# Patient Record
Sex: FEMALE | Race: WHITE | Employment: FULL TIME | URBAN - METROPOLITAN AREA
[De-identification: names, ages, dates, MRNs, and addresses within clinical notes are randomized per-mention and may not be internally consistent; named-entity substitution may affect disease eponyms.]

---

## 2017-11-03 ENCOUNTER — ED HISTORICAL/CONVERTED ENCOUNTER (OUTPATIENT)
Dept: OTHER | Age: 42
End: 2017-11-03

## 2022-09-16 ENCOUNTER — HOSPITAL ENCOUNTER (INPATIENT)
Age: 47
LOS: 4 days | Discharge: HOME | DRG: 193 | End: 2022-09-20
Attending: STUDENT IN AN ORGANIZED HEALTH CARE EDUCATION/TRAINING PROGRAM | Admitting: INTERNAL MEDICINE

## 2022-09-16 ENCOUNTER — APPOINTMENT (OUTPATIENT)
Dept: GENERAL RADIOLOGY | Age: 47
DRG: 193 | End: 2022-09-16
Attending: STUDENT IN AN ORGANIZED HEALTH CARE EDUCATION/TRAINING PROGRAM

## 2022-09-16 ENCOUNTER — APPOINTMENT (OUTPATIENT)
Dept: CT IMAGING | Age: 47
DRG: 193 | End: 2022-09-16
Attending: INTERNAL MEDICINE

## 2022-09-16 DIAGNOSIS — J44.1 ACUTE EXACERBATION OF CHRONIC OBSTRUCTIVE PULMONARY DISEASE (COPD) (HCC): Primary | ICD-10-CM

## 2022-09-16 LAB
ALBUMIN SERPL-MCNC: 3.8 G/DL (ref 3.5–5)
ALBUMIN/GLOB SERPL: 1.1 {RATIO} (ref 1.1–2.2)
ALP SERPL-CCNC: 78 U/L (ref 45–117)
ALT SERPL-CCNC: 24 U/L (ref 12–78)
ANION GAP SERPL CALC-SCNC: 5 MMOL/L (ref 5–15)
AST SERPL W P-5'-P-CCNC: 16 U/L (ref 15–37)
BASOPHILS # BLD: 0.1 K/UL (ref 0–0.1)
BASOPHILS NFR BLD: 1 % (ref 0–1)
BILIRUB SERPL-MCNC: 0.4 MG/DL (ref 0.2–1)
BNP SERPL-MCNC: 74 PG/ML
BUN SERPL-MCNC: 7 MG/DL (ref 6–20)
BUN/CREAT SERPL: 10 (ref 12–20)
CA-I BLD-MCNC: 9.4 MG/DL (ref 8.5–10.1)
CHLORIDE SERPL-SCNC: 105 MMOL/L (ref 97–108)
CO2 SERPL-SCNC: 26 MMOL/L (ref 21–32)
CREAT SERPL-MCNC: 0.71 MG/DL (ref 0.55–1.02)
DIFFERENTIAL METHOD BLD: ABNORMAL
EOSINOPHIL # BLD: 0 K/UL (ref 0–0.4)
EOSINOPHIL NFR BLD: 0 % (ref 0–7)
ERYTHROCYTE [DISTWIDTH] IN BLOOD BY AUTOMATED COUNT: 14.3 % (ref 11.5–14.5)
FLUAV RNA SPEC QL NAA+PROBE: NOT DETECTED
FLUBV RNA SPEC QL NAA+PROBE: NOT DETECTED
GLOBULIN SER CALC-MCNC: 3.6 G/DL (ref 2–4)
GLUCOSE SERPL-MCNC: 109 MG/DL (ref 65–100)
HCT VFR BLD AUTO: 41.7 % (ref 35–47)
HGB BLD-MCNC: 14 G/DL (ref 11.5–16)
IMM GRANULOCYTES # BLD AUTO: 0 K/UL (ref 0–0.04)
IMM GRANULOCYTES NFR BLD AUTO: 0 % (ref 0–0.5)
LYMPHOCYTES # BLD: 0.6 K/UL (ref 0.8–3.5)
LYMPHOCYTES NFR BLD: 5 % (ref 12–49)
MCH RBC QN AUTO: 29.5 PG (ref 26–34)
MCHC RBC AUTO-ENTMCNC: 33.6 G/DL (ref 30–36.5)
MCV RBC AUTO: 87.8 FL (ref 80–99)
MONOCYTES # BLD: 0.9 K/UL (ref 0–1)
MONOCYTES NFR BLD: 8 % (ref 5–13)
NEUTS SEG # BLD: 9.5 K/UL (ref 1.8–8)
NEUTS SEG NFR BLD: 86 % (ref 32–75)
NRBC # BLD: 0 K/UL (ref 0–0.01)
NRBC BLD-RTO: 0 PER 100 WBC
PLATELET # BLD AUTO: 260 K/UL (ref 150–400)
PMV BLD AUTO: 11.2 FL (ref 8.9–12.9)
POTASSIUM SERPL-SCNC: 4.3 MMOL/L (ref 3.5–5.1)
PROT SERPL-MCNC: 7.4 G/DL (ref 6.4–8.2)
RBC # BLD AUTO: 4.75 M/UL (ref 3.8–5.2)
SARS-COV-2, COV2: NOT DETECTED
SODIUM SERPL-SCNC: 136 MMOL/L (ref 136–145)
TROPONIN-HIGH SENSITIVITY: 7 NG/L (ref 0–51)
WBC # BLD AUTO: 11.1 K/UL (ref 3.6–11)

## 2022-09-16 PROCEDURE — 83880 ASSAY OF NATRIURETIC PEPTIDE: CPT

## 2022-09-16 PROCEDURE — 80053 COMPREHEN METABOLIC PANEL: CPT

## 2022-09-16 PROCEDURE — 94640 AIRWAY INHALATION TREATMENT: CPT

## 2022-09-16 PROCEDURE — 99285 EMERGENCY DEPT VISIT HI MDM: CPT

## 2022-09-16 PROCEDURE — 85025 COMPLETE CBC W/AUTO DIFF WBC: CPT

## 2022-09-16 PROCEDURE — 74011000250 HC RX REV CODE- 250: Performed by: INTERNAL MEDICINE

## 2022-09-16 PROCEDURE — 74011250636 HC RX REV CODE- 250/636: Performed by: STUDENT IN AN ORGANIZED HEALTH CARE EDUCATION/TRAINING PROGRAM

## 2022-09-16 PROCEDURE — 96374 THER/PROPH/DIAG INJ IV PUSH: CPT

## 2022-09-16 PROCEDURE — 84484 ASSAY OF TROPONIN QUANT: CPT

## 2022-09-16 PROCEDURE — 71045 X-RAY EXAM CHEST 1 VIEW: CPT

## 2022-09-16 PROCEDURE — 36415 COLL VENOUS BLD VENIPUNCTURE: CPT

## 2022-09-16 PROCEDURE — 87636 SARSCOV2 & INF A&B AMP PRB: CPT

## 2022-09-16 PROCEDURE — 74011250636 HC RX REV CODE- 250/636: Performed by: INTERNAL MEDICINE

## 2022-09-16 PROCEDURE — 71260 CT THORAX DX C+: CPT

## 2022-09-16 PROCEDURE — 65270000029 HC RM PRIVATE

## 2022-09-16 PROCEDURE — 74011250637 HC RX REV CODE- 250/637: Performed by: INTERNAL MEDICINE

## 2022-09-16 PROCEDURE — 74011000250 HC RX REV CODE- 250: Performed by: STUDENT IN AN ORGANIZED HEALTH CARE EDUCATION/TRAINING PROGRAM

## 2022-09-16 PROCEDURE — 74011250637 HC RX REV CODE- 250/637: Performed by: STUDENT IN AN ORGANIZED HEALTH CARE EDUCATION/TRAINING PROGRAM

## 2022-09-16 PROCEDURE — 74011000636 HC RX REV CODE- 636: Performed by: INTERNAL MEDICINE

## 2022-09-16 RX ORDER — ACETAMINOPHEN 325 MG/1
650 TABLET ORAL
Status: DISCONTINUED | OUTPATIENT
Start: 2022-09-16 | End: 2022-09-20 | Stop reason: HOSPADM

## 2022-09-16 RX ORDER — BUDESONIDE AND FORMOTEROL FUMARATE DIHYDRATE 160; 4.5 UG/1; UG/1
2 AEROSOL RESPIRATORY (INHALATION)
Status: DISCONTINUED | OUTPATIENT
Start: 2022-09-16 | End: 2022-09-20 | Stop reason: HOSPADM

## 2022-09-16 RX ORDER — IPRATROPIUM BROMIDE AND ALBUTEROL SULFATE 2.5; .5 MG/3ML; MG/3ML
3 SOLUTION RESPIRATORY (INHALATION)
Status: COMPLETED | OUTPATIENT
Start: 2022-09-16 | End: 2022-09-16

## 2022-09-16 RX ORDER — IPRATROPIUM BROMIDE AND ALBUTEROL SULFATE 2.5; .5 MG/3ML; MG/3ML
3 SOLUTION RESPIRATORY (INHALATION)
Status: DISCONTINUED | OUTPATIENT
Start: 2022-09-16 | End: 2022-09-18

## 2022-09-16 RX ORDER — AZITHROMYCIN 500 MG/1
500 TABLET, FILM COATED ORAL
Status: COMPLETED | OUTPATIENT
Start: 2022-09-16 | End: 2022-09-16

## 2022-09-16 RX ORDER — ONDANSETRON 2 MG/ML
4 INJECTION INTRAMUSCULAR; INTRAVENOUS
Status: DISCONTINUED | OUTPATIENT
Start: 2022-09-16 | End: 2022-09-20 | Stop reason: HOSPADM

## 2022-09-16 RX ORDER — DIPHENHYDRAMINE HCL 25 MG
25 TABLET ORAL AS NEEDED
COMMUNITY

## 2022-09-16 RX ORDER — ENOXAPARIN SODIUM 100 MG/ML
30 INJECTION SUBCUTANEOUS 2 TIMES DAILY
Status: DISCONTINUED | OUTPATIENT
Start: 2022-09-17 | End: 2022-09-20 | Stop reason: HOSPADM

## 2022-09-16 RX ORDER — POLYETHYLENE GLYCOL 3350 17 G/17G
17 POWDER, FOR SOLUTION ORAL DAILY PRN
Status: DISCONTINUED | OUTPATIENT
Start: 2022-09-16 | End: 2022-09-20 | Stop reason: HOSPADM

## 2022-09-16 RX ORDER — ALBUTEROL SULFATE 0.83 MG/ML
2.5 SOLUTION RESPIRATORY (INHALATION)
COMMUNITY

## 2022-09-16 RX ORDER — ONDANSETRON 4 MG/1
4 TABLET, ORALLY DISINTEGRATING ORAL
Status: DISCONTINUED | OUTPATIENT
Start: 2022-09-16 | End: 2022-09-20 | Stop reason: HOSPADM

## 2022-09-16 RX ORDER — SODIUM CHLORIDE 0.9 % (FLUSH) 0.9 %
5-40 SYRINGE (ML) INJECTION EVERY 8 HOURS
Status: DISCONTINUED | OUTPATIENT
Start: 2022-09-16 | End: 2022-09-16

## 2022-09-16 RX ORDER — SODIUM CHLORIDE 0.9 % (FLUSH) 0.9 %
5-40 SYRINGE (ML) INJECTION AS NEEDED
Status: DISCONTINUED | OUTPATIENT
Start: 2022-09-16 | End: 2022-09-20 | Stop reason: HOSPADM

## 2022-09-16 RX ORDER — ALBUTEROL SULFATE 90 UG/1
2 AEROSOL, METERED RESPIRATORY (INHALATION)
COMMUNITY

## 2022-09-16 RX ORDER — ACETAMINOPHEN 650 MG/1
650 SUPPOSITORY RECTAL
Status: DISCONTINUED | OUTPATIENT
Start: 2022-09-16 | End: 2022-09-20 | Stop reason: HOSPADM

## 2022-09-16 RX ORDER — PANTOPRAZOLE SODIUM 40 MG/1
40 TABLET, DELAYED RELEASE ORAL DAILY
Status: DISCONTINUED | OUTPATIENT
Start: 2022-09-17 | End: 2022-09-20 | Stop reason: HOSPADM

## 2022-09-16 RX ADMIN — ACETAMINOPHEN 650 MG: 325 TABLET, FILM COATED ORAL at 14:14

## 2022-09-16 RX ADMIN — IPRATROPIUM BROMIDE AND ALBUTEROL SULFATE 3 ML: .5; 2.5 SOLUTION RESPIRATORY (INHALATION) at 08:57

## 2022-09-16 RX ADMIN — METHYLPREDNISOLONE SODIUM SUCCINATE 60 MG: 40 INJECTION, POWDER, FOR SOLUTION INTRAMUSCULAR; INTRAVENOUS at 14:09

## 2022-09-16 RX ADMIN — METHYLPREDNISOLONE SODIUM SUCCINATE 60 MG: 40 INJECTION, POWDER, FOR SOLUTION INTRAMUSCULAR; INTRAVENOUS at 21:45

## 2022-09-16 RX ADMIN — BUDESONIDE AND FORMOTEROL FUMARATE DIHYDRATE 2 PUFF: 160; 4.5 AEROSOL RESPIRATORY (INHALATION) at 20:49

## 2022-09-16 RX ADMIN — LEVOFLOXACIN 750 MG: 500 TABLET, FILM COATED ORAL at 12:44

## 2022-09-16 RX ADMIN — IPRATROPIUM BROMIDE AND ALBUTEROL SULFATE 3 ML: .5; 2.5 SOLUTION RESPIRATORY (INHALATION) at 08:58

## 2022-09-16 RX ADMIN — IOPAMIDOL 100 ML: 755 INJECTION, SOLUTION INTRAVENOUS at 12:39

## 2022-09-16 RX ADMIN — AZITHROMYCIN MONOHYDRATE 500 MG: 500 TABLET ORAL at 08:58

## 2022-09-16 RX ADMIN — IPRATROPIUM BROMIDE AND ALBUTEROL SULFATE 3 ML: .5; 3 SOLUTION RESPIRATORY (INHALATION) at 20:49

## 2022-09-16 RX ADMIN — SODIUM CHLORIDE, PRESERVATIVE FREE 10 ML: 5 INJECTION INTRAVENOUS at 14:09

## 2022-09-16 RX ADMIN — IPRATROPIUM BROMIDE AND ALBUTEROL SULFATE 3 ML: .5; 3 SOLUTION RESPIRATORY (INHALATION) at 14:09

## 2022-09-16 RX ADMIN — METHYLPREDNISOLONE SODIUM SUCCINATE 125 MG: 125 INJECTION, POWDER, FOR SOLUTION INTRAMUSCULAR; INTRAVENOUS at 08:58

## 2022-09-16 NOTE — PROGRESS NOTES
Patient arrived to room via wheelchair and able to ambulate to the bed. Standard safety measures are in place. Patient A&O x4, patient has no needs at this time.

## 2022-09-16 NOTE — H&P
History and Physical    Patient: Kelsey Torres MRN: [id number]  SSN: [id number]    YOB: 1975  Age: 49 y.o. Sex: female      Subjective:      Kelsey Torres is a 49 y.o. female who presents to ER with 2 days of increasing wheezing, difficulty breathing, productive cough. +Tobacco. Has NOT received Covid vaccination. On inhalers at home. No other significant hx or ROS. PMH: Asthma/COPD  PSH: Negative  Med: Inhalers  SH: + Tobacco  FH: Negative       No Known Allergies    Review of Systems:  Constitutional: No fevers, No chills, No night sweats, No fatigue, No weakness, No significant weight change  Eyes: No visual disturbance, No loss of vision  HENT: No nasal congestion, No sore throat, No head lesion, No hearing deficit  Respiratory: +SOB, wheeze, productive cough  Cardiovascular: No chest pain, No lower extremity edema, No palpitations, No PND, No orthopnea  Gastrointestinal: No nausea, No vomiting, No diarrhea, No constipation, No abdominal pain, No Melena, No hematemesis, No BRBPR  Genitourinary: No frequency, No dysuria, No hematuria  Integument/Breast: No rash, No new skin lesion(s), No dryness, No new palpable nodule  Musculoskeletal: No arthralgias, No neck pain, No back pain, No joint pain, No fall or injury  Neurological: No headaches, No dizziness, No confusion,  No seizures, No focal weakness, No LOC, No paresthesia  Heme/Onc: No overt bleeding noted, No obvious swollen glands  Behavioral/Psychiatric: No change in mood; no SI; no hallucination      Objective:     Vitals:    09/16/22 1032 09/16/22 1039 09/16/22 1040 09/16/22 1122   BP:    (!) 141/91   Pulse:    (!) 102   Resp:    16   Temp:       SpO2: (!) 87% 91% 92% 92%   Weight:       Height:            Physical Exam:    General: +distress; accessory muscles  Head: atraumatic  Eye: No overt orbital findings, PERRLA   ENT: No overt hearing loss noted; No nasal lesion;  Throat julia  Neck: Supple, No overt palpable mass, No significant palpable lymph nodes  Vascular: No carotid bruit, palpable pulses bilat  Lung:  bilat wheezing; 3L NC  Heart: S1S2, No significant murmur appreciated  Abdomen: Soft, NT, No rigidity, No rebound, Bowel sounds +  Extremities: No edema  M/S: No traumatic change, active mobility noted  Skin: No cyanosis, No rash of significance (other than chronic lesions)  Neurologic: No overt focal motor deficit. Oriented. Psychiatric: Coherent and age appropriate    Recent Results (from the past 24 hour(s))   METABOLIC PANEL, COMPREHENSIVE    Collection Time: 09/16/22  8:57 AM   Result Value Ref Range    Sodium 136 136 - 145 mmol/L    Potassium 4.3 3.5 - 5.1 mmol/L    Chloride 105 97 - 108 mmol/L    CO2 26 21 - 32 mmol/L    Anion gap 5 5 - 15 mmol/L    Glucose 109 (H) 65 - 100 mg/dL    BUN 7 6 - 20 mg/dL    Creatinine 0.71 0.55 - 1.02 mg/dL    BUN/Creatinine ratio 10 (L) 12 - 20      GFR est AA >60 >60 ml/min/1.73m2    GFR est non-AA >60 >60 ml/min/1.73m2    Calcium 9.4 8.5 - 10.1 mg/dL    Bilirubin, total 0.4 0.2 - 1.0 mg/dL    AST (SGOT) 16 15 - 37 U/L    ALT (SGPT) 24 12 - 78 U/L    Alk. phosphatase 78 45 - 117 U/L    Protein, total 7.4 6.4 - 8.2 g/dL    Albumin 3.8 3.5 - 5.0 g/dL    Globulin 3.6 2.0 - 4.0 g/dL    A-G Ratio 1.1 1.1 - 2.2     CBC WITH AUTOMATED DIFF    Collection Time: 09/16/22  8:57 AM   Result Value Ref Range    WBC 11.1 (H) 3.6 - 11.0 K/uL    RBC 4.75 3.80 - 5.20 M/uL    HGB 14.0 11.5 - 16.0 g/dL    HCT 41.7 35.0 - 47.0 %    MCV 87.8 80.0 - 99.0 FL    MCH 29.5 26.0 - 34.0 PG    MCHC 33.6 30.0 - 36.5 g/dL    RDW 14.3 11.5 - 14.5 %    PLATELET 210 996 - 059 K/uL    MPV 11.2 8.9 - 12.9 FL    NRBC 0.0 0.0  WBC    ABSOLUTE NRBC 0.00 0.00 - 0.01 K/uL    NEUTROPHILS 86 (H) 32 - 75 %    LYMPHOCYTES 5 (L) 12 - 49 %    MONOCYTES 8 5 - 13 %    EOSINOPHILS 0 0 - 7 %    BASOPHILS 1 0 - 1 %    IMMATURE GRANULOCYTES 0 0 - 0.5 %    ABS. NEUTROPHILS 9.5 (H) 1.8 - 8.0 K/UL    ABS.  LYMPHOCYTES 0.6 (L) 0.8 - 3.5 K/UL    ABS. MONOCYTES 0.9 0.0 - 1.0 K/UL    ABS. EOSINOPHILS 0.0 0.0 - 0.4 K/UL    ABS. BASOPHILS 0.1 0.0 - 0.1 K/UL    ABS. IMM. GRANS. 0.0 0.00 - 0.04 K/UL    DF AUTOMATED     TROPONIN-HIGH SENSITIVITY    Collection Time: 09/16/22  8:57 AM   Result Value Ref Range    Troponin-High Sensitivity 7 0 - 51 ng/L   NT-PRO BNP    Collection Time: 09/16/22  8:57 AM   Result Value Ref Range    NT pro-BNP 74 <125 pg/mL       XR Results (maximum last 3): Results from Hospital Encounter encounter on 09/16/22    XR CHEST PORT    Narrative  Indication: Shortness of breath    Comparison: None    Portable exam of the chest obtained at 927 demonstrates normal heart size. There  is no acute process in the lung fields. Old right rib fracture is noted. Impression  No acute process.         Assessment/Plan:     Acute Hypoxic Respiratory Failure d/t AECOPD with Chronic Tobacco    - Admit  - Solu-medrol, O2, Levaquin, Duoneb  - C/s pulm  - CT Chest  - Covid test  - Nicotine    DVT Prophylaxis: Lovenox  Code Status: FULL    Signed By: Sadie Robles MD     September 16, 2022

## 2022-09-16 NOTE — ED PROVIDER NOTES
EMERGENCY DEPARTMENT HISTORY AND PHYSICAL EXAM      Date: 9/16/2022  Patient Name: Farhad Barnes    History of Presenting Illness   No chief complaint on file. History Provided By: Patient    HPI: Farhad Barnes, 49 y.o. female with past medical history of asthma versus COPD presents with complaint of difficulty breathing and symptoms started yesterday while she was working her shift. She has used her home inhalers without significant resolution. She does not recall the last time she used steroid. She endorses increased sputum production, no fevers or chills no associated chest pain. No  or GI symptoms. There are no other complaints, changes, or physical findings at this time. PCP: None    No current facility-administered medications on file prior to encounter. Current Outpatient Medications on File Prior to Encounter   Medication Sig Dispense Refill    albuterol (PROVENTIL HFA, VENTOLIN HFA, PROAIR HFA) 90 mcg/actuation inhaler Take 2 Puffs by inhalation every six (6) hours as needed for Wheezing. albuterol (PROVENTIL VENTOLIN) 2.5 mg /3 mL (0.083 %) nebu 2.5 mg by Nebulization route every six (6) hours as needed for Wheezing. diphenhydrAMINE (BENADRYL) 25 mg tablet Take 25 mg by mouth as needed. Past History     Past Medical History:  History reviewed. No pertinent past medical history. Past Surgical History:  History reviewed. No pertinent surgical history. Family History:  No family history on file. Social History: Allergies:  No Known Allergies    Review of Systems   Review of Systems  Constitutional: Negative except as in HPI. Eyes: Negative except as in HPI.  ENT: Negative except as in HPI. Cardiovascular: Negative except as in HPI. Respiratory: Negative except as in HPI. Gastrointestinal: Negative except as in HPI. Genitourinary: Negative except as in HPI. Musculoskeletal: Negative except as in HPI.   Integumentary: Negative except as in HPI.  Neurological: Negative except as in HPI. Psychiatric: Negative except as in HPI. Endocrine: Negative except as in HPI. Hematologic/Lymphatic: Negative except as in HPI. Allergic/Immunologic: Negative except as in HPI. Physical Exam   Physical Exam  Constitutional:       Appearance: Normal appearance. HENT:      Head: Normocephalic and atraumatic. Nose: Nose normal.      Mouth/Throat:      Mouth: Mucous membranes are moist.   Eyes:      Conjunctiva/sclera: Conjunctivae normal.      Pupils: Pupils are equal, round, and reactive to light. Cardiovascular:      Rate and Rhythm: Regular rhythm. Tachycardia present. Heart sounds: Normal heart sounds. Pulmonary:      Effort: Respiratory distress present. Breath sounds: Wheezing present. Abdominal:      General: There is no distension. Palpations: Abdomen is soft. Tenderness: There is no abdominal tenderness. Musculoskeletal:         General: No tenderness or deformity. Normal range of motion. Cervical back: Normal range of motion and neck supple. Skin:     General: Skin is warm and dry. Neurological:      General: No focal deficit present. Mental Status: She is alert and oriented to person, place, and time.    Psychiatric:         Mood and Affect: Mood normal.         Behavior: Behavior normal.       Lab and Diagnostic Study Results   Labs -     Recent Results (from the past 12 hour(s))   METABOLIC PANEL, COMPREHENSIVE    Collection Time: 09/16/22  8:57 AM   Result Value Ref Range    Sodium 136 136 - 145 mmol/L    Potassium 4.3 3.5 - 5.1 mmol/L    Chloride 105 97 - 108 mmol/L    CO2 26 21 - 32 mmol/L    Anion gap 5 5 - 15 mmol/L    Glucose 109 (H) 65 - 100 mg/dL    BUN 7 6 - 20 mg/dL    Creatinine 0.71 0.55 - 1.02 mg/dL    BUN/Creatinine ratio 10 (L) 12 - 20      GFR est AA >60 >60 ml/min/1.73m2    GFR est non-AA >60 >60 ml/min/1.73m2    Calcium 9.4 8.5 - 10.1 mg/dL    Bilirubin, total 0.4 0.2 - 1.0 mg/dL    AST (SGOT) 16 15 - 37 U/L    ALT (SGPT) 24 12 - 78 U/L    Alk. phosphatase 78 45 - 117 U/L    Protein, total 7.4 6.4 - 8.2 g/dL    Albumin 3.8 3.5 - 5.0 g/dL    Globulin 3.6 2.0 - 4.0 g/dL    A-G Ratio 1.1 1.1 - 2.2     CBC WITH AUTOMATED DIFF    Collection Time: 09/16/22  8:57 AM   Result Value Ref Range    WBC 11.1 (H) 3.6 - 11.0 K/uL    RBC 4.75 3.80 - 5.20 M/uL    HGB 14.0 11.5 - 16.0 g/dL    HCT 41.7 35.0 - 47.0 %    MCV 87.8 80.0 - 99.0 FL    MCH 29.5 26.0 - 34.0 PG    MCHC 33.6 30.0 - 36.5 g/dL    RDW 14.3 11.5 - 14.5 %    PLATELET 425 201 - 397 K/uL    MPV 11.2 8.9 - 12.9 FL    NRBC 0.0 0.0  WBC    ABSOLUTE NRBC 0.00 0.00 - 0.01 K/uL    NEUTROPHILS 86 (H) 32 - 75 %    LYMPHOCYTES 5 (L) 12 - 49 %    MONOCYTES 8 5 - 13 %    EOSINOPHILS 0 0 - 7 %    BASOPHILS 1 0 - 1 %    IMMATURE GRANULOCYTES 0 0 - 0.5 %    ABS. NEUTROPHILS 9.5 (H) 1.8 - 8.0 K/UL    ABS. LYMPHOCYTES 0.6 (L) 0.8 - 3.5 K/UL    ABS. MONOCYTES 0.9 0.0 - 1.0 K/UL    ABS. EOSINOPHILS 0.0 0.0 - 0.4 K/UL    ABS. BASOPHILS 0.1 0.0 - 0.1 K/UL    ABS. IMM. GRANS. 0.0 0.00 - 0.04 K/UL    DF AUTOMATED     TROPONIN-HIGH SENSITIVITY    Collection Time: 09/16/22  8:57 AM   Result Value Ref Range    Troponin-High Sensitivity 7 0 - 51 ng/L   NT-PRO BNP    Collection Time: 09/16/22  8:57 AM   Result Value Ref Range    NT pro-BNP 74 <125 pg/mL   COVID-19 WITH INFLUENZA A/B    Collection Time: 09/16/22 12:19 PM   Result Value Ref Range    SARS-CoV-2 by PCR Not Detected Not Detected      Influenza A by PCR Not Detected Not Detected      Influenza B by PCR Not Detected Not Detected         Radiologic Studies -   @lastxrresult@  CT Results  (Last 48 hours)      None          CXR Results  (Last 48 hours)                 09/16/22 0927  XR CHEST PORT Final result    Impression:  No acute process. Narrative: Indication: Shortness of breath       Comparison: None       Portable exam of the chest obtained at 927 demonstrates normal heart size.  There   is no acute process in the lung fields. Old right rib fracture is noted. Medical Decision Making and ED Course   Differential Diagnosis & Medical Decision Making Provider Note:   18-year-old female presenting for evaluation of difficulty breathing. Patient in extremis on initial exam with expiratory wheezing throughout and intermittently descending into the high 80s. Placed on oxygen and treat for presumed COPD exacerbation with steroid and azithromycin given report of possible prior COPD diagnoses and increase in sputum production in the setting of this exacerbation. Will reevaluate following treatment. Chest x-ray to rule out pneumonia. - I am the first provider for this patient. I reviewed the vital signs, available nursing notes, past medical history, past surgical history, family history and social history. The patients presenting problems have been discussed, and they are in agreement with the care plan formulated and outlined with them. I have encouraged them to ask questions as they arise throughout their visit. Vital Signs-Reviewed the patient's vital signs. Patient Vitals for the past 12 hrs:   Temp Pulse Resp BP SpO2   09/16/22 1426 -- (!) 113 17 (!) 140/67 91 %   09/16/22 1122 -- (!) 102 16 (!) 141/91 92 %   09/16/22 1040 -- -- -- -- 92 %   09/16/22 1039 -- -- -- -- 91 %   09/16/22 1032 -- -- -- -- (!) 87 %   09/16/22 0955 -- -- -- -- 95 %   09/16/22 0940 -- -- -- (!) 159/84 93 %   09/16/22 0925 -- -- -- (!) 152/90 96 %   09/16/22 0910 -- -- -- (!) 130/94 98 %   09/16/22 0855 99.8 °F (37.7 °C) -- -- -- 93 %   09/16/22 0852 -- (!) 114 26 (!) 186/100 (!) 88 %       ED Course:   ED Course as of 09/16/22 1457   Fri Sep 16, 2022   1121 Respiratory effort improved but desaturating to mid 80s on room air. Placed back on 3 L nasal cannula, admitted for hypoxia in the setting of COPD exacerbation.  [BQ]      ED Course User Index  [BQ] Sandra Mosqueda MD         Procedures   Performed by: Gricel Brush MD  Procedures      Disposition   Disposition: Admitted to Floor Medical Floor the case was discussed with the admitting physician Tereza Rivera    Diagnosis/Clinical Impression     Clinical Impression:   1. Acute exacerbation of chronic obstructive pulmonary disease (COPD) (HCC)        Attestations: Eloy Goodwin MD, am the primary clinician of record. Please note that this dictation was completed with [employer], the computer voice recognition software. Quite often unanticipated grammatical, syntax, homophones, and other interpretive errors are inadvertently transcribed by the computer software. Please disregard these errors. Please excuse any errors that have escaped final proofreading. Thank you.

## 2022-09-16 NOTE — PROGRESS NOTES
Reason for Admission:  SOB                     RUR Score:    4%                 Plan for utilizing home health:  Not at this time        PCP: First and Last name:  None     Name of Practice:    Are you a current patient: Yes/No:    Approximate date of last visit:    Can you participate in a virtual visit with your PCP:                     Current Advanced Directive/Advance Care Plan: Full Code      Healthcare Decision Maker:   Click here to complete [address] including selection of the Healthcare Decision Maker Relationship (ie \"Primary\")      Irma Shahid, boyfriend, [phone], Pt stated that Denise Swenson can make decision for her should she not be able to. Transition of Care Plan:      Met f/f with Pt, she confirmed that the information on the face sheet is correct. Pt stated that she lived at [facility] for about a year and not she lives at [facility]. Pt stated that she works at [employer]. Pt stated no HH, no DME and independent with ADL. Pt stated that she uses [address] on [facility] [address] Pt stated that her friend will give her a ride home when she is DC. Pt may need home oxygen, Pt will need to be tested. CM  dispo: home with no needs at this time.

## 2022-09-16 NOTE — ED NOTES
Pt trailed off 02 at 3L, pt o2 saturations  decreases to 87-88 percent, placed back on 3L via NC. Provider notified.

## 2022-09-16 NOTE — PROGRESS NOTES
Admission Medication Reconciliation:    Information obtained from:  Patient    Comments/Recommendations: Reviewed PTA medications and patient's allergies. Allergies:  Patient has no known allergies. Significant PMH/Disease States: History reviewed. No pertinent past medical history. Chief Complaint for this Admission:  No chief complaint on file. Prior to Admission Medications:   Prior to Admission Medications   Prescriptions Last Dose Informant Patient Reported? Taking? albuterol (PROVENTIL HFA, VENTOLIN HFA, PROAIR HFA) 90 mcg/actuation inhaler  Self Yes Yes   Sig: Take 2 Puffs by inhalation every six (6) hours as needed for Wheezing. albuterol (PROVENTIL VENTOLIN) 2.5 mg /3 mL (0.083 %) nebu  Self Yes Yes   Si.5 mg by Nebulization route every six (6) hours as needed for Wheezing. diphenhydrAMINE (BENADRYL) 25 mg tablet  Self Yes Yes   Sig: Take 25 mg by mouth as needed.       Facility-Administered Medications: None       Chasity Coppola

## 2022-09-16 NOTE — ED NOTES
Pt report called to nurse Vincenzo Dumont. Pt sitting on side of bed and eating dinner. TRANSFER - OUT REPORT:    Verbal report given to Megan patrick John  being transferred to (unit) for routine progression of care       Report consisted of patients Situation, Background, Assessment and   Recommendations(SBAR). Information from the following report(s) SBAR, Kardex, ED Summary, MAR, Recent Results, and Cardiac Rhythm sinus tach  was reviewed with the receiving nurse. Lines:   Peripheral IV 09/16/22 Anterior;Proximal;Right Forearm (Active)        Opportunity for questions and clarification was provided. Patient transported with:   Monitor  O2 @ 3 liters  Tech          History reviewed. No pertinent past medical history.

## 2022-09-17 PROCEDURE — 74011000250 HC RX REV CODE- 250: Performed by: INTERNAL MEDICINE

## 2022-09-17 PROCEDURE — 74011250637 HC RX REV CODE- 250/637: Performed by: HOSPITALIST

## 2022-09-17 PROCEDURE — 94761 N-INVAS EAR/PLS OXIMETRY MLT: CPT

## 2022-09-17 PROCEDURE — 65270000029 HC RM PRIVATE

## 2022-09-17 PROCEDURE — 94640 AIRWAY INHALATION TREATMENT: CPT

## 2022-09-17 PROCEDURE — 74011250637 HC RX REV CODE- 250/637: Performed by: INTERNAL MEDICINE

## 2022-09-17 PROCEDURE — 77010033678 HC OXYGEN DAILY

## 2022-09-17 PROCEDURE — 74011250636 HC RX REV CODE- 250/636: Performed by: INTERNAL MEDICINE

## 2022-09-17 RX ORDER — CHOLECALCIFEROL (VITAMIN D3) 125 MCG
5 CAPSULE ORAL
Status: DISCONTINUED | OUTPATIENT
Start: 2022-09-17 | End: 2022-09-20 | Stop reason: HOSPADM

## 2022-09-17 RX ORDER — GUAIFENESIN 600 MG/1
600 TABLET, EXTENDED RELEASE ORAL EVERY 12 HOURS
Status: DISCONTINUED | OUTPATIENT
Start: 2022-09-17 | End: 2022-09-18

## 2022-09-17 RX ORDER — BENZONATATE 100 MG/1
100 CAPSULE ORAL
Status: DISCONTINUED | OUTPATIENT
Start: 2022-09-17 | End: 2022-09-20 | Stop reason: HOSPADM

## 2022-09-17 RX ADMIN — MELATONIN TAB 5 MG 5 MG: 5 TAB at 23:06

## 2022-09-17 RX ADMIN — LEVOFLOXACIN 750 MG: 500 TABLET, FILM COATED ORAL at 12:33

## 2022-09-17 RX ADMIN — ENOXAPARIN SODIUM 30 MG: 100 INJECTION SUBCUTANEOUS at 08:40

## 2022-09-17 RX ADMIN — GUAIFENESIN 600 MG: 600 TABLET, EXTENDED RELEASE ORAL at 20:38

## 2022-09-17 RX ADMIN — IPRATROPIUM BROMIDE AND ALBUTEROL SULFATE 3 ML: .5; 3 SOLUTION RESPIRATORY (INHALATION) at 13:26

## 2022-09-17 RX ADMIN — METHYLPREDNISOLONE SODIUM SUCCINATE 40 MG: 40 INJECTION, POWDER, FOR SOLUTION INTRAMUSCULAR; INTRAVENOUS at 20:38

## 2022-09-17 RX ADMIN — IPRATROPIUM BROMIDE AND ALBUTEROL SULFATE 3 ML: .5; 3 SOLUTION RESPIRATORY (INHALATION) at 02:00

## 2022-09-17 RX ADMIN — PANTOPRAZOLE SODIUM 40 MG: 40 TABLET, DELAYED RELEASE ORAL at 08:41

## 2022-09-17 RX ADMIN — IPRATROPIUM BROMIDE AND ALBUTEROL SULFATE 3 ML: .5; 3 SOLUTION RESPIRATORY (INHALATION) at 20:14

## 2022-09-17 RX ADMIN — ACETAMINOPHEN 650 MG: 325 TABLET, FILM COATED ORAL at 12:37

## 2022-09-17 RX ADMIN — BUDESONIDE AND FORMOTEROL FUMARATE DIHYDRATE 2 PUFF: 160; 4.5 AEROSOL RESPIRATORY (INHALATION) at 20:14

## 2022-09-17 RX ADMIN — BUDESONIDE AND FORMOTEROL FUMARATE DIHYDRATE 2 PUFF: 160; 4.5 AEROSOL RESPIRATORY (INHALATION) at 08:03

## 2022-09-17 RX ADMIN — ENOXAPARIN SODIUM 30 MG: 100 INJECTION SUBCUTANEOUS at 20:38

## 2022-09-17 RX ADMIN — IPRATROPIUM BROMIDE AND ALBUTEROL SULFATE 3 ML: .5; 3 SOLUTION RESPIRATORY (INHALATION) at 08:03

## 2022-09-17 RX ADMIN — BENZONATATE 100 MG: 100 CAPSULE ORAL at 17:29

## 2022-09-17 RX ADMIN — METHYLPREDNISOLONE SODIUM SUCCINATE 60 MG: 40 INJECTION, POWDER, FOR SOLUTION INTRAMUSCULAR; INTRAVENOUS at 15:06

## 2022-09-17 RX ADMIN — METHYLPREDNISOLONE SODIUM SUCCINATE 60 MG: 40 INJECTION, POWDER, FOR SOLUTION INTRAMUSCULAR; INTRAVENOUS at 05:45

## 2022-09-17 NOTE — PROGRESS NOTES
PULMONARY CONSULT  [facility]    Name: Ad Rodriguez MRN: [id number]   : 1975 Hospital: [facility] [place]   Date: 2022  Admission date: 2022 Hospital Day: 2       HPI:     Hospital Problems  Never Reviewed            Codes Class Noted POA    COPD with acute exacerbation [facility]) ICD-10-CM: J44.1  ICD-9-CM: 491.21  2022 Unknown                [x] High complexity decision making was performed  [x] See my orders for details      Subjective/Initial History:     I was asked by Tone Auguste MD to see Ad Rodriguez  a 49 y.o.  female in consultation     Excerpts from admission 2022 or consult notes as follows:     Ad Rodriguez, 49 y.o. female with past medical history of asthma versus COPD presents with complaint of difficulty breathing and symptoms started yesterday while she was working her shift. She has used her home inhalers without significant resolution. She does not recall the last time she used steroid. She endorses increased sputum production, no fevers or chills no associated chest pain. No  or GI symptoms. There are no other complaints, changes, or physical findings at this time.     No Known Allergies     MAR reviewed and pertinent medications noted or modified as needed     Current Facility-Administered Medications   Medication    sodium chloride (NS) flush 5-40 mL    acetaminophen (TYLENOL) tablet 650 mg    Or    acetaminophen (TYLENOL) suppository 650 mg    polyethylene glycol (MIRALAX) packet 17 g    ondansetron (ZOFRAN ODT) tablet 4 mg    Or    ondansetron (ZOFRAN) injection 4 mg    enoxaparin (LOVENOX) injection 30 mg    albuterol-ipratropium (DUO-NEB) 2.5 MG-0.5 MG/3 ML    budesonide-formoteroL (SYMBICORT) 160-4.5 mcg/actuation HFA inhaler 2 Puff    methylPREDNISolone (PF) (SOLU-MEDROL) injection 60 mg    pantoprazole (PROTONIX) tablet 40 mg    levoFLOXacin (LEVAQUIN) tablet 750 mg      Patient PCP: None  PMH:  has no past medical history on file.  PSH:   has no past surgical history on file. FHX: family history is not on file. SHX:       ROS:    Negative except as in HPI. Objective:     Vital Signs: Telemetry:    normal sinus rhythm Intake/Output:   Visit Vitals  BP (!) 148/91 (BP 1 Location: Left upper arm, BP Patient Position: Sitting)   Pulse (!) 110   Temp 98.5 °F (36.9 °C)   Resp 18   Ht 5' 8\" (1.727 m)   Wt 113.4 kg (250 lb)   SpO2 97%   BMI 38.01 kg/m²       Temp (24hrs), Av.5 °F (36.9 °C), Min:98.5 °F (36.9 °C), Max:98.6 °F (37 °C)        O2 Device: Nasal cannula O2 Flow Rate (L/min): 4 l/min       Wt Readings from Last 4 Encounters:   22 113.4 kg (250 lb)        No intake or output data in the 24 hours ending 22    Last shift:      No intake/output data recorded. Last 3 shifts: No intake/output data recorded. Physical Exam  Constitutional:       Appearance: Normal appearance. HENT:      Head: Normocephalic and atraumatic. Nose: Nose normal.      Mouth/Throat:      Mouth: Mucous membranes are moist.   Eyes:      Conjunctiva/sclera: Conjunctivae normal.      Pupils: Pupils are equal, round, and reactive to light. Cardiovascular:      Rate and Rhythm: Regular rhythm. Tachycardia present. Heart sounds: Normal heart sounds. Pulmonary:      Effort: Respiratory distress present. Breath sounds: Wheezing present. Abdominal:      General: There is no distension. Palpations: Abdomen is soft. Tenderness: There is no abdominal tenderness. Musculoskeletal:         General: No tenderness or deformity. Normal range of motion. Cervical back: Normal range of motion and neck supple. Skin:     General: Skin is warm and dry. Neurological:      General: No focal deficit present. Mental Status: She is alert and oriented to person, place, and time.    Psychiatric:         Mood and Affect: Mood normal.         Behavior: Behavior normal.     Labs:    Recent Labs     22  0857   WBC 11.1*   HGB 14.0        Recent Labs     09/16/22  0857      K 4.3      CO2 26   *   BUN 7   CREA 0.71   CA 9.4   ALB 3.8   ALT 24     No results for input(s): PH, PCO2, PO2, HCO3, FIO2 in the last 72 hours. No results for input(s): CPK, CKNDX, TROIQ in the last 72 hours. No lab exists for component: CPKMB  No results found for: BNPP, BNP   No results found for: CULTNo results found for: TSH, TSHEXT    Imaging:    CXR Results  (Last 48 hours)                 09/16/22 0927  XR CHEST PORT Final result    Impression:  No acute process. Narrative: Indication: Shortness of breath       Comparison: None       Portable exam of the chest obtained at 927 demonstrates normal heart size. There   is no acute process in the lung fields. Old right rib fracture is noted. Results from Hospital Encounter encounter on 09/16/22    XR CHEST PORT    Narrative  Indication: Shortness of breath    Comparison: None    Portable exam of the chest obtained at 927 demonstrates normal heart size. There  is no acute process in the lung fields. Old right rib fracture is noted. Impression  No acute process. Results from [place] encounter on 09/16/22    CT CHEST W CONT    Narrative  INDICATION: resp distress    COMPARISON: None    TECHNIQUE:  Following the uneventful intravenous administration of 100 cc  Isovue-300, 5 mm axial images were obtained through the thorax. Coronal and  sagittal reformats were generated. CT dose reduction was achieved through use  of a standardized protocol tailored for this examination and automatic exposure  control for dose modulation. FINDINGS:    CHEST WALL: No mass or axillary lymphadenopathy. THYROID: No nodule. MEDIASTINUM: No mass or lymphadenopathy. LINSEY: No mass or lymphadenopathy. THORACIC AORTA: No dissection or aneurysm. MAIN PULMONARY ARTERY: Normal in caliber. TRACHEA/BRONCHI: Patent.   ESOPHAGUS: No wall thickening or dilatation. HEART: Normal in size. PLEURA: No effusion or pneumothorax. LUNGS: Patchy mild groundglass opacities in the upper lobes, right predominant. Background of emphysema. Multiple scattered pulmonary nodules. The largest  measures 8 mm in left upper lobe, 204-28; other examples include 5 mm  groundglass nodule left lower lobe, 204-34; and 5 mm solid nodule left lower  lobe, 204-41. INCIDENTALLY IMAGED UPPER ABDOMEN: Indeterminate 2.2 cm right adrenal nodule. Subcentimeter hepatic densities too small characterize, likely benign. BONES: No destructive bone lesion. Impression  1. Patchy right dominant upper lobe airspace disease, possibly representing  multifocal pneumonia amongst other considerations. Consider posttreatment chest  CT to document resolution. 2.  Scattered pulmonary nodules measuring up to 7 mm, which can also be  reassessed on follow-up. 3.  2.2 cm indeterminate right adrenal nodule. Further evaluation with adrenal  mass protocol CT recommended. IMPRESSION:   Acute respiratory failure with hypoxia and possible hypercapnea  COPD exacerbation  Body mass index is 38.01 kg/m². Pt is requiring Drug therapy requiring intensive monitoring for toxicity  Pt is unstable, unpredictable needing inpatient monitoring; is acutely ill and at high risk of sudden decline and decompensation with severe consequenses and continued end organ dysfunction and failure  Prognosis guarded       RECOMMENDATIONS/PLAN:     Patient is a 50 yo female presenting with acute exacerbation of her COPD. She demonstrates dyspnea upon exertion and shortness of breath. She used home inhalers without significant resolution. She does not recall the last time she used steroids. There is increased sputum production. High flow nasal Cannula oxygen as salvage oxygen delivery device to provide high concentration of oxygen to overcome refractory hypoxia;   Intubate and place on vent if NIV fails  Order and follow sputum cultures  Currently on PPIs and H3 blockers for GERD  Supplemental O2 to keep sats > 93%  Glucose monitoring and SSI  Bronchial hygiene with respiratory therapy techniques, bronchodilators  DVT, SUP prophylaxis     9/17 Patient is 97% on 4L. Need to order an ABG and sputum culture. Patient is awake, alert, and oriented. She has no concerns at this time. Currently on PPIs and H3 blockers for GERD.         Renetta Hair

## 2022-09-17 NOTE — PROGRESS NOTES
Hospitalist Progress Note    Subjective:   Daily Progress Note: 9/17/2022 4:03 PM    Admission HPI/Hx:  Shyann Steel is a 49 y.o. female who presents to ER with 2 days of increasing wheezing, difficulty breathing, productive cough. +Tobacco. Has NOT received Covid vaccination. On inhalers at home. No other significant hx or ROS.  ---------------------    Subjective: Breathing better; + cough.  No other acute symptoms    Current Facility-Administered Medications   Medication Dose Route Frequency    methylPREDNISolone (PF) (SOLU-MEDROL) injection 40 mg  40 mg IntraVENous Q8H    guaiFENesin ER (MUCINEX) tablet 600 mg  600 mg Oral Q12H    benzonatate (TESSALON) capsule 100 mg  100 mg Oral TID PRN    sodium chloride (NS) flush 5-40 mL  5-40 mL IntraVENous PRN    acetaminophen (TYLENOL) tablet 650 mg  650 mg Oral Q6H PRN    Or    acetaminophen (TYLENOL) suppository 650 mg  650 mg Rectal Q6H PRN    polyethylene glycol (MIRALAX) packet 17 g  17 g Oral DAILY PRN    ondansetron (ZOFRAN ODT) tablet 4 mg  4 mg Oral Q8H PRN    Or    ondansetron (ZOFRAN) injection 4 mg  4 mg IntraVENous Q6H PRN    enoxaparin (LOVENOX) injection 30 mg  30 mg SubCUTAneous BID    albuterol-ipratropium (DUO-NEB) 2.5 MG-0.5 MG/3 ML  3 mL Nebulization Q6H RT    budesonide-formoteroL (SYMBICORT) 160-4.5 mcg/actuation HFA inhaler 2 Puff  2 Puff Inhalation BID RT    pantoprazole (PROTONIX) tablet 40 mg  40 mg Oral DAILY    levoFLOXacin (LEVAQUIN) tablet 750 mg  750 mg Oral Q24H        Review of Systems    Constitutional: No fevers, No chills, No night sweats, No fatigue, No weakness, No significant weight change  Eyes: No visual disturbance, No loss of vision  HENT: No nasal congestion, No sore throat, No head lesion, No hearing deficit  Respiratory: +SOB, wheeze, productive cough  Cardiovascular: No chest pain, No lower extremity edema, No palpitations, No PND, No orthopnea  Gastrointestinal: No nausea, No vomiting, No diarrhea, No constipation, No abdominal pain, No Melena, No hematemesis, No BRBPR  Genitourinary: No frequency, No dysuria, No hematuria  Integument/Breast: No rash, No new skin lesion(s), No dryness, No new palpable nodule  Musculoskeletal: No arthralgias, No neck pain, No back pain, No joint pain, No fall or injury  Neurological: No headaches, No dizziness, No confusion,  No seizures, No focal weakness, No LOC, No paresthesia  Heme/Onc: No overt bleeding noted, No obvious swollen glands  Behavioral/Psychiatric: No change in mood; no SI; no hallucination      Objective:     Visit Vitals  BP (!) 148/91 (BP 1 Location: Left upper arm, BP Patient Position: Sitting)   Pulse (!) 110   Temp 98.5 °F (36.9 °C)   Resp 18   Ht 5' 8\" (1.727 m)   Wt 113.4 kg (250 lb)   SpO2 94%   BMI 38.01 kg/m²    O2 Flow Rate (L/min): 4 l/min O2 Device: Nasal cannula    Temp (24hrs), Av.5 °F (36.9 °C), Min:98.5 °F (36.9 °C), Max:98.6 °F (37 °C)        PHYSICAL EXAM    General: Distress resolved  Head: atraumatic  Eye: No overt orbital findings, PERRLA   ENT: No overt hearing loss noted; No nasal lesion; Throat julia  Neck: Supple, No overt palpable mass, No significant palpable lymph nodes  Vascular: No carotid bruit, palpable pulses bilat  Lung:  mild bilat wheezing; 3L NC  Heart: S1S2, No significant murmur appreciated  Abdomen: Soft, NT, No rigidity, No rebound, Bowel sounds +  Extremities: No edema  M/S: No traumatic change, active mobility noted  Skin: No cyanosis, No rash of significance (other than chronic lesions)  Neurologic: No overt focal motor deficit. Oriented. Psychiatric: Coherent and age appropriate      Data Review    No results found for this or any previous visit (from the past 24 hour(s)). CT CHEST W CONT   Final Result   1. Patchy right dominant upper lobe airspace disease, possibly representing   multifocal pneumonia amongst other considerations. Consider posttreatment chest   CT to document resolution.     2.  Scattered pulmonary nodules measuring up to 7 mm, which can also be   reassessed on follow-up. 3.  2.2 cm indeterminate right adrenal nodule. Further evaluation with adrenal   mass protocol CT recommended. XR CHEST PORT   Final Result   No acute process.              Assessment/Plan:     Acute Hypoxic Respiratory Failure d/t AECOPD with Chronic Tobacco and CT Chest W/contrast showing    # Multifocal PNA  # Pulm Nodules  # Right Adrenal Nodule     - Admit  - Solu-medrol, O2, Levaquin, Duoneb  - C/s pulm/Allauddin  - Covid test - Negative  - Nicotine; patient declined  - Repeat CT for Adrenal    DVT Prophylaxis: On AC  Code Status: Full Code    ________________________________________  Lilian MD Lanny

## 2022-09-17 NOTE — PROGRESS NOTES
PULMONARY CONSULT  [facility]    Name: Marcille Curling MRN: [id number]   : 1975 Hospital: [facility] [place]   Date: 2022  Admission date: 2022 Hospital Day: 2       HPI:     Hospital Problems  Never Reviewed            Codes Class Noted POA    COPD with acute exacerbation [facility]) ICD-10-CM: J44.1  ICD-9-CM: 491.21  2022 Unknown              [x] High complexity decision making was performed  [x] See my orders for details      Subjective/Initial History:     I was asked by Oskar Castro MD to see Marcille Curling  a 49 y.o.  female in consultation     Excerpts from admission 2022 or consult notes as follows:     Marcille Curling, 49 y.o. female with past medical history of asthma versus COPD presents with complaint of difficulty breathing and symptoms started yesterday while she was working her shift. She has used her home inhalers without significant resolution. She does not recall the last time she used steroid. She endorses increased sputum production, no fevers or chills no associated chest pain. No  or GI symptoms. There are no other complaints, changes, or physical findings at this time.     No Known Allergies     MAR reviewed and pertinent medications noted or modified as needed     Current Facility-Administered Medications   Medication    sodium chloride (NS) flush 5-40 mL    acetaminophen (TYLENOL) tablet 650 mg    Or    acetaminophen (TYLENOL) suppository 650 mg    polyethylene glycol (MIRALAX) packet 17 g    ondansetron (ZOFRAN ODT) tablet 4 mg    Or    ondansetron (ZOFRAN) injection 4 mg    enoxaparin (LOVENOX) injection 30 mg    albuterol-ipratropium (DUO-NEB) 2.5 MG-0.5 MG/3 ML    budesonide-formoteroL (SYMBICORT) 160-4.5 mcg/actuation HFA inhaler 2 Puff    methylPREDNISolone (PF) (SOLU-MEDROL) injection 60 mg    pantoprazole (PROTONIX) tablet 40 mg    levoFLOXacin (LEVAQUIN) tablet 750 mg      Patient PCP: None  PMH:  has no past medical history on file.  PSH:   has no past surgical history on file. FHX: family history is not on file. SHX:       ROS:    Negative except as in HPI. Objective:     Vital Signs: Telemetry:    normal sinus rhythm Intake/Output:   Visit Vitals  BP (!) 148/91 (BP 1 Location: Left upper arm, BP Patient Position: Sitting)   Pulse (!) 110   Temp 98.5 °F (36.9 °C)   Resp 18   Ht 5' 8\" (1.727 m)   Wt 113.4 kg (250 lb)   SpO2 97%   BMI 38.01 kg/m²       Temp (24hrs), Av.5 °F (36.9 °C), Min:98.5 °F (36.9 °C), Max:98.6 °F (37 °C)        O2 Device: Nasal cannula O2 Flow Rate (L/min): 4 l/min       Wt Readings from Last 4 Encounters:   22 113.4 kg (250 lb)        No intake or output data in the 24 hours ending 22 0934    Last shift:      No intake/output data recorded. Last 3 shifts: No intake/output data recorded. Physical Exam  Constitutional:       Appearance: Normal appearance. HENT:      Head: Normocephalic and atraumatic. Nose: Nose normal.      Mouth/Throat:      Mouth: Mucous membranes are moist.   Eyes:      Conjunctiva/sclera: Conjunctivae normal.      Pupils: Pupils are equal, round, and reactive to light. Cardiovascular:      Rate and Rhythm: Regular rhythm. Tachycardia present. Heart sounds: Normal heart sounds. Pulmonary:      Effort: Respiratory distress present. Breath sounds: Wheezing present. Abdominal:      General: There is no distension. Palpations: Abdomen is soft. Tenderness: There is no abdominal tenderness. Musculoskeletal:         General: No tenderness or deformity. Normal range of motion. Cervical back: Normal range of motion and neck supple. Skin:     General: Skin is warm and dry. Neurological:      General: No focal deficit present. Mental Status: She is alert and oriented to person, place, and time.    Psychiatric:         Mood and Affect: Mood normal.         Behavior: Behavior normal.     Labs:    Recent Labs     22  0857   WBC 11.1*   HGB 14.0          Recent Labs     09/16/22  0857      K 4.3      CO2 26   *   BUN 7   CREA 0.71   CA 9.4   ALB 3.8   ALT 24       No results for input(s): PH, PCO2, PO2, HCO3, FIO2 in the last 72 hours. No results for input(s): CPK, CKNDX, TROIQ in the last 72 hours. No lab exists for component: CPKMB  No results found for: BNPP, BNP   No results found for: CULTNo results found for: TSH, TSHEXT, TSHEXT    Imaging:    CXR Results  (Last 48 hours)                 09/16/22 0927  XR CHEST PORT Final result    Impression:  No acute process. Narrative: Indication: Shortness of breath       Comparison: None       Portable exam of the chest obtained at 927 demonstrates normal heart size. There   is no acute process in the lung fields. Old right rib fracture is noted. Results from Hospital Encounter encounter on 09/16/22    XR CHEST PORT    Narrative  Indication: Shortness of breath    Comparison: None    Portable exam of the chest obtained at 927 demonstrates normal heart size. There  is no acute process in the lung fields. Old right rib fracture is noted. Impression  No acute process. Results from [place] encounter on 09/16/22    CT CHEST W CONT    Narrative  INDICATION: resp distress    COMPARISON: None    TECHNIQUE:  Following the uneventful intravenous administration of 100 cc  Isovue-300, 5 mm axial images were obtained through the thorax. Coronal and  sagittal reformats were generated. CT dose reduction was achieved through use  of a standardized protocol tailored for this examination and automatic exposure  control for dose modulation. FINDINGS:    CHEST WALL: No mass or axillary lymphadenopathy. THYROID: No nodule. MEDIASTINUM: No mass or lymphadenopathy. LINSEY: No mass or lymphadenopathy. THORACIC AORTA: No dissection or aneurysm. MAIN PULMONARY ARTERY: Normal in caliber. TRACHEA/BRONCHI: Patent.   ESOPHAGUS: No wall thickening or dilatation. HEART: Normal in size. PLEURA: No effusion or pneumothorax. LUNGS: Patchy mild groundglass opacities in the upper lobes, right predominant. Background of emphysema. Multiple scattered pulmonary nodules. The largest  measures 8 mm in left upper lobe, 204-28; other examples include 5 mm  groundglass nodule left lower lobe, 204-34; and 5 mm solid nodule left lower  lobe, 204-41. INCIDENTALLY IMAGED UPPER ABDOMEN: Indeterminate 2.2 cm right adrenal nodule. Subcentimeter hepatic densities too small characterize, likely benign. BONES: No destructive bone lesion. Impression  1. Patchy right dominant upper lobe airspace disease, possibly representing  multifocal pneumonia amongst other considerations. Consider posttreatment chest  CT to document resolution. 2.  Scattered pulmonary nodules measuring up to 7 mm, which can also be  reassessed on follow-up. 3.  2.2 cm indeterminate right adrenal nodule. Further evaluation with adrenal  mass protocol CT recommended. IMPRESSION:   Acute respiratory failure with hypoxia   Pneumonia right upper lobe probably aspiration  Bilateral lung nodule  COPD exacerbation  Body mass index is 38.01 kg/m². Rule out obstructive sleep apnea  Obesity  Prognosis guarded       RECOMMENDATIONS/PLAN:     Patient is a 48 yo female presenting with acute exacerbation of her COPD. She demonstrates dyspnea upon exertion and shortness of breath. She used home inhalers without significant resolution. She does not recall the last time she used steroids. There is increased sputum production.   She is on 4 L nasal cannula she is not on any oxygen at home we will check oxygen before discharge possible she need home sleep testing she never had any sleep study done in the past  COVID test negative  CAT scan of the chest shows patchy right upper lobe infiltrate and also scattered bilateral lung nodules like 7 mm diameter and also had 2.2 cm right adrenal nodule recommend PET scan as an outpatient  On IV Solu-Medrol nebulizer treatment and Symbicort  Order and follow sputum cultures  Currently on PPIs and H3 blockers for GERD  Supplemental O2 to keep sats > 93%  Glucose monitoring and SSI  Bronchial hygiene with respiratory therapy techniques, bronchodilators  DVT, SUP prophylaxis     9/17 Patient is 97% on 4L. Need to order an ABG and sputum culture. Patient is awake, alert, and oriented. She has no concerns at this time. Currently on PPIs and 5HT3 blockers for GERD.   Normal CT chest follow-up as an outpatient for PET scan        Citlaly Jimenez MD

## 2022-09-18 ENCOUNTER — APPOINTMENT (OUTPATIENT)
Dept: CT IMAGING | Age: 47
DRG: 193 | End: 2022-09-18
Attending: INTERNAL MEDICINE

## 2022-09-18 PROCEDURE — 74011636637 HC RX REV CODE- 636/637: Performed by: INTERNAL MEDICINE

## 2022-09-18 PROCEDURE — 74011250637 HC RX REV CODE- 250/637: Performed by: HOSPITALIST

## 2022-09-18 PROCEDURE — 93005 ELECTROCARDIOGRAM TRACING: CPT

## 2022-09-18 PROCEDURE — 74011250637 HC RX REV CODE- 250/637: Performed by: INTERNAL MEDICINE

## 2022-09-18 PROCEDURE — 74011000250 HC RX REV CODE- 250: Performed by: INTERNAL MEDICINE

## 2022-09-18 PROCEDURE — 65270000029 HC RM PRIVATE

## 2022-09-18 PROCEDURE — 94761 N-INVAS EAR/PLS OXIMETRY MLT: CPT

## 2022-09-18 PROCEDURE — 74170 CT ABD WO CNTRST FLWD CNTRST: CPT

## 2022-09-18 PROCEDURE — 74011000636 HC RX REV CODE- 636: Performed by: INTERNAL MEDICINE

## 2022-09-18 PROCEDURE — 94640 AIRWAY INHALATION TREATMENT: CPT

## 2022-09-18 PROCEDURE — 74011250636 HC RX REV CODE- 250/636: Performed by: INTERNAL MEDICINE

## 2022-09-18 RX ORDER — ALBUTEROL SULFATE 90 UG/1
2 AEROSOL, METERED RESPIRATORY (INHALATION)
Status: DISCONTINUED | OUTPATIENT
Start: 2022-09-18 | End: 2022-09-20 | Stop reason: HOSPADM

## 2022-09-18 RX ORDER — GUAIFENESIN 600 MG/1
1200 TABLET, EXTENDED RELEASE ORAL EVERY 12 HOURS
Status: DISCONTINUED | OUTPATIENT
Start: 2022-09-18 | End: 2022-09-20 | Stop reason: HOSPADM

## 2022-09-18 RX ORDER — PREDNISONE 20 MG/1
20 TABLET ORAL 2 TIMES DAILY WITH MEALS
Status: DISCONTINUED | OUTPATIENT
Start: 2022-09-18 | End: 2022-09-20 | Stop reason: HOSPADM

## 2022-09-18 RX ORDER — LANOLIN ALCOHOL/MO/W.PET/CERES
3 CREAM (GRAM) TOPICAL
Status: DISCONTINUED | OUTPATIENT
Start: 2022-09-18 | End: 2022-09-18

## 2022-09-18 RX ORDER — GUAIFENESIN 100 MG/5ML
100 SOLUTION ORAL
Status: DISCONTINUED | OUTPATIENT
Start: 2022-09-18 | End: 2022-09-20 | Stop reason: HOSPADM

## 2022-09-18 RX ADMIN — BUDESONIDE AND FORMOTEROL FUMARATE DIHYDRATE 2 PUFF: 160; 4.5 AEROSOL RESPIRATORY (INHALATION) at 08:42

## 2022-09-18 RX ADMIN — GUAIFENESIN 600 MG: 600 TABLET, EXTENDED RELEASE ORAL at 08:33

## 2022-09-18 RX ADMIN — GUAIFENESIN 1200 MG: 600 TABLET, EXTENDED RELEASE ORAL at 21:23

## 2022-09-18 RX ADMIN — MELATONIN TAB 5 MG 5 MG: 5 TAB at 21:23

## 2022-09-18 RX ADMIN — PREDNISONE 20 MG: 20 TABLET ORAL at 18:21

## 2022-09-18 RX ADMIN — IPRATROPIUM BROMIDE AND ALBUTEROL SULFATE 3 ML: .5; 3 SOLUTION RESPIRATORY (INHALATION) at 00:44

## 2022-09-18 RX ADMIN — BUDESONIDE AND FORMOTEROL FUMARATE DIHYDRATE 2 PUFF: 160; 4.5 AEROSOL RESPIRATORY (INHALATION) at 21:18

## 2022-09-18 RX ADMIN — PANTOPRAZOLE SODIUM 40 MG: 40 TABLET, DELAYED RELEASE ORAL at 08:33

## 2022-09-18 RX ADMIN — ALBUTEROL SULFATE 2 PUFF: 108 INHALANT RESPIRATORY (INHALATION) at 21:18

## 2022-09-18 RX ADMIN — METHYLPREDNISOLONE SODIUM SUCCINATE 40 MG: 40 INJECTION, POWDER, FOR SOLUTION INTRAMUSCULAR; INTRAVENOUS at 14:18

## 2022-09-18 RX ADMIN — LEVOFLOXACIN 750 MG: 500 TABLET, FILM COATED ORAL at 14:17

## 2022-09-18 RX ADMIN — IPRATROPIUM BROMIDE AND ALBUTEROL SULFATE 3 ML: .5; 3 SOLUTION RESPIRATORY (INHALATION) at 08:42

## 2022-09-18 RX ADMIN — ENOXAPARIN SODIUM 30 MG: 100 INJECTION SUBCUTANEOUS at 21:24

## 2022-09-18 RX ADMIN — METHYLPREDNISOLONE SODIUM SUCCINATE 40 MG: 40 INJECTION, POWDER, FOR SOLUTION INTRAMUSCULAR; INTRAVENOUS at 05:29

## 2022-09-18 RX ADMIN — ENOXAPARIN SODIUM 30 MG: 100 INJECTION SUBCUTANEOUS at 08:33

## 2022-09-18 RX ADMIN — GUAIFENESIN 100 MG: 200 SOLUTION ORAL at 21:23

## 2022-09-18 RX ADMIN — IOPAMIDOL 100 ML: 755 INJECTION, SOLUTION INTRAVENOUS at 13:15

## 2022-09-18 NOTE — PROGRESS NOTES
Received call from telemetry that patients heart rate was up in the 160s-170s. Patient was up in the room walking around and had just received a breathing treatment. Patient asymptomatic. Patient laid back down in bed. Received another call from telemetry a little while late stating patients heart rate was still up in the 150s-160s. Patient resting in bed quietly. On call MD notified and order received for a STAT EKG. EKG done and showed patient has converted back into sinus rhythm. MD made aware. No interventions needed at this time.

## 2022-09-18 NOTE — PROGRESS NOTES
Hospitalist Progress Note    Subjective:   Daily Progress Note: 9/18/2022 4:03 PM    Admission HPI/Hx:  Shyann Steel is a 49 y.o. female who presents to ER with 2 days of increasing wheezing, difficulty breathing, productive cough. +Tobacco. Has NOT received Covid vaccination. On inhalers at home. No other significant hx or ROS.  ---------------------    Subjective: Breathing better; + cough. Insomnia.     Current Facility-Administered Medications   Medication Dose Route Frequency    methylPREDNISolone (PF) (SOLU-MEDROL) injection 20 mg  20 mg IntraVENous Q8H    guaiFENesin (ROBITUSSIN) 100 mg/5 mL oral liquid 100 mg  100 mg Oral Q4H PRN    guaiFENesin ER (MUCINEX) tablet 600 mg  600 mg Oral Q12H    benzonatate (TESSALON) capsule 100 mg  100 mg Oral TID PRN    melatonin tablet 5 mg  5 mg Oral QHS PRN    sodium chloride (NS) flush 5-40 mL  5-40 mL IntraVENous PRN    acetaminophen (TYLENOL) tablet 650 mg  650 mg Oral Q6H PRN    Or    acetaminophen (TYLENOL) suppository 650 mg  650 mg Rectal Q6H PRN    polyethylene glycol (MIRALAX) packet 17 g  17 g Oral DAILY PRN    ondansetron (ZOFRAN ODT) tablet 4 mg  4 mg Oral Q8H PRN    Or    ondansetron (ZOFRAN) injection 4 mg  4 mg IntraVENous Q6H PRN    enoxaparin (LOVENOX) injection 30 mg  30 mg SubCUTAneous BID    budesonide-formoteroL (SYMBICORT) 160-4.5 mcg/actuation HFA inhaler 2 Puff  2 Puff Inhalation BID RT    pantoprazole (PROTONIX) tablet 40 mg  40 mg Oral DAILY    levoFLOXacin (LEVAQUIN) tablet 750 mg  750 mg Oral Q24H        Review of Systems    Constitutional: No fevers, No chills, No night sweats, No fatigue, No weakness, No significant weight change  Eyes: No visual disturbance, No loss of vision  HENT: No nasal congestion, No sore throat, No head lesion, No hearing deficit  Respiratory: +SOB, wheeze, productive cough  Cardiovascular: No chest pain, No lower extremity edema, No palpitations, No PND, No orthopnea  Gastrointestinal: No nausea, No vomiting, No diarrhea, No constipation, No abdominal pain, No Melena, No hematemesis, No BRBPR  Genitourinary: No frequency, No dysuria, No hematuria  Integument/Breast: No rash, No new skin lesion(s), No dryness, No new palpable nodule  Musculoskeletal: No arthralgias, No neck pain, No back pain, No joint pain, No fall or injury  Neurological: No headaches, No dizziness, No confusion,  No seizures, No focal weakness, No LOC, No paresthesia  Heme/Onc: No overt bleeding noted, No obvious swollen glands  Behavioral/Psychiatric: No change in mood; no SI; no hallucination      Objective:     Visit Vitals  /70 (BP 1 Location: Right upper arm, BP Patient Position: At rest)   Pulse (!) 105   Temp 98.1 °F (36.7 °C)   Resp 20   Ht 5' 8\" (1.727 m)   Wt 113.4 kg (250 lb)   SpO2 94%   BMI 38.01 kg/m²    O2 Flow Rate (L/min): 4 l/min O2 Device: None (Room air)    Temp (24hrs), Av.2 °F (36.8 °C), Min:98.1 °F (36.7 °C), Max:98.5 °F (36.9 °C)        PHYSICAL EXAM    General: Distress resolved  Neck: Supple, No overt palpable mass, No significant palpable lymph nodes  Vascular: No carotid bruit, palpable pulses bilat  Lung:  Improved bilat breath sounds  Heart: S1S2, No significant murmur appreciated  Abdomen: Soft, NT, No rigidity, No rebound, Bowel sounds +  Extremities: No edema  M/S: No traumatic change, active mobility noted  Skin: No cyanosis, No rash of significance (other than chronic lesions)  Neurologic: No overt focal motor deficit. Oriented. Psychiatric: Coherent and age appropriate      Data Review    No results found for this or any previous visit (from the past 24 hour(s)). CT ABD W WO CONT   Final Result   Findings consistent with right adrenal myelolipoma measuring up to 3 cm. CT CHEST W CONT   Final Result   1. Patchy right dominant upper lobe airspace disease, possibly representing   multifocal pneumonia amongst other considerations. Consider posttreatment chest   CT to document resolution.     2. Scattered pulmonary nodules measuring up to 7 mm, which can also be   reassessed on follow-up. 3.  2.2 cm indeterminate right adrenal nodule. Further evaluation with adrenal   mass protocol CT recommended. XR CHEST PORT   Final Result   No acute process.              Assessment/Plan:     Acute Hypoxic Respiratory Failure d/t AECOPD with Chronic Tobacco and CT Chest W/contrast showing    # Multifocal PNA  # Pulm Nodules  # Right Adrenal Nodule --> Adrenal Myolipoma     - O2, Levaquin  - C/s pulm/Allauddin  - Covid test - Negative  - Nicotine; patient declined  - antitussive  - melatonin  - DC Duoneb d/t tachycardia  - Decrease Steroids    DVT Prophylaxis: On AC  Code Status: Full Code    ________________________________________  Genie Back MD

## 2022-09-18 NOTE — PROGRESS NOTES
PULMONARY CONSULT  [facility]    Name: Adela Vo MRN: [id number]   : 1975 Hospital: [facility]   Date: 2022  Admission date: 2022 Hospital Day: 3       HPI:     Hospital Problems  Never Reviewed            Codes Class Noted POA    COPD with acute exacerbation [facility]) ICD-10-CM: J44.1  ICD-9-CM: 491.21  2022 Unknown            [x] High complexity decision making was performed  [x] See my orders for details      Subjective/Initial History:     I was asked by Chris Smith MD to see Adela Vo  a 49 y.o.  female in consultation     Excerpts from admission 2022 or consult notes as follows:     Adela Vo, 49 y.o. female with past medical history of asthma versus COPD presents with complaint of difficulty breathing and symptoms started yesterday while she was working her shift. She has used her home inhalers without significant resolution. She does not recall the last time she used steroid. She endorses increased sputum production, no fevers or chills no associated chest pain. No  or GI symptoms. There are no other complaints, changes, or physical findings at this time.     No Known Allergies     MAR reviewed and pertinent medications noted or modified as needed     Current Facility-Administered Medications   Medication    methylPREDNISolone (PF) (SOLU-MEDROL) injection 20 mg    guaiFENesin (ROBITUSSIN) 100 mg/5 mL oral liquid 100 mg    guaiFENesin ER (MUCINEX) tablet 600 mg    benzonatate (TESSALON) capsule 100 mg    melatonin tablet 5 mg    sodium chloride (NS) flush 5-40 mL    acetaminophen (TYLENOL) tablet 650 mg    Or    acetaminophen (TYLENOL) suppository 650 mg    polyethylene glycol (MIRALAX) packet 17 g    ondansetron (ZOFRAN ODT) tablet 4 mg    Or    ondansetron (ZOFRAN) injection 4 mg    enoxaparin (LOVENOX) injection 30 mg    budesonide-formoteroL (SYMBICORT) 160-4.5 mcg/actuation HFA inhaler 2 Puff    pantoprazole (PROTONIX) tablet 40 mg    levoFLOXacin (LEVAQUIN) tablet 750 mg      Patient PCP: None  PMH:  has no past medical history on file. PSH:   has no past surgical history on file. FHX: family history is not on file. SHX:       ROS:    Negative except as in HPI. Objective:     Vital Signs: Telemetry:    normal sinus rhythm Intake/Output:   Visit Vitals  /70 (BP 1 Location: Right upper arm, BP Patient Position: At rest)   Pulse 92   Temp 98.1 °F (36.7 °C)   Resp 20   Ht 5' 8\" (1.727 m)   Wt 113.4 kg (250 lb)   SpO2 94%   BMI 38.01 kg/m²       Temp (24hrs), Av.2 °F (36.8 °C), Min:98.1 °F (36.7 °C), Max:98.2 °F (36.8 °C)        O2 Device: None (Room air) O2 Flow Rate (L/min): 4 l/min       Wt Readings from Last 4 Encounters:   22 113.4 kg (250 lb)        No intake or output data in the 24 hours ending 22 1747    Last shift:      No intake/output data recorded. Last 3 shifts: No intake/output data recorded. Physical Exam  Constitutional:       Appearance: Normal appearance. HENT:      Head: Normocephalic and atraumatic. Nose: Nose normal.      Mouth/Throat:      Mouth: Mucous membranes are moist.   Eyes:      Conjunctiva/sclera: Conjunctivae normal.      Pupils: Pupils are equal, round, and reactive to light. Cardiovascular:      Rate and Rhythm: Regular rhythm. Normal rate     Heart sounds: Normal heart sounds. Pulmonary:      Effort: No respiratory distress no accessory muscle usage     Breath sounds: Wheezing present. Abdominal:      General: There is no distension. Palpations: Abdomen is soft. Tenderness: There is no abdominal tenderness. Musculoskeletal:         General: No tenderness or deformity. Normal range of motion. Cervical back: Normal range of motion and neck supple. Skin:     General: Skin is warm and dry. Neurological:      General: No focal deficit present. Mental Status: She is alert and oriented to person, place, and time.    Psychiatric:         Mood and Affect: Mood normal.         Behavior: Behavior normal.     Labs:    Recent Labs     09/16/22  0857   WBC 11.1*   HGB 14.0          Recent Labs     09/16/22  0857      K 4.3      CO2 26   *   BUN 7   CREA 0.71   CA 9.4   ALB 3.8   ALT 24           Imaging:    CXR Results  (Last 48 hours)      None          Results from Hospital Encounter encounter on 09/16/22    XR CHEST PORT    Narrative  Indication: Shortness of breath    Comparison: None    Portable exam of the chest obtained at 927 demonstrates normal heart size. There  is no acute process in the lung fields. Old right rib fracture is noted. Impression  No acute process. Results from Hospital Encounter encounter on 09/16/22    CT ABD W WO CONT    Narrative  EXAM: CT ABD W WO CONT    INDICATION: Adrenal Nodule    COMPARISON: No relevant prior study available for comparison at the time of this  report. IV CONTRAST: 100 mL of Isovue-370. ORAL CONTRAST: None. TECHNIQUE:  Multislice helical CT was performed from the diaphragm to the iliac crest prior  to and during rapid bolus intravenous contrast administration. Contiguous 5 mm  axial images were reconstructed and lung and soft tissue windows were generated. Coronal and sagittal reformations were generated. CT dose reduction was  achieved through use of a standardized protocol tailored for this examination  and automatic exposure control for dose modulation. FINDINGS:    LOWER THORAX: No significant abnormality in the incidentally imaged lower chest.    LIVER: No enhancing mass lesion. Subcentimeter hypodense focus in the left  hepatic lobe, nonspecific. BILIARY TREE: Gallbladder is within normal limits. CBD is not dilated. SPLEEN: within normal limits. PANCREAS: No mass or ductal dilatation. ADRENALS: Right adrenal nodule measuring up to 3 x 2 cm (image 32 of series 401)  with demonstrable internal fat density, consistent with adrenal myelolipoma.  No  areas of significant enhancement seen. Left adrenal is unremarkable. KIDNEYS: No mass, calculus, or hydronephrosis. STOMACH: Unremarkable. SMALL BOWEL: No dilatation or wall thickening in the visualized small bowel  loops. COLON: No dilatation or wall thickening in the visualized large bowel loops. APPENDIX: Partially visualized and grossly within normal limits. PERITONEUM: No ascites or pneumoperitoneum. RETROPERITONEUM: No lymphadenopathy or aortic aneurysm. BONES: No destructive bone lesion. ABDOMINAL WALL: Fat-containing umbilical hernia. ADDITIONAL COMMENTS: N/A    Impression  Findings consistent with right adrenal myelolipoma measuring up to 3 cm. IMPRESSION:   Acute respiratory failure with hypoxia resolved now on room air  Pneumonia right upper lobe probably aspiration  Bilateral lung nodule  COPD exacerbation  Tachyarrhythmia during nebulized albuterol  Body mass index is 38.01 kg/m². Rule out obstructive sleep apnea  Obesity  Prognosis guarded       RECOMMENDATIONS/PLAN:     Patient is a 50 yo female presenting with acute exacerbation of her COPD. She demonstrates dyspnea upon exertion and shortness of breath. She used home inhalers without significant resolution. She does not recall the last time she used steroids. There is increased sputum production. Now on room air  Tacky arrhythmia when received nebulized albuterol during the night and this morning nebulized albuterol discontinued she albuterol inhaler at home without difficulty will resume it  Currently on IV Solu-Medrol will switch to oral prednisone  COVID test negative  CAT scan of the chest shows patchy right upper lobe infiltrate and also scattered bilateral lung nodules like 7 mm diameter and also had 2.2 cm right adrenal nodule recommend PET scan as an outpatient  Currently on PPIs for GERD       9/17 Patient is 97% on 4L. Need to order an ABG and sputum culture. Patient is awake, alert, and oriented.  She has no concerns at this time. Currently on PPIs and 5HT3 blockers for GERD. Normal CT chest follow-up as an outpatient for PET scan  9/18 now on room air oxygen saturation 94% had tachyarrhythmia last night during nebulized albuterol and again this morning. Nebulizer has been discontinued. Currently heart rhythm is regular she takes albuterol inhaler at home without difficulty we will resume it for continued bronchospasm.   We will switch IV Solu-Medrol to oral prednisone        Arlene Jones MD

## 2022-09-18 NOTE — PROGRESS NOTES
Telemetry called to report pt.'s heart rate was in 160-170's. This nurse checked on pt. Pt. Had no complaints of chest pain, headache, dizziness or SOB. Pt. Returned to HR 90's after a few moments. Dr. Layne Roldan was notified regarding the high heart rate. Dr. Timo Norris ordered to discontinue the albuterol and continue to monitor.

## 2022-09-19 LAB
ATRIAL RATE: 83 BPM
CALCULATED P AXIS, ECG09: 71 DEGREES
CALCULATED R AXIS, ECG10: 74 DEGREES
CALCULATED T AXIS, ECG11: 51 DEGREES
DIAGNOSIS, 93000: NORMAL
P-R INTERVAL, ECG05: 158 MS
Q-T INTERVAL, ECG07: 366 MS
QRS DURATION, ECG06: 86 MS
QTC CALCULATION (BEZET), ECG08: 430 MS
VENTRICULAR RATE, ECG03: 83 BPM

## 2022-09-19 PROCEDURE — 94640 AIRWAY INHALATION TREATMENT: CPT

## 2022-09-19 PROCEDURE — 74011250637 HC RX REV CODE- 250/637: Performed by: INTERNAL MEDICINE

## 2022-09-19 PROCEDURE — 65270000029 HC RM PRIVATE

## 2022-09-19 PROCEDURE — 74011636637 HC RX REV CODE- 636/637: Performed by: INTERNAL MEDICINE

## 2022-09-19 PROCEDURE — 74011250636 HC RX REV CODE- 250/636: Performed by: INTERNAL MEDICINE

## 2022-09-19 PROCEDURE — 74011250637 HC RX REV CODE- 250/637: Performed by: HOSPITALIST

## 2022-09-19 RX ADMIN — GUAIFENESIN 1200 MG: 600 TABLET, EXTENDED RELEASE ORAL at 08:59

## 2022-09-19 RX ADMIN — ENOXAPARIN SODIUM 30 MG: 100 INJECTION SUBCUTANEOUS at 08:59

## 2022-09-19 RX ADMIN — ALBUTEROL SULFATE 2 PUFF: 108 INHALANT RESPIRATORY (INHALATION) at 20:43

## 2022-09-19 RX ADMIN — PREDNISONE 20 MG: 20 TABLET ORAL at 17:19

## 2022-09-19 RX ADMIN — ALBUTEROL SULFATE 2 PUFF: 108 INHALANT RESPIRATORY (INHALATION) at 08:07

## 2022-09-19 RX ADMIN — LEVOFLOXACIN 750 MG: 500 TABLET, FILM COATED ORAL at 11:05

## 2022-09-19 RX ADMIN — BUDESONIDE AND FORMOTEROL FUMARATE DIHYDRATE 2 PUFF: 160; 4.5 AEROSOL RESPIRATORY (INHALATION) at 20:43

## 2022-09-19 RX ADMIN — GUAIFENESIN 1200 MG: 600 TABLET, EXTENDED RELEASE ORAL at 20:44

## 2022-09-19 RX ADMIN — BUDESONIDE AND FORMOTEROL FUMARATE DIHYDRATE 2 PUFF: 160; 4.5 AEROSOL RESPIRATORY (INHALATION) at 08:07

## 2022-09-19 RX ADMIN — ALBUTEROL SULFATE 2 PUFF: 108 INHALANT RESPIRATORY (INHALATION) at 13:37

## 2022-09-19 RX ADMIN — PANTOPRAZOLE SODIUM 40 MG: 40 TABLET, DELAYED RELEASE ORAL at 08:59

## 2022-09-19 RX ADMIN — ENOXAPARIN SODIUM 30 MG: 100 INJECTION SUBCUTANEOUS at 20:44

## 2022-09-19 RX ADMIN — PREDNISONE 20 MG: 20 TABLET ORAL at 08:59

## 2022-09-19 RX ADMIN — MELATONIN TAB 5 MG 5 MG: 5 TAB at 21:33

## 2022-09-19 NOTE — PROGRESS NOTES
PULMONARY PROGRESS NOTE  [facility]    Name: Manuel John MRN: [id number]   : 1975 Hospital: [facility]   Date: 2022  Admission date: 2022 Hospital Day: 4       HPI:     Hospital Problems  Never Reviewed            Codes Class Noted POA    COPD with acute exacerbation [facility]) ICD-10-CM: J44.1  ICD-9-CM: 491.21  2022 Unknown          [x] High complexity decision making was performed  [x] See my orders for details      Subjective/Initial History:     I was asked by Bettie Szymanski MD to see Manuel John  a 49 y.o.  female in consultation     Excerpts from admission 2022 or consult notes as follows:     Manuel John, 49 y.o. female with past medical history of asthma versus COPD presents with complaint of difficulty breathing and symptoms started yesterday while she was working her shift. She has used her home inhalers without significant resolution. She does not recall the last time she used steroid. She endorses increased sputum production, no fevers or chills no associated chest pain. No  or GI symptoms. There are no other complaints, changes, or physical findings at this time.     No Known Allergies     MAR reviewed and pertinent medications noted or modified as needed     Current Facility-Administered Medications   Medication    guaiFENesin (ROBITUSSIN) 100 mg/5 mL oral liquid 100 mg    predniSONE (DELTASONE) tablet 20 mg    albuterol (PROVENTIL HFA, VENTOLIN HFA, PROAIR HFA) inhaler 2 Puff    guaiFENesin ER (MUCINEX) tablet 1,200 mg    benzonatate (TESSALON) capsule 100 mg    melatonin tablet 5 mg    sodium chloride (NS) flush 5-40 mL    acetaminophen (TYLENOL) tablet 650 mg    Or    acetaminophen (TYLENOL) suppository 650 mg    polyethylene glycol (MIRALAX) packet 17 g    ondansetron (ZOFRAN ODT) tablet 4 mg    Or    ondansetron (ZOFRAN) injection 4 mg    enoxaparin (LOVENOX) injection 30 mg    budesonide-formoteroL (SYMBICORT) 160-4.5 mcg/actuation HFA inhaler 2 Puff    pantoprazole (PROTONIX) tablet 40 mg    levoFLOXacin (LEVAQUIN) tablet 750 mg      Patient PCP: None  PMH:  has no past medical history on file. PSH:   has no past surgical history on file. FHX: family history is not on file. SHX:       ROS:    Negative except as in HPI. Objective:     Vital Signs: Telemetry:    normal sinus rhythm Intake/Output:   Visit Vitals  /79 (BP 1 Location: Left upper arm, BP Patient Position: At rest;Sitting)   Pulse 81   Temp 97.9 °F (36.6 °C)   Resp 22   Ht 5' 8\" (1.727 m)   Wt 113.4 kg (250 lb)   SpO2 96%   BMI 38.01 kg/m²       Temp (24hrs), Av °F (36.7 °C), Min:97.9 °F (36.6 °C), Max:98.1 °F (36.7 °C)        O2 Device: None (Room air) O2 Flow Rate (L/min): 4 l/min       Wt Readings from Last 4 Encounters:   22 113.4 kg (250 lb)        No intake or output data in the 24 hours ending 22 0928    Last shift:      No intake/output data recorded. Last 3 shifts: No intake/output data recorded. Physical Exam  Constitutional:       Appearance: Normal appearance. HENT:      Head: Normocephalic and atraumatic. Nose: Nose normal.      Mouth/Throat:      Mouth: Mucous membranes are moist.   Eyes:      Conjunctiva/sclera: Conjunctivae normal.      Pupils: Pupils are equal, round, and reactive to light. Cardiovascular:      Rate and Rhythm: Regular rhythm. Normal rate     Heart sounds: Normal heart sounds. Pulmonary:      Effort: No respiratory distress no accessory muscle usage     Breath sounds: Wheezing present. Abdominal:      General: There is no distension. Palpations: Abdomen is soft. Tenderness: There is no abdominal tenderness. Musculoskeletal:         General: No tenderness or deformity. Normal range of motion. Cervical back: Normal range of motion and neck supple. Skin:     General: Skin is warm and dry. Neurological:      General: No focal deficit present.       Mental Status: She is alert and oriented to person, place, and time. Psychiatric:         Mood and Affect: Mood normal.         Behavior: Behavior normal.     Labs:    No results for input(s): WBC, HGB, PLT, INR, APTT, HGBEXT, PLTEXT, INREXT, HGBEXT, PLTEXT, INREXT in the last 72 hours. No lab exists for component: FIB, DDMER    No results for input(s): NA, K, CL, CO2, GLU, BUN, CREA, CA, MG, PHOS, LAC, ALB, TBIL, ALT, AML, LPSE in the last 72 hours. No lab exists for component: SGOT        Imaging:    CXR Results  (Last 48 hours)      None          Results from Hospital Encounter encounter on 09/16/22    XR CHEST PORT    Narrative  Indication: Shortness of breath    Comparison: None    Portable exam of the chest obtained at 927 demonstrates normal heart size. There  is no acute process in the lung fields. Old right rib fracture is noted. Impression  No acute process. Results from Hospital Encounter encounter on 09/16/22    CT ABD W WO CONT    Narrative  EXAM: CT ABD W WO CONT    INDICATION: Adrenal Nodule    COMPARISON: No relevant prior study available for comparison at the time of this  report. IV CONTRAST: 100 mL of Isovue-370. ORAL CONTRAST: None. TECHNIQUE:  Multislice helical CT was performed from the diaphragm to the iliac crest prior  to and during rapid bolus intravenous contrast administration. Contiguous 5 mm  axial images were reconstructed and lung and soft tissue windows were generated. Coronal and sagittal reformations were generated. CT dose reduction was  achieved through use of a standardized protocol tailored for this examination  and automatic exposure control for dose modulation. FINDINGS:    LOWER THORAX: No significant abnormality in the incidentally imaged lower chest.    LIVER: No enhancing mass lesion. Subcentimeter hypodense focus in the left  hepatic lobe, nonspecific. BILIARY TREE: Gallbladder is within normal limits. CBD is not dilated. SPLEEN: within normal limits.   PANCREAS: No mass or ductal dilatation. ADRENALS: Right adrenal nodule measuring up to 3 x 2 cm (image 32 of series 401)  with demonstrable internal fat density, consistent with adrenal myelolipoma. No  areas of significant enhancement seen. Left adrenal is unremarkable. KIDNEYS: No mass, calculus, or hydronephrosis. STOMACH: Unremarkable. SMALL BOWEL: No dilatation or wall thickening in the visualized small bowel  loops. COLON: No dilatation or wall thickening in the visualized large bowel loops. APPENDIX: Partially visualized and grossly within normal limits. PERITONEUM: No ascites or pneumoperitoneum. RETROPERITONEUM: No lymphadenopathy or aortic aneurysm. BONES: No destructive bone lesion. ABDOMINAL WALL: Fat-containing umbilical hernia. ADDITIONAL COMMENTS: N/A    Impression  Findings consistent with right adrenal myelolipoma measuring up to 3 cm. IMPRESSION:   Acute respiratory failure with hypoxia resolved now on room air  Pneumonia right upper lobe probably aspiration  Bilateral lung nodule  COPD exacerbation  Tachyarrhythmia during nebulized albuterol  Body mass index is 38.01 kg/m². Rule out obstructive sleep apnea  Obesity  Prognosis guarded       RECOMMENDATIONS/PLAN:     Patient is a 50 yo female presenting with acute exacerbation of her COPD. She demonstrates dyspnea upon exertion and shortness of breath. She used home inhalers without significant resolution. She does not recall the last time she used steroids. There is increased sputum production.   Now on room air  Tacky arrhythmia when received nebulized albuterol during the night and this morning nebulized albuterol discontinued she albuterol inhaler at home   Currently on prednisone  COVID test negative  CAT scan of the chest shows patchy right upper lobe infiltrate and also scattered bilateral lung nodules like 7 mm diameter and also had 2.2 cm right adrenal nodule recommend PET scan as an outpatient  Currently on PPIs for GERD     9/17 Patient is 97% on 4L. Need to order an ABG and sputum culture. Patient is awake, alert, and oriented. She has no concerns at this time. Currently on PPIs and 5HT3 blockers for GERD. Normal CT chest follow-up as an outpatient for PET scan  9/18 now on room air oxygen saturation 94% had tachyarrhythmia last night during nebulized albuterol and again this morning. Nebulizer has been discontinued. Currently heart rhythm is regular she takes albuterol inhaler at home without difficulty we will resume it for continued bronchospasm. We will switch IV Solu-Medrol to oral prednisone  9/19 Patient is 96% on 4L. She is looking forward to discharge today.         Mari Soares MD

## 2022-09-19 NOTE — PROGRESS NOTES
Hospitalist Progress Note    Subjective:   Daily Progress Note: 9/19/2022 4:03 PM    Admission HPI/Hx:  Gregory Bueno is a 49 y.o. female who presents to ER with 2 days of increasing wheezing, difficulty breathing, productive cough. +Tobacco. Has NOT received Covid vaccination. On inhalers at home. No other significant hx or ROS.  ---------------------    Subjective: Breathing better overall; + cough improved with mucinex. Insomnia.     Current Facility-Administered Medications   Medication Dose Route Frequency    guaiFENesin (ROBITUSSIN) 100 mg/5 mL oral liquid 100 mg  100 mg Oral Q4H PRN    predniSONE (DELTASONE) tablet 20 mg  20 mg Oral BID WITH MEALS    albuterol (PROVENTIL HFA, VENTOLIN HFA, PROAIR HFA) inhaler 2 Puff  2 Puff Inhalation Q6HWA RT    guaiFENesin ER (MUCINEX) tablet 1,200 mg  1,200 mg Oral Q12H    benzonatate (TESSALON) capsule 100 mg  100 mg Oral TID PRN    melatonin tablet 5 mg  5 mg Oral QHS PRN    sodium chloride (NS) flush 5-40 mL  5-40 mL IntraVENous PRN    acetaminophen (TYLENOL) tablet 650 mg  650 mg Oral Q6H PRN    Or    acetaminophen (TYLENOL) suppository 650 mg  650 mg Rectal Q6H PRN    polyethylene glycol (MIRALAX) packet 17 g  17 g Oral DAILY PRN    ondansetron (ZOFRAN ODT) tablet 4 mg  4 mg Oral Q8H PRN    Or    ondansetron (ZOFRAN) injection 4 mg  4 mg IntraVENous Q6H PRN    enoxaparin (LOVENOX) injection 30 mg  30 mg SubCUTAneous BID    budesonide-formoteroL (SYMBICORT) 160-4.5 mcg/actuation HFA inhaler 2 Puff  2 Puff Inhalation BID RT    pantoprazole (PROTONIX) tablet 40 mg  40 mg Oral DAILY    levoFLOXacin (LEVAQUIN) tablet 750 mg  750 mg Oral Q24H        Review of Systems    Constitutional: No fevers, No chills, No night sweats, No fatigue, No weakness, No significant weight change  Eyes: No visual disturbance, No loss of vision  HENT: No nasal congestion, No sore throat, No head lesion, No hearing deficit  Respiratory: +SOB, wheeze, productive cough  Cardiovascular: No chest pain, No lower extremity edema, No palpitations, No PND, No orthopnea  Gastrointestinal: No nausea, No vomiting, No diarrhea, No constipation, No abdominal pain, No Melena, No hematemesis, No BRBPR  Genitourinary: No frequency, No dysuria, No hematuria  Integument/Breast: No rash, No new skin lesion(s), No dryness, No new palpable nodule  Musculoskeletal: No arthralgias, No neck pain, No back pain, No joint pain, No fall or injury  Neurological: No headaches, No dizziness, No confusion,  No seizures, No focal weakness, No LOC, No paresthesia  Heme/Onc: No overt bleeding noted, No obvious swollen glands  Behavioral/Psychiatric: No change in mood; no SI; no hallucination      Objective:     Visit Vitals  /64 (BP 1 Location: Right upper arm, BP Patient Position: At rest;Lying)   Pulse 71   Temp 98.1 °F (36.7 °C)   Resp 20   Ht 5' 8\" (1.727 m)   Wt 113.4 kg (250 lb)   SpO2 95%   BMI 38.01 kg/m²    O2 Flow Rate (L/min): 4 l/min O2 Device: None (Room air)    Temp (24hrs), Av °F (36.7 °C), Min:97.9 °F (36.6 °C), Max:98.1 °F (36.7 °C)        PHYSICAL EXAM    General: Distress resolved  Neck: Supple, No overt palpable mass, No significant palpable lymph nodes  Vascular: No carotid bruit, palpable pulses bilat  Lung:  Improved bilat breath sounds  Heart: S1S2, No significant murmur appreciated  Abdomen: Soft, NT, No rigidity, No rebound, Bowel sounds +  Extremities: No edema  M/S: No traumatic change, active mobility noted  Skin: No cyanosis, No rash of significance (other than chronic lesions)  Neurologic: No overt focal motor deficit. Oriented. Psychiatric: Coherent and age appropriate      Data Review    No results found for this or any previous visit (from the past 24 hour(s)). CT ABD W WO CONT   Final Result   Findings consistent with right adrenal myelolipoma measuring up to 3 cm. CT CHEST W CONT   Final Result   1.   Patchy right dominant upper lobe airspace disease, possibly representing multifocal pneumonia amongst other considerations. Consider posttreatment chest   CT to document resolution. 2.  Scattered pulmonary nodules measuring up to 7 mm, which can also be   reassessed on follow-up. 3.  2.2 cm indeterminate right adrenal nodule. Further evaluation with adrenal   mass protocol CT recommended. XR CHEST PORT   Final Result   No acute process.              Assessment/Plan:     Acute Hypoxic Respiratory Failure d/t AECOPD with Chronic Tobacco and CT Chest W/contrast showing    # Multifocal PNA  # Pulm Nodules --> f/up with Pulm and outpatient PET Scan  # Right Adrenal Nodule --> Adrenal Myolipoma     - O2, Levaquin  - C/s pulm/Allauddin  - Covid test - Negative  - Nicotine; patient declined  - antitussive  - melatonin  - DC Duoneb d/t tachycardia  - Decrease Steroids --> PO  - Repeat labs    DC tomorrow/24h  Needs work release upon DC    DVT Prophylaxis: On AC  Code Status: Full Code    ________________________________________  Bettie Szymanski MD

## 2022-09-19 NOTE — PROGRESS NOTES
PULMONARY PROGRESS NOTE  [facility]    Name: Marcille Curling MRN: [id number]   : 1975 Hospital: [facility]   Date: 2022  Admission date: 2022 Hospital Day: 4       HPI:     Hospital Problems  Never Reviewed            Codes Class Noted POA    COPD with acute exacerbation [facility]) ICD-10-CM: J44.1  ICD-9-CM: 491.21  2022 Unknown            [x] High complexity decision making was performed  [x] See my orders for details      Subjective/Initial History:     I was asked by Oskar Castro MD to see Marcille Curling  a 49 y.o.  female in consultation     Excerpts from admission 2022 or consult notes as follows:     Marcille Curling, 49 y.o. female with past medical history of asthma versus COPD presents with complaint of difficulty breathing and symptoms started yesterday while she was working her shift. She has used her home inhalers without significant resolution. She does not recall the last time she used steroid. She endorses increased sputum production, no fevers or chills no associated chest pain. No  or GI symptoms. There are no other complaints, changes, or physical findings at this time.     No Known Allergies     MAR reviewed and pertinent medications noted or modified as needed     Current Facility-Administered Medications   Medication    guaiFENesin (ROBITUSSIN) 100 mg/5 mL oral liquid 100 mg    predniSONE (DELTASONE) tablet 20 mg    albuterol (PROVENTIL HFA, VENTOLIN HFA, PROAIR HFA) inhaler 2 Puff    guaiFENesin ER (MUCINEX) tablet 1,200 mg    benzonatate (TESSALON) capsule 100 mg    melatonin tablet 5 mg    sodium chloride (NS) flush 5-40 mL    acetaminophen (TYLENOL) tablet 650 mg    Or    acetaminophen (TYLENOL) suppository 650 mg    polyethylene glycol (MIRALAX) packet 17 g    ondansetron (ZOFRAN ODT) tablet 4 mg    Or    ondansetron (ZOFRAN) injection 4 mg    enoxaparin (LOVENOX) injection 30 mg    budesonide-formoteroL (SYMBICORT) 160-4.5 mcg/actuation HFA inhaler 2 Puff    pantoprazole (PROTONIX) tablet 40 mg    levoFLOXacin (LEVAQUIN) tablet 750 mg      Patient PCP: None  PMH:  has no past medical history on file. PSH:   has no past surgical history on file. FHX: family history is not on file. SHX:       ROS:    Negative except as in HPI. Objective:     Vital Signs: Telemetry:    normal sinus rhythm Intake/Output:   Visit Vitals  /79 (BP 1 Location: Left upper arm, BP Patient Position: At rest;Sitting)   Pulse 81   Temp 97.9 °F (36.6 °C)   Resp 22   Ht 5' 8\" (1.727 m)   Wt 113.4 kg (250 lb)   SpO2 96%   BMI 38.01 kg/m²       Temp (24hrs), Av °F (36.7 °C), Min:97.9 °F (36.6 °C), Max:98.1 °F (36.7 °C)        O2 Device: None (Room air) O2 Flow Rate (L/min): 4 l/min       Wt Readings from Last 4 Encounters:   22 113.4 kg (250 lb)        No intake or output data in the 24 hours ending 22 0856    Last shift:      No intake/output data recorded. Last 3 shifts: No intake/output data recorded. Physical Exam  Constitutional:       Appearance: Normal appearance. HENT:      Head: Normocephalic and atraumatic. Nose: Nose normal.      Mouth/Throat:      Mouth: Mucous membranes are moist.   Eyes:      Conjunctiva/sclera: Conjunctivae normal.      Pupils: Pupils are equal, round, and reactive to light. Cardiovascular:      Rate and Rhythm: Regular rhythm. Normal rate     Heart sounds: Normal heart sounds. Pulmonary:      Effort: No respiratory distress no accessory muscle usage     Breath sounds: Wheezing present. Abdominal:      General: There is no distension. Palpations: Abdomen is soft. Tenderness: There is no abdominal tenderness. Musculoskeletal:         General: No tenderness or deformity. Normal range of motion. Cervical back: Normal range of motion and neck supple. Skin:     General: Skin is warm and dry. Neurological:      General: No focal deficit present.       Mental Status: She is alert and oriented to person, place, and time. Psychiatric:         Mood and Affect: Mood normal.         Behavior: Behavior normal.     Labs:    Recent Labs     09/16/22  0857   WBC 11.1*   HGB 14.0          Recent Labs     09/16/22  0857      K 4.3      CO2 26   *   BUN 7   CREA 0.71   CA 9.4   ALB 3.8   ALT 24           Imaging:    CXR Results  (Last 48 hours)      None          Results from Hospital Encounter encounter on 09/16/22    XR CHEST PORT    Narrative  Indication: Shortness of breath    Comparison: None    Portable exam of the chest obtained at 927 demonstrates normal heart size. There  is no acute process in the lung fields. Old right rib fracture is noted. Impression  No acute process. Results from Hospital Encounter encounter on 09/16/22    CT ABD W WO CONT    Narrative  EXAM: CT ABD W WO CONT    INDICATION: Adrenal Nodule    COMPARISON: No relevant prior study available for comparison at the time of this  report. IV CONTRAST: 100 mL of Isovue-370. ORAL CONTRAST: None. TECHNIQUE:  Multislice helical CT was performed from the diaphragm to the iliac crest prior  to and during rapid bolus intravenous contrast administration. Contiguous 5 mm  axial images were reconstructed and lung and soft tissue windows were generated. Coronal and sagittal reformations were generated. CT dose reduction was  achieved through use of a standardized protocol tailored for this examination  and automatic exposure control for dose modulation. FINDINGS:    LOWER THORAX: No significant abnormality in the incidentally imaged lower chest.    LIVER: No enhancing mass lesion. Subcentimeter hypodense focus in the left  hepatic lobe, nonspecific. BILIARY TREE: Gallbladder is within normal limits. CBD is not dilated. SPLEEN: within normal limits. PANCREAS: No mass or ductal dilatation.   ADRENALS: Right adrenal nodule measuring up to 3 x 2 cm (image 32 of series 401)  with demonstrable internal fat density, consistent with adrenal myelolipoma. No  areas of significant enhancement seen. Left adrenal is unremarkable. KIDNEYS: No mass, calculus, or hydronephrosis. STOMACH: Unremarkable. SMALL BOWEL: No dilatation or wall thickening in the visualized small bowel  loops. COLON: No dilatation or wall thickening in the visualized large bowel loops. APPENDIX: Partially visualized and grossly within normal limits. PERITONEUM: No ascites or pneumoperitoneum. RETROPERITONEUM: No lymphadenopathy or aortic aneurysm. BONES: No destructive bone lesion. ABDOMINAL WALL: Fat-containing umbilical hernia. ADDITIONAL COMMENTS: N/A    Impression  Findings consistent with right adrenal myelolipoma measuring up to 3 cm. IMPRESSION:   Acute respiratory failure with hypoxia resolved now on room air  Pneumonia right upper lobe probably aspiration  Bilateral lung nodule  COPD exacerbation  Tachyarrhythmia during nebulized albuterol  Body mass index is 38.01 kg/m². Rule out obstructive sleep apnea  Obesity  Prognosis guarded       RECOMMENDATIONS/PLAN:     Patient is a 50 yo female presenting with acute exacerbation of her COPD. She demonstrates dyspnea upon exertion and shortness of breath. She used home inhalers without significant resolution. She does not recall the last time she used steroids. There is increased sputum production. Now on room air  Tacky arrhythmia when received nebulized albuterol during the night and this morning nebulized albuterol discontinued she albuterol inhaler at home without difficulty will resume it  Currently on IV Solu-Medrol will switch to oral prednisone  COVID test negative  CAT scan of the chest shows patchy right upper lobe infiltrate and also scattered bilateral lung nodules like 7 mm diameter and also had 2.2 cm right adrenal nodule recommend PET scan as an outpatient  Currently on PPIs for GERD       9/17 Patient is 97% on 4L.  Need to order an ABG and sputum culture. Patient is awake, alert, and oriented. She has no concerns at this time. Currently on PPIs and 5HT3 blockers for GERD. Normal CT chest follow-up as an outpatient for PET scan  9/18 now on room air oxygen saturation 94% had tachyarrhythmia last night during nebulized albuterol and again this morning. Nebulizer has been discontinued. Currently heart rhythm is regular she takes albuterol inhaler at home without difficulty we will resume it for continued bronchospasm. We will switch IV Solu-Medrol to oral prednisone  9/19 Patient is 96% on 4L. She is looking forward to discharge today.         Troy Santizo

## 2022-09-20 VITALS
TEMPERATURE: 98.3 F | RESPIRATION RATE: 18 BRPM | HEART RATE: 83 BPM | BODY MASS INDEX: 37.89 KG/M2 | OXYGEN SATURATION: 95 % | DIASTOLIC BLOOD PRESSURE: 84 MMHG | HEIGHT: 68 IN | WEIGHT: 250 LBS | SYSTOLIC BLOOD PRESSURE: 141 MMHG

## 2022-09-20 LAB
ALBUMIN SERPL-MCNC: 3.4 G/DL (ref 3.5–5)
ALBUMIN/GLOB SERPL: 1 {RATIO} (ref 1.1–2.2)
ALP SERPL-CCNC: 68 U/L (ref 45–117)
ALT SERPL-CCNC: 32 U/L (ref 12–78)
ANION GAP SERPL CALC-SCNC: 5 MMOL/L (ref 5–15)
AST SERPL W P-5'-P-CCNC: 17 U/L (ref 15–37)
BASOPHILS # BLD: 0 K/UL (ref 0–0.1)
BASOPHILS NFR BLD: 0 % (ref 0–1)
BILIRUB SERPL-MCNC: 0.3 MG/DL (ref 0.2–1)
BUN SERPL-MCNC: 14 MG/DL (ref 6–20)
BUN/CREAT SERPL: 17 (ref 12–20)
CA-I BLD-MCNC: 9.3 MG/DL (ref 8.5–10.1)
CHLORIDE SERPL-SCNC: 106 MMOL/L (ref 97–108)
CO2 SERPL-SCNC: 30 MMOL/L (ref 21–32)
CREAT SERPL-MCNC: 0.82 MG/DL (ref 0.55–1.02)
CRP SERPL-MCNC: 1.25 MG/DL (ref 0–0.6)
DIFFERENTIAL METHOD BLD: ABNORMAL
EOSINOPHIL # BLD: 0 K/UL (ref 0–0.4)
EOSINOPHIL NFR BLD: 1 % (ref 0–7)
ERYTHROCYTE [DISTWIDTH] IN BLOOD BY AUTOMATED COUNT: 14.6 % (ref 11.5–14.5)
GLOBULIN SER CALC-MCNC: 3.3 G/DL (ref 2–4)
GLUCOSE SERPL-MCNC: 108 MG/DL (ref 65–100)
HCT VFR BLD AUTO: 44.6 % (ref 35–47)
HGB BLD-MCNC: 14.1 G/DL (ref 11.5–16)
IMM GRANULOCYTES # BLD AUTO: 0.1 K/UL (ref 0–0.04)
IMM GRANULOCYTES NFR BLD AUTO: 1 % (ref 0–0.5)
LYMPHOCYTES # BLD: 2.3 K/UL (ref 0.8–3.5)
LYMPHOCYTES NFR BLD: 26 % (ref 12–49)
MAGNESIUM SERPL-MCNC: 2.1 MG/DL (ref 1.6–2.4)
MCH RBC QN AUTO: 28.7 PG (ref 26–34)
MCHC RBC AUTO-ENTMCNC: 31.6 G/DL (ref 30–36.5)
MCV RBC AUTO: 90.7 FL (ref 80–99)
MONOCYTES # BLD: 0.7 K/UL (ref 0–1)
MONOCYTES NFR BLD: 8 % (ref 5–13)
NEUTS SEG # BLD: 5.4 K/UL (ref 1.8–8)
NEUTS SEG NFR BLD: 64 % (ref 32–75)
NRBC # BLD: 0 K/UL (ref 0–0.01)
NRBC BLD-RTO: 0 PER 100 WBC
PLATELET # BLD AUTO: 261 K/UL (ref 150–400)
PMV BLD AUTO: 12 FL (ref 8.9–12.9)
POTASSIUM SERPL-SCNC: 3.9 MMOL/L (ref 3.5–5.1)
PROT SERPL-MCNC: 6.7 G/DL (ref 6.4–8.2)
RBC # BLD AUTO: 4.92 M/UL (ref 3.8–5.2)
SODIUM SERPL-SCNC: 141 MMOL/L (ref 136–145)
WBC # BLD AUTO: 8.5 K/UL (ref 3.6–11)

## 2022-09-20 PROCEDURE — 74011250637 HC RX REV CODE- 250/637: Performed by: INTERNAL MEDICINE

## 2022-09-20 PROCEDURE — 83735 ASSAY OF MAGNESIUM: CPT

## 2022-09-20 PROCEDURE — 86140 C-REACTIVE PROTEIN: CPT

## 2022-09-20 PROCEDURE — 74011250636 HC RX REV CODE- 250/636: Performed by: INTERNAL MEDICINE

## 2022-09-20 PROCEDURE — 80053 COMPREHEN METABOLIC PANEL: CPT

## 2022-09-20 PROCEDURE — 85025 COMPLETE CBC W/AUTO DIFF WBC: CPT

## 2022-09-20 PROCEDURE — 74011636637 HC RX REV CODE- 636/637: Performed by: INTERNAL MEDICINE

## 2022-09-20 PROCEDURE — 36415 COLL VENOUS BLD VENIPUNCTURE: CPT

## 2022-09-20 PROCEDURE — 94640 AIRWAY INHALATION TREATMENT: CPT

## 2022-09-20 RX ORDER — PANTOPRAZOLE SODIUM 40 MG/1
40 TABLET, DELAYED RELEASE ORAL DAILY
Qty: 7 TABLET | Refills: 0 | Status: SHIPPED | OUTPATIENT
Start: 2022-09-21

## 2022-09-20 RX ORDER — LEVOFLOXACIN 750 MG/1
750 TABLET ORAL EVERY 24 HOURS
Qty: 2 TABLET | Refills: 0 | Status: SHIPPED | OUTPATIENT
Start: 2022-09-21

## 2022-09-20 RX ORDER — BENZONATATE 100 MG/1
100 CAPSULE ORAL
Qty: 21 CAPSULE | Refills: 0 | Status: SHIPPED | OUTPATIENT
Start: 2022-09-20 | End: 2022-09-27

## 2022-09-20 RX ORDER — PREDNISONE 20 MG/1
TABLET ORAL
Qty: 3 TABLET | Refills: 0 | Status: SHIPPED | OUTPATIENT
Start: 2022-09-20

## 2022-09-20 RX ORDER — BUDESONIDE AND FORMOTEROL FUMARATE DIHYDRATE 160; 4.5 UG/1; UG/1
2 AEROSOL RESPIRATORY (INHALATION) 2 TIMES DAILY
Qty: 10.2 G | Refills: 0 | Status: SHIPPED | OUTPATIENT
Start: 2022-09-20

## 2022-09-20 RX ADMIN — PREDNISONE 20 MG: 20 TABLET ORAL at 08:34

## 2022-09-20 RX ADMIN — ALBUTEROL SULFATE 2 PUFF: 108 INHALANT RESPIRATORY (INHALATION) at 07:33

## 2022-09-20 RX ADMIN — ENOXAPARIN SODIUM 30 MG: 100 INJECTION SUBCUTANEOUS at 08:35

## 2022-09-20 RX ADMIN — PANTOPRAZOLE SODIUM 40 MG: 40 TABLET, DELAYED RELEASE ORAL at 08:34

## 2022-09-20 RX ADMIN — LEVOFLOXACIN 750 MG: 500 TABLET, FILM COATED ORAL at 11:36

## 2022-09-20 RX ADMIN — BUDESONIDE AND FORMOTEROL FUMARATE DIHYDRATE 2 PUFF: 160; 4.5 AEROSOL RESPIRATORY (INHALATION) at 07:33

## 2022-09-20 RX ADMIN — GUAIFENESIN 1200 MG: 600 TABLET, EXTENDED RELEASE ORAL at 08:34

## 2022-09-20 NOTE — PROGRESS NOTES
PULMONARY PROGRESS NOTE  [facility]    Name: Aneta Chase MRN: [id number]   : 1975 Hospital: [facility]   Date: 2022  Admission date: 2022 Hospital Day: 5       HPI:     Hospital Problems  Never Reviewed            Codes Class Noted POA    COPD with acute exacerbation [facility]) ICD-10-CM: J44.1  ICD-9-CM: 491.21  2022 Unknown          [x] High complexity decision making was performed  [x] See my orders for details      Subjective/Initial History:     I was asked by Marquis Loo MD to see Aneta Chase  a 49 y.o.  female in consultation     Excerpts from admission 2022 or consult notes as follows:     Aneta Chase, 49 y.o. female with past medical history of asthma versus COPD presents with complaint of difficulty breathing and symptoms started yesterday while she was working her shift. She has used her home inhalers without significant resolution. She does not recall the last time she used steroid. She endorses increased sputum production, no fevers or chills no associated chest pain. No  or GI symptoms. There are no other complaints, changes, or physical findings at this time.     No Known Allergies     MAR reviewed and pertinent medications noted or modified as needed     Current Facility-Administered Medications   Medication    guaiFENesin (ROBITUSSIN) 100 mg/5 mL oral liquid 100 mg    predniSONE (DELTASONE) tablet 20 mg    albuterol (PROVENTIL HFA, VENTOLIN HFA, PROAIR HFA) inhaler 2 Puff    guaiFENesin ER (MUCINEX) tablet 1,200 mg    benzonatate (TESSALON) capsule 100 mg    melatonin tablet 5 mg    sodium chloride (NS) flush 5-40 mL    acetaminophen (TYLENOL) tablet 650 mg    Or    acetaminophen (TYLENOL) suppository 650 mg    polyethylene glycol (MIRALAX) packet 17 g    ondansetron (ZOFRAN ODT) tablet 4 mg    Or    ondansetron (ZOFRAN) injection 4 mg    enoxaparin (LOVENOX) injection 30 mg    budesonide-formoteroL (SYMBICORT) 160-4.5 mcg/actuation HFA inhaler 2 Puff    pantoprazole (PROTONIX) tablet 40 mg    levoFLOXacin (LEVAQUIN) tablet 750 mg      Patient PCP: None  PMH:  has no past medical history on file. PSH:   has no past surgical history on file. FHX: family history is not on file. SHX:       ROS:    Negative except as in HPI. Objective:     Vital Signs: Telemetry:    normal sinus rhythm Intake/Output:   Visit Vitals  BP (!) 108/53 (BP 1 Location: Right upper arm, BP Patient Position: Lying left side)   Pulse (!) 57   Temp 97.5 °F (36.4 °C)   Resp 20   Ht 5' 8\" (1.727 m)   Wt 113.4 kg (250 lb)   SpO2 97%   BMI 38.01 kg/m²       Temp (24hrs), Av.8 °F (36.6 °C), Min:97.5 °F (36.4 °C), Max:98.1 °F (36.7 °C)        O2 Device: None (Room air) O2 Flow Rate (L/min): 4 l/min       Wt Readings from Last 4 Encounters:   22 113.4 kg (250 lb)        No intake or output data in the 24 hours ending 22 0820    Last shift:      No intake/output data recorded. Last 3 shifts: No intake/output data recorded. Physical Exam  Constitutional:       Appearance: Normal appearance. HENT:      Head: Normocephalic and atraumatic. Nose: Nose normal.      Mouth/Throat:      Mouth: Mucous membranes are moist.   Eyes:      Conjunctiva/sclera: Conjunctivae normal.      Pupils: Pupils are equal, round, and reactive to light. Cardiovascular:      Rate and Rhythm: Regular rhythm. Normal rate     Heart sounds: Normal heart sounds. Pulmonary:      Effort: No respiratory distress no accessory muscle usage     Breath sounds: Wheezing present. Abdominal:      General: There is no distension. Palpations: Abdomen is soft. Tenderness: There is no abdominal tenderness. Musculoskeletal:         General: No tenderness or deformity. Normal range of motion. Cervical back: Normal range of motion and neck supple. Skin:     General: Skin is warm and dry. Neurological:      General: No focal deficit present.       Mental Status: She is alert and oriented to person, place, and time. Psychiatric:         Mood and Affect: Mood normal.         Behavior: Behavior normal.     Labs:    No results for input(s): WBC, HGB, PLT, INR, APTT, HGBEXT, PLTEXT, INREXT, HGBEXT, PLTEXT, INREXT in the last 72 hours. No lab exists for component: FIB, DDMER    No results for input(s): NA, K, CL, CO2, GLU, BUN, CREA, CA, MG, PHOS, LAC, ALB, TBIL, ALT, AML, LPSE in the last 72 hours. No lab exists for component: SGOT        Imaging:    CXR Results  (Last 48 hours)      None          Results from Hospital Encounter encounter on 09/16/22    XR CHEST PORT    Narrative  Indication: Shortness of breath    Comparison: None    Portable exam of the chest obtained at 927 demonstrates normal heart size. There  is no acute process in the lung fields. Old right rib fracture is noted. Impression  No acute process. Results from Hospital Encounter encounter on 09/16/22    CT ABD W WO CONT    Narrative  EXAM: CT ABD W WO CONT    INDICATION: Adrenal Nodule    COMPARISON: No relevant prior study available for comparison at the time of this  report. IV CONTRAST: 100 mL of Isovue-370. ORAL CONTRAST: None. TECHNIQUE:  Multislice helical CT was performed from the diaphragm to the iliac crest prior  to and during rapid bolus intravenous contrast administration. Contiguous 5 mm  axial images were reconstructed and lung and soft tissue windows were generated. Coronal and sagittal reformations were generated. CT dose reduction was  achieved through use of a standardized protocol tailored for this examination  and automatic exposure control for dose modulation. FINDINGS:    LOWER THORAX: No significant abnormality in the incidentally imaged lower chest.    LIVER: No enhancing mass lesion. Subcentimeter hypodense focus in the left  hepatic lobe, nonspecific. BILIARY TREE: Gallbladder is within normal limits. CBD is not dilated.   SPLEEN: within normal limits. PANCREAS: No mass or ductal dilatation. ADRENALS: Right adrenal nodule measuring up to 3 x 2 cm (image 32 of series 401)  with demonstrable internal fat density, consistent with adrenal myelolipoma. No  areas of significant enhancement seen. Left adrenal is unremarkable. KIDNEYS: No mass, calculus, or hydronephrosis. STOMACH: Unremarkable. SMALL BOWEL: No dilatation or wall thickening in the visualized small bowel  loops. COLON: No dilatation or wall thickening in the visualized large bowel loops. APPENDIX: Partially visualized and grossly within normal limits. PERITONEUM: No ascites or pneumoperitoneum. RETROPERITONEUM: No lymphadenopathy or aortic aneurysm. BONES: No destructive bone lesion. ABDOMINAL WALL: Fat-containing umbilical hernia. ADDITIONAL COMMENTS: N/A    Impression  Findings consistent with right adrenal myelolipoma measuring up to 3 cm. IMPRESSION:   Acute respiratory failure with hypoxia resolved now on room air  Pneumonia right upper lobe probably aspiration  Bilateral lung nodule  COPD exacerbation  Tachyarrhythmia during nebulized albuterol  Body mass index is 38.01 kg/m². Rule out obstructive sleep apnea  Obesity  Prognosis guarded       RECOMMENDATIONS/PLAN:     Patient is a 48 yo female presenting with acute exacerbation of her COPD. She demonstrates dyspnea upon exertion and shortness of breath. She used home inhalers without significant resolution. She does not recall the last time she used steroids. There is increased sputum production.   Now on room air  Tacky arrhythmia when received nebulized albuterol during the night and this morning nebulized albuterol discontinued she albuterol inhaler at home   Currently on prednisone  COVID test negative  CAT scan of the chest shows patchy right upper lobe infiltrate and also scattered bilateral lung nodules like 7 mm diameter and also had 2.2 cm right adrenal nodule recommend PET scan as an outpatient  Currently on PPIs for GERD     9/17 Patient is 97% on 4L. Need to order an ABG and sputum culture. Patient is awake, alert, and oriented. She has no concerns at this time. Currently on PPIs and 5HT3 blockers for GERD. Normal CT chest follow-up as an outpatient for PET scan  9/18 now on room air oxygen saturation 94% had tachyarrhythmia last night during nebulized albuterol and again this morning. Nebulizer has been discontinued. Currently heart rhythm is regular she takes albuterol inhaler at home without difficulty we will resume it for continued bronchospasm. We will switch IV Solu-Medrol to oral prednisone  9/19 Patient is 96% on 4L. She is looking forward to discharge today. 9/20 Patient's discharge disposition is home self care later today (w/n 24 hours). She is 97% on 4L.        Roxi Aponte

## 2022-09-20 NOTE — DISCHARGE INSTRUCTIONS
Please follow up with PCP for further work up for right adrenal nodule. Please follow up with PCP/Pulmonary for further work up for pulmonary nodule.

## 2022-09-20 NOTE — PROGRESS NOTES
PULMONARY PROGRESS NOTE  [facility]    Name: Kathryn Ford MRN: [id number]   : 1975 Hospital: [facility]   Date: 2022  Admission date: 2022 Hospital Day: 5       HPI:     Hospital Problems  Never Reviewed            Codes Class Noted POA    COPD with acute exacerbation [facility]) ICD-10-CM: J44.1  ICD-9-CM: 491.21  2022 Unknown          [x] High complexity decision making was performed  [x] See my orders for details      Subjective/Initial History:     I was asked by Lilian Ca MD to see Kathryn Ford  a 49 y.o.  female in consultation     Excerpts from admission 2022 or consult notes as follows:     Kathryn Ford, 49 y.o. female with past medical history of asthma versus COPD presents with complaint of difficulty breathing and symptoms started yesterday while she was working her shift. She has used her home inhalers without significant resolution. She does not recall the last time she used steroid. She endorses increased sputum production, no fevers or chills no associated chest pain. No  or GI symptoms. There are no other complaints, changes, or physical findings at this time.     No Known Allergies     MAR reviewed and pertinent medications noted or modified as needed     Current Facility-Administered Medications   Medication    guaiFENesin (ROBITUSSIN) 100 mg/5 mL oral liquid 100 mg    predniSONE (DELTASONE) tablet 20 mg    albuterol (PROVENTIL HFA, VENTOLIN HFA, PROAIR HFA) inhaler 2 Puff    guaiFENesin ER (MUCINEX) tablet 1,200 mg    benzonatate (TESSALON) capsule 100 mg    melatonin tablet 5 mg    sodium chloride (NS) flush 5-40 mL    acetaminophen (TYLENOL) tablet 650 mg    Or    acetaminophen (TYLENOL) suppository 650 mg    polyethylene glycol (MIRALAX) packet 17 g    ondansetron (ZOFRAN ODT) tablet 4 mg    Or    ondansetron (ZOFRAN) injection 4 mg    enoxaparin (LOVENOX) injection 30 mg    budesonide-formoteroL (SYMBICORT) 160-4.5 mcg/actuation HFA inhaler 2 Puff    pantoprazole (PROTONIX) tablet 40 mg    levoFLOXacin (LEVAQUIN) tablet 750 mg      Patient PCP: None  PMH:  has no past medical history on file. PSH:   has no past surgical history on file. FHX: family history is not on file. SHX:       ROS:    Negative except as in HPI. Objective:     Vital Signs: Telemetry:    normal sinus rhythm Intake/Output:   Visit Vitals  BP (!) 141/84 (BP 1 Location: Left upper arm, BP Patient Position: At rest)   Pulse 83   Temp 98.3 °F (36.8 °C)   Resp 18   Ht 5' 8\" (1.727 m)   Wt 113.4 kg (250 lb)   SpO2 95%   BMI 38.01 kg/m²       Temp (24hrs), Av.9 °F (36.6 °C), Min:97.5 °F (36.4 °C), Max:98.3 °F (36.8 °C)        O2 Device: None (Room air) O2 Flow Rate (L/min): 4 l/min       Wt Readings from Last 4 Encounters:   22 113.4 kg (250 lb)        No intake or output data in the 24 hours ending 22 0837    Last shift:      No intake/output data recorded. Last 3 shifts: No intake/output data recorded. Physical Exam  Constitutional:       Appearance: Normal appearance. HENT:      Head: Normocephalic and atraumatic. Nose: Nose normal.      Mouth/Throat:      Mouth: Mucous membranes are moist.   Eyes:      Conjunctiva/sclera: Conjunctivae normal.      Pupils: Pupils are equal, round, and reactive to light. Cardiovascular:      Rate and Rhythm: Regular rhythm. Normal rate     Heart sounds: Normal heart sounds. Pulmonary:      Effort: No respiratory distress no accessory muscle usage     Breath sounds: Wheezing present. Abdominal:      General: There is no distension. Palpations: Abdomen is soft. Tenderness: There is no abdominal tenderness. Musculoskeletal:         General: No tenderness or deformity. Normal range of motion. Cervical back: Normal range of motion and neck supple. Skin:     General: Skin is warm and dry. Neurological:      General: No focal deficit present.       Mental Status: She is alert and oriented to person, place, and time. Psychiatric:         Mood and Affect: Mood normal.         Behavior: Behavior normal.     Labs:    No results for input(s): WBC, HGB, PLT, INR, APTT, HGBEXT, PLTEXT, INREXT, HGBEXT, PLTEXT, INREXT in the last 72 hours. No lab exists for component: FIB, DDMER    No results for input(s): NA, K, CL, CO2, GLU, BUN, CREA, CA, MG, PHOS, LAC, ALB, TBIL, ALT, AML, LPSE in the last 72 hours. No lab exists for component: SGOT        Imaging:    CXR Results  (Last 48 hours)      None          Results from Hospital Encounter encounter on 09/16/22    XR CHEST PORT    Narrative  Indication: Shortness of breath    Comparison: None    Portable exam of the chest obtained at 927 demonstrates normal heart size. There  is no acute process in the lung fields. Old right rib fracture is noted. Impression  No acute process. Results from Hospital Encounter encounter on 09/16/22    CT ABD W WO CONT    Narrative  EXAM: CT ABD W WO CONT    INDICATION: Adrenal Nodule    COMPARISON: No relevant prior study available for comparison at the time of this  report. IV CONTRAST: 100 mL of Isovue-370. ORAL CONTRAST: None. TECHNIQUE:  Multislice helical CT was performed from the diaphragm to the iliac crest prior  to and during rapid bolus intravenous contrast administration. Contiguous 5 mm  axial images were reconstructed and lung and soft tissue windows were generated. Coronal and sagittal reformations were generated. CT dose reduction was  achieved through use of a standardized protocol tailored for this examination  and automatic exposure control for dose modulation. FINDINGS:    LOWER THORAX: No significant abnormality in the incidentally imaged lower chest.    LIVER: No enhancing mass lesion. Subcentimeter hypodense focus in the left  hepatic lobe, nonspecific. BILIARY TREE: Gallbladder is within normal limits. CBD is not dilated. SPLEEN: within normal limits.   PANCREAS: No mass or ductal dilatation. ADRENALS: Right adrenal nodule measuring up to 3 x 2 cm (image 32 of series 401)  with demonstrable internal fat density, consistent with adrenal myelolipoma. No  areas of significant enhancement seen. Left adrenal is unremarkable. KIDNEYS: No mass, calculus, or hydronephrosis. STOMACH: Unremarkable. SMALL BOWEL: No dilatation or wall thickening in the visualized small bowel  loops. COLON: No dilatation or wall thickening in the visualized large bowel loops. APPENDIX: Partially visualized and grossly within normal limits. PERITONEUM: No ascites or pneumoperitoneum. RETROPERITONEUM: No lymphadenopathy or aortic aneurysm. BONES: No destructive bone lesion. ABDOMINAL WALL: Fat-containing umbilical hernia. ADDITIONAL COMMENTS: N/A    Impression  Findings consistent with right adrenal myelolipoma measuring up to 3 cm. IMPRESSION:   Acute respiratory failure with hypoxia resolved now on room air  Pneumonia right upper lobe probably aspiration  Bilateral lung nodule  COPD exacerbation  Tachyarrhythmia during nebulized albuterol  Body mass index is 38.01 kg/m². Rule out obstructive sleep apnea  Obesity  Prognosis guarded       RECOMMENDATIONS/PLAN:     Patient is a 50 yo female presenting with acute exacerbation of her COPD. She demonstrates dyspnea upon exertion and shortness of breath. She used home inhalers without significant resolution. She does not recall the last time she used steroids. There is increased sputum production.   Now on room air  Tacky arrhythmia when received nebulized albuterol during the night and this morning nebulized albuterol discontinued she albuterol inhaler at home   Currently on prednisone  COVID test negative  CAT scan of the chest shows patchy right upper lobe infiltrate and also scattered bilateral lung nodules like 7 mm diameter and also had 2.2 cm right adrenal nodule recommend PET scan as an outpatient  Currently on PPIs for GERD     9/17 Patient is 97% on 4L. Need to order an ABG and sputum culture. Patient is awake, alert, and oriented. She has no concerns at this time. Currently on PPIs and 5HT3 blockers for GERD. Normal CT chest follow-up as an outpatient for PET scan  9/18 now on room air oxygen saturation 94% had tachyarrhythmia last night during nebulized albuterol and again this morning. Nebulizer has been discontinued. Currently heart rhythm is regular she takes albuterol inhaler at home without difficulty we will resume it for continued bronchospasm. We will switch IV Solu-Medrol to oral prednisone  9/19 Patient is 96% on 4L. She is looking forward to discharge today. 9/20 Patient's discharge disposition is home self care later today (w/n 24 hours). She is 97% on 4L.        Yan Hallman MD

## 2022-09-20 NOTE — PROGRESS NOTES
..Discharge plan of care/case management plan validated with provider discharge order. Pt. Discharged home with self care. Discharge instructions were reviewed with the pt. Pt. Verbalized an understanding of the discharge instructions.

## 2022-09-20 NOTE — PROGRESS NOTES
CM noted discharge order. Patient has no CM needs at discharge. Primary RN made aware. Discharge plan of care/case management plan validated with provider discharge order.

## 2022-09-20 NOTE — DISCHARGE SUMMARY
Hospitalist Discharge Summary     Patient ID:  Simona Dumont  [id number]  49 y.o.  1975 9/16/2022    PCP on record: None    Admit date: 9/16/2022  Discharge date and time: 9/20/2022    DISCHARGE DIAGNOSIS:  Acute hypoxic respiratory failure  Acute exacerbation of COPD  Right upper lobe pneumonia  B/L pulmonary nodule  Obesity  Right adrenal nodule. CONSULTATIONS:  IP CONSULT TO PULMONOLOGY    Excerpted HPI from H&P of Elizabeth Saeed MD:  49year old female with hx of asthma/COPD presented to ED with c/o of shortness of breath. Patient had used her home inhalers without improvement.     ______________________________________________________________________  DISCHARGE SUMMARY/HOSPITAL COURSE:  for full details see H&P, daily progress notes, labs, consult notes. Patient was found to have acute hypoxic respiratory failure s/s acute exacerbation of COPD, and pneumonia. Started on supplemental oxygen, iv steroids and antibiotics. Respiratory status improved with steroids, and antibiotics. Pulmonary consulted. CT chest, abdomen and pelvis showed pulmonary nodule and adrenal nodule. Patient will need additional work up for pulmonary nodule and adrenal nodule which can be done as outpatient by PCP/Pulmonary. Patient was smoking cigarette prior to presentation. Smoking cessation counseling done and patient is motivated to quit smoking by herself. Patient is stable for discharge with PCP and pulmonary follow up.            _______________________________________________________________________  Patient seen and examined by me on discharge day. Pertinent Findings:  Gen:    Not in distress  Chest: B/L wheezing  CVS:   Regular rhythm.   No edema  Abd:  Soft, not distended, not tender  Neuro:  Alert, oriented  _______________________________________________________________________  DISCHARGE MEDICATIONS:   Current Discharge Medication List        START taking these medications    Details   benzonatate (TESSALON) 100 mg capsule Take 1 Capsule by mouth three (3) times daily as needed for Cough for up to 7 days. Qty: 21 Capsule, Refills: 0  Start date: 9/20/2022, End date: 9/27/2022      budesonide-formoteroL (SYMBICORT) 160-4.5 mcg/actuation HFAA Take 2 Puffs by inhalation two (2) times a day. Qty: 10.2 g, Refills: 0  Start date: 9/20/2022      guaiFENesin ER (MUCINEX) 1,200 mg Ta12 ER tablet Take 1 Tablet by mouth every twelve (12) hours. Qty: 10 Tablet, Refills: 0  Start date: 9/20/2022      pantoprazole (PROTONIX) 40 mg tablet Take 1 Tablet by mouth daily. Qty: 7 Tablet, Refills: 0  Start date: 9/21/2022      predniSONE (DELTASONE) 20 mg tablet Take 20 mg po daily for 2 days then 10 mg po daily for 2 days  Qty: 3 Tablet, Refills: 0  Start date: 9/20/2022      levoFLOXacin (LEVAQUIN) 750 mg tablet Take 1 Tablet by mouth every twenty-four (24) hours. Qty: 2 Tablet, Refills: 0  Start date: 9/21/2022           CONTINUE these medications which have NOT CHANGED    Details   albuterol (PROVENTIL HFA, VENTOLIN HFA, PROAIR HFA) 90 mcg/actuation inhaler Take 2 Puffs by inhalation every six (6) hours as needed for Wheezing. albuterol (PROVENTIL VENTOLIN) 2.5 mg /3 mL (0.083 %) nebu 2.5 mg by Nebulization route every six (6) hours as needed for Wheezing. diphenhydrAMINE (BENADRYL) 25 mg tablet Take 25 mg by mouth as needed. Patient Follow Up Instructions: Activity: Activity as tolerated  Diet: Regular Diet  Wound Care: None needed    Follow-up with PCP, pulmonary in 1 week. Follow-up tests/labs Repeat CT scan for right adrenal nodule and pulmonary nodule.   Follow-up Information       Follow up With Specialties Details Why Contact Info    None    None ([phone]) Patient stated that they have no PCP            ________________________________________________________________    Risk of deterioration: Moderate    Condition at Discharge: Stable  __________________________________________________________________    Disposition  Home with family, no needs    ____________________________________________________________________    Code Status: Full Code  ___________________________________________________________________      Total time in minutes spent coordinating this discharge (includes going over instructions, follow-up, prescriptions, and preparing report for sign off to her PCP) :  35 minutes    Signed:  Tory Buck MD

## 2022-09-22 ENCOUNTER — TELEPHONE (OUTPATIENT)
Dept: CASE MANAGEMENT | Age: 47
End: 2022-09-22

## 2022-09-29 ENCOUNTER — TELEPHONE (OUTPATIENT)
Dept: CASE MANAGEMENT | Age: 47
End: 2022-09-29

## 2023-08-10 ENCOUNTER — HOSPITAL ENCOUNTER (INPATIENT)
Facility: HOSPITAL | Age: 48
LOS: 2 days | Discharge: HOME | DRG: 190 | End: 2023-08-13
Attending: STUDENT IN AN ORGANIZED HEALTH CARE EDUCATION/TRAINING PROGRAM | Admitting: FAMILY MEDICINE

## 2023-08-10 ENCOUNTER — APPOINTMENT (OUTPATIENT)
Facility: HOSPITAL | Age: 48
DRG: 190 | End: 2023-08-10

## 2023-08-10 DIAGNOSIS — J45.41 MODERATE PERSISTENT ASTHMA WITH ACUTE EXACERBATION: Primary | ICD-10-CM

## 2023-08-10 PROCEDURE — 96375 TX/PRO/DX INJ NEW DRUG ADDON: CPT

## 2023-08-10 PROCEDURE — 93005 ELECTROCARDIOGRAM TRACING: CPT | Performed by: STUDENT IN AN ORGANIZED HEALTH CARE EDUCATION/TRAINING PROGRAM

## 2023-08-10 PROCEDURE — 84484 ASSAY OF TROPONIN QUANT: CPT

## 2023-08-10 PROCEDURE — 80053 COMPREHEN METABOLIC PANEL: CPT

## 2023-08-10 PROCEDURE — 6360000002 HC RX W HCPCS: Performed by: STUDENT IN AN ORGANIZED HEALTH CARE EDUCATION/TRAINING PROGRAM

## 2023-08-10 PROCEDURE — 99285 EMERGENCY DEPT VISIT HI MDM: CPT

## 2023-08-10 PROCEDURE — 36415 COLL VENOUS BLD VENIPUNCTURE: CPT

## 2023-08-10 PROCEDURE — 71045 X-RAY EXAM CHEST 1 VIEW: CPT

## 2023-08-10 PROCEDURE — 83880 ASSAY OF NATRIURETIC PEPTIDE: CPT

## 2023-08-10 PROCEDURE — 85025 COMPLETE CBC W/AUTO DIFF WBC: CPT

## 2023-08-10 RX ORDER — IPRATROPIUM BROMIDE AND ALBUTEROL SULFATE 2.5; .5 MG/3ML; MG/3ML
1 SOLUTION RESPIRATORY (INHALATION)
Status: COMPLETED | OUTPATIENT
Start: 2023-08-10 | End: 2023-08-11

## 2023-08-10 RX ORDER — METHYLPREDNISOLONE SODIUM SUCCINATE 125 MG/2ML
125 INJECTION, POWDER, LYOPHILIZED, FOR SOLUTION INTRAMUSCULAR; INTRAVENOUS ONCE
Status: COMPLETED | OUTPATIENT
Start: 2023-08-10 | End: 2023-08-10

## 2023-08-10 RX ADMIN — METHYLPREDNISOLONE 125 MG: 125 INJECTION, POWDER, LYOPHILIZED, FOR SOLUTION INTRAMUSCULAR; INTRAVENOUS at 23:48

## 2023-08-10 ASSESSMENT — PAIN - FUNCTIONAL ASSESSMENT: PAIN_FUNCTIONAL_ASSESSMENT: 0-10

## 2023-08-10 ASSESSMENT — LIFESTYLE VARIABLES
HOW OFTEN DO YOU HAVE A DRINK CONTAINING ALCOHOL: NEVER
HOW MANY STANDARD DRINKS CONTAINING ALCOHOL DO YOU HAVE ON A TYPICAL DAY: PATIENT DOES NOT DRINK

## 2023-08-10 ASSESSMENT — PAIN SCALES - GENERAL: PAINLEVEL_OUTOF10: 0

## 2023-08-11 PROBLEM — J44.9 COPD (CHRONIC OBSTRUCTIVE PULMONARY DISEASE) (HCC): Status: ACTIVE | Noted: 2023-08-11

## 2023-08-11 PROBLEM — J45.901 ACUTE ASTHMA EXACERBATION: Status: ACTIVE | Noted: 2023-08-11

## 2023-08-11 LAB
ALBUMIN SERPL-MCNC: 3.6 G/DL (ref 3.5–5)
ALBUMIN/GLOB SERPL: 1 (ref 1.1–2.2)
ALP SERPL-CCNC: 91 U/L (ref 45–117)
ALT SERPL-CCNC: 30 U/L (ref 12–78)
ANION GAP SERPL CALC-SCNC: 9 MMOL/L (ref 5–15)
AST SERPL W P-5'-P-CCNC: 17 U/L (ref 15–37)
BASOPHILS # BLD: 0.1 K/UL (ref 0–0.1)
BASOPHILS NFR BLD: 1 % (ref 0–1)
BILIRUB SERPL-MCNC: 0.3 MG/DL (ref 0.2–1)
BNP SERPL-MCNC: 67 PG/ML
BUN SERPL-MCNC: 13 MG/DL (ref 6–20)
BUN/CREAT SERPL: 17 (ref 12–20)
CA-I BLD-MCNC: 9.2 MG/DL (ref 8.5–10.1)
CHLORIDE SERPL-SCNC: 107 MMOL/L (ref 97–108)
CO2 SERPL-SCNC: 23 MMOL/L (ref 21–32)
CREAT SERPL-MCNC: 0.76 MG/DL (ref 0.55–1.02)
DIFFERENTIAL METHOD BLD: ABNORMAL
EKG ATRIAL RATE: 115 BPM
EKG DIAGNOSIS: NORMAL
EKG P AXIS: 64 DEGREES
EKG P-R INTERVAL: 138 MS
EKG Q-T INTERVAL: 308 MS
EKG QRS DURATION: 66 MS
EKG QTC CALCULATION (BAZETT): 426 MS
EKG R AXIS: 29 DEGREES
EKG T AXIS: 44 DEGREES
EKG VENTRICULAR RATE: 115 BPM
EOSINOPHIL # BLD: 0.1 K/UL (ref 0–0.4)
EOSINOPHIL NFR BLD: 1 % (ref 0–7)
ERYTHROCYTE [DISTWIDTH] IN BLOOD BY AUTOMATED COUNT: 14.1 % (ref 11.5–14.5)
GLOBULIN SER CALC-MCNC: 3.7 G/DL (ref 2–4)
GLUCOSE SERPL-MCNC: 107 MG/DL (ref 65–100)
HCT VFR BLD AUTO: 44.6 % (ref 35–47)
HGB BLD-MCNC: 14.4 G/DL (ref 11.5–16)
IMM GRANULOCYTES # BLD AUTO: 0 K/UL (ref 0–0.04)
IMM GRANULOCYTES NFR BLD AUTO: 0 % (ref 0–0.5)
LYMPHOCYTES # BLD: 1.4 K/UL (ref 0.8–3.5)
LYMPHOCYTES NFR BLD: 12 % (ref 12–49)
MCH RBC QN AUTO: 29.1 PG (ref 26–34)
MCHC RBC AUTO-ENTMCNC: 32.3 G/DL (ref 30–36.5)
MCV RBC AUTO: 90.3 FL (ref 80–99)
MONOCYTES # BLD: 1 K/UL (ref 0–1)
MONOCYTES NFR BLD: 9 % (ref 5–13)
NEUTS SEG # BLD: 8.4 K/UL (ref 1.8–8)
NEUTS SEG NFR BLD: 77 % (ref 32–75)
NRBC # BLD: 0 K/UL (ref 0–0.01)
NRBC BLD-RTO: 0 PER 100 WBC
PLATELET # BLD AUTO: 251 K/UL (ref 150–400)
PMV BLD AUTO: 12 FL (ref 8.9–12.9)
POTASSIUM SERPL-SCNC: 3.9 MMOL/L (ref 3.5–5.1)
PROT SERPL-MCNC: 7.3 G/DL (ref 6.4–8.2)
RBC # BLD AUTO: 4.94 M/UL (ref 3.8–5.2)
SODIUM SERPL-SCNC: 139 MMOL/L (ref 136–145)
TROPONIN I SERPL HS-MCNC: 8 NG/L (ref 0–51)
WBC # BLD AUTO: 10.9 K/UL (ref 3.6–11)

## 2023-08-11 PROCEDURE — 6360000002 HC RX W HCPCS: Performed by: STUDENT IN AN ORGANIZED HEALTH CARE EDUCATION/TRAINING PROGRAM

## 2023-08-11 PROCEDURE — 6360000002 HC RX W HCPCS: Performed by: FAMILY MEDICINE

## 2023-08-11 PROCEDURE — 94644 CONT INHLJ TX 1ST HOUR: CPT

## 2023-08-11 PROCEDURE — 6370000000 HC RX 637 (ALT 250 FOR IP): Performed by: FAMILY MEDICINE

## 2023-08-11 PROCEDURE — 6370000000 HC RX 637 (ALT 250 FOR IP): Performed by: INTERNAL MEDICINE

## 2023-08-11 PROCEDURE — 2700000000 HC OXYGEN THERAPY PER DAY

## 2023-08-11 PROCEDURE — 94761 N-INVAS EAR/PLS OXIMETRY MLT: CPT

## 2023-08-11 PROCEDURE — 1100000000 HC RM PRIVATE

## 2023-08-11 PROCEDURE — 6370000000 HC RX 637 (ALT 250 FOR IP): Performed by: STUDENT IN AN ORGANIZED HEALTH CARE EDUCATION/TRAINING PROGRAM

## 2023-08-11 PROCEDURE — 94640 AIRWAY INHALATION TREATMENT: CPT

## 2023-08-11 PROCEDURE — 2580000003 HC RX 258: Performed by: FAMILY MEDICINE

## 2023-08-11 PROCEDURE — 96365 THER/PROPH/DIAG IV INF INIT: CPT

## 2023-08-11 RX ORDER — DOXYCYCLINE HYCLATE 100 MG/1
100 CAPSULE ORAL EVERY 12 HOURS SCHEDULED
Status: DISCONTINUED | OUTPATIENT
Start: 2023-08-11 | End: 2023-08-13 | Stop reason: HOSPADM

## 2023-08-11 RX ORDER — MAGNESIUM SULFATE IN WATER 40 MG/ML
2000 INJECTION, SOLUTION INTRAVENOUS
Status: COMPLETED | OUTPATIENT
Start: 2023-08-11 | End: 2023-08-11

## 2023-08-11 RX ORDER — POLYETHYLENE GLYCOL 3350 17 G/17G
17 POWDER, FOR SOLUTION ORAL DAILY PRN
Status: DISCONTINUED | OUTPATIENT
Start: 2023-08-11 | End: 2023-08-13 | Stop reason: HOSPADM

## 2023-08-11 RX ORDER — SODIUM CHLORIDE 0.9 % (FLUSH) 0.9 %
5-40 SYRINGE (ML) INJECTION EVERY 12 HOURS SCHEDULED
Status: DISCONTINUED | OUTPATIENT
Start: 2023-08-11 | End: 2023-08-13 | Stop reason: HOSPADM

## 2023-08-11 RX ORDER — ACETAMINOPHEN 650 MG/1
650 SUPPOSITORY RECTAL EVERY 6 HOURS PRN
Status: DISCONTINUED | OUTPATIENT
Start: 2023-08-11 | End: 2023-08-13 | Stop reason: HOSPADM

## 2023-08-11 RX ORDER — BUDESONIDE AND FORMOTEROL FUMARATE DIHYDRATE 80; 4.5 UG/1; UG/1
2 AEROSOL RESPIRATORY (INHALATION)
Status: DISCONTINUED | OUTPATIENT
Start: 2023-08-11 | End: 2023-08-13 | Stop reason: HOSPADM

## 2023-08-11 RX ORDER — PANTOPRAZOLE SODIUM 40 MG/1
40 TABLET, DELAYED RELEASE ORAL DAILY
Status: DISCONTINUED | OUTPATIENT
Start: 2023-08-11 | End: 2023-08-13 | Stop reason: HOSPADM

## 2023-08-11 RX ORDER — IPRATROPIUM BROMIDE AND ALBUTEROL SULFATE 2.5; .5 MG/3ML; MG/3ML
1 SOLUTION RESPIRATORY (INHALATION)
Status: DISCONTINUED | OUTPATIENT
Start: 2023-08-11 | End: 2023-08-11

## 2023-08-11 RX ORDER — ONDANSETRON 4 MG/1
4 TABLET, ORALLY DISINTEGRATING ORAL EVERY 8 HOURS PRN
Status: DISCONTINUED | OUTPATIENT
Start: 2023-08-11 | End: 2023-08-13 | Stop reason: HOSPADM

## 2023-08-11 RX ORDER — ALBUTEROL SULFATE 2.5 MG/3ML
20 SOLUTION RESPIRATORY (INHALATION)
Status: COMPLETED | OUTPATIENT
Start: 2023-08-11 | End: 2023-08-11

## 2023-08-11 RX ORDER — SODIUM CHLORIDE 0.9 % (FLUSH) 0.9 %
5-40 SYRINGE (ML) INJECTION PRN
Status: DISCONTINUED | OUTPATIENT
Start: 2023-08-11 | End: 2023-08-13 | Stop reason: HOSPADM

## 2023-08-11 RX ORDER — SODIUM CHLORIDE 9 MG/ML
INJECTION, SOLUTION INTRAVENOUS CONTINUOUS
Status: DISCONTINUED | OUTPATIENT
Start: 2023-08-11 | End: 2023-08-12

## 2023-08-11 RX ORDER — METHYLPREDNISOLONE SODIUM SUCCINATE 1 G/16ML
40 INJECTION, POWDER, LYOPHILIZED, FOR SOLUTION INTRAMUSCULAR; INTRAVENOUS EVERY 6 HOURS
Status: DISCONTINUED | OUTPATIENT
Start: 2023-08-11 | End: 2023-08-13

## 2023-08-11 RX ORDER — GUAIFENESIN 600 MG/1
1200 TABLET, EXTENDED RELEASE ORAL EVERY 12 HOURS
Status: DISCONTINUED | OUTPATIENT
Start: 2023-08-11 | End: 2023-08-13 | Stop reason: HOSPADM

## 2023-08-11 RX ORDER — ONDANSETRON 2 MG/ML
4 INJECTION INTRAMUSCULAR; INTRAVENOUS EVERY 6 HOURS PRN
Status: DISCONTINUED | OUTPATIENT
Start: 2023-08-11 | End: 2023-08-13 | Stop reason: HOSPADM

## 2023-08-11 RX ORDER — SODIUM CHLORIDE 9 MG/ML
INJECTION, SOLUTION INTRAVENOUS PRN
Status: DISCONTINUED | OUTPATIENT
Start: 2023-08-11 | End: 2023-08-13 | Stop reason: HOSPADM

## 2023-08-11 RX ORDER — IPRATROPIUM BROMIDE AND ALBUTEROL SULFATE 2.5; .5 MG/3ML; MG/3ML
1 SOLUTION RESPIRATORY (INHALATION)
Status: DISCONTINUED | OUTPATIENT
Start: 2023-08-11 | End: 2023-08-12

## 2023-08-11 RX ORDER — MONTELUKAST SODIUM 10 MG/1
10 TABLET ORAL NIGHTLY
Status: DISCONTINUED | OUTPATIENT
Start: 2023-08-11 | End: 2023-08-13 | Stop reason: HOSPADM

## 2023-08-11 RX ORDER — ENOXAPARIN SODIUM 100 MG/ML
30 INJECTION SUBCUTANEOUS 2 TIMES DAILY
Status: DISCONTINUED | OUTPATIENT
Start: 2023-08-11 | End: 2023-08-13

## 2023-08-11 RX ORDER — ACETAMINOPHEN 325 MG/1
650 TABLET ORAL EVERY 6 HOURS PRN
Status: DISCONTINUED | OUTPATIENT
Start: 2023-08-11 | End: 2023-08-13 | Stop reason: HOSPADM

## 2023-08-11 RX ADMIN — BUDESONIDE AND FORMOTEROL FUMARATE DIHYDRATE 2 PUFF: 80; 4.5 AEROSOL RESPIRATORY (INHALATION) at 09:19

## 2023-08-11 RX ADMIN — METHYLPREDNISOLONE SODIUM SUCCINATE 40 MG: 40 INJECTION, POWDER, FOR SOLUTION INTRAMUSCULAR; INTRAVENOUS at 16:00

## 2023-08-11 RX ADMIN — MONTELUKAST 10 MG: 10 TABLET, FILM COATED ORAL at 22:43

## 2023-08-11 RX ADMIN — IPRATROPIUM BROMIDE AND ALBUTEROL SULFATE 1 DOSE: 2.5; .5 SOLUTION RESPIRATORY (INHALATION) at 00:25

## 2023-08-11 RX ADMIN — GUAIFENESIN 1200 MG: 600 TABLET, EXTENDED RELEASE ORAL at 22:43

## 2023-08-11 RX ADMIN — IPRATROPIUM BROMIDE AND ALBUTEROL SULFATE 1 DOSE: 2.5; .5 SOLUTION RESPIRATORY (INHALATION) at 12:14

## 2023-08-11 RX ADMIN — ENOXAPARIN SODIUM 30 MG: 100 INJECTION SUBCUTANEOUS at 22:43

## 2023-08-11 RX ADMIN — BUDESONIDE AND FORMOTEROL FUMARATE DIHYDRATE 2 PUFF: 80; 4.5 AEROSOL RESPIRATORY (INHALATION) at 19:25

## 2023-08-11 RX ADMIN — METHYLPREDNISOLONE SODIUM SUCCINATE 40 MG: 40 INJECTION INTRAMUSCULAR; INTRAVENOUS at 22:44

## 2023-08-11 RX ADMIN — MAGNESIUM SULFATE HEPTAHYDRATE 2000 MG: 40 INJECTION, SOLUTION INTRAVENOUS at 00:46

## 2023-08-11 RX ADMIN — IPRATROPIUM BROMIDE AND ALBUTEROL SULFATE 1 DOSE: 2.5; .5 SOLUTION RESPIRATORY (INHALATION) at 16:39

## 2023-08-11 RX ADMIN — IPRATROPIUM BROMIDE AND ALBUTEROL SULFATE 1 DOSE: 2.5; .5 SOLUTION RESPIRATORY (INHALATION) at 23:53

## 2023-08-11 RX ADMIN — ALBUTEROL SULFATE 20 MG: 2.5 SOLUTION RESPIRATORY (INHALATION) at 00:37

## 2023-08-11 RX ADMIN — METHYLPREDNISOLONE SODIUM SUCCINATE 40 MG: 40 INJECTION, POWDER, FOR SOLUTION INTRAMUSCULAR; INTRAVENOUS at 08:45

## 2023-08-11 RX ADMIN — IPRATROPIUM BROMIDE AND ALBUTEROL SULFATE 1 DOSE: 2.5; .5 SOLUTION RESPIRATORY (INHALATION) at 19:25

## 2023-08-11 RX ADMIN — IPRATROPIUM BROMIDE AND ALBUTEROL SULFATE 1 DOSE: 2.5; .5 SOLUTION RESPIRATORY (INHALATION) at 07:24

## 2023-08-11 RX ADMIN — SODIUM CHLORIDE: 9 INJECTION, SOLUTION INTRAVENOUS at 13:07

## 2023-08-11 RX ADMIN — GUAIFENESIN 1200 MG: 600 TABLET, EXTENDED RELEASE ORAL at 09:19

## 2023-08-11 RX ADMIN — DOXYCYCLINE HYCLATE 100 MG: 100 CAPSULE ORAL at 22:43

## 2023-08-11 RX ADMIN — SODIUM CHLORIDE, PRESERVATIVE FREE 10 ML: 5 INJECTION INTRAVENOUS at 08:46

## 2023-08-11 RX ADMIN — ENOXAPARIN SODIUM 30 MG: 100 INJECTION SUBCUTANEOUS at 08:45

## 2023-08-11 RX ADMIN — METHYLPREDNISOLONE SODIUM SUCCINATE 40 MG: 40 INJECTION INTRAMUSCULAR; INTRAVENOUS at 16:09

## 2023-08-11 RX ADMIN — PANTOPRAZOLE SODIUM 40 MG: 40 TABLET, DELAYED RELEASE ORAL at 08:45

## 2023-08-11 ASSESSMENT — PAIN SCALES - GENERAL: PAINLEVEL_OUTOF10: 0

## 2023-08-11 NOTE — H&P
History and Physical    NAME:   Kristen Ibanez   :  1975   MRN:  [id number]     Date/Time: 2023 12:31 PM    Patient PCP: None None  ______________________________________________________________________       Subjective:     CHIEF COMPLAINT:     SOB    HISTORY OF PRESENT ILLNESS:     Patient is a 51y.o. year old female with PMHx COPD, Cigarette Nicotine Dependence (37 pack years), Morbid Obesity who presents with SOB x2 days that worsened last night prompting her to come to the hospital. Reports she had a sore throat ~4 days ago and lymphadenopathy, which has resolved since. She used her home albuterol inhaler w/ minimal relief. Brought here by EMS and given albuterol nebulizer w/ moderate relief. O2 sats have been running ~89-94 on 3L NC, now on 4L at 96%. Drops to 90% after 2 minutes off NC while sitting and talking. Of note, previously hospitalized 1 year ago for 1 week for similar exacerbation. Endorses mild chest tightness, wheezing. Denies fever, chills, sore throat, nausea, vomiting, abdominal pain. No past medical history on file. Health Maintenance: Does not follow w/ PCP regularly, uses albuterol inhaler from a friend PRN    No past surgical history on file. Social History     Tobacco Use    Smoking status: Not on file    Smokeless tobacco: Not on file   Substance Use Topics    Alcohol use: Not on file    Smoking History: 37 pack years, 1ppd starting at 14 y/o    No family history on file. Allergies   Allergen Reactions    Latex    Fruits (facial swelling)  Tree Nuts  NKDA    Prior to Admission medications    Medication Sig Start Date End Date Taking?  Authorizing Provider   albuterol sulfate HFA (PROVENTIL;VENTOLIN;PROAIR) 108 (90 Base) MCG/ACT inhaler Inhale 2 puffs into the lungs every 6 hours as needed    Ar Automatic Reconciliation   albuterol (PROVENTIL) (2.5 MG/3ML) 0.083% nebulizer solution Inhale 3 mLs into the lungs every 6 hours as needed    Ar Automatic Reconciliation

## 2023-08-11 NOTE — ED PROVIDER NOTES
known as: LEVAQUIN     pantoprazole 40 MG tablet  Commonly known as: PROTONIX     predniSONE 20 MG tablet  Commonly known as: Vijaya Sahu           * This list has 2 medication(s) that are the same as other medications prescribed for you. Read the directions carefully, and ask your doctor or other care provider to review them with you. DISCONTINUED MEDICATIONS:  Current Discharge Medication List          I am the Primary Clinician of Record: Christen Cid MD (electronically signed)    (Please note that parts of this dictation were completed with voice recognition software. Quite often unanticipated grammatical, syntax, homophones, and other interpretive errors are inadvertently transcribed by the computer software. Please disregards these errors.  Please excuse any errors that have escaped final proofreading.)     Shital Arellano MD  08/11/23 [id number]

## 2023-08-11 NOTE — ED NOTES
RN assumed care of patient at this time. Bedside report received from [facility] at this time. Patient AAOx4 in sitting position. Patient on continuous cardiac monitoring, BP , and SpO2. Stretcher in lowest locked position and call bell within reach.        Iwona Chamberlain RN  08/10/23 [address], RN  08/10/23 9008

## 2023-08-11 NOTE — ACP (ADVANCE CARE PLANNING)
Advance Care Planning     Advance Care Planning Inpatient Note  [facility]    Today's Date: 8/11/2023  Unit: [facility]    Received request from IDT Member. Upon review of chart and communication with care team, patient's decision making abilities are not in question. . Patient and Healthcare Decision Maker was/were present in the room during visit. Goals of ACP Conversation:  Discuss advance care planning documents  Facilitate a discussion related to patient's goals of care as they align with the patient's values and beliefs. Health Care Decision Makers:       Primary Decision Maker: Estelle Cooper - [phone]  Summary:  Verified Documents  [facility] [address] [facility]    Advance Care Planning Documents (Patient Wishes):  Healthcare Power of /Advance Directive Appointment of [address] [place]  Living Will/Advance Directive  Anatomical Gift/Organ Donation     Assessment:  Patient seen by HARINDER Guajardo  Intern for AMD Consult for Shlomo Kincaid. Patient is sitting up in bed with significant other present. States she and significant other have been together for more than 20 years, and the rest of her family does not live near her. States she has decided to make her significant other, Gabriel Livers her AMD health care decision maker. Also states sh wishes to donate her entire body to science. States they have been in fellowship with different churches but are Christians. Listened attentively, providing time and space to discuss AMD. Maintained presence of ministry at bedside. AMD completed. HARINDER Guajardo   Intern    Interventions:  Provided education on documents for clarity and greater understanding  Discussed and provided education on state decision maker hierarchy  Assisted in the completion of documents according to patient's wishes at this time    Care Preferences Communicated:   No    Outcomes/Plan:  ACP

## 2023-08-11 NOTE — CARE COORDINATION
08/11/23 1458   Service Assessment   Patient Orientation Alert and Oriented   Cognition Alert   History Provided By Patient   Primary Caregiver Self   Accompanied By/Relationship Boyfriend - [id number] [employer] Spouse/Significant Other   Patient's Healthcare Decision Maker is: Legal Next of Kin   PCP Verified by CM Yes  (No PCP)   Prior Functional Level Independent in ADLs/IADLs   Current Functional Level Independent in ADLs/IADLs   Can patient return to prior living arrangement Yes   Ability to make needs known: Good   Family able to assist with home care needs: Yes   Would you like for me to discuss the discharge plan with any other family members/significant others, and if so, who? Yes  (BF)   Financial Resources  Resources None   Social/Functional History   Lives With Significant other     Cm met with pt and pt's significant other - Debora Romero at the bedside to complete discharge assessment. Cm verified home address. Pt lives at home with Mr. Julio Cesar Magdaleno. Pt does not have a medical advance directive. Pt indicated Mr. Natarajan would make health care decisions for her. Pt is agreeable with completing a medical advance directive. Pt ambulates without DME. Pt is currently on O2. Cm will continue to monitor pt for home O2 needs. Pt does not have health care insurance or a PCP. Pt indicated she will see if she can go to the [facility] to see if she can get a PCP there. Cm messaged [facility]  via [employer] to complete a medical advance directive.

## 2023-08-11 NOTE — H&P
History and Physical    NAME:   Brandy Ramesh   :  1975   MRN:  [id number]     Date/Time: 2023 12:06 PM    Patient PCP: None None  ______________________________________________________________________       Subjective:     CHIEF COMPLAINT:     SOB    HISTORY OF PRESENT ILLNESS:     Patient is a 51y.o. year old female with PMHx COPD, Cigarette Nicotine Dependence (37 pack years), Morbid Obesity who presents with SOB x2 days that worsened last night prompting her to come to the hospital. Reports she had a sore throat ~4 days ago and lymphadenopathy, which has resolved since. She used her home albuterol inhaler w/ minimal relief. Brought here by EMS and given albuterol nebulizer w/ moderate relief. O2 sats have been running ~89-94 on 3L NC, now on 4L at 96%. Drops to 90% after 2 minutes off NC while sitting and talking. Of note, previously hospitalized 1 year ago for 1 week for similar exacerbation. Endorses mild chest tightness, wheezing. Denies fever, chills, sore throat, nausea, vomiting, abdominal pain. No past medical history on file. Health Maintenance: Does not follow w/ PCP regularly, uses albuterol inhaler from a friend PRN    No past surgical history on file. Social History     Tobacco Use    Smoking status: Not on file    Smokeless tobacco: Not on file   Substance Use Topics    Alcohol use: Not on file    Smoking History: 37 pack years, 1ppd starting at 14 y/o    No family history on file. Allergies   Allergen Reactions    Latex    Fruits (facial swelling)  Tree Nuts  NKDA    Prior to Admission medications    Medication Sig Start Date End Date Taking?  Authorizing Provider   albuterol sulfate HFA (PROVENTIL;VENTOLIN;PROAIR) 108 (90 Base) MCG/ACT inhaler Inhale 2 puffs into the lungs every 6 hours as needed    Ar Automatic Reconciliation   albuterol (PROVENTIL) (2.5 MG/3ML) 0.083% nebulizer solution Inhale 3 mLs into the lungs every 6 hours as needed    Ar Automatic Reconciliation

## 2023-08-11 NOTE — CONSULTS
PULMONARY CONSULT  [facility]    Name: Jordan Montoya MRN: [id number]   : 1975 Hospital: [facility]   Date: 2023  Admission date: 8/10/2023 Hospital Day: 2       HPI:     Patient Active Problem List   Diagnosis    COPD with acute exacerbation ([address])    Acute asthma exacerbation    COPD (chronic obstructive pulmonary disease) ([address])             [x] High complexity decision making was performed  [x] See my orders for details      Subjective/Initial History:     I was asked by Lizzie Redman MD to see Jordan Montoya  a 51 y.o.    female in consultation     Excerpts from admission 8/10/2023 or consult notes as follows:   69-year-old lady came in because of shortness of breath and dyspnea significant past medical history of asthma COPD she continues to smoke I have seen her in the office last September she was doing fairly well now started couple of days ago her condition got worse more short of breath dyspneic audible wheezing she came to the emergency room got admitted      Allergies   Allergen Reactions    Latex         MAR reviewed and pertinent medications noted or modified as needed     Current Facility-Administered Medications   Medication Dose Route Frequency Provider Last Rate Last Admin    ipratropium 0.5 mg-albuterol 2.5 mg (DUONEB) nebulizer solution 1 Dose  1 Dose Inhalation Q4H WA RT Og Gama MD   1 Dose at 23 0724    guaiFENesin (MUCINEX) extended release tablet 1,200 mg  1,200 mg Oral Q12H Og Gama MD   1,200 mg at 23 0919    pantoprazole (PROTONIX) tablet 40 mg  40 mg Oral Daily Og Gama MD   40 mg at 23 0845    0.9 % sodium chloride infusion   IntraVENous Continuous Og Gama MD        sodium chloride flush 0.9 % injection 5-40 mL  5-40 mL IntraVENous 2 times per day Lizzie Redman MD   10 mL at 23 0846    sodium chloride flush 0.9 % injection 5-40 mL  5-40 mL IntraVENous PRN Lizzie Redman MD

## 2023-08-12 LAB
ALBUMIN SERPL-MCNC: 3.2 G/DL (ref 3.5–5)
ALBUMIN/GLOB SERPL: 0.9 (ref 1.1–2.2)
ALP SERPL-CCNC: 74 U/L (ref 45–117)
ALT SERPL-CCNC: 22 U/L (ref 12–78)
ANION GAP SERPL CALC-SCNC: 3 MMOL/L (ref 5–15)
AST SERPL W P-5'-P-CCNC: 11 U/L (ref 15–37)
BASOPHILS # BLD: 0 K/UL (ref 0–0.1)
BASOPHILS NFR BLD: 0 % (ref 0–1)
BILIRUB SERPL-MCNC: 0.2 MG/DL (ref 0.2–1)
BUN SERPL-MCNC: 12 MG/DL (ref 6–20)
BUN/CREAT SERPL: 19 (ref 12–20)
CA-I BLD-MCNC: 9.2 MG/DL (ref 8.5–10.1)
CHLORIDE SERPL-SCNC: 115 MMOL/L (ref 97–108)
CO2 SERPL-SCNC: 25 MMOL/L (ref 21–32)
CREAT SERPL-MCNC: 0.64 MG/DL (ref 0.55–1.02)
DIFFERENTIAL METHOD BLD: ABNORMAL
EOSINOPHIL # BLD: 0 K/UL (ref 0–0.4)
EOSINOPHIL NFR BLD: 0 % (ref 0–7)
ERYTHROCYTE [DISTWIDTH] IN BLOOD BY AUTOMATED COUNT: 14.5 % (ref 11.5–14.5)
GLOBULIN SER CALC-MCNC: 3.5 G/DL (ref 2–4)
GLUCOSE SERPL-MCNC: 136 MG/DL (ref 65–100)
HCT VFR BLD AUTO: 41.8 % (ref 35–47)
HGB BLD-MCNC: 13.4 G/DL (ref 11.5–16)
IMM GRANULOCYTES # BLD AUTO: 0.2 K/UL (ref 0–0.04)
IMM GRANULOCYTES NFR BLD AUTO: 1 % (ref 0–0.5)
LYMPHOCYTES # BLD: 0.8 K/UL (ref 0.8–3.5)
LYMPHOCYTES NFR BLD: 4 % (ref 12–49)
MCH RBC QN AUTO: 29.1 PG (ref 26–34)
MCHC RBC AUTO-ENTMCNC: 32.1 G/DL (ref 30–36.5)
MCV RBC AUTO: 90.7 FL (ref 80–99)
MONOCYTES # BLD: 0.6 K/UL (ref 0–1)
MONOCYTES NFR BLD: 3 % (ref 5–13)
NEUTS SEG # BLD: 18.1 K/UL (ref 1.8–8)
NEUTS SEG NFR BLD: 92 % (ref 32–75)
NRBC # BLD: 0 K/UL (ref 0–0.01)
NRBC BLD-RTO: 0 PER 100 WBC
PLATELET # BLD AUTO: 237 K/UL (ref 150–400)
PMV BLD AUTO: 11.8 FL (ref 8.9–12.9)
POTASSIUM SERPL-SCNC: 4.6 MMOL/L (ref 3.5–5.1)
PROT SERPL-MCNC: 6.7 G/DL (ref 6.4–8.2)
RBC # BLD AUTO: 4.61 M/UL (ref 3.8–5.2)
RBC MORPH BLD: ABNORMAL
SODIUM SERPL-SCNC: 143 MMOL/L (ref 136–145)
WBC # BLD AUTO: 19.7 K/UL (ref 3.6–11)

## 2023-08-12 PROCEDURE — 6370000000 HC RX 637 (ALT 250 FOR IP): Performed by: FAMILY MEDICINE

## 2023-08-12 PROCEDURE — 6360000002 HC RX W HCPCS: Performed by: FAMILY MEDICINE

## 2023-08-12 PROCEDURE — 6370000000 HC RX 637 (ALT 250 FOR IP): Performed by: INTERNAL MEDICINE

## 2023-08-12 PROCEDURE — 94640 AIRWAY INHALATION TREATMENT: CPT

## 2023-08-12 PROCEDURE — 1100000000 HC RM PRIVATE

## 2023-08-12 PROCEDURE — 80053 COMPREHEN METABOLIC PANEL: CPT

## 2023-08-12 PROCEDURE — 36415 COLL VENOUS BLD VENIPUNCTURE: CPT

## 2023-08-12 PROCEDURE — 2700000000 HC OXYGEN THERAPY PER DAY

## 2023-08-12 PROCEDURE — 2580000003 HC RX 258: Performed by: FAMILY MEDICINE

## 2023-08-12 PROCEDURE — 85025 COMPLETE CBC W/AUTO DIFF WBC: CPT

## 2023-08-12 PROCEDURE — 94761 N-INVAS EAR/PLS OXIMETRY MLT: CPT

## 2023-08-12 RX ORDER — IPRATROPIUM BROMIDE AND ALBUTEROL SULFATE 2.5; .5 MG/3ML; MG/3ML
1 SOLUTION RESPIRATORY (INHALATION) EVERY 6 HOURS
Status: DISCONTINUED | OUTPATIENT
Start: 2023-08-12 | End: 2023-08-13

## 2023-08-12 RX ADMIN — SODIUM CHLORIDE: 9 INJECTION, SOLUTION INTRAVENOUS at 14:35

## 2023-08-12 RX ADMIN — GUAIFENESIN 1200 MG: 600 TABLET, EXTENDED RELEASE ORAL at 18:28

## 2023-08-12 RX ADMIN — IPRATROPIUM BROMIDE AND ALBUTEROL SULFATE 1 DOSE: .5; 2.5 SOLUTION RESPIRATORY (INHALATION) at 20:17

## 2023-08-12 RX ADMIN — SODIUM CHLORIDE, PRESERVATIVE FREE 10 ML: 5 INJECTION INTRAVENOUS at 22:42

## 2023-08-12 RX ADMIN — IPRATROPIUM BROMIDE AND ALBUTEROL SULFATE 1 DOSE: 2.5; .5 SOLUTION RESPIRATORY (INHALATION) at 15:48

## 2023-08-12 RX ADMIN — METHYLPREDNISOLONE SODIUM SUCCINATE 40 MG: 40 INJECTION INTRAMUSCULAR; INTRAVENOUS at 04:25

## 2023-08-12 RX ADMIN — ENOXAPARIN SODIUM 30 MG: 100 INJECTION SUBCUTANEOUS at 08:22

## 2023-08-12 RX ADMIN — METHYLPREDNISOLONE SODIUM SUCCINATE 40 MG: 40 INJECTION INTRAMUSCULAR; INTRAVENOUS at 10:30

## 2023-08-12 RX ADMIN — ACETAMINOPHEN 650 MG: 325 TABLET ORAL at 08:23

## 2023-08-12 RX ADMIN — DOXYCYCLINE HYCLATE 100 MG: 100 CAPSULE ORAL at 08:22

## 2023-08-12 RX ADMIN — METHYLPREDNISOLONE SODIUM SUCCINATE 40 MG: 40 INJECTION INTRAMUSCULAR; INTRAVENOUS at 15:38

## 2023-08-12 RX ADMIN — MONTELUKAST 10 MG: 10 TABLET, FILM COATED ORAL at 21:25

## 2023-08-12 RX ADMIN — BUDESONIDE AND FORMOTEROL FUMARATE DIHYDRATE 2 PUFF: 80; 4.5 AEROSOL RESPIRATORY (INHALATION) at 08:32

## 2023-08-12 RX ADMIN — IPRATROPIUM BROMIDE AND ALBUTEROL SULFATE 1 DOSE: 2.5; .5 SOLUTION RESPIRATORY (INHALATION) at 08:32

## 2023-08-12 RX ADMIN — BUDESONIDE AND FORMOTEROL FUMARATE DIHYDRATE 2 PUFF: 80; 4.5 AEROSOL RESPIRATORY (INHALATION) at 20:18

## 2023-08-12 RX ADMIN — DOXYCYCLINE HYCLATE 100 MG: 100 CAPSULE ORAL at 21:25

## 2023-08-12 RX ADMIN — PANTOPRAZOLE SODIUM 40 MG: 40 TABLET, DELAYED RELEASE ORAL at 08:22

## 2023-08-12 RX ADMIN — GUAIFENESIN 1200 MG: 600 TABLET, EXTENDED RELEASE ORAL at 08:22

## 2023-08-12 RX ADMIN — ENOXAPARIN SODIUM 30 MG: 100 INJECTION SUBCUTANEOUS at 21:24

## 2023-08-12 RX ADMIN — IPRATROPIUM BROMIDE AND ALBUTEROL SULFATE 1 DOSE: 2.5; .5 SOLUTION RESPIRATORY (INHALATION) at 11:36

## 2023-08-12 RX ADMIN — METHYLPREDNISOLONE SODIUM SUCCINATE 40 MG: 40 INJECTION INTRAMUSCULAR; INTRAVENOUS at 22:42

## 2023-08-12 ASSESSMENT — PAIN SCALES - GENERAL: PAINLEVEL_OUTOF10: 0

## 2023-08-12 NOTE — CONSULTS
PULMONARY CONSULT  [facility]    Name: Kristen Ibanez MRN: [id number]   : 1975 Hospital: [facility]   Date: 2023  Admission date: 8/10/2023 Hospital Day: 3       HPI:     Patient Active Problem List   Diagnosis    COPD with acute exacerbation ([address])    Acute asthma exacerbation    COPD (chronic obstructive pulmonary disease) ([address])             [x] High complexity decision making was performed  [x] See my orders for details      Subjective/Initial History:     I was asked by Uriah Garcia MD to see Kristen Ibanez  a 51 y.o.    female in consultation     Excerpts from admission 8/10/2023 or consult notes as follows:   41-year-old lady came in because of shortness of breath and dyspnea significant past medical history of asthma COPD she continues to smoke I have seen her in the office last September she was doing fairly well now started couple of days ago her condition got worse more short of breath dyspneic audible wheezing she came to the emergency room got admitted      Allergies   Allergen Reactions    Latex         MAR reviewed and pertinent medications noted or modified as needed     Current Facility-Administered Medications   Medication Dose Route Frequency Provider Last Rate Last Admin    ipratropium 0.5 mg-albuterol 2.5 mg (DUONEB) nebulizer solution 1 Dose  1 Dose Inhalation Q4H WA RT Og Gama MD   1 Dose at 23 1548    guaiFENesin (MUCINEX) extended release tablet 1,200 mg  1,200 mg Oral Q12H Og Gama MD   1,200 mg at 23 6370    pantoprazole (PROTONIX) tablet 40 mg  40 mg Oral Daily Og Gama MD   40 mg at 23 0822    0.9 % sodium chloride infusion   IntraVENous Continuous Cortez Byron Gama MD 75 mL/hr at 23 1435 New Bag at 23 1435    sodium chloride flush 0.9 % injection 5-40 mL  5-40 mL IntraVENous 2 times per day Uriah Garcia MD   10 mL at 23 0846    sodium chloride flush 0.9 % injection 5-40 mL

## 2023-08-13 VITALS
HEART RATE: 64 BPM | OXYGEN SATURATION: 94 % | RESPIRATION RATE: 18 BRPM | HEIGHT: 68 IN | TEMPERATURE: 97.9 F | DIASTOLIC BLOOD PRESSURE: 70 MMHG | WEIGHT: 230 LBS | BODY MASS INDEX: 34.86 KG/M2 | SYSTOLIC BLOOD PRESSURE: 113 MMHG

## 2023-08-13 PROCEDURE — 6370000000 HC RX 637 (ALT 250 FOR IP): Performed by: FAMILY MEDICINE

## 2023-08-13 PROCEDURE — 6370000000 HC RX 637 (ALT 250 FOR IP): Performed by: INTERNAL MEDICINE

## 2023-08-13 PROCEDURE — 6360000002 HC RX W HCPCS: Performed by: INTERNAL MEDICINE

## 2023-08-13 PROCEDURE — 6360000002 HC RX W HCPCS: Performed by: FAMILY MEDICINE

## 2023-08-13 PROCEDURE — 94640 AIRWAY INHALATION TREATMENT: CPT

## 2023-08-13 PROCEDURE — 2580000003 HC RX 258: Performed by: FAMILY MEDICINE

## 2023-08-13 RX ORDER — MONTELUKAST SODIUM 10 MG/1
10 TABLET ORAL NIGHTLY
Qty: 30 TABLET | Refills: 3 | Status: SHIPPED | OUTPATIENT
Start: 2023-08-13

## 2023-08-13 RX ORDER — METHYLPREDNISOLONE 4 MG/1
24 TABLET ORAL ONCE
Status: COMPLETED | OUTPATIENT
Start: 2023-08-13 | End: 2023-08-13

## 2023-08-13 RX ORDER — METHYLPREDNISOLONE 4 MG/1
4 TABLET ORAL NIGHTLY
Qty: 3 TABLET | Refills: 0 | Status: SHIPPED | OUTPATIENT
Start: 2023-08-15 | End: 2023-08-18

## 2023-08-13 RX ORDER — METHYLPREDNISOLONE 4 MG/1
4 TABLET ORAL
Status: DISCONTINUED | OUTPATIENT
Start: 2023-08-14 | End: 2023-08-13 | Stop reason: HOSPADM

## 2023-08-13 RX ORDER — DOXYCYCLINE HYCLATE 100 MG/1
100 CAPSULE ORAL EVERY 12 HOURS SCHEDULED
Qty: 6 CAPSULE | Refills: 0 | Status: SHIPPED | OUTPATIENT
Start: 2023-08-13 | End: 2023-08-16

## 2023-08-13 RX ORDER — IPRATROPIUM BROMIDE AND ALBUTEROL SULFATE 2.5; .5 MG/3ML; MG/3ML
1 SOLUTION RESPIRATORY (INHALATION)
Status: DISCONTINUED | OUTPATIENT
Start: 2023-08-13 | End: 2023-08-13 | Stop reason: HOSPADM

## 2023-08-13 RX ORDER — METHYLPREDNISOLONE 4 MG/1
4 TABLET ORAL
Qty: 3 TABLET | Refills: 0 | Status: SHIPPED | OUTPATIENT
Start: 2023-08-14 | End: 2023-08-17

## 2023-08-13 RX ORDER — METHYLPREDNISOLONE 4 MG/1
4 TABLET ORAL
Qty: 5 TABLET | Refills: 0 | Status: SHIPPED | OUTPATIENT
Start: 2023-08-14 | End: 2023-08-19

## 2023-08-13 RX ORDER — METHYLPREDNISOLONE 8 MG/1
24 TABLET ORAL ONCE
Qty: 3 TABLET | Refills: 0 | Status: SHIPPED | OUTPATIENT
Start: 2023-08-13 | End: 2023-08-13

## 2023-08-13 RX ORDER — METHYLPREDNISOLONE 4 MG/1
4 TABLET ORAL NIGHTLY
Status: DISCONTINUED | OUTPATIENT
Start: 2023-08-15 | End: 2023-08-13 | Stop reason: HOSPADM

## 2023-08-13 RX ORDER — POLYETHYLENE GLYCOL 3350 17 G/17G
17 POWDER, FOR SOLUTION ORAL DAILY PRN
Qty: 100 G | Refills: 1 | Status: SHIPPED | OUTPATIENT
Start: 2023-08-13 | End: 2023-09-12

## 2023-08-13 RX ORDER — METHYLPREDNISOLONE 8 MG/1
8 TABLET ORAL NIGHTLY
Qty: 1 TABLET | Refills: 0 | Status: SHIPPED | OUTPATIENT
Start: 2023-08-14 | End: 2023-08-15

## 2023-08-13 RX ORDER — METHYLPREDNISOLONE 4 MG/1
8 TABLET ORAL NIGHTLY
Status: DISCONTINUED | OUTPATIENT
Start: 2023-08-14 | End: 2023-08-13 | Stop reason: HOSPADM

## 2023-08-13 RX ORDER — BUDESONIDE AND FORMOTEROL FUMARATE DIHYDRATE 80; 4.5 UG/1; UG/1
2 AEROSOL RESPIRATORY (INHALATION)
Qty: 10.2 G | Refills: 3 | Status: SHIPPED | OUTPATIENT
Start: 2023-08-13

## 2023-08-13 RX ORDER — METHYLPREDNISOLONE 4 MG/1
4 TABLET ORAL
Qty: 2 TABLET | Refills: 0 | Status: SHIPPED | OUTPATIENT
Start: 2023-08-14 | End: 2023-08-16

## 2023-08-13 RX ADMIN — GUAIFENESIN 1200 MG: 600 TABLET, EXTENDED RELEASE ORAL at 06:01

## 2023-08-13 RX ADMIN — DOXYCYCLINE HYCLATE 100 MG: 100 CAPSULE ORAL at 08:12

## 2023-08-13 RX ADMIN — IPRATROPIUM BROMIDE AND ALBUTEROL SULFATE 1 DOSE: 2.5; .5 SOLUTION RESPIRATORY (INHALATION) at 15:45

## 2023-08-13 RX ADMIN — IPRATROPIUM BROMIDE AND ALBUTEROL SULFATE 1 DOSE: .5; 2.5 SOLUTION RESPIRATORY (INHALATION) at 00:46

## 2023-08-13 RX ADMIN — IPRATROPIUM BROMIDE AND ALBUTEROL SULFATE 1 DOSE: 2.5; .5 SOLUTION RESPIRATORY (INHALATION) at 08:45

## 2023-08-13 RX ADMIN — SODIUM CHLORIDE, PRESERVATIVE FREE 10 ML: 5 INJECTION INTRAVENOUS at 08:20

## 2023-08-13 RX ADMIN — PANTOPRAZOLE SODIUM 40 MG: 40 TABLET, DELAYED RELEASE ORAL at 08:13

## 2023-08-13 RX ADMIN — METHYLPREDNISOLONE SODIUM SUCCINATE 40 MG: 40 INJECTION INTRAMUSCULAR; INTRAVENOUS at 08:13

## 2023-08-13 RX ADMIN — METHYLPREDNISOLONE SODIUM SUCCINATE 40 MG: 40 INJECTION INTRAMUSCULAR; INTRAVENOUS at 04:45

## 2023-08-13 RX ADMIN — ENOXAPARIN SODIUM 30 MG: 100 INJECTION SUBCUTANEOUS at 08:13

## 2023-08-13 RX ADMIN — BUDESONIDE AND FORMOTEROL FUMARATE DIHYDRATE 2 PUFF: 80; 4.5 AEROSOL RESPIRATORY (INHALATION) at 08:45

## 2023-08-13 RX ADMIN — METHYLPREDNISOLONE 24 MG: 4 TABLET ORAL at 10:14

## 2023-08-13 NOTE — CONSULTS
PULMONARY CONSULT  [facility]    Name: Yari Dalton MRN: [id number]   : 1975 Hospital: [facility]   Date: 2023  Admission date: 8/10/2023 Hospital Day: 4       HPI:     Patient Active Problem List   Diagnosis    COPD with acute exacerbation ([address])    Acute asthma exacerbation    COPD (chronic obstructive pulmonary disease) ([address])             [x] High complexity decision making was performed  [x] See my orders for details      Subjective/Initial History:     I was asked by Tyrel Fair MD to see Yari Dalton  a 51 y.o.    female in consultation     Excerpts from admission 8/10/2023 or consult notes as follows:   79-year-old lady came in because of shortness of breath and dyspnea significant past medical history of asthma COPD she continues to smoke I have seen her in the office last September she was doing fairly well now started couple of days ago her condition got worse more short of breath dyspneic audible wheezing she came to the emergency room got admitted      Allergies   Allergen Reactions    Latex         MAR reviewed and pertinent medications noted or modified as needed     Current Facility-Administered Medications   Medication Dose Route Frequency Provider Last Rate Last Admin    ipratropium 0.5 mg-albuterol 2.5 mg (DUONEB) nebulizer solution 1 Dose  1 Dose Inhalation Q6H RT Claire Lee MD   1 Dose at 23 0845    [START ON 2023] methylPREDNISolone (MEDROL) tablet 4 mg  4 mg Oral QAM AC Blas Don MD        [START ON 2023] methylPREDNISolone (MEDROL) tablet 4 mg  4 mg Oral Lunch Blas Don MD        [START ON 2023] methylPREDNISolone (MEDROL) tablet 4 mg  4 mg Oral MD Benton Ye ON 2023] methylPREDNISolone (MEDROL) tablet 8 mg  8 mg Oral Nightly Blas Don MD        [START ON 8/15/2023] methylPREDNISolone (MEDROL) tablet 4 mg  4 mg Oral Nightly Blas Don MD        guginoFENesin

## 2023-08-13 NOTE — DISCHARGE SUMMARY
methylPREDNISolone (MEDROL) 8 MG tablet Take 1 tablet by mouth nightly for 1 dose  Qty: 1 tablet, Refills: 0      !! methylPREDNISolone (MEDROL) 4 MG tablet Take 1 tablet by mouth nightly for 3 doses  Qty: 3 tablet, Refills: 0      polyethylene glycol (GLYCOLAX) 17 g packet Take 1 packet by mouth daily as needed for Constipation  Qty: 100 g, Refills: 1      doxycycline hyclate (VIBRAMYCIN) 100 MG capsule Take 1 capsule by mouth every 12 hours for 6 doses  Qty: 6 capsule, Refills: 0       !! - Potential duplicate medications found. Please discuss with provider. CONTINUE these medications which have NOT CHANGED    Details   albuterol (PROVENTIL) (2.5 MG/3ML) 0.083% nebulizer solution Inhale 3 mLs into the lungs every 6 hours as needed      guaiFENesin 1200 MG TB12 Take 1,200 mg by mouth in the morning and 1,200 mg in the evening. pantoprazole (PROTONIX) 40 MG tablet Take 1 tablet by mouth daily           STOP taking these medications       albuterol sulfate HFA (PROVENTIL;VENTOLIN;PROAIR) 108 (90 Base) MCG/ACT inhaler Comments:   Reason for Stopping:         diphenhydrAMINE (SOMINEX) 25 MG tablet Comments:   Reason for Stopping:         levoFLOXacin (LEVAQUIN) 750 MG tablet Comments:   Reason for Stopping:         predniSONE (DELTASONE) 20 MG tablet Comments:   Reason for Stopping:               Activity: activity as tolerated  Diet: regular diet  Wound Care: none needed    No follow-ups on file.   40 minutes discharge time  Signed By: Naldo Brown MD     August 13, 2023

## 2024-01-02 ENCOUNTER — APPOINTMENT (OUTPATIENT)
Facility: HOSPITAL | Age: 49
DRG: 871 | End: 2024-01-02

## 2024-01-02 ENCOUNTER — HOSPITAL ENCOUNTER (INPATIENT)
Facility: HOSPITAL | Age: 49
LOS: 6 days | Discharge: HOME | DRG: 871 | End: 2024-01-08
Attending: EMERGENCY MEDICINE | Admitting: HOSPITALIST

## 2024-01-02 DIAGNOSIS — J96.01 ACUTE RESPIRATORY FAILURE WITH HYPOXIA (HCC): Primary | ICD-10-CM

## 2024-01-02 PROBLEM — J44.1 COPD EXACERBATION (HCC): Status: ACTIVE | Noted: 2024-01-02

## 2024-01-02 LAB
ANION GAP SERPL CALC-SCNC: 8 MMOL/L (ref 5–15)
ARTERIAL PATENCY WRIST A: POSITIVE
ARTERIAL PATENCY WRIST A: YES
BASE DEFICIT BLD-SCNC: 0.6 MMOL/L
BASE DEFICIT BLDA-SCNC: 1.2 MMOL/L
BASE DEFICIT BLDA-SCNC: 2 MMOL/L
BASOPHILS # BLD: 0 K/UL (ref 0–0.1)
BASOPHILS NFR BLD: 0 % (ref 0–1)
BDY SITE: ABNORMAL
BUN SERPL-MCNC: 14 MG/DL (ref 6–20)
BUN/CREAT SERPL: 16 (ref 12–20)
CA-I BLD-MCNC: 1.11 MMOL/L (ref 1.12–1.32)
CA-I BLD-MCNC: 9.8 MG/DL (ref 8.5–10.1)
CHLORIDE BLD-SCNC: 102 MMOL/L (ref 98–107)
CHLORIDE SERPL-SCNC: 104 MMOL/L (ref 97–108)
CO2 BLD-SCNC: 23 MMOL/L
CO2 SERPL-SCNC: 23 MMOL/L (ref 21–32)
COHGB MFR BLD: 0.7 % (ref 1–2)
CREAT SERPL-MCNC: 0.89 MG/DL (ref 0.55–1.02)
CREAT UR-MCNC: 0.36 MG/DL (ref 0.6–1.3)
DIFFERENTIAL METHOD BLD: ABNORMAL
EOSINOPHIL # BLD: 0 K/UL (ref 0–0.4)
EOSINOPHIL NFR BLD: 0 % (ref 0–7)
ERYTHROCYTE [DISTWIDTH] IN BLOOD BY AUTOMATED COUNT: 13.5 % (ref 11.5–14.5)
FIO2 ON VENT: 100 %
FIO2 ON VENT: 50 %
FLUAV AG NPH QL IA: NEGATIVE
FLUBV AG NOSE QL IA: NEGATIVE
GAS FLOW.O2 O2 DELIVERY SYS: 15 L/MIN
GAS FLOW.O2 SETTING OXYMISER: 12
GLUCOSE BLD STRIP.AUTO-MCNC: 158 MG/DL (ref 65–100)
GLUCOSE SERPL-MCNC: 148 MG/DL (ref 65–100)
HCO3 BLD-SCNC: 23.6 MMOL/L (ref 19–28)
HCO3 BLDA-SCNC: 23 MMOL/L (ref 22–26)
HCO3 BLDA-SCNC: 24 MMOL/L (ref 22–26)
HCT VFR BLD AUTO: 44 % (ref 35–47)
HGB BLD-MCNC: 14.3 G/DL (ref 11.5–16)
IMM GRANULOCYTES # BLD AUTO: 0.1 K/UL (ref 0–0.04)
IMM GRANULOCYTES NFR BLD AUTO: 1 % (ref 0–0.5)
IPAP/PIP: 14
LACTATE BLD-SCNC: 2.22 MMOL/L (ref 0.4–2)
LACTATE BLD-SCNC: 2.83 MMOL/L (ref 0.4–2)
LYMPHOCYTES # BLD: 0.4 K/UL (ref 0.8–3.5)
LYMPHOCYTES NFR BLD: 3 % (ref 12–49)
MAGNESIUM SERPL-MCNC: 1.8 MG/DL (ref 1.6–2.4)
MCH RBC QN AUTO: 29.1 PG (ref 26–34)
MCHC RBC AUTO-ENTMCNC: 32.5 G/DL (ref 30–36.5)
MCV RBC AUTO: 89.6 FL (ref 80–99)
METHGB MFR BLD: 0.3 % (ref 0–1.4)
MONOCYTES # BLD: 0.9 K/UL (ref 0–1)
MONOCYTES NFR BLD: 7 % (ref 5–13)
NEUTS SEG # BLD: 12.4 K/UL (ref 1.8–8)
NEUTS SEG NFR BLD: 89 % (ref 32–75)
NRBC # BLD: 0 K/UL (ref 0–0.01)
NRBC BLD-RTO: 0 PER 100 WBC
OXYHGB MFR BLD: 94.1 % (ref 95–99)
PCO2 BLD: 36.8 MMHG (ref 35–45)
PCO2 BLDA: 37 MMHG (ref 35–45)
PCO2 BLDA: 44 MMHG (ref 35–45)
PEEP RESPIRATORY: 6
PERFORMED BY:: 111
PERFORMED BY:: ABNORMAL
PH BLD: 7.42 (ref 7.35–7.45)
PH BLDA: 7.35 (ref 7.35–7.45)
PH BLDA: 7.41 (ref 7.35–7.45)
PLATELET # BLD AUTO: 280 K/UL (ref 150–400)
PMV BLD AUTO: 11.6 FL (ref 8.9–12.9)
PO2 BLD: 37 MMHG (ref 75–100)
PO2 BLDA: 74 MMHG (ref 80–100)
PO2 BLDA: 78 MMHG (ref 80–100)
POTASSIUM BLD-SCNC: 3.7 MMOL/L (ref 3.5–5.5)
POTASSIUM SERPL-SCNC: 4 MMOL/L (ref 3.5–5.1)
RBC # BLD AUTO: 4.91 M/UL (ref 3.8–5.2)
SAO2 % BLD: 72 %
SAO2 % BLD: 95 % (ref 95–99)
SAO2 % BLD: 96 % (ref 95–99)
SAO2% DEVICE SAO2% SENSOR NAME: ABNORMAL
SARS-COV-2 RDRP RESP QL NAA+PROBE: NOT DETECTED
SERVICE CMNT-IMP: ABNORMAL
SERVICE CMNT-IMP: YES
SODIUM BLD-SCNC: 138 MMOL/L (ref 136–145)
SODIUM SERPL-SCNC: 135 MMOL/L (ref 136–145)
SPECIMEN SITE: ABNORMAL
SPECIMEN SITE: ABNORMAL
TROPONIN I SERPL HS-MCNC: 54 NG/L (ref 0–51)
TROPONIN I SERPL HS-MCNC: 63 NG/L (ref 0–51)
VENTILATION MODE VENT: ABNORMAL
WBC # BLD AUTO: 13.8 K/UL (ref 3.6–11)

## 2024-01-02 PROCEDURE — 99285 EMERGENCY DEPT VISIT HI MDM: CPT

## 2024-01-02 PROCEDURE — 6360000002 HC RX W HCPCS

## 2024-01-02 PROCEDURE — 36600 WITHDRAWAL OF ARTERIAL BLOOD: CPT

## 2024-01-02 PROCEDURE — 85025 COMPLETE CBC W/AUTO DIFF WBC: CPT

## 2024-01-02 PROCEDURE — 2580000003 HC RX 258: Performed by: EMERGENCY MEDICINE

## 2024-01-02 PROCEDURE — 82803 BLOOD GASES ANY COMBINATION: CPT

## 2024-01-02 PROCEDURE — 6370000000 HC RX 637 (ALT 250 FOR IP): Performed by: EMERGENCY MEDICINE

## 2024-01-02 PROCEDURE — 96375 TX/PRO/DX INJ NEW DRUG ADDON: CPT

## 2024-01-02 PROCEDURE — 96366 THER/PROPH/DIAG IV INF ADDON: CPT

## 2024-01-02 PROCEDURE — 87040 BLOOD CULTURE FOR BACTERIA: CPT

## 2024-01-02 PROCEDURE — 93005 ELECTROCARDIOGRAM TRACING: CPT | Performed by: EMERGENCY MEDICINE

## 2024-01-02 PROCEDURE — 83605 ASSAY OF LACTIC ACID: CPT

## 2024-01-02 PROCEDURE — 96367 TX/PROPH/DG ADDL SEQ IV INF: CPT

## 2024-01-02 PROCEDURE — 6370000000 HC RX 637 (ALT 250 FOR IP): Performed by: HOSPITALIST

## 2024-01-02 PROCEDURE — 2580000003 HC RX 258: Performed by: HOSPITALIST

## 2024-01-02 PROCEDURE — 36415 COLL VENOUS BLD VENIPUNCTURE: CPT

## 2024-01-02 PROCEDURE — 84132 ASSAY OF SERUM POTASSIUM: CPT

## 2024-01-02 PROCEDURE — 84484 ASSAY OF TROPONIN QUANT: CPT

## 2024-01-02 PROCEDURE — 87635 SARS-COV-2 COVID-19 AMP PRB: CPT

## 2024-01-02 PROCEDURE — 94640 AIRWAY INHALATION TREATMENT: CPT

## 2024-01-02 PROCEDURE — 82947 ASSAY GLUCOSE BLOOD QUANT: CPT

## 2024-01-02 PROCEDURE — 2700000000 HC OXYGEN THERAPY PER DAY

## 2024-01-02 PROCEDURE — 84295 ASSAY OF SERUM SODIUM: CPT

## 2024-01-02 PROCEDURE — 71045 X-RAY EXAM CHEST 1 VIEW: CPT

## 2024-01-02 PROCEDURE — 83735 ASSAY OF MAGNESIUM: CPT

## 2024-01-02 PROCEDURE — 80048 BASIC METABOLIC PNL TOTAL CA: CPT

## 2024-01-02 PROCEDURE — 96374 THER/PROPH/DIAG INJ IV PUSH: CPT

## 2024-01-02 PROCEDURE — 94762 N-INVAS EAR/PLS OXIMTRY CONT: CPT

## 2024-01-02 PROCEDURE — 96365 THER/PROPH/DIAG IV INF INIT: CPT

## 2024-01-02 PROCEDURE — 6360000002 HC RX W HCPCS: Performed by: HOSPITALIST

## 2024-01-02 PROCEDURE — 82330 ASSAY OF CALCIUM: CPT

## 2024-01-02 PROCEDURE — 87804 INFLUENZA ASSAY W/OPTIC: CPT

## 2024-01-02 PROCEDURE — 94660 CPAP INITIATION&MGMT: CPT

## 2024-01-02 PROCEDURE — 6360000002 HC RX W HCPCS: Performed by: EMERGENCY MEDICINE

## 2024-01-02 PROCEDURE — 1100000000 HC RM PRIVATE

## 2024-01-02 RX ORDER — IPRATROPIUM BROMIDE AND ALBUTEROL SULFATE 2.5; .5 MG/3ML; MG/3ML
1 SOLUTION RESPIRATORY (INHALATION)
Status: DISCONTINUED | OUTPATIENT
Start: 2024-01-02 | End: 2024-01-02

## 2024-01-02 RX ORDER — METHYLPREDNISOLONE SODIUM SUCCINATE 40 MG/ML
40 INJECTION, POWDER, LYOPHILIZED, FOR SOLUTION INTRAMUSCULAR; INTRAVENOUS EVERY 6 HOURS
Status: DISPENSED | OUTPATIENT
Start: 2024-01-02 | End: 2024-01-04

## 2024-01-02 RX ORDER — PREDNISONE 20 MG/1
40 TABLET ORAL DAILY
Status: COMPLETED | OUTPATIENT
Start: 2024-01-04 | End: 2024-01-06

## 2024-01-02 RX ORDER — SODIUM CHLORIDE 0.9 % (FLUSH) 0.9 %
5-40 SYRINGE (ML) INJECTION PRN
Status: DISCONTINUED | OUTPATIENT
Start: 2024-01-02 | End: 2024-01-08 | Stop reason: HOSPADM

## 2024-01-02 RX ORDER — ACETAMINOPHEN 325 MG/1
650 TABLET ORAL EVERY 6 HOURS PRN
Status: DISCONTINUED | OUTPATIENT
Start: 2024-01-02 | End: 2024-01-08 | Stop reason: HOSPADM

## 2024-01-02 RX ORDER — POLYETHYLENE GLYCOL 3350 17 G/17G
17 POWDER, FOR SOLUTION ORAL DAILY PRN
Status: DISCONTINUED | OUTPATIENT
Start: 2024-01-02 | End: 2024-01-08 | Stop reason: HOSPADM

## 2024-01-02 RX ORDER — SODIUM CHLORIDE 9 MG/ML
INJECTION, SOLUTION INTRAVENOUS CONTINUOUS
Status: DISPENSED | OUTPATIENT
Start: 2024-01-02 | End: 2024-01-04

## 2024-01-02 RX ORDER — ETOMIDATE 2 MG/ML
INJECTION INTRAVENOUS
Status: DISPENSED
Start: 2024-01-02 | End: 2024-01-03

## 2024-01-02 RX ORDER — METHYLPREDNISOLONE SODIUM SUCCINATE 125 MG/2ML
INJECTION, POWDER, LYOPHILIZED, FOR SOLUTION INTRAMUSCULAR; INTRAVENOUS
Status: DISPENSED
Start: 2024-01-02 | End: 2024-01-02

## 2024-01-02 RX ORDER — ONDANSETRON 4 MG/1
4 TABLET, ORALLY DISINTEGRATING ORAL EVERY 8 HOURS PRN
Status: DISCONTINUED | OUTPATIENT
Start: 2024-01-02 | End: 2024-01-08 | Stop reason: HOSPADM

## 2024-01-02 RX ORDER — ETOMIDATE 2 MG/ML
INJECTION INTRAVENOUS
Status: DISCONTINUED
Start: 2024-01-02 | End: 2024-01-02 | Stop reason: WASHOUT

## 2024-01-02 RX ORDER — ACETAMINOPHEN 650 MG/1
650 SUPPOSITORY RECTAL EVERY 6 HOURS PRN
Status: DISCONTINUED | OUTPATIENT
Start: 2024-01-02 | End: 2024-01-08 | Stop reason: HOSPADM

## 2024-01-02 RX ORDER — ALPRAZOLAM 0.5 MG/1
0.5 TABLET ORAL NIGHTLY PRN
Status: DISCONTINUED | OUTPATIENT
Start: 2024-01-02 | End: 2024-01-08 | Stop reason: HOSPADM

## 2024-01-02 RX ORDER — IPRATROPIUM BROMIDE AND ALBUTEROL SULFATE 2.5; .5 MG/3ML; MG/3ML
1 SOLUTION RESPIRATORY (INHALATION)
Status: COMPLETED | OUTPATIENT
Start: 2024-01-02 | End: 2024-01-02

## 2024-01-02 RX ORDER — ENOXAPARIN SODIUM 100 MG/ML
30 INJECTION SUBCUTANEOUS 2 TIMES DAILY
Status: DISCONTINUED | OUTPATIENT
Start: 2024-01-02 | End: 2024-01-08 | Stop reason: HOSPADM

## 2024-01-02 RX ORDER — AZITHROMYCIN 500 MG/1
500 TABLET, FILM COATED ORAL DAILY
Status: COMPLETED | OUTPATIENT
Start: 2024-01-03 | End: 2024-01-04

## 2024-01-02 RX ORDER — SODIUM CHLORIDE 0.9 % (FLUSH) 0.9 %
5-40 SYRINGE (ML) INJECTION EVERY 12 HOURS SCHEDULED
Status: DISCONTINUED | OUTPATIENT
Start: 2024-01-02 | End: 2024-01-08 | Stop reason: HOSPADM

## 2024-01-02 RX ORDER — IPRATROPIUM BROMIDE AND ALBUTEROL SULFATE 2.5; .5 MG/3ML; MG/3ML
1 SOLUTION RESPIRATORY (INHALATION)
Status: DISCONTINUED | OUTPATIENT
Start: 2024-01-02 | End: 2024-01-06

## 2024-01-02 RX ORDER — MAGNESIUM SULFATE IN WATER 40 MG/ML
INJECTION, SOLUTION INTRAVENOUS
Status: COMPLETED
Start: 2024-01-02 | End: 2024-01-02

## 2024-01-02 RX ORDER — ONDANSETRON 2 MG/ML
4 INJECTION INTRAMUSCULAR; INTRAVENOUS EVERY 6 HOURS PRN
Status: DISCONTINUED | OUTPATIENT
Start: 2024-01-02 | End: 2024-01-08 | Stop reason: HOSPADM

## 2024-01-02 RX ORDER — EPINEPHRINE 0.3 MG/.3ML
INJECTION SUBCUTANEOUS
Status: COMPLETED
Start: 2024-01-02 | End: 2024-01-02

## 2024-01-02 RX ORDER — MAGNESIUM SULFATE IN WATER 40 MG/ML
2000 INJECTION, SOLUTION INTRAVENOUS ONCE
Status: COMPLETED | OUTPATIENT
Start: 2024-01-02 | End: 2024-01-02

## 2024-01-02 RX ORDER — SODIUM CHLORIDE 9 MG/ML
INJECTION, SOLUTION INTRAVENOUS PRN
Status: DISCONTINUED | OUTPATIENT
Start: 2024-01-02 | End: 2024-01-08 | Stop reason: HOSPADM

## 2024-01-02 RX ORDER — IPRATROPIUM BROMIDE AND ALBUTEROL SULFATE 2.5; .5 MG/3ML; MG/3ML
3 SOLUTION RESPIRATORY (INHALATION)
Status: COMPLETED | OUTPATIENT
Start: 2024-01-02 | End: 2024-01-02

## 2024-01-02 RX ADMIN — IPRATROPIUM BROMIDE AND ALBUTEROL SULFATE 1 DOSE: 2.5; .5 SOLUTION RESPIRATORY (INHALATION) at 16:05

## 2024-01-02 RX ADMIN — IPRATROPIUM BROMIDE AND ALBUTEROL SULFATE 1 DOSE: 2.5; .5 SOLUTION RESPIRATORY (INHALATION) at 18:44

## 2024-01-02 RX ADMIN — SODIUM CHLORIDE: 9 INJECTION, SOLUTION INTRAVENOUS at 20:16

## 2024-01-02 RX ADMIN — METHYLPREDNISOLONE SODIUM SUCCINATE 125 MG: 125 INJECTION INTRAMUSCULAR; INTRAVENOUS at 06:50

## 2024-01-02 RX ADMIN — MAGNESIUM SULFATE HEPTAHYDRATE 2000 MG: 40 INJECTION, SOLUTION INTRAVENOUS at 06:58

## 2024-01-02 RX ADMIN — EPINEPHRINE: 0.3 INJECTION INTRAMUSCULAR at 06:44

## 2024-01-02 RX ADMIN — IPRATROPIUM BROMIDE AND ALBUTEROL SULFATE 1 DOSE: 2.5; .5 SOLUTION RESPIRATORY (INHALATION) at 11:47

## 2024-01-02 RX ADMIN — MAGNESIUM SULFATE IN WATER 2000 MG: 40 INJECTION, SOLUTION INTRAVENOUS at 06:58

## 2024-01-02 RX ADMIN — ALPRAZOLAM 0.5 MG: 0.5 TABLET ORAL at 23:06

## 2024-01-02 RX ADMIN — AZITHROMYCIN MONOHYDRATE 500 MG: 500 INJECTION, POWDER, LYOPHILIZED, FOR SOLUTION INTRAVENOUS at 09:10

## 2024-01-02 RX ADMIN — METHYLPREDNISOLONE SODIUM SUCCINATE 40 MG: 40 INJECTION INTRAMUSCULAR; INTRAVENOUS at 15:42

## 2024-01-02 RX ADMIN — CEFTRIAXONE SODIUM 1000 MG: 1 INJECTION, POWDER, FOR SOLUTION INTRAMUSCULAR; INTRAVENOUS at 09:10

## 2024-01-02 RX ADMIN — SODIUM CHLORIDE, PRESERVATIVE FREE 10 ML: 5 INJECTION INTRAVENOUS at 20:41

## 2024-01-02 RX ADMIN — IPRATROPIUM BROMIDE AND ALBUTEROL SULFATE 1 DOSE: 2.5; .5 SOLUTION RESPIRATORY (INHALATION) at 21:03

## 2024-01-02 RX ADMIN — IPRATROPIUM BROMIDE AND ALBUTEROL SULFATE 3 DOSE: 2.5; .5 SOLUTION RESPIRATORY (INHALATION) at 06:40

## 2024-01-02 RX ADMIN — METHYLPREDNISOLONE SODIUM SUCCINATE 40 MG: 40 INJECTION INTRAMUSCULAR; INTRAVENOUS at 20:41

## 2024-01-02 ASSESSMENT — PAIN SCALES - GENERAL: PAINLEVEL_OUTOF10: 6

## 2024-01-02 ASSESSMENT — PAIN - FUNCTIONAL ASSESSMENT
PAIN_FUNCTIONAL_ASSESSMENT: 0-10
PAIN_FUNCTIONAL_ASSESSMENT: NONE - DENIES PAIN

## 2024-01-02 NOTE — ED NOTES
Dr. Roland, ED MD Cohen, and respiratory called to bedside. Pt was refusing to keep BiPAP mask on due to feeling like she couldn't breath. Tried placing NRB 15L back on patient. Patient O2 sats dropped to 86%.   Patient placed back on BiPAP at this time. Respiratory to do continuous neb treatment through bipap.

## 2024-01-02 NOTE — ED TRIAGE NOTES
Pt was at work. Called ems for sob since yesterday morning. Breathing treatment prior to calling ems. Ems gave one in route. Pt smokes pack a day. Hx copd and asthma. Pt was 85% room air when ems arrived. Pt 70s on room air when arrived to ED

## 2024-01-02 NOTE — ED PROVIDER NOTES
[facility] EMERGENCY DEPT  EMERGENCY DEPARTMENT HISTORY AND PHYSICAL EXAM      Date: 1/2/2024  Patient Name: Kim Markham  MRN: [id number]  YOB: 1975  Date of evaluation: 1/2/2024  Provider: Erick Sarah DO   Note Started: 9:48 AM EST 1/2/24    HISTORY OF PRESENT ILLNESS     Chief Complaint   Patient presents with    Respiratory Distress       History Provided By: Patient    HPI: Kim Markham is a 48 y.o. female with past medical history significant for asthma, COPD presenting to the emergency department for evaluation of 1 day of shortness of breath.  Patient has been using breathing treatments without significant improvement of symptoms.  On have SpO2 85% on room air upon EMS arrival.    PAST MEDICAL HISTORY   Past Medical History:  Past Medical History:   Diagnosis Date    Asthma     COPD (chronic obstructive pulmonary disease) (HCC)        Past Surgical History:  No past surgical history on file.    Family History:  No family history on file.    Social History:  Social History     Tobacco Use    Smoking status: Every Day     Current packs/day: 1.00     Types: Cigarettes   Vaping Use    Vaping Use: Some days       Allergies:  Allergies   Allergen Reactions    Latex        PCP: None, None    Current Meds:   Current Facility-Administered Medications   Medication Dose Route Frequency Provider Last Rate Last Admin    methylPREDNISolone sodium succ (SOLU-MEDROL) 125 MG injection             azithromycin (ZITHROMAX) 500 mg in sodium chloride 0.9 % 250 mL IVPB ([id number])  500 mg IntraVENous Once Erick Sarah  mL/hr at 01/02/24 0910 500 mg at 01/02/24 0910     Current Outpatient Medications   Medication Sig Dispense Refill    budesonide-formoterol (SYMBICORT) 80-4.5 MCG/ACT AERO Inhale 2 puffs into the lungs in the morning and 2 puffs in the evening. 10.2 g 3    montelukast (SINGULAIR) 10 MG tablet Take 1 tablet by mouth nightly 30 tablet 3    albuterol (PROVENTIL) (2.5 MG/3ML) 0.083% nebulizer

## 2024-01-02 NOTE — H&P
ED physician  Time spent on admission: 35 minutes  Hospitalist, Internal Medicine  1/2/2024 at 2:14 PM

## 2024-01-03 ENCOUNTER — APPOINTMENT (OUTPATIENT)
Facility: HOSPITAL | Age: 49
DRG: 871 | End: 2024-01-03

## 2024-01-03 PROBLEM — J96.00 ACUTE RESPIRATORY FAILURE (HCC): Status: ACTIVE | Noted: 2024-01-03

## 2024-01-03 LAB
ANION GAP SERPL CALC-SCNC: 2 MMOL/L (ref 5–15)
ANION GAP SERPL CALC-SCNC: 4 MMOL/L (ref 5–15)
ARTERIAL PATENCY WRIST A: YES
BASE DEFICIT BLDA-SCNC: 0.5 MMOL/L
BASE EXCESS BLD CALC-SCNC: 2.5 MMOL/L
BDY SITE: ABNORMAL
BUN SERPL-MCNC: 12 MG/DL (ref 6–20)
BUN SERPL-MCNC: 17 MG/DL (ref 6–20)
BUN/CREAT SERPL: 21 (ref 12–20)
BUN/CREAT SERPL: 31 (ref 12–20)
CA-I BLD-MCNC: 1.32 MMOL/L (ref 1.12–1.32)
CA-I BLD-MCNC: 8.8 MG/DL (ref 8.5–10.1)
CA-I BLD-MCNC: 9.1 MG/DL (ref 8.5–10.1)
CHLORIDE BLD-SCNC: 106 MMOL/L (ref 98–107)
CHLORIDE SERPL-SCNC: 105 MMOL/L (ref 97–108)
CHLORIDE SERPL-SCNC: 108 MMOL/L (ref 97–108)
CO2 BLD-SCNC: 30 MMOL/L
CO2 SERPL-SCNC: 25 MMOL/L (ref 21–32)
CO2 SERPL-SCNC: 29 MMOL/L (ref 21–32)
COHGB MFR BLD: 0.4 % (ref 1–2)
CREAT SERPL-MCNC: 0.54 MG/DL (ref 0.55–1.02)
CREAT SERPL-MCNC: 0.58 MG/DL (ref 0.55–1.02)
CREAT UR-MCNC: 0.56 MG/DL (ref 0.6–1.3)
ERYTHROCYTE [DISTWIDTH] IN BLOOD BY AUTOMATED COUNT: 13.8 % (ref 11.5–14.5)
FIO2 ON VENT: 100 %
GAS FLOW.O2 O2 DELIVERY SYS: 15 L/MIN
GLUCOSE BLD STRIP.AUTO-MCNC: 100 MG/DL (ref 65–100)
GLUCOSE SERPL-MCNC: 100 MG/DL (ref 65–100)
GLUCOSE SERPL-MCNC: 113 MG/DL (ref 65–100)
HCO3 BLD-SCNC: 30.8 MMOL/L (ref 19–28)
HCO3 BLDA-SCNC: 27 MMOL/L (ref 22–26)
HCT VFR BLD AUTO: 44.1 % (ref 35–47)
HGB BLD-MCNC: 14.2 G/DL (ref 11.5–16)
LACTATE BLD-SCNC: 0.49 MMOL/L (ref 0.4–2)
LACTATE BLD-SCNC: <0.4 MMOL/L (ref 0.4–2)
MAGNESIUM SERPL-MCNC: 2 MG/DL (ref 1.6–2.4)
MCH RBC QN AUTO: 29.3 PG (ref 26–34)
MCHC RBC AUTO-ENTMCNC: 32.2 G/DL (ref 30–36.5)
MCV RBC AUTO: 91.1 FL (ref 80–99)
METHGB MFR BLD: 0.4 % (ref 0–1.4)
NRBC # BLD: 0 K/UL (ref 0–0.01)
NRBC BLD-RTO: 0 PER 100 WBC
OXYHGB MFR BLD: 98 % (ref 95–99)
PCO2 BLD: 61.1 MMHG (ref 35–45)
PCO2 BLDA: 57 MMHG (ref 35–45)
PERFORMED BY:: ABNORMAL
PH BLD: 7.31 (ref 7.35–7.45)
PH BLDA: 7.3 (ref 7.35–7.45)
PLATELET # BLD AUTO: 254 K/UL (ref 150–400)
PMV BLD AUTO: 11 FL (ref 8.9–12.9)
PO2 BLD: 44 MMHG (ref 75–100)
PO2 BLDA: 164 MMHG (ref 80–100)
POTASSIUM BLD-SCNC: 4.7 MMOL/L (ref 3.5–5.5)
POTASSIUM SERPL-SCNC: 4.5 MMOL/L (ref 3.5–5.1)
POTASSIUM SERPL-SCNC: 4.6 MMOL/L (ref 3.5–5.1)
RBC # BLD AUTO: 4.84 M/UL (ref 3.8–5.2)
SAO2 % BLD: 73 %
SAO2 % BLD: 99 % (ref 95–99)
SAO2% DEVICE SAO2% SENSOR NAME: ABNORMAL
SODIUM BLD-SCNC: 142 MMOL/L (ref 136–145)
SODIUM SERPL-SCNC: 136 MMOL/L (ref 136–145)
SODIUM SERPL-SCNC: 137 MMOL/L (ref 136–145)
SPECIMEN SITE: ABNORMAL
SPECIMEN SITE: ABNORMAL
TROPONIN I SERPL HS-MCNC: 25 NG/L (ref 0–51)
WBC # BLD AUTO: 17 K/UL (ref 3.6–11)

## 2024-01-03 PROCEDURE — 36415 COLL VENOUS BLD VENIPUNCTURE: CPT

## 2024-01-03 PROCEDURE — 36600 WITHDRAWAL OF ARTERIAL BLOOD: CPT

## 2024-01-03 PROCEDURE — 93005 ELECTROCARDIOGRAM TRACING: CPT | Performed by: STUDENT IN AN ORGANIZED HEALTH CARE EDUCATION/TRAINING PROGRAM

## 2024-01-03 PROCEDURE — 85027 COMPLETE CBC AUTOMATED: CPT

## 2024-01-03 PROCEDURE — 82330 ASSAY OF CALCIUM: CPT

## 2024-01-03 PROCEDURE — 2580000003 HC RX 258: Performed by: HOSPITALIST

## 2024-01-03 PROCEDURE — 71045 X-RAY EXAM CHEST 1 VIEW: CPT

## 2024-01-03 PROCEDURE — 82803 BLOOD GASES ANY COMBINATION: CPT

## 2024-01-03 PROCEDURE — 94640 AIRWAY INHALATION TREATMENT: CPT

## 2024-01-03 PROCEDURE — 1100000000 HC RM PRIVATE

## 2024-01-03 PROCEDURE — 84132 ASSAY OF SERUM POTASSIUM: CPT

## 2024-01-03 PROCEDURE — 6360000002 HC RX W HCPCS

## 2024-01-03 PROCEDURE — 6360000002 HC RX W HCPCS: Performed by: HOSPITALIST

## 2024-01-03 PROCEDURE — 84439 ASSAY OF FREE THYROXINE: CPT

## 2024-01-03 PROCEDURE — 84443 ASSAY THYROID STIM HORMONE: CPT

## 2024-01-03 PROCEDURE — 6370000000 HC RX 637 (ALT 250 FOR IP): Performed by: HOSPITALIST

## 2024-01-03 PROCEDURE — 82947 ASSAY GLUCOSE BLOOD QUANT: CPT

## 2024-01-03 PROCEDURE — 80048 BASIC METABOLIC PNL TOTAL CA: CPT

## 2024-01-03 PROCEDURE — 6360000002 HC RX W HCPCS: Performed by: INTERNAL MEDICINE

## 2024-01-03 PROCEDURE — 94660 CPAP INITIATION&MGMT: CPT

## 2024-01-03 PROCEDURE — 84295 ASSAY OF SERUM SODIUM: CPT

## 2024-01-03 PROCEDURE — 2500000003 HC RX 250 WO HCPCS: Performed by: HOSPITALIST

## 2024-01-03 PROCEDURE — 83735 ASSAY OF MAGNESIUM: CPT

## 2024-01-03 PROCEDURE — 84484 ASSAY OF TROPONIN QUANT: CPT

## 2024-01-03 PROCEDURE — 83605 ASSAY OF LACTIC ACID: CPT

## 2024-01-03 RX ORDER — ADENOSINE 3 MG/ML
6 INJECTION, SOLUTION INTRAVENOUS
Status: COMPLETED | OUTPATIENT
Start: 2024-01-03 | End: 2024-01-03

## 2024-01-03 RX ORDER — ADENOSINE 3 MG/ML
INJECTION, SOLUTION INTRAVENOUS
Status: COMPLETED
Start: 2024-01-03 | End: 2024-01-03

## 2024-01-03 RX ORDER — MORPHINE SULFATE 2 MG/ML
2 INJECTION, SOLUTION INTRAMUSCULAR; INTRAVENOUS ONCE
Status: COMPLETED | OUTPATIENT
Start: 2024-01-03 | End: 2024-01-03

## 2024-01-03 RX ADMIN — ENOXAPARIN SODIUM 30 MG: 100 INJECTION SUBCUTANEOUS at 20:59

## 2024-01-03 RX ADMIN — ADENOSINE 6 MG: 3 INJECTION, SOLUTION INTRAVENOUS at 20:06

## 2024-01-03 RX ADMIN — METHYLPREDNISOLONE SODIUM SUCCINATE 40 MG: 40 INJECTION INTRAMUSCULAR; INTRAVENOUS at 16:30

## 2024-01-03 RX ADMIN — CEFTRIAXONE SODIUM 2000 MG: 2 INJECTION, POWDER, FOR SOLUTION INTRAMUSCULAR; INTRAVENOUS at 09:43

## 2024-01-03 RX ADMIN — IPRATROPIUM BROMIDE AND ALBUTEROL SULFATE 1 DOSE: 2.5; .5 SOLUTION RESPIRATORY (INHALATION) at 08:51

## 2024-01-03 RX ADMIN — IPRATROPIUM BROMIDE AND ALBUTEROL SULFATE 1 DOSE: 2.5; .5 SOLUTION RESPIRATORY (INHALATION) at 20:37

## 2024-01-03 RX ADMIN — IPRATROPIUM BROMIDE AND ALBUTEROL SULFATE 1 DOSE: 2.5; .5 SOLUTION RESPIRATORY (INHALATION) at 11:35

## 2024-01-03 RX ADMIN — ENOXAPARIN SODIUM 30 MG: 100 INJECTION SUBCUTANEOUS at 09:52

## 2024-01-03 RX ADMIN — METHYLPREDNISOLONE SODIUM SUCCINATE 40 MG: 40 INJECTION INTRAMUSCULAR; INTRAVENOUS at 09:27

## 2024-01-03 RX ADMIN — ACETAMINOPHEN 650 MG: 325 TABLET ORAL at 16:35

## 2024-01-03 RX ADMIN — AMIODARONE HYDROCHLORIDE 150 MG: 1.5 INJECTION, SOLUTION INTRAVENOUS at 20:54

## 2024-01-03 RX ADMIN — SODIUM CHLORIDE, PRESERVATIVE FREE 10 ML: 5 INJECTION INTRAVENOUS at 09:37

## 2024-01-03 RX ADMIN — IPRATROPIUM BROMIDE AND ALBUTEROL SULFATE 1 DOSE: 2.5; .5 SOLUTION RESPIRATORY (INHALATION) at 14:33

## 2024-01-03 RX ADMIN — AZITHROMYCIN DIHYDRATE 500 MG: 500 TABLET ORAL at 09:53

## 2024-01-03 RX ADMIN — ONDANSETRON 4 MG: 2 INJECTION INTRAMUSCULAR; INTRAVENOUS at 03:40

## 2024-01-03 RX ADMIN — ACETAMINOPHEN 650 MG: 325 TABLET ORAL at 09:53

## 2024-01-03 RX ADMIN — AMIODARONE HYDROCHLORIDE 1 MG/MIN: 1.8 INJECTION, SOLUTION INTRAVENOUS at 21:10

## 2024-01-03 RX ADMIN — METHYLPREDNISOLONE SODIUM SUCCINATE 40 MG: 40 INJECTION INTRAMUSCULAR; INTRAVENOUS at 20:59

## 2024-01-03 RX ADMIN — SODIUM CHLORIDE, PRESERVATIVE FREE 10 ML: 5 INJECTION INTRAVENOUS at 21:01

## 2024-01-03 RX ADMIN — MORPHINE SULFATE 2 MG: 2 INJECTION, SOLUTION INTRAMUSCULAR; INTRAVENOUS at 21:35

## 2024-01-03 ASSESSMENT — PAIN SCALES - GENERAL
PAINLEVEL_OUTOF10: 7
PAINLEVEL_OUTOF10: 10
PAINLEVEL_OUTOF10: 6

## 2024-01-03 ASSESSMENT — PAIN DESCRIPTION - LOCATION
LOCATION: HEAD

## 2024-01-03 ASSESSMENT — PAIN DESCRIPTION - DESCRIPTORS: DESCRIPTORS: ACHING

## 2024-01-03 NOTE — ED NOTES
Spoke with MD Wilkerson regarding pt status and recent improvements with pt respiratory status over the past several hours, provider aware that pt is tolerating NC 5L instead of biPAP. Pt alert and oriented x 4, is sleepy but appears comfortable. Provider stated that pt can be admitted to tele, orders changed as discussed with provider.

## 2024-01-03 NOTE — ED NOTES
Assumed care of pt at this time, received report from Sissy LUGO. Pt appears to be resting comfortably on hospital bed, biPAP on at this time, pt aware of care plan.

## 2024-01-03 NOTE — ED NOTES
Pt placed off bipap at this time per RT, appears to be tolerating 4L O2 NC appropriately, O2 sats 92-96%, pt sitting on edge of bed, work of breathing appears to be appropriate.

## 2024-01-03 NOTE — ED NOTES
TRANSFER - OUT REPORT:    Verbal report given to Kraig RN (name) on Kim Markham  being transferred to PACU (unit) for routine progression of patient care       Report consisted of patient’s Situation, Background, Assessment and   Recommendations(SBAR).     Information from the following report(s) Nurse Handoff Report, Index, ED Encounter Summary, ED SBAR, MAR, and Recent Results was reviewed with the receiving nurse.    Opportunity for questions and clarification was provided.      Patient transported with:   Monitor  O2 @ 5 liters  Tech

## 2024-01-04 LAB
ANION GAP SERPL CALC-SCNC: 3 MMOL/L (ref 5–15)
BUN SERPL-MCNC: 18 MG/DL (ref 6–20)
BUN/CREAT SERPL: 29 (ref 12–20)
CA-I BLD-MCNC: 8.9 MG/DL (ref 8.5–10.1)
CHLORIDE SERPL-SCNC: 105 MMOL/L (ref 97–108)
CO2 SERPL-SCNC: 27 MMOL/L (ref 21–32)
CREAT SERPL-MCNC: 0.62 MG/DL (ref 0.55–1.02)
EKG ATRIAL RATE: 115 BPM
EKG ATRIAL RATE: 141 BPM
EKG ATRIAL RATE: 150 BPM
EKG ATRIAL RATE: 192 BPM
EKG DIAGNOSIS: NORMAL
EKG P AXIS: 74 DEGREES
EKG P AXIS: 79 DEGREES
EKG P-R INTERVAL: 140 MS
EKG P-R INTERVAL: 146 MS
EKG Q-T INTERVAL: 262 MS
EKG Q-T INTERVAL: 288 MS
EKG Q-T INTERVAL: 306 MS
EKG Q-T INTERVAL: 326 MS
EKG QRS DURATION: 66 MS
EKG QRS DURATION: 70 MS
EKG QRS DURATION: 76 MS
EKG QRS DURATION: 84 MS
EKG QTC CALCULATION (BAZETT): 423 MS
EKG QTC CALCULATION (BAZETT): 447 MS
EKG QTC CALCULATION (BAZETT): 480 MS
EKG QTC CALCULATION (BAZETT): 515 MS
EKG R AXIS: 104 DEGREES
EKG R AXIS: 80 DEGREES
EKG R AXIS: 82 DEGREES
EKG R AXIS: 98 DEGREES
EKG T AXIS: 46 DEGREES
EKG T AXIS: 51 DEGREES
EKG T AXIS: 52 DEGREES
EKG T AXIS: 62 DEGREES
EKG VENTRICULAR RATE: 115 BPM
EKG VENTRICULAR RATE: 150 BPM
EKG VENTRICULAR RATE: 167 BPM
EKG VENTRICULAR RATE: 175 BPM
ERYTHROCYTE [DISTWIDTH] IN BLOOD BY AUTOMATED COUNT: 13.7 % (ref 11.5–14.5)
GLUCOSE SERPL-MCNC: 97 MG/DL (ref 65–100)
HCT VFR BLD AUTO: 42 % (ref 35–47)
HGB BLD-MCNC: 13.2 G/DL (ref 11.5–16)
MCH RBC QN AUTO: 29.2 PG (ref 26–34)
MCHC RBC AUTO-ENTMCNC: 31.4 G/DL (ref 30–36.5)
MCV RBC AUTO: 92.9 FL (ref 80–99)
NRBC # BLD: 0 K/UL (ref 0–0.01)
NRBC BLD-RTO: 0 PER 100 WBC
PLATELET # BLD AUTO: 258 K/UL (ref 150–400)
PMV BLD AUTO: 11 FL (ref 8.9–12.9)
POTASSIUM SERPL-SCNC: ABNORMAL MMOL/L (ref 3.5–5.1)
RBC # BLD AUTO: 4.52 M/UL (ref 3.8–5.2)
SODIUM SERPL-SCNC: 135 MMOL/L (ref 136–145)
T4 FREE SERPL-MCNC: 1.3 NG/DL (ref 0.8–1.5)
TSH SERPL DL<=0.05 MIU/L-ACNC: 0.55 UIU/ML (ref 0.36–3.74)
WBC # BLD AUTO: 10.9 K/UL (ref 3.6–11)

## 2024-01-04 PROCEDURE — 2580000003 HC RX 258: Performed by: HOSPITALIST

## 2024-01-04 PROCEDURE — 6360000002 HC RX W HCPCS: Performed by: HOSPITALIST

## 2024-01-04 PROCEDURE — 94660 CPAP INITIATION&MGMT: CPT

## 2024-01-04 PROCEDURE — 2500000003 HC RX 250 WO HCPCS: Performed by: HOSPITALIST

## 2024-01-04 PROCEDURE — 85027 COMPLETE CBC AUTOMATED: CPT

## 2024-01-04 PROCEDURE — 6370000000 HC RX 637 (ALT 250 FOR IP): Performed by: HOSPITALIST

## 2024-01-04 PROCEDURE — 36415 COLL VENOUS BLD VENIPUNCTURE: CPT

## 2024-01-04 PROCEDURE — 2000000000 HC ICU R&B

## 2024-01-04 PROCEDURE — 94640 AIRWAY INHALATION TREATMENT: CPT

## 2024-01-04 PROCEDURE — 80048 BASIC METABOLIC PNL TOTAL CA: CPT

## 2024-01-04 PROCEDURE — 36600 WITHDRAWAL OF ARTERIAL BLOOD: CPT

## 2024-01-04 RX ADMIN — ENOXAPARIN SODIUM 30 MG: 100 INJECTION SUBCUTANEOUS at 08:18

## 2024-01-04 RX ADMIN — PREDNISONE 40 MG: 20 TABLET ORAL at 16:24

## 2024-01-04 RX ADMIN — IPRATROPIUM BROMIDE AND ALBUTEROL SULFATE 1 DOSE: 2.5; .5 SOLUTION RESPIRATORY (INHALATION) at 15:33

## 2024-01-04 RX ADMIN — ACETAMINOPHEN 650 MG: 325 TABLET ORAL at 16:24

## 2024-01-04 RX ADMIN — METHYLPREDNISOLONE SODIUM SUCCINATE 40 MG: 40 INJECTION INTRAMUSCULAR; INTRAVENOUS at 10:46

## 2024-01-04 RX ADMIN — AMIODARONE HYDROCHLORIDE 0.5 MG/MIN: 1.8 INJECTION, SOLUTION INTRAVENOUS at 03:04

## 2024-01-04 RX ADMIN — ALPRAZOLAM 0.5 MG: 0.5 TABLET ORAL at 20:49

## 2024-01-04 RX ADMIN — AZITHROMYCIN DIHYDRATE 500 MG: 500 TABLET ORAL at 08:24

## 2024-01-04 RX ADMIN — ENOXAPARIN SODIUM 30 MG: 100 INJECTION SUBCUTANEOUS at 20:49

## 2024-01-04 RX ADMIN — IPRATROPIUM BROMIDE AND ALBUTEROL SULFATE 1 DOSE: 2.5; .5 SOLUTION RESPIRATORY (INHALATION) at 19:43

## 2024-01-04 RX ADMIN — CEFTRIAXONE SODIUM 2000 MG: 2 INJECTION, POWDER, FOR SOLUTION INTRAMUSCULAR; INTRAVENOUS at 08:18

## 2024-01-04 RX ADMIN — SODIUM CHLORIDE, PRESERVATIVE FREE 10 ML: 5 INJECTION INTRAVENOUS at 20:22

## 2024-01-04 RX ADMIN — SODIUM CHLORIDE, PRESERVATIVE FREE 10 ML: 5 INJECTION INTRAVENOUS at 08:19

## 2024-01-04 RX ADMIN — IPRATROPIUM BROMIDE AND ALBUTEROL SULFATE 1 DOSE: 2.5; .5 SOLUTION RESPIRATORY (INHALATION) at 12:35

## 2024-01-04 RX ADMIN — IPRATROPIUM BROMIDE AND ALBUTEROL SULFATE 1 DOSE: 2.5; .5 SOLUTION RESPIRATORY (INHALATION) at 08:13

## 2024-01-04 RX ADMIN — METHYLPREDNISOLONE SODIUM SUCCINATE 40 MG: 40 INJECTION INTRAMUSCULAR; INTRAVENOUS at 05:03

## 2024-01-04 RX ADMIN — AMIODARONE HYDROCHLORIDE 0.5 MG/MIN: 1.8 INJECTION, SOLUTION INTRAVENOUS at 18:45

## 2024-01-04 ASSESSMENT — PAIN - FUNCTIONAL ASSESSMENT: PAIN_FUNCTIONAL_ASSESSMENT: 0-10

## 2024-01-04 ASSESSMENT — PAIN SCALES - GENERAL
PAINLEVEL_OUTOF10: 0
PAINLEVEL_OUTOF10: 0
PAINLEVEL_OUTOF10: 7
PAINLEVEL_OUTOF10: 0

## 2024-01-04 NOTE — CARE COORDINATION
01/04/24 1309   Service Assessment   Patient Orientation Alert and Oriented   Cognition Alert   History Provided By Patient   Primary Caregiver Self   Accompanied By/Relationship (ZECHARIAH) Erick Natarajan   Support Systems Spouse/Significant Other   Patient's Healthcare Decision Maker is: Legal Next of Kin   PCP Verified by CM Yes  (Per pt no PCP.)   Last Visit to PCP   (No PCP.)   Prior Functional Level Independent in ADLs/IADLs;Assistance with the following:;Mobility  (Cane @ times.)   Current Functional Level Independent in ADLs/IADLs;Assistance with the following:;Mobility  (Cane @ times.)   Can patient return to prior living arrangement Yes   Ability to make needs known: Good   Family able to assist with home care needs: Yes   Would you like for me to discuss the discharge plan with any other family members/significant others, and if so, who? Yes  ((BF) Erick Natarajan)   Financial Resources Other (Comment)  (Self Pay)   Community Resources None   Social/Functional History   Lives With Significant other   Type of Home Apartment   Home Layout One level   Home Access Stairs to enter without rails   Entrance Stairs - Number of Steps 2 outside steps.   Bathroom Shower/Tub Tub/Shower unit   Bathroom Toilet Standard   Bathroom Accessibility Accessible   Home Equipment Oxygen   Receives Help From Family   ADL Assistance Independent   Homemaking Assistance Independent   Homemaking Responsibilities Yes   Ambulation Assistance Needs assistance  (Cane)   Transfer Assistance Independent   Active  Yes   Occupation Full time employment   Discharge Planning   Type of Residence Apartment   Living Arrangements Spouse/Significant Other   Current Services Prior To Admission None   Potential Assistance Needed N/A   DME Ordered? No   Potential Assistance Purchasing Medications No   Type of Home Care Services None   Patient expects to be discharged to: Apartment   One/Two Story Residence One story   History of falls? 0   Services

## 2024-01-04 NOTE — ED NOTES
Bedside RN informed me that patient's pulse rate has increased to 160, I reviewed EKG, narrow complex tachycardia 178, consistent with SVT and/or A-fib with RVR.  Patient complaining of shortness of breath, saturations low 90s.  Blood pressure stable.  Decision made to cardiovert with adenosine 6 mg.  After 6 mg were pushed patient's heart rhythm degenerated into V. tach, patient remains awake, with pulse, patient cardioverted with 200 J synchronized with return of normal sinus rhythm, patient remained awake alert throughout procedure.      Procedure Note - External Cardioversion:   8:15 PM EST  Performed by Jefry Cohen MD.   Cardioversion was indicated for a rhythm of ventricular tachycardia and performed 1 times with a maximum delivered energy of 200 joules,  biphasic.    Adequate procedural sedation was attained, see moderate sedation record.    Patient's rhythm was sinus tachycardia at the end of the procedure.    The procedure took 16-30 minutes, and pt tolerated well.      Jefry Cohen MD  01/03/24 2015

## 2024-01-04 NOTE — ED NOTES
Patient unable to tolerate breathing treatment, O2 dropped into low 80s. Patient placed back on Bipap at this time

## 2024-01-04 NOTE — ED NOTES
Called admit hospitilist again, no answer. Charge nurse notified, in house hospitilist paged to bedside at this time.

## 2024-01-04 NOTE — ED NOTES
Bedside shift change report given to Will (oncoming nurse) by Lina (offgoing nurse). Report included the following information Nurse Handoff Report, ED SBAR, MAR, and Recent Results.

## 2024-01-04 NOTE — CONSULTS
Consult    NAME: Kim Markham   :  1975   MRN:  [id number]     Date/Time:  2024 12:31 PM    Patient PCP: None, None  ________________________________________________________________________      Subjective:   CHIEF COMPLAINT: Shortness of breath.    HISTORY OF PRESENT ILLNESS:   Patient presented with the symptoms of worsening of shortness of breath and was found to be in acute hypoxic respiratory failure.  Patient has been a smoker for a long time and denies any chest tightness or leg edema or dizziness.           Past Medical History:   Diagnosis Date    Asthma     COPD (chronic obstructive pulmonary disease) ([facility])       No past surgical history on file.  Allergies   Allergen Reactions    Latex       Meds:  See below  Social History     Tobacco Use    Smoking status: Every Day     Current packs/day: 1.00     Types: Cigarettes    Smokeless tobacco: Not on file   Substance Use Topics    Alcohol use: Not on file   Patient smokes 1 pack a day for more than 30 years and no history of drinking or drugs.    No family history on file.     FAMILY HISTORY: Mother is 78 years old and in a reasonable health and father  at the age of 56 when he had a gallbladder surgery and resulted into internal bleed.    PERSONAL HISTORY: Patient is  but has been living with her partner for 20+ years and has 2 children and works as a .    REVIEW OF SYSTEMS:     []         Unable to obtain  ROS due to ---   [x]         Total of 12 systems reviewed as follows:    Constitutional: negative fever, negative chills, negative weight loss  Eyes:   negative visual changes  ENT:   negative sore throat, tongue or lip swelling  Respiratory:  negative cough, positive for dyspnea  Cards:  negative for chest pain, palpitations, lower extremity edema  GI:   negative for nausea, vomiting, diarrhea, and abdominal pain  Genitourinary: negative for frequency, dysuria  Integument:  negative for rash   Hematologic: 
(24hrs), Av.4 °F (36.9 °C), Min:97.9 °F (36.6 °C), Max:98.9 °F (37.2 °C)    XR CHEST 1 VIEW   Final Result      Worsening RIGHT upper lung pneumonia.            XR CHEST PORTABLE   Final Result      New mixed interstitial and alveolar airspace disease, concerning for pneumonia.   Recommend follow-up chest radiograph to underlying nodules            Data Review:   Current Facility-Administered Medications   Medication Dose Route Frequency    amiodarone (NEXTERONE) 360 mg in dextrose 5% 200 ml  0.5 mg/min IntraVENous Continuous    sodium chloride flush 0.9 % injection 5-40 mL  5-40 mL IntraVENous 2 times per day    sodium chloride flush 0.9 % injection 5-40 mL  5-40 mL IntraVENous PRN    0.9 % sodium chloride infusion   IntraVENous PRN    ondansetron (ZOFRAN-ODT) disintegrating tablet 4 mg  4 mg Oral Q8H PRN    Or    ondansetron (ZOFRAN) injection 4 mg  4 mg IntraVENous Q6H PRN    polyethylene glycol (GLYCOLAX) packet 17 g  17 g Oral Daily PRN    enoxaparin Sodium (LOVENOX) injection 30 mg  30 mg SubCUTAneous BID    acetaminophen (TYLENOL) tablet 650 mg  650 mg Oral Q6H PRN    Or    acetaminophen (TYLENOL) suppository 650 mg  650 mg Rectal Q6H PRN    methylPREDNISolone sodium succ (SOLU-MEDROL) injection 40 mg  40 mg IntraVENous Q6H    Followed by    predniSONE (DELTASONE) tablet 40 mg  40 mg Oral Daily    cefTRIAXone (ROCEPHIN) 2,000 mg in sterile water 20 mL IV syringe  2,000 mg IntraVENous Q24H    0.9 % sodium chloride infusion   IntraVENous Continuous    ALPRAZolam (XANAX) tablet 0.5 mg  0.5 mg Oral Nightly PRN    ipratropium 0.5 mg-albuterol 2.5 mg (DUONEB) nebulizer solution 1 Dose  1 Dose Nebulization Q4H WA RT     Current Outpatient Medications   Medication Sig    budesonide-formoterol (SYMBICORT) 80-4.5 MCG/ACT AERO Inhale 2 puffs into the lungs in the morning and 2 puffs in the evening.    montelukast (SINGULAIR) 10 MG tablet Take 1 tablet by mouth nightly    albuterol (PROVENTIL) (2.5 MG/3ML) 0.083%

## 2024-01-04 NOTE — ED NOTES
Pt noted to be 84% on NC, Pt does not appear labored but slightly uncomfortable.  RT ntfd and will come reassess patient. Pt currently does not want to be placed back on BIPAP.

## 2024-01-04 NOTE — ED NOTES
Patient found to have heart rate in 170s on shift change. Increased WOB. EKG performed and patient found to be in SVT. Hospitilist contacted, waiting for response

## 2024-01-04 NOTE — ED NOTES
Pt is alert and oriented x 3. Resting in position of comfort, pt currently on BIPAP. Respirations increased, spo2 98%, pt currently on bipap. Pt reported to desat to 80% multiple times overnight when BIPAP was removed.  Pt reported  to have episode of SVR and Vtach overnight. Pt currently in NSR on monitor.

## 2024-01-04 NOTE — ED NOTES
TRANSFER - OUT REPORT:    Verbal report given to Rena veronica Markham  being transferred to PACU Overflow for routine progression of patient care       Report consisted of patient's Situation, Background, Assessment and   Recommendations(SBAR).     Information from the following report(s) Nurse Handoff Report, ED Encounter Summary, and ED SBAR was reviewed with the receiving nurse.    [place] Fall Assessment:    Presents to emergency department  because of falls (Syncope, seizure, or loss of consciousness): No  Age > 70: No  Altered Mental Status, Intoxication with alcohol or substance confusion (Disorientation, impaired judgment, poor safety awaremess, or inability to follow instructions): No  Impaired Mobility: Ambulates or transfers with assistive devices or assistance; Unable to ambulate or transer.: No  Nursing Judgement: No          Lines:   Peripheral IV 01/02/24 Left Antecubital (Active)       Peripheral IV 01/02/24 Proximal;Right Forearm (Active)   Site Assessment Clean, dry & intact 01/02/24 0706   Line Status Blood return noted 01/02/24 0706   Phlebitis Assessment No symptoms 01/02/24 0706   Infiltration Assessment 0 01/02/24 0706        Opportunity for questions and clarification was provided.      Patient transported with:  Monitor, O2 @ 6lpm, and Tech

## 2024-01-04 NOTE — ED NOTES
ED Doc at bedside, adenosine pushed. Patient went into V tach, shocked delivered. Patient now in 110s heart rate. Admitting hospitilist responded, agreed with adenosine. RN to update admit doc on events following.

## 2024-01-05 ENCOUNTER — APPOINTMENT (OUTPATIENT)
Facility: HOSPITAL | Age: 49
DRG: 871 | End: 2024-01-05
Attending: INTERNAL MEDICINE

## 2024-01-05 LAB
ARTERIAL PATENCY WRIST A: YES
ARTERIAL PATENCY WRIST A: YES
BASE EXCESS BLDA CALC-SCNC: 2.9 MMOL/L (ref 0–3)
BASE EXCESS BLDA CALC-SCNC: 4.7 MMOL/L (ref 0–3)
BDY SITE: ABNORMAL
BDY SITE: ABNORMAL
BODY TEMPERATURE: 98.1
BODY TEMPERATURE: 99.1
COHGB MFR BLD: 0.4 % (ref 1–2)
COHGB MFR BLD: 0.6 % (ref 1–2)
EKG ATRIAL RATE: 79 BPM
EKG DIAGNOSIS: NORMAL
EKG P AXIS: 74 DEGREES
EKG P-R INTERVAL: 152 MS
EKG Q-T INTERVAL: 384 MS
EKG QRS DURATION: 96 MS
EKG QTC CALCULATION (BAZETT): 440 MS
EKG R AXIS: 73 DEGREES
EKG T AXIS: 60 DEGREES
EKG VENTRICULAR RATE: 79 BPM
FIO2 ON VENT: 44 %
FIO2 ON VENT: 52 %
GAS FLOW.O2 O2 DELIVERY SYS: 6 L/MIN
GAS FLOW.O2 O2 DELIVERY SYS: 8 L/MIN
GLUCOSE BLD STRIP.AUTO-MCNC: 127 MG/DL (ref 65–100)
HCO3 BLDA-SCNC: 30 MMOL/L (ref 22–26)
HCO3 BLDA-SCNC: 32 MMOL/L (ref 22–26)
METHGB MFR BLD: 0.3 % (ref 0–1.4)
METHGB MFR BLD: 0.3 % (ref 0–1.4)
OXYHGB MFR BLD: 90.3 % (ref 95–99)
OXYHGB MFR BLD: 93.3 % (ref 95–99)
PCO2 BLDA: 57 MMHG (ref 35–45)
PCO2 BLDA: 58 MMHG (ref 35–45)
PERFORMED BY:: ABNORMAL
PH BLDA: 7.35 (ref 7.35–7.45)
PH BLDA: 7.36 (ref 7.35–7.45)
PO2 BLDA: 62 MMHG (ref 80–100)
PO2 BLDA: 72 MMHG (ref 80–100)
SAO2 % BLD: 91 % (ref 95–99)
SAO2 % BLD: 94 % (ref 95–99)
SAO2% DEVICE SAO2% SENSOR NAME: ABNORMAL
SAO2% DEVICE SAO2% SENSOR NAME: ABNORMAL
SPECIMEN SITE: ABNORMAL
SPECIMEN SITE: ABNORMAL
VENTILATION MODE VENT: ABNORMAL

## 2024-01-05 PROCEDURE — 6360000002 HC RX W HCPCS: Performed by: HOSPITALIST

## 2024-01-05 PROCEDURE — 6370000000 HC RX 637 (ALT 250 FOR IP): Performed by: HOSPITALIST

## 2024-01-05 PROCEDURE — 82962 GLUCOSE BLOOD TEST: CPT

## 2024-01-05 PROCEDURE — 2580000003 HC RX 258: Performed by: HOSPITALIST

## 2024-01-05 PROCEDURE — 82803 BLOOD GASES ANY COMBINATION: CPT

## 2024-01-05 PROCEDURE — 36600 WITHDRAWAL OF ARTERIAL BLOOD: CPT

## 2024-01-05 PROCEDURE — B24BZZZ ULTRASONOGRAPHY OF HEART WITH AORTA: ICD-10-PCS | Performed by: INTERNAL MEDICINE

## 2024-01-05 PROCEDURE — 2000000000 HC ICU R&B

## 2024-01-05 PROCEDURE — 94640 AIRWAY INHALATION TREATMENT: CPT

## 2024-01-05 PROCEDURE — 93005 ELECTROCARDIOGRAM TRACING: CPT | Performed by: INTERNAL MEDICINE

## 2024-01-05 PROCEDURE — 2700000000 HC OXYGEN THERAPY PER DAY

## 2024-01-05 PROCEDURE — 93306 TTE W/DOPPLER COMPLETE: CPT

## 2024-01-05 PROCEDURE — 6370000000 HC RX 637 (ALT 250 FOR IP): Performed by: INTERNAL MEDICINE

## 2024-01-05 PROCEDURE — 94761 N-INVAS EAR/PLS OXIMETRY MLT: CPT

## 2024-01-05 PROCEDURE — 2500000003 HC RX 250 WO HCPCS: Performed by: HOSPITALIST

## 2024-01-05 RX ORDER — DEXTROMETHORPHAN HBR. AND GUAIFENESIN 10; 100 MG/5ML; MG/5ML
10 SOLUTION ORAL EVERY 4 HOURS PRN
Status: DISCONTINUED | OUTPATIENT
Start: 2024-01-05 | End: 2024-01-05 | Stop reason: CLARIF

## 2024-01-05 RX ORDER — QUETIAPINE FUMARATE 25 MG/1
50 TABLET, FILM COATED ORAL NIGHTLY PRN
Status: DISCONTINUED | OUTPATIENT
Start: 2024-01-05 | End: 2024-01-08 | Stop reason: HOSPADM

## 2024-01-05 RX ORDER — BENZONATATE 100 MG/1
100 CAPSULE ORAL 3 TIMES DAILY PRN
Status: DISCONTINUED | OUTPATIENT
Start: 2024-01-05 | End: 2024-01-08 | Stop reason: HOSPADM

## 2024-01-05 RX ORDER — GUAIFENESIN/DEXTROMETHORPHAN 100-10MG/5
10 SYRUP ORAL EVERY 4 HOURS PRN
Status: DISCONTINUED | OUTPATIENT
Start: 2024-01-05 | End: 2024-01-08 | Stop reason: HOSPADM

## 2024-01-05 RX ADMIN — CEFTRIAXONE SODIUM 2000 MG: 2 INJECTION, POWDER, FOR SOLUTION INTRAMUSCULAR; INTRAVENOUS at 08:51

## 2024-01-05 RX ADMIN — SODIUM CHLORIDE, PRESERVATIVE FREE 10 ML: 5 INJECTION INTRAVENOUS at 08:55

## 2024-01-05 RX ADMIN — AMIODARONE HYDROCHLORIDE 0.5 MG/MIN: 1.8 INJECTION, SOLUTION INTRAVENOUS at 15:50

## 2024-01-05 RX ADMIN — PREDNISONE 40 MG: 20 TABLET ORAL at 08:55

## 2024-01-05 RX ADMIN — BENZONATATE 100 MG: 100 CAPSULE ORAL at 21:59

## 2024-01-05 RX ADMIN — QUETIAPINE FUMARATE 50 MG: 25 TABLET ORAL at 22:49

## 2024-01-05 RX ADMIN — SODIUM CHLORIDE, PRESERVATIVE FREE 10 ML: 5 INJECTION INTRAVENOUS at 22:49

## 2024-01-05 RX ADMIN — ENOXAPARIN SODIUM 30 MG: 100 INJECTION SUBCUTANEOUS at 08:52

## 2024-01-05 RX ADMIN — ALPRAZOLAM 0.5 MG: 0.5 TABLET ORAL at 21:59

## 2024-01-05 RX ADMIN — IPRATROPIUM BROMIDE AND ALBUTEROL SULFATE 1 DOSE: 2.5; .5 SOLUTION RESPIRATORY (INHALATION) at 15:55

## 2024-01-05 RX ADMIN — ACETAMINOPHEN 650 MG: 325 TABLET ORAL at 06:21

## 2024-01-05 RX ADMIN — ENOXAPARIN SODIUM 30 MG: 100 INJECTION SUBCUTANEOUS at 21:59

## 2024-01-05 RX ADMIN — IPRATROPIUM BROMIDE AND ALBUTEROL SULFATE 1 DOSE: 2.5; .5 SOLUTION RESPIRATORY (INHALATION) at 20:01

## 2024-01-05 RX ADMIN — IPRATROPIUM BROMIDE AND ALBUTEROL SULFATE 1 DOSE: 2.5; .5 SOLUTION RESPIRATORY (INHALATION) at 13:16

## 2024-01-05 RX ADMIN — AMIODARONE HYDROCHLORIDE 0.5 MG/MIN: 1.8 INJECTION, SOLUTION INTRAVENOUS at 04:26

## 2024-01-05 RX ADMIN — IPRATROPIUM BROMIDE AND ALBUTEROL SULFATE 1 DOSE: 2.5; .5 SOLUTION RESPIRATORY (INHALATION) at 09:48

## 2024-01-05 NOTE — CARE COORDINATION
Met w/patient at the bedside re: health insurance.  Patient informed writer she missed the open enrollment period w/her employer ([employer]).  Advised patient to contact her HR dept re: open enrollment.  Public benefits dept to screen patient for Medicaid to determine eligibility.      MAINE Fontanez  [phone]

## 2024-01-06 LAB
ANION GAP SERPL CALC-SCNC: 2 MMOL/L (ref 5–15)
BASOPHILS # BLD: 0 K/UL (ref 0–0.1)
BASOPHILS NFR BLD: 0 % (ref 0–1)
BUN SERPL-MCNC: 13 MG/DL (ref 6–20)
BUN/CREAT SERPL: 19 (ref 12–20)
CA-I BLD-MCNC: 8.7 MG/DL (ref 8.5–10.1)
CHLORIDE SERPL-SCNC: 106 MMOL/L (ref 97–108)
CO2 SERPL-SCNC: 33 MMOL/L (ref 21–32)
CREAT SERPL-MCNC: 0.68 MG/DL (ref 0.55–1.02)
DIFFERENTIAL METHOD BLD: ABNORMAL
ECHO AO ASC DIAM: 3.2 CM
ECHO AO ASCENDING AORTA INDEX: 1.46 CM/M2
ECHO AO ROOT DIAM: 3 CM
ECHO AO ROOT INDEX: 1.37 CM/M2
ECHO AV MEAN GRADIENT: 7 MMHG
ECHO AV MEAN VELOCITY: 1.2 M/S
ECHO AV PEAK GRADIENT: 14 MMHG
ECHO AV PEAK VELOCITY: 1.9 M/S
ECHO AV VELOCITY RATIO: 0.53
ECHO AV VTI: 24.2 CM
ECHO BSA: 2.26 M2
ECHO EST RA PRESSURE: 3 MMHG
ECHO LA DIAMETER INDEX: 1.42 CM/M2
ECHO LA DIAMETER: 3.1 CM
ECHO LA TO AORTIC ROOT RATIO: 1.03
ECHO LV E' LATERAL VELOCITY: 16 CM/S
ECHO LV E' SEPTAL VELOCITY: 12 CM/S
ECHO LV FRACTIONAL SHORTENING: 42 % (ref 28–44)
ECHO LV INTERNAL DIMENSION DIASTOLE INDEX: 2.05 CM/M2
ECHO LV INTERNAL DIMENSION DIASTOLIC: 4.5 CM (ref 3.9–5.3)
ECHO LV INTERNAL DIMENSION SYSTOLIC INDEX: 1.19 CM/M2
ECHO LV INTERNAL DIMENSION SYSTOLIC: 2.6 CM
ECHO LV IVSD: 1.5 CM (ref 0.6–0.9)
ECHO LV MASS 2D: 304.6 G (ref 67–162)
ECHO LV MASS INDEX 2D: 139.1 G/M2 (ref 43–95)
ECHO LV POSTERIOR WALL DIASTOLIC: 1.7 CM (ref 0.6–0.9)
ECHO LV RELATIVE WALL THICKNESS RATIO: 0.76
ECHO LVOT AV VTI INDEX: 0.77
ECHO LVOT MEAN GRADIENT: 2 MMHG
ECHO LVOT PEAK GRADIENT: 4 MMHG
ECHO LVOT PEAK VELOCITY: 1 M/S
ECHO LVOT VTI: 18.7 CM
ECHO MV A VELOCITY: 0.9 M/S
ECHO MV E VELOCITY: 0.81 M/S
ECHO MV E/A RATIO: 0.9
ECHO MV E/E' LATERAL: 5.06
ECHO MV E/E' RATIO (AVERAGED): 5.91
ECHO PV MAX VELOCITY: 1.2 M/S
ECHO PV MEAN GRADIENT: 3 MMHG
ECHO PV MEAN VELOCITY: 0.9 M/S
ECHO PV PEAK GRADIENT: 6 MMHG
ECHO PV VTI: 15.9 CM
ECHO RIGHT VENTRICULAR SYSTOLIC PRESSURE (RVSP): 12 MMHG
ECHO RV BASAL DIMENSION: 3.4 CM
ECHO RV FREE WALL PEAK S': 14 CM/S
ECHO RV MID DIMENSION: 2.9 CM
ECHO TV REGURGITANT MAX VELOCITY: 1.46 M/S
ECHO TV REGURGITANT PEAK GRADIENT: 9 MMHG
EOSINOPHIL # BLD: 0 K/UL (ref 0–0.4)
EOSINOPHIL NFR BLD: 0 % (ref 0–7)
ERYTHROCYTE [DISTWIDTH] IN BLOOD BY AUTOMATED COUNT: 13.7 % (ref 11.5–14.5)
GLUCOSE SERPL-MCNC: 89 MG/DL (ref 65–100)
HCT VFR BLD AUTO: 39.7 % (ref 35–47)
HGB BLD-MCNC: 12.5 G/DL (ref 11.5–16)
IMM GRANULOCYTES # BLD AUTO: 0 K/UL (ref 0–0.04)
IMM GRANULOCYTES NFR BLD AUTO: 1 % (ref 0–0.5)
LYMPHOCYTES # BLD: 1.5 K/UL (ref 0.8–3.5)
LYMPHOCYTES NFR BLD: 23 % (ref 12–49)
MCH RBC QN AUTO: 28.8 PG (ref 26–34)
MCHC RBC AUTO-ENTMCNC: 31.5 G/DL (ref 30–36.5)
MCV RBC AUTO: 91.5 FL (ref 80–99)
MONOCYTES # BLD: 0.9 K/UL (ref 0–1)
MONOCYTES NFR BLD: 14 % (ref 5–13)
NEUTS SEG # BLD: 4.1 K/UL (ref 1.8–8)
NEUTS SEG NFR BLD: 62 % (ref 32–75)
NRBC # BLD: 0 K/UL (ref 0–0.01)
NRBC BLD-RTO: 0 PER 100 WBC
PLATELET # BLD AUTO: 224 K/UL (ref 150–400)
PMV BLD AUTO: 11.3 FL (ref 8.9–12.9)
POTASSIUM SERPL-SCNC: 3.9 MMOL/L (ref 3.5–5.1)
RBC # BLD AUTO: 4.34 M/UL (ref 3.8–5.2)
SODIUM SERPL-SCNC: 141 MMOL/L (ref 136–145)
WBC # BLD AUTO: 6.5 K/UL (ref 3.6–11)

## 2024-01-06 PROCEDURE — 6370000000 HC RX 637 (ALT 250 FOR IP): Performed by: INTERNAL MEDICINE

## 2024-01-06 PROCEDURE — 2580000003 HC RX 258: Performed by: HOSPITALIST

## 2024-01-06 PROCEDURE — 6370000000 HC RX 637 (ALT 250 FOR IP): Performed by: HOSPITALIST

## 2024-01-06 PROCEDURE — 94761 N-INVAS EAR/PLS OXIMETRY MLT: CPT

## 2024-01-06 PROCEDURE — 6360000002 HC RX W HCPCS: Performed by: HOSPITALIST

## 2024-01-06 PROCEDURE — 2000000000 HC ICU R&B

## 2024-01-06 PROCEDURE — 85025 COMPLETE CBC W/AUTO DIFF WBC: CPT

## 2024-01-06 PROCEDURE — 2700000000 HC OXYGEN THERAPY PER DAY

## 2024-01-06 PROCEDURE — 94640 AIRWAY INHALATION TREATMENT: CPT

## 2024-01-06 PROCEDURE — 93005 ELECTROCARDIOGRAM TRACING: CPT | Performed by: INTERNAL MEDICINE

## 2024-01-06 PROCEDURE — 80048 BASIC METABOLIC PNL TOTAL CA: CPT

## 2024-01-06 PROCEDURE — 2500000003 HC RX 250 WO HCPCS: Performed by: HOSPITALIST

## 2024-01-06 PROCEDURE — 6370000000 HC RX 637 (ALT 250 FOR IP): Performed by: STUDENT IN AN ORGANIZED HEALTH CARE EDUCATION/TRAINING PROGRAM

## 2024-01-06 PROCEDURE — 36415 COLL VENOUS BLD VENIPUNCTURE: CPT

## 2024-01-06 RX ORDER — AMIODARONE HYDROCHLORIDE 200 MG/1
200 TABLET ORAL DAILY
Status: DISCONTINUED | OUTPATIENT
Start: 2024-01-13 | End: 2024-01-08 | Stop reason: HOSPADM

## 2024-01-06 RX ORDER — AMIODARONE HYDROCHLORIDE 200 MG/1
400 TABLET ORAL 2 TIMES DAILY
Status: DISCONTINUED | OUTPATIENT
Start: 2024-01-06 | End: 2024-01-08 | Stop reason: HOSPADM

## 2024-01-06 RX ORDER — IPRATROPIUM BROMIDE AND ALBUTEROL SULFATE 2.5; .5 MG/3ML; MG/3ML
1 SOLUTION RESPIRATORY (INHALATION)
Status: DISCONTINUED | OUTPATIENT
Start: 2024-01-06 | End: 2024-01-08 | Stop reason: HOSPADM

## 2024-01-06 RX ORDER — IPRATROPIUM BROMIDE AND ALBUTEROL SULFATE 2.5; .5 MG/3ML; MG/3ML
1 SOLUTION RESPIRATORY (INHALATION) EVERY 4 HOURS PRN
Status: DISCONTINUED | OUTPATIENT
Start: 2024-01-06 | End: 2024-01-08 | Stop reason: HOSPADM

## 2024-01-06 RX ADMIN — IPRATROPIUM BROMIDE AND ALBUTEROL SULFATE 1 DOSE: 2.5; .5 SOLUTION RESPIRATORY (INHALATION) at 07:41

## 2024-01-06 RX ADMIN — ALPRAZOLAM 0.5 MG: 0.5 TABLET ORAL at 20:36

## 2024-01-06 RX ADMIN — GUAIFENESIN SYRUP AND DEXTROMETHORPHAN 10 ML: 100; 10 SYRUP ORAL at 20:46

## 2024-01-06 RX ADMIN — SODIUM CHLORIDE, PRESERVATIVE FREE 10 ML: 5 INJECTION INTRAVENOUS at 08:06

## 2024-01-06 RX ADMIN — IPRATROPIUM BROMIDE AND ALBUTEROL SULFATE 1 DOSE: 2.5; .5 SOLUTION RESPIRATORY (INHALATION) at 19:53

## 2024-01-06 RX ADMIN — ENOXAPARIN SODIUM 30 MG: 100 INJECTION SUBCUTANEOUS at 20:36

## 2024-01-06 RX ADMIN — PREDNISONE 40 MG: 20 TABLET ORAL at 08:05

## 2024-01-06 RX ADMIN — CEFTRIAXONE SODIUM 2000 MG: 2 INJECTION, POWDER, FOR SOLUTION INTRAMUSCULAR; INTRAVENOUS at 08:06

## 2024-01-06 RX ADMIN — AMIODARONE HYDROCHLORIDE 400 MG: 200 TABLET ORAL at 20:35

## 2024-01-06 RX ADMIN — GUAIFENESIN SYRUP AND DEXTROMETHORPHAN 10 ML: 100; 10 SYRUP ORAL at 05:31

## 2024-01-06 RX ADMIN — SODIUM CHLORIDE, PRESERVATIVE FREE 10 ML: 5 INJECTION INTRAVENOUS at 20:36

## 2024-01-06 RX ADMIN — AMIODARONE HYDROCHLORIDE 0.5 MG/MIN: 1.8 INJECTION, SOLUTION INTRAVENOUS at 05:15

## 2024-01-06 RX ADMIN — IPRATROPIUM BROMIDE AND ALBUTEROL SULFATE 1 DOSE: 2.5; .5 SOLUTION RESPIRATORY (INHALATION) at 13:32

## 2024-01-06 RX ADMIN — ENOXAPARIN SODIUM 30 MG: 100 INJECTION SUBCUTANEOUS at 08:06

## 2024-01-06 ASSESSMENT — PAIN SCALES - GENERAL
PAINLEVEL_OUTOF10: 0
PAINLEVEL_OUTOF10: 0

## 2024-01-06 NOTE — RT PROTOCOL NOTE
RT Nebulizer Bronchodilator Protocol Note    There is a bronchodilator order in the chart from a provider indicating to follow the RT Bronchodilator Protocol and there is an “Initiate RT Bronchodilator Protocol” order as well (see protocol at bottom of note).    CXR Findings:  No results found.    The findings from the last RT Protocol Assessment were as follows:  Smoking: Chronic pulmonary disease  Respiratory Pattern: Dyspnea on exertion or RR 21-25 bpm  Breath Sounds: Inspiratory and expiratory or bilateral wheezing and/or rhonchi  Cough: Strong, spontaneous, non-productive  Indication for Bronchodilator Therapy:    Bronchodilator Assessment Score: 10    Aerosolized bronchodilator medication orders have been revised according to the RT Nebulizer Bronchodilator Protocol below.    Respiratory Therapist to perform RT Therapy Protocol Assessment initially then follow the protocol.  Repeat RT Therapy Protocol Assessment PRN for score 0-3 or on second treatment, BID, and PRN for scores above 3.    No Indications - adjust the frequency to every 6 hours PRN wheezing or bronchospasm, if no treatments needed after 48 hours then discontinue using Per Protocol order mode.     If indication present, adjust the RT bronchodilator orders based on the Bronchodilator Assessment Score as indicated below.  If a patient is on this medication at home then do not decrease Frequency below that used at home.    0-3 - enter or revise RT bronchodilator order(s) to equivalent RT Bronchodilator order with Frequency of every 4 hours PRN for wheezing or increased work of breathing using Per Protocol order mode.       4-6 - enter or revise RT Bronchodilator order(s) to two equivalent RT bronchodilator orders with one order with BID Frequency and one order with Frequency of every 4 hours PRN wheezing or increased work of breathing using Per Protocol order mode.         7-10 - enter or revise RT Bronchodilator order(s) to two equivalent RT

## 2024-01-07 LAB
ANION GAP SERPL CALC-SCNC: 7 MMOL/L (ref 5–15)
BUN SERPL-MCNC: 18 MG/DL (ref 6–20)
BUN/CREAT SERPL: 25 (ref 12–20)
CA-I BLD-MCNC: 8.6 MG/DL (ref 8.5–10.1)
CHLORIDE SERPL-SCNC: 104 MMOL/L (ref 97–108)
CO2 SERPL-SCNC: 30 MMOL/L (ref 21–32)
CREAT SERPL-MCNC: 0.73 MG/DL (ref 0.55–1.02)
EKG ATRIAL RATE: 77 BPM
EKG DIAGNOSIS: NORMAL
EKG P AXIS: 67 DEGREES
EKG P-R INTERVAL: 146 MS
EKG Q-T INTERVAL: 384 MS
EKG QRS DURATION: 90 MS
EKG QTC CALCULATION (BAZETT): 434 MS
EKG R AXIS: 68 DEGREES
EKG T AXIS: 53 DEGREES
EKG VENTRICULAR RATE: 77 BPM
ERYTHROCYTE [DISTWIDTH] IN BLOOD BY AUTOMATED COUNT: 13.6 % (ref 11.5–14.5)
GLUCOSE SERPL-MCNC: 122 MG/DL (ref 65–100)
HCT VFR BLD AUTO: 41.9 % (ref 35–47)
HGB BLD-MCNC: 13.4 G/DL (ref 11.5–16)
MCH RBC QN AUTO: 29.1 PG (ref 26–34)
MCHC RBC AUTO-ENTMCNC: 32 G/DL (ref 30–36.5)
MCV RBC AUTO: 90.9 FL (ref 80–99)
NRBC # BLD: 0 K/UL (ref 0–0.01)
NRBC BLD-RTO: 0 PER 100 WBC
PLATELET # BLD AUTO: 238 K/UL (ref 150–400)
PMV BLD AUTO: 11.1 FL (ref 8.9–12.9)
POTASSIUM SERPL-SCNC: 3.8 MMOL/L (ref 3.5–5.1)
RBC # BLD AUTO: 4.61 M/UL (ref 3.8–5.2)
SODIUM SERPL-SCNC: 141 MMOL/L (ref 136–145)
WBC # BLD AUTO: 7.9 K/UL (ref 3.6–11)

## 2024-01-07 PROCEDURE — 6370000000 HC RX 637 (ALT 250 FOR IP): Performed by: INTERNAL MEDICINE

## 2024-01-07 PROCEDURE — 80048 BASIC METABOLIC PNL TOTAL CA: CPT

## 2024-01-07 PROCEDURE — 2580000003 HC RX 258: Performed by: HOSPITALIST

## 2024-01-07 PROCEDURE — 2700000000 HC OXYGEN THERAPY PER DAY

## 2024-01-07 PROCEDURE — 6370000000 HC RX 637 (ALT 250 FOR IP): Performed by: STUDENT IN AN ORGANIZED HEALTH CARE EDUCATION/TRAINING PROGRAM

## 2024-01-07 PROCEDURE — 94640 AIRWAY INHALATION TREATMENT: CPT

## 2024-01-07 PROCEDURE — 85027 COMPLETE CBC AUTOMATED: CPT

## 2024-01-07 PROCEDURE — 94761 N-INVAS EAR/PLS OXIMETRY MLT: CPT

## 2024-01-07 PROCEDURE — 36415 COLL VENOUS BLD VENIPUNCTURE: CPT

## 2024-01-07 PROCEDURE — 6370000000 HC RX 637 (ALT 250 FOR IP): Performed by: HOSPITALIST

## 2024-01-07 PROCEDURE — 6360000002 HC RX W HCPCS: Performed by: HOSPITALIST

## 2024-01-07 PROCEDURE — 1100000000 HC RM PRIVATE

## 2024-01-07 RX ORDER — EPINEPHRINE 1 MG/ML
0.3 INJECTION, SOLUTION, CONCENTRATE INTRAVENOUS ONCE
Status: DISCONTINUED | OUTPATIENT
Start: 2024-01-07 | End: 2024-01-08 | Stop reason: HOSPADM

## 2024-01-07 RX ORDER — TIOTROPIUM BROMIDE 18 UG/1
18 CAPSULE ORAL; RESPIRATORY (INHALATION) DAILY
Qty: 90 CAPSULE | Refills: 1 | OUTPATIENT
Start: 2024-01-07

## 2024-01-07 RX ORDER — BUDESONIDE AND FORMOTEROL FUMARATE DIHYDRATE 80; 4.5 UG/1; UG/1
2 AEROSOL RESPIRATORY (INHALATION)
Qty: 10.2 G | Refills: 3 | OUTPATIENT
Start: 2024-01-07

## 2024-01-07 RX ADMIN — SODIUM CHLORIDE, PRESERVATIVE FREE 10 ML: 5 INJECTION INTRAVENOUS at 21:07

## 2024-01-07 RX ADMIN — AMIODARONE HYDROCHLORIDE 400 MG: 200 TABLET ORAL at 07:50

## 2024-01-07 RX ADMIN — IPRATROPIUM BROMIDE AND ALBUTEROL SULFATE 1 DOSE: 2.5; .5 SOLUTION RESPIRATORY (INHALATION) at 10:35

## 2024-01-07 RX ADMIN — BENZONATATE 100 MG: 100 CAPSULE ORAL at 02:59

## 2024-01-07 RX ADMIN — GUAIFENESIN SYRUP AND DEXTROMETHORPHAN 10 ML: 100; 10 SYRUP ORAL at 21:43

## 2024-01-07 RX ADMIN — IPRATROPIUM BROMIDE AND ALBUTEROL SULFATE 1 DOSE: 2.5; .5 SOLUTION RESPIRATORY (INHALATION) at 18:14

## 2024-01-07 RX ADMIN — GUAIFENESIN SYRUP AND DEXTROMETHORPHAN 10 ML: 100; 10 SYRUP ORAL at 07:15

## 2024-01-07 RX ADMIN — ENOXAPARIN SODIUM 30 MG: 100 INJECTION SUBCUTANEOUS at 07:50

## 2024-01-07 RX ADMIN — IPRATROPIUM BROMIDE AND ALBUTEROL SULFATE 1 DOSE: 2.5; .5 SOLUTION RESPIRATORY (INHALATION) at 13:26

## 2024-01-07 RX ADMIN — ENOXAPARIN SODIUM 30 MG: 100 INJECTION SUBCUTANEOUS at 21:06

## 2024-01-07 RX ADMIN — ACETAMINOPHEN 650 MG: 325 TABLET ORAL at 02:36

## 2024-01-07 RX ADMIN — GUAIFENESIN SYRUP AND DEXTROMETHORPHAN 10 ML: 100; 10 SYRUP ORAL at 02:59

## 2024-01-07 RX ADMIN — AMIODARONE HYDROCHLORIDE 400 MG: 200 TABLET ORAL at 21:39

## 2024-01-07 RX ADMIN — SODIUM CHLORIDE, PRESERVATIVE FREE 10 ML: 5 INJECTION INTRAVENOUS at 07:50

## 2024-01-08 VITALS
SYSTOLIC BLOOD PRESSURE: 134 MMHG | HEART RATE: 97 BPM | BODY MASS INDEX: 35.61 KG/M2 | WEIGHT: 235 LBS | DIASTOLIC BLOOD PRESSURE: 80 MMHG | RESPIRATION RATE: 17 BRPM | HEIGHT: 68 IN | TEMPERATURE: 98.1 F | OXYGEN SATURATION: 91 %

## 2024-01-08 LAB
BACTERIA SPEC CULT: NORMAL
BACTERIA SPEC CULT: NORMAL
GLUCOSE BLD STRIP.AUTO-MCNC: 113 MG/DL (ref 65–100)
Lab: NORMAL
Lab: NORMAL
PERFORMED BY:: ABNORMAL

## 2024-01-08 PROCEDURE — 82962 GLUCOSE BLOOD TEST: CPT

## 2024-01-08 PROCEDURE — 94761 N-INVAS EAR/PLS OXIMETRY MLT: CPT

## 2024-01-08 PROCEDURE — 94640 AIRWAY INHALATION TREATMENT: CPT

## 2024-01-08 PROCEDURE — 6370000000 HC RX 637 (ALT 250 FOR IP): Performed by: STUDENT IN AN ORGANIZED HEALTH CARE EDUCATION/TRAINING PROGRAM

## 2024-01-08 PROCEDURE — 6370000000 HC RX 637 (ALT 250 FOR IP): Performed by: INTERNAL MEDICINE

## 2024-01-08 PROCEDURE — 2580000003 HC RX 258: Performed by: HOSPITALIST

## 2024-01-08 PROCEDURE — 6360000002 HC RX W HCPCS: Performed by: HOSPITALIST

## 2024-01-08 RX ORDER — AMOXICILLIN AND CLAVULANATE POTASSIUM 875; 125 MG/1; MG/1
1 TABLET, FILM COATED ORAL 2 TIMES DAILY
Qty: 20 TABLET | Refills: 0 | Status: SHIPPED | OUTPATIENT
Start: 2024-01-08 | End: 2024-01-18

## 2024-01-08 RX ORDER — METHYLPREDNISOLONE 4 MG/1
TABLET ORAL
Qty: 21 TABLET | Refills: 0 | Status: SHIPPED | OUTPATIENT
Start: 2024-01-08 | End: 2024-01-14

## 2024-01-08 RX ORDER — AMIODARONE HYDROCHLORIDE 200 MG/1
200 TABLET ORAL DAILY
Qty: 30 TABLET | Refills: 0 | Status: SHIPPED | OUTPATIENT
Start: 2024-01-13

## 2024-01-08 RX ORDER — MONTELUKAST SODIUM 10 MG/1
10 TABLET ORAL NIGHTLY
Qty: 30 TABLET | Refills: 0 | Status: SHIPPED | OUTPATIENT
Start: 2024-01-08

## 2024-01-08 RX ORDER — BENZONATATE 100 MG/1
100 CAPSULE ORAL 3 TIMES DAILY PRN
Qty: 21 CAPSULE | Refills: 0 | Status: SHIPPED | OUTPATIENT
Start: 2024-01-08 | End: 2024-01-15

## 2024-01-08 RX ORDER — AMIODARONE HYDROCHLORIDE 400 MG/1
400 TABLET ORAL 2 TIMES DAILY
Qty: 8 TABLET | Refills: 0 | Status: SHIPPED | OUTPATIENT
Start: 2024-01-08 | End: 2024-01-12

## 2024-01-08 RX ORDER — BUDESONIDE AND FORMOTEROL FUMARATE DIHYDRATE 160; 4.5 UG/1; UG/1
2 AEROSOL RESPIRATORY (INHALATION) 2 TIMES DAILY
Qty: 30.6 G | Refills: 1 | Status: SHIPPED | OUTPATIENT
Start: 2024-01-08

## 2024-01-08 RX ADMIN — SODIUM CHLORIDE, PRESERVATIVE FREE 10 ML: 5 INJECTION INTRAVENOUS at 10:26

## 2024-01-08 RX ADMIN — AMIODARONE HYDROCHLORIDE 400 MG: 200 TABLET ORAL at 10:27

## 2024-01-08 RX ADMIN — IPRATROPIUM BROMIDE AND ALBUTEROL SULFATE 1 DOSE: 2.5; .5 SOLUTION RESPIRATORY (INHALATION) at 07:48

## 2024-01-08 RX ADMIN — ENOXAPARIN SODIUM 30 MG: 100 INJECTION SUBCUTANEOUS at 10:27

## 2024-01-08 RX ADMIN — IPRATROPIUM BROMIDE AND ALBUTEROL SULFATE 1 DOSE: 2.5; .5 SOLUTION RESPIRATORY (INHALATION) at 13:42

## 2024-01-08 ASSESSMENT — PAIN SCALES - GENERAL
PAINLEVEL_OUTOF10: 0
PAINLEVEL_OUTOF10: 0

## 2024-01-08 NOTE — DISCHARGE SUMMARY
Hospitalist Discharge Summary     Patient ID:  Kim Markham  [id number]  48 y.o.  1975 1/2/2024    PCP on record: None, None    Admit date: 1/2/2024  Discharge date and time: 1/8/2024    DISCHARGE DIAGNOSIS:    Acute hypoxic/hypercapnic respiratory failure/COPD exacerbation/community-acquired pneumonia/supraventricular tachycardia/concerns for obstructive sleep apnea/obesity    CONSULTATIONS:  IP CONSULT TO CARDIOLOGY  IP CONSULT TO PULMONOLOGY  IP CONSULT TO PULMONOLOGY    Excerpted HPI from H&P of Pratik Bansal MD:  Kim Markham is a 48 y.o. female presents with progressively worsening persistent shortness of breath.  Associated with coughing, nonproductive.  She denies fever or chills.  She denies chest pains.  She has COPD and had used her bronchodilator inhalation treatment at home with no response for which she presents to the ER.  Patient initiated on bronchodilation treatment with steroid therapy.  She was also initiated on BiPAP in the ER.     ______________________________________________________________________  DISCHARGE SUMMARY/HOSPITAL COURSE:  for full details see H&P, daily progress notes, labs, consult notes.     Patient was subsequently admitted to [facility] for evaluation as well as management, of note patient initially required BiPAP, started on IV steroids, IV antibiotics, of note patient's respiratory status improved, patient was weaned down on oxygen, patient was evaluated for need for home O2 which patient qualifies for, patient did develop SVT during the course of the admission, started on amiodarone, evaluated by cardiology, once patient's clinical status improved patient was deemed stable for discharge close outpatient follow-up with primary care physician as well as cardiology as well as pulmonology.        _______________________________________________________________________  Patient seen and examined by me on discharge

## 2024-01-08 NOTE — CARE COORDINATION
CM had is approved that the hospital could pay for patient's first 30 days of new medications.     [facility] is going to fill patient's medications and patient can  shortly.    No other CM needs at discharge.    Transition of Care Plan:    RUR: 10%  Prior Level of Functioning: Independent  Disposition: Home/self  If SNF or IPR: Date FOC offered: N/A  Date FOC received: N/A  Accepting facility: N/A  Date authorization started with reference number: N/A  Date authorization received and expires: N/A  Follow up appointments: Per MD  DME needed: N/A  Transportation at discharge:   IM/IMM Medicare/ letter given: N/A  Is patient a  and connected with [facility]? N/A   If yes, was [place] transfer form completed and [facility] notified?   Caregiver Contact: N/A  Discharge Caregiver contacted prior to discharge? N/A  Care Conference needed? No  Barriers to discharge: None

## 2024-01-08 NOTE — PLAN OF CARE
Problem: Discharge Planning  Goal: Discharge to home or other facility with appropriate resources  Outcome: Progressing  Flowsheets (Taken 1/8/2024 1026)  Discharge to home or other facility with appropriate resources:   Identify barriers to discharge with patient and caregiver   Arrange for needed discharge resources and transportation as appropriate   Identify discharge learning needs (meds, wound care, etc)   Refer to discharge planning if patient needs post-hospital services based on physician order or complex needs related to functional status, cognitive ability or social support system     Problem: Pain  Goal: Verbalizes/displays adequate comfort level or baseline comfort level  Outcome: Progressing     Problem: Skin/Tissue Integrity  Goal: Absence of new skin breakdown  Description: 1.  Monitor for areas of redness and/or skin breakdown  2.  Assess vascular access sites hourly  3.  Every 4-6 hours minimum:  Change oxygen saturation probe site  4.  Every 4-6 hours:  If on nasal continuous positive airway pressure, respiratory therapy assess nares and determine need for appliance change or resting period.  Outcome: Progressing

## 2024-01-08 NOTE — CARE COORDINATION
CM noted discharge order.  Patient is recommended for home oxygen at discharge.    CM met with patient to discuss payment for oxygen because patient does not have insurance.  Patient stated she is not interested in home oxygen because she does not feel she needs it.  CM explained that the first 30 days could likely be covered.  Patient explained she does not want to be on oxygen at home.    Patient did ask about getting her medications at discharge.  She stated she does not have the ability to pay for medications.      CM reached out to [facility] to see if they could give CM a quote on patient's medications for the hospital to possibly cover first 30 days of their medications.    CM will continue to follow up.

## 2024-01-09 NOTE — PROGRESS NOTES
Progress Note      1/7/2024 1:19 PM  NAME: Kim Markham   MRN:[id number]   Admit Diagnosis: Acute respiratory failure (HCC) [J96.00]  COPD exacerbation (HCC) [J44.1]  Acute respiratory failure with hypoxia (HCC) [J96.01]      Subjective:   Chart reviewed.  Patient still has a shortness of breath.  Nurse notified me that the patient had an episode of ventricular tachycardia however I did not find the rhythm strip.  Echocardiographic results discussed with the patient.    Patient is now on the medical floor.    Review of Systems:    Symptom Y/N Comments  Symptom Y/N Comments   Fever/Chills n   Chest Pain n    Poor Appetite    Edema     Cough    Abdominal Pain n    Sputum    Joint Pain     SOB/KINSEY y Improving  Pruritis/Rash     Nausea/vomit    Other     Diarrhea         Constipation           Could NOT obtain due to:      Objective:          Physical Exam:    Last 24hrs VS reviewed since prior progress note. Most recent are:    /76   Pulse 73   Temp 98.2 °F (36.8 °C) (Oral)   Resp 23   Ht 1.727 m (5' 8\")   Wt 106.6 kg (235 lb)   SpO2 94%   BMI 35.73 kg/m²     Intake/Output Summary (Last 24 hours) at 1/7/2024 1319  Last data filed at 1/7/2024 0435  Gross per 24 hour   Intake --   Output 700 ml   Net -700 ml          General Appearance: Well developed, well nourished, alert & oriented x 3,    no acute distress.  Ears/Nose/Mouth/Throat: Hearing grossly normal.  Neck: Supple.  Chest: Lungs clear to auscultation bilaterally.  Cardiovascular: Regular rate and rhythm, S1,S2 normal, no murmur.  Abdomen: Soft, non-tender, bowel sounds are active.  Extremities: No edema bilaterally.  Skin: Warm and dry.    []         Post-cath site without hematoma, bruit, tenderness, or thrill.  Distal pulses intact.    PMH/SH reviewed - no change compared to H&P    Data Review    Telemetry: normal sinus rhythm     EKG:   []  No new EKG for review    Lab Data Personally Reviewed:    Recent Labs     01/06/24  8392 
    Hospitalist Progress Note               Daily Progress Note: 1/6/2024    Chief complaint:   Chief Complaint   Patient presents with    Respiratory Distress        Subjective:   Hospital course to date:    Patient admitted with acute on chronic hypoxemic and hypercarbic respiratory failure requiring BIPAP. Her course was also complicated by SVT requiring shock and amiodarone initiation.   --------  Patient is seen today for follow-up. She reported a significant improvement in her breathing effort. She was able to ambulate but continues to need O2 by nasal canula.     Medications reviewed  Current Facility-Administered Medications   Medication Dose Route Frequency    ipratropium 0.5 mg-albuterol 2.5 mg (DUONEB) nebulizer solution 1 Dose  1 Dose Nebulization Q4H PRN    ipratropium 0.5 mg-albuterol 2.5 mg (DUONEB) nebulizer solution 1 Dose  1 Dose Inhalation Q6H WA RT    benzonatate (TESSALON) capsule 100 mg  100 mg Oral TID PRN    QUEtiapine (SEROQUEL) tablet 50 mg  50 mg Oral Nightly PRN    guaiFENesin-dextromethorphan (ROBITUSSIN DM) 100-10 MG/5ML syrup 10 mL  10 mL Oral Q4H PRN    amiodarone (NEXTERONE) 360 mg in dextrose 5% 200 ml  0.5 mg/min IntraVENous Continuous    sodium chloride flush 0.9 % injection 5-40 mL  5-40 mL IntraVENous 2 times per day    sodium chloride flush 0.9 % injection 5-40 mL  5-40 mL IntraVENous PRN    0.9 % sodium chloride infusion   IntraVENous PRN    ondansetron (ZOFRAN-ODT) disintegrating tablet 4 mg  4 mg Oral Q8H PRN    Or    ondansetron (ZOFRAN) injection 4 mg  4 mg IntraVENous Q6H PRN    polyethylene glycol (GLYCOLAX) packet 17 g  17 g Oral Daily PRN    enoxaparin Sodium (LOVENOX) injection 30 mg  30 mg SubCUTAneous BID    acetaminophen (TYLENOL) tablet 650 mg  650 mg Oral Q6H PRN    Or    acetaminophen (TYLENOL) suppository 650 mg  650 mg Rectal Q6H PRN    ALPRAZolam (XANAX) tablet 0.5 mg  0.5 mg Oral Nightly PRN       Review of Systems:   Pertinent items are noted in 
    Hospitalist Progress Note    NAME: Kim Markham   :  1975   MRN:  [id number]       Assessment / Plan:    48-year-old female with progressive worsening shortness of breath associated with cough admitted for COPD exacerbation.        COPD exacerbation.  Lactic acidosis  On bronchodilator therapy albuterol and Atrovent   on steroid. Continue ceftriaxone and azithromycin day #3  BiPAP as tolerated while sleeping and awake.   Continue with supplemental oxygen.       Acute hypoxemic respiratory failure  right upper lobe pneumonia and COPD exacerbation.   Continue ceftriaxone and azithromycin D#3  Follow-up on cultures.  Follow-up on Legionella and mycoplasma.     Acute hypoxic respiratory failure.  Obstructive sleep apnea.  Continue BiPAP  Outpatient polysomnographic study.       30.0 - 39.9 Obese / Body mass index is 35.73 kg/m².    Estimated discharge date:   Barriers: Supplemental oxygen requirement.    Code status: Full  Prophylaxis: Lovenox  Recommended Disposition: Home w/Family     Subjective:     Chief Complaint / Reason for Physician Visit  \" Shortness of breath and increased work of breathing\".  Discussed with RN events overnight.     Review of Systems:  Symptom Y/N Comments  Symptom Y/N Comments   Fever/Chills    Chest Pain     Poor Appetite    Edema     Cough x   Abdominal Pain     Sputum    Joint Pain     SOB/KINSEY x   Pruritis/Rash     Nausea/vomit    Tolerating PT/OT     Diarrhea    Tolerating Diet     Constipation    Other       Could NOT obtain due to:      Objective:     VITALS:   Last 24hrs VS reviewed since prior progress note. Most recent are:  [unfilled]    Intake/Output Summary (Last 24 hours) at 2024 1558  Last data filed at 2024 1550  Gross per 24 hour   Intake --   Output 2200 ml   Net -2200 ml        I had a face to face encounter and independently examined this patient on 2024, as outlined below:  PHYSICAL EXAM:  General: WD, WN. Alert, cooperative, no acute 
    Hospitalist Progress Note    NAME: Kim Markham   :  1975   MRN:  [id number]       Assessment / Plan:    48-year-old female with progressive worsening shortness of breath associated with cough admitted for COPD exacerbation.      COPD exacerbation  Continue bronchodilator elation treatment  On steroid.  Therapy  Continue ceftriaxone and azithromycin day #2     Acute hypoxemic respiratory failure  Multifocal pneumonia  Continue ceftriaxone and azithromycin  Follow-up on cultures     Acute hypoxic respiratory failure  Continue BiPAP         30.0 - 39.9 Obese / Body mass index is 35.73 kg/m².    Estimated discharge date:   Barriers:    Code status: Full  Prophylaxis: Lovenox  Recommended Disposition: Home w/Family     Subjective:     Chief Complaint / Reason for Physician Visit  \" Shortness of breath with cough\".  Discussed with RN events overnight.     Review of Systems:  Symptom Y/N Comments  Symptom Y/N Comments   Fever/Chills    Chest Pain     Poor Appetite    Edema     Cough y   Abdominal Pain     Sputum    Joint Pain     SOB/KINSEY y   Pruritis/Rash     Nausea/vomit    Tolerating PT/OT     Diarrhea    Tolerating Diet y    Constipation    Other       Could NOT obtain due to:      Objective:     VITALS:   Last 24hrs VS reviewed since prior progress note. Most recent are:  [unfilled]    Intake/Output Summary (Last 24 hours) at 1/3/2024 1446  Last data filed at 1/3/2024 0800  Gross per 24 hour   Intake --   Output 500 ml   Net -500 ml        I had a face to face encounter and independently examined this patient on 1/3/2024, as outlined below:  PHYSICAL EXAM:  General: WD, WN. Alert, cooperative, no acute distress    EENT:  EOMI. Anicteric sclerae. MMM  Resp:  CTA bilaterally, no wheezing or rales.  No accessory muscle use  CV:  Regular  rhythm,  No edema  GI:  Soft, Non distended, Non tender.  +Bowel sounds  Neurologic:  Alert and oriented X 3, normal speech,   Psych:   Good insight. Not anxious nor 
    Hospitalist Progress Note    NAME: Kim Markham   :  1975   MRN:  [id number]       Assessment / Plan:    48-year-old female with progressive worsening shortness of breath associated with cough admitted for COPD exacerbation.        COPD exacerbation.  Lactic acidosis  Continue bronchodilator therapy   on steroid. Continue ceftriaxone and azithromycin day #3  BiPAP as tolerated while sleeping and awake.     Acute hypoxemic respiratory failure  Multifocal pneumonia  Continue ceftriaxone and azithromycin  Follow-up on cultures.  Follow-up on Legionella and mycoplasma.     Acute hypoxic respiratory failure.  Obstructive sleep apnea.  Continue BiPAP  Outpatient polysomnographic study.       30.0 - 39.9 Obese / Body mass index is 35.73 kg/m².    Estimated discharge date:   Barriers:    Code status: Full  Prophylaxis: Lovenox  Recommended Disposition: Home w/Family     Subjective:     Chief Complaint / Reason for Physician Visit  \" Shortness of breath and increased work of breathing\".  Discussed with RN events overnight.     Review of Systems:  Symptom Y/N Comments  Symptom Y/N Comments   Fever/Chills    Chest Pain     Poor Appetite    Edema     Cough x   Abdominal Pain     Sputum    Joint Pain     SOB/KINSEY x   Pruritis/Rash     Nausea/vomit    Tolerating PT/OT     Diarrhea    Tolerating Diet     Constipation    Other       Could NOT obtain due to:      Objective:     VITALS:   Last 24hrs VS reviewed since prior progress note. Most recent are:  [unfilled]  No intake or output data in the 24 hours ending 24 1456     I had a face to face encounter and independently examined this patient on 2024, as outlined below:  PHYSICAL EXAM:  General: WD, WN. Alert, cooperative, no acute distress    EENT:  EOMI. Anicteric sclerae. MMM  Resp:  Increased work of breathing, traces of expiratory wheezes, bilateral rhonchi.  CV:  Regular  rhythm,  No edema  GI:  Soft, Non distended, Non tender.  +Bowel 
  Physician Progress Note      PATIENT:               CONY POZO  CSN #:                  [id number]  :                       1975  ADMIT DATE:       2024 6:38 AM  DISCH DATE:        2024 6:30 PM  RESPONDING  PROVIDER #:        Kudleep Nuñez MD          QUERY TEXT:    Dear Attending,    Pt admitted with pneumonia.  Noted documentation of sepsis on Consults    Cardiac surg consultant.  If possible, please document in progress notes and   discharge summary:    The medical record reflects the following:  Risk Factors: 48 year old female, PNA, respiratory failure, COPD exacerbation  Clinical Indicators: Consults  Cardiac surg consultant \"Acute hypoxic   respiratory failure probably related to right upper lobe pneumonia.  Sepsis\"  -Lab WBC: 13.8-17.0-10.9-6.5-7.9  Lactic Acid: 2.83-2.22-<0.40-0.49  Treatment: IVF NS, IV ceftriaxone, IV Azithromycin      Please email [email or web address] with any questions  Options provided:  -- sepsis confirmed present on admission  -- sepsis ruled out  -- Other - I will add my own diagnosis  -- Disagree - Not applicable / Not valid  -- Disagree - Clinically unable to determine / Unknown  -- Refer to Clinical Documentation Reviewer    PROVIDER RESPONSE TEXT:    The diagnosis of sepsis was confirmed as present on admission.    Query created by: Antonella Morales on 2024 9:15 AM      Electronically signed by:  Kuldeep Nuñez MD 2024 9:40 AM          
4 Eyes Skin Assessment     NAME:  Kim Markham  YOB: 1975  MEDICAL RECORD NUMBER:  [id number]    The patient is being assessed for  Transfer to New Unit    I agree that at least one RN has performed a thorough Head to Toe Skin Assessment on the patient. ALL assessment sites listed below have been assessed.      Areas assessed by both nurses:    Head, Face, Ears, Shoulders, Back, Chest, Arms, Elbows, Hands, Sacrum. Buttock, Coccyx, Ischium, Legs. Feet and Heels, Under Medical Devices , and Other ***        Does the Patient have a Wound? No noted wound(s)       Jerson Prevention initiated by RN: No  Wound Care Orders initiated by RN: No    Pressure Injury (Stage 3,4, Unstageable, DTI, NWPT, and Complex wounds) if present, place Wound referral order by RN under : No    New Ostomies, if present place, Ostomy referral order under : No     Nurse 1 eSignature: Electronically signed by Lizzy Moreno RN on 1/7/24 at 12:15 PM EST    **SHARE this note so that the co-signing nurse can place an eSignature**    Nurse 2 eSignature: {Esignature:[id number]}   
Discharge instructions and prescriptions discussed with patient and patient verbalized understanding.  Patient discharged home.  Patient escorted to exit by CNA via wheelchair via wheelchair.  Patient's  here to take patient home via private vehicle.  
Discharge paperwork complete, except for follow up appointments, Just print when patient ready to leave. Primary nurse aware.  
Patient transferred from PACU, full set of vital obtained, and skin assessment completed.   
Pulmonary/ CC progress note  Ms. Kim Markham is a 48 years old obese  female with history of chronic active smoking has been feeling sick for 2 days prior to coming to the hospital.  She noted that other coworkers were also sick at her workplace.  She developed increasing symptoms of shortness of breath, cough congestion felt hot and cold with chills.  She started coughing up scanty amount of clear sputum.  As her shortness of breath got worse, she came to the hospital.  She does have history of COPD and uses albuterol inhaler as an outpatient.  In ER she was found to be hypoxic and tachypneic.  Initial WBC count was elevated at 17,000.  Chest x-ray was done which showed right upper lobe infiltrate.  She developed acute hypercapnic respiratory failure, blood gas was done which revealed 7.30/57/164/27/99%.  She was placed on BiPAP.  Nebulizer treatment was given which resulted in palpitations.  Subjective:     Patient seen and examined  Overnight events noted    Lying in bed comfortably  On nasal cannula oxygen  Able to ambulate on nasal cannula oxygen  No acute distress  Gradual improvement in respiratory status    No past surgical history on file.   Prior to Admission medications    Medication Sig Start Date End Date Taking? Authorizing Provider   budesonide-formoterol (SYMBICORT) 80-4.5 MCG/ACT AERO Inhale 2 puffs into the lungs in the morning and 2 puffs in the evening.  Patient not taking: Reported on 1/7/2024 8/13/23   Stuart Cameron MD   montelukast (SINGULAIR) 10 MG tablet Take 1 tablet by mouth nightly  Patient not taking: Reported on 1/7/2024 8/13/23   Stuart Cameron MD   albuterol (PROVENTIL) (2.5 MG/3ML) 0.083% nebulizer solution Inhale 3 mLs into the lungs every 6 hours as needed    Automatic Reconciliation, Ar   guaiFENesin 1200 MG TB12 Take 1,200 mg by mouth in the morning and 1,200 mg in the evening. 9/20/22   Automatic Reconciliation, Ar   pantoprazole (PROTONIX) 40 MG tablet Take 
Pulmonary/ CC progress note  Ms. Kim Markham is a 48 years old obese  female with history of chronic active smoking has been feeling sick for 2 days prior to coming to the hospital.  She noted that other coworkers were also sick at her workplace.  She developed increasing symptoms of shortness of breath, cough congestion felt hot and cold with chills.  She started coughing up scanty amount of clear sputum.  As her shortness of breath got worse, she came to the hospital.  She does have history of COPD and uses albuterol inhaler as an outpatient.  In ER she was found to be hypoxic and tachypneic.  Initial WBC count was elevated at 17,000.  Chest x-ray was done which showed right upper lobe infiltrate.  She developed acute hypercapnic respiratory failure, blood gas was done which revealed 7.30/57/164/27/99%.  She was placed on BiPAP.  Nebulizer treatment was given which resulted in palpitations.  Subjective:     Patient seen and examined  Overnight events noted    Sitting up in bed comfortably  Awake and alert  On 2 L nasal cannula oxygen  Ambulating with nasal cannula without difficulty  Gradual improvement in respiratory status      Patient Active Problem List   Diagnosis    COPD with acute exacerbation (HCC)    Acute asthma exacerbation    COPD (chronic obstructive pulmonary disease) (HCC)    COPD exacerbation (HCC)    Acute respiratory failure (HCC)     Past Medical History:   Diagnosis Date    Asthma     COPD (chronic obstructive pulmonary disease) (HCC)       No family history on file.   Social History     Tobacco Use    Smoking status: Every Day     Current packs/day: 1.00     Types: Cigarettes    Smokeless tobacco: Not on file   Substance Use Topics    Alcohol use: Not on file     No past surgical history on file.   Prior to Admission medications    Medication Sig Start Date End Date Taking? Authorizing Provider   amiodarone (PACERONE) 400 MG tablet Take 1 tablet by mouth 2 times daily for 8 doses 
Received patient from ED AOx4. No s/s of distress noted. Noted amio gtt order. Per MAR, gtt stopped at 1500. Per Jimbo, RN, stopped due to running out and no order to continue was noted. Did confirm order to continue until d/c. Gtt restarted at 1845.  
Spiritual Care Assessment/Progress Note  [facility]    Name: Kim Markham MRN: [id number]    Age: 48 y.o.     Sex: female   Language: English     Date: 1/7/2024            Total Time Calculated: 23 min              Spiritual Assessment begun in [facility] ONCOLOGY  Service Provided For:: Patient  Referral/Consult From:: Rounding  Encounter Overview/Reason : Initial Encounter    Spiritual beliefs:      [x] Involved in a zainab tradition/spiritual practice:      [] Supported by a zainab community:      [] Claims no spiritual orientation:      [] Seeking spiritual identity:           [] Adheres to an individual form of spirituality:      [] Not able to assess:                Identified resources for coping and support system:   Support System: Spouse       [x] Prayer                  [] Devotional reading               [] Music                  [] Guided Imagery     [] Pet visits                                        [] Other: (COMMENT)     Specific area/focus of visit   Encounter:    Crisis:    Spiritual/Emotional needs: Type: Emotional Distress, Spiritual Support  Ritual, Rites and Sacraments:    Grief, Loss, and Adjustments:    Ethics/Mediation:    Behavioral Health:    Palliative Care:    Advance Care Planning:           Narrative:  rounded and visited patient in room #414. Patient was sitting up in the bed watching television. Patient shared about her illness and the trauma she experienced. Patient is thankful to have family support. Provided patient with comfort through active listening, supportive presence, engaging conversation, and a prayer of healing. Advised of  availability.     Rev. Myrtle Cabrera DMin.    - Spiritual Health  [facility]   
Spo2 on room air at rest 94%  Spo2 on room air with ambulation 86%  Spo2 on 2lpm with ambulation 92%  
To Whom It May Concern,    This is to certify that Kim Markham  1975 was admitted to [facility] from 2024 - 2024. Patient can return to work on 1/10/2024.    Please feel free to contact us for any additional information      Kuldeep Nuñez MD  
  WBC 6.5 7.9   HGB 12.5 13.4   HCT 39.7 41.9    238       No results for input(s): \"INR\", \"PROTIME\", \"APTT\" in the last 72 hours.   Recent Labs     01/06/24  0515 01/07/24  0050    141   K 3.9 3.8    104   CO2 33* 30   BUN 13 18   CREATININE 0.68 0.73   GLUCOSE 89 122*   CALCIUM 8.7 8.6       No results for input(s): \"CKTOTAL\", \"CKMB\", \"TROPONINI\", \"BNP\", \"PROBNP\", \"NTPROBNP\" in the last 72 hours.    Invalid input(s): \"CKINDEX\"  No results found for: \"CHOL\", \"TRIG\", \"HDL\", \"LDLCHOLESTEROL\", \"LDLCALC\", \"LABVLDL\", \"VLDL\", \"CHOLHDLRATIO\"    No results for input(s): \"AST\", \"ALKPHOS\", \"LABPROT\", \"LABALBU\", \"GLOB\", \"GGT\", \"AMYLASE\", \"LIPASE\" in the last 72 hours.    Invalid input(s): \"GPT\", \"TBILITOT\"  No results for input(s): \"PH\", \"PCO2\", \"PO2\" in the last 72 hours.    Medications Personally Reviewed:    Current Facility-Administered Medications   Medication Dose Route Frequency    EPINEPHrine PF 1 MG/ML injection (Anaphylaxis) 0.3 mg  0.3 mg IntraMUSCular Once    ipratropium 0.5 mg-albuterol 2.5 mg (DUONEB) nebulizer solution 1 Dose  1 Dose Nebulization Q4H PRN    ipratropium 0.5 mg-albuterol 2.5 mg (DUONEB) nebulizer solution 1 Dose  1 Dose Inhalation Q6H WA RT    amiodarone (CORDARONE) tablet 400 mg  400 mg Oral BID    Followed by    [START ON 1/13/2024] amiodarone (CORDARONE) tablet 200 mg  200 mg Oral Daily    benzonatate (TESSALON) capsule 100 mg  100 mg Oral TID PRN    QUEtiapine (SEROQUEL) tablet 50 mg  50 mg Oral Nightly PRN    guaiFENesin-dextromethorphan (ROBITUSSIN DM) 100-10 MG/5ML syrup 10 mL  10 mL Oral Q4H PRN    sodium chloride flush 0.9 % injection 5-40 mL  5-40 mL IntraVENous 2 times per day    sodium chloride flush 0.9 % injection 5-40 mL  5-40 mL IntraVENous PRN    0.9 % sodium chloride infusion   IntraVENous PRN    ondansetron (ZOFRAN-ODT) disintegrating tablet 4 mg  4 mg Oral Q8H PRN    Or    ondansetron (ZOFRAN) injection 4 mg  4 mg IntraVENous Q6H PRN    polyethylene glycol 
in the morning and 2 puffs in the evening.  Patient not taking: Reported on 2024   Stuart Cameron MD   montelukast (SINGULAIR) 10 MG tablet Take 1 tablet by mouth nightly  Patient not taking: Reported on 2024   Stuart Cameron MD   albuterol (PROVENTIL) (2.5 MG/3ML) 0.083% nebulizer solution Inhale 3 mLs into the lungs every 6 hours as needed    Automatic Reconciliation, Ar   guaiFENesin 1200 MG TB12 Take 1,200 mg by mouth in the morning and 1,200 mg in the evening. 22   Automatic Reconciliation, Ar   pantoprazole (PROTONIX) 40 MG tablet Take 1 tablet by mouth daily 22   Automatic Reconciliation, Ar     Allergies   Allergen Reactions    Latex         Review of Systems:  A comprehensive review of systems was negative except for that written in the History of Present Illness.  She does have history of snoring when she sleeps.  She works at nights and sleeps during the day and has been reported that she tends to hold her breath during her sleep.  She works at night shift at her local [employer].    Labs:    Recent Labs     24  0515 24  0050   WBC 6.5 7.9   HGB 12.5 13.4   HCT 39.7 41.9    238       Recent Labs     24  0515 24  0050    141   K 3.9 3.8    104   CO2 33* 30   GLUCOSE 89 122*   BUN 13 18   CREATININE 0.68 0.73   CALCIUM 8.7 8.6       Recent Labs     24  0447   PHART 7.36   PNZ6CTD 58*   PO2ART 62*   TYB8FKA 32*   BEART 4.7*   FIO2A 52   G5BSRTLH 91*   OXYHEM 90.3*   CARBOXHGBART 0.6*   METHGBART 0.3   TEMP 98.1           Objective:   Blood pressure 127/76, pulse 73, temperature 98.2 °F (36.8 °C), temperature source Oral, resp. rate 23, height 1.727 m (5' 8\"), weight 106.6 kg (235 lb), SpO2 94 %.  Temp (24hrs), Av.2 °F (36.8 °C), Min:98 °F (36.7 °C), Max:98.4 °F (36.9 °C)    XR CHEST 1 VIEW   Final Result      Worsening RIGHT upper lung pneumonia.            XR CHEST PORTABLE   Final Result      New mixed interstitial 
  Worsening RIGHT upper lung pneumonia.            XR CHEST PORTABLE   Final Result      New mixed interstitial and alveolar airspace disease, concerning for pneumonia.   Recommend follow-up chest radiograph to underlying nodules            Data Review:   Current Facility-Administered Medications   Medication Dose Route Frequency    amiodarone (NEXTERONE) 360 mg in dextrose 5% 200 ml  0.5 mg/min IntraVENous Continuous    sodium chloride flush 0.9 % injection 5-40 mL  5-40 mL IntraVENous 2 times per day    sodium chloride flush 0.9 % injection 5-40 mL  5-40 mL IntraVENous PRN    0.9 % sodium chloride infusion   IntraVENous PRN    ondansetron (ZOFRAN-ODT) disintegrating tablet 4 mg  4 mg Oral Q8H PRN    Or    ondansetron (ZOFRAN) injection 4 mg  4 mg IntraVENous Q6H PRN    polyethylene glycol (GLYCOLAX) packet 17 g  17 g Oral Daily PRN    enoxaparin Sodium (LOVENOX) injection 30 mg  30 mg SubCUTAneous BID    acetaminophen (TYLENOL) tablet 650 mg  650 mg Oral Q6H PRN    Or    acetaminophen (TYLENOL) suppository 650 mg  650 mg Rectal Q6H PRN    predniSONE (DELTASONE) tablet 40 mg  40 mg Oral Daily    cefTRIAXone (ROCEPHIN) 2,000 mg in sterile water 20 mL IV syringe  2,000 mg IntraVENous Q24H    ALPRAZolam (XANAX) tablet 0.5 mg  0.5 mg Oral Nightly PRN    ipratropium 0.5 mg-albuterol 2.5 mg (DUONEB) nebulizer solution 1 Dose  1 Dose Nebulization Q4H WA RT      Exam:      This is a middle-aged and obese  female who is currently mildly short of breath.  She is alert and oriented x 3  Head normocephalic and atraumatic, pupils are round responsive light sclera icteric conjunctiva pink  Neck is supple.  Thyroid not enlarged no carotid bruit JVD is absent  Chest: Bilateral rhonchi audible, trace expiratory wheezing  Heart: S1-S2 normal  Abdomen: Soft, nontender, no visceromegaly  Extremities: No edema, sinus or clubbing  Neuro: No focal motor deficit    Impression:   This is a middle-aged obese female with known 
0.5 mg-albuterol 2.5 mg (DUONEB) nebulizer solution 1 Dose  1 Dose Nebulization Q4H WA RT           Problem List:   Acute hypoxic respiratory failure and exacerbation of COPD.  Possible right upper lobe pneumonia.  Tobacco use.  Severe exogenous obesity.  Patient may have had a episode of ventricular tachycardia however I did not find the rhythm strip and could be artifact also.       Assessment/Plan:   I will check echocardiogram.  Will follow pulmonary recommendations.  Thank you.           [x]       High complexity decision making was performed in this patient at high risk for decompensation with multiple organ involvement.    Jake Burnett MD                 
injection 30 mg  30 mg SubCUTAneous BID    acetaminophen (TYLENOL) tablet 650 mg  650 mg Oral Q6H PRN    Or    acetaminophen (TYLENOL) suppository 650 mg  650 mg Rectal Q6H PRN    ALPRAZolam (XANAX) tablet 0.5 mg  0.5 mg Oral Nightly PRN           Problem List:   Acute hypoxic respiratory failure and exacerbation of COPD.  Possible right upper lobe pneumonia.  Tobacco use.  Severe exogenous obesity.  Patient may have had a episode of ventricular tachycardia however I did not find the rhythm strip and could be artifact also.       Assessment/Plan:    Will follow pulmonary recommendations.  Thank you.           [x]       High complexity decision making was performed in this patient at high risk for decompensation with multiple organ involvement.    Jake Burnett MD                 
tachycardia   Required shock in the setting of severe respiratory distress   Amiodarone drip was initiated, switched to PO on 1/6/23 with plans to continue amiodarone 400 mg BID until 1/12/23 to achieve a load of ~6 g followed by 200 mg daily maintenance   Will need outpatient follow up with cardiology to determine long term need      Concern for DAVID   Outpatient PSG     Obese   Counseled, will need outpatient follow up         Code status: Full     Social determinants of health: Concerns about insurance coverage       Estimated discharge date//time frame/disposition: Home once O2 is provided if needed     Barriers to discharge: O2 need       Pratik Bansal MD

## 2024-11-12 ENCOUNTER — HOSPITAL ENCOUNTER (INPATIENT)
Facility: HOSPITAL | Age: 49
LOS: 59 days | Discharge: HOME | End: 2025-01-10
Attending: EMERGENCY MEDICINE | Admitting: STUDENT IN AN ORGANIZED HEALTH CARE EDUCATION/TRAINING PROGRAM
Payer: MEDICAID

## 2024-11-12 ENCOUNTER — APPOINTMENT (OUTPATIENT)
Facility: HOSPITAL | Age: 49
End: 2024-11-12
Payer: MEDICAID

## 2024-11-12 DIAGNOSIS — J96.02 ACUTE RESPIRATORY FAILURE WITH HYPOXIA AND HYPERCAPNIA: ICD-10-CM

## 2024-11-12 DIAGNOSIS — I10 ESSENTIAL HYPERTENSION: ICD-10-CM

## 2024-11-12 DIAGNOSIS — N17.9 AKI (ACUTE KIDNEY INJURY) (HCC): ICD-10-CM

## 2024-11-12 DIAGNOSIS — J96.01 ACUTE RESPIRATORY FAILURE WITH HYPOXIA AND HYPERCAPNIA: ICD-10-CM

## 2024-11-12 DIAGNOSIS — R09.02 HYPOXIA: ICD-10-CM

## 2024-11-12 DIAGNOSIS — J96.01 ACUTE RESPIRATORY FAILURE WITH HYPOXIA: ICD-10-CM

## 2024-11-12 DIAGNOSIS — J44.1 COPD WITH ACUTE EXACERBATION (HCC): ICD-10-CM

## 2024-11-12 DIAGNOSIS — R07.9 CHEST PAIN, UNSPECIFIED TYPE: ICD-10-CM

## 2024-11-12 DIAGNOSIS — I50.811 ACUTE RIGHT-SIDED CONGESTIVE HEART FAILURE (HCC): Primary | ICD-10-CM

## 2024-11-12 PROBLEM — I47.10 SVT (SUPRAVENTRICULAR TACHYCARDIA) (HCC): Status: ACTIVE | Noted: 2024-11-12

## 2024-11-12 LAB
ANION GAP SERPL CALC-SCNC: 6 MMOL/L (ref 2–12)
BASOPHILS # BLD: 0.1 K/UL (ref 0–0.1)
BASOPHILS NFR BLD: 1 % (ref 0–1)
BNP SERPL-MCNC: 294 PG/ML
BUN SERPL-MCNC: 15 MG/DL (ref 6–20)
BUN/CREAT SERPL: 18 (ref 12–20)
CA-I BLD-MCNC: 8.8 MG/DL (ref 8.5–10.1)
CHLORIDE SERPL-SCNC: 108 MMOL/L (ref 97–108)
CO2 SERPL-SCNC: 25 MMOL/L (ref 21–32)
CREAT SERPL-MCNC: 0.85 MG/DL (ref 0.55–1.02)
DIFFERENTIAL METHOD BLD: ABNORMAL
EOSINOPHIL # BLD: 0.2 K/UL (ref 0–0.4)
EOSINOPHIL NFR BLD: 1 % (ref 0–7)
ERYTHROCYTE [DISTWIDTH] IN BLOOD BY AUTOMATED COUNT: 13.8 % (ref 11.5–14.5)
GLUCOSE SERPL-MCNC: 168 MG/DL (ref 65–100)
HCT VFR BLD AUTO: 44.4 % (ref 35–47)
HGB BLD-MCNC: 13.9 G/DL (ref 11.5–16)
IMM GRANULOCYTES # BLD AUTO: 0 K/UL (ref 0–0.04)
IMM GRANULOCYTES NFR BLD AUTO: 0 % (ref 0–0.5)
LYMPHOCYTES # BLD: 1.8 K/UL (ref 0.8–3.5)
LYMPHOCYTES NFR BLD: 15 % (ref 12–49)
MCH RBC QN AUTO: 28.7 PG (ref 26–34)
MCHC RBC AUTO-ENTMCNC: 31.3 G/DL (ref 30–36.5)
MCV RBC AUTO: 91.7 FL (ref 80–99)
MONOCYTES # BLD: 1.2 K/UL (ref 0–1)
MONOCYTES NFR BLD: 9 % (ref 5–13)
NEUTS SEG # BLD: 9.1 K/UL (ref 1.8–8)
NEUTS SEG NFR BLD: 74 % (ref 32–75)
NRBC # BLD: 0 K/UL (ref 0–0.01)
NRBC BLD-RTO: 0 PER 100 WBC
PLATELET # BLD AUTO: 316 K/UL (ref 150–400)
PMV BLD AUTO: 11.8 FL (ref 8.9–12.9)
POTASSIUM SERPL-SCNC: 4.2 MMOL/L (ref 3.5–5.1)
RBC # BLD AUTO: 4.84 M/UL (ref 3.8–5.2)
SODIUM SERPL-SCNC: 139 MMOL/L (ref 136–145)
TROPONIN I SERPL HS-MCNC: 100 NG/L (ref 0–51)
TROPONIN I SERPL HS-MCNC: 144 NG/L (ref 0–51)
TROPONIN I SERPL HS-MCNC: 67 NG/L (ref 0–51)
WBC # BLD AUTO: 12.4 K/UL (ref 3.6–11)

## 2024-11-12 PROCEDURE — 2000000000 HC ICU R&B

## 2024-11-12 PROCEDURE — 94660 CPAP INITIATION&MGMT: CPT

## 2024-11-12 PROCEDURE — 6360000002 HC RX W HCPCS

## 2024-11-12 PROCEDURE — 2700000000 HC OXYGEN THERAPY PER DAY

## 2024-11-12 PROCEDURE — 84484 ASSAY OF TROPONIN QUANT: CPT

## 2024-11-12 PROCEDURE — 5A09357 ASSISTANCE WITH RESPIRATORY VENTILATION, LESS THAN 24 CONSECUTIVE HOURS, CONTINUOUS POSITIVE AIRWAY PRESSURE: ICD-10-PCS | Performed by: EMERGENCY MEDICINE

## 2024-11-12 PROCEDURE — 99291 CRITICAL CARE FIRST HOUR: CPT

## 2024-11-12 PROCEDURE — 6370000000 HC RX 637 (ALT 250 FOR IP)

## 2024-11-12 PROCEDURE — 96375 TX/PRO/DX INJ NEW DRUG ADDON: CPT

## 2024-11-12 PROCEDURE — 85025 COMPLETE CBC W/AUTO DIFF WBC: CPT

## 2024-11-12 PROCEDURE — 83880 ASSAY OF NATRIURETIC PEPTIDE: CPT

## 2024-11-12 PROCEDURE — 96365 THER/PROPH/DIAG IV INF INIT: CPT

## 2024-11-12 PROCEDURE — 2580000003 HC RX 258: Performed by: NURSE PRACTITIONER

## 2024-11-12 PROCEDURE — 93005 ELECTROCARDIOGRAM TRACING: CPT | Performed by: EMERGENCY MEDICINE

## 2024-11-12 PROCEDURE — 71045 X-RAY EXAM CHEST 1 VIEW: CPT

## 2024-11-12 PROCEDURE — 94761 N-INVAS EAR/PLS OXIMETRY MLT: CPT

## 2024-11-12 PROCEDURE — 6360000002 HC RX W HCPCS: Performed by: EMERGENCY MEDICINE

## 2024-11-12 PROCEDURE — 94640 AIRWAY INHALATION TREATMENT: CPT

## 2024-11-12 PROCEDURE — 80048 BASIC METABOLIC PNL TOTAL CA: CPT

## 2024-11-12 PROCEDURE — 92960 CARDIOVERSION ELECTRIC EXT: CPT

## 2024-11-12 RX ORDER — ONDANSETRON 4 MG/1
4 TABLET, ORALLY DISINTEGRATING ORAL EVERY 8 HOURS PRN
Status: DISCONTINUED | OUTPATIENT
Start: 2024-11-12 | End: 2024-11-26

## 2024-11-12 RX ORDER — POTASSIUM CHLORIDE 29.8 MG/ML
20 INJECTION INTRAVENOUS PRN
Status: DISCONTINUED | OUTPATIENT
Start: 2024-11-12 | End: 2024-11-16

## 2024-11-12 RX ORDER — SODIUM CHLORIDE 0.9 % (FLUSH) 0.9 %
5-40 SYRINGE (ML) INJECTION EVERY 12 HOURS SCHEDULED
Status: DISCONTINUED | OUTPATIENT
Start: 2024-11-12 | End: 2025-01-10 | Stop reason: HOSPADM

## 2024-11-12 RX ORDER — NITROGLYCERIN 0.4 MG/1
0.4 TABLET SUBLINGUAL EVERY 5 MIN PRN
Status: DISCONTINUED | OUTPATIENT
Start: 2024-11-12 | End: 2024-12-08

## 2024-11-12 RX ORDER — ENOXAPARIN SODIUM 100 MG/ML
40 INJECTION SUBCUTANEOUS DAILY
Status: DISCONTINUED | OUTPATIENT
Start: 2024-11-13 | End: 2024-11-13

## 2024-11-12 RX ORDER — NITROGLYCERIN 20 MG/100ML
5-200 INJECTION INTRAVENOUS CONTINUOUS
Status: DISCONTINUED | OUTPATIENT
Start: 2024-11-12 | End: 2024-11-13

## 2024-11-12 RX ORDER — FUROSEMIDE 10 MG/ML
60 INJECTION INTRAMUSCULAR; INTRAVENOUS
Status: COMPLETED | OUTPATIENT
Start: 2024-11-12 | End: 2024-11-12

## 2024-11-12 RX ORDER — POTASSIUM CHLORIDE 7.45 MG/ML
10 INJECTION INTRAVENOUS PRN
Status: DISCONTINUED | OUTPATIENT
Start: 2024-11-12 | End: 2024-11-16

## 2024-11-12 RX ORDER — ALBUTEROL SULFATE 5 MG/ML
1.25 SOLUTION RESPIRATORY (INHALATION)
Status: COMPLETED | OUTPATIENT
Start: 2024-11-12 | End: 2024-11-12

## 2024-11-12 RX ORDER — IPRATROPIUM BROMIDE AND ALBUTEROL SULFATE 2.5; .5 MG/3ML; MG/3ML
1 SOLUTION RESPIRATORY (INHALATION)
Status: DISCONTINUED | OUTPATIENT
Start: 2024-11-13 | End: 2024-11-14

## 2024-11-12 RX ORDER — NITROGLYCERIN 20 MG/100ML
5-200 INJECTION INTRAVENOUS CONTINUOUS
Status: DISCONTINUED | OUTPATIENT
Start: 2024-11-12 | End: 2024-11-12

## 2024-11-12 RX ORDER — MIDAZOLAM HYDROCHLORIDE 5 MG/ML
INJECTION, SOLUTION INTRAMUSCULAR; INTRAVENOUS
Status: COMPLETED
Start: 2024-11-12 | End: 2024-11-12

## 2024-11-12 RX ORDER — ACETAMINOPHEN 325 MG/1
650 TABLET ORAL EVERY 6 HOURS PRN
Status: DISCONTINUED | OUTPATIENT
Start: 2024-11-12 | End: 2024-11-26

## 2024-11-12 RX ORDER — MAGNESIUM SULFATE IN WATER 40 MG/ML
2000 INJECTION, SOLUTION INTRAVENOUS PRN
Status: DISCONTINUED | OUTPATIENT
Start: 2024-11-12 | End: 2024-11-26

## 2024-11-12 RX ORDER — POLYETHYLENE GLYCOL 3350 17 G/17G
17 POWDER, FOR SOLUTION ORAL DAILY PRN
Status: DISCONTINUED | OUTPATIENT
Start: 2024-11-12 | End: 2024-11-26

## 2024-11-12 RX ORDER — ACETAMINOPHEN 650 MG/1
650 SUPPOSITORY RECTAL EVERY 6 HOURS PRN
Status: DISCONTINUED | OUTPATIENT
Start: 2024-11-12 | End: 2024-11-26

## 2024-11-12 RX ORDER — NITROGLYCERIN 0.4 MG/1
TABLET SUBLINGUAL
Status: COMPLETED
Start: 2024-11-12 | End: 2024-11-12

## 2024-11-12 RX ORDER — ONDANSETRON 2 MG/ML
4 INJECTION INTRAMUSCULAR; INTRAVENOUS EVERY 6 HOURS PRN
Status: DISCONTINUED | OUTPATIENT
Start: 2024-11-12 | End: 2024-11-26

## 2024-11-12 RX ORDER — MIDAZOLAM HYDROCHLORIDE 5 MG/ML
5 INJECTION, SOLUTION INTRAMUSCULAR; INTRAVENOUS ONCE
Status: COMPLETED | OUTPATIENT
Start: 2024-11-12 | End: 2024-11-12

## 2024-11-12 RX ORDER — SODIUM CHLORIDE 9 MG/ML
INJECTION, SOLUTION INTRAVENOUS PRN
Status: DISCONTINUED | OUTPATIENT
Start: 2024-11-12 | End: 2024-11-29

## 2024-11-12 RX ORDER — SODIUM CHLORIDE 0.9 % (FLUSH) 0.9 %
5-40 SYRINGE (ML) INJECTION PRN
Status: DISCONTINUED | OUTPATIENT
Start: 2024-11-12 | End: 2025-01-10 | Stop reason: HOSPADM

## 2024-11-12 RX ADMIN — MIDAZOLAM 5 MG: 5 INJECTION INTRAMUSCULAR; INTRAVENOUS at 19:15

## 2024-11-12 RX ADMIN — SODIUM CHLORIDE, PRESERVATIVE FREE 10 ML: 5 INJECTION INTRAVENOUS at 22:33

## 2024-11-12 RX ADMIN — NITROGLYCERIN 1 INCH: 20 OINTMENT TOPICAL at 20:50

## 2024-11-12 RX ADMIN — NITROGLYCERIN 5 MCG/MIN: 20 INJECTION INTRAVENOUS at 21:38

## 2024-11-12 RX ADMIN — NITROGLYCERIN 0.4 MG: 0.4 TABLET SUBLINGUAL at 20:49

## 2024-11-12 RX ADMIN — FUROSEMIDE 60 MG: 10 INJECTION, SOLUTION INTRAMUSCULAR; INTRAVENOUS at 22:29

## 2024-11-12 RX ADMIN — Medication 0.4 MG: at 20:49

## 2024-11-12 RX ADMIN — ALBUTEROL SULFATE 1.25 MG: 2.5 SOLUTION RESPIRATORY (INHALATION) at 22:25

## 2024-11-12 RX ADMIN — NITROGLYCERIN 5 MCG/MIN: 20 INJECTION INTRAVENOUS at 21:45

## 2024-11-12 RX ADMIN — MIDAZOLAM HYDROCHLORIDE 5 MG: 5 INJECTION, SOLUTION INTRAMUSCULAR; INTRAVENOUS at 19:15

## 2024-11-12 NOTE — ED TRIAGE NOTES
Pt c/o SOB with hx asthma and COPD. Pt reports trying at home inhaler with no relief. EKG in triage shows HR 190s and possible VTACH. Pt brought back to C2 and provider came to bedside.

## 2024-11-13 ENCOUNTER — APPOINTMENT (OUTPATIENT)
Facility: HOSPITAL | Age: 49
End: 2024-11-13
Payer: MEDICAID

## 2024-11-13 ENCOUNTER — APPOINTMENT (OUTPATIENT)
Facility: HOSPITAL | Age: 49
End: 2024-11-13
Attending: STUDENT IN AN ORGANIZED HEALTH CARE EDUCATION/TRAINING PROGRAM
Payer: MEDICAID

## 2024-11-13 LAB
ANION GAP SERPL CALC-SCNC: 7 MMOL/L (ref 2–12)
APTT PPP: 26.6 SEC (ref 21.2–34.1)
ARTERIAL PATENCY WRIST A: YES
BASE DEFICIT BLDA-SCNC: 1.1 MMOL/L
BASE DEFICIT BLDA-SCNC: 4.6 MMOL/L
BASE EXCESS BLDA CALC-SCNC: 2.2 MMOL/L (ref 0–3)
BASOPHILS # BLD: 0 K/UL (ref 0–0.1)
BASOPHILS NFR BLD: 0 % (ref 0–1)
BDY SITE: ABNORMAL
BODY TEMPERATURE: 96.4
BODY TEMPERATURE: 97.9
BUN SERPL-MCNC: 19 MG/DL (ref 6–20)
BUN/CREAT SERPL: 15 (ref 12–20)
CA-I BLD-MCNC: 8.5 MG/DL (ref 8.5–10.1)
CHLORIDE SERPL-SCNC: 104 MMOL/L (ref 97–108)
CO2 SERPL-SCNC: 28 MMOL/L (ref 21–32)
COHGB MFR BLD: 0.1 % (ref 1–2)
COHGB MFR BLD: 0.2 % (ref 1–2)
COHGB MFR BLD: 0.3 % (ref 1–2)
CREAT SERPL-MCNC: 1.23 MG/DL (ref 0.55–1.02)
DIFFERENTIAL METHOD BLD: ABNORMAL
ECHO AO ASC DIAM: 2.5 CM
ECHO AO ASCENDING AORTA INDEX: 1.11 CM/M2
ECHO AO ROOT DIAM: 3.2 CM
ECHO AO ROOT INDEX: 1.42 CM/M2
ECHO AV MEAN GRADIENT: 8 MMHG
ECHO AV MEAN VELOCITY: 1.3 M/S
ECHO AV PEAK GRADIENT: 13 MMHG
ECHO AV PEAK VELOCITY: 1.8 M/S
ECHO AV VELOCITY RATIO: 0.39
ECHO AV VTI: 30.9 CM
ECHO BSA: 2.33 M2
ECHO BSA: 2.33 M2
ECHO LA DIAMETER INDEX: 1.56 CM/M2
ECHO LA DIAMETER: 3.5 CM
ECHO LA TO AORTIC ROOT RATIO: 1.09
ECHO LV E' LATERAL VELOCITY: 11 CM/S
ECHO LV E' SEPTAL VELOCITY: 12.8 CM/S
ECHO LV EDV A2C: 30 ML
ECHO LV EDV NDEX A2C: 13 ML/M2
ECHO LV EF PHYSICIAN: 60 %
ECHO LV EJECTION FRACTION A2C: 75 %
ECHO LV ESV A2C: 8 ML
ECHO LV ESV INDEX A2C: 4 ML/M2
ECHO LV FRACTIONAL SHORTENING: 32 % (ref 28–44)
ECHO LV INTERNAL DIMENSION DIASTOLE INDEX: 2.22 CM/M2
ECHO LV INTERNAL DIMENSION DIASTOLIC: 5 CM (ref 3.9–5.3)
ECHO LV INTERNAL DIMENSION SYSTOLIC INDEX: 1.51 CM/M2
ECHO LV INTERNAL DIMENSION SYSTOLIC: 3.4 CM
ECHO LV IVSD: 1.2 CM (ref 0.6–0.9)
ECHO LV MASS 2D: 247.6 G (ref 67–162)
ECHO LV MASS INDEX 2D: 110 G/M2 (ref 43–95)
ECHO LV POSTERIOR WALL DIASTOLIC: 1.3 CM (ref 0.6–0.9)
ECHO LV RELATIVE WALL THICKNESS RATIO: 0.52
ECHO LVOT AV VTI INDEX: 0.36
ECHO LVOT MEAN GRADIENT: 1 MMHG
ECHO LVOT PEAK GRADIENT: 2 MMHG
ECHO LVOT PEAK VELOCITY: 0.7 M/S
ECHO LVOT VTI: 11.2 CM
ECHO MV A VELOCITY: 0.78 M/S
ECHO MV E VELOCITY: 0.76 M/S
ECHO MV E/A RATIO: 0.97
ECHO MV E/E' LATERAL: 6.91
ECHO MV E/E' RATIO (AVERAGED): 6.42
ECHO MV E/E' SEPTAL: 5.94
ECHO MV REGURGITANT PEAK GRADIENT: 5 MMHG
ECHO MV REGURGITANT PEAK VELOCITY: 1.1 M/S
ECHO PV MAX VELOCITY: 0.8 M/S
ECHO PV MEAN GRADIENT: 2 MMHG
ECHO PV MEAN VELOCITY: 0.6 M/S
ECHO PV PEAK GRADIENT: 2 MMHG
ECHO PV VTI: 13.8 CM
ECHO RV BASAL DIMENSION: 3.7 CM
ECHO RV FREE WALL PEAK S': 18.6 CM/S
ECHO RV MID DIMENSION: 3.2 CM
ECHO TV REGURGITANT MAX VELOCITY: 1.2 M/S
ECHO TV REGURGITANT PEAK GRADIENT: 6 MMHG
EKG ATRIAL RATE: 115 BPM
EKG ATRIAL RATE: 227 BPM
EKG ATRIAL RATE: 62 BPM
EKG DIAGNOSIS: NORMAL
EKG P AXIS: 16 DEGREES
EKG P AXIS: 71 DEGREES
EKG P-R INTERVAL: 130 MS
EKG P-R INTERVAL: 168 MS
EKG Q-T INTERVAL: 264 MS
EKG Q-T INTERVAL: 326 MS
EKG Q-T INTERVAL: 446 MS
EKG QRS DURATION: 166 MS
EKG QRS DURATION: 94 MS
EKG QRS DURATION: 96 MS
EKG QTC CALCULATION (BAZETT): 450 MS
EKG QTC CALCULATION (BAZETT): 452 MS
EKG QTC CALCULATION (BAZETT): 480 MS
EKG R AXIS: 235 DEGREES
EKG R AXIS: 65 DEGREES
EKG R AXIS: 77 DEGREES
EKG T AXIS: -5 DEGREES
EKG T AXIS: 60 DEGREES
EKG T AXIS: 69 DEGREES
EKG VENTRICULAR RATE: 115 BPM
EKG VENTRICULAR RATE: 199 BPM
EKG VENTRICULAR RATE: 62 BPM
EOSINOPHIL # BLD: 0 K/UL (ref 0–0.4)
EOSINOPHIL NFR BLD: 0 % (ref 0–7)
ERYTHROCYTE [DISTWIDTH] IN BLOOD BY AUTOMATED COUNT: 13.9 % (ref 11.5–14.5)
FIO2 ON VENT: 100 %
FIO2 ON VENT: 50 %
FIO2 ON VENT: 75 %
GAS FLOW.O2 O2 DELIVERY SYS: 50 L/MIN
GAS FLOW.O2 SETTING OXYMISER: 14
GAS FLOW.O2 SETTING OXYMISER: 22
GLUCOSE BLD STRIP.AUTO-MCNC: 102 MG/DL (ref 65–100)
GLUCOSE BLD STRIP.AUTO-MCNC: 121 MG/DL (ref 65–100)
GLUCOSE BLD STRIP.AUTO-MCNC: 138 MG/DL (ref 65–100)
GLUCOSE BLD STRIP.AUTO-MCNC: 148 MG/DL (ref 65–100)
GLUCOSE BLD STRIP.AUTO-MCNC: 82 MG/DL (ref 65–100)
GLUCOSE BLD STRIP.AUTO-MCNC: 92 MG/DL (ref 65–100)
GLUCOSE SERPL-MCNC: 129 MG/DL (ref 65–100)
HCO3 BLDA-SCNC: 25 MMOL/L (ref 22–26)
HCO3 BLDA-SCNC: 25 MMOL/L (ref 22–26)
HCO3 BLDA-SCNC: 29 MMOL/L (ref 22–26)
HCT VFR BLD AUTO: 41.1 % (ref 35–47)
HGB BLD-MCNC: 12.8 G/DL (ref 11.5–16)
IMM GRANULOCYTES # BLD AUTO: 0 K/UL (ref 0–0.04)
IMM GRANULOCYTES NFR BLD AUTO: 0 % (ref 0–0.5)
INR PPP: 1 (ref 0.9–1.1)
LYMPHOCYTES # BLD: 0.3 K/UL (ref 0.8–3.5)
LYMPHOCYTES NFR BLD: 3 % (ref 12–49)
MCH RBC QN AUTO: 28.7 PG (ref 26–34)
MCHC RBC AUTO-ENTMCNC: 31.1 G/DL (ref 30–36.5)
MCV RBC AUTO: 92.2 FL (ref 80–99)
METHGB MFR BLD: 0.3 % (ref 0–1.4)
METHGB MFR BLD: 0.3 % (ref 0–1.4)
METHGB MFR BLD: 0.6 % (ref 0–1.4)
MONOCYTES # BLD: 0.4 K/UL (ref 0–1)
MONOCYTES NFR BLD: 4 % (ref 5–13)
MRSA DNA SPEC QL NAA+PROBE: NOT DETECTED
NEUTS SEG # BLD: 10 K/UL (ref 1.8–8)
NEUTS SEG NFR BLD: 93 % (ref 32–75)
NRBC # BLD: 0 K/UL (ref 0–0.01)
NRBC BLD-RTO: 0 PER 100 WBC
OXYHGB MFR BLD: 93.1 % (ref 95–99)
OXYHGB MFR BLD: 96.8 % (ref 95–99)
OXYHGB MFR BLD: 98.8 % (ref 95–99)
PCO2 BLDA: 45 MMHG (ref 35–45)
PCO2 BLDA: 54 MMHG (ref 35–45)
PCO2 BLDA: 64 MMHG (ref 35–45)
PEEP RESPIRATORY: 5
PEEP RESPIRATORY: 8
PERFORMED BY:: ABNORMAL
PERFORMED BY:: NORMAL
PERFORMED BY:: NORMAL
PH BLDA: 7.21 (ref 7.35–7.45)
PH BLDA: 7.35 (ref 7.35–7.45)
PH BLDA: 7.36 (ref 7.35–7.45)
PLATELET # BLD AUTO: 262 K/UL (ref 150–400)
PMV BLD AUTO: 11.9 FL (ref 8.9–12.9)
PO2 BLDA: 404 MMHG (ref 80–100)
PO2 BLDA: 69 MMHG (ref 80–100)
PO2 BLDA: 95 MMHG (ref 80–100)
POTASSIUM SERPL-SCNC: 4.3 MMOL/L (ref 3.5–5.1)
PROTHROMBIN TIME: 13 SEC (ref 11.9–14.6)
RBC # BLD AUTO: 4.46 M/UL (ref 3.8–5.2)
SAO2 % BLD: 100 % (ref 95–99)
SAO2 % BLD: 94 % (ref 95–99)
SAO2 % BLD: 97 % (ref 95–99)
SAO2% DEVICE SAO2% SENSOR NAME: ABNORMAL
SERVICE CMNT-IMP: ABNORMAL
SODIUM SERPL-SCNC: 139 MMOL/L (ref 136–145)
SPECIMEN SITE: ABNORMAL
THERAPEUTIC RANGE: NORMAL SEC (ref 82–109)
TROPONIN I SERPL HS-MCNC: 248 NG/L (ref 0–51)
UFH PPP CHRO-ACNC: 0.18 IU/ML
VT SETTING VENT: 450
WBC # BLD AUTO: 10.8 K/UL (ref 3.6–11)

## 2024-11-13 PROCEDURE — 84484 ASSAY OF TROPONIN QUANT: CPT

## 2024-11-13 PROCEDURE — 93970 EXTREMITY STUDY: CPT

## 2024-11-13 PROCEDURE — 6360000002 HC RX W HCPCS: Performed by: NURSE PRACTITIONER

## 2024-11-13 PROCEDURE — 94002 VENT MGMT INPAT INIT DAY: CPT

## 2024-11-13 PROCEDURE — 94640 AIRWAY INHALATION TREATMENT: CPT

## 2024-11-13 PROCEDURE — 6360000004 HC RX CONTRAST MEDICATION

## 2024-11-13 PROCEDURE — 5A1955Z RESPIRATORY VENTILATION, GREATER THAN 96 CONSECUTIVE HOURS: ICD-10-PCS | Performed by: STUDENT IN AN ORGANIZED HEALTH CARE EDUCATION/TRAINING PROGRAM

## 2024-11-13 PROCEDURE — 85730 THROMBOPLASTIN TIME PARTIAL: CPT

## 2024-11-13 PROCEDURE — 6360000002 HC RX W HCPCS: Performed by: STUDENT IN AN ORGANIZED HEALTH CARE EDUCATION/TRAINING PROGRAM

## 2024-11-13 PROCEDURE — 93005 ELECTROCARDIOGRAM TRACING: CPT | Performed by: STUDENT IN AN ORGANIZED HEALTH CARE EDUCATION/TRAINING PROGRAM

## 2024-11-13 PROCEDURE — 6370000000 HC RX 637 (ALT 250 FOR IP): Performed by: NURSE PRACTITIONER

## 2024-11-13 PROCEDURE — 85610 PROTHROMBIN TIME: CPT

## 2024-11-13 PROCEDURE — 87641 MR-STAPH DNA AMP PROBE: CPT

## 2024-11-13 PROCEDURE — 82803 BLOOD GASES ANY COMBINATION: CPT

## 2024-11-13 PROCEDURE — 6360000004 HC RX CONTRAST MEDICATION: Performed by: STUDENT IN AN ORGANIZED HEALTH CARE EDUCATION/TRAINING PROGRAM

## 2024-11-13 PROCEDURE — 2000000000 HC ICU R&B

## 2024-11-13 PROCEDURE — 2700000000 HC OXYGEN THERAPY PER DAY

## 2024-11-13 PROCEDURE — 2500000003 HC RX 250 WO HCPCS

## 2024-11-13 PROCEDURE — 36600 WITHDRAWAL OF ARTERIAL BLOOD: CPT

## 2024-11-13 PROCEDURE — 80048 BASIC METABOLIC PNL TOTAL CA: CPT

## 2024-11-13 PROCEDURE — 2500000003 HC RX 250 WO HCPCS: Performed by: NURSE PRACTITIONER

## 2024-11-13 PROCEDURE — 2580000003 HC RX 258: Performed by: NURSE PRACTITIONER

## 2024-11-13 PROCEDURE — 85520 HEPARIN ASSAY: CPT

## 2024-11-13 PROCEDURE — 2500000003 HC RX 250 WO HCPCS: Performed by: STUDENT IN AN ORGANIZED HEALTH CARE EDUCATION/TRAINING PROGRAM

## 2024-11-13 PROCEDURE — 71045 X-RAY EXAM CHEST 1 VIEW: CPT

## 2024-11-13 PROCEDURE — 85025 COMPLETE CBC W/AUTO DIFF WBC: CPT

## 2024-11-13 PROCEDURE — 6360000002 HC RX W HCPCS

## 2024-11-13 PROCEDURE — C8929 TTE W OR WO FOL WCON,DOPPLER: HCPCS

## 2024-11-13 PROCEDURE — 0BH17EZ INSERTION OF ENDOTRACHEAL AIRWAY INTO TRACHEA, VIA NATURAL OR ARTIFICIAL OPENING: ICD-10-PCS | Performed by: STUDENT IN AN ORGANIZED HEALTH CARE EDUCATION/TRAINING PROGRAM

## 2024-11-13 PROCEDURE — 02HV33Z INSERTION OF INFUSION DEVICE INTO SUPERIOR VENA CAVA, PERCUTANEOUS APPROACH: ICD-10-PCS | Performed by: STUDENT IN AN ORGANIZED HEALTH CARE EDUCATION/TRAINING PROGRAM

## 2024-11-13 PROCEDURE — 71275 CT ANGIOGRAPHY CHEST: CPT

## 2024-11-13 PROCEDURE — 6370000000 HC RX 637 (ALT 250 FOR IP): Performed by: STUDENT IN AN ORGANIZED HEALTH CARE EDUCATION/TRAINING PROGRAM

## 2024-11-13 PROCEDURE — 2580000003 HC RX 258: Performed by: STUDENT IN AN ORGANIZED HEALTH CARE EDUCATION/TRAINING PROGRAM

## 2024-11-13 PROCEDURE — 6370000000 HC RX 637 (ALT 250 FOR IP)

## 2024-11-13 PROCEDURE — 94761 N-INVAS EAR/PLS OXIMETRY MLT: CPT

## 2024-11-13 PROCEDURE — 82962 GLUCOSE BLOOD TEST: CPT

## 2024-11-13 RX ORDER — HEPARIN SODIUM 10000 [USP'U]/100ML
5-30 INJECTION, SOLUTION INTRAVENOUS CONTINUOUS
Status: DISCONTINUED | OUTPATIENT
Start: 2024-11-13 | End: 2024-11-15

## 2024-11-13 RX ORDER — GLUCAGON 1 MG/ML
1 KIT INJECTION PRN
Status: DISCONTINUED | OUTPATIENT
Start: 2024-11-13 | End: 2025-01-10 | Stop reason: HOSPADM

## 2024-11-13 RX ORDER — ROCURONIUM BROMIDE 10 MG/ML
INJECTION, SOLUTION INTRAVENOUS
Status: DISPENSED
Start: 2024-11-13 | End: 2024-11-13

## 2024-11-13 RX ORDER — FUROSEMIDE 10 MG/ML
20 INJECTION INTRAMUSCULAR; INTRAVENOUS ONCE
Status: COMPLETED | OUTPATIENT
Start: 2024-11-13 | End: 2024-11-13

## 2024-11-13 RX ORDER — NOREPINEPHRINE BITARTRATE 0.06 MG/ML
1-100 INJECTION, SOLUTION INTRAVENOUS CONTINUOUS
Status: DISCONTINUED | OUTPATIENT
Start: 2024-11-13 | End: 2024-11-13 | Stop reason: SDUPTHER

## 2024-11-13 RX ORDER — ETOMIDATE 2 MG/ML
INJECTION INTRAVENOUS
Status: COMPLETED | OUTPATIENT
Start: 2024-11-13 | End: 2024-11-13

## 2024-11-13 RX ORDER — CLOPIDOGREL 300 MG/1
600 TABLET, FILM COATED ORAL ONCE
Status: COMPLETED | OUTPATIENT
Start: 2024-11-13 | End: 2024-11-13

## 2024-11-13 RX ORDER — MAGNESIUM SULFATE IN WATER 40 MG/ML
2000 INJECTION, SOLUTION INTRAVENOUS ONCE
Status: COMPLETED | OUTPATIENT
Start: 2024-11-13 | End: 2024-11-13

## 2024-11-13 RX ORDER — ASPIRIN 81 MG/1
81 TABLET ORAL DAILY
Status: DISCONTINUED | OUTPATIENT
Start: 2024-11-13 | End: 2024-11-28

## 2024-11-13 RX ORDER — FENTANYL CITRATE-0.9 % NACL/PF 10 MCG/ML
25-200 PLASTIC BAG, INJECTION (ML) INTRAVENOUS CONTINUOUS
Status: DISCONTINUED | OUTPATIENT
Start: 2024-11-13 | End: 2024-11-24

## 2024-11-13 RX ORDER — DEXTROSE MONOHYDRATE 100 MG/ML
INJECTION, SOLUTION INTRAVENOUS CONTINUOUS PRN
Status: DISCONTINUED | OUTPATIENT
Start: 2024-11-13 | End: 2025-01-10 | Stop reason: HOSPADM

## 2024-11-13 RX ORDER — DEXMEDETOMIDINE HYDROCHLORIDE 4 UG/ML
.1-1.5 INJECTION, SOLUTION INTRAVENOUS CONTINUOUS
Status: DISCONTINUED | OUTPATIENT
Start: 2024-11-13 | End: 2024-11-13

## 2024-11-13 RX ORDER — METOPROLOL TARTRATE 1 MG/ML
5 INJECTION, SOLUTION INTRAVENOUS EVERY 6 HOURS PRN
Status: DISCONTINUED | OUTPATIENT
Start: 2024-11-13 | End: 2024-12-08

## 2024-11-13 RX ORDER — HEPARIN SODIUM 1000 [USP'U]/ML
4000 INJECTION, SOLUTION INTRAVENOUS; SUBCUTANEOUS PRN
Status: DISCONTINUED | OUTPATIENT
Start: 2024-11-13 | End: 2024-11-15

## 2024-11-13 RX ORDER — ETOMIDATE 2 MG/ML
INJECTION INTRAVENOUS
Status: COMPLETED
Start: 2024-11-13 | End: 2024-11-13

## 2024-11-13 RX ORDER — HEPARIN SODIUM 1000 [USP'U]/ML
2000 INJECTION, SOLUTION INTRAVENOUS; SUBCUTANEOUS PRN
Status: DISCONTINUED | OUTPATIENT
Start: 2024-11-13 | End: 2024-11-15

## 2024-11-13 RX ORDER — PROPOFOL 10 MG/ML
5-50 INJECTION, EMULSION INTRAVENOUS CONTINUOUS
Status: DISCONTINUED | OUTPATIENT
Start: 2024-11-13 | End: 2024-11-13

## 2024-11-13 RX ORDER — ALBUTEROL SULFATE 2.5 MG/.5ML
2.5 SOLUTION RESPIRATORY (INHALATION)
Status: DISCONTINUED | OUTPATIENT
Start: 2024-11-13 | End: 2024-11-16

## 2024-11-13 RX ORDER — MIDAZOLAM HYDROCHLORIDE 2 MG/2ML
INJECTION, SOLUTION INTRAMUSCULAR; INTRAVENOUS
Status: COMPLETED | OUTPATIENT
Start: 2024-11-13 | End: 2024-11-13

## 2024-11-13 RX ORDER — PROPOFOL 10 MG/ML
5-50 INJECTION, EMULSION INTRAVENOUS
Status: DISCONTINUED | OUTPATIENT
Start: 2024-11-13 | End: 2024-11-13

## 2024-11-13 RX ORDER — PROPOFOL 10 MG/ML
5-50 INJECTION, EMULSION INTRAVENOUS CONTINUOUS
Status: DISCONTINUED | OUTPATIENT
Start: 2024-11-13 | End: 2024-11-24

## 2024-11-13 RX ORDER — HEPARIN SODIUM 1000 [USP'U]/ML
4000 INJECTION, SOLUTION INTRAVENOUS; SUBCUTANEOUS ONCE
Status: DISCONTINUED | OUTPATIENT
Start: 2024-11-13 | End: 2024-11-13

## 2024-11-13 RX ORDER — ATORVASTATIN CALCIUM 40 MG/1
40 TABLET, FILM COATED ORAL NIGHTLY
Status: DISCONTINUED | OUTPATIENT
Start: 2024-11-13 | End: 2024-12-12

## 2024-11-13 RX ORDER — IOPAMIDOL 755 MG/ML
100 INJECTION, SOLUTION INTRAVASCULAR
Status: COMPLETED | OUTPATIENT
Start: 2024-11-13 | End: 2024-11-13

## 2024-11-13 RX ORDER — METHYLPREDNISOLONE SODIUM SUCCINATE 40 MG/ML
40 INJECTION INTRAMUSCULAR; INTRAVENOUS EVERY 8 HOURS
Status: DISCONTINUED | OUTPATIENT
Start: 2024-11-13 | End: 2024-11-20

## 2024-11-13 RX ORDER — INSULIN LISPRO 100 [IU]/ML
0-8 INJECTION, SOLUTION INTRAVENOUS; SUBCUTANEOUS
Status: DISCONTINUED | OUTPATIENT
Start: 2024-11-13 | End: 2024-12-09

## 2024-11-13 RX ORDER — MIDAZOLAM HYDROCHLORIDE 1 MG/ML
INJECTION, SOLUTION INTRAMUSCULAR; INTRAVENOUS
Status: COMPLETED
Start: 2024-11-13 | End: 2024-11-13

## 2024-11-13 RX ORDER — NOREPINEPHRINE BITARTRATE 0.06 MG/ML
1-100 INJECTION, SOLUTION INTRAVENOUS CONTINUOUS PRN
Status: DISCONTINUED | OUTPATIENT
Start: 2024-11-13 | End: 2024-11-16

## 2024-11-13 RX ORDER — HEPARIN SODIUM 10000 [USP'U]/100ML
5-30 INJECTION, SOLUTION INTRAVENOUS CONTINUOUS
Status: DISCONTINUED | OUTPATIENT
Start: 2024-11-13 | End: 2024-11-13

## 2024-11-13 RX ADMIN — MIDAZOLAM HYDROCHLORIDE 2 MG: 1 INJECTION, SOLUTION INTRAMUSCULAR; INTRAVENOUS at 03:06

## 2024-11-13 RX ADMIN — Medication 100 MCG/HR: at 21:21

## 2024-11-13 RX ADMIN — PROPOFOL 50 MCG/KG/MIN: 10 INJECTION, EMULSION INTRAVENOUS at 06:17

## 2024-11-13 RX ADMIN — PERFLUTREN 2 ML: 6.52 INJECTION, SUSPENSION INTRAVENOUS at 12:17

## 2024-11-13 RX ADMIN — IOPAMIDOL 100 ML: 755 INJECTION, SOLUTION INTRAVENOUS at 16:13

## 2024-11-13 RX ADMIN — Medication 5 MCG/MIN: at 10:27

## 2024-11-13 RX ADMIN — PROPOFOL 30 MCG/KG/MIN: 10 INJECTION, EMULSION INTRAVENOUS at 03:39

## 2024-11-13 RX ADMIN — IPRATROPIUM BROMIDE AND ALBUTEROL SULFATE 1 DOSE: .5; 2.5 SOLUTION RESPIRATORY (INHALATION) at 19:42

## 2024-11-13 RX ADMIN — ATORVASTATIN CALCIUM 40 MG: 40 TABLET, FILM COATED ORAL at 20:16

## 2024-11-13 RX ADMIN — PROPOFOL 40 MCG/KG/MIN: 10 INJECTION, EMULSION INTRAVENOUS at 22:21

## 2024-11-13 RX ADMIN — CLOPIDOGREL BISULFATE 600 MG: 300 TABLET, FILM COATED ORAL at 18:03

## 2024-11-13 RX ADMIN — SODIUM CHLORIDE, PRESERVATIVE FREE 10 ML: 5 INJECTION INTRAVENOUS at 09:13

## 2024-11-13 RX ADMIN — ONDANSETRON 4 MG: 2 INJECTION INTRAMUSCULAR; INTRAVENOUS at 02:10

## 2024-11-13 RX ADMIN — ETOMIDATE INJECTION: 2 SOLUTION INTRAVENOUS at 02:59

## 2024-11-13 RX ADMIN — IPRATROPIUM BROMIDE AND ALBUTEROL SULFATE 1 DOSE: .5; 2.5 SOLUTION RESPIRATORY (INHALATION) at 16:25

## 2024-11-13 RX ADMIN — IPRATROPIUM BROMIDE AND ALBUTEROL SULFATE 1 DOSE: .5; 2.5 SOLUTION RESPIRATORY (INHALATION) at 11:07

## 2024-11-13 RX ADMIN — WATER 60 MG: 1 INJECTION INTRAMUSCULAR; INTRAVENOUS; SUBCUTANEOUS at 09:10

## 2024-11-13 RX ADMIN — HEPARIN SODIUM 7 UNITS/KG/HR: 10000 INJECTION, SOLUTION INTRAVENOUS at 17:58

## 2024-11-13 RX ADMIN — AZITHROMYCIN DIHYDRATE 500 MG: 500 INJECTION, POWDER, LYOPHILIZED, FOR SOLUTION INTRAVENOUS at 01:21

## 2024-11-13 RX ADMIN — FUROSEMIDE 20 MG: 10 INJECTION, SOLUTION INTRAMUSCULAR; INTRAVENOUS at 01:04

## 2024-11-13 RX ADMIN — DEXMEDETOMIDINE HYDROCHLORIDE 0.2 MCG/KG/HR: 400 INJECTION, SOLUTION INTRAVENOUS at 01:48

## 2024-11-13 RX ADMIN — IPRATROPIUM BROMIDE AND ALBUTEROL SULFATE 1 DOSE: .5; 2.5 SOLUTION RESPIRATORY (INHALATION) at 07:42

## 2024-11-13 RX ADMIN — Medication 100 MCG/HR: at 13:02

## 2024-11-13 RX ADMIN — PROPOFOL 50 MCG/KG/MIN: 10 INJECTION, EMULSION INTRAVENOUS at 12:19

## 2024-11-13 RX ADMIN — ETOMIDATE INJECTION 20 MG: 2 SOLUTION INTRAVENOUS at 02:56

## 2024-11-13 RX ADMIN — METHYLPREDNISOLONE SODIUM SUCCINATE 40 MG: 40 INJECTION INTRAMUSCULAR; INTRAVENOUS at 20:55

## 2024-11-13 RX ADMIN — WATER 1000 MG: 1 INJECTION INTRAMUSCULAR; INTRAVENOUS; SUBCUTANEOUS at 01:19

## 2024-11-13 RX ADMIN — MIDAZOLAM 2 MG: 1 INJECTION INTRAMUSCULAR; INTRAVENOUS at 03:06

## 2024-11-13 RX ADMIN — ASPIRIN 81 MG: 81 TABLET, COATED ORAL at 18:03

## 2024-11-13 RX ADMIN — ENOXAPARIN SODIUM 40 MG: 100 INJECTION SUBCUTANEOUS at 09:12

## 2024-11-13 RX ADMIN — PROPOFOL 50 MCG/KG/MIN: 10 INJECTION, EMULSION INTRAVENOUS at 15:51

## 2024-11-13 RX ADMIN — SODIUM CHLORIDE, PRESERVATIVE FREE 10 ML: 5 INJECTION INTRAVENOUS at 20:13

## 2024-11-13 RX ADMIN — PROPOFOL 50 MCG/KG/MIN: 10 INJECTION, EMULSION INTRAVENOUS at 09:09

## 2024-11-13 RX ADMIN — MAGNESIUM SULFATE HEPTAHYDRATE 2000 MG: 40 INJECTION, SOLUTION INTRAVENOUS at 21:05

## 2024-11-13 RX ADMIN — METOPROLOL TARTRATE 5 MG: 5 INJECTION INTRAVENOUS at 01:06

## 2024-11-13 RX ADMIN — Medication 25 MCG/HR: at 03:11

## 2024-11-13 RX ADMIN — MIDAZOLAM HYDROCHLORIDE 2 MG: 1 INJECTION, SOLUTION INTRAMUSCULAR; INTRAVENOUS at 03:02

## 2024-11-13 ASSESSMENT — PULMONARY FUNCTION TESTS
PIF_VALUE: 33
PIF_VALUE: 27
PIF_VALUE: 31
PIF_VALUE: 29
PIF_VALUE: 25
PIF_VALUE: 28
PIF_VALUE: 29
PIF_VALUE: 33
PIF_VALUE: 26
PIF_VALUE: 26
PIF_VALUE: 27
PIF_VALUE: 26
PIF_VALUE: 33
PIF_VALUE: 28
PIF_VALUE: 30
PIF_VALUE: 31
PIF_VALUE: 29
PIF_VALUE: 26
PIF_VALUE: 33
PIF_VALUE: 29
PIF_VALUE: 33
PIF_VALUE: 31
PIF_VALUE: 33
PIF_VALUE: 33
PIF_VALUE: 50
PIF_VALUE: 29
PIF_VALUE: 31
PIF_VALUE: 27
PIF_VALUE: 26

## 2024-11-13 ASSESSMENT — PAIN SCALES - GENERAL
PAINLEVEL_OUTOF10: 0

## 2024-11-13 NOTE — CARE COORDINATION
Per IDR    Pt is on the vent, not able to get her medication for asthma. COPD acute exacerbation.     Pt does not have insurance, works at [employer].     Pt could not afford her medication.

## 2024-11-13 NOTE — H&P
CRITICAL CARE ADMISSION NOTE    Name: Kim Markham   : 1975   MRN: [id number]   Date: 2024      Diagnoses/problem list:   Asthma  COPD  Vtach    HPI   Kim Markham is a 49 y.o. female with known past medical history significant for asthma and COPD who treated her symptoms today with Primatene Mist over-the-counter says that she went into fast heart rate became short of breath immediately.  No other exacerbating or relieving factors which presents to the emergency room with no treatment prior to arrival.  Patient reports that she had a few day history of shortness of breath and thought she had a pneumonia. She has been taking OTC cough syrup and cough medicine as well as her inhaler. She reports difficulty affording medications and has limited access to healthcare.     ROS negative except as otherwise documented.    Assessment and plan:     NEUROLOGICAL:    Alert, oriented  -Patient denies any pain currently    PULMONOLOGY:   Acute hypoxemic respiratory failure  - maintain SpO2>=92, pH>=7.30  - HFNC as she did not tolerate BIPAP mask  - History of asthma and COPD, no previous intubations  - follow ABG and CXR  - Albuterol as needed    CARDIOVASCULAR:   -Ventricular tachycardia and started on a nitroglycerin infusion  -Was taking multiple puffs of her inhaler that is epinephrine based likely exacerbating her tachycardia  -Goal MAP>=65  -Trend troponin / lactate  -EKG PRN  -Metoprolol IV for tachycardia  -Currently on nitroglycerin infusion from the ED and will wean as tolerated for SBP<160 mmHG, HR<100    GASTROINTESTINAL:   Nutrition: NPO  Bowel regimen  -Colace 100mg po daily  -bisacodyl PRN  GI Px  -Pepcid 20mg IV      RENAL/ELECTROLYTE:   No acute renal issues  - goal K>=4, Mg>=2, Phos >3  - daily BMP  - strict I/O's; daily weights  - avoid nephrotoxic medications    ENDOCRINE:   -No known metabolic conditions  -Will add BS POC testing and ISS as glucose elevated on

## 2024-11-13 NOTE — PROCEDURES
PROCEDURE NOTE  Date: 11/13/2024   Name: Kim Markham  YOB: 1975    Intubation    Date/Time: 11/13/2024 3:18 AM    Performed by: Kelley Medina APRN - CNP  Authorized by: Kelley Medina APRN - CNP  Consent: Verbal consent obtained.  Risks and benefits: risks, benefits and alternatives were discussed  Consent given by: patient  Patient understanding: patient states understanding of the procedure being performed  Patient consent: the patient's understanding of the procedure matches consent given  Procedure consent: procedure consent matches procedure scheduled  Relevant documents: relevant documents present and verified  Test results: test results available and properly labeled  Site marked: the operative site was marked  Imaging studies: imaging studies available  Patient identity confirmed: verbally with patient, arm band and hospital-assigned identification number  Time out: Immediately prior to procedure a \"time out\" was called to verify the correct patient, procedure, equipment, support staff and site/side marked as required.  Indications: hypoxemia and respiratory distress  Intubation method: fiberoptic oral  Patient status: sedated  Preoxygenation: BVM  Sedatives: etomidate (Versed 2 mg; Etomidate 20)  Paralytic: none  Laryngoscope size: Jara 3  Tube size: 7.5 mm  Tube type: cuffed  Number of attempts: 1  Cricoid pressure: no  Cords visualized: yes  Post-procedure assessment: chest rise, ETCO2 monitor and CO2 detector  Breath sounds: equal and absent over the epigastrium  Cuff inflated: yes  ETT to lip: 23 cm  ETT to teeth: 22 cm  Tube secured with: ETT felipe  Chest x-ray interpreted by me.  Chest x-ray findings: endotracheal tube in appropriate position  Patient tolerance: patient tolerated the procedure well with no immediate complications

## 2024-11-13 NOTE — PROCEDURES
PROCEDURE NOTE  Date: 11/13/2024   Name: Kim Markham  YOB: 1975    CENTRAL LINE    Date/Time: 11/13/2024 1:27 PM    Performed by: Mee Elizabeth MD  Authorized by: Mee Elizabeth MD  Consent: The procedure was performed in an emergent situation. Verbal consent not obtained.  Required items: required blood products, implants, devices, and special equipment available  Patient identity confirmed: hospital-assigned identification number and arm band  Time out: Immediately prior to procedure a \"time out\" was called to verify the correct patient, procedure, equipment, support staff and site/side marked as required.  Indications: vascular access and central pressure monitoring  Anesthesia: local infiltration    Anesthesia:  Local Anesthetic: lidocaine 1% without epinephrine    Sedation:  Patient sedated: yes  Sedation type: moderate (conscious) sedation  Sedatives: see MAR for details  Analgesia: see MAR for details  Vitals: Vital signs were monitored during sedation.    Preparation: skin prepped with 2% chlorhexidine  Skin prep agent dried: skin prep agent completely dried prior to procedure  Sterile barriers: all five maximum sterile barriers used - cap, mask, sterile gown, sterile gloves, and large sterile sheet  Hand hygiene: hand hygiene performed prior to central venous catheter insertion  Location details: right internal jugular  Patient position: Trendelenburg  Catheter type: triple lumen  Catheter size: 8.5 Fr  Pre-procedure: landmarks identified  Ultrasound guidance: yes  Sterile ultrasound techniques: sterile gel and sterile probe covers were used  Number of attempts: 1  Successful placement: yes  Post-procedure: line sutured and dressing applied  Assessment: blood return through all ports, free fluid flow, placement verified by x-ray and no pneumothorax on x-ray  Patient tolerance: patient tolerated the procedure well with no immediate complications

## 2024-11-13 NOTE — ED PROVIDER NOTES
[facility] EMERGENCY DEPT  EMERGENCY DEPARTMENT HISTORY AND PHYSICAL EXAM      Date: 11/12/2024  Patient Name: Kim Markham  MRN: [id number]  Birthdate 1975  Date of evaluation: 11/12/2024  Provider: Ezra Triplett MD   Note Started: 7:13 PM EST 11/12/24    HISTORY OF PRESENT ILLNESS     Chief Complaint   Patient presents with    Shortness of Breath       History Provided By: Patient    HPI: Kim Markham is a 49 y.o. female with known past medical history significant for asthma and COPD who treated her symptoms today with Primatene Mist over-the-counter says that she went into fast heart rate became short of breath immediately.  No other exacerbating or relieving factors which presents to the emergency room with no treatment prior to arrival.  Patient is unable to provide any history given her shortness of breath.  Patient's heart rate is 207    PAST MEDICAL HISTORY   Past Medical History:  Past Medical History:   Diagnosis Date    Asthma     COPD (chronic obstructive pulmonary disease) (HCC)        Past Surgical History:  No past surgical history on file.    Family History:  No family history on file.    Social History:  Social History     Tobacco Use    Smoking status: Every Day     Current packs/day: 1.00     Types: Cigarettes   Vaping Use    Vaping status: Some Days       Allergies:  Allergies   Allergen Reactions    Latex        PCP: None, None    Current Meds:   Current Facility-Administered Medications   Medication Dose Route Frequency Provider Last Rate Last Admin    nitroGLYCERIN (NITROSTAT) SL tablet 0.4 mg  0.4 mg SubLINGual Q5 Min PRN Ezra Triplett MD   0.4 mg at 11/12/24 2049    nitroGLYCERIN 200 mcg/mL in dextrose 5%  5-200 mcg/min IntraVENous Continuous Ezra Triplett MD 1.5 mL/hr at 11/12/24 2145 5 mcg/min at 11/12/24 2145    furosemide (LASIX) injection 60 mg  60 mg IntraVENous NOW Ezra Triplett MD         Current Outpatient Medications   Medication Sig Dispense Refill

## 2024-11-13 NOTE — CONSULTS
CARDIOLOGY CONSULTATION    REASON FOR CONSULT: STEMI alert    REQUESTING PROVIDER: Dr. Elizabeth    CHIEF COMPLAINT:  Shortness of breath, palpitations     HISTORY OF PRESENT ILLNESS:  Kim Markham is a 49 y.o. year-old female with past medical history significant for asthma and COPD who was evaluated today due to STEMI alert. Patient initially presented to the ED yesterday evening with chief complaint of shortness of breath. Patient reported she was using OTC cough medicines and Primatene Mist with subsequent palpitations. Of note, patient was here January 2024 with respiratory distress, episode of SVT requiring shock and amiodarone infusion. Per medical records, patient has difficulty affording medications and has limited access to healthcare. Seen today in ICU, intubated and sedated. Telemetry changes noted today prompting 12 lead EKG. STEMI alert was called. Stat echo completed at the bedside. STEMI alert was cancelled. Will continue to follow     Records from hospital admission course thus far reviewed.      Telemetry reviewed.      INPATIENT MEDICATIONS:  Home medications reviewed.    Current Facility-Administered Medications:     metoprolol (LOPRESSOR) injection 5 mg, 5 mg, IntraVENous, Q6H PRN, Mee Elizabeth MD, 5 mg at 11/13/24 0106    azithromycin (ZITHROMAX) 500 mg in sodium chloride 0.9 % 250 mL IVPB ([id number]), 500 mg, IntraVENous, Q24H, Mee Elizabeth MD, Stopped at 11/13/24 0212    cefTRIAXone (ROCEPHIN) 1,000 mg in sterile water 10 mL IV syringe, 1,000 mg, IntraVENous, Q24H, Mee Elizabeth MD, 1,000 mg at 11/13/24 0119    methylPREDNISolone sodium succ (SOLU-MEDROL) 60 mg in sterile water 0.96 mL injection, 60 mg, IntraVENous, Daily, Mee Elizabeth MD, 60 mg at 11/13/24 0910    insulin lispro (HUMALOG,ADMELOG) injection vial 0-8 Units, 0-8 Units, SubCUTAneous, 4x Daily AC & HS, Mee Elizabeth MD    glucose chewable tablet 16 g, 4 tablet, Oral, PRN, Mee Elizabeth

## 2024-11-13 NOTE — CARE COORDINATION
11/13/24 1419   Service Assessment   Patient Orientation Other (see comment)  (Vent)   Cognition Other (see comment)  (Vent)   History Provided By Medical Record   Primary Caregiver Self   Support Systems Family Members   Patient's Healthcare Decision Maker is: Patient Declined (Legal Next of Kin Remains as Decision Maker)   PCP Verified by CM No   Prior Functional Level Independent in ADLs/IADLs   Current Functional Level Other (see comment)  (TBD)   Can patient return to prior living arrangement Unknown at present   Ability to make needs known: Unable   Family able to assist with home care needs: Other (comment)  (Unknown)   Financial Resources None   Community Resources None   Social/Functional History   Lives With Significant other     Pt is on the vent, attempted to call Pt boyfriend, it stated that the number has been disconnected or changed.    No HH, no DME and independent with ADL, Pt works full time.    Pt does not have insurance so she could not afford her medication for her Asthma.     CM will follow up with Pt once she is extubated.     Advance Care Planning     General Advance Care Planning (ACP) Conversation    Date of Conversation: 11/13/2024      Healthcare Decision Maker:   Primary Decision Maker: Erick Natarajan - [phone]       Content/Action Overview:    Reviewed DNR/DNI and patient elects Full Code (Attempt Resuscitation)

## 2024-11-13 NOTE — PROGRESS NOTES
Heparin Infusion Initiation  Kim Markham is a 49 y.o. female starting heparin for:  ACS  Heparin dosing: order for weight based protocol  Initial Dosing Weight: 130 kg (Recorded body weight)  Recent Labs     11/12/24  1903 11/13/24  1325 11/13/24  1351     --  262   HGB 13.9  --  12.8   INR  --  1.0  --    APTT  --   --  26.6     Factor Xa inhibitor/LMWH use within the past 72 hours? Yes - Enoxaparin  If yes, date and time of last administration: 11/13 0912  Hypertriglyceridemia (> 690 mg/dL) or hyperbilirubinemia (> 37 mg/dL conjugated bilirubin, >14 mg/dL unconjugated bilirubin) present? No  No results for input(s): \"BILITOT\" in the last 72 hours.    Invalid input(s): \"TRIGL\"    Assessment/Plan:   Heparin to be monitored using anti-Xa for duration of therapy. Will continue with anti-Xa for duration and titrate gtt to account for any lingering effect of the enoxaparin.  Initial bolus ordered: No  Starting rate:  7 unit/kg/hr  PRN boluses entered: Yes

## 2024-11-13 NOTE — PROGRESS NOTES
Spiritual Health History and Assessment/Progress Note  [facility]    Attempted Encounter, Crisis,  ,  ,      Name: Kim Markham MRN: [id number]    Age: 49 y.o.     Sex: female   Language: English   Worship: None   SVT (supraventricular tachycardia) (HCC)     Date: 11/13/2024            Total Time Calculated: 7 min              Spiritual Assessment began in SSR 2 CCU        Referral/Consult From: Nurse   Encounter Overview/Reason: Attempted Encounter, Crisis  Service Provided For: Patient not available (Pt intubated)    Susanne, Belief, Meaning:   Patient unable to assess at this time  Family/Friends No family/friends present      Importance and Influence:  Patient unable to assess at this time  Family/Friends No family/friends present    Community:  Patient Other: unable to assess  Family/Friends No family/friends present    Assessment and Plan of Care:     Patient Interventions include: Other: none, Pt intubated  Family/Friends Interventions include: No family/friends present    Patient Plan of Care: Spiritual Care available upon further referral  Family/Friends Plan of Care: No family/friends present    I responded to Code Stemi for the Pt, Ms. Kim Markham. Pt was intubated/unconscious and no family was present. Further spiritual support available upon referral.    Electronically signed by Axel Stone MDiv, Chaplain Resident on 11/13/2024 at 12:09 PM

## 2024-11-13 NOTE — PLAN OF CARE
Problem: Discharge Planning  Goal: Discharge to home or other facility with appropriate resources  Outcome: Not Progressing     Problem: Confusion  Goal: Confusion, delirium, dementia, or psychosis is improved or at baseline  Description: INTERVENTIONS:  1. Assess for possible contributors to thought disturbance, including medications, impaired vision or hearing, underlying metabolic abnormalities, dehydration, psychiatric diagnoses, and notify attending LIP  2. Avenue high risk fall precautions, as indicated  3. Provide frequent short contacts to provide reality reorientation, refocusing and direction  4. Decrease environmental stimuli, including noise as appropriate  5. Monitor and intervene to maintain adequate nutrition, hydration, elimination, sleep and activity  6. If unable to ensure safety without constant attention obtain sitter and review sitter guidelines with assigned personnel  7. Initiate Psychosocial CNS and Spiritual Care consult, as indicated  Outcome: Not Progressing     Problem: ABCDS Injury Assessment  Goal: Absence of physical injury  Outcome: Progressing     Problem: Safety - Adult  Goal: Free from fall injury  Outcome: Progressing     Problem: Discharge Planning  Goal: Discharge to home or other facility with appropriate resources  Outcome: Not Progressing     Problem: Confusion  Goal: Confusion, delirium, dementia, or psychosis is improved or at baseline  Description: INTERVENTIONS:  1. Assess for possible contributors to thought disturbance, including medications, impaired vision or hearing, underlying metabolic abnormalities, dehydration, psychiatric diagnoses, and notify attending LIP  2. Avenue high risk fall precautions, as indicated  3. Provide frequent short contacts to provide reality reorientation, refocusing and direction  4. Decrease environmental stimuli, including noise as appropriate  5. Monitor and intervene to maintain adequate nutrition, hydration, elimination, sleep  and activity  6. If unable to ensure safety without constant attention obtain sitter and review sitter guidelines with assigned personnel  7. Initiate Psychosocial CNS and Spiritual Care consult, as indicated  Outcome: Not Progressing

## 2024-11-14 ENCOUNTER — APPOINTMENT (OUTPATIENT)
Facility: HOSPITAL | Age: 49
End: 2024-11-14
Payer: MEDICAID

## 2024-11-14 LAB
ANION GAP SERPL CALC-SCNC: 6 MMOL/L (ref 2–12)
APTT PPP: 29.9 SEC (ref 21.2–34.1)
APTT PPP: 40.3 SEC (ref 21.2–34.1)
ARTERIAL PATENCY WRIST A: YES
BASE EXCESS BLDA CALC-SCNC: 5.1 MMOL/L (ref 0–3)
BDY SITE: ABNORMAL
BUN SERPL-MCNC: 27 MG/DL (ref 6–20)
BUN/CREAT SERPL: 27 (ref 12–20)
CA-I BLD-MCNC: 8.8 MG/DL (ref 8.5–10.1)
CHLORIDE SERPL-SCNC: 103 MMOL/L (ref 97–108)
CO2 SERPL-SCNC: 29 MMOL/L (ref 21–32)
COHGB MFR BLD: 0.3 % (ref 1–2)
CREAT SERPL-MCNC: 0.99 MG/DL (ref 0.55–1.02)
EKG ATRIAL RATE: 192 BPM
EKG DIAGNOSIS: NORMAL
EKG Q-T INTERVAL: 328 MS
EKG QRS DURATION: 92 MS
EKG QTC CALCULATION (BAZETT): 519 MS
EKG R AXIS: 91 DEGREES
EKG T AXIS: 53 DEGREES
EKG VENTRICULAR RATE: 151 BPM
ERYTHROCYTE [DISTWIDTH] IN BLOOD BY AUTOMATED COUNT: 13.9 % (ref 11.5–14.5)
FIO2 ON VENT: 55 %
FLUAV RNA SPEC QL NAA+PROBE: NOT DETECTED
FLUBV RNA SPEC QL NAA+PROBE: NOT DETECTED
GAS FLOW.O2 SETTING OXYMISER: 18
GLUCOSE BLD STRIP.AUTO-MCNC: 125 MG/DL (ref 65–100)
GLUCOSE BLD STRIP.AUTO-MCNC: 134 MG/DL (ref 65–100)
GLUCOSE BLD STRIP.AUTO-MCNC: 134 MG/DL (ref 65–100)
GLUCOSE BLD STRIP.AUTO-MCNC: 142 MG/DL (ref 65–100)
GLUCOSE SERPL-MCNC: 162 MG/DL (ref 65–100)
HCO3 BLDA-SCNC: 29 MMOL/L (ref 22–26)
HCT VFR BLD AUTO: 38.8 % (ref 35–47)
HGB BLD-MCNC: 12.5 G/DL (ref 11.5–16)
MAGNESIUM SERPL-MCNC: 2.8 MG/DL (ref 1.6–2.4)
MCH RBC QN AUTO: 28.9 PG (ref 26–34)
MCHC RBC AUTO-ENTMCNC: 32.2 G/DL (ref 30–36.5)
MCV RBC AUTO: 89.8 FL (ref 80–99)
METHGB MFR BLD: 0.3 % (ref 0–1.4)
NRBC # BLD: 0 K/UL (ref 0–0.01)
NRBC BLD-RTO: 0 PER 100 WBC
OXYHGB MFR BLD: 92.7 % (ref 95–99)
PCO2 BLDA: 39 MMHG (ref 35–45)
PEEP RESPIRATORY: 10
PERFORMED BY:: ABNORMAL
PH BLDA: 7.49 (ref 7.35–7.45)
PHOSPHATE SERPL-MCNC: 3.3 MG/DL (ref 2.6–4.7)
PLATELET # BLD AUTO: 285 K/UL (ref 150–400)
PMV BLD AUTO: 11.7 FL (ref 8.9–12.9)
PO2 BLDA: 65 MMHG (ref 80–100)
POTASSIUM SERPL-SCNC: 3.8 MMOL/L (ref 3.5–5.1)
RBC # BLD AUTO: 4.32 M/UL (ref 3.8–5.2)
SAO2 % BLD: 93 % (ref 95–99)
SAO2% DEVICE SAO2% SENSOR NAME: ABNORMAL
SARS-COV-2 RNA RESP QL NAA+PROBE: NOT DETECTED
SODIUM SERPL-SCNC: 138 MMOL/L (ref 136–145)
SPECIMEN SITE: ABNORMAL
THERAPEUTIC RANGE: ABNORMAL SEC (ref 82–109)
THERAPEUTIC RANGE: NORMAL SEC (ref 82–109)
TROPONIN I SERPL HS-MCNC: 88 NG/L (ref 0–51)
UFH PPP CHRO-ACNC: 0.19 IU/ML
UFH PPP CHRO-ACNC: 0.28 IU/ML
UFH PPP CHRO-ACNC: 0.35 IU/ML
VENTILATION MODE VENT: ABNORMAL
VT SETTING VENT: 500
WBC # BLD AUTO: 11.5 K/UL (ref 3.6–11)

## 2024-11-14 PROCEDURE — 51798 US URINE CAPACITY MEASURE: CPT

## 2024-11-14 PROCEDURE — 6360000002 HC RX W HCPCS: Performed by: NURSE PRACTITIONER

## 2024-11-14 PROCEDURE — 6370000000 HC RX 637 (ALT 250 FOR IP): Performed by: NURSE PRACTITIONER

## 2024-11-14 PROCEDURE — 80048 BASIC METABOLIC PNL TOTAL CA: CPT

## 2024-11-14 PROCEDURE — 6370000000 HC RX 637 (ALT 250 FOR IP): Performed by: STUDENT IN AN ORGANIZED HEALTH CARE EDUCATION/TRAINING PROGRAM

## 2024-11-14 PROCEDURE — 6360000002 HC RX W HCPCS: Performed by: STUDENT IN AN ORGANIZED HEALTH CARE EDUCATION/TRAINING PROGRAM

## 2024-11-14 PROCEDURE — 94003 VENT MGMT INPAT SUBQ DAY: CPT

## 2024-11-14 PROCEDURE — 87636 SARSCOV2 & INF A&B AMP PRB: CPT

## 2024-11-14 PROCEDURE — 2700000000 HC OXYGEN THERAPY PER DAY

## 2024-11-14 PROCEDURE — 94640 AIRWAY INHALATION TREATMENT: CPT

## 2024-11-14 PROCEDURE — 84484 ASSAY OF TROPONIN QUANT: CPT

## 2024-11-14 PROCEDURE — 85027 COMPLETE CBC AUTOMATED: CPT

## 2024-11-14 PROCEDURE — 2500000003 HC RX 250 WO HCPCS: Performed by: STUDENT IN AN ORGANIZED HEALTH CARE EDUCATION/TRAINING PROGRAM

## 2024-11-14 PROCEDURE — 3E033XZ INTRODUCTION OF VASOPRESSOR INTO PERIPHERAL VEIN, PERCUTANEOUS APPROACH: ICD-10-PCS | Performed by: STUDENT IN AN ORGANIZED HEALTH CARE EDUCATION/TRAINING PROGRAM

## 2024-11-14 PROCEDURE — 94761 N-INVAS EAR/PLS OXIMETRY MLT: CPT

## 2024-11-14 PROCEDURE — 82962 GLUCOSE BLOOD TEST: CPT

## 2024-11-14 PROCEDURE — 2580000003 HC RX 258: Performed by: STUDENT IN AN ORGANIZED HEALTH CARE EDUCATION/TRAINING PROGRAM

## 2024-11-14 PROCEDURE — 84100 ASSAY OF PHOSPHORUS: CPT

## 2024-11-14 PROCEDURE — 6370000000 HC RX 637 (ALT 250 FOR IP)

## 2024-11-14 PROCEDURE — 85520 HEPARIN ASSAY: CPT

## 2024-11-14 PROCEDURE — 36415 COLL VENOUS BLD VENIPUNCTURE: CPT

## 2024-11-14 PROCEDURE — 71045 X-RAY EXAM CHEST 1 VIEW: CPT

## 2024-11-14 PROCEDURE — 85730 THROMBOPLASTIN TIME PARTIAL: CPT

## 2024-11-14 PROCEDURE — 36600 WITHDRAWAL OF ARTERIAL BLOOD: CPT

## 2024-11-14 PROCEDURE — 2000000000 HC ICU R&B

## 2024-11-14 PROCEDURE — 93005 ELECTROCARDIOGRAM TRACING: CPT | Performed by: STUDENT IN AN ORGANIZED HEALTH CARE EDUCATION/TRAINING PROGRAM

## 2024-11-14 PROCEDURE — 83735 ASSAY OF MAGNESIUM: CPT

## 2024-11-14 PROCEDURE — 2500000003 HC RX 250 WO HCPCS: Performed by: NURSE PRACTITIONER

## 2024-11-14 PROCEDURE — 2580000003 HC RX 258: Performed by: NURSE PRACTITIONER

## 2024-11-14 PROCEDURE — 82803 BLOOD GASES ANY COMBINATION: CPT

## 2024-11-14 RX ORDER — ACETYLCYSTEINE 200 MG/ML
600 SOLUTION ORAL; RESPIRATORY (INHALATION)
Status: DISCONTINUED | OUTPATIENT
Start: 2024-11-14 | End: 2024-11-22

## 2024-11-14 RX ORDER — FUROSEMIDE 10 MG/ML
80 INJECTION INTRAMUSCULAR; INTRAVENOUS ONCE
Status: COMPLETED | OUTPATIENT
Start: 2024-11-14 | End: 2024-11-14

## 2024-11-14 RX ORDER — METOPROLOL TARTRATE 1 MG/ML
2.5 INJECTION, SOLUTION INTRAVENOUS ONCE
Status: COMPLETED | OUTPATIENT
Start: 2024-11-14 | End: 2024-11-14

## 2024-11-14 RX ORDER — IPRATROPIUM BROMIDE AND ALBUTEROL SULFATE 2.5; .5 MG/3ML; MG/3ML
1 SOLUTION RESPIRATORY (INHALATION)
Status: DISCONTINUED | OUTPATIENT
Start: 2024-11-14 | End: 2024-11-17

## 2024-11-14 RX ORDER — LORAZEPAM 1 MG/1
1 TABLET ORAL EVERY 8 HOURS
Status: DISCONTINUED | OUTPATIENT
Start: 2024-11-14 | End: 2024-11-16

## 2024-11-14 RX ORDER — POLYETHYLENE GLYCOL 3350 17 G/17G
17 POWDER, FOR SOLUTION ORAL DAILY
Status: DISCONTINUED | OUTPATIENT
Start: 2024-11-14 | End: 2024-12-24

## 2024-11-14 RX ADMIN — PROPOFOL 45 MCG/KG/MIN: 10 INJECTION, EMULSION INTRAVENOUS at 05:17

## 2024-11-14 RX ADMIN — AMIODARONE HYDROCHLORIDE 0.5 MG/MIN: 1.8 INJECTION, SOLUTION INTRAVENOUS at 23:44

## 2024-11-14 RX ADMIN — ACETYLCYSTEINE 600 MG: 200 INHALANT RESPIRATORY (INHALATION) at 19:24

## 2024-11-14 RX ADMIN — AMIODARONE HYDROCHLORIDE 150 MG: 1.5 INJECTION, SOLUTION INTRAVENOUS at 13:34

## 2024-11-14 RX ADMIN — AMIODARONE HYDROCHLORIDE 150 MG: 1.5 INJECTION, SOLUTION INTRAVENOUS at 12:34

## 2024-11-14 RX ADMIN — METHYLPREDNISOLONE SODIUM SUCCINATE 40 MG: 40 INJECTION INTRAMUSCULAR; INTRAVENOUS at 20:21

## 2024-11-14 RX ADMIN — HEPARIN SODIUM 2000 UNITS: 1000 INJECTION INTRAVENOUS; SUBCUTANEOUS at 12:43

## 2024-11-14 RX ADMIN — METHYLPREDNISOLONE SODIUM SUCCINATE 40 MG: 40 INJECTION INTRAMUSCULAR; INTRAVENOUS at 05:06

## 2024-11-14 RX ADMIN — FUROSEMIDE 80 MG: 10 INJECTION, SOLUTION INTRAMUSCULAR; INTRAVENOUS at 05:06

## 2024-11-14 RX ADMIN — HEPARIN SODIUM 11 UNITS/KG/HR: 10000 INJECTION, SOLUTION INTRAVENOUS at 16:53

## 2024-11-14 RX ADMIN — PROPOFOL 45 MCG/KG/MIN: 10 INJECTION, EMULSION INTRAVENOUS at 21:27

## 2024-11-14 RX ADMIN — IPRATROPIUM BROMIDE AND ALBUTEROL SULFATE 1 DOSE: .5; 2.5 SOLUTION RESPIRATORY (INHALATION) at 15:47

## 2024-11-14 RX ADMIN — AZITHROMYCIN DIHYDRATE 500 MG: 500 INJECTION, POWDER, LYOPHILIZED, FOR SOLUTION INTRAVENOUS at 00:33

## 2024-11-14 RX ADMIN — SODIUM CHLORIDE, PRESERVATIVE FREE 10 ML: 5 INJECTION INTRAVENOUS at 08:25

## 2024-11-14 RX ADMIN — IPRATROPIUM BROMIDE AND ALBUTEROL SULFATE 1 DOSE: .5; 2.5 SOLUTION RESPIRATORY (INHALATION) at 11:14

## 2024-11-14 RX ADMIN — IPRATROPIUM BROMIDE AND ALBUTEROL SULFATE 1 DOSE: 2.5; .5 SOLUTION RESPIRATORY (INHALATION) at 19:24

## 2024-11-14 RX ADMIN — METOPROLOL TARTRATE 2.5 MG: 5 INJECTION INTRAVENOUS at 14:09

## 2024-11-14 RX ADMIN — METHYLPREDNISOLONE SODIUM SUCCINATE 40 MG: 40 INJECTION INTRAMUSCULAR; INTRAVENOUS at 12:28

## 2024-11-14 RX ADMIN — ATORVASTATIN CALCIUM 40 MG: 40 TABLET, FILM COATED ORAL at 20:21

## 2024-11-14 RX ADMIN — LORAZEPAM 1 MG: 1 TABLET ORAL at 20:21

## 2024-11-14 RX ADMIN — IPRATROPIUM BROMIDE AND ALBUTEROL SULFATE 1 DOSE: 2.5; .5 SOLUTION RESPIRATORY (INHALATION) at 23:39

## 2024-11-14 RX ADMIN — PROPOFOL 45 MCG/KG/MIN: 10 INJECTION, EMULSION INTRAVENOUS at 16:24

## 2024-11-14 RX ADMIN — PROPOFOL 45 MCG/KG/MIN: 10 INJECTION, EMULSION INTRAVENOUS at 07:46

## 2024-11-14 RX ADMIN — Medication 100 MCG/HR: at 07:45

## 2024-11-14 RX ADMIN — SODIUM CHLORIDE, PRESERVATIVE FREE 10 ML: 5 INJECTION INTRAVENOUS at 20:21

## 2024-11-14 RX ADMIN — PROPOFOL 50 MCG/KG/MIN: 10 INJECTION, EMULSION INTRAVENOUS at 13:42

## 2024-11-14 RX ADMIN — PROPOFOL 45 MCG/KG/MIN: 10 INJECTION, EMULSION INTRAVENOUS at 01:41

## 2024-11-14 RX ADMIN — AMIODARONE HYDROCHLORIDE 1 MG/MIN: 1.8 INJECTION, SOLUTION INTRAVENOUS at 13:18

## 2024-11-14 RX ADMIN — LORAZEPAM 1 MG: 1 TABLET ORAL at 12:28

## 2024-11-14 RX ADMIN — POLYETHYLENE GLYCOL 3350 17 G: 17 POWDER, FOR SOLUTION ORAL at 14:09

## 2024-11-14 RX ADMIN — WATER 1000 MG: 1 INJECTION INTRAMUSCULAR; INTRAVENOUS; SUBCUTANEOUS at 00:34

## 2024-11-14 RX ADMIN — Medication 125 MCG/HR: at 15:39

## 2024-11-14 RX ADMIN — ASPIRIN 81 MG: 81 TABLET, COATED ORAL at 08:25

## 2024-11-14 RX ADMIN — PROPOFOL 45 MCG/KG/MIN: 10 INJECTION, EMULSION INTRAVENOUS at 18:55

## 2024-11-14 RX ADMIN — PROPOFOL 50 MCG/KG/MIN: 10 INJECTION, EMULSION INTRAVENOUS at 10:37

## 2024-11-14 RX ADMIN — IPRATROPIUM BROMIDE AND ALBUTEROL SULFATE 1 DOSE: .5; 2.5 SOLUTION RESPIRATORY (INHALATION) at 07:58

## 2024-11-14 RX ADMIN — HEPARIN SODIUM 2000 UNITS: 1000 INJECTION INTRAVENOUS; SUBCUTANEOUS at 02:45

## 2024-11-14 ASSESSMENT — PULMONARY FUNCTION TESTS
PIF_VALUE: 27
PIF_VALUE: 28
PIF_VALUE: 27
PIF_VALUE: 30
PIF_VALUE: 28
PIF_VALUE: 30
PIF_VALUE: 28
PIF_VALUE: 29
PIF_VALUE: 29
PIF_VALUE: 27
PIF_VALUE: 31
PIF_VALUE: 27
PIF_VALUE: 29
PIF_VALUE: 28
PIF_VALUE: 26
PIF_VALUE: 29
PIF_VALUE: 31
PIF_VALUE: 26
PIF_VALUE: 29
PIF_VALUE: 29
PIF_VALUE: 26
PIF_VALUE: 26
PIF_VALUE: 28
PIF_VALUE: 29
PIF_VALUE: 30
PIF_VALUE: 27
PIF_VALUE: 26
PIF_VALUE: 26
PIF_VALUE: 27
PIF_VALUE: 28
PIF_VALUE: 25
PIF_VALUE: 29
PIF_VALUE: 29
PIF_VALUE: 26
PIF_VALUE: 27
PIF_VALUE: 29
PIF_VALUE: 27
PIF_VALUE: 28
PIF_VALUE: 29
PIF_VALUE: 28
PIF_VALUE: 27
PIF_VALUE: 28
PIF_VALUE: 30
PIF_VALUE: 28
PIF_VALUE: 24
PIF_VALUE: 24

## 2024-11-14 ASSESSMENT — PAIN SCALES - GENERAL: PAINLEVEL_OUTOF10: 0

## 2024-11-14 NOTE — PROGRESS NOTES
Comprehensive Nutrition Assessment    Type and Reason for Visit:  Initial (Vent)    Nutrition Recommendations/Plan:   NPO, advance as medically appropriate.    When medically appropriate, start TF of Promote 1.0 [place](Other TF) via NGT at 25 mL/hr, advance by 10 mL/hr every 6 hrs as tolerated to goal rate of 55 mL/hr.  Flush with 120 mL H2O q4hrs.  Provides: 1320 kcal(63%), 83 gm protein(65%), 1816 mL H2O(86%).                      +1030 kcal (111%)  from Propofol at 39 mL/hr.    Monitor and record TF rate, flushes, tolerance, BM in I/Os.     Malnutrition Assessment:  Malnutrition Status:  No malnutrition (11/14/24 1115)    Context:  Acute Illness     Findings of the 6 clinical characteristics of malnutrition:  Energy Intake:  No decrease in energy intake  Weight Loss:  No weight loss     Body Fat Loss:  No body fat loss     Muscle Mass Loss:  No muscle mass loss    Fluid Accumulation:  No fluid accumulation     Strength:  Not Performed    Nutrition Assessment:    Admitted for SVT. Pt intubated in early a.m. of 11/13 in CCU. Brief nutrition assessment done- insufficient info gathered. (11/14) Pt remains vented, now off levo, sedated on propofol and fentanyl. NGT in place, hemo stable for EN initiation; however, will hold off for 24-36 hrs. Report of plan for SBT today. No noted h/o wt loss nor poor intakes PTA. RD to provide regimen for when medically able to start TF. Labs: BUN 27, , Mg 2.8, POC . Meds: Propofol, fentanyl, heparin, amiodarone, solu-medrol, ativan, duoneb, rocephin, aspirin, levophed PRN x3.    Nutrition Related Findings:    NFPE w/o acute findings. No N/V/D/C reported. No dysphagia hx. Last BM PTA; on bowel regimen. Trace generalized edema. Wound Type: None       Current Nutrition Intake & Therapies:    Average Meal Intake: NPO  Average Supplements Intake: NPO  Diet NPO    Anthropometric Measures:  Height: 172.7 cm (5' 8\")  Ideal Body Weight (IBW): 140 lbs (64 kg)    Admission Body

## 2024-11-14 NOTE — PROGRESS NOTES
CRITICAL CARE ADMISSION NOTE    Name: Kim Markham   : 1975   MRN: [id number]   Date: 2024      Diagnoses/problem list:   Asthma  COPD  Vtach    HPI   Kim Markham is a 49 y.o. female with known past medical history significant for asthma and COPD who treated her symptoms today with Primatene Mist over-the-counter says that she went into fast heart rate became short of breath immediately.  No other exacerbating or relieving factors which presents to the emergency room with no treatment prior to arrival.  Patient reports that she had a few day history of shortness of breath and thought she had a pneumonia. She has been taking OTC cough syrup and cough medicine as well as her inhaler. She reports difficulty affording medications and has limited access to healthcare.     : Continues to have high vent requirements and high sedation requirements. SVT noted and amiodarone initiated.    ROS negative except as otherwise documented.    Assessment and plan:     NEUROLOGICAL:    -Intubated, sedated  -sedation with fentanyl infusion, propofol. Precedex caused profound bradycardia    PULMONOLOGY:   Acute hypoxemic respiratory failure  - maintain SpO2>=92, pH>=7.30  - Intubated with high oxygen requirements. Continue to wean FIO2 but maintain PEEP 10, TV adjusted to 500mL due to continued hypercarbia  - History of asthma and COPD, no previous intubations  - follow ABG and CXR  - Albuterol as needed  - Thickened secretions today and started mucomyst    CARDIOVASCULAR:   -SVT with amiodarone infusion and boluses x2  -Was taking multiple puffs of her inhaler that is epinephrine based likely exacerbating her tachycardia  -Goal MAP>=65  -Trend troponin / lactate  -EKG PRN  -Metoprolol IV for tachycardia but noted to have hypotension requiring norepinephrine   -Currently on norepinephrine 2 mcg/min  -CHF with LVEF of 30%. Heart failure consult placed    GASTROINTESTINAL:   Nutrition: NPO  - NGT placed. Start

## 2024-11-14 NOTE — PLAN OF CARE
Problem: Safety - Adult  Goal: Free from fall injury  Outcome: Progressing     Problem: Chronic Conditions and Co-morbidities  Goal: Patient's chronic conditions and co-morbidity symptoms are monitored and maintained or improved  Outcome: Progressing     Problem: Respiratory - Adult  Goal: Achieves optimal ventilation and oxygenation  Outcome: Progressing     Problem: Cardiovascular - Adult  Goal: Maintains optimal cardiac output and hemodynamic stability  Outcome: Progressing

## 2024-11-14 NOTE — CARE COORDINATION
CM reviewed Pt medicals, Pt is on the vent.    CM could not reach Pt boyfriend yesterday, it stated that the number has been disconnected or changed.     CM will attempt to find Pt LNOK    Per IDR  Pt has lived with her boyfriend for 23 years. The correct phone number has been placed in the chart.     Pt has not family except her boyfriend.            Minocycline Counseling: Patient advised regarding possible photosensitivity and discoloration of the teeth, skin, lips, tongue and gums.  Patient instructed to avoid sunlight, if possible.  When exposed to sunlight, patients should wear protective clothing, sunglasses, and sunscreen.  The patient was instructed to call the office immediately if the following severe adverse effects occur:  hearing changes, easy bruising/bleeding, severe headache, or vision changes.  The patient verbalized understanding of the proper use and possible adverse effects of minocycline.  All of the patient's questions and concerns were addressed. Benzoyl Peroxide Pregnancy And Lactation Text: This medication is Pregnancy Category C. It is unknown if benzoyl peroxide is excreted in breast milk. Azelaic Acid Counseling: Patient counseled that medicine may cause skin irritation and to avoid applying near the eyes.  In the event of skin irritation, the patient was advised to reduce the amount of the drug applied or use it less frequently.   The patient verbalized understanding of the proper use and possible adverse effects of azelaic acid.  All of the patient's questions and concerns were addressed. Include Pregnancy/Lactation Warning?: No Bactrim Counseling:  I discussed with the patient the risks of sulfa antibiotics including but not limited to GI upset, allergic reaction, drug rash, diarrhea, dizziness, photosensitivity, and yeast infections.  Rarely, more serious reactions can occur including but not limited to aplastic anemia, agranulocytosis, methemoglobinemia, blood dyscrasias, liver or kidney failure, lung infiltrates or desquamative/blistering drug rashes. Erythromycin Pregnancy And Lactation Text: This medication is Pregnancy Category B and is considered safe during pregnancy. It is also excreted in breast milk. Tazorac Pregnancy And Lactation Text: This medication is not safe during pregnancy. It is unknown if this medication is excreted in breast milk. Dapsone Counseling: I discussed with the patient the risks of dapsone including but not limited to hemolytic anemia, agranulocytosis, rashes, methemoglobinemia, kidney failure, peripheral neuropathy, headaches, GI upset, and liver toxicity.  Patients who start dapsone require monitoring including baseline LFTs and weekly CBCs for the first month, then every month thereafter.  The patient verbalized understanding of the proper use and possible adverse effects of dapsone.  All of the patient's questions and concerns were addressed. Spironolactone Counseling: Patient advised regarding risks of diarrhea, abdominal pain, hyperkalemia, birth defects (for female patients), liver toxicity and renal toxicity. The patient may need blood work to monitor liver and kidney function and potassium levels while on therapy. The patient verbalized understanding of the proper use and possible adverse effects of spironolactone.  All of the patient's questions and concerns were addressed. High Dose Vitamin A Pregnancy And Lactation Text: High dose vitamin A therapy is contraindicated during pregnancy and breast feeding. Doxycycline Counseling:  Patient counseled regarding possible photosensitivity and increased risk for sunburn.  Patient instructed to avoid sunlight, if possible.  When exposed to sunlight, patients should wear protective clothing, sunglasses, and sunscreen.  The patient was instructed to call the office immediately if the following severe adverse effects occur:  hearing changes, easy bruising/bleeding, severe headache, or vision changes.  The patient verbalized understanding of the proper use and possible adverse effects of doxycycline.  All of the patient's questions and concerns were addressed. Minocycline Pregnancy And Lactation Text: This medication is Pregnancy Category D and not consider safe during pregnancy. It is also excreted in breast milk. Isotretinoin Counseling: Patient should get monthly blood tests, not donate blood, not drive at night if vision affected, not share medication, and not undergo elective surgery for 6 months after tx completed. Side effects reviewed, pt to contact office should one occur. Topical Retinoid counseling:  Patient advised to apply a pea-sized amount only at bedtime and wait 30 minutes after washing their face before applying.  If too drying, patient may add a non-comedogenic moisturizer. The patient verbalized understanding of the proper use and possible adverse effects of retinoids.  All of the patient's questions and concerns were addressed. Azelaic Acid Pregnancy And Lactation Text: This medication is considered safe during pregnancy and breast feeding. Bactrim Pregnancy And Lactation Text: This medication is Pregnancy Category D and is known to cause fetal risk.  It is also excreted in breast milk. Spironolactone Pregnancy And Lactation Text: This medication can cause feminization of the male fetus and should be avoided during pregnancy. The active metabolite is also found in breast milk. Topical Clindamycin Counseling: Patient counseled that this medication may cause skin irritation or allergic reactions.  In the event of skin irritation, the patient was advised to reduce the amount of the drug applied or use it less frequently.   The patient verbalized understanding of the proper use and possible adverse effects of clindamycin.  All of the patient's questions and concerns were addressed. Dapsone Pregnancy And Lactation Text: This medication is Pregnancy Category C and is not considered safe during pregnancy or breast feeding. Azithromycin Counseling:  I discussed with the patient the risks of azithromycin including but not limited to GI upset, allergic reaction, drug rash, diarrhea, and yeast infections. Topical Sulfur Applications Counseling: Topical Sulfur Counseling: Patient counseled that this medication may cause skin irritation or allergic reactions.  In the event of skin irritation, the patient was advised to reduce the amount of the drug applied or use it less frequently.   The patient verbalized understanding of the proper use and possible adverse effects of topical sulfur application.  All of the patient's questions and concerns were addressed. Doxycycline Pregnancy And Lactation Text: This medication is Pregnancy Category D and not consider safe during pregnancy. It is also excreted in breast milk but is considered safe for shorter treatment courses. Birth Control Pills Counseling: Birth Control Pill Counseling: I discussed with the patient the potential side effects of OCPs including but not limited to increased risk of stroke, heart attack, thrombophlebitis, deep venous thrombosis, hepatic adenomas, breast changes, GI upset, headaches, and depression.  The patient verbalized understanding of the proper use and possible adverse effects of OCPs. All of the patient's questions and concerns were addressed. Sarecycline Counseling: Patient advised regarding possible photosensitivity and discoloration of the teeth, skin, lips, tongue and gums.  Patient instructed to avoid sunlight, if possible.  When exposed to sunlight, patients should wear protective clothing, sunglasses, and sunscreen.  The patient was instructed to call the office immediately if the following severe adverse effects occur:  hearing changes, easy bruising/bleeding, severe headache, or vision changes.  The patient verbalized understanding of the proper use and possible adverse effects of sarecycline.  All of the patient's questions and concerns were addressed. Isotretinoin Pregnancy And Lactation Text: This medication is Pregnancy Category X and is considered extremely dangerous during pregnancy. It is unknown if it is excreted in breast milk. Topical Retinoid Pregnancy And Lactation Text: This medication is Pregnancy Category C. It is unknown if this medication is excreted in breast milk. Tetracycline Counseling: Patient counseled regarding possible photosensitivity and increased risk for sunburn.  Patient instructed to avoid sunlight, if possible.  When exposed to sunlight, patients should wear protective clothing, sunglasses, and sunscreen.  The patient was instructed to call the office immediately if the following severe adverse effects occur:  hearing changes, easy bruising/bleeding, severe headache, or vision changes.  The patient verbalized understanding of the proper use and possible adverse effects of tetracycline.  All of the patient's questions and concerns were addressed. Patient understands to avoid pregnancy while on therapy due to potential birth defects. Winlevi Counseling:  I discussed with the patient the risks of topical clascoterone including but not limited to erythema, scaling, itching, and stinging. Patient voiced their understanding. Topical Clindamycin Pregnancy And Lactation Text: This medication is Pregnancy Category B and is considered safe during pregnancy. It is unknown if it is excreted in breast milk. Benzoyl Peroxide Counseling: Patient counseled that medicine may cause skin irritation and bleach clothing.  In the event of skin irritation, the patient was advised to reduce the amount of the drug applied or use it less frequently.   The patient verbalized understanding of the proper use and possible adverse effects of benzoyl peroxide.  All of the patient's questions and concerns were addressed. Topical Sulfur Applications Pregnancy And Lactation Text: This medication is Pregnancy Category C and has an unknown safety profile during pregnancy. It is unknown if this topical medication is excreted in breast milk. Azithromycin Pregnancy And Lactation Text: This medication is considered safe during pregnancy and is also secreted in breast milk. Detail Level: Zone Winlevi Pregnancy And Lactation Text: This medication is considered safe during pregnancy and breastfeeding. Erythromycin Counseling:  I discussed with the patient the risks of erythromycin including but not limited to GI upset, allergic reaction, drug rash, diarrhea, increase in liver enzymes, and yeast infections. Birth Control Pills Pregnancy And Lactation Text: This medication should be avoided if pregnant and for the first 30 days post-partum. High Dose Vitamin A Counseling: Side effects reviewed, pt to contact office should one occur. Tazorac Counseling:  Patient advised that medication is irritating and drying.  Patient may need to apply sparingly and wash off after an hour before eventually leaving it on overnight.  The patient verbalized understanding of the proper use and possible adverse effects of tazorac.  All of the patient's questions and concerns were addressed. Detail Level: Generalized Detail Level: Simple

## 2024-11-14 NOTE — PROGRESS NOTES
CARDIOLOGY PROGRESS NOTE      Patient Name: Kim Markham  Age: 49 y.o.  Gender:female  :1975  MRN: [id number]    Patient seen and examined. This is a patient with a history of asthma and COPD who presented with shortness of breath and palpitations now being followed for elevated troponin. Remains intubated and sedated in ICU. Troponin has trended down. Of note, patient was reportedly taking multiple puffs of her epinephrine-based inhaler. No other complaints reported.    Telemetry reviewed, there were no events noted in the past 24 hours.    Pertinent review of systems items noted above, all other systems are negative. Current medications reviewed.    Physical Examination    Allergies   Allergen Reactions    Latex      Vitals:    24 1500   BP: (!) 100/55   Pulse: 70   Resp: 18   Temp: 97.8 °F (36.6 °C)   SpO2: 94%     Vital signs are stable  No apparent distress.  Heart has a RRR.  No murmur  Lungs are diminished  Abdomen is soft, nontender, normal bowel sounds.  Extremities have trace edema  Skin is dry and warm.  Normal affect    Labs reviewed:  Recent Results (from the past 12 hour(s))   POCT Glucose    Collection Time: 24  7:58 AM   Result Value Ref Range    POC Glucose 125 (H) 65 - 100 mg/dL    Performed by: HEENA ALFARO    APTT    Collection Time: 24  8:07 AM   Result Value Ref Range    APTT 40.3 (H) 21.2 - 34.1 sec    Therapeutic Range   82 - 109 sec   Heparin, Anti-XA    Collection Time: 24 10:45 AM   Result Value Ref Range    Heparin Xa,LMWH and Unfrac 0.28 IU/mL   POCT Glucose    Collection Time: 24  1:29 PM   Result Value Ref Range    POC Glucose 134 (H) 65 - 100 mg/dL    Performed by: HEENA ALFARO         Case discussed with Dr. Restrepo and our impression and recommendations are as follows:  Type II NSTEMI, likely demand ischemia given hypoxia   Troponin -[phone]  Echo with EF 60-65%, LVH, abnormal diastolic function, RV moderately dilated   CTA chest

## 2024-11-15 ENCOUNTER — APPOINTMENT (OUTPATIENT)
Facility: HOSPITAL | Age: 49
End: 2024-11-15
Payer: MEDICAID

## 2024-11-15 LAB
ANION GAP SERPL CALC-SCNC: 8 MMOL/L (ref 2–12)
BUN SERPL-MCNC: 39 MG/DL (ref 6–20)
BUN/CREAT SERPL: 32 (ref 12–20)
CA-I BLD-MCNC: 8.7 MG/DL (ref 8.5–10.1)
CHLORIDE SERPL-SCNC: 99 MMOL/L (ref 97–108)
CO2 SERPL-SCNC: 29 MMOL/L (ref 21–32)
CREAT SERPL-MCNC: 1.23 MG/DL (ref 0.55–1.02)
ERYTHROCYTE [DISTWIDTH] IN BLOOD BY AUTOMATED COUNT: 13.9 % (ref 11.5–14.5)
GLUCOSE BLD STRIP.AUTO-MCNC: 130 MG/DL (ref 65–100)
GLUCOSE BLD STRIP.AUTO-MCNC: 132 MG/DL (ref 65–100)
GLUCOSE BLD STRIP.AUTO-MCNC: 134 MG/DL (ref 65–100)
GLUCOSE BLD STRIP.AUTO-MCNC: 138 MG/DL (ref 65–100)
GLUCOSE SERPL-MCNC: 140 MG/DL (ref 65–100)
HCT VFR BLD AUTO: 36.6 % (ref 35–47)
HGB BLD-MCNC: 12 G/DL (ref 11.5–16)
MCH RBC QN AUTO: 28.7 PG (ref 26–34)
MCHC RBC AUTO-ENTMCNC: 32.8 G/DL (ref 30–36.5)
MCV RBC AUTO: 87.6 FL (ref 80–99)
NRBC # BLD: 0 K/UL (ref 0–0.01)
NRBC BLD-RTO: 0 PER 100 WBC
PERFORMED BY:: ABNORMAL
PLATELET # BLD AUTO: 214 K/UL (ref 150–400)
PMV BLD AUTO: 12.3 FL (ref 8.9–12.9)
POTASSIUM SERPL-SCNC: 3.5 MMOL/L (ref 3.5–5.1)
RBC # BLD AUTO: 4.18 M/UL (ref 3.8–5.2)
SODIUM SERPL-SCNC: 136 MMOL/L (ref 136–145)
TROPONIN I SERPL HS-MCNC: 48 NG/L (ref 0–51)
UFH PPP CHRO-ACNC: 0.14 IU/ML
UFH PPP CHRO-ACNC: 0.24 IU/ML
WBC # BLD AUTO: 12.3 K/UL (ref 3.6–11)

## 2024-11-15 PROCEDURE — 2000000000 HC ICU R&B

## 2024-11-15 PROCEDURE — 6370000000 HC RX 637 (ALT 250 FOR IP): Performed by: STUDENT IN AN ORGANIZED HEALTH CARE EDUCATION/TRAINING PROGRAM

## 2024-11-15 PROCEDURE — 6360000002 HC RX W HCPCS: Performed by: INTERNAL MEDICINE

## 2024-11-15 PROCEDURE — 2500000003 HC RX 250 WO HCPCS: Performed by: NURSE PRACTITIONER

## 2024-11-15 PROCEDURE — 6360000002 HC RX W HCPCS: Performed by: STUDENT IN AN ORGANIZED HEALTH CARE EDUCATION/TRAINING PROGRAM

## 2024-11-15 PROCEDURE — 6360000002 HC RX W HCPCS: Performed by: NURSE PRACTITIONER

## 2024-11-15 PROCEDURE — 71045 X-RAY EXAM CHEST 1 VIEW: CPT

## 2024-11-15 PROCEDURE — 80048 BASIC METABOLIC PNL TOTAL CA: CPT

## 2024-11-15 PROCEDURE — 85027 COMPLETE CBC AUTOMATED: CPT

## 2024-11-15 PROCEDURE — 2580000003 HC RX 258: Performed by: NURSE PRACTITIONER

## 2024-11-15 PROCEDURE — 2580000003 HC RX 258: Performed by: STUDENT IN AN ORGANIZED HEALTH CARE EDUCATION/TRAINING PROGRAM

## 2024-11-15 PROCEDURE — 84484 ASSAY OF TROPONIN QUANT: CPT

## 2024-11-15 PROCEDURE — 2700000000 HC OXYGEN THERAPY PER DAY

## 2024-11-15 PROCEDURE — 85520 HEPARIN ASSAY: CPT

## 2024-11-15 PROCEDURE — 94640 AIRWAY INHALATION TREATMENT: CPT

## 2024-11-15 PROCEDURE — 82962 GLUCOSE BLOOD TEST: CPT

## 2024-11-15 PROCEDURE — 94761 N-INVAS EAR/PLS OXIMETRY MLT: CPT

## 2024-11-15 PROCEDURE — 2500000003 HC RX 250 WO HCPCS: Performed by: STUDENT IN AN ORGANIZED HEALTH CARE EDUCATION/TRAINING PROGRAM

## 2024-11-15 PROCEDURE — 6370000000 HC RX 637 (ALT 250 FOR IP): Performed by: INTERNAL MEDICINE

## 2024-11-15 PROCEDURE — 94003 VENT MGMT INPAT SUBQ DAY: CPT

## 2024-11-15 PROCEDURE — 6370000000 HC RX 637 (ALT 250 FOR IP)

## 2024-11-15 RX ORDER — METOPROLOL TARTRATE 25 MG/1
12.5 TABLET, FILM COATED ORAL 2 TIMES DAILY
Status: DISCONTINUED | OUTPATIENT
Start: 2024-11-15 | End: 2024-11-23

## 2024-11-15 RX ORDER — FUROSEMIDE 10 MG/ML
20 INJECTION INTRAMUSCULAR; INTRAVENOUS ONCE
Status: COMPLETED | OUTPATIENT
Start: 2024-11-15 | End: 2024-11-15

## 2024-11-15 RX ORDER — ENOXAPARIN SODIUM 100 MG/ML
40 INJECTION SUBCUTANEOUS DAILY
Status: DISCONTINUED | OUTPATIENT
Start: 2024-11-15 | End: 2024-11-18

## 2024-11-15 RX ORDER — FAMOTIDINE 20 MG/1
20 TABLET, FILM COATED ORAL DAILY
Status: DISCONTINUED | OUTPATIENT
Start: 2024-11-15 | End: 2024-12-09

## 2024-11-15 RX ADMIN — Medication 100 MCG/HR: at 02:10

## 2024-11-15 RX ADMIN — IPRATROPIUM BROMIDE AND ALBUTEROL SULFATE 1 DOSE: 2.5; .5 SOLUTION RESPIRATORY (INHALATION) at 03:48

## 2024-11-15 RX ADMIN — SODIUM CHLORIDE, PRESERVATIVE FREE 10 ML: 5 INJECTION INTRAVENOUS at 20:22

## 2024-11-15 RX ADMIN — LORAZEPAM 1 MG: 1 TABLET ORAL at 03:55

## 2024-11-15 RX ADMIN — PROPOFOL 40 MCG/KG/MIN: 10 INJECTION, EMULSION INTRAVENOUS at 03:51

## 2024-11-15 RX ADMIN — METHYLPREDNISOLONE SODIUM SUCCINATE 40 MG: 40 INJECTION INTRAMUSCULAR; INTRAVENOUS at 05:13

## 2024-11-15 RX ADMIN — PROPOFOL 40 MCG/KG/MIN: 10 INJECTION, EMULSION INTRAVENOUS at 19:22

## 2024-11-15 RX ADMIN — ATORVASTATIN CALCIUM 40 MG: 40 TABLET, FILM COATED ORAL at 20:20

## 2024-11-15 RX ADMIN — IPRATROPIUM BROMIDE AND ALBUTEROL SULFATE 1 DOSE: 2.5; .5 SOLUTION RESPIRATORY (INHALATION) at 08:48

## 2024-11-15 RX ADMIN — IPRATROPIUM BROMIDE AND ALBUTEROL SULFATE 1 DOSE: 2.5; .5 SOLUTION RESPIRATORY (INHALATION) at 19:29

## 2024-11-15 RX ADMIN — ACETYLCYSTEINE 600 MG: 200 INHALANT RESPIRATORY (INHALATION) at 19:29

## 2024-11-15 RX ADMIN — METOPROLOL TARTRATE 5 MG: 5 INJECTION INTRAVENOUS at 05:14

## 2024-11-15 RX ADMIN — PROPOFOL 40 MCG/KG/MIN: 10 INJECTION, EMULSION INTRAVENOUS at 22:34

## 2024-11-15 RX ADMIN — Medication 75 MCG/HR: at 10:34

## 2024-11-15 RX ADMIN — IPRATROPIUM BROMIDE AND ALBUTEROL SULFATE 1 DOSE: 2.5; .5 SOLUTION RESPIRATORY (INHALATION) at 16:18

## 2024-11-15 RX ADMIN — WATER 1000 MG: 1 INJECTION INTRAMUSCULAR; INTRAVENOUS; SUBCUTANEOUS at 00:44

## 2024-11-15 RX ADMIN — PROPOFOL 40 MCG/KG/MIN: 10 INJECTION, EMULSION INTRAVENOUS at 10:31

## 2024-11-15 RX ADMIN — FAMOTIDINE 20 MG: 20 TABLET, FILM COATED ORAL at 11:36

## 2024-11-15 RX ADMIN — Medication 75 MCG/HR: at 20:42

## 2024-11-15 RX ADMIN — METHYLPREDNISOLONE SODIUM SUCCINATE 40 MG: 40 INJECTION INTRAMUSCULAR; INTRAVENOUS at 20:20

## 2024-11-15 RX ADMIN — ASPIRIN 81 MG: 81 TABLET, COATED ORAL at 09:15

## 2024-11-15 RX ADMIN — PROPOFOL 40 MCG/KG/MIN: 10 INJECTION, EMULSION INTRAVENOUS at 16:25

## 2024-11-15 RX ADMIN — PROPOFOL 40 MCG/KG/MIN: 10 INJECTION, EMULSION INTRAVENOUS at 13:47

## 2024-11-15 RX ADMIN — HEPARIN SODIUM 2000 UNITS: 1000 INJECTION INTRAVENOUS; SUBCUTANEOUS at 01:23

## 2024-11-15 RX ADMIN — LORAZEPAM 1 MG: 1 TABLET ORAL at 20:20

## 2024-11-15 RX ADMIN — METOPROLOL TARTRATE 12.5 MG: 25 TABLET, FILM COATED ORAL at 18:15

## 2024-11-15 RX ADMIN — IPRATROPIUM BROMIDE AND ALBUTEROL SULFATE 1 DOSE: 2.5; .5 SOLUTION RESPIRATORY (INHALATION) at 11:26

## 2024-11-15 RX ADMIN — METOPROLOL TARTRATE 5 MG: 5 INJECTION INTRAVENOUS at 17:01

## 2024-11-15 RX ADMIN — AMIODARONE HYDROCHLORIDE 0.5 MG/MIN: 1.8 INJECTION, SOLUTION INTRAVENOUS at 12:03

## 2024-11-15 RX ADMIN — POLYETHYLENE GLYCOL 3350 17 G: 17 POWDER, FOR SOLUTION ORAL at 09:15

## 2024-11-15 RX ADMIN — AMIODARONE HYDROCHLORIDE 0.5 MG/MIN: 1.8 INJECTION, SOLUTION INTRAVENOUS at 22:47

## 2024-11-15 RX ADMIN — PROPOFOL 40 MCG/KG/MIN: 10 INJECTION, EMULSION INTRAVENOUS at 07:17

## 2024-11-15 RX ADMIN — AZITHROMYCIN DIHYDRATE 500 MG: 500 INJECTION, POWDER, LYOPHILIZED, FOR SOLUTION INTRAVENOUS at 00:53

## 2024-11-15 RX ADMIN — ACETYLCYSTEINE 600 MG: 200 INHALANT RESPIRATORY (INHALATION) at 08:48

## 2024-11-15 RX ADMIN — PROPOFOL 40 MCG/KG/MIN: 10 INJECTION, EMULSION INTRAVENOUS at 00:39

## 2024-11-15 RX ADMIN — METHYLPREDNISOLONE SODIUM SUCCINATE 40 MG: 40 INJECTION INTRAMUSCULAR; INTRAVENOUS at 13:47

## 2024-11-15 RX ADMIN — FUROSEMIDE 20 MG: 10 INJECTION, SOLUTION INTRAMUSCULAR; INTRAVENOUS at 11:36

## 2024-11-15 RX ADMIN — LORAZEPAM 1 MG: 1 TABLET ORAL at 11:36

## 2024-11-15 RX ADMIN — ENOXAPARIN SODIUM 40 MG: 100 INJECTION SUBCUTANEOUS at 11:36

## 2024-11-15 RX ADMIN — SODIUM CHLORIDE, PRESERVATIVE FREE 10 ML: 5 INJECTION INTRAVENOUS at 09:16

## 2024-11-15 ASSESSMENT — PULMONARY FUNCTION TESTS
PIF_VALUE: 30
PIF_VALUE: 27
PIF_VALUE: 27
PIF_VALUE: 40
PIF_VALUE: 28
PIF_VALUE: 28
PIF_VALUE: 27
PIF_VALUE: 29
PIF_VALUE: 28
PIF_VALUE: 30
PIF_VALUE: 28
PIF_VALUE: 33
PIF_VALUE: 27
PIF_VALUE: 27
PIF_VALUE: 29
PIF_VALUE: 30
PIF_VALUE: 28
PIF_VALUE: 29
PIF_VALUE: 28
PIF_VALUE: 29
PIF_VALUE: 29
PIF_VALUE: 27

## 2024-11-15 NOTE — CONSULTS
Nutrition Note      Nutrition Recommendations/Plan:   NPO, advance as medically appropriate.     Start TF of Promote 1.0 Delmar(Other TF) via NGT at 25 mL/hr, advance by 10 mL/hr every 6 hrs as tolerated to goal rate of 65 mL/hr.  Flush with 120 mL H2O q4hrs.  Provides: 1560 kcal(74%), 98 gm protein(77%), 2015 mL H2O(95%).                      +824 kcal (113%)  from Propofol at 31.2 mL/hr.     Monitor and record TF rate, flushes, tolerance, BM in I/Os.    Nutrition Assessment  Pt followed during IDRs. Pt remains vented, on decreasing pressor support, possible SBT today. Discussed starting EN if unable to extubate today. RD to provide regimen. Labs and meds reviewed- propofol, levophed 4 mcg, fentanyl.    Electronically signed by Mahi Vera RD on 11/15/24 at 1:27 PM EST    Contact: [phone] or [employer].

## 2024-11-15 NOTE — PROGRESS NOTES
CARDIOLOGY PROGRESS NOTE      Patient Name: Kim Markham  Age: 49 y.o.  Gender:female  :1975  MRN: [id number]    Patient seen and examined. This is a patient with a history of asthma and COPD who presented with shortness of breath and palpitations now being followed for elevated troponin. Remains intubated and sedated in ICU. Troponin has trended down. Of note, patient was reportedly taking multiple puffs of her epinephrine-based inhaler. No other complaints reported.    Telemetry reviewed, there were no events noted in the past 24 hours.    Pertinent review of systems items noted above, all other systems are negative. Current medications reviewed.    Physical Examination    Allergies   Allergen Reactions    Latex      Vitals:    11/15/24 1200   BP:    Pulse:    Resp:    Temp: 98.1 °F (36.7 °C)   SpO2:      Vital signs are stable  No apparent distress.  Heart has a RRR.  No murmur  Lungs are diminished  Abdomen is soft, nontender, normal bowel sounds.  Extremities have trace edema  Skin is dry and warm.  Normal affect    Labs reviewed:  Recent Results (from the past 12 hour(s))   Troponin    Collection Time: 11/15/24  4:15 AM   Result Value Ref Range    Troponin, High Sensitivity 48 0 - 51 ng/L   CBC    Collection Time: 11/15/24  4:15 AM   Result Value Ref Range    WBC 12.3 (H) 3.6 - 11.0 K/uL    RBC 4.18 3.80 - 5.20 M/uL    Hemoglobin 12.0 11.5 - 16.0 g/dL    Hematocrit 36.6 35.0 - 47.0 %    MCV 87.6 80.0 - 99.0 FL    MCH 28.7 26.0 - 34.0 PG    MCHC 32.8 30.0 - 36.5 g/dL    RDW 13.9 11.5 - 14.5 %    Platelets 214 150 - 400 K/uL    MPV 12.3 8.9 - 12.9 FL    Nucleated RBCs 0.0 0.0  WBC    nRBC 0.00 0.00 - 0.01 K/uL   Basic Metabolic Panel    Collection Time: 11/15/24  4:15 AM   Result Value Ref Range    Sodium 136 136 - 145 mmol/L    Potassium 3.5 3.5 - 5.1 mmol/L    Chloride 99 97 - 108 mmol/L    CO2 29 21 - 32 mmol/L    Anion Gap 8 2 - 12 mmol/L    Glucose 140 (H) 65 - 100 mg/dL    BUN 39 (H)

## 2024-11-15 NOTE — PROGRESS NOTES
CRITICAL CARE PROGRESS NOTE    Name: Kim Markham   : 1975   MRN: [id number]   Date: 11/15/2024      Diagnoses/problem list:   Asthma  COPD  Vtach    HPI   Kim Markham is a 49 y.o. female with known past medical history significant for asthma and COPD who treated her symptoms today with Primatene Mist over-the-counter says that she went into fast heart rate became short of breath immediately.  No other exacerbating or relieving factors which presents to the emergency room with no treatment prior to arrival.  Patient reports that she had a few day history of shortness of breath and thought she had a pneumonia. She has been taking OTC cough syrup and cough medicine as well as her inhaler. She reports difficulty affording medications and has limited access to healthcare.     : Continues to have high vent requirements and high sedation requirements. SVT noted and amiodarone initiated.    11/15: Remains intubated on 500/16/10/55%.  Still has bilateral wheezing on auscultation.  Slight rise in creatinine noted but urine output is good.  No arrhythmias this morning.  Remains on amiodarone, propofol, fentanyl and heparin infusions.    Assessment and plan:     NEUROLOGICAL:    - Wean sedation for RASS goal 0 to -1  - SAT daily for neurological exam  - Avoid Precedex due to profound bradycardia    PULMONOLOGY:   Acute hypoxemic respiratory failure  - maintain SpO2>=92, pH>=7.30  - FiO2 weaned to 55% and PEEP is at 10, will continue to wean for goal sat above 90%  - History of asthma and COPD, no previous intubations  - ABG and chest x-ray as needed  - Scheduled DuoNebs due to continued wheezing  - Continue Mucomyst for clearance    CARDIOVASCULAR:   -SVT with amiodarone infusion and boluses x2  -Was taking multiple puffs of her inhaler that is epinephrine based likely exacerbating her tachycardia  -Goal MAP>=65  -EKG PRN  -Metoprolol IV for tachycardia but noted to have hypotension requiring  of the patient's care with the consulting services, the bedside nurses and the respiratory therapist.      NOTE OF PERSONAL INVOLVEMENT IN CARE   This patient has a high probability of imminent, clinically significant deterioration, which requires the highest level of preparedness to intervene urgently. I participated in the decision-making and personally managed or directed the management of the following life and organ supporting interventions that required my frequent assessment to treat or prevent imminent deterioration.    I personally spent 40 minutes of critical care time.  This is time spent at this critically ill patient's bedside actively involved in patient care as well as the coordination of care.  This does not include any procedural time which has been billed separately.    Lisa Aguayo MD  Critical Care Medicine  [facility]

## 2024-11-15 NOTE — PLAN OF CARE
Problem: ABCDS Injury Assessment  Goal: Absence of physical injury  Outcome: Progressing     Problem: Safety - Adult  Goal: Free from fall injury  Outcome: Progressing     Problem: Chronic Conditions and Co-morbidities  Goal: Patient's chronic conditions and co-morbidity symptoms are monitored and maintained or improved  Outcome: Progressing     Problem: Respiratory - Adult  Goal: Achieves optimal ventilation and oxygenation  Outcome: Progressing     Problem: Cardiovascular - Adult  Goal: Maintains optimal cardiac output and hemodynamic stability  Outcome: Progressing     Problem: Musculoskeletal - Adult  Goal: Return mobility to safest level of function  Outcome: Progressing  Flowsheets (Taken 11/14/2024 2000)  Return Mobility to Safest Level of Function: Assess patient stability and activity tolerance for standing, transferring and ambulating with or without assistive devices     Problem: Infection - Adult  Goal: Absence of infection at discharge  Outcome: Progressing  Flowsheets (Taken 11/14/2024 2000)  Absence of infection at discharge:   Assess and monitor for signs and symptoms of infection   Monitor lab/diagnostic results  Goal: Absence of infection during hospitalization  Outcome: Progressing  Flowsheets (Taken 11/14/2024 2000)  Absence of infection during hospitalization: Assess and monitor for signs and symptoms of infection  Goal: Absence of fever/infection during anticipated neutropenic period  Outcome: Progressing  Flowsheets (Taken 11/14/2024 2000)  Absence of fever/infection during anticipated neutropenic period: Monitor white blood cell count     Problem: Safety - Medical Restraint  Goal: Remains free of injury from restraints (Restraint for Interference with Medical Device)  Description: INTERVENTIONS:  1. Determine that other, less restrictive measures have been tried or would not be effective before applying the restraint  2. Evaluate the patient's condition at the time of restraint

## 2024-11-15 NOTE — CARE COORDINATION
CM reviewed chart.     Patient is from home with her boyfriend and per bf has no other family.     Per IDR   Patient remains intubated   Breathing trial may be attempted today.    Patient is self pay and historically paying for her medications has been an issue. Per primary nurse patient was not taking ordered medications because she does not have insurance.     Patient may need assistance with medications.    CM will continue to follow.

## 2024-11-16 LAB
ANION GAP SERPL CALC-SCNC: 12 MMOL/L (ref 2–12)
ARTERIAL PATENCY WRIST A: YES
BACTERIA SPEC CULT: NORMAL
BASE DEFICIT BLDA-SCNC: 0.3 MMOL/L
BDY SITE: ABNORMAL
BODY TEMPERATURE: 98.6
BUN SERPL-MCNC: 68 MG/DL (ref 6–20)
BUN/CREAT SERPL: 27 (ref 12–20)
CA-I BLD-MCNC: 8.2 MG/DL (ref 8.5–10.1)
CHLORIDE SERPL-SCNC: 95 MMOL/L (ref 97–108)
CO2 SERPL-SCNC: 26 MMOL/L (ref 21–32)
COHGB MFR BLD: 0.3 % (ref 1–2)
CREAT SERPL-MCNC: 2.51 MG/DL (ref 0.55–1.02)
CREAT UR-MCNC: 96 MG/DL
ERYTHROCYTE [DISTWIDTH] IN BLOOD BY AUTOMATED COUNT: 13.7 % (ref 11.5–14.5)
FIO2 ON VENT: 55 %
GAS FLOW.O2 SETTING OXYMISER: 18
GLUCOSE BLD STRIP.AUTO-MCNC: 125 MG/DL (ref 65–100)
GLUCOSE BLD STRIP.AUTO-MCNC: 126 MG/DL (ref 65–100)
GLUCOSE BLD STRIP.AUTO-MCNC: 128 MG/DL (ref 65–100)
GLUCOSE SERPL-MCNC: 140 MG/DL (ref 65–100)
HCO3 BLDA-SCNC: 24 MMOL/L (ref 22–26)
HCT VFR BLD AUTO: 34 % (ref 35–47)
HGB BLD-MCNC: 11.3 G/DL (ref 11.5–16)
Lab: NORMAL
MCH RBC QN AUTO: 28.5 PG (ref 26–34)
MCHC RBC AUTO-ENTMCNC: 33.2 G/DL (ref 30–36.5)
MCV RBC AUTO: 85.9 FL (ref 80–99)
METHGB MFR BLD: 0.4 % (ref 0–1.4)
NRBC # BLD: 0 K/UL (ref 0–0.01)
NRBC BLD-RTO: 0 PER 100 WBC
OSMOLALITY UR: 402 MOSM/KG H2O
OXYHGB MFR BLD: 95.7 % (ref 95–99)
PCO2 BLDA: 39 MMHG (ref 35–45)
PEEP RESPIRATORY: 10
PERFORMED BY:: ABNORMAL
PH BLDA: 7.41 (ref 7.35–7.45)
PLATELET # BLD AUTO: 250 K/UL (ref 150–400)
PMV BLD AUTO: 12.3 FL (ref 8.9–12.9)
PO2 BLDA: 98 MMHG (ref 80–100)
POTASSIUM SERPL-SCNC: 3.7 MMOL/L (ref 3.5–5.1)
RBC # BLD AUTO: 3.96 M/UL (ref 3.8–5.2)
SAO2 % BLD: 96 % (ref 95–99)
SAO2% DEVICE SAO2% SENSOR NAME: ABNORMAL
SODIUM SERPL-SCNC: 133 MMOL/L (ref 136–145)
SODIUM UR-SCNC: 20 MMOL/L
SPECIMEN SITE: ABNORMAL
UFH PPP CHRO-ACNC: <0.1 IU/ML
UUN UR-MCNC: 512 MG/DL
VENTILATION MODE VENT: ABNORMAL
VT SETTING VENT: 500
WBC # BLD AUTO: 13 K/UL (ref 3.6–11)

## 2024-11-16 PROCEDURE — 6360000002 HC RX W HCPCS: Performed by: NURSE PRACTITIONER

## 2024-11-16 PROCEDURE — 94003 VENT MGMT INPAT SUBQ DAY: CPT

## 2024-11-16 PROCEDURE — 82803 BLOOD GASES ANY COMBINATION: CPT

## 2024-11-16 PROCEDURE — 6370000000 HC RX 637 (ALT 250 FOR IP): Performed by: INTERNAL MEDICINE

## 2024-11-16 PROCEDURE — 84540 ASSAY OF URINE/UREA-N: CPT

## 2024-11-16 PROCEDURE — 6370000000 HC RX 637 (ALT 250 FOR IP)

## 2024-11-16 PROCEDURE — 94761 N-INVAS EAR/PLS OXIMETRY MLT: CPT

## 2024-11-16 PROCEDURE — 6360000002 HC RX W HCPCS: Performed by: STUDENT IN AN ORGANIZED HEALTH CARE EDUCATION/TRAINING PROGRAM

## 2024-11-16 PROCEDURE — 85027 COMPLETE CBC AUTOMATED: CPT

## 2024-11-16 PROCEDURE — 2500000003 HC RX 250 WO HCPCS: Performed by: NURSE PRACTITIONER

## 2024-11-16 PROCEDURE — 2700000000 HC OXYGEN THERAPY PER DAY

## 2024-11-16 PROCEDURE — 83935 ASSAY OF URINE OSMOLALITY: CPT

## 2024-11-16 PROCEDURE — 6370000000 HC RX 637 (ALT 250 FOR IP): Performed by: STUDENT IN AN ORGANIZED HEALTH CARE EDUCATION/TRAINING PROGRAM

## 2024-11-16 PROCEDURE — 6360000002 HC RX W HCPCS: Performed by: INTERNAL MEDICINE

## 2024-11-16 PROCEDURE — 2580000003 HC RX 258: Performed by: STUDENT IN AN ORGANIZED HEALTH CARE EDUCATION/TRAINING PROGRAM

## 2024-11-16 PROCEDURE — 2580000003 HC RX 258: Performed by: NURSE PRACTITIONER

## 2024-11-16 PROCEDURE — 82570 ASSAY OF URINE CREATININE: CPT

## 2024-11-16 PROCEDURE — 36415 COLL VENOUS BLD VENIPUNCTURE: CPT

## 2024-11-16 PROCEDURE — 36600 WITHDRAWAL OF ARTERIAL BLOOD: CPT

## 2024-11-16 PROCEDURE — 2000000000 HC ICU R&B

## 2024-11-16 PROCEDURE — 84300 ASSAY OF URINE SODIUM: CPT

## 2024-11-16 PROCEDURE — 80048 BASIC METABOLIC PNL TOTAL CA: CPT

## 2024-11-16 PROCEDURE — 82962 GLUCOSE BLOOD TEST: CPT

## 2024-11-16 PROCEDURE — 85520 HEPARIN ASSAY: CPT

## 2024-11-16 PROCEDURE — 94640 AIRWAY INHALATION TREATMENT: CPT

## 2024-11-16 RX ORDER — FUROSEMIDE 10 MG/ML
60 INJECTION INTRAMUSCULAR; INTRAVENOUS DAILY
Status: DISCONTINUED | OUTPATIENT
Start: 2024-11-16 | End: 2024-11-20

## 2024-11-16 RX ORDER — LORAZEPAM 1 MG/1
1 TABLET ORAL EVERY 4 HOURS PRN
Status: DISCONTINUED | OUTPATIENT
Start: 2024-11-16 | End: 2024-11-16

## 2024-11-16 RX ORDER — AMIODARONE HYDROCHLORIDE 200 MG/1
400 TABLET ORAL 2 TIMES DAILY
Status: DISCONTINUED | OUTPATIENT
Start: 2024-11-16 | End: 2024-11-22

## 2024-11-16 RX ORDER — ALBUTEROL SULFATE 2.5 MG/.5ML
2.5 SOLUTION RESPIRATORY (INHALATION) EVERY 4 HOURS
Status: DISCONTINUED | OUTPATIENT
Start: 2024-11-16 | End: 2024-11-16

## 2024-11-16 RX ADMIN — SODIUM CHLORIDE, PRESERVATIVE FREE 10 ML: 5 INJECTION INTRAVENOUS at 08:46

## 2024-11-16 RX ADMIN — ATORVASTATIN CALCIUM 40 MG: 40 TABLET, FILM COATED ORAL at 20:26

## 2024-11-16 RX ADMIN — PROPOFOL 40 MCG/KG/MIN: 10 INJECTION, EMULSION INTRAVENOUS at 21:32

## 2024-11-16 RX ADMIN — METOPROLOL TARTRATE 12.5 MG: 25 TABLET, FILM COATED ORAL at 20:26

## 2024-11-16 RX ADMIN — PROPOFOL 40 MCG/KG/MIN: 10 INJECTION, EMULSION INTRAVENOUS at 15:04

## 2024-11-16 RX ADMIN — PROPOFOL 40 MCG/KG/MIN: 10 INJECTION, EMULSION INTRAVENOUS at 08:38

## 2024-11-16 RX ADMIN — PROPOFOL 40 MCG/KG/MIN: 10 INJECTION, EMULSION INTRAVENOUS at 05:58

## 2024-11-16 RX ADMIN — METHYLPREDNISOLONE SODIUM SUCCINATE 40 MG: 40 INJECTION INTRAMUSCULAR; INTRAVENOUS at 13:30

## 2024-11-16 RX ADMIN — IPRATROPIUM BROMIDE AND ALBUTEROL SULFATE 1 DOSE: 2.5; .5 SOLUTION RESPIRATORY (INHALATION) at 23:44

## 2024-11-16 RX ADMIN — ENOXAPARIN SODIUM 40 MG: 100 INJECTION SUBCUTANEOUS at 08:36

## 2024-11-16 RX ADMIN — POLYETHYLENE GLYCOL 3350 17 G: 17 POWDER, FOR SOLUTION ORAL at 08:36

## 2024-11-16 RX ADMIN — METHYLPREDNISOLONE SODIUM SUCCINATE 40 MG: 40 INJECTION INTRAMUSCULAR; INTRAVENOUS at 20:26

## 2024-11-16 RX ADMIN — LORAZEPAM 1 MG: 1 TABLET ORAL at 04:34

## 2024-11-16 RX ADMIN — ACETYLCYSTEINE 600 MG: 200 INHALANT RESPIRATORY (INHALATION) at 09:24

## 2024-11-16 RX ADMIN — IPRATROPIUM BROMIDE AND ALBUTEROL SULFATE 1 DOSE: 2.5; .5 SOLUTION RESPIRATORY (INHALATION) at 04:12

## 2024-11-16 RX ADMIN — FAMOTIDINE 20 MG: 20 TABLET, FILM COATED ORAL at 08:36

## 2024-11-16 RX ADMIN — PROPOFOL 40 MCG/KG/MIN: 10 INJECTION, EMULSION INTRAVENOUS at 18:09

## 2024-11-16 RX ADMIN — IPRATROPIUM BROMIDE AND ALBUTEROL SULFATE 1 DOSE: 2.5; .5 SOLUTION RESPIRATORY (INHALATION) at 00:38

## 2024-11-16 RX ADMIN — IPRATROPIUM BROMIDE AND ALBUTEROL SULFATE 1 DOSE: 2.5; .5 SOLUTION RESPIRATORY (INHALATION) at 09:24

## 2024-11-16 RX ADMIN — ACETYLCYSTEINE 600 MG: 200 INHALANT RESPIRATORY (INHALATION) at 19:49

## 2024-11-16 RX ADMIN — METOPROLOL TARTRATE 12.5 MG: 25 TABLET, FILM COATED ORAL at 08:36

## 2024-11-16 RX ADMIN — SODIUM CHLORIDE, PRESERVATIVE FREE 10 ML: 5 INJECTION INTRAVENOUS at 20:32

## 2024-11-16 RX ADMIN — PROPOFOL 40 MCG/KG/MIN: 10 INJECTION, EMULSION INTRAVENOUS at 11:53

## 2024-11-16 RX ADMIN — FUROSEMIDE 60 MG: 10 INJECTION, SOLUTION INTRAMUSCULAR; INTRAVENOUS at 11:52

## 2024-11-16 RX ADMIN — IPRATROPIUM BROMIDE AND ALBUTEROL SULFATE 1 DOSE: 2.5; .5 SOLUTION RESPIRATORY (INHALATION) at 15:57

## 2024-11-16 RX ADMIN — IPRATROPIUM BROMIDE AND ALBUTEROL SULFATE 1 DOSE: 2.5; .5 SOLUTION RESPIRATORY (INHALATION) at 11:59

## 2024-11-16 RX ADMIN — IPRATROPIUM BROMIDE AND ALBUTEROL SULFATE 1 DOSE: 2.5; .5 SOLUTION RESPIRATORY (INHALATION) at 19:49

## 2024-11-16 RX ADMIN — WATER 1000 MG: 1 INJECTION INTRAMUSCULAR; INTRAVENOUS; SUBCUTANEOUS at 01:43

## 2024-11-16 RX ADMIN — METHYLPREDNISOLONE SODIUM SUCCINATE 40 MG: 40 INJECTION INTRAMUSCULAR; INTRAVENOUS at 04:32

## 2024-11-16 RX ADMIN — AZITHROMYCIN DIHYDRATE 500 MG: 500 INJECTION, POWDER, LYOPHILIZED, FOR SOLUTION INTRAVENOUS at 01:43

## 2024-11-16 RX ADMIN — ASPIRIN 81 MG: 81 TABLET, COATED ORAL at 08:36

## 2024-11-16 RX ADMIN — PROPOFOL 40 MCG/KG/MIN: 10 INJECTION, EMULSION INTRAVENOUS at 02:11

## 2024-11-16 RX ADMIN — AMIODARONE HYDROCHLORIDE 400 MG: 200 TABLET ORAL at 11:52

## 2024-11-16 RX ADMIN — AMIODARONE HYDROCHLORIDE 400 MG: 200 TABLET ORAL at 20:26

## 2024-11-16 RX ADMIN — Medication 50 MCG/HR: at 10:16

## 2024-11-16 ASSESSMENT — PULMONARY FUNCTION TESTS
PIF_VALUE: 23
PIF_VALUE: 30
PIF_VALUE: 30
PIF_VALUE: 28
PIF_VALUE: 31
PIF_VALUE: 27
PIF_VALUE: 28
PIF_VALUE: 32
PIF_VALUE: 23
PIF_VALUE: 35
PIF_VALUE: 23
PIF_VALUE: 23
PIF_VALUE: 29
PIF_VALUE: 33
PIF_VALUE: 31
PIF_VALUE: 30
PIF_VALUE: 33
PIF_VALUE: 22
PIF_VALUE: 28
PIF_VALUE: 31
PIF_VALUE: 30
PIF_VALUE: 31
PIF_VALUE: 23
PIF_VALUE: 30
PIF_VALUE: 28
PIF_VALUE: 28
PIF_VALUE: 31
PIF_VALUE: 28
PIF_VALUE: 29
PIF_VALUE: 23
PIF_VALUE: 23

## 2024-11-16 ASSESSMENT — PAIN SCALES - GENERAL: PAINLEVEL_OUTOF10: 0

## 2024-11-16 NOTE — PROGRESS NOTES
CRITICAL CARE PROGRESS NOTE    Name: Kim Markham   : 1975   MRN: [id number]   Date: 2024      Diagnoses/problem list:   Asthma  COPD  Vtach    HPI   Kim Markham is a 49 y.o. female with known past medical history significant for asthma and COPD who treated her symptoms today with Primatene Mist over-the-counter says that she went into fast heart rate became short of breath immediately.  No other exacerbating or relieving factors which presents to the emergency room with no treatment prior to arrival.  Patient reports that she had a few day history of shortness of breath and thought she had a pneumonia. She has been taking OTC cough syrup and cough medicine as well as her inhaler. She reports difficulty affording medications and has limited access to healthcare.     : Continues to have high vent requirements and high sedation requirements. SVT noted and amiodarone initiated.    11/15: Remains intubated on 500/16/10/55%.  Still has bilateral wheezing on auscultation.  Slight rise in creatinine noted but urine output is good.  No arrhythmias this morning.  Remains on amiodarone, propofol, fentanyl and heparin infusions.  : continues to require high sedation and high vent support. Will attempt SBT    Assessment and plan:     NEUROLOGICAL:    - Wean sedation for RASS goal 0 to -1  - SAT daily for neurological exam  - Avoid Precedex due to profound bradycardia  -Continue propofol and fentanyl  -Will add PO ativan to reduce IV medication dosages    PULMONOLOGY:   Acute hypoxemic respiratory failure  - maintain SpO2>=92, pH>=7.30  - FiO2 weaned to 55% and PEEP is at 10, will continue to wean for goal sat above 90%  - History of asthma and COPD, no previous intubations  - ABG and chest x-ray as needed  - Scheduled DuoNebs due to continued wheezing  - Continue Mucomyst for clearance  - SBT daily    CARDIOVASCULAR:   -SVT with amiodarone infusion and boluses x2  -Was taking multiple puffs of  including clinical events, labs, images, vital signs, I/O's, and examined patient.  I have discussed the case and the plan and management of the patient's care with the consulting services, the bedside nurses and the respiratory therapist.      NOTE OF PERSONAL INVOLVEMENT IN CARE   This patient has a high probability of imminent, clinically significant deterioration, which requires the highest level of preparedness to intervene urgently. I participated in the decision-making and personally managed or directed the management of the following life and organ supporting interventions that required my frequent assessment to treat or prevent imminent deterioration.    I personally spent 40 minutes of critical care time.  This is time spent at this critically ill patient's bedside actively involved in patient care as well as the coordination of care.  This does not include any procedural time which has been billed separately.    Joellen Elizabeth MD  [facility]

## 2024-11-16 NOTE — PROGRESS NOTES
CARDIOLOGY PROGRESS NOTE      Patient Name: Kim Markham  Age: 49 y.o.  Gender:female  :1975  MRN: [id number]    This is a patient with a history of asthma and COPD who presented with shortness of breath and palpitations now being followed for elevated troponin.      - Remains intubated and sedated in ICU. Rhythm sinus, /70.     Telemetry reviewed, there were no events noted in the past 24 hours.      Physical Examination    Allergies   Allergen Reactions    Latex      Vitals:    24 0926   BP:    Pulse: 69   Resp: 18   Temp:    SpO2: 96%     Vital signs are stable  Intubated/sedated  Heart has a RRR.  No murmur  Lungs are diminished  Abdomen is soft, nontender, normal bowel sounds.  Extremities have trace edema  Skin is dry and warm.    Labs reviewed:  Recent Results (from the past 12 hour(s))   Heparin, Anti-XA    Collection Time: 24  4:36 AM   Result Value Ref Range    Heparin Xa,LMWH and Unfrac <0.10 IU/mL   CBC    Collection Time: 24  4:36 AM   Result Value Ref Range    WBC 13.0 (H) 3.6 - 11.0 K/uL    RBC 3.96 3.80 - 5.20 M/uL    Hemoglobin 11.3 (L) 11.5 - 16.0 g/dL    Hematocrit 34.0 (L) 35.0 - 47.0 %    MCV 85.9 80.0 - 99.0 FL    MCH 28.5 26.0 - 34.0 PG    MCHC 33.2 30.0 - 36.5 g/dL    RDW 13.7 11.5 - 14.5 %    Platelets 250 150 - 400 K/uL    MPV 12.3 8.9 - 12.9 FL    Nucleated RBCs 0.0 0.0  WBC    nRBC 0.00 0.00 - 0.01 K/uL      Impression and recommendations are as follows:  Type II NSTEMI, likely demand ischemia given hypoxia. Peak troponin 248. Echo with EF 60-65%, LVH, RV moderately dilated. CTA chest negative for PE   BLE venous duplex negative for DVT   Continue ASA, statin   Can consider ischemic evaluation in the OP setting   Atrial arrhythmia, likely AVNRT, remains in sinus on amio  Continue amio gtt  Keep electrolytes WNL, K+ 4-5, Mg >2   Can follow-up with EP in the OP setting   Hypoxic resp failure due to COPD exacerbation. Currently intubated on

## 2024-11-17 ENCOUNTER — APPOINTMENT (OUTPATIENT)
Facility: HOSPITAL | Age: 49
End: 2024-11-17
Payer: MEDICAID

## 2024-11-17 LAB
ANION GAP SERPL CALC-SCNC: 10 MMOL/L (ref 2–12)
ANION GAP SERPL CALC-SCNC: 11 MMOL/L (ref 2–12)
BUN SERPL-MCNC: 74 MG/DL (ref 6–20)
BUN SERPL-MCNC: 78 MG/DL (ref 6–20)
BUN/CREAT SERPL: 28 (ref 12–20)
BUN/CREAT SERPL: 29 (ref 12–20)
CA-I BLD-MCNC: 8.4 MG/DL (ref 8.5–10.1)
CA-I BLD-MCNC: 8.6 MG/DL (ref 8.5–10.1)
CHLORIDE SERPL-SCNC: 96 MMOL/L (ref 97–108)
CHLORIDE SERPL-SCNC: 98 MMOL/L (ref 97–108)
CO2 SERPL-SCNC: 27 MMOL/L (ref 21–32)
CO2 SERPL-SCNC: 28 MMOL/L (ref 21–32)
CREAT SERPL-MCNC: 2.62 MG/DL (ref 0.55–1.02)
CREAT SERPL-MCNC: 2.69 MG/DL (ref 0.55–1.02)
ERYTHROCYTE [DISTWIDTH] IN BLOOD BY AUTOMATED COUNT: 13.8 % (ref 11.5–14.5)
GLUCOSE BLD STRIP.AUTO-MCNC: 104 MG/DL (ref 65–100)
GLUCOSE BLD STRIP.AUTO-MCNC: 112 MG/DL (ref 65–100)
GLUCOSE BLD STRIP.AUTO-MCNC: 112 MG/DL (ref 65–100)
GLUCOSE BLD STRIP.AUTO-MCNC: 116 MG/DL (ref 65–100)
GLUCOSE BLD STRIP.AUTO-MCNC: 117 MG/DL (ref 65–100)
GLUCOSE SERPL-MCNC: 108 MG/DL (ref 65–100)
GLUCOSE SERPL-MCNC: 126 MG/DL (ref 65–100)
HCT VFR BLD AUTO: 34 % (ref 35–47)
HGB BLD-MCNC: 11.3 G/DL (ref 11.5–16)
MCH RBC QN AUTO: 28.6 PG (ref 26–34)
MCHC RBC AUTO-ENTMCNC: 33.2 G/DL (ref 30–36.5)
MCV RBC AUTO: 86.1 FL (ref 80–99)
NRBC # BLD: 0 K/UL (ref 0–0.01)
NRBC BLD-RTO: 0 PER 100 WBC
PERFORMED BY:: ABNORMAL
PLATELET # BLD AUTO: 212 K/UL (ref 150–400)
PMV BLD AUTO: 12.2 FL (ref 8.9–12.9)
POTASSIUM SERPL-SCNC: 3.4 MMOL/L (ref 3.5–5.1)
POTASSIUM SERPL-SCNC: 3.5 MMOL/L (ref 3.5–5.1)
RBC # BLD AUTO: 3.95 M/UL (ref 3.8–5.2)
SODIUM SERPL-SCNC: 134 MMOL/L (ref 136–145)
SODIUM SERPL-SCNC: 136 MMOL/L (ref 136–145)
WBC # BLD AUTO: 12.2 K/UL (ref 3.6–11)

## 2024-11-17 PROCEDURE — 2500000003 HC RX 250 WO HCPCS: Performed by: NURSE PRACTITIONER

## 2024-11-17 PROCEDURE — 2580000003 HC RX 258: Performed by: NURSE PRACTITIONER

## 2024-11-17 PROCEDURE — 6370000000 HC RX 637 (ALT 250 FOR IP): Performed by: INTERNAL MEDICINE

## 2024-11-17 PROCEDURE — 6370000000 HC RX 637 (ALT 250 FOR IP): Performed by: STUDENT IN AN ORGANIZED HEALTH CARE EDUCATION/TRAINING PROGRAM

## 2024-11-17 PROCEDURE — 6360000002 HC RX W HCPCS: Performed by: STUDENT IN AN ORGANIZED HEALTH CARE EDUCATION/TRAINING PROGRAM

## 2024-11-17 PROCEDURE — 82962 GLUCOSE BLOOD TEST: CPT

## 2024-11-17 PROCEDURE — 85027 COMPLETE CBC AUTOMATED: CPT

## 2024-11-17 PROCEDURE — P9047 ALBUMIN (HUMAN), 25%, 50ML: HCPCS | Performed by: STUDENT IN AN ORGANIZED HEALTH CARE EDUCATION/TRAINING PROGRAM

## 2024-11-17 PROCEDURE — 2700000000 HC OXYGEN THERAPY PER DAY

## 2024-11-17 PROCEDURE — 6360000002 HC RX W HCPCS: Performed by: INTERNAL MEDICINE

## 2024-11-17 PROCEDURE — P9045 ALBUMIN (HUMAN), 5%, 250 ML: HCPCS | Performed by: STUDENT IN AN ORGANIZED HEALTH CARE EDUCATION/TRAINING PROGRAM

## 2024-11-17 PROCEDURE — 80048 BASIC METABOLIC PNL TOTAL CA: CPT

## 2024-11-17 PROCEDURE — 94640 AIRWAY INHALATION TREATMENT: CPT

## 2024-11-17 PROCEDURE — 71045 X-RAY EXAM CHEST 1 VIEW: CPT

## 2024-11-17 PROCEDURE — 6360000002 HC RX W HCPCS: Performed by: NURSE PRACTITIONER

## 2024-11-17 PROCEDURE — 94003 VENT MGMT INPAT SUBQ DAY: CPT

## 2024-11-17 PROCEDURE — 36415 COLL VENOUS BLD VENIPUNCTURE: CPT

## 2024-11-17 PROCEDURE — 6370000000 HC RX 637 (ALT 250 FOR IP)

## 2024-11-17 PROCEDURE — 2000000000 HC ICU R&B

## 2024-11-17 PROCEDURE — 94761 N-INVAS EAR/PLS OXIMETRY MLT: CPT

## 2024-11-17 PROCEDURE — 2580000003 HC RX 258: Performed by: STUDENT IN AN ORGANIZED HEALTH CARE EDUCATION/TRAINING PROGRAM

## 2024-11-17 RX ORDER — ALBUMIN HUMAN 50 G/1000ML
12.5 SOLUTION INTRAVENOUS ONCE
Status: COMPLETED | OUTPATIENT
Start: 2024-11-17 | End: 2024-11-17

## 2024-11-17 RX ORDER — CALCIUM GLUCONATE 20 MG/ML
2000 INJECTION, SOLUTION INTRAVENOUS ONCE
Status: COMPLETED | OUTPATIENT
Start: 2024-11-17 | End: 2024-11-17

## 2024-11-17 RX ORDER — IPRATROPIUM BROMIDE AND ALBUTEROL SULFATE 2.5; .5 MG/3ML; MG/3ML
1 SOLUTION RESPIRATORY (INHALATION)
Status: DISCONTINUED | OUTPATIENT
Start: 2024-11-17 | End: 2024-11-24

## 2024-11-17 RX ORDER — ALBUMIN (HUMAN) 12.5 G/50ML
25 SOLUTION INTRAVENOUS ONCE
Status: COMPLETED | OUTPATIENT
Start: 2024-11-17 | End: 2024-11-17

## 2024-11-17 RX ORDER — SENNOSIDES A AND B 8.6 MG/1
1 TABLET, FILM COATED ORAL NIGHTLY
Status: DISCONTINUED | OUTPATIENT
Start: 2024-11-17 | End: 2024-12-09

## 2024-11-17 RX ADMIN — METHYLPREDNISOLONE SODIUM SUCCINATE 40 MG: 40 INJECTION INTRAMUSCULAR; INTRAVENOUS at 05:16

## 2024-11-17 RX ADMIN — ALBUMIN (HUMAN) 12.5 G: 12.5 INJECTION, SOLUTION INTRAVENOUS at 20:58

## 2024-11-17 RX ADMIN — SENNOSIDES 8.6 MG: 8.6 TABLET, FILM COATED ORAL at 20:55

## 2024-11-17 RX ADMIN — METHYLPREDNISOLONE SODIUM SUCCINATE 40 MG: 40 INJECTION INTRAMUSCULAR; INTRAVENOUS at 13:34

## 2024-11-17 RX ADMIN — SODIUM CHLORIDE, PRESERVATIVE FREE 10 ML: 5 INJECTION INTRAVENOUS at 10:03

## 2024-11-17 RX ADMIN — IPRATROPIUM BROMIDE AND ALBUTEROL SULFATE 1 DOSE: 2.5; .5 SOLUTION RESPIRATORY (INHALATION) at 19:54

## 2024-11-17 RX ADMIN — ATORVASTATIN CALCIUM 40 MG: 40 TABLET, FILM COATED ORAL at 20:56

## 2024-11-17 RX ADMIN — AMIODARONE HYDROCHLORIDE 400 MG: 200 TABLET ORAL at 20:56

## 2024-11-17 RX ADMIN — PROPOFOL 40 MCG/KG/MIN: 10 INJECTION, EMULSION INTRAVENOUS at 23:40

## 2024-11-17 RX ADMIN — PROPOFOL 40 MCG/KG/MIN: 10 INJECTION, EMULSION INTRAVENOUS at 09:41

## 2024-11-17 RX ADMIN — IPRATROPIUM BROMIDE AND ALBUTEROL SULFATE 1 DOSE: 2.5; .5 SOLUTION RESPIRATORY (INHALATION) at 08:50

## 2024-11-17 RX ADMIN — PROPOFOL 40 MCG/KG/MIN: 10 INJECTION, EMULSION INTRAVENOUS at 13:15

## 2024-11-17 RX ADMIN — METOPROLOL TARTRATE 12.5 MG: 25 TABLET, FILM COATED ORAL at 20:55

## 2024-11-17 RX ADMIN — PROPOFOL 50 MCG/KG/MIN: 10 INJECTION, EMULSION INTRAVENOUS at 05:16

## 2024-11-17 RX ADMIN — POTASSIUM BICARBONATE 40 MEQ: 782 TABLET, EFFERVESCENT ORAL at 17:52

## 2024-11-17 RX ADMIN — SODIUM CHLORIDE, PRESERVATIVE FREE 10 ML: 5 INJECTION INTRAVENOUS at 20:57

## 2024-11-17 RX ADMIN — IPRATROPIUM BROMIDE AND ALBUTEROL SULFATE 1 DOSE: 2.5; .5 SOLUTION RESPIRATORY (INHALATION) at 23:24

## 2024-11-17 RX ADMIN — FAMOTIDINE 20 MG: 20 TABLET, FILM COATED ORAL at 10:02

## 2024-11-17 RX ADMIN — PROPOFOL 40 MCG/KG/MIN: 10 INJECTION, EMULSION INTRAVENOUS at 00:52

## 2024-11-17 RX ADMIN — ASPIRIN 81 MG: 81 TABLET, COATED ORAL at 10:02

## 2024-11-17 RX ADMIN — ENOXAPARIN SODIUM 40 MG: 100 INJECTION SUBCUTANEOUS at 10:03

## 2024-11-17 RX ADMIN — METOPROLOL TARTRATE 12.5 MG: 25 TABLET, FILM COATED ORAL at 10:02

## 2024-11-17 RX ADMIN — IPRATROPIUM BROMIDE AND ALBUTEROL SULFATE 1 DOSE: 2.5; .5 SOLUTION RESPIRATORY (INHALATION) at 13:44

## 2024-11-17 RX ADMIN — PROPOFOL 40 MCG/KG/MIN: 10 INJECTION, EMULSION INTRAVENOUS at 19:13

## 2024-11-17 RX ADMIN — Medication 50 MCG/HR: at 06:11

## 2024-11-17 RX ADMIN — PROPOFOL 40 MCG/KG/MIN: 10 INJECTION, EMULSION INTRAVENOUS at 15:44

## 2024-11-17 RX ADMIN — WATER 1000 MG: 1 INJECTION INTRAMUSCULAR; INTRAVENOUS; SUBCUTANEOUS at 00:53

## 2024-11-17 RX ADMIN — ACETYLCYSTEINE 600 MG: 200 INHALANT RESPIRATORY (INHALATION) at 19:54

## 2024-11-17 RX ADMIN — AMIODARONE HYDROCHLORIDE 400 MG: 200 TABLET ORAL at 10:02

## 2024-11-17 RX ADMIN — ACETYLCYSTEINE 600 MG: 200 INHALANT RESPIRATORY (INHALATION) at 08:50

## 2024-11-17 RX ADMIN — ALBUMIN (HUMAN) 25 G: 0.25 INJECTION, SOLUTION INTRAVENOUS at 10:06

## 2024-11-17 RX ADMIN — CALCIUM GLUCONATE 2000 MG: 20 INJECTION, SOLUTION INTRAVENOUS at 17:53

## 2024-11-17 RX ADMIN — IPRATROPIUM BROMIDE AND ALBUTEROL SULFATE 1 DOSE: 2.5; .5 SOLUTION RESPIRATORY (INHALATION) at 04:37

## 2024-11-17 RX ADMIN — DOCUSATE SODIUM 100 MG: 50 LIQUID ORAL at 10:02

## 2024-11-17 RX ADMIN — AZITHROMYCIN DIHYDRATE 500 MG: 500 INJECTION, POWDER, LYOPHILIZED, FOR SOLUTION INTRAVENOUS at 00:57

## 2024-11-17 RX ADMIN — POLYETHYLENE GLYCOL 3350 17 G: 17 POWDER, FOR SOLUTION ORAL at 10:03

## 2024-11-17 RX ADMIN — METHYLPREDNISOLONE SODIUM SUCCINATE 40 MG: 40 INJECTION INTRAMUSCULAR; INTRAVENOUS at 20:55

## 2024-11-17 ASSESSMENT — PULMONARY FUNCTION TESTS
PIF_VALUE: 17
PIF_VALUE: 27
PIF_VALUE: 17
PIF_VALUE: 29
PIF_VALUE: 32
PIF_VALUE: 31
PIF_VALUE: 17
PIF_VALUE: 18
PIF_VALUE: 17
PIF_VALUE: 28
PIF_VALUE: 30
PIF_VALUE: 17
PIF_VALUE: 17
PIF_VALUE: 30
PIF_VALUE: 34
PIF_VALUE: 17
PIF_VALUE: 31
PIF_VALUE: 17
PIF_VALUE: 34
PIF_VALUE: 33
PIF_VALUE: 17
PIF_VALUE: 31
PIF_VALUE: 29
PIF_VALUE: 18
PIF_VALUE: 26
PIF_VALUE: 26
PIF_VALUE: 17
PIF_VALUE: 31
PIF_VALUE: 31
PIF_VALUE: 29
PIF_VALUE: 30
PIF_VALUE: 17
PIF_VALUE: 17
PIF_VALUE: 29
PIF_VALUE: 17
PIF_VALUE: 26
PIF_VALUE: 17
PIF_VALUE: 17

## 2024-11-17 ASSESSMENT — PAIN SCALES - GENERAL
PAINLEVEL_OUTOF10: 0
PAINLEVEL_OUTOF10: 0

## 2024-11-17 NOTE — PROGRESS NOTES
CRITICAL CARE PROGRESS NOTE    Name: Kim Markham   : 1975   MRN: [id number]   Date: 2024      Diagnoses/problem list:   Asthma  COPD  Vtach    HPI   Kim Markham is a 49 y.o. female with known past medical history significant for asthma and COPD who treated her symptoms today with Primatene Mist over-the-counter says that she went into fast heart rate became short of breath immediately.  No other exacerbating or relieving factors which presents to the emergency room with no treatment prior to arrival.  Patient reports that she had a few day history of shortness of breath and thought she had a pneumonia. She has been taking OTC cough syrup and cough medicine as well as her inhaler. She reports difficulty affording medications and has limited access to healthcare.     : Continues to have high vent requirements and high sedation requirements. SVT noted and amiodarone initiated.    11/15: Remains intubated on 500/16/10/55%.  Still has bilateral wheezing on auscultation.  Slight rise in creatinine noted but urine output is good.  No arrhythmias this morning.  Remains on amiodarone, propofol, fentanyl and heparin infusions.  : continues to require high sedation and high vent support. Will attempt SBT  : tolerated SBT for >2 hours. Will repeat today    Assessment and plan:     NEUROLOGICAL:    - Wean sedation for RASS goal 0 to -1  - SAT daily for neurological exam  - Avoid Precedex due to profound bradycardia  -Continue propofol and fentanyl  -Added PO ativan to reduce IV medication dosages    PULMONOLOGY:   Acute hypoxemic respiratory failure  - maintain SpO2>=92, pH>=7.30  - FiO2 weaned to 55% and PEEP is at 10, will continue to wean for goal sat above 90%  - History of asthma and COPD, no previous intubations  - ABG and chest x-ray as needed  - Scheduled DuoNebs due to continued wheezing  - Continue Mucomyst for clearance  - SBT daily  -Will order an interval CT chest for Monday to

## 2024-11-17 NOTE — PROGRESS NOTES
CARDIOLOGY PROGRESS NOTE      Patient Name: Kim Markham  Age: 49 y.o.  Gender:female  :1975  MRN: [id number]    This is a patient with a history of asthma and COPD who presented with shortness of breath and palpitations now being followed for elevated troponin.      - Remains intubated and sedated in ICU. Rhythm sinus, /70.      - Remains intubated and sedated in ICU. Off pressors. Rhythm sinus. FI02 50%. Off iv amiodarone, now by NGT.     Telemetry reviewed, there were no events noted in the past 24 hours.      Physical Examination    Allergies   Allergen Reactions    Latex      Vitals:    24 0901   BP:    Pulse: 78   Resp: 16   Temp:    SpO2: 99%     Vital signs are stable  Intubated/sedated  Heart has a RRR.  No murmur  Lungs are diminished  Abdomen is soft, nontender, normal bowel sounds.  Extremities have trace edema  Skin is dry and warm.    Labs reviewed:  Recent Results (from the past 12 hour(s))   CBC    Collection Time: 24  1:00 AM   Result Value Ref Range    WBC 12.2 (H) 3.6 - 11.0 K/uL    RBC 3.95 3.80 - 5.20 M/uL    Hemoglobin 11.3 (L) 11.5 - 16.0 g/dL    Hematocrit 34.0 (L) 35.0 - 47.0 %    MCV 86.1 80.0 - 99.0 FL    MCH 28.6 26.0 - 34.0 PG    MCHC 33.2 30.0 - 36.5 g/dL    RDW 13.8 11.5 - 14.5 %    Platelets 212 150 - 400 K/uL    MPV 12.2 8.9 - 12.9 FL    Nucleated RBCs 0.0 0.0  WBC    nRBC 0.00 0.00 - 0.01 K/uL   Basic Metabolic Panel    Collection Time: 24  1:00 AM   Result Value Ref Range    Sodium 134 (L) 136 - 145 mmol/L    Potassium 3.5 3.5 - 5.1 mmol/L    Chloride 96 (L) 97 - 108 mmol/L    CO2 27 21 - 32 mmol/L    Anion Gap 11 2 - 12 mmol/L    Glucose 126 (H) 65 - 100 mg/dL    BUN 74 (H) 6 - 20 mg/dL    Creatinine 2.62 (H) 0.55 - 1.02 mg/dL    BUN/Creatinine Ratio 28 (H) 12 - 20      Est, Glom Filt Rate 22 (L) >60 ml/min/1.73m2    Calcium 8.6 8.5 - 10.1 mg/dL   POCT Glucose    Collection Time: 24  7:08 AM   Result Value Ref Range

## 2024-11-18 ENCOUNTER — APPOINTMENT (OUTPATIENT)
Facility: HOSPITAL | Age: 49
End: 2024-11-18
Attending: STUDENT IN AN ORGANIZED HEALTH CARE EDUCATION/TRAINING PROGRAM
Payer: MEDICAID

## 2024-11-18 LAB
ANION GAP SERPL CALC-SCNC: 8 MMOL/L (ref 2–12)
ARTERIAL PATENCY WRIST A: YES
BASE EXCESS BLDA CALC-SCNC: 2.6 MMOL/L (ref 0–3)
BDY SITE: ABNORMAL
BODY TEMPERATURE: 98.7
BUN SERPL-MCNC: 74 MG/DL (ref 6–20)
BUN/CREAT SERPL: 33 (ref 12–20)
CA-I BLD-MCNC: 8.7 MG/DL (ref 8.5–10.1)
CHLORIDE SERPL-SCNC: 100 MMOL/L (ref 97–108)
CO2 SERPL-SCNC: 29 MMOL/L (ref 21–32)
COHGB MFR BLD: 0.1 % (ref 1–2)
CREAT SERPL-MCNC: 2.22 MG/DL (ref 0.55–1.02)
ERYTHROCYTE [DISTWIDTH] IN BLOOD BY AUTOMATED COUNT: 13.8 % (ref 11.5–14.5)
FIO2 ON VENT: 40 %
GAS FLOW.O2 SETTING OXYMISER: 10
GLUCOSE BLD STRIP.AUTO-MCNC: 119 MG/DL (ref 65–100)
GLUCOSE BLD STRIP.AUTO-MCNC: 123 MG/DL (ref 65–100)
GLUCOSE BLD STRIP.AUTO-MCNC: 124 MG/DL (ref 65–100)
GLUCOSE BLD STRIP.AUTO-MCNC: 127 MG/DL (ref 65–100)
GLUCOSE BLD STRIP.AUTO-MCNC: 134 MG/DL (ref 65–100)
GLUCOSE SERPL-MCNC: 135 MG/DL (ref 65–100)
HCO3 BLDA-SCNC: 28 MMOL/L (ref 22–26)
HCT VFR BLD AUTO: 34.7 % (ref 35–47)
HGB BLD-MCNC: 11.4 G/DL (ref 11.5–16)
MCH RBC QN AUTO: 28.9 PG (ref 26–34)
MCHC RBC AUTO-ENTMCNC: 32.9 G/DL (ref 30–36.5)
MCV RBC AUTO: 87.8 FL (ref 80–99)
METHGB MFR BLD: 0.4 % (ref 0–1.4)
NRBC # BLD: 0 K/UL (ref 0–0.01)
NRBC BLD-RTO: 0 PER 100 WBC
OXYHGB MFR BLD: 92.1 % (ref 95–99)
PCO2 BLDA: 46 MMHG (ref 35–45)
PEEP RESPIRATORY: 6
PERFORMED BY:: ABNORMAL
PH BLDA: 7.4 (ref 7.35–7.45)
PLATELET # BLD AUTO: 218 K/UL (ref 150–400)
PMV BLD AUTO: 11.7 FL (ref 8.9–12.9)
PO2 BLDA: 68 MMHG (ref 80–100)
POTASSIUM SERPL-SCNC: 4.3 MMOL/L (ref 3.5–5.1)
RBC # BLD AUTO: 3.95 M/UL (ref 3.8–5.2)
SAO2 % BLD: 93 % (ref 95–99)
SAO2% DEVICE SAO2% SENSOR NAME: ABNORMAL
SODIUM SERPL-SCNC: 137 MMOL/L (ref 136–145)
SPECIMEN SITE: ABNORMAL
VENTILATION MODE VENT: ABNORMAL
VT SETTING VENT: 500
WBC # BLD AUTO: 12.9 K/UL (ref 3.6–11)

## 2024-11-18 PROCEDURE — 6370000000 HC RX 637 (ALT 250 FOR IP): Performed by: STUDENT IN AN ORGANIZED HEALTH CARE EDUCATION/TRAINING PROGRAM

## 2024-11-18 PROCEDURE — 6360000002 HC RX W HCPCS: Performed by: STUDENT IN AN ORGANIZED HEALTH CARE EDUCATION/TRAINING PROGRAM

## 2024-11-18 PROCEDURE — 2580000003 HC RX 258: Performed by: STUDENT IN AN ORGANIZED HEALTH CARE EDUCATION/TRAINING PROGRAM

## 2024-11-18 PROCEDURE — 6360000002 HC RX W HCPCS: Performed by: NURSE PRACTITIONER

## 2024-11-18 PROCEDURE — 6370000000 HC RX 637 (ALT 250 FOR IP): Performed by: INTERNAL MEDICINE

## 2024-11-18 PROCEDURE — 93975 VASCULAR STUDY: CPT

## 2024-11-18 PROCEDURE — 36415 COLL VENOUS BLD VENIPUNCTURE: CPT

## 2024-11-18 PROCEDURE — 94640 AIRWAY INHALATION TREATMENT: CPT

## 2024-11-18 PROCEDURE — 82962 GLUCOSE BLOOD TEST: CPT

## 2024-11-18 PROCEDURE — 6370000000 HC RX 637 (ALT 250 FOR IP)

## 2024-11-18 PROCEDURE — 2000000000 HC ICU R&B

## 2024-11-18 PROCEDURE — 94003 VENT MGMT INPAT SUBQ DAY: CPT

## 2024-11-18 PROCEDURE — 85027 COMPLETE CBC AUTOMATED: CPT

## 2024-11-18 PROCEDURE — 2500000003 HC RX 250 WO HCPCS: Performed by: NURSE PRACTITIONER

## 2024-11-18 PROCEDURE — 80048 BASIC METABOLIC PNL TOTAL CA: CPT

## 2024-11-18 PROCEDURE — 2580000003 HC RX 258: Performed by: NURSE PRACTITIONER

## 2024-11-18 PROCEDURE — 2700000000 HC OXYGEN THERAPY PER DAY

## 2024-11-18 PROCEDURE — 6360000002 HC RX W HCPCS: Performed by: INTERNAL MEDICINE

## 2024-11-18 PROCEDURE — 82803 BLOOD GASES ANY COMBINATION: CPT

## 2024-11-18 PROCEDURE — 94761 N-INVAS EAR/PLS OXIMETRY MLT: CPT

## 2024-11-18 PROCEDURE — 36600 WITHDRAWAL OF ARTERIAL BLOOD: CPT

## 2024-11-18 RX ORDER — BUSPIRONE HYDROCHLORIDE 5 MG/1
7.5 TABLET ORAL 2 TIMES DAILY
Status: DISCONTINUED | OUTPATIENT
Start: 2024-11-18 | End: 2024-12-09

## 2024-11-18 RX ORDER — ENOXAPARIN SODIUM 100 MG/ML
30 INJECTION SUBCUTANEOUS 2 TIMES DAILY
Status: DISCONTINUED | OUTPATIENT
Start: 2024-11-19 | End: 2024-11-26

## 2024-11-18 RX ADMIN — ENOXAPARIN SODIUM 40 MG: 100 INJECTION SUBCUTANEOUS at 09:10

## 2024-11-18 RX ADMIN — BUSPIRONE HYDROCHLORIDE 7.5 MG: 5 TABLET ORAL at 12:35

## 2024-11-18 RX ADMIN — ACETYLCYSTEINE 600 MG: 200 INHALANT RESPIRATORY (INHALATION) at 06:12

## 2024-11-18 RX ADMIN — METOPROLOL TARTRATE 12.5 MG: 25 TABLET, FILM COATED ORAL at 09:11

## 2024-11-18 RX ADMIN — METHYLPREDNISOLONE SODIUM SUCCINATE 40 MG: 40 INJECTION INTRAMUSCULAR; INTRAVENOUS at 12:35

## 2024-11-18 RX ADMIN — DOCUSATE SODIUM 100 MG: 50 LIQUID ORAL at 09:11

## 2024-11-18 RX ADMIN — Medication 50 MCG/HR: at 01:05

## 2024-11-18 RX ADMIN — IPRATROPIUM BROMIDE AND ALBUTEROL SULFATE 1 DOSE: 2.5; .5 SOLUTION RESPIRATORY (INHALATION) at 16:24

## 2024-11-18 RX ADMIN — PROPOFOL 40 MCG/KG/MIN: 10 INJECTION, EMULSION INTRAVENOUS at 02:05

## 2024-11-18 RX ADMIN — PROPOFOL 40 MCG/KG/MIN: 10 INJECTION, EMULSION INTRAVENOUS at 22:55

## 2024-11-18 RX ADMIN — AZITHROMYCIN DIHYDRATE 500 MG: 500 INJECTION, POWDER, LYOPHILIZED, FOR SOLUTION INTRAVENOUS at 01:01

## 2024-11-18 RX ADMIN — PROPOFOL 40 MCG/KG/MIN: 10 INJECTION, EMULSION INTRAVENOUS at 09:04

## 2024-11-18 RX ADMIN — PROPOFOL 40 MCG/KG/MIN: 10 INJECTION, EMULSION INTRAVENOUS at 05:41

## 2024-11-18 RX ADMIN — FAMOTIDINE 20 MG: 20 TABLET, FILM COATED ORAL at 09:11

## 2024-11-18 RX ADMIN — SENNOSIDES 8.6 MG: 8.6 TABLET, FILM COATED ORAL at 20:50

## 2024-11-18 RX ADMIN — AMIODARONE HYDROCHLORIDE 400 MG: 200 TABLET ORAL at 09:11

## 2024-11-18 RX ADMIN — PROPOFOL 40 MCG/KG/MIN: 10 INJECTION, EMULSION INTRAVENOUS at 16:48

## 2024-11-18 RX ADMIN — WATER 1000 MG: 1 INJECTION INTRAMUSCULAR; INTRAVENOUS; SUBCUTANEOUS at 00:57

## 2024-11-18 RX ADMIN — ATORVASTATIN CALCIUM 40 MG: 40 TABLET, FILM COATED ORAL at 20:49

## 2024-11-18 RX ADMIN — ASPIRIN 81 MG: 81 TABLET, COATED ORAL at 09:11

## 2024-11-18 RX ADMIN — AMIODARONE HYDROCHLORIDE 400 MG: 200 TABLET ORAL at 20:49

## 2024-11-18 RX ADMIN — ACETYLCYSTEINE 600 MG: 200 INHALANT RESPIRATORY (INHALATION) at 19:39

## 2024-11-18 RX ADMIN — SODIUM CHLORIDE, PRESERVATIVE FREE 10 ML: 5 INJECTION INTRAVENOUS at 09:10

## 2024-11-18 RX ADMIN — METHYLPREDNISOLONE SODIUM SUCCINATE 40 MG: 40 INJECTION INTRAMUSCULAR; INTRAVENOUS at 04:38

## 2024-11-18 RX ADMIN — IPRATROPIUM BROMIDE AND ALBUTEROL SULFATE 1 DOSE: 2.5; .5 SOLUTION RESPIRATORY (INHALATION) at 06:12

## 2024-11-18 RX ADMIN — IPRATROPIUM BROMIDE AND ALBUTEROL SULFATE 1 DOSE: 2.5; .5 SOLUTION RESPIRATORY (INHALATION) at 02:54

## 2024-11-18 RX ADMIN — PROPOFOL 40 MCG/KG/MIN: 10 INJECTION, EMULSION INTRAVENOUS at 19:18

## 2024-11-18 RX ADMIN — SODIUM CHLORIDE, PRESERVATIVE FREE 10 ML: 5 INJECTION INTRAVENOUS at 20:50

## 2024-11-18 RX ADMIN — METOPROLOL TARTRATE 12.5 MG: 25 TABLET, FILM COATED ORAL at 20:50

## 2024-11-18 RX ADMIN — PROPOFOL 40 MCG/KG/MIN: 10 INJECTION, EMULSION INTRAVENOUS at 12:35

## 2024-11-18 RX ADMIN — POLYETHYLENE GLYCOL 3350 17 G: 17 POWDER, FOR SOLUTION ORAL at 09:10

## 2024-11-18 RX ADMIN — BUSPIRONE HYDROCHLORIDE 7.5 MG: 5 TABLET ORAL at 20:49

## 2024-11-18 RX ADMIN — IPRATROPIUM BROMIDE AND ALBUTEROL SULFATE 1 DOSE: 2.5; .5 SOLUTION RESPIRATORY (INHALATION) at 19:39

## 2024-11-18 RX ADMIN — IPRATROPIUM BROMIDE AND ALBUTEROL SULFATE 1 DOSE: 2.5; .5 SOLUTION RESPIRATORY (INHALATION) at 23:24

## 2024-11-18 RX ADMIN — METHYLPREDNISOLONE SODIUM SUCCINATE 40 MG: 40 INJECTION INTRAMUSCULAR; INTRAVENOUS at 20:49

## 2024-11-18 RX ADMIN — Medication 50 MCG/HR: at 22:58

## 2024-11-18 RX ADMIN — IPRATROPIUM BROMIDE AND ALBUTEROL SULFATE 1 DOSE: 2.5; .5 SOLUTION RESPIRATORY (INHALATION) at 11:42

## 2024-11-18 ASSESSMENT — PULMONARY FUNCTION TESTS
PIF_VALUE: 13
PIF_VALUE: 22
PIF_VALUE: 18
PIF_VALUE: 13
PIF_VALUE: 16
PIF_VALUE: 32
PIF_VALUE: 17
PIF_VALUE: 20
PIF_VALUE: 25
PIF_VALUE: 16
PIF_VALUE: 13
PIF_VALUE: 18
PIF_VALUE: 17
PIF_VALUE: 18
PIF_VALUE: 18
PIF_VALUE: 25
PIF_VALUE: 18
PIF_VALUE: 18
PIF_VALUE: 17
PIF_VALUE: 16
PIF_VALUE: 18
PIF_VALUE: 18
PIF_VALUE: 28
PIF_VALUE: 25
PIF_VALUE: 12
PIF_VALUE: 23
PIF_VALUE: 17
PIF_VALUE: 23
PIF_VALUE: 18
PIF_VALUE: 18
PIF_VALUE: 21
PIF_VALUE: 29
PIF_VALUE: 18
PIF_VALUE: 15
PIF_VALUE: 20
PIF_VALUE: 17
PIF_VALUE: 14
PIF_VALUE: 14
PIF_VALUE: 15
PIF_VALUE: 22
PIF_VALUE: 18
PIF_VALUE: 15

## 2024-11-18 ASSESSMENT — PAIN SCALES - GENERAL
PAINLEVEL_OUTOF10: 0

## 2024-11-18 NOTE — CARE COORDINATION
CM reviewed Pt medicals, Pt lives with her boyfriend and was IND with ADL.    Pt does not have insurance, Pt could not afford her medications.     CM will follow for D/C planning.    Pt will need assistance with medications.     Per IDR  Pt remains on the vent.  Pt did SBT for 9 hours yesterday.     Pt will follow commands, wide awake.

## 2024-11-18 NOTE — PROGRESS NOTES
CRITICAL CARE PROGRESS NOTE    Name: Kim Markham   : 1975   MRN: [id number]   Date: 2024      Diagnoses/problem list:   Asthma  COPD  Vtach    HPI   Kim Markham is a 49 y.o. female with known past medical history significant for asthma and COPD who treated her symptoms today with Primatene Mist over-the-counter says that she went into fast heart rate became short of breath immediately.  No other exacerbating or relieving factors which presents to the emergency room with no treatment prior to arrival.  Patient reports that she had a few day history of shortness of breath and thought she had a pneumonia. She has been taking OTC cough syrup and cough medicine as well as her inhaler. She reports difficulty affording medications and has limited access to healthcare.     : Continues to have high vent requirements and high sedation requirements. SVT noted and amiodarone initiated.    11/15: Remains intubated on 500/16/10/55%.  Still has bilateral wheezing on auscultation.  Slight rise in creatinine noted but urine output is good.  No arrhythmias this morning.  Remains on amiodarone, propofol, fentanyl and heparin infusions.  : continues to require high sedation and high vent support. Will attempt SBT  : tolerated SBT for >2 hours. Will repeat today  : Sedated with propofol 40ug/kg/min, RAAS -1 to -2. AC 12 Vt 0.5 FiO2 0.4 PEEP 6. Doing well on SBTs but there is concern that she has too much anxiety to withdraw sedation and extubate    Assessment and plan:     NEUROLOGICAL:    - Wean sedation for RASS goal 0 to -1  - SAT daily for neurological exam  - Avoid Precedex due to profound bradycardia  -Continue propofol and fentanyl  -Added PO buspirone to reduce IV medication dosages    PULMONOLOGY:   Acute hypoxemic respiratory failure  - maintain SpO2>=92, pH>=7.30  - FiO2 weaned to 55% and PEEP is at 10, will continue to wean for goal SpO2 >90%  - History of asthma and COPD, no

## 2024-11-18 NOTE — PROGRESS NOTES
Comprehensive Nutrition Assessment    Type and Reason for Visit:  Reassess    Nutrition Recommendations/Plan:   Continue current TF:   Promote 1.0 Delmar(Other TF) via NGT at 25 mL/hr, advance by 10 mL/hr every 6 hrs as tolerated to goal rate of 65 mL/hr.  Flush with 120 mL H2O q4hrs.  Provides: 1560 kcal(74%), 98 gm protein(77%), 2015 mL H2O(95%).                      +824 kcal (113%)  from Propofol at 31.2 mL/hr.     Monitor and record TF rate, flushes, tolerance, BM in I/Os.     Malnutrition Assessment:  Malnutrition Status:  No malnutrition (11/14/24 1115)    Context:  Acute Illness     Findings of the 6 clinical characteristics of malnutrition:  Energy Intake:  No decrease in energy intake  Weight Loss:  No weight loss     Body Fat Loss:  No body fat loss     Muscle Mass Loss:  No muscle mass loss    Fluid Accumulation:  No fluid accumulation     Strength:  Not Performed    Nutrition Assessment:    Admitted for SVT. Pt intubated in early a.m. of 11/13 in CCU. Brief nutrition assessment done- insufficient info gathered. (11/14) Pt remains vented, now off levo, sedated on propofol and fentanyl. NGT in place, hemo stable for EN initiation; however, will hold off for 24-36 hrs. Report of plan for SBT today. No noted h/o wt loss nor poor intakes PTA. RD to provide regimen for when medically able to start TF. (11/18) Pt remains intubated and sedated in CCU. Pt tolerating TF at goal rate. Current propofol rate 31.2ml/hr provides 823kcals/day. Meds: fentanyl, propofol, lipitor, rocephin, colace, pepcid, lopressor, glycolax. Labs: Creat 2.22 H, Glucose 135 H, Hgb 11.4.    Nutrition Related Findings:    No BM documented yet this admission, glycolax/senokot ordered. TF at  goal rate 65ml/hr. Wound Type: None       Current Nutrition Intake & Therapies:    Average Meal Intake: NPO  Average Supplements Intake: NPO  Diet NPO  ADULT TUBE FEEDING; Nasogastric; Other Tube Feeding (specify); Promote 1.0 Delmar; Continuous; 25; Yes;

## 2024-11-18 NOTE — PLAN OF CARE
Problem: Respiratory - Adult  Goal: Achieves optimal ventilation and oxygenation  11/18/2024 1203 by Candida Carpio RN  Outcome: Progressing  Flowsheets (Taken 11/18/2024 1203)  Achieves optimal ventilation and oxygenation:   Assess for changes in respiratory status   Respiratory therapy support as indicated   Oxygen supplementation based on oxygen saturation or arterial blood gases  Note: Wean ventilator support / SBT as tolerated  11/17/2024 2227 by Yessy Hurtado RN  Outcome: Progressing  Flowsheets (Taken 11/17/2024 2000)  Achieves optimal ventilation and oxygenation: Assess for changes in respiratory status     Problem: Cardiovascular - Adult  Goal: Maintains optimal cardiac output and hemodynamic stability  11/18/2024 1203 by Candida Carpio RN  Outcome: Progressing  Flowsheets (Taken 11/18/2024 1203)  Maintains optimal cardiac output and hemodynamic stability: Monitor blood pressure and heart rate  11/17/2024 2227 by Yessy Hurtado RN  Outcome: Progressing  Flowsheets (Taken 11/17/2024 2000)  Maintains optimal cardiac output and hemodynamic stability: Monitor blood pressure and heart rate  Goal: Absence of cardiac dysrhythmias or at baseline  11/18/2024 1203 by Candida Carpio RN  Outcome: Progressing  Flowsheets (Taken 11/18/2024 1203)  Absence of cardiac dysrhythmias or at baseline:   Monitor cardiac rate and rhythm   Administer antiarrhythmia medication and electrolyte replacement as ordered  11/17/2024 2227 by Yessy Hurtado RN  Outcome: Progressing     Problem: Safety - Medical Restraint  Goal: Remains free of injury from restraints (Restraint for Interference with Medical Device)  Description: INTERVENTIONS:  1. Determine that other, less restrictive measures have been tried or would not be effective before applying the restraint  2. Evaluate the patient's condition at the time of restraint application  3. Inform patient/family regarding the reason for restraint  4. Q2H:

## 2024-11-18 NOTE — PROGRESS NOTES
CARDIOLOGY PROGRESS NOTE      Patient Name: Kim Markham  Age: 49 y.o.  Gender:female  :1975  MRN: [id number]    Patient seen and examined. This is a patient with a history of asthma and COPD who presented with shortness of breath and palpitations now being followed for elevated troponin. Remains intubated and sedated in ICU. NSR. Family at bedside. No other complaints reported.    Telemetry reviewed, there were no events noted in the past 24 hours.      Physical Examination    Allergies   Allergen Reactions    Latex      Vitals:    24 1500   BP: 138/82   Pulse: 82   Resp: 12   Temp: 98.9 °F (37.2 °C)   SpO2: 95%     Vital signs are stable  Intubated/sedated  Heart has a RRR.  No murmur  Lungs are diminished  Abdomen is soft, nontender, normal bowel sounds.  Extremities have trace edema  Skin is dry and warm.    Labs reviewed:  Recent Results (from the past 12 hour(s))   POCT Glucose    Collection Time: 24  5:43 AM   Result Value Ref Range    POC Glucose 124 (H) 65 - 100 mg/dL    Performed by: Bryant Sandoval    POCT Glucose    Collection Time: 24 11:32 AM   Result Value Ref Range    POC Glucose 123 (H) 65 - 100 mg/dL    Performed by: Nimesh Bone    Vascular duplex renal arterial complete    Collection Time: 24 11:48 AM   Result Value Ref Range    Body Surface Area 2.33 m2    Left Renal Dist RI 0.89 no units    Left renal dist .0 cm/s    Left renal dist EDV 11.6 cm/s    Left Segmental Renal Artery PSV 46.5 cm/s    Left Segmental Renal Artery EDV 10.0 cm/s    Left Segmental Renal Artery PSV 34.4 cm/s    Left Segmental Renal Artery EDV 7.9 cm/s    Left Segmental Renal Artery PSV 54.7 cm/s    Left Segmental Renal Artery EDV 10.4 cm/s    Right Renal Dist RI 0.82 no units    Right renal dist .0 cm/s    Right renal dist EDV 26.4 cm/s    Right Renal Mid RI 0.84 no units    Right renal mid .0 cm/s    Right renal mid EDV 25.3 cm/s    Right Renal Prox RI 0.82  no units    Right renal prox .0 cm/s    Right renal prox EDV 19.5 cm/s    Right renal origin .0 cm/s    Right renal origin EDV 13.6 cm/s    Right Segmental Renal Artery PSV 37.9 cm/s    Right Segmental Renal Artery EDV 7.9 cm/s    Right Segmental Renal Artery PSV 27.7 cm/s    Right Segmental Renal Artery EDV 7.7 cm/s    Right Segmental Renal Artery PSV 27.5 cm/s    Right Segmental Renal Artery EDV 5.9 cm/s    Infrarenal Ao .0 cm/s    Left kidney length 12.40 cm    Right kidney length 12.99 cm    Right Renal Origin RI 0.90       Case discussed with Dr. Dean and our impression and recommendations are as follows:  Type II NSTEMI, likely demand ischemia given hypoxia. Peak troponin 248. Echo with EF 60-65%, LVH, RV moderately dilated. CTA chest negative for PE   Continue ASA  Can consider ischemic evaluation in the OP setting   Atrial arrhythmia, likely AVNRT, remains in sinus on amio, now on oral. If remains on this long term will need TSH/LFT/PFT monitoring.   Continue amio  Keep electrolytes WNL, K+ 4-5, Mg >2   Can follow-up with EP in the OP setting   Hypoxic resp failure due to COPD exacerbation. Currently intubated on [facility] vent, sedation.   - on IV abx and steroids per intensivist team   Obesity, BMI 43.58. Needs OP sleep study for possible DAVID   Tobacco abuse, needs encouraged cessation     Remains critically ill, requiring ICU care.      Please do not hesitate to call if additional questions arise.    TOMÁS GRADY, APRN - NP  11/18/2024

## 2024-11-19 ENCOUNTER — APPOINTMENT (OUTPATIENT)
Facility: HOSPITAL | Age: 49
End: 2024-11-19
Payer: MEDICAID

## 2024-11-19 LAB
ANION GAP SERPL CALC-SCNC: 4 MMOL/L (ref 2–12)
ANION GAP SERPL CALC-SCNC: 6 MMOL/L (ref 2–12)
BUN SERPL-MCNC: 71 MG/DL (ref 6–20)
BUN SERPL-MCNC: 77 MG/DL (ref 6–20)
BUN/CREAT SERPL: 32 (ref 12–20)
BUN/CREAT SERPL: 33 (ref 12–20)
CA-I BLD-MCNC: 9.2 MG/DL (ref 8.5–10.1)
CA-I BLD-MCNC: 9.2 MG/DL (ref 8.5–10.1)
CHLORIDE SERPL-SCNC: 104 MMOL/L (ref 97–108)
CHLORIDE SERPL-SCNC: 105 MMOL/L (ref 97–108)
CO2 SERPL-SCNC: 29 MMOL/L (ref 21–32)
CO2 SERPL-SCNC: 29 MMOL/L (ref 21–32)
CREAT SERPL-MCNC: 2.17 MG/DL (ref 0.55–1.02)
CREAT SERPL-MCNC: 2.38 MG/DL (ref 0.55–1.02)
ECHO BSA: 2.33 M2
ERYTHROCYTE [DISTWIDTH] IN BLOOD BY AUTOMATED COUNT: 13.9 % (ref 11.5–14.5)
GLUCOSE BLD STRIP.AUTO-MCNC: 107 MG/DL (ref 65–100)
GLUCOSE BLD STRIP.AUTO-MCNC: 119 MG/DL (ref 65–100)
GLUCOSE BLD STRIP.AUTO-MCNC: 122 MG/DL (ref 65–100)
GLUCOSE BLD STRIP.AUTO-MCNC: 99 MG/DL (ref 65–100)
GLUCOSE SERPL-MCNC: 120 MG/DL (ref 65–100)
GLUCOSE SERPL-MCNC: 127 MG/DL (ref 65–100)
HCT VFR BLD AUTO: 35 % (ref 35–47)
HGB BLD-MCNC: 11.2 G/DL (ref 11.5–16)
MAGNESIUM SERPL-MCNC: 3.4 MG/DL (ref 1.6–2.4)
MCH RBC QN AUTO: 28.4 PG (ref 26–34)
MCHC RBC AUTO-ENTMCNC: 32 G/DL (ref 30–36.5)
MCV RBC AUTO: 88.8 FL (ref 80–99)
NRBC # BLD: 0 K/UL (ref 0–0.01)
NRBC BLD-RTO: 0 PER 100 WBC
PERFORMED BY:: ABNORMAL
PERFORMED BY:: NORMAL
PHOSPHATE SERPL-MCNC: 4.9 MG/DL (ref 2.6–4.7)
PLATELET # BLD AUTO: 243 K/UL (ref 150–400)
PMV BLD AUTO: 12.5 FL (ref 8.9–12.9)
POTASSIUM SERPL-SCNC: 3.9 MMOL/L (ref 3.5–5.1)
POTASSIUM SERPL-SCNC: 4.5 MMOL/L (ref 3.5–5.1)
RBC # BLD AUTO: 3.94 M/UL (ref 3.8–5.2)
SODIUM SERPL-SCNC: 138 MMOL/L (ref 136–145)
SODIUM SERPL-SCNC: 139 MMOL/L (ref 136–145)
VAS AORTA INFRARENAL PSV: 106 CM/S
VAS LEFT KIDNEY LENGTH: 12.4 CM
VAS LEFT RENAL DIST EDV: 11.6 CM/S
VAS LEFT RENAL DIST PSV: 108 CM/S
VAS LEFT RENAL DIST RI: 0.89 NO UNITS
VAS LEFT SEGMENTAL RENAL ARTERY EDV: 10 CM/S
VAS LEFT SEGMENTAL RENAL ARTERY EDV: 10.4 CM/S
VAS LEFT SEGMENTAL RENAL ARTERY EDV: 7.9 CM/S
VAS LEFT SEGMENTAL RENAL ARTERY PSV: 34.4 CM/S
VAS LEFT SEGMENTAL RENAL ARTERY PSV: 46.5 CM/S
VAS LEFT SEGMENTAL RENAL ARTERY PSV: 54.7 CM/S
VAS RIGHT KIDNEY LENGTH: 12.99 CM
VAS RIGHT RENAL DIST EDV: 26.4 CM/S
VAS RIGHT RENAL DIST PSV: 143 CM/S
VAS RIGHT RENAL DIST RI: 0.82 NO UNITS
VAS RIGHT RENAL MID EDV: 25.3 CM/S
VAS RIGHT RENAL MID PSV: 160 CM/S
VAS RIGHT RENAL MID RI: 0.84 NO UNITS
VAS RIGHT RENAL ORIGIN EDV: 13.6 CM/S
VAS RIGHT RENAL ORIGIN PSV: 135 CM/S
VAS RIGHT RENAL ORIGIN RI: 0.9
VAS RIGHT RENAL PROX EDV: 19.5 CM/S
VAS RIGHT RENAL PROX PSV: 107 CM/S
VAS RIGHT RENAL PROX RI: 0.82 NO UNITS
VAS RIGHT SEGMENTAL RENAL ARTERY EDV: 5.9 CM/S
VAS RIGHT SEGMENTAL RENAL ARTERY EDV: 7.7 CM/S
VAS RIGHT SEGMENTAL RENAL ARTERY EDV: 7.9 CM/S
VAS RIGHT SEGMENTAL RENAL ARTERY PSV: 27.5 CM/S
VAS RIGHT SEGMENTAL RENAL ARTERY PSV: 27.7 CM/S
VAS RIGHT SEGMENTAL RENAL ARTERY PSV: 37.9 CM/S
WBC # BLD AUTO: 14.6 K/UL (ref 3.6–11)

## 2024-11-19 PROCEDURE — 6370000000 HC RX 637 (ALT 250 FOR IP): Performed by: STUDENT IN AN ORGANIZED HEALTH CARE EDUCATION/TRAINING PROGRAM

## 2024-11-19 PROCEDURE — 6360000002 HC RX W HCPCS: Performed by: INTERNAL MEDICINE

## 2024-11-19 PROCEDURE — 6360000002 HC RX W HCPCS: Performed by: NURSE PRACTITIONER

## 2024-11-19 PROCEDURE — 94003 VENT MGMT INPAT SUBQ DAY: CPT

## 2024-11-19 PROCEDURE — 6360000002 HC RX W HCPCS: Performed by: STUDENT IN AN ORGANIZED HEALTH CARE EDUCATION/TRAINING PROGRAM

## 2024-11-19 PROCEDURE — 94640 AIRWAY INHALATION TREATMENT: CPT

## 2024-11-19 PROCEDURE — 85027 COMPLETE CBC AUTOMATED: CPT

## 2024-11-19 PROCEDURE — 2000000000 HC ICU R&B

## 2024-11-19 PROCEDURE — 6370000000 HC RX 637 (ALT 250 FOR IP): Performed by: INTERNAL MEDICINE

## 2024-11-19 PROCEDURE — 93975 VASCULAR STUDY: CPT | Performed by: SURGERY

## 2024-11-19 PROCEDURE — 2580000003 HC RX 258: Performed by: NURSE PRACTITIONER

## 2024-11-19 PROCEDURE — 71045 X-RAY EXAM CHEST 1 VIEW: CPT

## 2024-11-19 PROCEDURE — 2580000003 HC RX 258: Performed by: STUDENT IN AN ORGANIZED HEALTH CARE EDUCATION/TRAINING PROGRAM

## 2024-11-19 PROCEDURE — 94761 N-INVAS EAR/PLS OXIMETRY MLT: CPT

## 2024-11-19 PROCEDURE — 80048 BASIC METABOLIC PNL TOTAL CA: CPT

## 2024-11-19 PROCEDURE — 93005 ELECTROCARDIOGRAM TRACING: CPT | Performed by: INTERNAL MEDICINE

## 2024-11-19 PROCEDURE — 84100 ASSAY OF PHOSPHORUS: CPT

## 2024-11-19 PROCEDURE — 6370000000 HC RX 637 (ALT 250 FOR IP)

## 2024-11-19 PROCEDURE — 2700000000 HC OXYGEN THERAPY PER DAY

## 2024-11-19 PROCEDURE — 83735 ASSAY OF MAGNESIUM: CPT

## 2024-11-19 PROCEDURE — 36415 COLL VENOUS BLD VENIPUNCTURE: CPT

## 2024-11-19 PROCEDURE — 2500000003 HC RX 250 WO HCPCS: Performed by: NURSE PRACTITIONER

## 2024-11-19 PROCEDURE — 82962 GLUCOSE BLOOD TEST: CPT

## 2024-11-19 RX ADMIN — PROPOFOL 40 MCG/KG/MIN: 10 INJECTION, EMULSION INTRAVENOUS at 20:27

## 2024-11-19 RX ADMIN — PROPOFOL 40 MCG/KG/MIN: 10 INJECTION, EMULSION INTRAVENOUS at 17:14

## 2024-11-19 RX ADMIN — METHYLPREDNISOLONE SODIUM SUCCINATE 40 MG: 40 INJECTION INTRAMUSCULAR; INTRAVENOUS at 04:52

## 2024-11-19 RX ADMIN — PROPOFOL 50 MCG/KG/MIN: 10 INJECTION, EMULSION INTRAVENOUS at 14:16

## 2024-11-19 RX ADMIN — ACETYLCYSTEINE 600 MG: 200 INHALANT RESPIRATORY (INHALATION) at 08:05

## 2024-11-19 RX ADMIN — SERTRALINE HYDROCHLORIDE 50 MG: 50 TABLET ORAL at 20:25

## 2024-11-19 RX ADMIN — PROPOFOL 40 MCG/KG/MIN: 10 INJECTION, EMULSION INTRAVENOUS at 04:51

## 2024-11-19 RX ADMIN — PROPOFOL 40 MCG/KG/MIN: 10 INJECTION, EMULSION INTRAVENOUS at 08:04

## 2024-11-19 RX ADMIN — ACETYLCYSTEINE 600 MG: 200 INHALANT RESPIRATORY (INHALATION) at 21:23

## 2024-11-19 RX ADMIN — AMIODARONE HYDROCHLORIDE 400 MG: 200 TABLET ORAL at 08:57

## 2024-11-19 RX ADMIN — WATER 1000 MG: 1 INJECTION INTRAMUSCULAR; INTRAVENOUS; SUBCUTANEOUS at 01:15

## 2024-11-19 RX ADMIN — IPRATROPIUM BROMIDE AND ALBUTEROL SULFATE 1 DOSE: 2.5; .5 SOLUTION RESPIRATORY (INHALATION) at 03:45

## 2024-11-19 RX ADMIN — ENOXAPARIN SODIUM 30 MG: 100 INJECTION SUBCUTANEOUS at 08:56

## 2024-11-19 RX ADMIN — METHYLPREDNISOLONE SODIUM SUCCINATE 40 MG: 40 INJECTION INTRAMUSCULAR; INTRAVENOUS at 20:34

## 2024-11-19 RX ADMIN — SENNOSIDES 8.6 MG: 8.6 TABLET, FILM COATED ORAL at 20:20

## 2024-11-19 RX ADMIN — DOCUSATE SODIUM 100 MG: 50 LIQUID ORAL at 08:57

## 2024-11-19 RX ADMIN — PROPOFOL 40 MCG/KG/MIN: 10 INJECTION, EMULSION INTRAVENOUS at 11:35

## 2024-11-19 RX ADMIN — PROPOFOL 40 MCG/KG/MIN: 10 INJECTION, EMULSION INTRAVENOUS at 01:50

## 2024-11-19 RX ADMIN — BUSPIRONE HYDROCHLORIDE 7.5 MG: 5 TABLET ORAL at 20:13

## 2024-11-19 RX ADMIN — POLYETHYLENE GLYCOL 3350 17 G: 17 POWDER, FOR SOLUTION ORAL at 08:57

## 2024-11-19 RX ADMIN — IPRATROPIUM BROMIDE AND ALBUTEROL SULFATE 1 DOSE: 2.5; .5 SOLUTION RESPIRATORY (INHALATION) at 21:22

## 2024-11-19 RX ADMIN — FAMOTIDINE 20 MG: 20 TABLET, FILM COATED ORAL at 08:58

## 2024-11-19 RX ADMIN — AMIODARONE HYDROCHLORIDE 400 MG: 200 TABLET ORAL at 20:12

## 2024-11-19 RX ADMIN — SODIUM CHLORIDE, PRESERVATIVE FREE 10 ML: 5 INJECTION INTRAVENOUS at 20:35

## 2024-11-19 RX ADMIN — IPRATROPIUM BROMIDE AND ALBUTEROL SULFATE 1 DOSE: 2.5; .5 SOLUTION RESPIRATORY (INHALATION) at 11:26

## 2024-11-19 RX ADMIN — IPRATROPIUM BROMIDE AND ALBUTEROL SULFATE 1 DOSE: 2.5; .5 SOLUTION RESPIRATORY (INHALATION) at 17:26

## 2024-11-19 RX ADMIN — Medication 50 MCG/HR: at 14:35

## 2024-11-19 RX ADMIN — METOPROLOL TARTRATE 12.5 MG: 25 TABLET, FILM COATED ORAL at 08:58

## 2024-11-19 RX ADMIN — METOPROLOL TARTRATE 12.5 MG: 25 TABLET, FILM COATED ORAL at 20:15

## 2024-11-19 RX ADMIN — PROPOFOL 40 MCG/KG/MIN: 10 INJECTION, EMULSION INTRAVENOUS at 23:48

## 2024-11-19 RX ADMIN — IPRATROPIUM BROMIDE AND ALBUTEROL SULFATE 1 DOSE: 2.5; .5 SOLUTION RESPIRATORY (INHALATION) at 08:04

## 2024-11-19 RX ADMIN — AZITHROMYCIN DIHYDRATE 500 MG: 500 INJECTION, POWDER, LYOPHILIZED, FOR SOLUTION INTRAVENOUS at 01:15

## 2024-11-19 RX ADMIN — ATORVASTATIN CALCIUM 40 MG: 40 TABLET, FILM COATED ORAL at 20:13

## 2024-11-19 RX ADMIN — ASPIRIN 81 MG: 81 TABLET, COATED ORAL at 08:58

## 2024-11-19 RX ADMIN — BUSPIRONE HYDROCHLORIDE 7.5 MG: 5 TABLET ORAL at 08:57

## 2024-11-19 RX ADMIN — SODIUM CHLORIDE, PRESERVATIVE FREE 10 ML: 5 INJECTION INTRAVENOUS at 08:56

## 2024-11-19 RX ADMIN — ENOXAPARIN SODIUM 30 MG: 100 INJECTION SUBCUTANEOUS at 20:42

## 2024-11-19 RX ADMIN — METHYLPREDNISOLONE SODIUM SUCCINATE 40 MG: 40 INJECTION INTRAMUSCULAR; INTRAVENOUS at 12:08

## 2024-11-19 ASSESSMENT — PULMONARY FUNCTION TESTS
PIF_VALUE: 21
PIF_VALUE: 30
PIF_VALUE: 19
PIF_VALUE: 24
PIF_VALUE: 33
PIF_VALUE: 18
PIF_VALUE: 20
PIF_VALUE: 24
PIF_VALUE: 20
PIF_VALUE: 24
PIF_VALUE: 22
PIF_VALUE: 28
PIF_VALUE: 25

## 2024-11-19 ASSESSMENT — PAIN SCALES - GENERAL: PAINLEVEL_OUTOF10: 0

## 2024-11-19 NOTE — PROGRESS NOTES
CRITICAL CARE PROGRESS NOTE    Name: Kim Markham   : 1975   MRN: [id number]   Date: 2024      Diagnoses/problem list:   Asthma  COPD  Vtach    HPI   Kim Markham is a 49 y.o. female with known past medical history significant for asthma and COPD who treated her symptoms today with Primatene Mist over-the-counter says that she went into fast heart rate became short of breath immediately.  No other exacerbating or relieving factors which presents to the emergency room with no treatment prior to arrival.  Patient reports that she had a few day history of shortness of breath and thought she had a pneumonia. She has been taking OTC cough syrup and cough medicine as well as her inhaler. She reports difficulty affording medications and has limited access to healthcare.     : Continues to have high vent requirements and high sedation requirements. SVT noted and amiodarone initiated.    11/15: Remains intubated on 500/16/10/55%.  Still has bilateral wheezing on auscultation.  Slight rise in creatinine noted but urine output is good.  No arrhythmias this morning.  Remains on amiodarone, propofol, fentanyl and heparin infusions.  : continues to require high sedation and high vent support. Will attempt SBT  : tolerated SBT for >2 hours. Will repeat today  : Sedated with propofol 40ug/kg/min, RAAS -1 to -2. AC 12 Vt 0.5 FiO2 0.4 PEEP 6. Doing well on SBTs but there is concern that she has too much anxiety to withdraw sedation and extubate  : Sedated with propofol 50ug/kg/min, RAAS -1 to -2. VD 7; AC 12 Vt 0.5 FiO2 0.4 PEEP 6. Doing well on SBTs but there is concern that she has too much anxiety to withdraw sedation and extubate. Started on buspirone . Will add sertraline    Assessment and plan:     NEUROLOGICAL:    - Wean sedation for RASS goal 0 to -1  - SAT daily for neurological exam  - Avoid Precedex due to profound bradycardia  -Continue propofol and fentanyl  -Added PO  NC/AT, supple  Cardiac: tachycardic, no murmurs  Pulm: Coarse breath sounds bilaterally  ABD: Soft, non distended, non tender, normal bowel sounds  Extremities: mild edema to BLE  Neuro: Sedated    Labs and Data: Reviewed 11/19/24  Medications: Reviewed 11/19/24  Imaging: Reviewed 11/19/24    Intake/Output:     Intake/Output Summary (Last 24 hours) at 11/19/2024 0948  Last data filed at 11/19/2024 0856  Gross per 24 hour   Intake 1885.73 ml   Output 2050 ml   Net -164.27 ml       CRITICAL CARE DOCUMENTATION  I had a face to face encounter with the patient, reviewed and interpreted patient data including clinical events, labs, images, vital signs, I/O's, and examined patient.  I have discussed the case and the plan and management of the patient's care with the consulting services, the bedside nurses and the respiratory therapist.      NOTE OF PERSONAL INVOLVEMENT IN CARE   This patient has a high probability of imminent, clinically significant deterioration, which requires the highest level of preparedness to intervene urgently. I participated in the decision-making and personally managed or directed the management of the following life and organ supporting interventions that required my frequent assessment to treat or prevent imminent deterioration.    I personally spent 48 minutes of critical care time.  This is time spent at this critically ill patient's bedside actively involved in patient care as well as the coordination of care.  This does not include any procedural time which has been billed separately.    Jerad Burton MD  Critical Care Medicine  [facility]

## 2024-11-19 NOTE — CARE COORDINATION
CM reviewed Pt medicals, Pt lives with her boyfriend and was IND with ADL.     Pt does not have insurance, Pt could not afford her medications.      CM will follow for D/C planning.     Pt will need assistance with medications.          Per IDR  HX of Asthma, did not tolerate BiPAP. Pt is passing SBT.    More awake and anxious.     2:20  CM sent a referral to [facility] to inquire if Pt qualifies for Medicaid.

## 2024-11-19 NOTE — PROGRESS NOTES
CARDIOLOGY PROGRESS NOTE      Patient Name: Kim Markham  Age: 49 y.o.  Gender:female  :1975  MRN: [id number]    Patient seen and examined. This is a patient with a history of asthma and COPD who presented with shortness of breath and palpitations now being followed for elevated troponin. Remains intubated and sedated in ICU. NSR. No other complaints reported.    Telemetry reviewed, there were no events noted in the past 24 hours.      Physical Examination    Allergies   Allergen Reactions    Latex      Vitals:    24 1300   BP: 133/85   Pulse: 90   Resp: 15   Temp:    SpO2:      Vital signs are stable  Intubated/sedated  Heart has a RRR.  No murmur  Lungs are diminished  Abdomen is soft, nontender, normal bowel sounds.  Extremities have trace edema  Skin is dry and warm.    Labs reviewed:  Recent Results (from the past 12 hour(s))   POCT Glucose    Collection Time: 24  8:39 AM   Result Value Ref Range    POC Glucose 107 (H) 65 - 100 mg/dL    Performed by: DEAN MOORE    POCT Glucose    Collection Time: 24 11:48 AM   Result Value Ref Range    POC Glucose 99 65 - 100 mg/dL    Performed by: DEAN MOORE       Case discussed with Dr. Dean and our impression and recommendations are as follows:  Type II NSTEMI, likely demand ischemia given hypoxia. Peak troponin 248. Echo with EF 60-65%, LVH, RV moderately dilated. CTA chest negative for PE   Continue ASA  Can consider ischemic evaluation in the OP setting   Atrial arrhythmia, likely AVNRT, remains in sinus on amio, now on oral. If remains on this long term will need TSH/LFT/PFT monitoring.   Continue amio, reduce dose to 200mg daily at discharge   Keep electrolytes WNL, K+ 4-5, Mg >2   Can follow-up with EP in the OP setting   Hypoxic resp failure due to COPD exacerbation. Currently intubated on [facility] vent, sedation.   - on IV abx and steroids per intensivist team   Obesity, BMI 43.58. Needs OP sleep study for possible ADVID   Tobacco

## 2024-11-20 ENCOUNTER — APPOINTMENT (OUTPATIENT)
Facility: HOSPITAL | Age: 49
End: 2024-11-20
Payer: MEDICAID

## 2024-11-20 LAB
ANION GAP SERPL CALC-SCNC: 6 MMOL/L (ref 2–12)
BNP SERPL-MCNC: 585 PG/ML
BUN SERPL-MCNC: 78 MG/DL (ref 6–20)
BUN/CREAT SERPL: 32 (ref 12–20)
CA-I BLD-MCNC: 9.1 MG/DL (ref 8.5–10.1)
CHLORIDE SERPL-SCNC: 105 MMOL/L (ref 97–108)
CO2 SERPL-SCNC: 28 MMOL/L (ref 21–32)
CREAT SERPL-MCNC: 2.45 MG/DL (ref 0.55–1.02)
EKG ATRIAL RATE: 89 BPM
EKG DIAGNOSIS: NORMAL
EKG P AXIS: 65 DEGREES
EKG P-R INTERVAL: 148 MS
EKG Q-T INTERVAL: 370 MS
EKG QRS DURATION: 100 MS
EKG QTC CALCULATION (BAZETT): 450 MS
EKG R AXIS: 55 DEGREES
EKG T AXIS: 52 DEGREES
EKG VENTRICULAR RATE: 89 BPM
ERYTHROCYTE [DISTWIDTH] IN BLOOD BY AUTOMATED COUNT: 13.8 % (ref 11.5–14.5)
GLUCOSE BLD STRIP.AUTO-MCNC: 111 MG/DL (ref 65–100)
GLUCOSE BLD STRIP.AUTO-MCNC: 118 MG/DL (ref 65–100)
GLUCOSE BLD STRIP.AUTO-MCNC: 124 MG/DL (ref 65–100)
GLUCOSE BLD STRIP.AUTO-MCNC: 128 MG/DL (ref 65–100)
GLUCOSE BLD STRIP.AUTO-MCNC: 154 MG/DL (ref 65–100)
GLUCOSE SERPL-MCNC: 125 MG/DL (ref 65–100)
HCT VFR BLD AUTO: 37.3 % (ref 35–47)
HGB BLD-MCNC: 11.8 G/DL (ref 11.5–16)
MAGNESIUM SERPL-MCNC: 3.5 MG/DL (ref 1.6–2.4)
MCH RBC QN AUTO: 28.2 PG (ref 26–34)
MCHC RBC AUTO-ENTMCNC: 31.6 G/DL (ref 30–36.5)
MCV RBC AUTO: 89.2 FL (ref 80–99)
NRBC # BLD: 0 K/UL (ref 0–0.01)
NRBC BLD-RTO: 0 PER 100 WBC
PERFORMED BY:: ABNORMAL
PHOSPHATE SERPL-MCNC: 7.1 MG/DL (ref 2.6–4.7)
PLATELET # BLD AUTO: 252 K/UL (ref 150–400)
PMV BLD AUTO: 12.1 FL (ref 8.9–12.9)
POTASSIUM SERPL-SCNC: 4.6 MMOL/L (ref 3.5–5.1)
PROCALCITONIN SERPL-MCNC: <0.05 NG/ML
RBC # BLD AUTO: 4.18 M/UL (ref 3.8–5.2)
SODIUM SERPL-SCNC: 139 MMOL/L (ref 136–145)
WBC # BLD AUTO: 15.2 K/UL (ref 3.6–11)

## 2024-11-20 PROCEDURE — 6370000000 HC RX 637 (ALT 250 FOR IP): Performed by: INTERNAL MEDICINE

## 2024-11-20 PROCEDURE — 36415 COLL VENOUS BLD VENIPUNCTURE: CPT

## 2024-11-20 PROCEDURE — 71045 X-RAY EXAM CHEST 1 VIEW: CPT

## 2024-11-20 PROCEDURE — 6360000002 HC RX W HCPCS: Performed by: STUDENT IN AN ORGANIZED HEALTH CARE EDUCATION/TRAINING PROGRAM

## 2024-11-20 PROCEDURE — 84100 ASSAY OF PHOSPHORUS: CPT

## 2024-11-20 PROCEDURE — 85027 COMPLETE CBC AUTOMATED: CPT

## 2024-11-20 PROCEDURE — 2500000003 HC RX 250 WO HCPCS: Performed by: NURSE PRACTITIONER

## 2024-11-20 PROCEDURE — 6360000002 HC RX W HCPCS: Performed by: NURSE PRACTITIONER

## 2024-11-20 PROCEDURE — 94640 AIRWAY INHALATION TREATMENT: CPT

## 2024-11-20 PROCEDURE — 2000000000 HC ICU R&B

## 2024-11-20 PROCEDURE — 6370000000 HC RX 637 (ALT 250 FOR IP): Performed by: STUDENT IN AN ORGANIZED HEALTH CARE EDUCATION/TRAINING PROGRAM

## 2024-11-20 PROCEDURE — 94761 N-INVAS EAR/PLS OXIMETRY MLT: CPT

## 2024-11-20 PROCEDURE — 94003 VENT MGMT INPAT SUBQ DAY: CPT

## 2024-11-20 PROCEDURE — 2700000000 HC OXYGEN THERAPY PER DAY

## 2024-11-20 PROCEDURE — 84145 PROCALCITONIN (PCT): CPT

## 2024-11-20 PROCEDURE — 83880 ASSAY OF NATRIURETIC PEPTIDE: CPT

## 2024-11-20 PROCEDURE — 6360000002 HC RX W HCPCS: Performed by: INTERNAL MEDICINE

## 2024-11-20 PROCEDURE — 2580000003 HC RX 258: Performed by: STUDENT IN AN ORGANIZED HEALTH CARE EDUCATION/TRAINING PROGRAM

## 2024-11-20 PROCEDURE — 83735 ASSAY OF MAGNESIUM: CPT

## 2024-11-20 PROCEDURE — 82962 GLUCOSE BLOOD TEST: CPT

## 2024-11-20 PROCEDURE — 6370000000 HC RX 637 (ALT 250 FOR IP)

## 2024-11-20 PROCEDURE — 2580000003 HC RX 258: Performed by: NURSE PRACTITIONER

## 2024-11-20 PROCEDURE — 80048 BASIC METABOLIC PNL TOTAL CA: CPT

## 2024-11-20 RX ORDER — POTASSIUM CHLORIDE 29.8 MG/ML
20 INJECTION INTRAVENOUS PRN
Status: DISCONTINUED | OUTPATIENT
Start: 2024-11-20 | End: 2024-11-26

## 2024-11-20 RX ORDER — FUROSEMIDE 10 MG/ML
40 INJECTION INTRAMUSCULAR; INTRAVENOUS ONCE
Status: COMPLETED | OUTPATIENT
Start: 2024-11-20 | End: 2024-11-20

## 2024-11-20 RX ORDER — METHYLPREDNISOLONE SODIUM SUCCINATE 40 MG/ML
40 INJECTION INTRAMUSCULAR; INTRAVENOUS DAILY
Status: DISCONTINUED | OUTPATIENT
Start: 2024-11-21 | End: 2024-11-23

## 2024-11-20 RX ORDER — POTASSIUM CHLORIDE 7.45 MG/ML
10 INJECTION INTRAVENOUS PRN
Status: DISCONTINUED | OUTPATIENT
Start: 2024-11-20 | End: 2024-11-26

## 2024-11-20 RX ORDER — METOPROLOL TARTRATE 1 MG/ML
2.5 INJECTION, SOLUTION INTRAVENOUS ONCE
Status: COMPLETED | OUTPATIENT
Start: 2024-11-21 | End: 2024-11-21

## 2024-11-20 RX ADMIN — AMIODARONE HYDROCHLORIDE 400 MG: 200 TABLET ORAL at 09:08

## 2024-11-20 RX ADMIN — SODIUM CHLORIDE, PRESERVATIVE FREE 10 ML: 5 INJECTION INTRAVENOUS at 20:26

## 2024-11-20 RX ADMIN — BUSPIRONE HYDROCHLORIDE 7.5 MG: 5 TABLET ORAL at 20:25

## 2024-11-20 RX ADMIN — IPRATROPIUM BROMIDE AND ALBUTEROL SULFATE 1 DOSE: 2.5; .5 SOLUTION RESPIRATORY (INHALATION) at 15:06

## 2024-11-20 RX ADMIN — BUSPIRONE HYDROCHLORIDE 7.5 MG: 5 TABLET ORAL at 09:08

## 2024-11-20 RX ADMIN — Medication 75 MCG/HR: at 05:17

## 2024-11-20 RX ADMIN — METHYLPREDNISOLONE SODIUM SUCCINATE 40 MG: 40 INJECTION INTRAMUSCULAR; INTRAVENOUS at 05:08

## 2024-11-20 RX ADMIN — PROPOFOL 45 MCG/KG/MIN: 10 INJECTION, EMULSION INTRAVENOUS at 22:00

## 2024-11-20 RX ADMIN — ASPIRIN 81 MG: 81 TABLET, COATED ORAL at 09:08

## 2024-11-20 RX ADMIN — METOPROLOL TARTRATE 12.5 MG: 25 TABLET, FILM COATED ORAL at 09:08

## 2024-11-20 RX ADMIN — SODIUM CHLORIDE, PRESERVATIVE FREE 10 ML: 5 INJECTION INTRAVENOUS at 09:21

## 2024-11-20 RX ADMIN — Medication 75 MCG/HR: at 20:02

## 2024-11-20 RX ADMIN — FAMOTIDINE 20 MG: 20 TABLET, FILM COATED ORAL at 09:08

## 2024-11-20 RX ADMIN — WATER 1000 MG: 1 INJECTION INTRAMUSCULAR; INTRAVENOUS; SUBCUTANEOUS at 01:01

## 2024-11-20 RX ADMIN — IPRATROPIUM BROMIDE AND ALBUTEROL SULFATE 1 DOSE: 2.5; .5 SOLUTION RESPIRATORY (INHALATION) at 07:46

## 2024-11-20 RX ADMIN — PROPOFOL 45 MCG/KG/MIN: 10 INJECTION, EMULSION INTRAVENOUS at 07:20

## 2024-11-20 RX ADMIN — PROPOFOL 45 MCG/KG/MIN: 10 INJECTION, EMULSION INTRAVENOUS at 10:22

## 2024-11-20 RX ADMIN — IPRATROPIUM BROMIDE AND ALBUTEROL SULFATE 1 DOSE: 2.5; .5 SOLUTION RESPIRATORY (INHALATION) at 04:22

## 2024-11-20 RX ADMIN — PROPOFOL 50 MCG/KG/MIN: 10 INJECTION, EMULSION INTRAVENOUS at 02:41

## 2024-11-20 RX ADMIN — ACETYLCYSTEINE 600 MG: 200 INHALANT RESPIRATORY (INHALATION) at 20:59

## 2024-11-20 RX ADMIN — PROPOFOL 45 MCG/KG/MIN: 10 INJECTION, EMULSION INTRAVENOUS at 15:28

## 2024-11-20 RX ADMIN — SERTRALINE HYDROCHLORIDE 50 MG: 50 TABLET ORAL at 09:08

## 2024-11-20 RX ADMIN — PROPOFOL 45 MCG/KG/MIN: 10 INJECTION, EMULSION INTRAVENOUS at 18:41

## 2024-11-20 RX ADMIN — SENNOSIDES 8.6 MG: 8.6 TABLET, FILM COATED ORAL at 20:26

## 2024-11-20 RX ADMIN — IPRATROPIUM BROMIDE AND ALBUTEROL SULFATE 1 DOSE: 2.5; .5 SOLUTION RESPIRATORY (INHALATION) at 20:59

## 2024-11-20 RX ADMIN — DOCUSATE SODIUM 100 MG: 50 LIQUID ORAL at 09:08

## 2024-11-20 RX ADMIN — FUROSEMIDE 40 MG: 10 INJECTION, SOLUTION INTRAMUSCULAR; INTRAVENOUS at 10:19

## 2024-11-20 RX ADMIN — AMIODARONE HYDROCHLORIDE 400 MG: 200 TABLET ORAL at 20:24

## 2024-11-20 RX ADMIN — POLYETHYLENE GLYCOL 3350 17 G: 17 POWDER, FOR SOLUTION ORAL at 09:08

## 2024-11-20 RX ADMIN — METOPROLOL TARTRATE 12.5 MG: 25 TABLET, FILM COATED ORAL at 20:26

## 2024-11-20 RX ADMIN — ATORVASTATIN CALCIUM 40 MG: 40 TABLET, FILM COATED ORAL at 20:25

## 2024-11-20 RX ADMIN — ENOXAPARIN SODIUM 30 MG: 100 INJECTION SUBCUTANEOUS at 09:08

## 2024-11-20 RX ADMIN — FUROSEMIDE 40 MG: 10 INJECTION, SOLUTION INTRAMUSCULAR; INTRAVENOUS at 16:58

## 2024-11-20 RX ADMIN — PROPOFOL 45 MCG/KG/MIN: 10 INJECTION, EMULSION INTRAVENOUS at 12:51

## 2024-11-20 RX ADMIN — IPRATROPIUM BROMIDE AND ALBUTEROL SULFATE 1 DOSE: 2.5; .5 SOLUTION RESPIRATORY (INHALATION) at 11:12

## 2024-11-20 RX ADMIN — IPRATROPIUM BROMIDE AND ALBUTEROL SULFATE 1 DOSE: 2.5; .5 SOLUTION RESPIRATORY (INHALATION) at 01:01

## 2024-11-20 RX ADMIN — ACETYLCYSTEINE 600 MG: 200 INHALANT RESPIRATORY (INHALATION) at 07:46

## 2024-11-20 RX ADMIN — ENOXAPARIN SODIUM 30 MG: 100 INJECTION SUBCUTANEOUS at 20:30

## 2024-11-20 ASSESSMENT — PULMONARY FUNCTION TESTS
PIF_VALUE: 17
PIF_VALUE: 24
PIF_VALUE: 15
PIF_VALUE: 24
PIF_VALUE: 16
PIF_VALUE: 18
PIF_VALUE: 20
PIF_VALUE: 22
PIF_VALUE: 20
PIF_VALUE: 15
PIF_VALUE: 19
PIF_VALUE: 19
PIF_VALUE: 13
PIF_VALUE: 23
PIF_VALUE: 15
PIF_VALUE: 17
PIF_VALUE: 17
PIF_VALUE: 23
PIF_VALUE: 20
PIF_VALUE: 25
PIF_VALUE: 12
PIF_VALUE: 21
PIF_VALUE: 22
PIF_VALUE: 14
PIF_VALUE: 17
PIF_VALUE: 12

## 2024-11-20 NOTE — PROGRESS NOTES
Comprehensive Nutrition Assessment    Type and Reason for Visit:  Reassess (Early Goal)    Nutrition Recommendations/Plan:   NPO, advance as medically appropriate.    Modify TF to Vital AF 1.2 Delmar(Peptide Based) via NGT at 43 mL/hr, advance by 10 mL/hr every 6 hrs as tolerated to new goal rate of 53 mL/hr.  Increase water flushes to 140 mL q4hrs.  Provides: 1526 kcal(73%), 95 gm protein(74%), 1870 mL H2O(90%).                        +927 kcal(118%) from Propofol at 35.1 mL/hr.    Continue to monitor and record TF rate, flushes, tolerance, BM in I/Os.     Malnutrition Assessment:  Malnutrition Status:  No malnutrition (11/14/24 1115)    Context:  Acute Illness       Nutrition Assessment:    Admitted for SVT. Pt intubated in early a.m. of 11/13 in CCU. Brief nutrition assessment done- insufficient info gathered. (11/14) Pt remains vented, now off levo, sedated on propofol and fentanyl. NGT in place, hemo stable for EN initiation; however, will hold off for 24-36 hrs. Report of plan for SBT today. No noted h/o wt loss nor poor intakes PTA. RD to provide regimen for when medically able to start TF. (11/18) Pt remains intubated and sedated in CCU. Pt tolerating TF at goal rate. (11/20) Pt remains vented, increasing sedation need on propofol and fentanyl. TF tolerated at goal rate. Labs: , BUN 78, Cr 2.45, GFR 23, Mg 3.4, Phos 4.9, POC . Meds: Fentanyl, propofol, senna, glycolax, lopressor, duoneb, pepcid, lovenox, colace, rocephin, lipitor, aspirin, amiodarone.    Nutrition Related Findings:    NFPE w/o acute findings. No N/V/D reported. No dysphagia hx. Last BM PTA, +C despite bowel regimen; ongoing bowel regimen increased. +1 BLE; nonpitting generalized edema. Wound Type: None       Current Nutrition Intake & Therapies:    Average Meal Intake: NPO  Average Supplements Intake: NPO  Diet NPO  ADULT TUBE FEEDING; Nasogastric; Other Tube Feeding (specify); Promote 1.0 Delmar; Continuous; 25; Yes; 10; Q 6 hours;

## 2024-11-20 NOTE — CARE COORDINATION
CM reviewed Pt medicals, Pt lives with her boyfriend and is IND with ADL.    Pt does not have insurance and could not afford to buy her medication.     Per Ensemble, \"This patient actually came into our Self Pay queue on yesterday. She’s been screened and a Financial Assistance application has been submitted due to over income for Medicaid. Patient was actively working full time prior to admission a week ago per boyfriend.\"    Pt will need to be set up with a Clinic that assist Pt that do not have insurance to get her medication.       CM will follow for D/C needs.     Per IDR  Pt remains on the vent.     Needs more sedation and oxygen.    Will try to get her extubated soon,

## 2024-11-20 NOTE — PLAN OF CARE
Problem: ABCDS Injury Assessment  Goal: Absence of physical injury  Outcome: Progressing     Problem: Safety - Adult  Goal: Free from fall injury  Outcome: Progressing     Problem: Respiratory - Adult  Goal: Achieves optimal ventilation and oxygenation  11/19/2024 2148 by MIKE Sierra RN  Outcome: Progressing  11/19/2024 1124 by Candida Carpio RN  Outcome: Progressing  Flowsheets (Taken 11/19/2024 1124)  Achieves optimal ventilation and oxygenation:   Assess for changes in respiratory status   Encourage broncho-pulmonary hygiene including cough, deep breathe, incentive spirometry   Assess the need for suctioning and aspirate as needed   Respiratory therapy support as indicated     Problem: Cardiovascular - Adult  Goal: Maintains optimal cardiac output and hemodynamic stability  11/19/2024 2148 by MIKE Sirera RN  Outcome: Progressing  11/19/2024 1124 by Candida Carpio RN  Outcome: Progressing     Problem: Gastrointestinal - Adult  Goal: Minimal or absence of nausea and vomiting  Outcome: Progressing     Problem: Genitourinary - Adult  Goal: Absence of urinary retention  Outcome: Progressing     Problem: Pain  Goal: Verbalizes/displays adequate comfort level or baseline comfort level  Outcome: Progressing     Problem: Skin/Tissue Integrity  Goal: Absence of new skin breakdown  Description: 1.  Monitor for areas of redness and/or skin breakdown  2.  Assess vascular access sites hourly  3.  Every 4-6 hours minimum:  Change oxygen saturation probe site  4.  Every 4-6 hours:  If on nasal continuous positive airway pressure, respiratory therapy assess nares and determine need for appliance change or resting period.  Outcome: Progressing

## 2024-11-20 NOTE — PROGRESS NOTES
CARDIOLOGY PROGRESS NOTE      Patient Name: Kim Markham  Age: 49 y.o.  Gender:female  :1975  MRN: [id number]    Patient seen and examined. This is a patient with a history of asthma and COPD who presented with shortness of breath and palpitations now being followed for elevated troponin. Remains intubated and sedated in ICU. NSR. No other complaints reported.    Telemetry reviewed, there were no events noted in the past 24 hours.      Physical Examination    Allergies   Allergen Reactions    Latex      Vitals:    24 1300   BP: 131/81   Pulse: 72   Resp: 13   Temp:    SpO2: 95%     Vital signs are stable  Intubated/sedated  Heart has a RRR.  No murmur  Lungs are diminished  Abdomen is soft, nontender, normal bowel sounds.  Extremities have trace edema  Skin is dry and warm.    Labs reviewed:  Recent Results (from the past 12 hour(s))   CBC    Collection Time: 24  3:10 AM   Result Value Ref Range    WBC 15.2 (H) 3.6 - 11.0 K/uL    RBC 4.18 3.80 - 5.20 M/uL    Hemoglobin 11.8 11.5 - 16.0 g/dL    Hematocrit 37.3 35.0 - 47.0 %    MCV 89.2 80.0 - 99.0 FL    MCH 28.2 26.0 - 34.0 PG    MCHC 31.6 30.0 - 36.5 g/dL    RDW 13.8 11.5 - 14.5 %    Platelets 252 150 - 400 K/uL    MPV 12.1 8.9 - 12.9 FL    Nucleated RBCs 0.0 0.0  WBC    nRBC 0.00 0.00 - 0.01 K/uL   Basic Metabolic Panel    Collection Time: 24  3:10 AM   Result Value Ref Range    Sodium 139 136 - 145 mmol/L    Potassium 4.6 3.5 - 5.1 mmol/L    Chloride 105 97 - 108 mmol/L    CO2 28 21 - 32 mmol/L    Anion Gap 6 2 - 12 mmol/L    Glucose 125 (H) 65 - 100 mg/dL    BUN 78 (H) 6 - 20 mg/dL    Creatinine 2.45 (H) 0.55 - 1.02 mg/dL    BUN/Creatinine Ratio 32 (H) 12 - 20      Est, Glom Filt Rate 23 (L) >60 ml/min/1.73m2    Calcium 9.1 8.5 - 10.1 mg/dL   POCT Glucose    Collection Time: 24  6:31 AM   Result Value Ref Range    POC Glucose 118 (H) 65 - 100 mg/dL    Performed by: Mariela Laurent    POCT Glucose    Collection Time:

## 2024-11-20 NOTE — PROGRESS NOTES
CRITICAL CARE PROGRESS NOTE    Name: Kim Markham   : 1975   MRN: [id number]   Date: 2024      Diagnoses/problem list:   Asthma  COPD  Vtach    HPI   Kim Markham is a 49 y.o. female with known past medical history significant for asthma and COPD who treated her symptoms today with Primatene Mist over-the-counter says that she went into fast heart rate became short of breath immediately.  No other exacerbating or relieving factors which presents to the emergency room with no treatment prior to arrival.  Patient reports that she had a few day history of shortness of breath and thought she had a pneumonia. She has been taking OTC cough syrup and cough medicine as well as her inhaler. She reports difficulty affording medications and has limited access to healthcare.     : Continues to have high vent requirements and high sedation requirements. SVT noted and amiodarone initiated.    11/15: Remains intubated on 500/16/10/55%.  Still has bilateral wheezing on auscultation.  Slight rise in creatinine noted but urine output is good.  No arrhythmias this morning.  Remains on amiodarone, propofol, fentanyl and heparin infusions.  : continues to require high sedation and high vent support. Will attempt SBT  : tolerated SBT for >2 hours. Will repeat today  : Sedated with propofol 40ug/kg/min, RAAS -1 to -2. AC 12 Vt 0.5 FiO2 0.4 PEEP 6. Doing well on SBTs but there is concern that she has too much anxiety to withdraw sedation and extubate  : Sedated with propofol 50ug/kg/min, RAAS -1 to -2. VD 7; AC 12 Vt 0.5 FiO2 0.4 PEEP 6. Doing well on SBTs but there is concern that she has too much anxiety to withdraw sedation and extubate. Started on buspirone . Will add sertraline  : Sedated with propofol 45ug/kg/min & fentanyl 75ug/hr, RAAS -1 to -2. VD 7; AC 12 Vt 0.5 FiO2 0.4 PEEP 6. Doing well on SBTs but there is concern that she has too much anxiety to withdraw sedation and

## 2024-11-21 LAB
ANION GAP SERPL CALC-SCNC: 9 MMOL/L (ref 2–12)
BUN SERPL-MCNC: 85 MG/DL (ref 6–20)
BUN/CREAT SERPL: 32 (ref 12–20)
CA-I BLD-MCNC: 9.2 MG/DL (ref 8.5–10.1)
CA-I BLD-MCNC: ABNORMAL MG/DL (ref 8.5–10.1)
CHLORIDE SERPL-SCNC: 98 MMOL/L (ref 97–108)
CO2 SERPL-SCNC: 26 MMOL/L (ref 21–32)
CREAT SERPL-MCNC: 2.68 MG/DL (ref 0.55–1.02)
ERYTHROCYTE [DISTWIDTH] IN BLOOD BY AUTOMATED COUNT: 14.1 % (ref 11.5–14.5)
GLUCOSE BLD STRIP.AUTO-MCNC: 113 MG/DL (ref 65–100)
GLUCOSE BLD STRIP.AUTO-MCNC: 117 MG/DL (ref 65–100)
GLUCOSE BLD STRIP.AUTO-MCNC: 117 MG/DL (ref 65–100)
GLUCOSE BLD STRIP.AUTO-MCNC: 120 MG/DL (ref 65–100)
GLUCOSE SERPL-MCNC: 130 MG/DL (ref 65–100)
HCT VFR BLD AUTO: 35.1 % (ref 35–47)
HGB BLD-MCNC: 11.7 G/DL (ref 11.5–16)
MAGNESIUM SERPL-MCNC: 3.1 MG/DL (ref 1.6–2.4)
MAGNESIUM SERPL-MCNC: NORMAL MG/DL (ref 1.6–2.4)
MCH RBC QN AUTO: 29.8 PG (ref 26–34)
MCHC RBC AUTO-ENTMCNC: 33.3 G/DL (ref 30–36.5)
MCV RBC AUTO: 89.5 FL (ref 80–99)
NRBC # BLD: 0 K/UL (ref 0–0.01)
NRBC BLD-RTO: 0 PER 100 WBC
PERFORMED BY:: ABNORMAL
PHOSPHATE SERPL-MCNC: 6.1 MG/DL (ref 2.6–4.7)
PLATELET # BLD AUTO: 237 K/UL (ref 150–400)
PMV BLD AUTO: 12.9 FL (ref 8.9–12.9)
POTASSIUM SERPL-SCNC: 4.2 MMOL/L (ref 3.5–5.1)
POTASSIUM SERPL-SCNC: ABNORMAL MMOL/L (ref 3.5–5.1)
RBC # BLD AUTO: 3.92 M/UL (ref 3.8–5.2)
SODIUM SERPL-SCNC: 133 MMOL/L (ref 136–145)
WBC # BLD AUTO: 16.5 K/UL (ref 3.6–11)

## 2024-11-21 PROCEDURE — 94640 AIRWAY INHALATION TREATMENT: CPT

## 2024-11-21 PROCEDURE — 84132 ASSAY OF SERUM POTASSIUM: CPT

## 2024-11-21 PROCEDURE — 83735 ASSAY OF MAGNESIUM: CPT

## 2024-11-21 PROCEDURE — 6360000002 HC RX W HCPCS: Performed by: INTERNAL MEDICINE

## 2024-11-21 PROCEDURE — 6370000000 HC RX 637 (ALT 250 FOR IP): Performed by: STUDENT IN AN ORGANIZED HEALTH CARE EDUCATION/TRAINING PROGRAM

## 2024-11-21 PROCEDURE — 2700000000 HC OXYGEN THERAPY PER DAY

## 2024-11-21 PROCEDURE — 82310 ASSAY OF CALCIUM: CPT

## 2024-11-21 PROCEDURE — 6360000002 HC RX W HCPCS: Performed by: STUDENT IN AN ORGANIZED HEALTH CARE EDUCATION/TRAINING PROGRAM

## 2024-11-21 PROCEDURE — 2580000003 HC RX 258: Performed by: NURSE PRACTITIONER

## 2024-11-21 PROCEDURE — 94003 VENT MGMT INPAT SUBQ DAY: CPT

## 2024-11-21 PROCEDURE — 2500000003 HC RX 250 WO HCPCS: Performed by: NURSE PRACTITIONER

## 2024-11-21 PROCEDURE — 6370000000 HC RX 637 (ALT 250 FOR IP): Performed by: INTERNAL MEDICINE

## 2024-11-21 PROCEDURE — 2580000003 HC RX 258: Performed by: STUDENT IN AN ORGANIZED HEALTH CARE EDUCATION/TRAINING PROGRAM

## 2024-11-21 PROCEDURE — 36415 COLL VENOUS BLD VENIPUNCTURE: CPT

## 2024-11-21 PROCEDURE — 80048 BASIC METABOLIC PNL TOTAL CA: CPT

## 2024-11-21 PROCEDURE — 2000000000 HC ICU R&B

## 2024-11-21 PROCEDURE — 94761 N-INVAS EAR/PLS OXIMETRY MLT: CPT

## 2024-11-21 PROCEDURE — 6370000000 HC RX 637 (ALT 250 FOR IP)

## 2024-11-21 PROCEDURE — 85027 COMPLETE CBC AUTOMATED: CPT

## 2024-11-21 PROCEDURE — 82962 GLUCOSE BLOOD TEST: CPT

## 2024-11-21 PROCEDURE — 84100 ASSAY OF PHOSPHORUS: CPT

## 2024-11-21 RX ORDER — FUROSEMIDE 10 MG/ML
40 INJECTION INTRAMUSCULAR; INTRAVENOUS EVERY 8 HOURS
Status: DISPENSED | OUTPATIENT
Start: 2024-11-21 | End: 2024-11-23

## 2024-11-21 RX ADMIN — FAMOTIDINE 20 MG: 20 TABLET, FILM COATED ORAL at 09:19

## 2024-11-21 RX ADMIN — BUSPIRONE HYDROCHLORIDE 7.5 MG: 5 TABLET ORAL at 21:08

## 2024-11-21 RX ADMIN — ENOXAPARIN SODIUM 30 MG: 100 INJECTION SUBCUTANEOUS at 21:09

## 2024-11-21 RX ADMIN — AMIODARONE HYDROCHLORIDE 400 MG: 200 TABLET ORAL at 09:19

## 2024-11-21 RX ADMIN — METOPROLOL TARTRATE 12.5 MG: 25 TABLET, FILM COATED ORAL at 21:09

## 2024-11-21 RX ADMIN — PROPOFOL 45 MCG/KG/MIN: 10 INJECTION, EMULSION INTRAVENOUS at 06:40

## 2024-11-21 RX ADMIN — ACETYLCYSTEINE 600 MG: 200 INHALANT RESPIRATORY (INHALATION) at 20:49

## 2024-11-21 RX ADMIN — SERTRALINE HYDROCHLORIDE 50 MG: 50 TABLET ORAL at 09:19

## 2024-11-21 RX ADMIN — Medication 75 MCG/HR: at 09:49

## 2024-11-21 RX ADMIN — IPRATROPIUM BROMIDE AND ALBUTEROL SULFATE 1 DOSE: 2.5; .5 SOLUTION RESPIRATORY (INHALATION) at 07:53

## 2024-11-21 RX ADMIN — IPRATROPIUM BROMIDE AND ALBUTEROL SULFATE 1 DOSE: 2.5; .5 SOLUTION RESPIRATORY (INHALATION) at 11:22

## 2024-11-21 RX ADMIN — PROPOFOL 45 MCG/KG/MIN: 10 INJECTION, EMULSION INTRAVENOUS at 00:31

## 2024-11-21 RX ADMIN — IPRATROPIUM BROMIDE AND ALBUTEROL SULFATE 1 DOSE: 2.5; .5 SOLUTION RESPIRATORY (INHALATION) at 15:40

## 2024-11-21 RX ADMIN — Medication 75 MCG/HR: at 23:47

## 2024-11-21 RX ADMIN — ENOXAPARIN SODIUM 30 MG: 100 INJECTION SUBCUTANEOUS at 09:20

## 2024-11-21 RX ADMIN — IPRATROPIUM BROMIDE AND ALBUTEROL SULFATE 1 DOSE: 2.5; .5 SOLUTION RESPIRATORY (INHALATION) at 20:49

## 2024-11-21 RX ADMIN — PROPOFOL 45 MCG/KG/MIN: 10 INJECTION, EMULSION INTRAVENOUS at 09:54

## 2024-11-21 RX ADMIN — ATORVASTATIN CALCIUM 40 MG: 40 TABLET, FILM COATED ORAL at 21:08

## 2024-11-21 RX ADMIN — IPRATROPIUM BROMIDE AND ALBUTEROL SULFATE 1 DOSE: 2.5; .5 SOLUTION RESPIRATORY (INHALATION) at 00:35

## 2024-11-21 RX ADMIN — AMIODARONE HYDROCHLORIDE 400 MG: 200 TABLET ORAL at 21:08

## 2024-11-21 RX ADMIN — SODIUM CHLORIDE, PRESERVATIVE FREE 10 ML: 5 INJECTION INTRAVENOUS at 09:20

## 2024-11-21 RX ADMIN — IPRATROPIUM BROMIDE AND ALBUTEROL SULFATE 1 DOSE: 2.5; .5 SOLUTION RESPIRATORY (INHALATION) at 04:47

## 2024-11-21 RX ADMIN — PROPOFOL 45 MCG/KG/MIN: 10 INJECTION, EMULSION INTRAVENOUS at 21:21

## 2024-11-21 RX ADMIN — METHYLPREDNISOLONE SODIUM SUCCINATE 40 MG: 40 INJECTION INTRAMUSCULAR; INTRAVENOUS at 09:19

## 2024-11-21 RX ADMIN — ASPIRIN 81 MG: 81 TABLET, COATED ORAL at 09:19

## 2024-11-21 RX ADMIN — PROPOFOL 45 MCG/KG/MIN: 10 INJECTION, EMULSION INTRAVENOUS at 12:17

## 2024-11-21 RX ADMIN — DOCUSATE SODIUM 100 MG: 50 LIQUID ORAL at 09:19

## 2024-11-21 RX ADMIN — FUROSEMIDE 40 MG: 10 INJECTION, SOLUTION INTRAMUSCULAR; INTRAVENOUS at 17:25

## 2024-11-21 RX ADMIN — FUROSEMIDE 40 MG: 10 INJECTION, SOLUTION INTRAMUSCULAR; INTRAVENOUS at 12:08

## 2024-11-21 RX ADMIN — WATER 1000 MG: 1 INJECTION INTRAMUSCULAR; INTRAVENOUS; SUBCUTANEOUS at 01:06

## 2024-11-21 RX ADMIN — PROPOFOL 45 MCG/KG/MIN: 10 INJECTION, EMULSION INTRAVENOUS at 17:51

## 2024-11-21 RX ADMIN — METOPROLOL TARTRATE 12.5 MG: 25 TABLET, FILM COATED ORAL at 09:18

## 2024-11-21 RX ADMIN — BUSPIRONE HYDROCHLORIDE 7.5 MG: 5 TABLET ORAL at 09:19

## 2024-11-21 RX ADMIN — ACETYLCYSTEINE 600 MG: 200 INHALANT RESPIRATORY (INHALATION) at 07:53

## 2024-11-21 RX ADMIN — POLYETHYLENE GLYCOL 3350 17 G: 17 POWDER, FOR SOLUTION ORAL at 09:20

## 2024-11-21 RX ADMIN — PROPOFOL 45 MCG/KG/MIN: 10 INJECTION, EMULSION INTRAVENOUS at 03:43

## 2024-11-21 RX ADMIN — METOPROLOL TARTRATE 2.5 MG: 5 INJECTION INTRAVENOUS at 00:15

## 2024-11-21 RX ADMIN — PROPOFOL 45 MCG/KG/MIN: 10 INJECTION, EMULSION INTRAVENOUS at 23:41

## 2024-11-21 RX ADMIN — SENNOSIDES 8.6 MG: 8.6 TABLET, FILM COATED ORAL at 21:10

## 2024-11-21 RX ADMIN — PROPOFOL 45 MCG/KG/MIN: 10 INJECTION, EMULSION INTRAVENOUS at 14:45

## 2024-11-21 RX ADMIN — SODIUM CHLORIDE, PRESERVATIVE FREE 10 ML: 5 INJECTION INTRAVENOUS at 21:10

## 2024-11-21 ASSESSMENT — PULMONARY FUNCTION TESTS
PIF_VALUE: 26
PIF_VALUE: 16
PIF_VALUE: 12
PIF_VALUE: 17
PIF_VALUE: 19
PIF_VALUE: 14
PIF_VALUE: 14
PIF_VALUE: 18
PIF_VALUE: 19
PIF_VALUE: 12
PIF_VALUE: 18
PIF_VALUE: 21
PIF_VALUE: 19
PIF_VALUE: 15
PIF_VALUE: 33
PIF_VALUE: 23
PIF_VALUE: 13
PIF_VALUE: 18
PIF_VALUE: 23
PIF_VALUE: 16
PIF_VALUE: 23
PIF_VALUE: 15
PIF_VALUE: 20
PIF_VALUE: 16
PIF_VALUE: 20
PIF_VALUE: 17
PIF_VALUE: 20
PIF_VALUE: 27
PIF_VALUE: 23
PIF_VALUE: 15
PIF_VALUE: 23
PIF_VALUE: 17
PIF_VALUE: 13
PIF_VALUE: 12
PIF_VALUE: 17
PIF_VALUE: 22
PIF_VALUE: 29
PIF_VALUE: 23
PIF_VALUE: 19

## 2024-11-21 ASSESSMENT — PAIN SCALES - GENERAL
PAINLEVEL_OUTOF10: 0

## 2024-11-21 NOTE — PLAN OF CARE
Problem: ABCDS Injury Assessment  Goal: Absence of physical injury  Outcome: Progressing     Problem: Safety - Adult  Goal: Free from fall injury  Outcome: Progressing     Problem: Neurosensory - Adult  Goal: Absence of seizures  Outcome: Progressing  Goal: Remains free of injury related to seizures activity  Outcome: Progressing

## 2024-11-21 NOTE — CONSULTS
Nutrition Note    Nutrition Recommendations/Plan:   NPO, advance as medically appropriate.     Continue TF of Vital AF 1.2 [place](Peptide Based) via NGT at goal rate of 53 mL/hr.  Decrease water flushes to 85 mL q4hrs.  Provides: 1526 kcal(73%), 95 gm protein(74%), 1540 mL H2O(74%).                        +927 kcal(118%) from Propofol at 35.1 mL/hr.     Continue to monitor and record TF rate, flushes, tolerance, BM in I/Os.      Pt followed during IDRs, concern for fluid overload. RD to modify regimen for therapeutic management.    Electronically signed by Mahi Vera RD on 11/21/24 at 10:35 AM EST    Contact: ext. [phone] or [employer].

## 2024-11-21 NOTE — CARE COORDINATION
CM reviewed Pt medicals, Pt lives with her boyfriend and is IND with ADL.     Pt does not have insurance and could not afford to buy her medication.      Pt will need to be set up with a Clinic that assist Pt that do not have insurance to get her medication.     Per IDR  Pt remains on the vent, going to try to Extubate Pt.

## 2024-11-21 NOTE — PROGRESS NOTES
CRITICAL CARE PROGRESS NOTE    Name: Kim Markham   : 1975   MRN: [id number]   Date: 2024      Diagnoses/problem list:   Asthma  COPD  Vtach    HPI   Kim Markham is a 49 y.o. female with known past medical history significant for asthma and COPD who treated her symptoms today with Primatene Mist over-the-counter says that she went into fast heart rate became short of breath immediately.  No other exacerbating or relieving factors which presents to the emergency room with no treatment prior to arrival.  Patient reports that she had a few day history of shortness of breath and thought she had a pneumonia. She has been taking OTC cough syrup and cough medicine as well as her inhaler. She reports difficulty affording medications and has limited access to healthcare.     : Continues to have high vent requirements and high sedation requirements. SVT noted and amiodarone initiated.    11/15: Remains intubated on 500/16/10/55%.  Still has bilateral wheezing on auscultation.  Slight rise in creatinine noted but urine output is good.  No arrhythmias this morning.  Remains on amiodarone, propofol, fentanyl and heparin infusions.  : continues to require high sedation and high vent support. Will attempt SBT  : tolerated SBT for >2 hours. Will repeat today  : Sedated with propofol 40ug/kg/min, RAAS -1 to -2. AC 12 Vt 0.5 FiO2 0.4 PEEP 6. Doing well on SBTs but there is concern that she has too much anxiety to withdraw sedation and extubate  : Sedated with propofol 50ug/kg/min, RAAS -1 to -2. VD 7; AC 12 Vt 0.5 FiO2 0.4 PEEP 6. Doing well on SBTs but there is concern that she has too much anxiety to withdraw sedation and extubate. Started on buspirone . Will add sertraline  : Sedated with propofol 45ug/kg/min & fentanyl 75ug/hr, RAAS -1 to -2. VD 8; AC 12 Vt 0.5 FiO2 0.4 PEEP 6. Doing well on SBTs but there is concern that she has too much anxiety to withdraw sedation and

## 2024-11-21 NOTE — PROGRESS NOTES
Magnesium Hemolyzed, Recollection Recommended 1.6 - 2.4 mg/dL   Phosphorus    Collection Time: 11/21/24  2:44 AM   Result Value Ref Range    Phosphorus 6.1 (H) 2.6 - 4.7 mg/dL   Potassium    Collection Time: 11/21/24  5:50 AM   Result Value Ref Range    Potassium 4.2 3.5 - 5.1 mmol/L   Magnesium    Collection Time: 11/21/24  5:50 AM   Result Value Ref Range    Magnesium 3.1 (H) 1.6 - 2.4 mg/dL   Calcium    Collection Time: 11/21/24  5:50 AM   Result Value Ref Range    Calcium 9.2 8.5 - 10.1 mg/dL   POCT Glucose    Collection Time: 11/21/24  5:52 AM   Result Value Ref Range    POC Glucose 113 (H) 65 - 100 mg/dL    Performed by: Lexy Galvez    POCT Glucose    Collection Time: 11/21/24 11:26 AM   Result Value Ref Range    POC Glucose 117 (H) 65 - 100 mg/dL    Performed by: Bishop Serna       Case discussed with Dr. Dean and our impression and recommendations are as follows:  Type II NSTEMI, likely demand ischemia given hypoxia. Peak troponin 248. Echo with EF 60-65%, LVH, RV moderately dilated. CTA chest negative for PE   Continue ASA  Can consider ischemic evaluation in the OP setting   Atrial arrhythmia, likely AVNRT, remains in sinus on amio, now on oral. If remains on this long term will need TSH/LFT/PFT monitoring.   Continue amio, reduce dose to 200mg daily tomorrow (11/22/2024)   Keep electrolytes WNL, K+ 4-5, Mg >2   Can follow-up with EP in the OP setting   Hypoxic resp failure due to COPD exacerbation. Currently intubated on [facility] vent, sedation.   - on IV abx and steroids per intensivist team   Obesity, BMI 43.58. Needs OP sleep study for possible DAVID   Tobacco abuse, needs encouraged cessation     Remains critically ill, requiring ICU care.      Please do not hesitate to call if additional questions arise.    TOMÁS GRADY, APRN - NP  11/21/2024

## 2024-11-22 LAB
ANION GAP SERPL CALC-SCNC: 8 MMOL/L (ref 2–12)
ARTERIAL PATENCY WRIST A: YES
BASE EXCESS BLDA CALC-SCNC: 5.5 MMOL/L (ref 0–3)
BASOPHILS # BLD: 0 K/UL (ref 0–0.1)
BASOPHILS NFR BLD: 0 % (ref 0–1)
BDY SITE: ABNORMAL
BODY TEMPERATURE: 100.7
BUN SERPL-MCNC: 96 MG/DL (ref 6–20)
BUN/CREAT SERPL: 45 (ref 12–20)
CA-I BLD-MCNC: 9.3 MG/DL (ref 8.5–10.1)
CHLORIDE SERPL-SCNC: 107 MMOL/L (ref 97–108)
CO2 SERPL-SCNC: 29 MMOL/L (ref 21–32)
COHGB MFR BLD: 0.2 % (ref 1–2)
CREAT SERPL-MCNC: 2.14 MG/DL (ref 0.55–1.02)
DIFFERENTIAL METHOD BLD: ABNORMAL
EOSINOPHIL # BLD: 0.3 K/UL (ref 0–0.4)
EOSINOPHIL NFR BLD: 2 % (ref 0–7)
ERYTHROCYTE [DISTWIDTH] IN BLOOD BY AUTOMATED COUNT: 13.5 % (ref 11.5–14.5)
FIO2 ON VENT: 50 %
GAS FLOW.O2 O2 DELIVERY SYS: 10 L/MIN
GLUCOSE BLD STRIP.AUTO-MCNC: 106 MG/DL (ref 65–100)
GLUCOSE BLD STRIP.AUTO-MCNC: 116 MG/DL (ref 65–100)
GLUCOSE BLD STRIP.AUTO-MCNC: 123 MG/DL (ref 65–100)
GLUCOSE BLD STRIP.AUTO-MCNC: 124 MG/DL (ref 65–100)
GLUCOSE BLD STRIP.AUTO-MCNC: 126 MG/DL (ref 65–100)
GLUCOSE SERPL-MCNC: 113 MG/DL (ref 65–100)
HCO3 BLDA-SCNC: 30 MMOL/L (ref 22–26)
HCT VFR BLD AUTO: 36.1 % (ref 35–47)
HGB BLD-MCNC: 11.4 G/DL (ref 11.5–16)
IMM GRANULOCYTES # BLD AUTO: 0 K/UL
IMM GRANULOCYTES NFR BLD AUTO: 0 %
LYMPHOCYTES # BLD: 1.1 K/UL (ref 0.8–3.5)
LYMPHOCYTES NFR BLD: 8 % (ref 12–49)
MAGNESIUM SERPL-MCNC: 3.1 MG/DL (ref 1.6–2.4)
MCH RBC QN AUTO: 28.2 PG (ref 26–34)
MCHC RBC AUTO-ENTMCNC: 31.6 G/DL (ref 30–36.5)
MCV RBC AUTO: 89.4 FL (ref 80–99)
METHGB MFR BLD: 0.4 % (ref 0–1.4)
MONOCYTES # BLD: 1.8 K/UL (ref 0–1)
MONOCYTES NFR BLD: 13 % (ref 5–13)
NEUTS SEG # BLD: 10.7 K/UL (ref 1.8–8)
NEUTS SEG NFR BLD: 77 % (ref 32–75)
NRBC # BLD: 0 K/UL (ref 0–0.01)
NRBC BLD-RTO: 0 PER 100 WBC
OXYHGB MFR BLD: 91.9 % (ref 95–99)
PCO2 BLDA: 47 MMHG (ref 35–45)
PERFORMED BY:: ABNORMAL
PH BLDA: 7.43 (ref 7.35–7.45)
PLATELET # BLD AUTO: 234 K/UL (ref 150–400)
PMV BLD AUTO: 12 FL (ref 8.9–12.9)
PO2 BLDA: 71 MMHG (ref 80–100)
POTASSIUM SERPL-SCNC: 2.8 MMOL/L (ref 3.5–5.1)
POTASSIUM SERPL-SCNC: 3.4 MMOL/L (ref 3.5–5.1)
RBC # BLD AUTO: 4.04 M/UL (ref 3.8–5.2)
RBC MORPH BLD: ABNORMAL
SAO2 % BLD: 93 % (ref 95–99)
SAO2% DEVICE SAO2% SENSOR NAME: ABNORMAL
SODIUM SERPL-SCNC: 144 MMOL/L (ref 136–145)
SPECIMEN SITE: ABNORMAL
WBC # BLD AUTO: 13.9 K/UL (ref 3.6–11)

## 2024-11-22 PROCEDURE — 80048 BASIC METABOLIC PNL TOTAL CA: CPT

## 2024-11-22 PROCEDURE — 6370000000 HC RX 637 (ALT 250 FOR IP): Performed by: STUDENT IN AN ORGANIZED HEALTH CARE EDUCATION/TRAINING PROGRAM

## 2024-11-22 PROCEDURE — 85025 COMPLETE CBC W/AUTO DIFF WBC: CPT

## 2024-11-22 PROCEDURE — 83735 ASSAY OF MAGNESIUM: CPT

## 2024-11-22 PROCEDURE — 36600 WITHDRAWAL OF ARTERIAL BLOOD: CPT

## 2024-11-22 PROCEDURE — 6360000002 HC RX W HCPCS: Performed by: INTERNAL MEDICINE

## 2024-11-22 PROCEDURE — 94003 VENT MGMT INPAT SUBQ DAY: CPT

## 2024-11-22 PROCEDURE — 36415 COLL VENOUS BLD VENIPUNCTURE: CPT

## 2024-11-22 PROCEDURE — 2700000000 HC OXYGEN THERAPY PER DAY

## 2024-11-22 PROCEDURE — 94761 N-INVAS EAR/PLS OXIMETRY MLT: CPT

## 2024-11-22 PROCEDURE — 84132 ASSAY OF SERUM POTASSIUM: CPT

## 2024-11-22 PROCEDURE — 82962 GLUCOSE BLOOD TEST: CPT

## 2024-11-22 PROCEDURE — 6370000000 HC RX 637 (ALT 250 FOR IP)

## 2024-11-22 PROCEDURE — 94640 AIRWAY INHALATION TREATMENT: CPT

## 2024-11-22 PROCEDURE — 6370000000 HC RX 637 (ALT 250 FOR IP): Performed by: INTERNAL MEDICINE

## 2024-11-22 PROCEDURE — 82803 BLOOD GASES ANY COMBINATION: CPT

## 2024-11-22 PROCEDURE — 2580000003 HC RX 258: Performed by: NURSE PRACTITIONER

## 2024-11-22 PROCEDURE — 2000000000 HC ICU R&B

## 2024-11-22 PROCEDURE — 2500000003 HC RX 250 WO HCPCS: Performed by: NURSE PRACTITIONER

## 2024-11-22 PROCEDURE — 6360000002 HC RX W HCPCS: Performed by: STUDENT IN AN ORGANIZED HEALTH CARE EDUCATION/TRAINING PROGRAM

## 2024-11-22 PROCEDURE — 6360000002 HC RX W HCPCS: Performed by: NURSE PRACTITIONER

## 2024-11-22 RX ORDER — HALOPERIDOL 5 MG/ML
5 INJECTION INTRAMUSCULAR EVERY 6 HOURS PRN
Status: DISCONTINUED | OUTPATIENT
Start: 2024-11-22 | End: 2024-12-08

## 2024-11-22 RX ORDER — AMIODARONE HYDROCHLORIDE 200 MG/1
200 TABLET ORAL DAILY
Status: DISCONTINUED | OUTPATIENT
Start: 2024-11-22 | End: 2024-11-23

## 2024-11-22 RX ADMIN — Medication 75 MCG/HR: at 09:33

## 2024-11-22 RX ADMIN — IPRATROPIUM BROMIDE AND ALBUTEROL SULFATE 1 DOSE: 2.5; .5 SOLUTION RESPIRATORY (INHALATION) at 17:26

## 2024-11-22 RX ADMIN — POTASSIUM CHLORIDE 10 MEQ: 7.45 INJECTION INTRAVENOUS at 23:15

## 2024-11-22 RX ADMIN — POLYETHYLENE GLYCOL 3350 17 G: 17 POWDER, FOR SOLUTION ORAL at 08:38

## 2024-11-22 RX ADMIN — DOCUSATE SODIUM 100 MG: 50 LIQUID ORAL at 08:39

## 2024-11-22 RX ADMIN — METOPROLOL TARTRATE 12.5 MG: 25 TABLET, FILM COATED ORAL at 21:05

## 2024-11-22 RX ADMIN — IPRATROPIUM BROMIDE AND ALBUTEROL SULFATE 1 DOSE: 2.5; .5 SOLUTION RESPIRATORY (INHALATION) at 11:58

## 2024-11-22 RX ADMIN — SODIUM CHLORIDE, PRESERVATIVE FREE 10 ML: 5 INJECTION INTRAVENOUS at 08:50

## 2024-11-22 RX ADMIN — SERTRALINE HYDROCHLORIDE 50 MG: 50 TABLET ORAL at 08:38

## 2024-11-22 RX ADMIN — IPRATROPIUM BROMIDE AND ALBUTEROL SULFATE 1 DOSE: 2.5; .5 SOLUTION RESPIRATORY (INHALATION) at 00:10

## 2024-11-22 RX ADMIN — IPRATROPIUM BROMIDE AND ALBUTEROL SULFATE 1 DOSE: 2.5; .5 SOLUTION RESPIRATORY (INHALATION) at 04:40

## 2024-11-22 RX ADMIN — POTASSIUM CHLORIDE 10 MEQ: 7.45 INJECTION INTRAVENOUS at 21:03

## 2024-11-22 RX ADMIN — ATORVASTATIN CALCIUM 40 MG: 40 TABLET, FILM COATED ORAL at 21:04

## 2024-11-22 RX ADMIN — FAMOTIDINE 20 MG: 20 TABLET, FILM COATED ORAL at 08:38

## 2024-11-22 RX ADMIN — SENNOSIDES 8.6 MG: 8.6 TABLET, FILM COATED ORAL at 21:06

## 2024-11-22 RX ADMIN — BUSPIRONE HYDROCHLORIDE 7.5 MG: 5 TABLET ORAL at 08:38

## 2024-11-22 RX ADMIN — METHYLPREDNISOLONE SODIUM SUCCINATE 40 MG: 40 INJECTION INTRAMUSCULAR; INTRAVENOUS at 08:39

## 2024-11-22 RX ADMIN — METOPROLOL TARTRATE 12.5 MG: 25 TABLET, FILM COATED ORAL at 08:38

## 2024-11-22 RX ADMIN — POTASSIUM CHLORIDE 10 MEQ: 7.45 INJECTION INTRAVENOUS at 10:29

## 2024-11-22 RX ADMIN — PROPOFOL 45 MCG/KG/MIN: 10 INJECTION, EMULSION INTRAVENOUS at 05:56

## 2024-11-22 RX ADMIN — POTASSIUM CHLORIDE 10 MEQ: 7.45 INJECTION INTRAVENOUS at 09:28

## 2024-11-22 RX ADMIN — BUSPIRONE HYDROCHLORIDE 7.5 MG: 5 TABLET ORAL at 21:04

## 2024-11-22 RX ADMIN — POTASSIUM CHLORIDE 10 MEQ: 7.45 INJECTION INTRAVENOUS at 11:30

## 2024-11-22 RX ADMIN — ENOXAPARIN SODIUM 30 MG: 100 INJECTION SUBCUTANEOUS at 21:05

## 2024-11-22 RX ADMIN — IPRATROPIUM BROMIDE AND ALBUTEROL SULFATE 1 DOSE: 2.5; .5 SOLUTION RESPIRATORY (INHALATION) at 20:17

## 2024-11-22 RX ADMIN — IPRATROPIUM BROMIDE AND ALBUTEROL SULFATE 1 DOSE: 2.5; .5 SOLUTION RESPIRATORY (INHALATION) at 09:17

## 2024-11-22 RX ADMIN — FUROSEMIDE 40 MG: 10 INJECTION, SOLUTION INTRAMUSCULAR; INTRAVENOUS at 10:27

## 2024-11-22 RX ADMIN — PROPOFOL 45 MCG/KG/MIN: 10 INJECTION, EMULSION INTRAVENOUS at 02:33

## 2024-11-22 RX ADMIN — SODIUM CHLORIDE, PRESERVATIVE FREE 10 ML: 5 INJECTION INTRAVENOUS at 21:06

## 2024-11-22 RX ADMIN — POTASSIUM CHLORIDE 10 MEQ: 7.45 INJECTION INTRAVENOUS at 18:40

## 2024-11-22 RX ADMIN — FUROSEMIDE 40 MG: 10 INJECTION, SOLUTION INTRAMUSCULAR; INTRAVENOUS at 02:29

## 2024-11-22 RX ADMIN — ENOXAPARIN SODIUM 30 MG: 100 INJECTION SUBCUTANEOUS at 08:39

## 2024-11-22 RX ADMIN — POTASSIUM CHLORIDE 10 MEQ: 7.45 INJECTION INTRAVENOUS at 14:44

## 2024-11-22 RX ADMIN — PROPOFOL 45 MCG/KG/MIN: 10 INJECTION, EMULSION INTRAVENOUS at 08:41

## 2024-11-22 RX ADMIN — IPRATROPIUM BROMIDE AND ALBUTEROL SULFATE 1 DOSE: 2.5; .5 SOLUTION RESPIRATORY (INHALATION) at 23:20

## 2024-11-22 RX ADMIN — POTASSIUM CHLORIDE 10 MEQ: 7.45 INJECTION INTRAVENOUS at 22:19

## 2024-11-22 RX ADMIN — AMIODARONE HYDROCHLORIDE 200 MG: 200 TABLET ORAL at 10:26

## 2024-11-22 RX ADMIN — HALOPERIDOL LACTATE 5 MG: 5 INJECTION, SOLUTION INTRAMUSCULAR at 21:12

## 2024-11-22 RX ADMIN — ASPIRIN 81 MG: 81 TABLET, COATED ORAL at 08:38

## 2024-11-22 ASSESSMENT — PULMONARY FUNCTION TESTS
PIF_VALUE: 13
PIF_VALUE: 18
PIF_VALUE: 13
PIF_VALUE: 21
PIF_VALUE: 18
PIF_VALUE: 17
PIF_VALUE: 12
PIF_VALUE: 13
PIF_VALUE: 16
PIF_VALUE: 20
PIF_VALUE: 15
PIF_VALUE: 22
PIF_VALUE: 14
PIF_VALUE: 18
PIF_VALUE: 14
PIF_VALUE: 16
PIF_VALUE: 13
PIF_VALUE: 20
PIF_VALUE: 22
PIF_VALUE: 16
PIF_VALUE: 12
PIF_VALUE: 16

## 2024-11-22 ASSESSMENT — PAIN SCALES - GENERAL: PAINLEVEL_OUTOF10: 0

## 2024-11-22 NOTE — CARE COORDINATION
CM reviewed Pt medicals, Pt has too much anxiety to withdraw sedation and extubate.     Pt lives with her boyfriend and was IND with ADL, Pt works.     Per IDR  Pt has Asthma, poorly controled because she does not have insurance for medication.     Pt anxiety causes trouble with extubating.    Will try her on extubating.

## 2024-11-22 NOTE — PROGRESS NOTES
CRITICAL CARE PROGRESS NOTE    Name: Kim Markham   : 1975   MRN: [id number]   Date: 2024      Diagnoses/problem list:   Asthma  COPD  Vtach    HPI   Kim Markham is a 49 y.o. female with known past medical history significant for asthma and COPD who treated her symptoms today with Primatene Mist over-the-counter says that she went into fast heart rate became short of breath immediately.  No other exacerbating or relieving factors which presents to the emergency room with no treatment prior to arrival.  Patient reports that she had a few day history of shortness of breath and thought she had a pneumonia. She has been taking OTC cough syrup and cough medicine as well as her inhaler. She reports difficulty affording medications and has limited access to healthcare.     : Continues to have high vent requirements and high sedation requirements. SVT noted and amiodarone initiated.    11/15: Remains intubated on 500/16/10/55%.  Still has bilateral wheezing on auscultation.  Slight rise in creatinine noted but urine output is good.  No arrhythmias this morning.  Remains on amiodarone, propofol, fentanyl and heparin infusions.  : continues to require high sedation and high vent support. Will attempt SBT  : tolerated SBT for >2 hours. Will repeat today  : Sedated with propofol 40ug/kg/min, RAAS -1 to -2. AC 12 Vt 0.5 FiO2 0.4 PEEP 6. Doing well on SBTs but there is concern that she has too much anxiety to withdraw sedation and extubate  : Sedated with propofol 50ug/kg/min, RAAS -1 to -2. VD 7; AC 12 Vt 0.5 FiO2 0.4 PEEP 6. Doing well on SBTs but there is concern that she has too much anxiety to withdraw sedation and extubate. Started on buspirone . Will add sertraline  : Sedated with propofol 45ug/kg/min & fentanyl 75ug/hr, RAAS -1 to -2. VD 8; AC 12 Vt 0.5 FiO2 0.4 PEEP 6. Doing well on SBTs but there is concern that she has too much anxiety to withdraw sedation and  prophylaxis     RENAL/ELECTROLYTE:   - slight rise in creatinine noted  - goal K>=4, Mg>=2, Phos >3  - daily BMP  - strict I/O's; daily weights  - avoid nephrotoxic medications  - holding diuretics today with increase in creatinine  - will trend CVP  - will give 25% albumin bolus x1    ENDOCRINE:   - ISS to keep -180    HEMATOLOGY/ONCOLOGY:   - Completed heparin drip for 48 hours for ACS  - Now on Lovenox for VTE prophylaxis    ID/MICRO:   Leukocytosis  -Trend fevers, WBC  -Remains afebrile, mild leukocytosis  -Blood cx x2, Urine Cx, Sputum Cx done  -Added azithromycin, ceftriaxone and solumedrol for COPD exacerbation  -Completed 7Ds azithromycin  -Completed 8Ds Ceftriaxone 8Ds   -Tapering Solu-Medrol      ICU DAILY CHECKLIST     Code Status: Full  DVT Prophylaxis: Lovenox  T/L/D: PIV  Diet: NPO  Activity Level:as tolerated  ABCDEF Bundle/Checklist Completed: Yes  Disposition: Stay in ICU  Multidisciplinary Rounds Completed:  Yes      OBJECTIVE:     Blood pressure 114/66, pulse 80, temperature 99.2 °F (37.3 °C), resp. rate 14, height 1.727 m (5' 7.99\"), weight 133 kg (293 lb 3.4 oz), SpO2 94%.  Physical Exam  Gen: intubated  HEENT: NC/AT, supple  Cardiac: tachycardic, no murmurs  Pulm: Coarse breath sounds bilaterally  ABD: Soft, non distended, non tender, normal bowel sounds  Extremities: mild edema to BLE  Neuro: Sedated    Labs and Data: Reviewed 11/22/24  Medications: Reviewed 11/22/24  Imaging: Reviewed 11/22/24    Intake/Output:     Intake/Output Summary (Last 24 hours) at 11/22/2024 0913  Last data filed at 11/22/2024 0800  Gross per 24 hour   Intake 2569.99 ml   Output 3950 ml   Net -1380.01 ml       CRITICAL CARE DOCUMENTATION  I had a face to face encounter with the patient, reviewed and interpreted patient data including clinical events, labs, images, vital signs, I/O's, and examined patient.  I have discussed the case and the plan and management of the patient's care with the consulting services,

## 2024-11-22 NOTE — PROGRESS NOTES
CARDIOLOGY PROGRESS NOTE      Patient Name: Kim Markham  Age: 49 y.o.  Gender:female  :1975  MRN: [id number]    Patient seen and examined. This is a patient with a history of asthma and COPD who presented with shortness of breath and palpitations now being followed for elevated troponin. Remains in ICU, was extubated today. NSR currently. Episode of NSVT overnight, 2024. No other complaints reported.    Telemetry reviewed, there were no events noted in the past 24 hours.      Physical Examination    Allergies   Allergen Reactions    Latex      Vitals:    24 1400   BP: (!) 138/107   Pulse: (!) 111   Resp: (!) 34   Temp:    SpO2: 93%     Vital signs are stable  Intubated/sedated  Heart has a RRR.  No murmur  Lungs are diminished  Abdomen is soft, nontender, normal bowel sounds.  Extremities have trace edema  Skin is dry and warm.    Labs reviewed:  Recent Results (from the past 12 hour(s))   POCT Glucose    Collection Time: 24  6:17 AM   Result Value Ref Range    POC Glucose 116 (H) 65 - 100 mg/dL    Performed by: Lexy Galvez    POCT Glucose    Collection Time: 24  8:47 AM   Result Value Ref Range    POC Glucose 106 (H) 65 - 100 mg/dL    Performed by: Beka Stephens    POCT Glucose    Collection Time: 24 11:51 AM   Result Value Ref Range    POC Glucose 126 (H) 65 - 100 mg/dL    Performed by: Beka Stephens       Case discussed with Dr. Dean and our impression and recommendations are as follows:  Type II NSTEMI, likely demand ischemia given hypoxia. Peak troponin 248. Echo with EF 60-65%, LVH, RV moderately dilated. CTA chest negative for PE   Continue ASA  Can consider ischemic evaluation in the OP setting   Atrial arrhythmia, likely AVNRT, remains in sinus on amio, now on oral. If remains on this long term will need TSH/LFT/PFT monitoring.   Continue amio, reduce dose to 200mg daily tomorrow (2024)   Keep electrolytes WNL, K+ 4-5, Mg >2   Can follow-up with EP in the OP

## 2024-11-23 ENCOUNTER — APPOINTMENT (OUTPATIENT)
Facility: HOSPITAL | Age: 49
End: 2024-11-23
Payer: MEDICAID

## 2024-11-23 LAB
ANION GAP SERPL CALC-SCNC: 3 MMOL/L (ref 2–12)
ANION GAP SERPL CALC-SCNC: 3 MMOL/L (ref 2–12)
ANION GAP SERPL CALC-SCNC: 4 MMOL/L (ref 2–12)
BASOPHILS # BLD: 0 K/UL (ref 0–0.1)
BASOPHILS NFR BLD: 0 % (ref 0–1)
BUN SERPL-MCNC: 58 MG/DL (ref 6–20)
BUN SERPL-MCNC: 60 MG/DL (ref 6–20)
BUN SERPL-MCNC: 65 MG/DL (ref 6–20)
BUN/CREAT SERPL: 56 (ref 12–20)
BUN/CREAT SERPL: 56 (ref 12–20)
BUN/CREAT SERPL: 61 (ref 12–20)
CA-I BLD-MCNC: 10 MG/DL (ref 8.5–10.1)
CA-I BLD-MCNC: 10.5 MG/DL (ref 8.5–10.1)
CA-I BLD-MCNC: 10.7 MG/DL (ref 8.5–10.1)
CHLORIDE SERPL-SCNC: 115 MMOL/L (ref 97–108)
CHLORIDE SERPL-SCNC: 115 MMOL/L (ref 97–108)
CHLORIDE SERPL-SCNC: 119 MMOL/L (ref 97–108)
CO2 SERPL-SCNC: 30 MMOL/L (ref 21–32)
CO2 SERPL-SCNC: 31 MMOL/L (ref 21–32)
CO2 SERPL-SCNC: 31 MMOL/L (ref 21–32)
CREAT SERPL-MCNC: 1.04 MG/DL (ref 0.55–1.02)
CREAT SERPL-MCNC: 1.06 MG/DL (ref 0.55–1.02)
CREAT SERPL-MCNC: 1.07 MG/DL (ref 0.55–1.02)
DIFFERENTIAL METHOD BLD: ABNORMAL
EOSINOPHIL # BLD: 0 K/UL (ref 0–0.4)
EOSINOPHIL NFR BLD: 0 % (ref 0–7)
ERYTHROCYTE [DISTWIDTH] IN BLOOD BY AUTOMATED COUNT: 13.7 % (ref 11.5–14.5)
GLUCOSE BLD STRIP.AUTO-MCNC: 111 MG/DL (ref 65–100)
GLUCOSE BLD STRIP.AUTO-MCNC: 112 MG/DL (ref 65–100)
GLUCOSE BLD STRIP.AUTO-MCNC: 116 MG/DL (ref 65–100)
GLUCOSE BLD STRIP.AUTO-MCNC: 117 MG/DL (ref 65–100)
GLUCOSE BLD STRIP.AUTO-MCNC: 118 MG/DL (ref 65–100)
GLUCOSE SERPL-MCNC: 127 MG/DL (ref 65–100)
GLUCOSE SERPL-MCNC: 136 MG/DL (ref 65–100)
GLUCOSE SERPL-MCNC: 150 MG/DL (ref 65–100)
HCT VFR BLD AUTO: 37 % (ref 35–47)
HGB BLD-MCNC: 11.8 G/DL (ref 11.5–16)
IMM GRANULOCYTES # BLD AUTO: 0 K/UL (ref 0–0.04)
IMM GRANULOCYTES NFR BLD AUTO: 0 % (ref 0–0.5)
LYMPHOCYTES # BLD: 1.3 K/UL (ref 0.8–3.5)
LYMPHOCYTES NFR BLD: 8 % (ref 12–49)
MAGNESIUM SERPL-MCNC: 2.6 MG/DL (ref 1.6–2.4)
MAGNESIUM SERPL-MCNC: 2.8 MG/DL (ref 1.6–2.4)
MCH RBC QN AUTO: 28.4 PG (ref 26–34)
MCHC RBC AUTO-ENTMCNC: 31.9 G/DL (ref 30–36.5)
MCV RBC AUTO: 89.2 FL (ref 80–99)
METAMYELOCYTES NFR BLD MANUAL: 2 %
MONOCYTES # BLD: 1.8 K/UL (ref 0–1)
MONOCYTES NFR BLD: 11 % (ref 5–13)
NEUTS BAND NFR BLD MANUAL: 5 %
NEUTS SEG # BLD: 13 K/UL (ref 1.8–8)
NEUTS SEG NFR BLD: 74 % (ref 32–75)
NRBC # BLD: 0 K/UL (ref 0–0.01)
NRBC BLD-RTO: 0 PER 100 WBC
PERFORMED BY:: ABNORMAL
PHOSPHATE SERPL-MCNC: 2.4 MG/DL (ref 2.6–4.7)
PLATELET # BLD AUTO: 234 K/UL (ref 150–400)
PMV BLD AUTO: 11.7 FL (ref 8.9–12.9)
POTASSIUM SERPL-SCNC: 3.2 MMOL/L (ref 3.5–5.1)
POTASSIUM SERPL-SCNC: 3.8 MMOL/L (ref 3.5–5.1)
POTASSIUM SERPL-SCNC: 4.4 MMOL/L (ref 3.5–5.1)
RBC # BLD AUTO: 4.15 M/UL (ref 3.8–5.2)
RBC MORPH BLD: ABNORMAL
SODIUM SERPL-SCNC: 149 MMOL/L (ref 136–145)
SODIUM SERPL-SCNC: 150 MMOL/L (ref 136–145)
SODIUM SERPL-SCNC: 152 MMOL/L (ref 136–145)
WBC # BLD AUTO: 16.4 K/UL (ref 3.6–11)

## 2024-11-23 PROCEDURE — 6360000002 HC RX W HCPCS: Performed by: INTERNAL MEDICINE

## 2024-11-23 PROCEDURE — 36415 COLL VENOUS BLD VENIPUNCTURE: CPT

## 2024-11-23 PROCEDURE — 6370000000 HC RX 637 (ALT 250 FOR IP): Performed by: HOSPITALIST

## 2024-11-23 PROCEDURE — 6360000002 HC RX W HCPCS: Performed by: NURSE PRACTITIONER

## 2024-11-23 PROCEDURE — 6360000002 HC RX W HCPCS: Performed by: STUDENT IN AN ORGANIZED HEALTH CARE EDUCATION/TRAINING PROGRAM

## 2024-11-23 PROCEDURE — 6370000000 HC RX 637 (ALT 250 FOR IP): Performed by: INTERNAL MEDICINE

## 2024-11-23 PROCEDURE — 6370000000 HC RX 637 (ALT 250 FOR IP): Performed by: STUDENT IN AN ORGANIZED HEALTH CARE EDUCATION/TRAINING PROGRAM

## 2024-11-23 PROCEDURE — 2000000000 HC ICU R&B

## 2024-11-23 PROCEDURE — 71045 X-RAY EXAM CHEST 1 VIEW: CPT

## 2024-11-23 PROCEDURE — 6370000000 HC RX 637 (ALT 250 FOR IP)

## 2024-11-23 PROCEDURE — 83735 ASSAY OF MAGNESIUM: CPT

## 2024-11-23 PROCEDURE — 82962 GLUCOSE BLOOD TEST: CPT

## 2024-11-23 PROCEDURE — 2580000003 HC RX 258: Performed by: NURSE PRACTITIONER

## 2024-11-23 PROCEDURE — 93005 ELECTROCARDIOGRAM TRACING: CPT | Performed by: NURSE PRACTITIONER

## 2024-11-23 PROCEDURE — 85025 COMPLETE CBC W/AUTO DIFF WBC: CPT

## 2024-11-23 PROCEDURE — 84100 ASSAY OF PHOSPHORUS: CPT

## 2024-11-23 PROCEDURE — 2500000003 HC RX 250 WO HCPCS

## 2024-11-23 PROCEDURE — 80048 BASIC METABOLIC PNL TOTAL CA: CPT

## 2024-11-23 PROCEDURE — 94640 AIRWAY INHALATION TREATMENT: CPT

## 2024-11-23 PROCEDURE — 2500000003 HC RX 250 WO HCPCS: Performed by: NURSE PRACTITIONER

## 2024-11-23 PROCEDURE — 92610 EVALUATE SWALLOWING FUNCTION: CPT

## 2024-11-23 RX ORDER — POTASSIUM CHLORIDE 1500 MG/1
40 TABLET, EXTENDED RELEASE ORAL ONCE
Status: DISCONTINUED | OUTPATIENT
Start: 2024-11-23 | End: 2024-11-23

## 2024-11-23 RX ORDER — FUROSEMIDE 10 MG/ML
40 INJECTION INTRAMUSCULAR; INTRAVENOUS DAILY
Status: DISCONTINUED | OUTPATIENT
Start: 2024-11-24 | End: 2024-11-27

## 2024-11-23 RX ORDER — PREDNISONE 20 MG/1
10 TABLET ORAL DAILY
Status: DISCONTINUED | OUTPATIENT
Start: 2024-11-30 | End: 2024-11-27

## 2024-11-23 RX ORDER — PREDNISONE 1 MG/1
1 TABLET ORAL DAILY
Status: DISCONTINUED | OUTPATIENT
Start: 2024-12-06 | End: 2024-11-27

## 2024-11-23 RX ORDER — METHYLPREDNISOLONE SODIUM SUCCINATE 40 MG/ML
40 INJECTION INTRAMUSCULAR; INTRAVENOUS DAILY
Status: COMPLETED | OUTPATIENT
Start: 2024-11-24 | End: 2024-11-25

## 2024-11-23 RX ORDER — PREDNISONE 1 MG/1
2.5 TABLET ORAL DAILY
Status: DISCONTINUED | OUTPATIENT
Start: 2024-12-04 | End: 2024-11-27

## 2024-11-23 RX ORDER — METHYLPREDNISOLONE SODIUM SUCCINATE 40 MG/ML
20 INJECTION INTRAMUSCULAR; INTRAVENOUS DAILY
Status: DISCONTINUED | OUTPATIENT
Start: 2024-11-26 | End: 2024-11-26

## 2024-11-23 RX ORDER — PREDNISONE 5 MG/1
5 TABLET ORAL DAILY
Status: DISCONTINUED | OUTPATIENT
Start: 2024-12-02 | End: 2024-11-27

## 2024-11-23 RX ORDER — ACETAZOLAMIDE 500 MG/5ML
500 INJECTION, POWDER, LYOPHILIZED, FOR SOLUTION INTRAVENOUS ONCE
Status: COMPLETED | OUTPATIENT
Start: 2024-11-23 | End: 2024-11-23

## 2024-11-23 RX ORDER — FUROSEMIDE 10 MG/ML
40 INJECTION INTRAMUSCULAR; INTRAVENOUS ONCE
Status: DISCONTINUED | OUTPATIENT
Start: 2024-11-23 | End: 2024-11-23

## 2024-11-23 RX ORDER — ACETYLCYSTEINE 200 MG/ML
600 SOLUTION ORAL; RESPIRATORY (INHALATION) 2 TIMES DAILY
Status: COMPLETED | OUTPATIENT
Start: 2024-11-23 | End: 2024-11-28

## 2024-11-23 RX ORDER — METOPROLOL TARTRATE 25 MG/1
25 TABLET, FILM COATED ORAL 2 TIMES DAILY
Status: DISCONTINUED | OUTPATIENT
Start: 2024-11-23 | End: 2024-11-27

## 2024-11-23 RX ORDER — FUROSEMIDE 10 MG/ML
40 INJECTION INTRAMUSCULAR; INTRAVENOUS ONCE
Status: COMPLETED | OUTPATIENT
Start: 2024-11-23 | End: 2024-11-23

## 2024-11-23 RX ORDER — FUROSEMIDE 10 MG/ML
40 INJECTION INTRAMUSCULAR; INTRAVENOUS DAILY
Status: DISCONTINUED | OUTPATIENT
Start: 2024-11-24 | End: 2024-11-23

## 2024-11-23 RX ORDER — PREDNISONE 5 MG/1
15 TABLET ORAL DAILY
Status: DISCONTINUED | OUTPATIENT
Start: 2024-11-28 | End: 2024-11-27

## 2024-11-23 RX ORDER — BUDESONIDE 0.5 MG/2ML
0.5 INHALANT ORAL
Status: DISCONTINUED | OUTPATIENT
Start: 2024-11-23 | End: 2024-12-24

## 2024-11-23 RX ADMIN — POTASSIUM CHLORIDE 10 MEQ: 7.45 INJECTION INTRAVENOUS at 01:13

## 2024-11-23 RX ADMIN — METHYLPREDNISOLONE SODIUM SUCCINATE 40 MG: 40 INJECTION INTRAMUSCULAR; INTRAVENOUS at 08:34

## 2024-11-23 RX ADMIN — BUDESONIDE INHALATION 500 MCG: 0.5 SUSPENSION RESPIRATORY (INHALATION) at 09:58

## 2024-11-23 RX ADMIN — ACETYLCYSTEINE 600 MG: 200 SOLUTION ORAL; RESPIRATORY (INHALATION) at 16:42

## 2024-11-23 RX ADMIN — IPRATROPIUM BROMIDE AND ALBUTEROL SULFATE 1 DOSE: 2.5; .5 SOLUTION RESPIRATORY (INHALATION) at 19:39

## 2024-11-23 RX ADMIN — DOCUSATE SODIUM 100 MG: 50 LIQUID ORAL at 08:34

## 2024-11-23 RX ADMIN — BUSPIRONE HYDROCHLORIDE 7.5 MG: 5 TABLET ORAL at 19:51

## 2024-11-23 RX ADMIN — ENOXAPARIN SODIUM 30 MG: 100 INJECTION SUBCUTANEOUS at 19:49

## 2024-11-23 RX ADMIN — FUROSEMIDE 40 MG: 10 INJECTION, SOLUTION INTRAMUSCULAR; INTRAVENOUS at 11:43

## 2024-11-23 RX ADMIN — POTASSIUM CHLORIDE 10 MEQ: 7.45 INJECTION INTRAVENOUS at 04:40

## 2024-11-23 RX ADMIN — AMIODARONE HYDROCHLORIDE 1 MG/MIN: 1.8 INJECTION, SOLUTION INTRAVENOUS at 23:39

## 2024-11-23 RX ADMIN — IPRATROPIUM BROMIDE AND ALBUTEROL SULFATE 1 DOSE: 2.5; .5 SOLUTION RESPIRATORY (INHALATION) at 13:57

## 2024-11-23 RX ADMIN — SERTRALINE HYDROCHLORIDE 50 MG: 50 TABLET ORAL at 08:33

## 2024-11-23 RX ADMIN — ASPIRIN 81 MG: 81 TABLET, COATED ORAL at 08:33

## 2024-11-23 RX ADMIN — IPRATROPIUM BROMIDE AND ALBUTEROL SULFATE 1 DOSE: 2.5; .5 SOLUTION RESPIRATORY (INHALATION) at 08:28

## 2024-11-23 RX ADMIN — BUDESONIDE INHALATION 500 MCG: 0.5 SUSPENSION RESPIRATORY (INHALATION) at 19:39

## 2024-11-23 RX ADMIN — POTASSIUM BICARBONATE 40 MEQ: 782 TABLET, EFFERVESCENT ORAL at 11:44

## 2024-11-23 RX ADMIN — ACETAZOLAMIDE SODIUM 500 MG: 500 INJECTION, POWDER, LYOPHILIZED, FOR SOLUTION INTRAVENOUS at 12:42

## 2024-11-23 RX ADMIN — HALOPERIDOL LACTATE 5 MG: 5 INJECTION, SOLUTION INTRAMUSCULAR at 19:50

## 2024-11-23 RX ADMIN — POTASSIUM CHLORIDE 10 MEQ: 7.45 INJECTION INTRAVENOUS at 03:44

## 2024-11-23 RX ADMIN — IPRATROPIUM BROMIDE AND ALBUTEROL SULFATE 1 DOSE: 2.5; .5 SOLUTION RESPIRATORY (INHALATION) at 03:40

## 2024-11-23 RX ADMIN — FAMOTIDINE 20 MG: 20 TABLET, FILM COATED ORAL at 08:33

## 2024-11-23 RX ADMIN — SODIUM CHLORIDE, PRESERVATIVE FREE 10 ML: 5 INJECTION INTRAVENOUS at 19:51

## 2024-11-23 RX ADMIN — POTASSIUM CHLORIDE 10 MEQ: 7.45 INJECTION INTRAVENOUS at 05:36

## 2024-11-23 RX ADMIN — SENNOSIDES 8.6 MG: 8.6 TABLET, FILM COATED ORAL at 19:50

## 2024-11-23 RX ADMIN — IPRATROPIUM BROMIDE AND ALBUTEROL SULFATE 1 DOSE: 2.5; .5 SOLUTION RESPIRATORY (INHALATION) at 22:53

## 2024-11-23 RX ADMIN — METOPROLOL TARTRATE 12.5 MG: 25 TABLET, FILM COATED ORAL at 08:33

## 2024-11-23 RX ADMIN — BUSPIRONE HYDROCHLORIDE 7.5 MG: 5 TABLET ORAL at 08:33

## 2024-11-23 RX ADMIN — IPRATROPIUM BROMIDE AND ALBUTEROL SULFATE 1 DOSE: 2.5; .5 SOLUTION RESPIRATORY (INHALATION) at 16:42

## 2024-11-23 RX ADMIN — ATORVASTATIN CALCIUM 40 MG: 40 TABLET, FILM COATED ORAL at 19:50

## 2024-11-23 RX ADMIN — AMIODARONE HYDROCHLORIDE 200 MG: 200 TABLET ORAL at 08:33

## 2024-11-23 RX ADMIN — ENOXAPARIN SODIUM 30 MG: 100 INJECTION SUBCUTANEOUS at 08:34

## 2024-11-23 RX ADMIN — METOPROLOL TARTRATE 25 MG: 25 TABLET, FILM COATED ORAL at 19:50

## 2024-11-23 RX ADMIN — SODIUM CHLORIDE, PRESERVATIVE FREE 10 ML: 5 INJECTION INTRAVENOUS at 08:34

## 2024-11-23 RX ADMIN — POTASSIUM CHLORIDE 10 MEQ: 7.45 INJECTION INTRAVENOUS at 00:11

## 2024-11-23 RX ADMIN — POTASSIUM BICARBONATE 40 MEQ: 782 TABLET, EFFERVESCENT ORAL at 19:50

## 2024-11-23 RX ADMIN — POTASSIUM CHLORIDE 10 MEQ: 7.45 INJECTION INTRAVENOUS at 06:57

## 2024-11-23 RX ADMIN — AMIODARONE HYDROCHLORIDE 150 MG: 1.5 INJECTION, SOLUTION INTRAVENOUS at 23:19

## 2024-11-23 ASSESSMENT — PAIN SCALES - GENERAL
PAINLEVEL_OUTOF10: 0

## 2024-11-23 NOTE — PLAN OF CARE
Speech LAnguage Pathology Dysphagia EVALUATION    Patient: Kim Markham (49 y.o. female)  Date: 11/23/2024  Primary Diagnosis: Acute right-sided congestive heart failure (HCC) [I50.811]  SVT (supraventricular tachycardia) (HCC) [I47.10]  Essential hypertension [I10]  Hypoxia [R09.02]       Precautions: Aspiration                    Time In: 1000  Time Out: 1037    DIET RECOMMENDATIONS: NPO and ice chips    SWALLOW SAFETY PRECAUTIONS: 1:1 assistance, STRICT aspiration precautions, FEED ONLY IF AWAKE AND ALERT, frequent and thorough oral care    ASSESSMENT :  Based on the objective data described below, the patient presents with     Moderate oropharyngeal dysphagia    48 y/o female with extended ICU stay, intubated 11/13 - a.m. 11/23 diagnoses including acute respiratory failure, acute CHF, NSTEMI, COPD exacerbation, possible volume overload. PMH notable for COPD, asthma. Tobacco use. Limited access to medications d/t difficulty affording them.    NGT currently in place.   RN reports that patient has been awake, alert, weak this morning.   Awake and alert. Significantly weak appearing, difficulty lifting arms. Consistently responsive to SLP commands, questions, frequently with mild to moderate delay. Mildly reduced vocal intensity. Mildly reduced intelligibility. Reported wanting coffee. Denies h/o dysphagia.  present.     Oral mucosa pink, moist.   Significant thick clear and tan/yellow secretions present in oral cavity.   Shallow oral suction assisted with clearing oral secretions.   Significantly weak, gurgly breath sounds and vocal quality. Productive cough of significant amounts of secretions w/cues multiple times throughout session effective in reducing wet, gurgly breath sounds.   Severely reduced lingual strength, coordination w/cued protrusion/lateralization. Similar with lips, mandible. Slowed movements.   Velum elevates upon phonation.     Ice chips, Puree, Thin Liquid (single/sequential cup  sips)  SLP presented secondary to patient weakness.  Mildly prolonged bolus retrieval.   Mildly prolonged oral prep and delayed A-P transit.   Scant oral residue post puree presentation.  Timely to mildly delayed swallow elicit suspected.  Significantly increased work of breath post swallow with all trials.   SpO2 during PO trials consistently 93-94%, 96-98% outside of PO trials.  Often closed eyes, replied that she was resting when SLP asked if she had fallen asleep.   Weak, gurgly sounds present throughout trials,   perhaps mildly reduced following ice chips and   cues for multiple dry swallows in effort to clear pharyngeal secretions.   Immediate cough post swallow with x1 swallow of thin via cup.   Occasional wet throat clear, unclear if r/t PO intake.  Max verbal cues yielded only x1 small sip. All other presentations of thin (perhaps x5) patient consistently impulsive, taking x2 sequential sips.   A-P transit and swallow elicit times both appeared more coordinated with smaller sip size.     Recommend that patient remain NPO except ice chips w/nursing.   NGT currently in place.  Excess thick secretions in oral cavity and pharynx.  Hopeful that patient will be able to initiate diet as medical status, strength, secretion management improve.   May perform MBSS as indicated.     Patient will benefit from skilled intervention to address the above impairments.    GOALS:    Problem: SLP Adult - Impaired Swallowing  Goal: By Discharge: Advance to least restrictive diet without signs or symptoms of aspiration for planned discharge setting.  See evaluation for individualized goals.  Description: Speech Therapy Swallow Goals  Initiated 11/23/2024  -Patient will tolerate PO diet without clinical indicators of aspiration given mod cues within 7 day(s).        -Patient will tolerate advanced PO trials without clinical indicators of aspiration given mod cues within 7 day(s).      -Patient will participate in modified barium

## 2024-11-23 NOTE — PROGRESS NOTES
CARDIOLOGY PROGRESS NOTE      Patient Name: Kim Markham  Age: 49 y.o.  Gender:female  :1975  MRN: [id number]    Patient seen and examined. This is a patient with a history of asthma and COPD who presented with shortness of breath and palpitations now being followed for elevated troponin. Remains in ICU, was extubated today. NSR currently. Episode of NSVT overnight, 2024. No other complaints reported.    Telemetry reviewed, there were no events noted in the past 24 hours.    Tele: NSR, no arrhythmias, lasix x 1 given today --CXR mild pul edema      Physical Examination    Allergies   Allergen Reactions    Latex      Vitals:    24 1000   BP: (!) 147/80   Pulse: 88   Resp: 20   Temp:    SpO2: 96%     Vital signs are stable  Gen: awake, NG inplace  Heart has a RRR.  No murmur  Lungs are diminished  Abdomen is soft, nontender, normal bowel sounds.  Extremities have trace edema  Skin is dry and warm.    Labs reviewed:  Recent Results (from the past 12 hour(s))   Basic Metabolic Panel    Collection Time: 24  2:50 AM   Result Value Ref Range    Sodium 150 (H) 136 - 145 mmol/L    Potassium 3.2 (L) 3.5 - 5.1 mmol/L    Chloride 115 (H) 97 - 108 mmol/L    CO2 31 21 - 32 mmol/L    Anion Gap 4 2 - 12 mmol/L    Glucose 127 (H) 65 - 100 mg/dL    BUN 65 (H) 6 - 20 mg/dL    Creatinine 1.06 (H) 0.55 - 1.02 mg/dL    BUN/Creatinine Ratio 61 (H) 12 - 20      Est, Glom Filt Rate 64 >60 ml/min/1.73m2    Calcium 10.0 8.5 - 10.1 mg/dL   CBC with Auto Differential    Collection Time: 24  2:50 AM   Result Value Ref Range    WBC 16.4 (H) 3.6 - 11.0 K/uL    RBC 4.15 3.80 - 5.20 M/uL    Hemoglobin 11.8 11.5 - 16.0 g/dL    Hematocrit 37.0 35.0 - 47.0 %    MCV 89.2 80.0 - 99.0 FL    MCH 28.4 26.0 - 34.0 PG    MCHC 31.9 30.0 - 36.5 g/dL    RDW 13.7 11.5 - 14.5 %    Platelets 234 150 - 400 K/uL    MPV 11.7 8.9 - 12.9 FL    Nucleated RBCs 0.0 0.0  WBC    nRBC 0.00 0.00 - 0.01 K/uL    Neutrophils % 74 32 -

## 2024-11-23 NOTE — PROGRESS NOTES
CRITICAL CARE PROGRESS NOTE    Name: Kim Markham   : 1975   MRN: [id number]   Date: 2024      Diagnoses/problem list:   Asthma  COPD  SVT  Acute on chronic HFpEF    HPI   Kim Markham is a 49 y.o. female with known past medical history significant for asthma and COPD who treated her symptoms today with Primatene Mist over-the-counter says that she went into fast heart rate became short of breath immediately.  No other exacerbating or relieving factors which presents to the emergency room with no treatment prior to arrival.  Patient reports that she had a few day history of shortness of breath and thought she had a pneumonia. She has been taking OTC cough syrup and cough medicine as well as her inhaler. She reports difficulty affording medications and has limited access to healthcare.     : Continues to have high vent requirements and high sedation requirements. SVT noted and amiodarone initiated.    11/15: Remains intubated on 500/16/10/55%.  Still has bilateral wheezing on auscultation.  Slight rise in creatinine noted but urine output is good.  No arrhythmias this morning.  Remains on amiodarone, propofol, fentanyl and heparin infusions.  : continues to require high sedation and high vent support. Will attempt SBT  : tolerated SBT for >2 hours. Will repeat today  : Sedated with propofol 40ug/kg/min, RAAS -1 to -2. AC 12 Vt 0.5 FiO2 0.4 PEEP 6. Doing well on SBTs but there is concern that she has too much anxiety to withdraw sedation and extubate  : Sedated with propofol 50ug/kg/min, RAAS -1 to -2. VD 7; AC 12 Vt 0.5 FiO2 0.4 PEEP 6. Doing well on SBTs but there is concern that she has too much anxiety to withdraw sedation and extubate. Started on buspirone . Will add sertraline  : Sedated with propofol 45ug/kg/min & fentanyl 75ug/hr, RAAS -1 to -2. VD 8; AC 12 Vt 0.5 FiO2 0.4 PEEP 6. Doing well on SBTs but there is concern that she has too much anxiety to

## 2024-11-23 NOTE — PLAN OF CARE
Problem: Neurosensory - Adult  Goal: Achieves maximal functionality and self care  Outcome: Not Progressing     Problem: Musculoskeletal - Adult  Goal: Return mobility to safest level of function  Outcome: Not Progressing  Goal: Return ADL status to a safe level of function  Outcome: Not Progressing     Problem: Metabolic/Fluid and Electrolytes - Adult  Goal: Electrolytes maintained within normal limits  Outcome: Not Progressing     Problem: Discharge Planning  Goal: Discharge to home or other facility with appropriate resources  Outcome: Progressing     Problem: ABCDS Injury Assessment  Goal: Absence of physical injury  Outcome: Progressing     Problem: Safety - Adult  Goal: Free from fall injury  Outcome: Progressing     Problem: Confusion  Goal: Confusion, delirium, dementia, or psychosis is improved or at baseline  Description: INTERVENTIONS:  1. Assess for possible contributors to thought disturbance, including medications, impaired vision or hearing, underlying metabolic abnormalities, dehydration, psychiatric diagnoses, and notify attending LIP  2. [place] high risk fall precautions, as indicated  3. Provide frequent short contacts to provide reality reorientation, refocusing and direction  4. Decrease environmental stimuli, including noise as appropriate  5. Monitor and intervene to maintain adequate nutrition, hydration, elimination, sleep and activity  6. If unable to ensure safety without constant attention obtain sitter and review sitter guidelines with assigned personnel  7. Initiate Psychosocial CNS and Spiritual Care consult, as indicated  Outcome: Progressing     Problem: Chronic Conditions and Co-morbidities  Goal: Patient's chronic conditions and co-morbidity symptoms are monitored and maintained or improved  Outcome: Progressing     Problem: Neurosensory - Adult  Goal: Achieves stable or improved neurological status  Outcome: Progressing  Goal: Absence of seizures  Outcome: Progressing  Goal:

## 2024-11-24 LAB
ANION GAP SERPL CALC-SCNC: 3 MMOL/L (ref 2–12)
ARTERIAL PATENCY WRIST A: YES
BASE EXCESS BLDA CALC-SCNC: 5.5 MMOL/L (ref 0–3)
BASOPHILS # BLD: 0 K/UL (ref 0–0.1)
BASOPHILS NFR BLD: 0 % (ref 0–1)
BDY SITE: ABNORMAL
BODY TEMPERATURE: 99.6
BUN SERPL-MCNC: 55 MG/DL (ref 6–20)
BUN/CREAT SERPL: 61 (ref 12–20)
CA-I BLD-MCNC: 1.36 MMOL/L (ref 1.13–1.32)
CA-I BLD-MCNC: 10.1 MG/DL (ref 8.5–10.1)
CHLORIDE SERPL-SCNC: 122 MMOL/L (ref 97–108)
CO2 SERPL-SCNC: 29 MMOL/L (ref 21–32)
COHGB MFR BLD: 0.3 % (ref 1–2)
CREAT SERPL-MCNC: 0.9 MG/DL (ref 0.55–1.02)
DIFFERENTIAL METHOD BLD: ABNORMAL
EKG ATRIAL RATE: 163 BPM
EKG DIAGNOSIS: NORMAL
EKG Q-T INTERVAL: 300 MS
EKG QRS DURATION: 82 MS
EKG QTC CALCULATION (BAZETT): 483 MS
EKG R AXIS: 95 DEGREES
EKG T AXIS: 22 DEGREES
EKG VENTRICULAR RATE: 156 BPM
EOSINOPHIL # BLD: 0 K/UL (ref 0–0.4)
EOSINOPHIL NFR BLD: 0 % (ref 0–7)
ERYTHROCYTE [DISTWIDTH] IN BLOOD BY AUTOMATED COUNT: 13.9 % (ref 11.5–14.5)
FIO2 ON VENT: 50 %
GAS FLOW.O2 O2 DELIVERY SYS: 10 L/MIN
GLUCOSE BLD STRIP.AUTO-MCNC: 104 MG/DL (ref 65–100)
GLUCOSE BLD STRIP.AUTO-MCNC: 106 MG/DL (ref 65–100)
GLUCOSE BLD STRIP.AUTO-MCNC: 109 MG/DL (ref 65–100)
GLUCOSE BLD STRIP.AUTO-MCNC: 114 MG/DL (ref 65–100)
GLUCOSE BLD STRIP.AUTO-MCNC: 137 MG/DL (ref 65–100)
GLUCOSE SERPL-MCNC: 134 MG/DL (ref 65–100)
HCO3 BLDA-SCNC: 31 MMOL/L (ref 22–26)
HCT VFR BLD AUTO: 38.5 % (ref 35–47)
HGB BLD-MCNC: 11.8 G/DL (ref 11.5–16)
IMM GRANULOCYTES # BLD AUTO: 0 K/UL
IMM GRANULOCYTES NFR BLD AUTO: 0 %
LYMPHOCYTES # BLD: 1.1 K/UL (ref 0.8–3.5)
LYMPHOCYTES NFR BLD: 7 % (ref 12–49)
MCH RBC QN AUTO: 28.4 PG (ref 26–34)
MCHC RBC AUTO-ENTMCNC: 30.6 G/DL (ref 30–36.5)
MCV RBC AUTO: 92.8 FL (ref 80–99)
METHGB MFR BLD: 0.3 % (ref 0–1.4)
MONOCYTES # BLD: 2.3 K/UL (ref 0–1)
MONOCYTES NFR BLD: 15 % (ref 5–13)
MYELOCYTES NFR BLD MANUAL: 2 %
NEUTS BAND NFR BLD MANUAL: 2 % (ref 0–6)
NEUTS SEG # BLD: 11.4 K/UL (ref 1.8–8)
NEUTS SEG NFR BLD: 74 % (ref 32–75)
NRBC # BLD: 0 K/UL (ref 0–0.01)
NRBC BLD-RTO: 0 PER 100 WBC
OXYHGB MFR BLD: 93.3 % (ref 95–99)
PCO2 BLDA: 49 MMHG (ref 35–45)
PERFORMED BY:: ABNORMAL
PH BLDA: 7.42 (ref 7.35–7.45)
PLATELET # BLD AUTO: 219 K/UL (ref 150–400)
PMV BLD AUTO: 11.7 FL (ref 8.9–12.9)
PO2 BLDA: 73 MMHG (ref 80–100)
POTASSIUM SERPL-SCNC: 3.6 MMOL/L (ref 3.5–5.1)
RBC # BLD AUTO: 4.15 M/UL (ref 3.8–5.2)
RBC MORPH BLD: ABNORMAL
SAO2 % BLD: 94 % (ref 95–99)
SAO2% DEVICE SAO2% SENSOR NAME: ABNORMAL
SODIUM SERPL-SCNC: 154 MMOL/L (ref 136–145)
SPECIMEN SITE: ABNORMAL
WBC # BLD AUTO: 15 K/UL (ref 3.6–11)

## 2024-11-24 PROCEDURE — 2500000003 HC RX 250 WO HCPCS: Performed by: NURSE PRACTITIONER

## 2024-11-24 PROCEDURE — 6370000000 HC RX 637 (ALT 250 FOR IP): Performed by: STUDENT IN AN ORGANIZED HEALTH CARE EDUCATION/TRAINING PROGRAM

## 2024-11-24 PROCEDURE — 85025 COMPLETE CBC W/AUTO DIFF WBC: CPT

## 2024-11-24 PROCEDURE — 6360000002 HC RX W HCPCS: Performed by: INTERNAL MEDICINE

## 2024-11-24 PROCEDURE — 36600 WITHDRAWAL OF ARTERIAL BLOOD: CPT

## 2024-11-24 PROCEDURE — 6370000000 HC RX 637 (ALT 250 FOR IP): Performed by: INTERNAL MEDICINE

## 2024-11-24 PROCEDURE — 36415 COLL VENOUS BLD VENIPUNCTURE: CPT

## 2024-11-24 PROCEDURE — 6370000000 HC RX 637 (ALT 250 FOR IP)

## 2024-11-24 PROCEDURE — 82803 BLOOD GASES ANY COMBINATION: CPT

## 2024-11-24 PROCEDURE — 6360000002 HC RX W HCPCS: Performed by: STUDENT IN AN ORGANIZED HEALTH CARE EDUCATION/TRAINING PROGRAM

## 2024-11-24 PROCEDURE — 6370000000 HC RX 637 (ALT 250 FOR IP): Performed by: NURSE PRACTITIONER

## 2024-11-24 PROCEDURE — 6370000000 HC RX 637 (ALT 250 FOR IP): Performed by: HOSPITALIST

## 2024-11-24 PROCEDURE — 6360000002 HC RX W HCPCS: Performed by: NURSE PRACTITIONER

## 2024-11-24 PROCEDURE — 82962 GLUCOSE BLOOD TEST: CPT

## 2024-11-24 PROCEDURE — 2580000003 HC RX 258: Performed by: NURSE PRACTITIONER

## 2024-11-24 PROCEDURE — 2000000000 HC ICU R&B

## 2024-11-24 PROCEDURE — 94640 AIRWAY INHALATION TREATMENT: CPT

## 2024-11-24 PROCEDURE — 82330 ASSAY OF CALCIUM: CPT

## 2024-11-24 PROCEDURE — 80048 BASIC METABOLIC PNL TOTAL CA: CPT

## 2024-11-24 RX ORDER — 0.9 % SODIUM CHLORIDE 0.9 %
500 INTRAVENOUS SOLUTION INTRAVENOUS ONCE
Status: COMPLETED | OUTPATIENT
Start: 2024-11-24 | End: 2024-11-24

## 2024-11-24 RX ORDER — IPRATROPIUM BROMIDE AND ALBUTEROL SULFATE 2.5; .5 MG/3ML; MG/3ML
1 SOLUTION RESPIRATORY (INHALATION) EVERY 4 HOURS PRN
Status: DISCONTINUED | OUTPATIENT
Start: 2024-11-24 | End: 2024-11-26

## 2024-11-24 RX ORDER — DIGOXIN 0.25 MG/ML
500 INJECTION INTRAMUSCULAR; INTRAVENOUS ONCE
Status: COMPLETED | OUTPATIENT
Start: 2024-11-24 | End: 2024-11-24

## 2024-11-24 RX ORDER — DIPHENHYDRAMINE HYDROCHLORIDE AND LIDOCAINE HYDROCHLORIDE AND ALUMINUM HYDROXIDE AND MAGNESIUM HYDRO
5 KIT 4 TIMES DAILY
Status: DISCONTINUED | OUTPATIENT
Start: 2024-11-24 | End: 2024-12-09

## 2024-11-24 RX ORDER — AMIODARONE HYDROCHLORIDE 200 MG/1
200 TABLET ORAL DAILY
Status: DISCONTINUED | OUTPATIENT
Start: 2024-11-24 | End: 2024-11-26

## 2024-11-24 RX ADMIN — METHYLPREDNISOLONE SODIUM SUCCINATE 40 MG: 40 INJECTION INTRAMUSCULAR; INTRAVENOUS at 08:22

## 2024-11-24 RX ADMIN — ACETYLCYSTEINE 600 MG: 200 SOLUTION ORAL; RESPIRATORY (INHALATION) at 06:35

## 2024-11-24 RX ADMIN — IPRATROPIUM BROMIDE 0.5 MG: 0.5 SOLUTION RESPIRATORY (INHALATION) at 06:34

## 2024-11-24 RX ADMIN — POLYETHYLENE GLYCOL 3350 17 G: 17 POWDER, FOR SOLUTION ORAL at 08:22

## 2024-11-24 RX ADMIN — BUDESONIDE INHALATION 500 MCG: 0.5 SUSPENSION RESPIRATORY (INHALATION) at 20:52

## 2024-11-24 RX ADMIN — IPRATROPIUM BROMIDE 0.5 MG: 0.5 SOLUTION RESPIRATORY (INHALATION) at 11:35

## 2024-11-24 RX ADMIN — IPRATROPIUM BROMIDE 0.5 MG: 0.5 SOLUTION RESPIRATORY (INHALATION) at 15:41

## 2024-11-24 RX ADMIN — ENOXAPARIN SODIUM 30 MG: 100 INJECTION SUBCUTANEOUS at 20:18

## 2024-11-24 RX ADMIN — ACETAMINOPHEN 650 MG: 325 TABLET ORAL at 20:15

## 2024-11-24 RX ADMIN — SODIUM CHLORIDE, PRESERVATIVE FREE 10 ML: 5 INJECTION INTRAVENOUS at 08:22

## 2024-11-24 RX ADMIN — ATORVASTATIN CALCIUM 40 MG: 40 TABLET, FILM COATED ORAL at 20:16

## 2024-11-24 RX ADMIN — IPRATROPIUM BROMIDE 0.5 MG: 0.5 SOLUTION RESPIRATORY (INHALATION) at 02:57

## 2024-11-24 RX ADMIN — AMIODARONE HYDROCHLORIDE 200 MG: 200 TABLET ORAL at 11:32

## 2024-11-24 RX ADMIN — METOPROLOL TARTRATE 25 MG: 25 TABLET, FILM COATED ORAL at 20:16

## 2024-11-24 RX ADMIN — POTASSIUM BICARBONATE 40 MEQ: 782 TABLET, EFFERVESCENT ORAL at 08:23

## 2024-11-24 RX ADMIN — FAMOTIDINE 20 MG: 20 TABLET, FILM COATED ORAL at 08:23

## 2024-11-24 RX ADMIN — DOCUSATE SODIUM 100 MG: 50 LIQUID ORAL at 08:22

## 2024-11-24 RX ADMIN — METOPROLOL TARTRATE 25 MG: 25 TABLET, FILM COATED ORAL at 08:23

## 2024-11-24 RX ADMIN — IPRATROPIUM BROMIDE 0.5 MG: 0.5 SOLUTION RESPIRATORY (INHALATION) at 23:56

## 2024-11-24 RX ADMIN — ASPIRIN 81 MG: 81 TABLET, COATED ORAL at 08:23

## 2024-11-24 RX ADMIN — DIGOXIN 500 MCG: 0.25 INJECTION INTRAMUSCULAR; INTRAVENOUS at 00:15

## 2024-11-24 RX ADMIN — SERTRALINE HYDROCHLORIDE 50 MG: 50 TABLET ORAL at 08:23

## 2024-11-24 RX ADMIN — BUSPIRONE HYDROCHLORIDE 7.5 MG: 5 TABLET ORAL at 08:23

## 2024-11-24 RX ADMIN — BUSPIRONE HYDROCHLORIDE 7.5 MG: 5 TABLET ORAL at 20:17

## 2024-11-24 RX ADMIN — Medication 5 ML: at 20:19

## 2024-11-24 RX ADMIN — ENOXAPARIN SODIUM 30 MG: 100 INJECTION SUBCUTANEOUS at 08:22

## 2024-11-24 RX ADMIN — IPRATROPIUM BROMIDE 0.5 MG: 0.5 SOLUTION RESPIRATORY (INHALATION) at 20:52

## 2024-11-24 RX ADMIN — SODIUM CHLORIDE, PRESERVATIVE FREE 10 ML: 5 INJECTION INTRAVENOUS at 20:18

## 2024-11-24 RX ADMIN — ACETYLCYSTEINE 600 MG: 200 SOLUTION ORAL; RESPIRATORY (INHALATION) at 20:52

## 2024-11-24 RX ADMIN — AMIODARONE HYDROCHLORIDE 0.5 MG/MIN: 1.8 INJECTION, SOLUTION INTRAVENOUS at 06:05

## 2024-11-24 RX ADMIN — BUDESONIDE INHALATION 500 MCG: 0.5 SUSPENSION RESPIRATORY (INHALATION) at 06:34

## 2024-11-24 RX ADMIN — SODIUM CHLORIDE 500 ML: 9 INJECTION, SOLUTION INTRAVENOUS at 00:14

## 2024-11-24 RX ADMIN — POTASSIUM BICARBONATE 40 MEQ: 782 TABLET, EFFERVESCENT ORAL at 20:14

## 2024-11-24 RX ADMIN — SENNOSIDES 8.6 MG: 8.6 TABLET, FILM COATED ORAL at 20:16

## 2024-11-24 ASSESSMENT — PAIN SCALES - GENERAL
PAINLEVEL_OUTOF10: 0

## 2024-11-24 NOTE — PROGRESS NOTES
CRITICAL CARE PROGRESS NOTE    Name: Kim Markham   : 1975   MRN: [id number]   Date: 2024      Diagnoses/problem list:   Asthma  COPD  SVT  Acute on chronic HFpEF    HPI   Kim Markham is a 49 y.o. female with known past medical history significant for asthma and COPD who treated her symptoms today with Primatene Mist over-the-counter says that she went into fast heart rate became short of breath immediately.  No other exacerbating or relieving factors which presents to the emergency room with no treatment prior to arrival.  Patient reports that she had a few day history of shortness of breath and thought she had a pneumonia. She has been taking OTC cough syrup and cough medicine as well as her inhaler. She reports difficulty affording medications and has limited access to healthcare.     : Continues to have high vent requirements and high sedation requirements. SVT noted and amiodarone initiated.    11/15: Remains intubated on 500/16/10/55%.  Still has bilateral wheezing on auscultation.  Slight rise in creatinine noted but urine output is good.  No arrhythmias this morning.  Remains on amiodarone, propofol, fentanyl and heparin infusions.  : continues to require high sedation and high vent support. Will attempt SBT  : tolerated SBT for >2 hours. Will repeat today  : Sedated with propofol 40ug/kg/min, RAAS -1 to -2. AC 12 Vt 0.5 FiO2 0.4 PEEP 6. Doing well on SBTs but there is concern that she has too much anxiety to withdraw sedation and extubate  : Sedated with propofol 50ug/kg/min, RAAS -1 to -2. VD 7; AC 12 Vt 0.5 FiO2 0.4 PEEP 6. Doing well on SBTs but there is concern that she has too much anxiety to withdraw sedation and extubate. Started on buspirone . Will add sertraline  : Sedated with propofol 45ug/kg/min & fentanyl 75ug/hr, RAAS -1 to -2. VD 8; AC 12 Vt 0.5 FiO2 0.4 PEEP 6. Doing well on SBTs but there is concern that she has too much anxiety to

## 2024-11-24 NOTE — PLAN OF CARE
Problem: Neurosensory - Adult  Goal: Achieves maximal functionality and self care  Outcome: Not Progressing     Problem: Musculoskeletal - Adult  Goal: Return mobility to safest level of function  Outcome: Not Progressing  Goal: Return ADL status to a safe level of function  Outcome: Not Progressing     Problem: Discharge Planning  Goal: Discharge to home or other facility with appropriate resources  Outcome: Progressing     Problem: ABCDS Injury Assessment  Goal: Absence of physical injury  Outcome: Progressing     Problem: Safety - Adult  Goal: Free from fall injury  Outcome: Progressing     Problem: Confusion  Goal: Confusion, delirium, dementia, or psychosis is improved or at baseline  Description: INTERVENTIONS:  1. Assess for possible contributors to thought disturbance, including medications, impaired vision or hearing, underlying metabolic abnormalities, dehydration, psychiatric diagnoses, and notify attending LIP  2. [place] high risk fall precautions, as indicated  3. Provide frequent short contacts to provide reality reorientation, refocusing and direction  4. Decrease environmental stimuli, including noise as appropriate  5. Monitor and intervene to maintain adequate nutrition, hydration, elimination, sleep and activity  6. If unable to ensure safety without constant attention obtain sitter and review sitter guidelines with assigned personnel  7. Initiate Psychosocial CNS and Spiritual Care consult, as indicated  Outcome: Progressing     Problem: Chronic Conditions and Co-morbidities  Goal: Patient's chronic conditions and co-morbidity symptoms are monitored and maintained or improved  Outcome: Progressing     Problem: Neurosensory - Adult  Goal: Achieves stable or improved neurological status  Outcome: Progressing  Goal: Absence of seizures  Outcome: Progressing  Goal: Remains free of injury related to seizures activity  Outcome: Progressing     Problem: Respiratory - Adult  Goal: Achieves optimal

## 2024-11-24 NOTE — PROGRESS NOTES
Patient converted into SVT rhythm at a sustained rate of 145-160. Patient became hypotensive and desaturated to the mid 80's. NP Bernice Saldana at bedside to give verbal orders for Amiodarone BOLUS, followed by Continuous Amiodarone drip, 500 mL NS Bolus, and an increase in O2 from 5 to 10L. Stat EKG also obtained showing SVT at 158 bpm.

## 2024-11-25 ENCOUNTER — APPOINTMENT (OUTPATIENT)
Facility: HOSPITAL | Age: 49
End: 2024-11-25
Payer: MEDICAID

## 2024-11-25 LAB
ANION GAP SERPL CALC-SCNC: 0 MMOL/L (ref 2–12)
BASOPHILS # BLD: 0 K/UL (ref 0–0.1)
BASOPHILS NFR BLD: 0 % (ref 0–1)
BUN SERPL-MCNC: 47 MG/DL (ref 6–20)
BUN/CREAT SERPL: 64 (ref 12–20)
CA-I BLD-MCNC: 10.7 MG/DL (ref 8.5–10.1)
CHLORIDE SERPL-SCNC: 125 MMOL/L (ref 97–108)
CO2 SERPL-SCNC: 32 MMOL/L (ref 21–32)
CREAT SERPL-MCNC: 0.74 MG/DL (ref 0.55–1.02)
DIFFERENTIAL METHOD BLD: ABNORMAL
EKG ATRIAL RATE: 174 BPM
EKG ATRIAL RATE: 85 BPM
EKG DIAGNOSIS: NORMAL
EKG DIAGNOSIS: NORMAL
EKG P AXIS: 67 DEGREES
EKG P-R INTERVAL: 150 MS
EKG Q-T INTERVAL: 298 MS
EKG Q-T INTERVAL: 366 MS
EKG QRS DURATION: 78 MS
EKG QRS DURATION: 82 MS
EKG QTC CALCULATION (BAZETT): 435 MS
EKG QTC CALCULATION (BAZETT): 483 MS
EKG R AXIS: 46 DEGREES
EKG R AXIS: 76 DEGREES
EKG T AXIS: 47 DEGREES
EKG T AXIS: 65 DEGREES
EKG VENTRICULAR RATE: 158 BPM
EKG VENTRICULAR RATE: 85 BPM
EOSINOPHIL # BLD: 0 K/UL (ref 0–0.4)
EOSINOPHIL NFR BLD: 0 % (ref 0–7)
ERYTHROCYTE [DISTWIDTH] IN BLOOD BY AUTOMATED COUNT: 14 % (ref 11.5–14.5)
GLUCOSE BLD STRIP.AUTO-MCNC: 103 MG/DL (ref 65–100)
GLUCOSE BLD STRIP.AUTO-MCNC: 106 MG/DL (ref 65–100)
GLUCOSE BLD STRIP.AUTO-MCNC: 112 MG/DL (ref 65–100)
GLUCOSE BLD STRIP.AUTO-MCNC: 117 MG/DL (ref 65–100)
GLUCOSE SERPL-MCNC: 119 MG/DL (ref 65–100)
HCT VFR BLD AUTO: 42.4 % (ref 35–47)
HGB BLD-MCNC: 12.7 G/DL (ref 11.5–16)
IMM GRANULOCYTES # BLD AUTO: 0 K/UL
IMM GRANULOCYTES NFR BLD AUTO: 0 %
LYMPHOCYTES # BLD: 1.3 K/UL (ref 0.8–3.5)
LYMPHOCYTES NFR BLD: 6 % (ref 12–49)
MCH RBC QN AUTO: 28.3 PG (ref 26–34)
MCHC RBC AUTO-ENTMCNC: 30 G/DL (ref 30–36.5)
MCV RBC AUTO: 94.6 FL (ref 80–99)
METAMYELOCYTES NFR BLD MANUAL: 1 %
MONOCYTES # BLD: 2.8 K/UL (ref 0–1)
MONOCYTES NFR BLD: 13 % (ref 5–13)
NEUTS SEG # BLD: 17.5 K/UL (ref 1.8–8)
NEUTS SEG NFR BLD: 80 % (ref 32–75)
NRBC # BLD: 0 K/UL (ref 0–0.01)
NRBC BLD-RTO: 0 PER 100 WBC
PERFORMED BY:: ABNORMAL
PLATELET # BLD AUTO: 273 K/UL (ref 150–400)
PMV BLD AUTO: 11.9 FL (ref 8.9–12.9)
POTASSIUM SERPL-SCNC: 3.9 MMOL/L (ref 3.5–5.1)
RBC # BLD AUTO: 4.48 M/UL (ref 3.8–5.2)
RBC MORPH BLD: ABNORMAL
SODIUM SERPL-SCNC: 157 MMOL/L (ref 136–145)
WBC # BLD AUTO: 21.9 K/UL (ref 3.6–11)

## 2024-11-25 PROCEDURE — 6370000000 HC RX 637 (ALT 250 FOR IP): Performed by: HOSPITALIST

## 2024-11-25 PROCEDURE — 2700000000 HC OXYGEN THERAPY PER DAY

## 2024-11-25 PROCEDURE — 6360000002 HC RX W HCPCS: Performed by: STUDENT IN AN ORGANIZED HEALTH CARE EDUCATION/TRAINING PROGRAM

## 2024-11-25 PROCEDURE — 85025 COMPLETE CBC W/AUTO DIFF WBC: CPT

## 2024-11-25 PROCEDURE — 2580000003 HC RX 258: Performed by: NURSE PRACTITIONER

## 2024-11-25 PROCEDURE — 6370000000 HC RX 637 (ALT 250 FOR IP): Performed by: STUDENT IN AN ORGANIZED HEALTH CARE EDUCATION/TRAINING PROGRAM

## 2024-11-25 PROCEDURE — 6360000002 HC RX W HCPCS: Performed by: INTERNAL MEDICINE

## 2024-11-25 PROCEDURE — 93005 ELECTROCARDIOGRAM TRACING: CPT | Performed by: STUDENT IN AN ORGANIZED HEALTH CARE EDUCATION/TRAINING PROGRAM

## 2024-11-25 PROCEDURE — 97161 PT EVAL LOW COMPLEX 20 MIN: CPT

## 2024-11-25 PROCEDURE — 6370000000 HC RX 637 (ALT 250 FOR IP): Performed by: INTERNAL MEDICINE

## 2024-11-25 PROCEDURE — 6370000000 HC RX 637 (ALT 250 FOR IP)

## 2024-11-25 PROCEDURE — 74018 RADEX ABDOMEN 1 VIEW: CPT

## 2024-11-25 PROCEDURE — 82962 GLUCOSE BLOOD TEST: CPT

## 2024-11-25 PROCEDURE — 6370000000 HC RX 637 (ALT 250 FOR IP): Performed by: NURSE PRACTITIONER

## 2024-11-25 PROCEDURE — 2060000000 HC ICU INTERMEDIATE R&B

## 2024-11-25 PROCEDURE — 97530 THERAPEUTIC ACTIVITIES: CPT

## 2024-11-25 PROCEDURE — 92526 ORAL FUNCTION THERAPY: CPT

## 2024-11-25 PROCEDURE — 80048 BASIC METABOLIC PNL TOTAL CA: CPT

## 2024-11-25 PROCEDURE — 94761 N-INVAS EAR/PLS OXIMETRY MLT: CPT

## 2024-11-25 PROCEDURE — 36415 COLL VENOUS BLD VENIPUNCTURE: CPT

## 2024-11-25 PROCEDURE — 94640 AIRWAY INHALATION TREATMENT: CPT

## 2024-11-25 PROCEDURE — 2500000003 HC RX 250 WO HCPCS: Performed by: STUDENT IN AN ORGANIZED HEALTH CARE EDUCATION/TRAINING PROGRAM

## 2024-11-25 PROCEDURE — 97166 OT EVAL MOD COMPLEX 45 MIN: CPT

## 2024-11-25 RX ORDER — SODIUM CHLORIDE 0.9 % (FLUSH) 0.9 %
5-40 SYRINGE (ML) INJECTION PRN
Status: DISCONTINUED | OUTPATIENT
Start: 2024-11-25 | End: 2024-11-29

## 2024-11-25 RX ORDER — DEXTROSE MONOHYDRATE AND SODIUM CHLORIDE 5; .45 G/100ML; G/100ML
INJECTION, SOLUTION INTRAVENOUS CONTINUOUS
Status: DISCONTINUED | OUTPATIENT
Start: 2024-11-26 | End: 2024-11-26

## 2024-11-25 RX ORDER — DEXTROSE MONOHYDRATE AND SODIUM CHLORIDE 5; .9 G/100ML; G/100ML
INJECTION, SOLUTION INTRAVENOUS CONTINUOUS
Status: DISCONTINUED | OUTPATIENT
Start: 2024-11-26 | End: 2024-11-25

## 2024-11-25 RX ORDER — SODIUM CHLORIDE 9 MG/ML
INJECTION, SOLUTION INTRAVENOUS PRN
Status: DISCONTINUED | OUTPATIENT
Start: 2024-11-25 | End: 2024-11-29

## 2024-11-25 RX ORDER — SODIUM CHLORIDE 0.9 % (FLUSH) 0.9 %
5-40 SYRINGE (ML) INJECTION EVERY 12 HOURS SCHEDULED
Status: DISCONTINUED | OUTPATIENT
Start: 2024-11-26 | End: 2024-11-29

## 2024-11-25 RX ADMIN — METOPROLOL TARTRATE 25 MG: 25 TABLET, FILM COATED ORAL at 08:33

## 2024-11-25 RX ADMIN — ACETAMINOPHEN 650 MG: 325 TABLET ORAL at 08:53

## 2024-11-25 RX ADMIN — ENOXAPARIN SODIUM 30 MG: 100 INJECTION SUBCUTANEOUS at 08:30

## 2024-11-25 RX ADMIN — POTASSIUM BICARBONATE 40 MEQ: 782 TABLET, EFFERVESCENT ORAL at 08:32

## 2024-11-25 RX ADMIN — POTASSIUM BICARBONATE 40 MEQ: 782 TABLET, EFFERVESCENT ORAL at 22:27

## 2024-11-25 RX ADMIN — FAMOTIDINE 20 MG: 20 TABLET, FILM COATED ORAL at 08:33

## 2024-11-25 RX ADMIN — SERTRALINE HYDROCHLORIDE 50 MG: 50 TABLET ORAL at 08:33

## 2024-11-25 RX ADMIN — ACETYLCYSTEINE 600 MG: 200 SOLUTION ORAL; RESPIRATORY (INHALATION) at 08:03

## 2024-11-25 RX ADMIN — SODIUM CHLORIDE, PRESERVATIVE FREE 10 ML: 5 INJECTION INTRAVENOUS at 22:57

## 2024-11-25 RX ADMIN — SENNOSIDES 8.6 MG: 8.6 TABLET, FILM COATED ORAL at 22:27

## 2024-11-25 RX ADMIN — ACETYLCYSTEINE 600 MG: 200 SOLUTION ORAL; RESPIRATORY (INHALATION) at 21:18

## 2024-11-25 RX ADMIN — IPRATROPIUM BROMIDE 0.5 MG: 0.5 SOLUTION RESPIRATORY (INHALATION) at 15:31

## 2024-11-25 RX ADMIN — DOCUSATE SODIUM 100 MG: 50 LIQUID ORAL at 08:32

## 2024-11-25 RX ADMIN — BUDESONIDE INHALATION 500 MCG: 0.5 SUSPENSION RESPIRATORY (INHALATION) at 08:03

## 2024-11-25 RX ADMIN — AMIODARONE HYDROCHLORIDE 200 MG: 200 TABLET ORAL at 08:32

## 2024-11-25 RX ADMIN — POLYETHYLENE GLYCOL 3350 17 G: 17 POWDER, FOR SOLUTION ORAL at 08:31

## 2024-11-25 RX ADMIN — BUDESONIDE INHALATION 500 MCG: 0.5 SUSPENSION RESPIRATORY (INHALATION) at 21:18

## 2024-11-25 RX ADMIN — BUSPIRONE HYDROCHLORIDE 7.5 MG: 5 TABLET ORAL at 22:26

## 2024-11-25 RX ADMIN — ASPIRIN 81 MG: 81 TABLET, COATED ORAL at 08:33

## 2024-11-25 RX ADMIN — METOPROLOL TARTRATE 5 MG: 5 INJECTION INTRAVENOUS at 13:31

## 2024-11-25 RX ADMIN — IPRATROPIUM BROMIDE 0.5 MG: 0.5 SOLUTION RESPIRATORY (INHALATION) at 04:33

## 2024-11-25 RX ADMIN — Medication 5 ML: at 13:31

## 2024-11-25 RX ADMIN — IPRATROPIUM BROMIDE 0.5 MG: 0.5 SOLUTION RESPIRATORY (INHALATION) at 11:08

## 2024-11-25 RX ADMIN — METOPROLOL TARTRATE 25 MG: 25 TABLET, FILM COATED ORAL at 22:28

## 2024-11-25 RX ADMIN — BUSPIRONE HYDROCHLORIDE 7.5 MG: 5 TABLET ORAL at 08:32

## 2024-11-25 RX ADMIN — IPRATROPIUM BROMIDE 0.5 MG: 0.5 SOLUTION RESPIRATORY (INHALATION) at 21:18

## 2024-11-25 RX ADMIN — IPRATROPIUM BROMIDE 0.5 MG: 0.5 SOLUTION RESPIRATORY (INHALATION) at 08:03

## 2024-11-25 RX ADMIN — Medication 5 ML: at 09:42

## 2024-11-25 RX ADMIN — ENOXAPARIN SODIUM 30 MG: 100 INJECTION SUBCUTANEOUS at 22:50

## 2024-11-25 RX ADMIN — Medication 5 ML: at 23:52

## 2024-11-25 RX ADMIN — METHYLPREDNISOLONE SODIUM SUCCINATE 40 MG: 40 INJECTION INTRAMUSCULAR; INTRAVENOUS at 08:30

## 2024-11-25 RX ADMIN — ATORVASTATIN CALCIUM 40 MG: 40 TABLET, FILM COATED ORAL at 22:28

## 2024-11-25 RX ADMIN — SODIUM CHLORIDE, PRESERVATIVE FREE 10 ML: 5 INJECTION INTRAVENOUS at 08:33

## 2024-11-25 ASSESSMENT — PAIN SCALES - GENERAL
PAINLEVEL_OUTOF10: 0
PAINLEVEL_OUTOF10: 3
PAINLEVEL_OUTOF10: 0
PAINLEVEL_OUTOF10: 0
PAINLEVEL_OUTOF10: 8
PAINLEVEL_OUTOF10: 8
PAINLEVEL_OUTOF10: 0
PAINLEVEL_OUTOF10: 1
PAINLEVEL_OUTOF10: 9

## 2024-11-25 ASSESSMENT — PAIN DESCRIPTION - ORIENTATION
ORIENTATION: POSTERIOR
ORIENTATION: POSTERIOR

## 2024-11-25 ASSESSMENT — PAIN DESCRIPTION - DESCRIPTORS
DESCRIPTORS: DISCOMFORT
DESCRIPTORS: DISCOMFORT

## 2024-11-25 ASSESSMENT — PAIN DESCRIPTION - LOCATION
LOCATION: BACK
LOCATION: BACK

## 2024-11-25 NOTE — PLAN OF CARE
Speech LAnguage Pathology Dysphagia TREATMENT    Patient: Kim Markham (49 y.o. female)  Date: 11/25/2024  Primary Diagnosis: Acute right-sided congestive heart failure (HCC) [I50.811]  SVT (supraventricular tachycardia) (HCC) [I47.10]  Essential hypertension [I10]  Hypoxia [R09.02]       Precautions: aspiration Fall Risk, Bed Alarm                  Time In: 1000  Time Out: 1037    DIET RECOMMENDATIONS: NPO    SWALLOW SAFETY PRECAUTIONS: Oral care, oropharyngeal and oral suction. Anti-fungal medication for oral thrush.    ASSESSMENT :  Patient is still not safe for oral intake at this time. Recommend to continue NPO status.   Patient seen at bedside in ICU. NG in place. TF are observed in NG. Patient w/ wet vocal quality, oropharyngeal secretions and thrush prior to trials. Oropharyngeal suction administered and copious tan secretions/liquid suctioned. Concerns for tolerance of TF. ?'able reflux. This was discussed w/ RN and MD. Patient continues w/ dysarthria and labored oral motor movements. Intermittent confusion present. Patient does c/o of visual changes/blurred vision.   Slow mastication of ice chip trials x4. Swallow initiated w/ delay. Pharyngeal response reduced to digital palpation. Swallow sounds weak and tight to cervical auscultation. Significant cough response following tsp thin trial. Oropharyngeal suction provided and secretions removed. Vocal quality clear.   No further trials administered due to aspiration risk.   Discussed tolerance of TF w/ ?able reflux, change in vision and lingual thrush w/ MD and RN.   Patient may benefit from further head imagining.     Patient will benefit from skilled intervention to address the above impairments.    GOALS:    Problem: SLP Adult - Impaired Swallowing  Goal: By Discharge: Advance to least restrictive diet without signs or symptoms of aspiration for planned discharge setting.  See evaluation for individualized goals.  Description: Speech Therapy Swallow  (numerical) 0    COMMUNICATION/EDUCATION:   Swallow safety precautions, Aspiration precautions, MBS recommendations, and Prognosis and SLP POC education provided to Patient and Nurse via explanation, all questions/concerns addressed. Patient requires reinforcement.    The patient's plan of care including recommendations, planned interventions, and recommended diet changes were discussed with: Registered nurse and Physician.    Patient/family have participated as able in goal setting and plan of care    Thank you,  Inés Lu, SLP

## 2024-11-25 NOTE — PROGRESS NOTES
Patient arrived to the unit at 1830. Patient oriented to the unit. Dual skin assessment performed with Candida SAMAYOA RN. Abrasion noted to right neck and excoriation on coccyx. Vitals signs obtained. No complaints of pain at this time. 02 connected, set at 2lpm via NC.NG tube remains in right nare at 63 cm with tube feeding attached.Setting verified on pump. Patient with smearing noted from rectum. Incontinence care performed. Mouth care completed and moisturizer applied on lips. Patient HOB elevated at 45 degrees. Bed locked and in the lowest position, call light in reach and bed alarm on.    4 Eyes Skin Assessment     NAME:  Kim Markham  YOB: 1975  MEDICAL RECORD NUMBER:  [id number]    The patient is being assessed for  Transfer to New Unit    I agree that at least one RN has performed a thorough Head to Toe Skin Assessment on the patient. ALL assessment sites listed below have been assessed.      Areas assessed by both nurses:    Head, Face, Ears, Shoulders, Back, Chest, Arms, Elbows, Hands, Sacrum. Buttock, Coccyx, Ischium, Legs. Feet and Heels, and Under Medical Devices         Does the Patient have a Wound? No noted wound(s)       [place] Prevention initiated by RN: Yes  Wound Care Orders initiated by RN: No    Pressure Injury (Stage 3,4, Unstageable, DTI, NWPT, and Complex wounds) if present, place Wound referral order by RN under : No    New Ostomies, if present place, Ostomy referral order under : No     Nurse 1 eSignature: Electronically signed by Nathalia Llanos RN on 11/25/24 at 6:52 PM EST    **SHARE this note so that the co-signing nurse can place an eSignature**    Nurse 2 eSignature: Electronically signed by Jael Acevedo RN on 11/25/24 at 6:59 PM EST

## 2024-11-25 NOTE — CARE COORDINATION
CM reviewed Pt medicals, Pt lives with her boyfriend and was IND with ADL.     Pt was working before coming into the hosptal.    Pt does not have insurance and has trouble getting her medication for her Asthma.     Pt will need [facility] to assist her with a PCP and medication.     Per IDR  Since extubation, having trouble feeding Pt.   Pt thinks she needs glasses's and doesn't have them.       On 02@4lnc.     Pt can transfer to the floor.

## 2024-11-25 NOTE — PLAN OF CARE
Problem: Discharge Planning  Goal: Discharge to home or other facility with appropriate resources  Outcome: Not Progressing     Problem: Confusion  Goal: Confusion, delirium, dementia, or psychosis is improved or at baseline  Description: INTERVENTIONS:  1. Assess for possible contributors to thought disturbance, including medications, impaired vision or hearing, underlying metabolic abnormalities, dehydration, psychiatric diagnoses, and notify attending LIP  2. [place] high risk fall precautions, as indicated  3. Provide frequent short contacts to provide reality reorientation, refocusing and direction  4. Decrease environmental stimuli, including noise as appropriate  5. Monitor and intervene to maintain adequate nutrition, hydration, elimination, sleep and activity  6. If unable to ensure safety without constant attention obtain sitter and review sitter guidelines with assigned personnel  7. Initiate Psychosocial CNS and Spiritual Care consult, as indicated  Outcome: Not Progressing     Problem: Chronic Conditions and Co-morbidities  Goal: Patient's chronic conditions and co-morbidity symptoms are monitored and maintained or improved  Outcome: Not Progressing     Problem: Neurosensory - Adult  Goal: Achieves stable or improved neurological status  Outcome: Not Progressing  Goal: Achieves maximal functionality and self care  Outcome: Not Progressing     Problem: Cardiovascular - Adult  Goal: Absence of cardiac dysrhythmias or at baseline  Outcome: Not Progressing     Problem: Musculoskeletal - Adult  Goal: Return mobility to safest level of function  Outcome: Not Progressing  Goal: Return ADL status to a safe level of function  Outcome: Not Progressing     Problem: ABCDS Injury Assessment  Goal: Absence of physical injury  Outcome: Progressing     Problem: Safety - Adult  Goal: Free from fall injury  Outcome: Progressing     Problem: Neurosensory - Adult  Goal: Absence of seizures  Outcome: Progressing  Goal:

## 2024-11-25 NOTE — PROGRESS NOTES
OCCUPATIONAL THERAPY EVALUATION  Patient: Kim Markham (49 y.o. female)  Date: 11/25/2024  Primary Diagnosis: Acute right-sided congestive heart failure (HCC) [I50.811]  SVT (supraventricular tachycardia) (HCC) [I47.10]  Essential hypertension [I10]  Hypoxia [R09.02]       Precautions: Fall Risk, Bed Alarm                Recommendations for nursing mobility: Frequent repositioning to prevent skin breakdown, Use of bed/chair alarm for safety, LE elevation for management of edema, and Assist x2    In place during session:Peripheral IV, Nasal Cannula 2L, External Catheter, EKG/telemetry , Pulse ox, and Nasogastric Tube  ASSESSMENT  Pt is a 49 y.o. female presenting to [facility] with c/o increased heart rate and SOB, admitted 11/12/2024 and currently being treated for SVT. Pt intubated on mechanical ventilator 11/14, extubated 11/23. Pt w/ PMHx of asthma and COPD. Pt received semi-supine in bed upon arrival, AXO x4 with cues for day, and agreeable to OT evaluation.     Based on current observations, pt presents with decreased  functional mobility, independence in ADLs, high-level IADLs, ROM, strength, sensation, body mechanics, activity tolerance, endurance, safety awareness, cognition, command following, attention/concentration, coordination, balance, posture, increased pain levels (see below for objective details and assist levels).     Overall, pt tolerates session poor today with 8/10 back pain and observed SOB and reported fatigue. Pt stated she was soiled and bedding needed to be changed. Pt required maxAx2 to roll L<>R w/ multimodal cueing for sequencing and technique and [place] assist to reach for bed rail. Pt was dependent for anterior/posterior pericare at bed level. Pt required totalAx2 w/ use of MATS pad to scoot up and reposition in bed. BUE ROM and strength assessed at bed level, pt presents w/ minimal BUE AROM. Pt's AAROM WFL. Difficulty assessing strength s/t significant weakness, however, apparent L  and

## 2024-11-25 NOTE — PLAN OF CARE
assisted with rolling L/R x2 with maxAx2 to replace chux pad and complete pericare with totalA (see OT note for details). Pt required maxAx2 for rolling L/R. Pt scooted to HOB with maxAx2 and use of MATS. Pt handed off to OT with call bell in reach and all needs met, in no acute distress. Pt will benefit from continued skilled PT to address above deficits and return to PLOF. Current PT DC recommendation High intensity and comprehensive skilled physical therapy in a multidisciplinary setting as patient is working towards tolerating up to 3 hours of therapy/day x 5-7 days/week once medically appropriate.     Start of Session End of Session   SPO2 (%) 96    Heart Rate (BPM) 97 96   Blood Pressure 138/88      GOALS:    Problem: Physical Therapy - Adult  Goal: By Discharge: Performs mobility at highest level of function for planned discharge setting.  See evaluation for individualized goals.  Description: FUNCTIONAL STATUS PRIOR TO ADMISSION: Patient was modified independent using a rolling walker and single point cane for functional mobility.    HOME SUPPORT PRIOR TO ADMISSION: The patient lived with spouse but did not require assistance.    Physical Therapy Goals  Initiated 11/25/2024  Pt stated goal: to go home  Pt will be I with LE HEP in 7 days.  Pt will perform bed mobility with Modified [place] in 7 days.    *will add add'l balance, amb and transfer goals as pt progresses  Outcome: Progressing        PLAN :  Recommendations and Planned Interventions: bed mobility training, transfer training, gait training, therapeutic exercises, neuromuscular re-education, patient and family training/education, and therapeutic activities    Recommend next PT session: EOB transfers, seated static and dynamic balance, standing static balance, and LE therex    Frequency/Duration: Patient will be followed by physical therapy:  3-5x/week to address goals.    Recommendation for discharge: (in order for the patient to meet his/her  2007, [employer] of [employer] [facility], under license to [employer]. All rights reserved     Score:  Initial:  Most Recent: X (Date: 2024 )   Interpretation of Tool:  Represents activities that are increasingly more difficult (i.e. Bed mobility, Transfers, Gait).  Score 24 23 22-20 19-15 14-10 9-7 6   Modifier CH CI CJ CK CL CM CN         Physical Therapy Evaluation Charge Determination   History Examination Presentation Decision-Making   HIGH Complexity :3+ comorbidities / personal factors will impact the outcome/ POC  MEDIUM Complexity : 3 Standardized tests and measures addressing body structure, function, activity limitation and / or participation in recreation  LOW Complexity : Stable, uncomplicated  Other outcome measures [facility] 6  MEDIUM      Based on the above components, the patient evaluation is determined to be of the following complexity level: LOW    Pain Ratin/10 back pain  Pain Intervention(s):   nursing notified and addressing    Activity Tolerance:   Poor    After treatment patient left in no apparent distress:   Bed locked and in lowest position Patient left in no apparent distress in bed, Call bell within reach, Bed/ chair alarm activated, and Side rails x3 and nsg updated.    COMMUNICATION/EDUCATION:   The patient’s plan of care was discussed with: Occupational therapist and Registered nurse    Patient Education  Education Given To: Patient  Education Provided: Role of Therapy;Plan of Care;Transfer Training;Orientation    PT/OT sessions overlapped for increased safety of pt and clinician.       Thank you for this referral.  Deepthi Landry, PT  Minutes: 39

## 2024-11-25 NOTE — PROGRESS NOTES
Received Order for Telemetry     Kim Markham   1975   [id number]   Acute right-sided congestive heart failure (HCC) [I50.811]  SVT (supraventricular tachycardia) (HCC) [I47.10]  Essential hypertension [I10]  Hypoxia [R09.02]   Kuldeep Nuñez*     Tele Box # [id number] placed on patient at  1809 pm  ER Room #   Admitting to Room 489  Verified with Primary Nurse RAMON at  1809 pm

## 2024-11-26 ENCOUNTER — APPOINTMENT (OUTPATIENT)
Facility: HOSPITAL | Age: 49
End: 2024-11-26
Payer: MEDICAID

## 2024-11-26 PROBLEM — N17.9 AKI (ACUTE KIDNEY INJURY) (HCC): Status: ACTIVE | Noted: 2024-11-26

## 2024-11-26 PROBLEM — I50.811 ACUTE RIGHT-SIDED CONGESTIVE HEART FAILURE (HCC): Status: ACTIVE | Noted: 2024-11-26

## 2024-11-26 PROBLEM — I10 ESSENTIAL HYPERTENSION: Status: ACTIVE | Noted: 2024-11-26

## 2024-11-26 LAB
ANION GAP SERPL CALC-SCNC: 2 MMOL/L (ref 2–12)
ANION GAP SERPL CALC-SCNC: 3 MMOL/L (ref 2–12)
ANION GAP SERPL CALC-SCNC: 5 MMOL/L (ref 2–12)
APPEARANCE UR: CLEAR
ARTERIAL PATENCY WRIST A: YES
ARTERIAL PATENCY WRIST A: YES
BACTERIA URNS QL MICRO: NEGATIVE /HPF
BASE EXCESS BLDA CALC-SCNC: 5.3 MMOL/L (ref 0–3)
BASE EXCESS BLDA CALC-SCNC: 6.4 MMOL/L (ref 0–3)
BASOPHILS # BLD: 0 K/UL (ref 0–0.1)
BASOPHILS NFR BLD: 0 % (ref 0–1)
BDY SITE: ABNORMAL
BDY SITE: ABNORMAL
BILIRUB UR QL: NEGATIVE
BODY TEMPERATURE: 102.2
BODY TEMPERATURE: 98.6
BUN SERPL-MCNC: 42 MG/DL (ref 6–20)
BUN SERPL-MCNC: 45 MG/DL (ref 6–20)
BUN SERPL-MCNC: 59 MG/DL (ref 6–20)
BUN/CREAT SERPL: 46 (ref 12–20)
BUN/CREAT SERPL: 49 (ref 12–20)
BUN/CREAT SERPL: 49 (ref 12–20)
CA-I BLD-MCNC: 1.38 MMOL/L (ref 1.13–1.32)
CA-I BLD-MCNC: 10 MG/DL (ref 8.5–10.1)
CA-I BLD-MCNC: 10.1 MG/DL (ref 8.5–10.1)
CA-I BLD-MCNC: 10.9 MG/DL (ref 8.5–10.1)
CHLORIDE SERPL-SCNC: 124 MMOL/L (ref 97–108)
CHLORIDE SERPL-SCNC: 128 MMOL/L (ref 97–108)
CHLORIDE SERPL-SCNC: 131 MMOL/L (ref 97–108)
CO2 SERPL-SCNC: 28 MMOL/L (ref 21–32)
CO2 SERPL-SCNC: 31 MMOL/L (ref 21–32)
CO2 SERPL-SCNC: 31 MMOL/L (ref 21–32)
COHGB MFR BLD: 0.3 % (ref 1–2)
COHGB MFR BLD: 0.5 % (ref 1–2)
COLOR UR: ABNORMAL
CREAT SERPL-MCNC: 0.91 MG/DL (ref 0.55–1.02)
CREAT SERPL-MCNC: 0.92 MG/DL (ref 0.55–1.02)
CREAT SERPL-MCNC: 1.21 MG/DL (ref 0.55–1.02)
DIFFERENTIAL METHOD BLD: ABNORMAL
EOSINOPHIL # BLD: 0 K/UL (ref 0–0.4)
EOSINOPHIL NFR BLD: 0 % (ref 0–7)
EPITH CASTS URNS QL MICRO: ABNORMAL /LPF
ERYTHROCYTE [DISTWIDTH] IN BLOOD BY AUTOMATED COUNT: 14.1 % (ref 11.5–14.5)
FIO2 ON VENT: 100 %
FIO2 ON VENT: 36 %
GAS FLOW.O2 O2 DELIVERY SYS: 4 L/MIN
GAS FLOW.O2 SETTING OXYMISER: 16
GLUCOSE BLD STRIP.AUTO-MCNC: 108 MG/DL (ref 65–100)
GLUCOSE BLD STRIP.AUTO-MCNC: 112 MG/DL (ref 65–100)
GLUCOSE BLD STRIP.AUTO-MCNC: 121 MG/DL (ref 65–100)
GLUCOSE BLD STRIP.AUTO-MCNC: 98 MG/DL (ref 65–100)
GLUCOSE SERPL-MCNC: 117 MG/DL (ref 65–100)
GLUCOSE SERPL-MCNC: 129 MG/DL (ref 65–100)
GLUCOSE SERPL-MCNC: 130 MG/DL (ref 65–100)
GLUCOSE UR STRIP.AUTO-MCNC: NEGATIVE MG/DL
HCO3 BLDA-SCNC: 31 MMOL/L (ref 22–26)
HCO3 BLDA-SCNC: 32 MMOL/L (ref 22–26)
HCT VFR BLD AUTO: 43.2 % (ref 35–47)
HGB BLD-MCNC: 13.1 G/DL (ref 11.5–16)
HGB UR QL STRIP: ABNORMAL
HYALINE CASTS URNS QL MICRO: ABNORMAL /LPF (ref 0–5)
IMM GRANULOCYTES # BLD AUTO: 0 K/UL (ref 0–0.04)
IMM GRANULOCYTES NFR BLD AUTO: 0 % (ref 0–0.5)
KETONES UR QL STRIP.AUTO: NEGATIVE MG/DL
LACTATE SERPL-SCNC: 0.8 MMOL/L (ref 0.4–2)
LEUKOCYTE ESTERASE UR QL STRIP.AUTO: NEGATIVE
LYMPHOCYTES # BLD: 2.6 K/UL (ref 0.8–3.5)
LYMPHOCYTES NFR BLD: 9 % (ref 12–49)
MCH RBC QN AUTO: 28.6 PG (ref 26–34)
MCHC RBC AUTO-ENTMCNC: 30.3 G/DL (ref 30–36.5)
MCV RBC AUTO: 94.3 FL (ref 80–99)
METHGB MFR BLD: 0.4 % (ref 0–1.4)
METHGB MFR BLD: 0.4 % (ref 0–1.4)
MONOCYTES # BLD: 2.3 K/UL (ref 0–1)
MONOCYTES NFR BLD: 8 % (ref 5–13)
MRSA DNA SPEC QL NAA+PROBE: NOT DETECTED
MUCOUS THREADS URNS QL MICRO: ABNORMAL /LPF
NEUTS BAND NFR BLD MANUAL: 2 %
NEUTS SEG # BLD: 24 K/UL (ref 1.8–8)
NEUTS SEG NFR BLD: 81 % (ref 32–75)
NITRITE UR QL STRIP.AUTO: NEGATIVE
NRBC # BLD: 0 K/UL (ref 0–0.01)
NRBC BLD-RTO: 0 PER 100 WBC
OXYHGB MFR BLD: 96.8 % (ref 95–99)
OXYHGB MFR BLD: 97.2 % (ref 95–99)
PCO2 BLDA: 48 MMHG (ref 35–45)
PCO2 BLDA: 51 MMHG (ref 35–45)
PEEP RESPIRATORY: 10
PERFORMED BY:: ABNORMAL
PERFORMED BY:: NORMAL
PH BLDA: 7.42 (ref 7.35–7.45)
PH BLDA: 7.42 (ref 7.35–7.45)
PH UR STRIP: 6 (ref 5–8)
PLATELET # BLD AUTO: 305 K/UL (ref 150–400)
PMV BLD AUTO: 12 FL (ref 8.9–12.9)
PO2 BLDA: 105 MMHG (ref 80–100)
PO2 BLDA: 124 MMHG (ref 80–100)
POTASSIUM SERPL-SCNC: 3.9 MMOL/L (ref 3.5–5.1)
POTASSIUM SERPL-SCNC: 4.6 MMOL/L (ref 3.5–5.1)
POTASSIUM SERPL-SCNC: 4.7 MMOL/L (ref 3.5–5.1)
PROT UR STRIP-MCNC: NEGATIVE MG/DL
RBC # BLD AUTO: 4.58 M/UL (ref 3.8–5.2)
RBC #/AREA URNS HPF: ABNORMAL /HPF (ref 0–5)
RBC MORPH BLD: ABNORMAL
SAO2 % BLD: 98 % (ref 95–99)
SAO2 % BLD: 98 % (ref 95–99)
SAO2% DEVICE SAO2% SENSOR NAME: ABNORMAL
SAO2% DEVICE SAO2% SENSOR NAME: ABNORMAL
SODIUM SERPL-SCNC: 159 MMOL/L (ref 136–145)
SODIUM SERPL-SCNC: 160 MMOL/L (ref 136–145)
SODIUM SERPL-SCNC: 164 MMOL/L (ref 136–145)
SP GR UR REFRACTOMETRY: 1.02 (ref 1–1.03)
SPECIMEN SITE: ABNORMAL
SPECIMEN SITE: ABNORMAL
TROPONIN I SERPL HS-MCNC: 39 NG/L (ref 0–51)
URINE CULTURE IF INDICATED: ABNORMAL
UROBILINOGEN UR QL STRIP.AUTO: 0.1 EU/DL (ref 0.1–1)
VT SETTING VENT: 510
WBC # BLD AUTO: 28.9 K/UL (ref 3.6–11)
WBC URNS QL MICRO: ABNORMAL /HPF (ref 0–4)

## 2024-11-26 PROCEDURE — 2500000003 HC RX 250 WO HCPCS: Performed by: STUDENT IN AN ORGANIZED HEALTH CARE EDUCATION/TRAINING PROGRAM

## 2024-11-26 PROCEDURE — 2580000003 HC RX 258: Performed by: NURSE PRACTITIONER

## 2024-11-26 PROCEDURE — 81001 URINALYSIS AUTO W/SCOPE: CPT

## 2024-11-26 PROCEDURE — 2580000003 HC RX 258: Performed by: STUDENT IN AN ORGANIZED HEALTH CARE EDUCATION/TRAINING PROGRAM

## 2024-11-26 PROCEDURE — 6370000000 HC RX 637 (ALT 250 FOR IP): Performed by: STUDENT IN AN ORGANIZED HEALTH CARE EDUCATION/TRAINING PROGRAM

## 2024-11-26 PROCEDURE — 6360000002 HC RX W HCPCS: Performed by: STUDENT IN AN ORGANIZED HEALTH CARE EDUCATION/TRAINING PROGRAM

## 2024-11-26 PROCEDURE — 85025 COMPLETE CBC W/AUTO DIFF WBC: CPT

## 2024-11-26 PROCEDURE — 2580000003 HC RX 258

## 2024-11-26 PROCEDURE — 82330 ASSAY OF CALCIUM: CPT

## 2024-11-26 PROCEDURE — 2700000000 HC OXYGEN THERAPY PER DAY

## 2024-11-26 PROCEDURE — 71250 CT THORAX DX C-: CPT

## 2024-11-26 PROCEDURE — 84484 ASSAY OF TROPONIN QUANT: CPT

## 2024-11-26 PROCEDURE — 51701 INSERT BLADDER CATHETER: CPT

## 2024-11-26 PROCEDURE — 71045 X-RAY EXAM CHEST 1 VIEW: CPT

## 2024-11-26 PROCEDURE — 6360000002 HC RX W HCPCS: Performed by: INTERNAL MEDICINE

## 2024-11-26 PROCEDURE — 80048 BASIC METABOLIC PNL TOTAL CA: CPT

## 2024-11-26 PROCEDURE — 82962 GLUCOSE BLOOD TEST: CPT

## 2024-11-26 PROCEDURE — 82803 BLOOD GASES ANY COMBINATION: CPT

## 2024-11-26 PROCEDURE — 87641 MR-STAPH DNA AMP PROBE: CPT

## 2024-11-26 PROCEDURE — 94002 VENT MGMT INPAT INIT DAY: CPT

## 2024-11-26 PROCEDURE — 6370000000 HC RX 637 (ALT 250 FOR IP): Performed by: HOSPITALIST

## 2024-11-26 PROCEDURE — 6360000002 HC RX W HCPCS

## 2024-11-26 PROCEDURE — 6370000000 HC RX 637 (ALT 250 FOR IP): Performed by: NURSE PRACTITIONER

## 2024-11-26 PROCEDURE — 6370000000 HC RX 637 (ALT 250 FOR IP): Performed by: INTERNAL MEDICINE

## 2024-11-26 PROCEDURE — 87040 BLOOD CULTURE FOR BACTERIA: CPT

## 2024-11-26 PROCEDURE — 70450 CT HEAD/BRAIN W/O DYE: CPT

## 2024-11-26 PROCEDURE — 2000000000 HC ICU R&B

## 2024-11-26 PROCEDURE — 51798 US URINE CAPACITY MEASURE: CPT

## 2024-11-26 PROCEDURE — 36415 COLL VENOUS BLD VENIPUNCTURE: CPT

## 2024-11-26 PROCEDURE — 6370000000 HC RX 637 (ALT 250 FOR IP)

## 2024-11-26 PROCEDURE — 0BH17EZ INSERTION OF ENDOTRACHEAL AIRWAY INTO TRACHEA, VIA NATURAL OR ARTIFICIAL OPENING: ICD-10-PCS | Performed by: STUDENT IN AN ORGANIZED HEALTH CARE EDUCATION/TRAINING PROGRAM

## 2024-11-26 PROCEDURE — 0BC78ZZ EXTIRPATION OF MATTER FROM LEFT MAIN BRONCHUS, VIA NATURAL OR ARTIFICIAL OPENING ENDOSCOPIC: ICD-10-PCS | Performed by: STUDENT IN AN ORGANIZED HEALTH CARE EDUCATION/TRAINING PROGRAM

## 2024-11-26 PROCEDURE — 94640 AIRWAY INHALATION TREATMENT: CPT

## 2024-11-26 PROCEDURE — 31645 BRNCHSC W/THER ASPIR 1ST: CPT

## 2024-11-26 PROCEDURE — 36600 WITHDRAWAL OF ARTERIAL BLOOD: CPT

## 2024-11-26 PROCEDURE — 94761 N-INVAS EAR/PLS OXIMETRY MLT: CPT

## 2024-11-26 PROCEDURE — 2500000003 HC RX 250 WO HCPCS

## 2024-11-26 PROCEDURE — 83605 ASSAY OF LACTIC ACID: CPT

## 2024-11-26 RX ORDER — AMIODARONE HYDROCHLORIDE 200 MG/1
200 TABLET ORAL 2 TIMES DAILY
Status: DISCONTINUED | OUTPATIENT
Start: 2024-11-26 | End: 2024-11-29

## 2024-11-26 RX ORDER — SODIUM CHLORIDE 0.9 % (FLUSH) 0.9 %
5-40 SYRINGE (ML) INJECTION EVERY 12 HOURS SCHEDULED
Status: DISCONTINUED | OUTPATIENT
Start: 2024-11-26 | End: 2024-11-29

## 2024-11-26 RX ORDER — ACETAMINOPHEN 325 MG/1
650 TABLET ORAL EVERY 6 HOURS PRN
Status: DISCONTINUED | OUTPATIENT
Start: 2024-11-26 | End: 2024-12-05

## 2024-11-26 RX ORDER — ETOMIDATE 2 MG/ML
INJECTION INTRAVENOUS
Status: COMPLETED
Start: 2024-11-26 | End: 2024-11-26

## 2024-11-26 RX ORDER — ONDANSETRON 2 MG/ML
4 INJECTION INTRAMUSCULAR; INTRAVENOUS EVERY 6 HOURS PRN
Status: DISCONTINUED | OUTPATIENT
Start: 2024-11-26 | End: 2025-01-10 | Stop reason: HOSPADM

## 2024-11-26 RX ORDER — POTASSIUM CHLORIDE 7.45 MG/ML
10 INJECTION INTRAVENOUS PRN
Status: DISCONTINUED | OUTPATIENT
Start: 2024-11-26 | End: 2025-01-10 | Stop reason: HOSPADM

## 2024-11-26 RX ORDER — SALIVA STIMULANT COMB. NO.3
SPRAY, NON-AEROSOL (ML) MUCOUS MEMBRANE PRN
Status: DISCONTINUED | OUTPATIENT
Start: 2024-11-26 | End: 2025-01-10 | Stop reason: HOSPADM

## 2024-11-26 RX ORDER — POTASSIUM CHLORIDE 1.5 G/1.58G
40 POWDER, FOR SOLUTION ORAL DAILY
Status: DISCONTINUED | OUTPATIENT
Start: 2024-11-27 | End: 2024-11-30

## 2024-11-26 RX ORDER — ENOXAPARIN SODIUM 100 MG/ML
30 INJECTION SUBCUTANEOUS 2 TIMES DAILY
Status: DISCONTINUED | OUTPATIENT
Start: 2024-11-26 | End: 2025-01-10 | Stop reason: HOSPADM

## 2024-11-26 RX ORDER — POTASSIUM CHLORIDE 29.8 MG/ML
20 INJECTION INTRAVENOUS PRN
Status: DISCONTINUED | OUTPATIENT
Start: 2024-11-26 | End: 2025-01-10 | Stop reason: HOSPADM

## 2024-11-26 RX ORDER — SUCCINYLCHOLINE CHLORIDE 20 MG/ML
INJECTION INTRAMUSCULAR; INTRAVENOUS
Status: COMPLETED
Start: 2024-11-26 | End: 2024-11-26

## 2024-11-26 RX ORDER — ETOMIDATE 2 MG/ML
20 INJECTION INTRAVENOUS ONCE
Status: COMPLETED | OUTPATIENT
Start: 2024-11-26 | End: 2024-11-26

## 2024-11-26 RX ORDER — LORAZEPAM 2 MG/ML
2 INJECTION INTRAMUSCULAR ONCE
Status: COMPLETED | OUTPATIENT
Start: 2024-11-26 | End: 2024-11-26

## 2024-11-26 RX ORDER — PROPOFOL 10 MG/ML
5-50 INJECTION, EMULSION INTRAVENOUS CONTINUOUS
Status: DISCONTINUED | OUTPATIENT
Start: 2024-11-26 | End: 2024-11-29

## 2024-11-26 RX ORDER — DEXTROSE MONOHYDRATE 50 MG/ML
INJECTION, SOLUTION INTRAVENOUS CONTINUOUS
Status: DISCONTINUED | OUTPATIENT
Start: 2024-11-26 | End: 2024-11-26

## 2024-11-26 RX ORDER — 0.9 % SODIUM CHLORIDE 0.9 %
500 INTRAVENOUS SOLUTION INTRAVENOUS ONCE
Status: COMPLETED | OUTPATIENT
Start: 2024-11-26 | End: 2024-11-27

## 2024-11-26 RX ORDER — MAGNESIUM SULFATE IN WATER 40 MG/ML
2000 INJECTION, SOLUTION INTRAVENOUS PRN
Status: DISCONTINUED | OUTPATIENT
Start: 2024-11-26 | End: 2025-01-10 | Stop reason: HOSPADM

## 2024-11-26 RX ORDER — SUCCINYLCHOLINE/SOD CL,ISO/PF 200MG/10ML
100 SYRINGE (ML) INTRAVENOUS ONCE
Status: DISCONTINUED | OUTPATIENT
Start: 2024-11-26 | End: 2024-11-27

## 2024-11-26 RX ORDER — ONDANSETRON 4 MG/1
4 TABLET, ORALLY DISINTEGRATING ORAL EVERY 8 HOURS PRN
Status: DISCONTINUED | OUTPATIENT
Start: 2024-11-26 | End: 2025-01-10 | Stop reason: HOSPADM

## 2024-11-26 RX ORDER — IPRATROPIUM BROMIDE AND ALBUTEROL SULFATE 2.5; .5 MG/3ML; MG/3ML
1 SOLUTION RESPIRATORY (INHALATION)
Status: DISCONTINUED | OUTPATIENT
Start: 2024-11-26 | End: 2024-12-20

## 2024-11-26 RX ORDER — POLYETHYLENE GLYCOL 3350 17 G/17G
17 POWDER, FOR SOLUTION ORAL DAILY PRN
Status: DISCONTINUED | OUTPATIENT
Start: 2024-11-26 | End: 2024-12-09

## 2024-11-26 RX ORDER — SODIUM CHLORIDE 9 MG/ML
INJECTION, SOLUTION INTRAVENOUS PRN
Status: DISCONTINUED | OUTPATIENT
Start: 2024-11-26 | End: 2025-01-10 | Stop reason: HOSPADM

## 2024-11-26 RX ORDER — LORAZEPAM 2 MG/ML
INJECTION INTRAMUSCULAR
Status: COMPLETED
Start: 2024-11-26 | End: 2024-11-26

## 2024-11-26 RX ORDER — ACETAMINOPHEN 650 MG/1
650 SUPPOSITORY RECTAL EVERY 6 HOURS PRN
Status: DISCONTINUED | OUTPATIENT
Start: 2024-11-26 | End: 2024-12-05

## 2024-11-26 RX ORDER — SODIUM CHLORIDE 0.9 % (FLUSH) 0.9 %
5-40 SYRINGE (ML) INJECTION PRN
Status: DISCONTINUED | OUTPATIENT
Start: 2024-11-26 | End: 2024-11-29

## 2024-11-26 RX ORDER — DEXTROSE MONOHYDRATE 50 MG/ML
INJECTION, SOLUTION INTRAVENOUS CONTINUOUS
Status: DISCONTINUED | OUTPATIENT
Start: 2024-11-26 | End: 2024-11-29

## 2024-11-26 RX ADMIN — BUDESONIDE INHALATION 500 MCG: 0.5 SUSPENSION RESPIRATORY (INHALATION) at 18:23

## 2024-11-26 RX ADMIN — ACETAMINOPHEN 650 MG: 325 TABLET ORAL at 02:31

## 2024-11-26 RX ADMIN — PROPOFOL 20 MCG/KG/MIN: 10 INJECTION, EMULSION INTRAVENOUS at 22:27

## 2024-11-26 RX ADMIN — BUDESONIDE INHALATION 500 MCG: 0.5 SUSPENSION RESPIRATORY (INHALATION) at 08:27

## 2024-11-26 RX ADMIN — LORAZEPAM 2 MG: 2 INJECTION INTRAMUSCULAR; INTRAVENOUS at 18:40

## 2024-11-26 RX ADMIN — Medication 5 ML: at 14:07

## 2024-11-26 RX ADMIN — SUCCINYLCHOLINE CHLORIDE 100 MG: 20 INJECTION, SOLUTION INTRAMUSCULAR; INTRAVENOUS at 18:47

## 2024-11-26 RX ADMIN — SODIUM CHLORIDE, PRESERVATIVE FREE 10 ML: 5 INJECTION INTRAVENOUS at 10:32

## 2024-11-26 RX ADMIN — METOPROLOL TARTRATE 25 MG: 25 TABLET, FILM COATED ORAL at 19:51

## 2024-11-26 RX ADMIN — ENOXAPARIN SODIUM 30 MG: 100 INJECTION SUBCUTANEOUS at 10:26

## 2024-11-26 RX ADMIN — PIPERACILLIN SODIUM AND TAZOBACTAM SODIUM 4500 MG: 4; .5 INJECTION, POWDER, LYOPHILIZED, FOR SOLUTION INTRAVENOUS at 06:08

## 2024-11-26 RX ADMIN — ACETAMINOPHEN 650 MG: 325 TABLET ORAL at 20:49

## 2024-11-26 RX ADMIN — SERTRALINE HYDROCHLORIDE 50 MG: 50 TABLET ORAL at 10:29

## 2024-11-26 RX ADMIN — SODIUM CHLORIDE, PRESERVATIVE FREE 10 ML: 5 INJECTION INTRAVENOUS at 20:03

## 2024-11-26 RX ADMIN — PIPERACILLIN SODIUM AND TAZOBACTAM SODIUM 4500 MG: 4; .5 INJECTION, POWDER, LYOPHILIZED, FOR SOLUTION INTRAVENOUS at 22:39

## 2024-11-26 RX ADMIN — BUSPIRONE HYDROCHLORIDE 7.5 MG: 5 TABLET ORAL at 19:49

## 2024-11-26 RX ADMIN — Medication 5 ML: at 10:31

## 2024-11-26 RX ADMIN — POLYETHYLENE GLYCOL 3350 17 G: 17 POWDER, FOR SOLUTION ORAL at 10:27

## 2024-11-26 RX ADMIN — FAMOTIDINE 20 MG: 20 TABLET, FILM COATED ORAL at 10:30

## 2024-11-26 RX ADMIN — ASPIRIN 81 MG: 81 TABLET, COATED ORAL at 10:29

## 2024-11-26 RX ADMIN — DEXTROSE MONOHYDRATE: 50 INJECTION, SOLUTION INTRAVENOUS at 19:00

## 2024-11-26 RX ADMIN — ETOMIDATE 20 MG: 2 INJECTION INTRAVENOUS at 18:43

## 2024-11-26 RX ADMIN — VANCOMYCIN HYDROCHLORIDE 2500 MG: 1 INJECTION, POWDER, LYOPHILIZED, FOR SOLUTION INTRAVENOUS at 07:43

## 2024-11-26 RX ADMIN — DOCUSATE SODIUM 100 MG: 50 LIQUID ORAL at 10:28

## 2024-11-26 RX ADMIN — METHYLPREDNISOLONE SODIUM SUCCINATE 20 MG: 40 INJECTION INTRAMUSCULAR; INTRAVENOUS at 10:27

## 2024-11-26 RX ADMIN — IPRATROPIUM BROMIDE AND ALBUTEROL SULFATE 1 DOSE: 2.5; .5 SOLUTION RESPIRATORY (INHALATION) at 13:55

## 2024-11-26 RX ADMIN — DEXTROSE AND SODIUM CHLORIDE: 5; 450 INJECTION, SOLUTION INTRAVENOUS at 00:22

## 2024-11-26 RX ADMIN — SODIUM CHLORIDE 1250 MG: 9 INJECTION, SOLUTION INTRAVENOUS at 22:49

## 2024-11-26 RX ADMIN — METOPROLOL TARTRATE 5 MG: 5 INJECTION INTRAVENOUS at 20:07

## 2024-11-26 RX ADMIN — PROPOFOL 20 MCG/KG/MIN: 10 INJECTION, EMULSION INTRAVENOUS at 19:03

## 2024-11-26 RX ADMIN — PIPERACILLIN SODIUM AND TAZOBACTAM SODIUM 4500 MG: 4; .5 INJECTION, POWDER, LYOPHILIZED, FOR SOLUTION INTRAVENOUS at 14:07

## 2024-11-26 RX ADMIN — IPRATROPIUM BROMIDE AND ALBUTEROL SULFATE 1 DOSE: 2.5; .5 SOLUTION RESPIRATORY (INHALATION) at 08:27

## 2024-11-26 RX ADMIN — AMIODARONE HYDROCHLORIDE 200 MG: 200 TABLET ORAL at 19:50

## 2024-11-26 RX ADMIN — SODIUM CHLORIDE, PRESERVATIVE FREE 10 ML: 5 INJECTION INTRAVENOUS at 10:31

## 2024-11-26 RX ADMIN — ACETAMINOPHEN 650 MG: 325 TABLET ORAL at 11:08

## 2024-11-26 RX ADMIN — Medication 5 ML: at 18:02

## 2024-11-26 RX ADMIN — ENOXAPARIN SODIUM 30 MG: 100 INJECTION SUBCUTANEOUS at 19:57

## 2024-11-26 RX ADMIN — DEXTROSE MONOHYDRATE: 50 INJECTION, SOLUTION INTRAVENOUS at 09:46

## 2024-11-26 RX ADMIN — METOPROLOL TARTRATE 25 MG: 25 TABLET, FILM COATED ORAL at 10:30

## 2024-11-26 RX ADMIN — POTASSIUM BICARBONATE 40 MEQ: 782 TABLET, EFFERVESCENT ORAL at 10:45

## 2024-11-26 RX ADMIN — LORAZEPAM 2 MG: 2 INJECTION INTRAMUSCULAR at 18:40

## 2024-11-26 RX ADMIN — SENNOSIDES 8.6 MG: 8.6 TABLET, FILM COATED ORAL at 19:51

## 2024-11-26 RX ADMIN — IPRATROPIUM BROMIDE AND ALBUTEROL SULFATE 1 DOSE: 2.5; .5 SOLUTION RESPIRATORY (INHALATION) at 18:23

## 2024-11-26 RX ADMIN — SODIUM CHLORIDE 500 ML: 9 INJECTION, SOLUTION INTRAVENOUS at 23:00

## 2024-11-26 RX ADMIN — ATORVASTATIN CALCIUM 40 MG: 40 TABLET, FILM COATED ORAL at 19:50

## 2024-11-26 RX ADMIN — SODIUM CHLORIDE, PRESERVATIVE FREE 10 ML: 5 INJECTION INTRAVENOUS at 20:02

## 2024-11-26 RX ADMIN — BUSPIRONE HYDROCHLORIDE 7.5 MG: 5 TABLET ORAL at 10:29

## 2024-11-26 RX ADMIN — METOPROLOL TARTRATE 5 MG: 5 INJECTION INTRAVENOUS at 01:54

## 2024-11-26 RX ADMIN — ACETYLCYSTEINE 600 MG: 200 SOLUTION ORAL; RESPIRATORY (INHALATION) at 08:27

## 2024-11-26 RX ADMIN — IPRATROPIUM BROMIDE 0.5 MG: 0.5 SOLUTION RESPIRATORY (INHALATION) at 02:01

## 2024-11-26 RX ADMIN — PIPERACILLIN SODIUM AND TAZOBACTAM SODIUM 4500 MG: 4; .5 INJECTION, POWDER, LYOPHILIZED, FOR SOLUTION INTRAVENOUS at 00:44

## 2024-11-26 RX ADMIN — AMIODARONE HYDROCHLORIDE 200 MG: 200 TABLET ORAL at 10:30

## 2024-11-26 ASSESSMENT — PAIN SCALES - GENERAL
PAINLEVEL_OUTOF10: 0

## 2024-11-26 ASSESSMENT — PULMONARY FUNCTION TESTS
PIF_VALUE: 26
PIF_VALUE: 25
PIF_VALUE: 30

## 2024-11-26 ASSESSMENT — PAIN SCALES - WONG BAKER
WONGBAKER_NUMERICALRESPONSE: NO HURT
WONGBAKER_NUMERICALRESPONSE: NO HURT

## 2024-11-26 ASSESSMENT — PAIN DESCRIPTION - LOCATION: LOCATION: BACK

## 2024-11-26 NOTE — PROGRESS NOTES
Attempted dysphagia tx, spoke w/ RN on 4W reported overnight concerns for decline in respiratory status and not managing secretions. Patient transferred to ICU. Spoke w/ RNKelly reports patient not appropriate at this time for PO trials and TF currently on hold. ST will require new orders to follow once medically appropriate.

## 2024-11-26 NOTE — H&P
CRITICAL CARE ADMISSION NOTE    Name: Kim Markham   : 1975   MRN: [id number]   Date: 2024      Diagnoses/problem list:   Altered mental status  COPD exacerbation  GUERA  Hypernatremia  Leukocytosis    HPI   Kim Markham is a 49 y.o. female with known past medical history significant for asthma and COPD who treated her symptoms today with Primatene Mist over-the-counter says that she went into fast heart rate became short of breath immediately.  No other exacerbating or relieving factors which presents to the emergency room with no treatment prior to arrival.  Patient reports that she had a few day history of shortness of breath and thought she had a pneumonia. She has been taking OTC cough syrup and cough medicine as well as her inhaler. She reports difficulty affording medications and has limited access to healthcare.      : Continues to have high vent requirements and high sedation requirements. SVT noted and amiodarone initiated.     11/15: Remains intubated on 500/16/10/55%.  Still has bilateral wheezing on auscultation.  Slight rise in creatinine noted but urine output is good.  No arrhythmias this morning.  Remains on amiodarone, propofol, fentanyl and heparin infusions.  : continues to require high sedation and high vent support. Will attempt SBT  : tolerated SBT for >2 hours. Will repeat today  : Sedated with propofol 40ug/kg/min, RAAS -1 to -2. AC 12 Vt 0.5 FiO2 0.4 PEEP 6. Doing well on SBTs but there is concern that she has too much anxiety to withdraw sedation and extubate  : Sedated with propofol 50ug/kg/min, RAAS -1 to -2. VD 7; AC 12 Vt 0.5 FiO2 0.4 PEEP 6. Doing well on SBTs but there is concern that she has too much anxiety to withdraw sedation and extubate. Started on buspirone . Will add sertraline  : Sedated with propofol 45ug/kg/min & fentanyl 75ug/hr, RAAS -1 to -2. VD 8; AC 12 Vt 0.5 FiO2 0.4 PEEP 6. Doing well on SBTs but there is concern

## 2024-11-26 NOTE — CONSULTS
Nephrology Consultation          Patient: Kim Markham MRN: [id number]  SSN: [id number]    YOB: 1975  Age: 49 y.o.  Sex: female      Subjective:   Reason for the consultation.  Hypernatremia  History of present illness.  The patient is 49-year-old woman with underlying history of COPD, history of congestive heart failure with preserved LVEF was hospitalized for acute hypoxic respiratory failure requiring intubation/extubated/NSVT required amiodarone drip.  She also developed acute kidney injury which is resolving now and noticed to have worsening hypernatremia which prompted nephrology consultation.  A chest x-ray did report bilateral infiltrate but CT chest plain no evidence of pulmonary edema or opacities but atelectasis.  She was on IV Lasix 40 mg daily which was held appropriately.  She is currently on nasal cannula 6 L.  No noticed lower extremity swelling.  She has NG tube in place.  She seems to be confused also.  She is noticed to be on hypotonic IV fluids D5 50 cc/h.    Past Medical History:   Diagnosis Date    Asthma     COPD (chronic obstructive pulmonary disease) (HCC)      No past surgical history on file.   No family history on file.  Social History     Tobacco Use    Smoking status: Every Day     Current packs/day: 1.00     Types: Cigarettes    Smokeless tobacco: Not on file   Substance Use Topics    Alcohol use: Not on file      Current Facility-Administered Medications   Medication Dose Route Frequency Provider Last Rate Last Admin    Vancomycin Pharmacy Dosing   Other RX Placeholder Carol Mccarty MD        sodium chloride flush 0.9 % injection 5-40 mL  5-40 mL IntraVENous 2 times per day Mee Elizabeth MD   10 mL at 11/26/24 1032    sodium chloride flush 0.9 % injection 5-40 mL  5-40 mL IntraVENous PRN Mee Elizabeth MD        0.9 % sodium chloride infusion   IntraVENous PRN Mee Elizabeth MD        potassium chloride 20 mEq/50 mL IVPB (Central Line)

## 2024-11-26 NOTE — PLAN OF CARE
Problem: Discharge Planning  Goal: Discharge to home or other facility with appropriate resources  Recent Flowsheet Documentation  Taken 11/26/2024 0945 by Kasandra Rodriguez RN  Discharge to home or other facility with appropriate resources:   Identify barriers to discharge with patient and caregiver   Identify discharge learning needs (meds, wound care, etc)   Arrange for interpreters to assist at discharge as needed  11/26/2024 0747 by Deepak Mayer, RN  Outcome: Progressing     Problem: ABCDS Injury Assessment  Goal: Absence of physical injury  Recent Flowsheet Documentation  Taken 11/26/2024 0945 by Kasandra Rodriguez RN  Absence of Physical Injury: Implement safety measures based on patient assessment  11/26/2024 0747 by Deepak Mayer RN  Outcome: Progressing     Problem: Safety - Adult  Goal: Free from fall injury  Recent Flowsheet Documentation  Taken 11/26/2024 0945 by Kasandra Rodriguez RN  Free From Fall Injury:   Instruct family/caregiver on patient safety   Based on caregiver fall risk screen, instruct family/caregiver to ask for assistance with transferring infant if caregiver noted to have fall risk factors  11/26/2024 0747 by Deepak Mayer, RN  Outcome: Progressing     Problem: Confusion  Goal: Confusion, delirium, dementia, or psychosis is improved or at baseline  Description: INTERVENTIONS:  1. Assess for possible contributors to thought disturbance, including medications, impaired vision or hearing, underlying metabolic abnormalities, dehydration, psychiatric diagnoses, and notify attending LIP  2. [place] high risk fall precautions, as indicated  3. Provide frequent short contacts to provide reality reorientation, refocusing and direction  4. Decrease environmental stimuli, including noise as appropriate  5. Monitor and intervene to maintain adequate nutrition, hydration, elimination, sleep and activity  6. If unable to ensure safety without constant attention obtain sitter

## 2024-11-26 NOTE — PROGRESS NOTES
Spoke with xray, xray upgraded to stat due to respiratory effort, xray states they are coming up now.

## 2024-11-26 NOTE — PROGRESS NOTES
CRITICAL CARE PROGRESS NOTE    Name: Kim Markham   : 1975   MRN: [id number]   Date: 2024      Diagnoses/problem list:   Asthma  COPD  SVT  Acute on chronic HFpEF    HPI   Kim Markham is a 49 y.o. female with known past medical history significant for asthma and COPD who treated her symptoms today with Primatene Mist over-the-counter says that she went into fast heart rate became short of breath immediately.  No other exacerbating or relieving factors which presents to the emergency room with no treatment prior to arrival.  Patient reports that she had a few day history of shortness of breath and thought she had a pneumonia. She has been taking OTC cough syrup and cough medicine as well as her inhaler. She reports difficulty affording medications and has limited access to healthcare.     : Continues to have high vent requirements and high sedation requirements. SVT noted and amiodarone initiated.    11/15: Remains intubated on 500/16/10/55%.  Still has bilateral wheezing on auscultation.  Slight rise in creatinine noted but urine output is good.  No arrhythmias this morning.  Remains on amiodarone, propofol, fentanyl and heparin infusions.  : continues to require high sedation and high vent support. Will attempt SBT  : tolerated SBT for >2 hours. Will repeat today  : Sedated with propofol 40ug/kg/min, RAAS -1 to -2. AC 12 Vt 0.5 FiO2 0.4 PEEP 6. Doing well on SBTs but there is concern that she has too much anxiety to withdraw sedation and extubate  : Sedated with propofol 50ug/kg/min, RAAS -1 to -2. VD 7; AC 12 Vt 0.5 FiO2 0.4 PEEP 6. Doing well on SBTs but there is concern that she has too much anxiety to withdraw sedation and extubate. Started on buspirone . Will add sertraline  : Sedated with propofol 45ug/kg/min & fentanyl 75ug/hr, RAAS -1 to -2. VD 8; AC 12 Vt 0.5 FiO2 0.4 PEEP 6. Doing well on SBTs but there is concern that she has too much anxiety to  but intravascularly dry; holding diuresis temporarily    - lasix 40 mg IV qd held    ENDOCRINE:   - SSI with Accu-Cheks    HEMATOLOGY/ONCOLOGY:   - Lovenox for DVT prophylaxis  - Status post heparin infusion for initial concern for ACS    ID/MICRO:   COPD exacerbation  - s/p azithromycin and ceftriaxone for COPD exacerbation      ICU DAILY CHECKLIST     Code Status: Full  DVT Prophylaxis: Lovenox  T/L/D: PIV  Diet: NPO  Activity Level:as tolerated  ABCDEF Bundle/Checklist Completed: Yes  Disposition: Transfer to non-ICU bed  Multidisciplinary Rounds Completed:  Yes      OBJECTIVE:     Blood pressure (!) 122/97, pulse (!) 122, temperature (!) 100.8 °F (38.2 °C), temperature source Axillary, resp. rate 20, height 1.727 m (5' 7.99\"), weight 133 kg (293 lb 3.4 oz), SpO2 94%.  Physical Exam  Vitals reviewed.   Constitutional:       Appearance: She is obese. She is ill-appearing. She is not diaphoretic.      Comments: Disheveled   HENT:      Head: Normocephalic and atraumatic.      Nose: Nose normal.      Comments: NGT     Mouth/Throat:      Mouth: Mucous membranes are moist.      Comments: Thick secretions  Eyes:      Extraocular Movements: Extraocular movements intact.      Pupils: Pupils are equal, round, and reactive to light.   Cardiovascular:      Rate and Rhythm: Normal rate and regular rhythm.      Pulses: Normal pulses.      Heart sounds: Normal heart sounds. No murmur heard.  Pulmonary:      Effort: Pulmonary effort is normal. No respiratory distress.      Comments: NC  Abdominal:      General: Abdomen is flat. Bowel sounds are normal.      Palpations: Abdomen is soft.   Musculoskeletal:         General: No deformity or signs of injury. Normal range of motion.      Cervical back: Normal range of motion. No rigidity.   Skin:     General: Skin is warm and dry.      Comments: Facial plethora   Neurological:      Mental Status: She is alert. Mental status is at baseline.      Cranial Nerves: No cranial nerve deficit

## 2024-11-26 NOTE — PROGRESS NOTES
Vancomycin Dosing Consult  Kmi Markham is a 49 y.o. female with leukocytosis. Pharmacy was consulted by Dr. Mccarty to dose and monitor vancomycin. Today is day 1.    Antibiotic regimen: Vancomycin + Zosyn    Temp (24hrs), Av.3 °F (37.9 °C), Min:98.8 °F (37.1 °C), Max:101.5 °F (38.6 °C)    Recent Labs     24  0315 24  0315 24  0130 24  1148   WBC 15.0* 21.9* 28.9*  --    CREATININE 0.90 0.74 1.21* 0.92   BUN 55* 47* 59* 45*     Est CrCl: 102 mL/min  Concomitant nephrotoxic drugs: Loop diuretics    Cultures:    Blood x 2:  No growth after 4 hours    MRSA Swab: Not detected      Target range: AUC/LILLIE 400-600    Recent level history:  Date/Time Dose & Interval Measured Level (mcg/mL) Associated AUC/LILLIE              Assessment/Plan:   Leukocytosis (WBC 28.9) and febrile.  Vanc and zosyn start empirically today with concern for sepsis. Patient given LD of vancomycin this morning.  Patient's renal function is stable enough to begin AUC dosing without level. Start MD this evening at 1250mg q12h (predicted AUC of 544 at SS)  Blood cultures show no growth thus far.  Order vanc level on  at 0800.  BMP and CBC ordered for at least 3 days.  Antimicrobial stop date 7 days

## 2024-11-26 NOTE — PROGRESS NOTES
Patient currently on PT/OT caseload, however patient transferred to ICU from 489 due to medical decline. Pt currently placed on hold and current PT/OT orders will be discontinued at this time. Will need new PT/OT evaluation order once pt medically stable for (re)assessment. Thank you.

## 2024-11-26 NOTE — PROCEDURES
PROCEDURE NOTE  Date: 11/26/2024   Name: Kim Markham  YOB: 1975    Intubation    Date/Time: 11/26/2024 6:47 PM    Performed by: Mee Elizabeth MD  Authorized by: Mee Elizabeth MD  Consent: Verbal consent obtained. Written consent not obtained.  Risks and benefits: risks, benefits and alternatives were discussed  Consent given by: patient  Patient understanding: patient states understanding of the procedure being performed  Patient consent: the patient's understanding of the procedure matches consent given  Procedure consent: procedure consent matches procedure scheduled  Relevant documents: relevant documents present and verified  Test results: test results available and properly labeled  Site marked: the operative site was not marked  Imaging studies: imaging studies available  Required items: required blood products, implants, devices, and special equipment available  Patient identity confirmed: arm band and hospital-assigned identification number  Time out: Immediately prior to procedure a \"time out\" was called to verify the correct patient, procedure, equipment, support staff and site/side marked as required.  Indications: respiratory distress, respiratory failure, airway protection and hypoxemia  Intubation method: video-assisted  Patient status: paralyzed (RSI)  Preoxygenation: BVM  Pretreatment meds: ativan.  Sedatives: etomidate  Paralytic: succinylcholine  Laryngoscope size: Mac 3  Tube size: 7.0 mm  Tube type: cuffed  Number of attempts: 1  Cricoid pressure: no  Cords visualized: yes  Post-procedure assessment: chest rise and ETCO2 monitor  Breath sounds: equal  Cuff inflated: yes  ETT to lip: 26 cm  ETT to teeth: 25 cm  Tube secured with: ETT felipe  Chest x-ray interpreted by me.  Chest x-ray findings: endotracheal tube in appropriate position  Patient tolerance: patient tolerated the procedure well with no immediate complications

## 2024-11-26 NOTE — PROGRESS NOTES
Hospitalist Progress Note               Daily Progress Note: 2024      Hospital Day: 15     Chief complaint:   Chief Complaint   Patient presents with    Shortness of Breath        Brief HPI/ Hospital course to date:      --------  Patient is seen today for follow-up. Appears very somnolent. Vital signs significant for tachycardia, rate of 127. Telemetry showing sinus tachycardia. Requiring 3L NC. Nursing reports sepsis alert called overnight due to fever and tachycardia. Started on vancomycin and zosyn empirically. Chest X-ray done, results pending. Urinalysis not indicative of UTI. Also noted hypernatremia, hypotonic solution started overnight. Will consult nephrology for assistance. Possibly needing D5W instead. Leukocytosis up-trending. Patient on tube feed, possible aspiration? Nursing reports no episodes of nausea or vomiting. No diarrhea or skin wounds. Central line in ICU, now removed. MRSA screen pending.     Stat ABG and CT head. Discuss case with intensivist given concern for airway protection.     All ROS negative otherwise mentioned above.      /61   Pulse (!) 129   Temp 99.5 °F (37.5 °C) (Axillary)   Resp 20   Ht 1.727 m (5' 7.99\")   Wt 126 kg (277 lb 12.5 oz)   SpO2 94%   BMI 42.25 kg/m²  O2 Flow Rate (L/min): 4 L/min O2 Device: Nasal cannula    Temp (24hrs), Av.9 °F (37.7 °C), Min:98.8 °F (37.1 °C), Max:100.8 °F (38.2 °C)    No intake/output data recorded.    1901 -  0700  In: 1795.3 [I.V.:87.3]  Out: 4903 [Urine:4903]    Physical Exam:  General:  Somnolent    Lungs:   Diminished breath sounds auscultation bilaterally.   Heart:  Regular rate and rhythm, S1, S2 normal, no murmur. No click, rub or gallop.   Abdomen:   Soft, non-tender. Bowel sounds normal. No masses,  No organomegaly.   Extremities: mild  edema. Extremities normal, atraumatic, no cyanosis.    Pulses: 2+ and symmetric all extremities.   Skin: Skin color, texture, turgor normal. No rashes or lesions

## 2024-11-26 NOTE — PROGRESS NOTES
Dr. Carver at beside:    Tube Feeding held.  STAT ABG ordered. - Daphne with RT called.   Dr. Carver assessing patient at this time.

## 2024-11-26 NOTE — CARE COORDINATION
Per IDR    Urgently transferred back to ICU.    PT recommends: High intensity and comprehensive skilled physical therapy in a multidisciplinary setting as patient is working towards tolerating up to 3 hours of therapy/day x 5-7 days/week\"    CM will meet with Pt to discuss [facility].    CM will contact [facility] to inquire if they can accept Pt under jerry.     2:50  CM met f/f with Pt to explain to her that CM sent a referral to [facility] for when she is medically stable and ready for D/C.    Pt said ok.

## 2024-11-26 NOTE — PROGRESS NOTES
TRANSFER - OUT REPORT:    Verbal report given to Michelle on Kim Markham  being transferred to ICU for change in patient condition (deterioration)       Report consisted of patient's Situation, Background, Assessment and   Recommendations(SBAR).     Information from the following report(s) Nurse Handoff Report was reviewed with the receiving nurse.           Lines:   Peripheral IV 11/20/24 Proximal;Right Forearm (Active)   Site Assessment Clean, dry & intact 11/26/24 0817   Line Status Flushed 11/26/24 0817   Line Care Connections checked and tightened 11/25/24 1600   Phlebitis Assessment No symptoms 11/26/24 0817   Infiltration Assessment 0 11/26/24 0817   Alcohol Cap Used Yes 11/25/24 1600   Dressing Status Clean, dry & intact 11/26/24 0817   Dressing Type Transparent 11/26/24 0817   Dressing Intervention New;Dressing changed 11/22/24 0300       Peripheral IV 11/26/24 Left;Anterior Wrist (Active)       Peripheral IV 11/26/24 Posterior;Right Hand (Active)        Opportunity for questions and clarification was provided.      Patient transported with:  Monitor, O2 @ 4lpm, Registered Nurse, and Tech

## 2024-11-26 NOTE — PLAN OF CARE
Problem: Discharge Planning  Goal: Discharge to home or other facility with appropriate resources  11/26/2024 0747 by Deepak Mayer RN  Outcome: Progressing  11/25/2024 2304 by Angelique Montelongo RN  Outcome: Progressing     Problem: ABCDS Injury Assessment  Goal: Absence of physical injury  11/26/2024 0747 by Deepak Mayer RN  Outcome: Progressing  11/25/2024 2304 by Angelique Montelongo RN  Outcome: Progressing     Problem: Safety - Adult  Goal: Free from fall injury  11/26/2024 0747 by Deepak Mayer RN  Outcome: Progressing  11/25/2024 2304 by Angelique Montelongo RN  Outcome: Progressing     Problem: Confusion  Goal: Confusion, delirium, dementia, or psychosis is improved or at baseline  Description: INTERVENTIONS:  1. Assess for possible contributors to thought disturbance, including medications, impaired vision or hearing, underlying metabolic abnormalities, dehydration, psychiatric diagnoses, and notify attending LIP  2. [place] high risk fall precautions, as indicated  3. Provide frequent short contacts to provide reality reorientation, refocusing and direction  4. Decrease environmental stimuli, including noise as appropriate  5. Monitor and intervene to maintain adequate nutrition, hydration, elimination, sleep and activity  6. If unable to ensure safety without constant attention obtain sitter and review sitter guidelines with assigned personnel  7. Initiate Psychosocial CNS and Spiritual Care consult, as indicated  11/26/2024 0747 by Deepak Mayer RN  Outcome: Progressing  11/25/2024 2304 by Angelique Montelongo RN  Outcome: Progressing     Problem: Chronic Conditions and Co-morbidities  Goal: Patient's chronic conditions and co-morbidity symptoms are monitored and maintained or improved  11/26/2024 0747 by Deepak Mayer, RN  Outcome: Progressing  11/25/2024 2304 by Angelique Montelongo RN  Outcome: Progressing     Problem: Neurosensory - Adult  Goal: Achieves stable or improved  restraints (Restraint for Violent/Self-Destructive Behavior)  Description: INTERVENTIONS:  1. Determine that de-escalation and other, less restrictive measures have been tried or would not be effective before applying the restraint  2. Identify and document the criteria for restraint  3. Evaluate the patient's condition at the time of restraint application  4. Inform patient/family regarding the reason for restraint/seclusion  5. Q2H: Monitor comfort, nutrition and hydration needs  6. Q15M: Perform safety checks including skin, circulation, sensory, respiratory and psychological status  7. Ensure continuous observation  8. Identify and implement measures to help patient regain control, assess readiness for release and initiate progressive release per policy  Outcome: Progressing     Problem: Safety - Medical Restraint  Goal: Remains free of injury from restraints (Restraint for Interference with Medical Device)  Description: INTERVENTIONS:  1. Determine that other, less restrictive measures have been tried or would not be effective before applying the restraint  2. Evaluate the patient's condition at the time of restraint application  3. Inform patient/family regarding the reason for restraint  4. Q2H: Monitor safety, psychosocial status, comfort, nutrition and hydration  Outcome: Progressing     Problem: Pain  Goal: Verbalizes/displays adequate comfort level or baseline comfort level  Outcome: Progressing     Problem: Skin/Tissue Integrity  Goal: Absence of new skin breakdown  Description: 1.  Monitor for areas of redness and/or skin breakdown  2.  Assess vascular access sites hourly  3.  Every 4-6 hours minimum:  Change oxygen saturation probe site  4.  Every 4-6 hours:  If on nasal continuous positive airway pressure, respiratory therapy assess nares and determine need for appliance change or resting period.  Outcome: Progressing     Problem: Skin/Tissue Integrity - Adult  Goal: Skin integrity remains

## 2024-11-26 NOTE — SIGNIFICANT EVENT
Patient meeting sepsis criteria with leukocytosis, tachycardia, and temp 100.8.   Will start empiric coverage with IV Zosyn. Will also start on IV D5 and 0.45% NS at 100 ml/hr.  Obtain urinalysis, troponin, and lactic acid level.     Attending Addendum: Will add -Vancomycin as well, F/u MRSA. And CXR.  Patient is on steroids, suspect leukocytosis likely reactive.  However, given sepsis alert, will cover empirically with broad-spectrum antibiotics, until further data.

## 2024-11-26 NOTE — PROGRESS NOTES
Per Dr Carver pt accepted back to ICU by intensvist.  Order for Transfer placed.  Spoke with Lynsey in Nursing supervisor/bed coordinator office for ICU bed placement.

## 2024-11-27 ENCOUNTER — APPOINTMENT (OUTPATIENT)
Facility: HOSPITAL | Age: 49
End: 2024-11-27
Payer: MEDICAID

## 2024-11-27 LAB
ALBUMIN SERPL-MCNC: 1.8 G/DL (ref 3.5–5)
ANION GAP SERPL CALC-SCNC: 1 MMOL/L (ref 2–12)
ANION GAP SERPL CALC-SCNC: 2 MMOL/L (ref 2–12)
APPEARANCE UR: ABNORMAL
ARTERIAL PATENCY WRIST A: YES
BACTERIA URNS QL MICRO: NEGATIVE /HPF
BASE EXCESS BLDA CALC-SCNC: 2.2 MMOL/L (ref 0–3)
BDY SITE: ABNORMAL
BILIRUB UR QL: NEGATIVE
BODY TEMPERATURE: 100.6
BUN SERPL-MCNC: 40 MG/DL (ref 6–20)
BUN SERPL-MCNC: 45 MG/DL (ref 6–20)
BUN SERPL-MCNC: 49 MG/DL (ref 6–20)
BUN SERPL-MCNC: 49 MG/DL (ref 6–20)
BUN/CREAT SERPL: 40 (ref 12–20)
BUN/CREAT SERPL: 41 (ref 12–20)
BUN/CREAT SERPL: 43 (ref 12–20)
BUN/CREAT SERPL: 45 (ref 12–20)
CA-I BLD-MCNC: 1.29 MMOL/L (ref 1.13–1.32)
CA-I BLD-MCNC: 8.9 MG/DL (ref 8.5–10.1)
CA-I BLD-MCNC: 9 MG/DL (ref 8.5–10.1)
CA-I BLD-MCNC: 9.1 MG/DL (ref 8.5–10.1)
CA-I BLD-MCNC: 9.4 MG/DL (ref 8.5–10.1)
CHLORIDE SERPL-SCNC: 129 MMOL/L (ref 97–108)
CHLORIDE SERPL-SCNC: 130 MMOL/L (ref 97–108)
CHLORIDE SERPL-SCNC: 130 MMOL/L (ref 97–108)
CHLORIDE SERPL-SCNC: 131 MMOL/L (ref 97–108)
CO2 SERPL-SCNC: 26 MMOL/L (ref 21–32)
CO2 SERPL-SCNC: 28 MMOL/L (ref 21–32)
CO2 SERPL-SCNC: 28 MMOL/L (ref 21–32)
CO2 SERPL-SCNC: 30 MMOL/L (ref 21–32)
COHGB MFR BLD: 0.3 % (ref 1–2)
COLOR UR: ABNORMAL
CREAT SERPL-MCNC: 0.89 MG/DL (ref 0.55–1.02)
CREAT SERPL-MCNC: 1.09 MG/DL (ref 0.55–1.02)
CREAT SERPL-MCNC: 1.14 MG/DL (ref 0.55–1.02)
CREAT SERPL-MCNC: 1.21 MG/DL (ref 0.55–1.02)
EPITH CASTS URNS QL MICRO: ABNORMAL /LPF
ERYTHROCYTE [DISTWIDTH] IN BLOOD BY AUTOMATED COUNT: 14.4 % (ref 11.5–14.5)
FIO2 ON VENT: 60 %
GAS FLOW.O2 SETTING OXYMISER: 16
GLUCOSE BLD STRIP.AUTO-MCNC: 126 MG/DL (ref 65–100)
GLUCOSE BLD STRIP.AUTO-MCNC: 132 MG/DL (ref 65–100)
GLUCOSE BLD STRIP.AUTO-MCNC: 140 MG/DL (ref 65–100)
GLUCOSE BLD STRIP.AUTO-MCNC: 170 MG/DL (ref 65–100)
GLUCOSE SERPL-MCNC: 116 MG/DL (ref 65–100)
GLUCOSE SERPL-MCNC: 119 MG/DL (ref 65–100)
GLUCOSE SERPL-MCNC: 126 MG/DL (ref 65–100)
GLUCOSE SERPL-MCNC: 138 MG/DL (ref 65–100)
GLUCOSE UR STRIP.AUTO-MCNC: NEGATIVE MG/DL
HCO3 BLDA-SCNC: 27 MMOL/L (ref 22–26)
HCT VFR BLD AUTO: 33.6 % (ref 35–47)
HGB BLD-MCNC: 9.9 G/DL (ref 11.5–16)
HGB UR QL STRIP: ABNORMAL
KETONES UR QL STRIP.AUTO: NEGATIVE MG/DL
LACTATE SERPL-SCNC: 0.9 MMOL/L (ref 0.4–2)
LEUKOCYTE ESTERASE UR QL STRIP.AUTO: ABNORMAL
MCH RBC QN AUTO: 28.3 PG (ref 26–34)
MCHC RBC AUTO-ENTMCNC: 29.5 G/DL (ref 30–36.5)
MCV RBC AUTO: 96 FL (ref 80–99)
METHGB MFR BLD: 0.4 % (ref 0–1.4)
MUCOUS THREADS URNS QL MICRO: ABNORMAL /LPF
NITRITE UR QL STRIP.AUTO: NEGATIVE
NRBC # BLD: 0 K/UL (ref 0–0.01)
NRBC BLD-RTO: 0 PER 100 WBC
OXYHGB MFR BLD: 96 % (ref 95–99)
PCO2 BLDA: 43 MMHG (ref 35–45)
PEEP RESPIRATORY: 10
PERFORMED BY:: ABNORMAL
PH BLDA: 7.42 (ref 7.35–7.45)
PH UR STRIP: 5 (ref 5–8)
PHOSPHATE SERPL-MCNC: 2.2 MG/DL (ref 2.6–4.7)
PLATELET # BLD AUTO: 205 K/UL (ref 150–400)
PMV BLD AUTO: 12.6 FL (ref 8.9–12.9)
PO2 BLDA: 99 MMHG (ref 80–100)
POTASSIUM SERPL-SCNC: 3.6 MMOL/L (ref 3.5–5.1)
POTASSIUM SERPL-SCNC: 3.7 MMOL/L (ref 3.5–5.1)
POTASSIUM SERPL-SCNC: 3.9 MMOL/L (ref 3.5–5.1)
POTASSIUM SERPL-SCNC: 4.5 MMOL/L (ref 3.5–5.1)
PROT UR STRIP-MCNC: NEGATIVE MG/DL
RBC # BLD AUTO: 3.5 M/UL (ref 3.8–5.2)
RBC #/AREA URNS HPF: ABNORMAL /HPF (ref 0–5)
SAO2 % BLD: 97 % (ref 95–99)
SAO2% DEVICE SAO2% SENSOR NAME: ABNORMAL
SODIUM SERPL-SCNC: 158 MMOL/L (ref 136–145)
SODIUM SERPL-SCNC: 159 MMOL/L (ref 136–145)
SODIUM SERPL-SCNC: 159 MMOL/L (ref 136–145)
SODIUM SERPL-SCNC: 163 MMOL/L (ref 136–145)
SP GR UR REFRACTOMETRY: 1.02 (ref 1–1.03)
SPECIMEN SITE: ABNORMAL
URINE CULTURE IF INDICATED: ABNORMAL
UROBILINOGEN UR QL STRIP.AUTO: 0.1 EU/DL (ref 0.1–1)
VT SETTING VENT: 510
WBC # BLD AUTO: 29.3 K/UL (ref 3.6–11)
WBC CASTS URNS QL MICRO: PRESENT
WBC URNS QL MICRO: ABNORMAL /HPF (ref 0–4)

## 2024-11-27 PROCEDURE — 80069 RENAL FUNCTION PANEL: CPT

## 2024-11-27 PROCEDURE — 2700000000 HC OXYGEN THERAPY PER DAY

## 2024-11-27 PROCEDURE — 81001 URINALYSIS AUTO W/SCOPE: CPT

## 2024-11-27 PROCEDURE — P9045 ALBUMIN (HUMAN), 5%, 250 ML: HCPCS | Performed by: NURSE PRACTITIONER

## 2024-11-27 PROCEDURE — 94003 VENT MGMT INPAT SUBQ DAY: CPT

## 2024-11-27 PROCEDURE — 83605 ASSAY OF LACTIC ACID: CPT

## 2024-11-27 PROCEDURE — 94761 N-INVAS EAR/PLS OXIMETRY MLT: CPT

## 2024-11-27 PROCEDURE — 71045 X-RAY EXAM CHEST 1 VIEW: CPT

## 2024-11-27 PROCEDURE — 2500000003 HC RX 250 WO HCPCS: Performed by: NURSE PRACTITIONER

## 2024-11-27 PROCEDURE — 6370000000 HC RX 637 (ALT 250 FOR IP): Performed by: STUDENT IN AN ORGANIZED HEALTH CARE EDUCATION/TRAINING PROGRAM

## 2024-11-27 PROCEDURE — 6370000000 HC RX 637 (ALT 250 FOR IP): Performed by: INTERNAL MEDICINE

## 2024-11-27 PROCEDURE — 6360000002 HC RX W HCPCS: Performed by: STUDENT IN AN ORGANIZED HEALTH CARE EDUCATION/TRAINING PROGRAM

## 2024-11-27 PROCEDURE — 94002 VENT MGMT INPAT INIT DAY: CPT

## 2024-11-27 PROCEDURE — 82962 GLUCOSE BLOOD TEST: CPT

## 2024-11-27 PROCEDURE — 80048 BASIC METABOLIC PNL TOTAL CA: CPT

## 2024-11-27 PROCEDURE — 2580000003 HC RX 258: Performed by: NURSE PRACTITIONER

## 2024-11-27 PROCEDURE — 6360000002 HC RX W HCPCS: Performed by: NURSE PRACTITIONER

## 2024-11-27 PROCEDURE — 6370000000 HC RX 637 (ALT 250 FOR IP)

## 2024-11-27 PROCEDURE — 36415 COLL VENOUS BLD VENIPUNCTURE: CPT

## 2024-11-27 PROCEDURE — 2580000003 HC RX 258: Performed by: STUDENT IN AN ORGANIZED HEALTH CARE EDUCATION/TRAINING PROGRAM

## 2024-11-27 PROCEDURE — 2000000000 HC ICU R&B

## 2024-11-27 PROCEDURE — 94640 AIRWAY INHALATION TREATMENT: CPT

## 2024-11-27 PROCEDURE — 85027 COMPLETE CBC AUTOMATED: CPT

## 2024-11-27 PROCEDURE — 82803 BLOOD GASES ANY COMBINATION: CPT

## 2024-11-27 PROCEDURE — 51701 INSERT BLADDER CATHETER: CPT

## 2024-11-27 PROCEDURE — 82330 ASSAY OF CALCIUM: CPT

## 2024-11-27 PROCEDURE — 87086 URINE CULTURE/COLONY COUNT: CPT

## 2024-11-27 PROCEDURE — 2580000003 HC RX 258

## 2024-11-27 PROCEDURE — 36600 WITHDRAWAL OF ARTERIAL BLOOD: CPT

## 2024-11-27 PROCEDURE — 6360000002 HC RX W HCPCS

## 2024-11-27 PROCEDURE — 51798 US URINE CAPACITY MEASURE: CPT

## 2024-11-27 RX ORDER — 0.9 % SODIUM CHLORIDE 0.9 %
500 INTRAVENOUS SOLUTION INTRAVENOUS ONCE
Status: COMPLETED | OUTPATIENT
Start: 2024-11-27 | End: 2024-11-27

## 2024-11-27 RX ORDER — NOREPINEPHRINE BITARTRATE 0.06 MG/ML
1-100 INJECTION, SOLUTION INTRAVENOUS CONTINUOUS
Status: DISCONTINUED | OUTPATIENT
Start: 2024-11-27 | End: 2024-11-28

## 2024-11-27 RX ORDER — HYDROCORTISONE SODIUM SUCCINATE 100 MG/2ML
100 INJECTION INTRAMUSCULAR; INTRAVENOUS EVERY 8 HOURS
Status: DISCONTINUED | OUTPATIENT
Start: 2024-11-27 | End: 2024-11-29

## 2024-11-27 RX ORDER — ALBUMIN HUMAN 50 G/1000ML
25 SOLUTION INTRAVENOUS ONCE
Status: COMPLETED | OUTPATIENT
Start: 2024-11-27 | End: 2024-11-27

## 2024-11-27 RX ADMIN — PIPERACILLIN SODIUM AND TAZOBACTAM SODIUM 4500 MG: 4; .5 INJECTION, POWDER, LYOPHILIZED, FOR SOLUTION INTRAVENOUS at 14:01

## 2024-11-27 RX ADMIN — Medication 5 MCG/MIN: at 01:23

## 2024-11-27 RX ADMIN — PIPERACILLIN SODIUM AND TAZOBACTAM SODIUM 4500 MG: 4; .5 INJECTION, POWDER, LYOPHILIZED, FOR SOLUTION INTRAVENOUS at 06:16

## 2024-11-27 RX ADMIN — Medication 5 ML: at 20:46

## 2024-11-27 RX ADMIN — PROPOFOL 25 MCG/KG/MIN: 10 INJECTION, EMULSION INTRAVENOUS at 17:15

## 2024-11-27 RX ADMIN — BUSPIRONE HYDROCHLORIDE 7.5 MG: 5 TABLET ORAL at 20:46

## 2024-11-27 RX ADMIN — DEXTROSE MONOHYDRATE: 50 INJECTION, SOLUTION INTRAVENOUS at 21:34

## 2024-11-27 RX ADMIN — ACETYLCYSTEINE 600 MG: 200 SOLUTION ORAL; RESPIRATORY (INHALATION) at 20:14

## 2024-11-27 RX ADMIN — DOCUSATE SODIUM 100 MG: 50 LIQUID ORAL at 08:23

## 2024-11-27 RX ADMIN — ALBUMIN (HUMAN) 25 G: 12.5 INJECTION, SOLUTION INTRAVENOUS at 02:46

## 2024-11-27 RX ADMIN — IPRATROPIUM BROMIDE AND ALBUTEROL SULFATE 1 DOSE: 2.5; .5 SOLUTION RESPIRATORY (INHALATION) at 20:13

## 2024-11-27 RX ADMIN — ACETAMINOPHEN 650 MG: 325 TABLET ORAL at 08:43

## 2024-11-27 RX ADMIN — IPRATROPIUM BROMIDE AND ALBUTEROL SULFATE 1 DOSE: 2.5; .5 SOLUTION RESPIRATORY (INHALATION) at 07:58

## 2024-11-27 RX ADMIN — HYDROCORTISONE SODIUM SUCCINATE 100 MG: 100 INJECTION, POWDER, FOR SOLUTION INTRAMUSCULAR; INTRAVENOUS at 23:52

## 2024-11-27 RX ADMIN — PROPOFOL 25 MCG/KG/MIN: 10 INJECTION, EMULSION INTRAVENOUS at 11:25

## 2024-11-27 RX ADMIN — SENNOSIDES 8.6 MG: 8.6 TABLET, FILM COATED ORAL at 20:46

## 2024-11-27 RX ADMIN — SODIUM CHLORIDE, PRESERVATIVE FREE 10 ML: 5 INJECTION INTRAVENOUS at 20:54

## 2024-11-27 RX ADMIN — POLYETHYLENE GLYCOL 3350 17 G: 17 POWDER, FOR SOLUTION ORAL at 08:22

## 2024-11-27 RX ADMIN — Medication 5 ML: at 12:12

## 2024-11-27 RX ADMIN — AMIODARONE HYDROCHLORIDE 200 MG: 200 TABLET ORAL at 08:24

## 2024-11-27 RX ADMIN — SODIUM CHLORIDE, PRESERVATIVE FREE 10 ML: 5 INJECTION INTRAVENOUS at 08:27

## 2024-11-27 RX ADMIN — Medication 5 MCG/MIN: at 22:34

## 2024-11-27 RX ADMIN — IPRATROPIUM BROMIDE AND ALBUTEROL SULFATE 1 DOSE: 2.5; .5 SOLUTION RESPIRATORY (INHALATION) at 01:38

## 2024-11-27 RX ADMIN — Medication 5 ML: at 08:43

## 2024-11-27 RX ADMIN — IPRATROPIUM BROMIDE AND ALBUTEROL SULFATE 1 DOSE: 2.5; .5 SOLUTION RESPIRATORY (INHALATION) at 13:06

## 2024-11-27 RX ADMIN — ACETYLCYSTEINE 600 MG: 200 SOLUTION ORAL; RESPIRATORY (INHALATION) at 07:58

## 2024-11-27 RX ADMIN — Medication 5 ML: at 15:57

## 2024-11-27 RX ADMIN — SODIUM CHLORIDE 500 ML: 9 INJECTION, SOLUTION INTRAVENOUS at 00:05

## 2024-11-27 RX ADMIN — ENOXAPARIN SODIUM 30 MG: 100 INJECTION SUBCUTANEOUS at 08:23

## 2024-11-27 RX ADMIN — FAMOTIDINE 20 MG: 20 TABLET, FILM COATED ORAL at 08:24

## 2024-11-27 RX ADMIN — ACETYLCYSTEINE 600 MG: 200 SOLUTION ORAL; RESPIRATORY (INHALATION) at 01:37

## 2024-11-27 RX ADMIN — HYDROCORTISONE SODIUM SUCCINATE 100 MG: 100 INJECTION, POWDER, FOR SOLUTION INTRAMUSCULAR; INTRAVENOUS at 08:25

## 2024-11-27 RX ADMIN — BUDESONIDE INHALATION 500 MCG: 0.5 SUSPENSION RESPIRATORY (INHALATION) at 20:14

## 2024-11-27 RX ADMIN — ATORVASTATIN CALCIUM 40 MG: 40 TABLET, FILM COATED ORAL at 20:46

## 2024-11-27 RX ADMIN — SERTRALINE HYDROCHLORIDE 50 MG: 50 TABLET ORAL at 08:24

## 2024-11-27 RX ADMIN — BUDESONIDE INHALATION 500 MCG: 0.5 SUSPENSION RESPIRATORY (INHALATION) at 07:58

## 2024-11-27 RX ADMIN — POTASSIUM CHLORIDE 40 MEQ: 1.5 POWDER, FOR SOLUTION ORAL at 08:20

## 2024-11-27 RX ADMIN — ASPIRIN 81 MG: 81 TABLET, COATED ORAL at 08:25

## 2024-11-27 RX ADMIN — PROPOFOL 40 MCG/KG/MIN: 10 INJECTION, EMULSION INTRAVENOUS at 23:51

## 2024-11-27 RX ADMIN — HYDROCORTISONE SODIUM SUCCINATE 100 MG: 100 INJECTION, POWDER, FOR SOLUTION INTRAMUSCULAR; INTRAVENOUS at 15:54

## 2024-11-27 RX ADMIN — ENOXAPARIN SODIUM 30 MG: 100 INJECTION SUBCUTANEOUS at 20:46

## 2024-11-27 RX ADMIN — PROPOFOL 25 MCG/KG/MIN: 10 INJECTION, EMULSION INTRAVENOUS at 05:16

## 2024-11-27 RX ADMIN — VANCOMYCIN HYDROCHLORIDE 1000 MG: 1 INJECTION, POWDER, LYOPHILIZED, FOR SOLUTION INTRAVENOUS at 21:39

## 2024-11-27 RX ADMIN — BUSPIRONE HYDROCHLORIDE 7.5 MG: 5 TABLET ORAL at 08:24

## 2024-11-27 RX ADMIN — SODIUM CHLORIDE 1250 MG: 9 INJECTION, SOLUTION INTRAVENOUS at 10:26

## 2024-11-27 RX ADMIN — AMIODARONE HYDROCHLORIDE 200 MG: 200 TABLET ORAL at 20:46

## 2024-11-27 RX ADMIN — PIPERACILLIN SODIUM AND TAZOBACTAM SODIUM 4500 MG: 4; .5 INJECTION, POWDER, LYOPHILIZED, FOR SOLUTION INTRAVENOUS at 22:42

## 2024-11-27 RX ADMIN — PROPOFOL 45 MCG/KG/MIN: 10 INJECTION, EMULSION INTRAVENOUS at 20:43

## 2024-11-27 ASSESSMENT — PULMONARY FUNCTION TESTS
PIF_VALUE: 26
PIF_VALUE: 32
PIF_VALUE: 25
PIF_VALUE: 27
PIF_VALUE: 24

## 2024-11-27 ASSESSMENT — PAIN SCALES - GENERAL
PAINLEVEL_OUTOF10: 0
PAINLEVEL_OUTOF10: 2
PAINLEVEL_OUTOF10: 0
PAINLEVEL_OUTOF10: 1
PAINLEVEL_OUTOF10: 0
PAINLEVEL_OUTOF10: 0

## 2024-11-27 NOTE — PROCEDURES
PROCEDURE NOTE  Date: 11/26/2024   Name: Kim Markham  YOB: 1975    Procedures      Bronchoscopy Procedure Note    Procedure:  Diagnostic bronchoscopy.    Diagnosis:  Mucus plugging    Indication:  Abnormal chest imaging and Mucus Plugging    Consent/Treatment:  Informed consent was obtained from the  patient after risks, benefits and alternatives were explained. Timeout verified the correct patient and correct procedure.     Anesthesia:   Patient on ventilator and receiving  Propofol drip 30     The following parameters were monitored: oxygen saturation, heart rate, blood pressure, respiratory rate, EKG, adequacy of pulmonary ventilation and response to care. Total physician intraservice time was 15 minutes    Procedure Details:   -- The bronchoscope was introduced through an endotracheal tube.   -- The trachea and judit were completely inspected and copious yellow secretions left>right main bronchus.  -- The right-sided endobronchial anatomy was completely inspected and was found to be normal.  -- The left-sided endobronchial anatomy was completely inspected and copious yellow secretions consistent with severe mucus plugging.     Specimens:   N/a    Rapid On-Site Evaluation: A preliminary diagnosis of mucus plugging    Complications: none    Estimated Blood Loss: 0    Joellen Elizabeth MD  Critical Care Medicine  [facility] Critical [facility]

## 2024-11-27 NOTE — PLAN OF CARE
Problem: Discharge Planning  Goal: Discharge to home or other facility with appropriate resources  Recent Flowsheet Documentation  Taken 11/27/2024 0800 by Kasandra Rodriguez RN  Discharge to home or other facility with appropriate resources:   Arrange for needed discharge resources and transportation as appropriate   Identify discharge learning needs (meds, wound care, etc)   Arrange for interpreters to assist at discharge as needed     Problem: ABCDS Injury Assessment  Goal: Absence of physical injury  Recent Flowsheet Documentation  Taken 11/27/2024 0800 by Kasandra Rodriguez RN  Absence of Physical Injury: Implement safety measures based on patient assessment     Problem: Safety - Adult  Goal: Free from fall injury  Recent Flowsheet Documentation  Taken 11/27/2024 0800 by Kasandra Rodriguez RN  Free From Fall Injury:   Instruct family/caregiver on patient safety   Based on caregiver fall risk screen, instruct family/caregiver to ask for assistance with transferring infant if caregiver noted to have fall risk factors  11/27/2024 0642 by Zachary Gutierrez RN  Outcome: Progressing     Problem: Confusion  Goal: Confusion, delirium, dementia, or psychosis is improved or at baseline  Description: INTERVENTIONS:  1. Assess for possible contributors to thought disturbance, including medications, impaired vision or hearing, underlying metabolic abnormalities, dehydration, psychiatric diagnoses, and notify attending LIP  2. [place] high risk fall precautions, as indicated  3. Provide frequent short contacts to provide reality reorientation, refocusing and direction  4. Decrease environmental stimuli, including noise as appropriate  5. Monitor and intervene to maintain adequate nutrition, hydration, elimination, sleep and activity  6. If unable to ensure safety without constant attention obtain sitter and review sitter guidelines with assigned personnel  7. Initiate Psychosocial CNS and Spiritual Care consult, as

## 2024-11-27 NOTE — RT PROTOCOL NOTE
RT participate with MD Elizabeth for clean out of pt due to desaturations. No samples obtained and pt maintaining on 90% with a PEEP 10.

## 2024-11-27 NOTE — PROGRESS NOTES
When patient is placed in spontaneous mode for SBT, her RR exceeds 30, and her tidal volumes drop to 300-350. Increasing support does not help. SBT ended shortly after starting, patient back on previous settings.

## 2024-11-27 NOTE — PROGRESS NOTES
CRITICAL CARE ADMISSION NOTE    Name: Kim Markham   : 1975   MRN: [id number]   Date: 2024      Diagnoses/problem list:   Altered mental status  COPD exacerbation  GUERA  Hypernatremia  Leukocytosis    HPI   Kim Markham is a 49 y.o. female with known past medical history significant for asthma and COPD who treated her symptoms today with Primatene Mist over-the-counter says that she went into fast heart rate became short of breath immediately.  No other exacerbating or relieving factors which presents to the emergency room with no treatment prior to arrival.  Patient reports that she had a few day history of shortness of breath and thought she had a pneumonia. She has been taking OTC cough syrup and cough medicine as well as her inhaler. She reports difficulty affording medications and has limited access to healthcare.      : Continues to have high vent requirements and high sedation requirements. SVT noted and amiodarone initiated.     11/15: Remains intubated on 500/16/10/55%.  Still has bilateral wheezing on auscultation.  Slight rise in creatinine noted but urine output is good.  No arrhythmias this morning.  Remains on amiodarone, propofol, fentanyl and heparin infusions.  : continues to require high sedation and high vent support. Will attempt SBT  : tolerated SBT for >2 hours. Will repeat today  : Sedated with propofol 40ug/kg/min, RAAS -1 to -2. AC 12 Vt 0.5 FiO2 0.4 PEEP 6. Doing well on SBTs but there is concern that she has too much anxiety to withdraw sedation and extubate  : Sedated with propofol 50ug/kg/min, RAAS -1 to -2. VD 7; AC 12 Vt 0.5 FiO2 0.4 PEEP 6. Doing well on SBTs but there is concern that she has too much anxiety to withdraw sedation and extubate. Started on buspirone . Will add sertraline  : Sedated with propofol 45ug/kg/min & fentanyl 75ug/hr, RAAS -1 to -2. VD 8; AC 12 Vt 0.5 FiO2 0.4 PEEP 6. Doing well on SBTs but there is concern

## 2024-11-27 NOTE — CARE COORDINATION
CM reviewed Pt medicals, Lauri, Liaison for [facility] came up to the unit to bring Pt a financial form.     Lauri stated that Pt looks good for rehab.     Lauri Alvarez @ [phone]    Per IDR  Pt is on the vent, responding to commands.     KARTHIKEYAN will reach out to LTAC.    10:30  Sent a referral to [facility], will asked if they can accept Pt under jerry.     1:50  [facility] declined Pt stating they cannot accept Pt without insurance at this time.     KARTHIKEYAN sent another referral to [facility] to asked them to please look at Pt for Medicaid.

## 2024-11-27 NOTE — CONSULTS
Comprehensive Nutrition Assessment    Type and Reason for Visit:  Reassess, Nutrition support (Vent/TF)    Nutrition Recommendations/Plan:   NPO, advance as medically appropriate.    Modify TF of Vital AF 1.2 [place]mar(Peptide Based) via NGT at 45 mL/hr, advance by 10 mL/hr every 4 hrs as tolerated to new goal rate of 70 mL/hr.  Decrease water flushes to 150 mL q3hrs.  Provides: 2016 kcal(80%), 126 gm protein(79%), 2561 mL H2O(102%).                      +399 kcal(96%) from Propofol at 15.1 mL/hr.    Continue to monitor and record TF rate, flushes, tolerance, BM in I/Os.     Malnutrition Assessment:  Malnutrition Status:  No malnutrition (11/27/24 1010)    Context:  Acute Illness     Findings of the 6 clinical characteristics of malnutrition:  Energy Intake:  No decrease in energy intake  Weight Loss:  No weight loss     Body Fat Loss:  No body fat loss     Muscle Mass Loss:  No muscle mass loss    Fluid Accumulation:  No fluid accumulation     Strength:  Not Performed    Nutrition Assessment:    Admitted for SVT. Pt intubated in early a.m. of 11/13 in CCU. Brief nutrition assessment done- insufficient info gathered. (11/14) Pt remains vented, now off levo, sedated on propofol and fentanyl. NGT in place, hemo stable for EN initiation; however, will hold off for 24-36 hrs. Report of plan for SBT today. No noted h/o wt loss nor poor intakes PTA. RD to provide regimen for when medically able to start TF. (11/18) Pt remains intubated and sedated in CCU. Pt tolerating TF at goal rate. (11/20) Pt remains vented, increasing sedation need on propofol and fentanyl. TF tolerated at goal rate. (11/27) Pt extubated, transferred to cardiac tele unit, Pt later w/ medical declined, returned to ICU <7 hrs later. Pt reintubated last evening(11/26), started on TF. Pt remains vented, on minimal levo, sedated on propofol. TF started last evning, tolerated at goal rate. RD to modify to meet new EENs. Possible need for trach per IDRs.  Rounds  Plan of Care discussed with: RNMD Elizabeth    Goals:  Goals: Meet at least 75% of estimated needs, Tolerate nutrition support at goal rate, within 7 days  Type of Goal: New goal  Previous Goal Met: New Goal    Nutrition Monitoring and Evaluation:   Behavioral-Environmental Outcomes: None Identified  Food/Nutrient Intake Outcomes: Progression of Nutrition, Enteral Nutrition Intake/Tolerance, Supplement Intake  Physical Signs/Symptoms Outcomes: Biochemical Data, Hemodynamic Status, Weight, Fluid Status or Edema    Discharge Planning:    Too soon to determine     Maih Vera RD  Contact: ext. [phone] or [employer].

## 2024-11-27 NOTE — PROGRESS NOTES
Nephrology follow up          Patient: Kim Markham MRN: [id number]  SSN: [id number]    YOB: 1975  Age: 49 y.o.  Sex: female      Subjective:   The patient is seen in ICU  She got intubated yesterday due to respiratory distress  Status post bronch and mucous plug removal  Resolving hypernatremia  On D5 IV fluids plus water flushes via NG  Resolving GUERA  On single pressor support    Recurrent severe urinary retention status post several straight caths and plan for indwelling Alvares catheter placement.    No past surgical history on file.   No family history on file.  Social History     Tobacco Use    Smoking status: Every Day     Current packs/day: 1.00     Types: Cigarettes    Smokeless tobacco: Not on file   Substance Use Topics    Alcohol use: Not on file      Current Facility-Administered Medications   Medication Dose Route Frequency Provider Last Rate Last Admin    norepinephrine (LEVOPHED) 16 mg in sodium chloride 0.9 % 250 mL infusion  1-100 mcg/min IntraVENous Continuous Bernice Saldana APRN - CNP 0.9 mL/hr at 11/27/24 1447 1 mcg/min at 11/27/24 1447    hydrocortisone sodium succinate PF (SOLU-CORTEF) injection 100 mg  100 mg IntraVENous Q8H Mee Elizabeth MD   100 mg at 11/27/24 0825    vancomycin (VANCOCIN) 1,000 mg in sodium chloride 0.9 % 250 mL IVPB ([id number])  1,000 mg IntraVENous Q12H Mee Elizabeth MD        Vancomycin Pharmacy Dosing   Other RX Placeholder Carol Mccarty MD        sodium chloride flush 0.9 % injection 5-40 mL  5-40 mL IntraVENous 2 times per day Mee Elizabeth MD   10 mL at 11/27/24 0827    sodium chloride flush 0.9 % injection 5-40 mL  5-40 mL IntraVENous PRN Mee Elizabeth MD        0.9 % sodium chloride infusion   IntraVENous PRN Mee Elizabeth MD        potassium chloride 20 mEq/50 mL IVPB (Central Line)  20 mEq IntraVENous PRN Mee Elizabeth MD        Or    potassium chloride 10 mEq/100 mL IVPB (Peripheral Line)  10

## 2024-11-28 ENCOUNTER — APPOINTMENT (OUTPATIENT)
Facility: HOSPITAL | Age: 49
End: 2024-11-28
Payer: MEDICAID

## 2024-11-28 LAB
ANION GAP SERPL CALC-SCNC: 1 MMOL/L (ref 2–12)
ANION GAP SERPL CALC-SCNC: 2 MMOL/L (ref 2–12)
ANION GAP SERPL CALC-SCNC: 3 MMOL/L (ref 2–12)
ANION GAP SERPL CALC-SCNC: 3 MMOL/L (ref 2–12)
ARTERIAL PATENCY WRIST A: ABNORMAL
BACTERIA SPEC CULT: NORMAL
BASE EXCESS BLDA CALC-SCNC: 3.5 MMOL/L (ref 0–3)
BDY SITE: ABNORMAL
BUN SERPL-MCNC: 30 MG/DL (ref 6–20)
BUN SERPL-MCNC: 34 MG/DL (ref 6–20)
BUN SERPL-MCNC: 38 MG/DL (ref 6–20)
BUN SERPL-MCNC: 38 MG/DL (ref 6–20)
BUN/CREAT SERPL: 45 (ref 12–20)
BUN/CREAT SERPL: 51 (ref 12–20)
CA-I BLD-MCNC: 1.22 MMOL/L (ref 1.13–1.32)
CA-I BLD-MCNC: 8.5 MG/DL (ref 8.5–10.1)
CA-I BLD-MCNC: 8.6 MG/DL (ref 8.5–10.1)
CA-I BLD-MCNC: 8.7 MG/DL (ref 8.5–10.1)
CA-I BLD-MCNC: 9 MG/DL (ref 8.5–10.1)
CHLORIDE SERPL-SCNC: 124 MMOL/L (ref 97–108)
CHLORIDE SERPL-SCNC: 125 MMOL/L (ref 97–108)
CHLORIDE SERPL-SCNC: 127 MMOL/L (ref 97–108)
CHLORIDE SERPL-SCNC: 129 MMOL/L (ref 97–108)
CO2 SERPL-SCNC: 25 MMOL/L (ref 21–32)
CO2 SERPL-SCNC: 27 MMOL/L (ref 21–32)
CO2 SERPL-SCNC: 27 MMOL/L (ref 21–32)
CO2 SERPL-SCNC: 28 MMOL/L (ref 21–32)
COHGB MFR BLD: 0.3 % (ref 1–2)
COLONY COUNT, CNT: NORMAL
COLONY COUNT, CNT: NORMAL
CREAT SERPL-MCNC: 0.67 MG/DL (ref 0.55–1.02)
CREAT SERPL-MCNC: 0.74 MG/DL (ref 0.55–1.02)
CREAT SERPL-MCNC: 0.75 MG/DL (ref 0.55–1.02)
CREAT SERPL-MCNC: 0.84 MG/DL (ref 0.55–1.02)
DATE LAST DOSE: ABNORMAL
DOSE AMOUNT: ABNORMAL UNITS
ERYTHROCYTE [DISTWIDTH] IN BLOOD BY AUTOMATED COUNT: 14 % (ref 11.5–14.5)
FIO2 ON VENT: 35 %
GAS FLOW.O2 SETTING OXYMISER: 16
GLUCOSE BLD STRIP.AUTO-MCNC: 135 MG/DL (ref 65–100)
GLUCOSE BLD STRIP.AUTO-MCNC: 138 MG/DL (ref 65–100)
GLUCOSE BLD STRIP.AUTO-MCNC: 139 MG/DL (ref 65–100)
GLUCOSE BLD STRIP.AUTO-MCNC: 153 MG/DL (ref 65–100)
GLUCOSE BLD STRIP.AUTO-MCNC: 170 MG/DL (ref 65–100)
GLUCOSE SERPL-MCNC: 142 MG/DL (ref 65–100)
GLUCOSE SERPL-MCNC: 153 MG/DL (ref 65–100)
GLUCOSE SERPL-MCNC: 154 MG/DL (ref 65–100)
GLUCOSE SERPL-MCNC: 174 MG/DL (ref 65–100)
HCO3 BLDA-SCNC: 27 MMOL/L (ref 22–26)
HCT VFR BLD AUTO: 30.5 % (ref 35–47)
HGB BLD-MCNC: 9.4 G/DL (ref 11.5–16)
Lab: NORMAL
MCH RBC QN AUTO: 28.8 PG (ref 26–34)
MCHC RBC AUTO-ENTMCNC: 30.8 G/DL (ref 30–36.5)
MCV RBC AUTO: 93.6 FL (ref 80–99)
METHGB MFR BLD: 0.3 % (ref 0–1.4)
NRBC # BLD: 0 K/UL (ref 0–0.01)
NRBC BLD-RTO: 0 PER 100 WBC
OXYHGB MFR BLD: 94.3 % (ref 95–99)
PCO2 BLDA: 37 MMHG (ref 35–45)
PEEP RESPIRATORY: 10
PERFORMED BY:: ABNORMAL
PH BLDA: 7.49 (ref 7.35–7.45)
PLATELET # BLD AUTO: 178 K/UL (ref 150–400)
PMV BLD AUTO: 12.6 FL (ref 8.9–12.9)
PO2 BLDA: 74 MMHG (ref 80–100)
POTASSIUM SERPL-SCNC: 3.2 MMOL/L (ref 3.5–5.1)
POTASSIUM SERPL-SCNC: 3.4 MMOL/L (ref 3.5–5.1)
POTASSIUM SERPL-SCNC: 3.5 MMOL/L (ref 3.5–5.1)
POTASSIUM SERPL-SCNC: 3.6 MMOL/L (ref 3.5–5.1)
RBC # BLD AUTO: 3.26 M/UL (ref 3.8–5.2)
SAO2 % BLD: 95 % (ref 95–99)
SAO2% DEVICE SAO2% SENSOR NAME: ABNORMAL
SODIUM SERPL-SCNC: 152 MMOL/L (ref 136–145)
SODIUM SERPL-SCNC: 154 MMOL/L (ref 136–145)
SODIUM SERPL-SCNC: 157 MMOL/L (ref 136–145)
SODIUM SERPL-SCNC: 158 MMOL/L (ref 136–145)
SPECIMEN SITE: ABNORMAL
VANCOMYCIN TROUGH SERPL-MCNC: 13 UG/ML (ref 5–10)
VENTILATION MODE VENT: ABNORMAL
VT SETTING VENT: 569
WBC # BLD AUTO: 19.8 K/UL (ref 3.6–11)

## 2024-11-28 PROCEDURE — 94640 AIRWAY INHALATION TREATMENT: CPT

## 2024-11-28 PROCEDURE — 2000000000 HC ICU R&B

## 2024-11-28 PROCEDURE — 2580000003 HC RX 258: Performed by: STUDENT IN AN ORGANIZED HEALTH CARE EDUCATION/TRAINING PROGRAM

## 2024-11-28 PROCEDURE — 6360000002 HC RX W HCPCS: Performed by: STUDENT IN AN ORGANIZED HEALTH CARE EDUCATION/TRAINING PROGRAM

## 2024-11-28 PROCEDURE — 6370000000 HC RX 637 (ALT 250 FOR IP): Performed by: STUDENT IN AN ORGANIZED HEALTH CARE EDUCATION/TRAINING PROGRAM

## 2024-11-28 PROCEDURE — 71045 X-RAY EXAM CHEST 1 VIEW: CPT

## 2024-11-28 PROCEDURE — 82330 ASSAY OF CALCIUM: CPT

## 2024-11-28 PROCEDURE — 85027 COMPLETE CBC AUTOMATED: CPT

## 2024-11-28 PROCEDURE — 82803 BLOOD GASES ANY COMBINATION: CPT

## 2024-11-28 PROCEDURE — 36600 WITHDRAWAL OF ARTERIAL BLOOD: CPT

## 2024-11-28 PROCEDURE — 82962 GLUCOSE BLOOD TEST: CPT

## 2024-11-28 PROCEDURE — 6370000000 HC RX 637 (ALT 250 FOR IP): Performed by: NURSE PRACTITIONER

## 2024-11-28 PROCEDURE — 3E0G76Z INTRODUCTION OF NUTRITIONAL SUBSTANCE INTO UPPER GI, VIA NATURAL OR ARTIFICIAL OPENING: ICD-10-PCS | Performed by: STUDENT IN AN ORGANIZED HEALTH CARE EDUCATION/TRAINING PROGRAM

## 2024-11-28 PROCEDURE — 6370000000 HC RX 637 (ALT 250 FOR IP): Performed by: INTERNAL MEDICINE

## 2024-11-28 PROCEDURE — 94003 VENT MGMT INPAT SUBQ DAY: CPT

## 2024-11-28 PROCEDURE — 0DH67UZ INSERTION OF FEEDING DEVICE INTO STOMACH, VIA NATURAL OR ARTIFICIAL OPENING: ICD-10-PCS | Performed by: STUDENT IN AN ORGANIZED HEALTH CARE EDUCATION/TRAINING PROGRAM

## 2024-11-28 PROCEDURE — 2580000003 HC RX 258: Performed by: NURSE PRACTITIONER

## 2024-11-28 PROCEDURE — 80202 ASSAY OF VANCOMYCIN: CPT

## 2024-11-28 PROCEDURE — 6370000000 HC RX 637 (ALT 250 FOR IP)

## 2024-11-28 PROCEDURE — 80048 BASIC METABOLIC PNL TOTAL CA: CPT

## 2024-11-28 PROCEDURE — 36415 COLL VENOUS BLD VENIPUNCTURE: CPT

## 2024-11-28 RX ORDER — SCOPOLAMINE 1 MG/3D
1 PATCH, EXTENDED RELEASE TRANSDERMAL
Status: DISCONTINUED | OUTPATIENT
Start: 2024-11-28 | End: 2024-12-10

## 2024-11-28 RX ORDER — ASPIRIN 81 MG/1
81 TABLET, CHEWABLE ORAL DAILY
Status: DISCONTINUED | OUTPATIENT
Start: 2024-11-28 | End: 2024-12-09

## 2024-11-28 RX ORDER — MIDODRINE HYDROCHLORIDE 5 MG/1
5 TABLET ORAL
Status: DISCONTINUED | OUTPATIENT
Start: 2024-11-28 | End: 2024-12-01

## 2024-11-28 RX ORDER — POTASSIUM CHLORIDE 1.5 G/1.58G
60 POWDER, FOR SOLUTION ORAL ONCE
Status: COMPLETED | OUTPATIENT
Start: 2024-11-28 | End: 2024-11-28

## 2024-11-28 RX ORDER — LORAZEPAM 2 MG/ML
1 INJECTION INTRAMUSCULAR EVERY 4 HOURS PRN
Status: DISCONTINUED | OUTPATIENT
Start: 2024-11-28 | End: 2024-12-06

## 2024-11-28 RX ADMIN — SODIUM CHLORIDE, PRESERVATIVE FREE 10 ML: 5 INJECTION INTRAVENOUS at 09:02

## 2024-11-28 RX ADMIN — IPRATROPIUM BROMIDE AND ALBUTEROL SULFATE 1 DOSE: 2.5; .5 SOLUTION RESPIRATORY (INHALATION) at 03:03

## 2024-11-28 RX ADMIN — POTASSIUM CHLORIDE 10 MEQ: 7.46 INJECTION, SOLUTION INTRAVENOUS at 00:32

## 2024-11-28 RX ADMIN — IPRATROPIUM BROMIDE AND ALBUTEROL SULFATE 1 DOSE: 2.5; .5 SOLUTION RESPIRATORY (INHALATION) at 19:40

## 2024-11-28 RX ADMIN — ACETYLCYSTEINE 600 MG: 200 SOLUTION ORAL; RESPIRATORY (INHALATION) at 08:13

## 2024-11-28 RX ADMIN — ASPIRIN 81 MG 81 MG: 81 TABLET ORAL at 09:00

## 2024-11-28 RX ADMIN — DOCUSATE SODIUM 100 MG: 50 LIQUID ORAL at 09:00

## 2024-11-28 RX ADMIN — DEXTROSE MONOHYDRATE: 50 INJECTION, SOLUTION INTRAVENOUS at 13:14

## 2024-11-28 RX ADMIN — POLYETHYLENE GLYCOL 3350 17 G: 17 POWDER, FOR SOLUTION ORAL at 09:02

## 2024-11-28 RX ADMIN — IPRATROPIUM BROMIDE AND ALBUTEROL SULFATE 1 DOSE: 2.5; .5 SOLUTION RESPIRATORY (INHALATION) at 08:13

## 2024-11-28 RX ADMIN — AMIODARONE HYDROCHLORIDE 200 MG: 200 TABLET ORAL at 21:11

## 2024-11-28 RX ADMIN — FAMOTIDINE 20 MG: 20 TABLET, FILM COATED ORAL at 09:01

## 2024-11-28 RX ADMIN — DEXTROSE MONOHYDRATE: 50 INJECTION, SOLUTION INTRAVENOUS at 21:05

## 2024-11-28 RX ADMIN — BUSPIRONE HYDROCHLORIDE 7.5 MG: 5 TABLET ORAL at 21:11

## 2024-11-28 RX ADMIN — ENOXAPARIN SODIUM 30 MG: 100 INJECTION SUBCUTANEOUS at 09:01

## 2024-11-28 RX ADMIN — PROPOFOL 15 MCG/KG/MIN: 10 INJECTION, EMULSION INTRAVENOUS at 10:30

## 2024-11-28 RX ADMIN — MIDODRINE HYDROCHLORIDE 5 MG: 5 TABLET ORAL at 16:34

## 2024-11-28 RX ADMIN — SERTRALINE HYDROCHLORIDE 50 MG: 50 TABLET ORAL at 09:01

## 2024-11-28 RX ADMIN — LORAZEPAM 1 MG: 2 INJECTION INTRAMUSCULAR; INTRAVENOUS at 13:11

## 2024-11-28 RX ADMIN — ACETAMINOPHEN 650 MG: 325 TABLET ORAL at 04:41

## 2024-11-28 RX ADMIN — SODIUM CHLORIDE, PRESERVATIVE FREE 10 ML: 5 INJECTION INTRAVENOUS at 21:28

## 2024-11-28 RX ADMIN — BUDESONIDE INHALATION 500 MCG: 0.5 SUSPENSION RESPIRATORY (INHALATION) at 19:40

## 2024-11-28 RX ADMIN — PROPOFOL 35 MCG/KG/MIN: 10 INJECTION, EMULSION INTRAVENOUS at 06:11

## 2024-11-28 RX ADMIN — VANCOMYCIN HYDROCHLORIDE 1000 MG: 1 INJECTION, POWDER, LYOPHILIZED, FOR SOLUTION INTRAVENOUS at 12:43

## 2024-11-28 RX ADMIN — MIDODRINE HYDROCHLORIDE 5 MG: 5 TABLET ORAL at 09:00

## 2024-11-28 RX ADMIN — ENOXAPARIN SODIUM 30 MG: 100 INJECTION SUBCUTANEOUS at 21:12

## 2024-11-28 RX ADMIN — PROPOFOL 40 MCG/KG/MIN: 10 INJECTION, EMULSION INTRAVENOUS at 03:17

## 2024-11-28 RX ADMIN — POTASSIUM CHLORIDE 40 MEQ: 1.5 POWDER, FOR SOLUTION ORAL at 09:02

## 2024-11-28 RX ADMIN — AMIODARONE HYDROCHLORIDE 200 MG: 200 TABLET ORAL at 09:00

## 2024-11-28 RX ADMIN — POTASSIUM BICARBONATE 40 MEQ: 782 TABLET, EFFERVESCENT ORAL at 01:41

## 2024-11-28 RX ADMIN — HYDROCORTISONE SODIUM SUCCINATE 100 MG: 100 INJECTION, POWDER, FOR SOLUTION INTRAMUSCULAR; INTRAVENOUS at 16:34

## 2024-11-28 RX ADMIN — PIPERACILLIN SODIUM AND TAZOBACTAM SODIUM 4500 MG: 4; .5 INJECTION, POWDER, LYOPHILIZED, FOR SOLUTION INTRAVENOUS at 22:29

## 2024-11-28 RX ADMIN — POTASSIUM CHLORIDE 60 MEQ: 1.5 POWDER, FOR SOLUTION ORAL at 16:34

## 2024-11-28 RX ADMIN — PROPOFOL 35 MCG/KG/MIN: 10 INJECTION, EMULSION INTRAVENOUS at 22:46

## 2024-11-28 RX ADMIN — IPRATROPIUM BROMIDE AND ALBUTEROL SULFATE 1 DOSE: 2.5; .5 SOLUTION RESPIRATORY (INHALATION) at 13:09

## 2024-11-28 RX ADMIN — VANCOMYCIN HYDROCHLORIDE 1000 MG: 1 INJECTION, POWDER, LYOPHILIZED, FOR SOLUTION INTRAVENOUS at 21:08

## 2024-11-28 RX ADMIN — PIPERACILLIN SODIUM AND TAZOBACTAM SODIUM 4500 MG: 4; .5 INJECTION, POWDER, LYOPHILIZED, FOR SOLUTION INTRAVENOUS at 06:08

## 2024-11-28 RX ADMIN — BUSPIRONE HYDROCHLORIDE 7.5 MG: 5 TABLET ORAL at 09:00

## 2024-11-28 RX ADMIN — BUDESONIDE INHALATION 500 MCG: 0.5 SUSPENSION RESPIRATORY (INHALATION) at 08:13

## 2024-11-28 RX ADMIN — Medication 5 ML: at 09:01

## 2024-11-28 RX ADMIN — MIDODRINE HYDROCHLORIDE 5 MG: 5 TABLET ORAL at 12:44

## 2024-11-28 RX ADMIN — HYDROCORTISONE SODIUM SUCCINATE 100 MG: 100 INJECTION, POWDER, FOR SOLUTION INTRAMUSCULAR; INTRAVENOUS at 09:01

## 2024-11-28 RX ADMIN — PIPERACILLIN SODIUM AND TAZOBACTAM SODIUM 4500 MG: 4; .5 INJECTION, POWDER, LYOPHILIZED, FOR SOLUTION INTRAVENOUS at 14:48

## 2024-11-28 RX ADMIN — ATORVASTATIN CALCIUM 40 MG: 40 TABLET, FILM COATED ORAL at 21:10

## 2024-11-28 RX ADMIN — Medication 5 ML: at 21:28

## 2024-11-28 RX ADMIN — Medication 5 ML: at 18:28

## 2024-11-28 ASSESSMENT — PULMONARY FUNCTION TESTS
PIF_VALUE: 28
PIF_VALUE: 28
PIF_VALUE: 21
PIF_VALUE: 28
PIF_VALUE: 29
PIF_VALUE: 21
PIF_VALUE: 21
PIF_VALUE: 28
PIF_VALUE: 28
PIF_VALUE: 21
PIF_VALUE: 28
PIF_VALUE: 21
PIF_VALUE: 29
PIF_VALUE: 28
PIF_VALUE: 28
PIF_VALUE: 21
PIF_VALUE: 28
PIF_VALUE: 28
PIF_VALUE: 21
PIF_VALUE: 28
PIF_VALUE: 28
PIF_VALUE: 21
PIF_VALUE: 21
PIF_VALUE: 28
PIF_VALUE: 28
PIF_VALUE: 21
PIF_VALUE: 27
PIF_VALUE: 28
PIF_VALUE: 28

## 2024-11-28 ASSESSMENT — PAIN SCALES - GENERAL
PAINLEVEL_OUTOF10: 0

## 2024-11-28 NOTE — PROGRESS NOTES
CRITICAL CARE ADMISSION NOTE    Name: Kim Markham   : 1975   MRN: [id number]   Date: 2024      Diagnoses/problem list:   Altered mental status  COPD exacerbation  GUERA  Hypernatremia  Leukocytosis    HPI   Kim Markham is a 49 y.o. female with known past medical history significant for asthma and COPD who treated her symptoms today with Primatene Mist over-the-counter says that she went into fast heart rate became short of breath immediately.  No other exacerbating or relieving factors which presents to the emergency room with no treatment prior to arrival.  Patient reports that she had a few day history of shortness of breath and thought she had a pneumonia. She has been taking OTC cough syrup and cough medicine as well as her inhaler. She reports difficulty affording medications and has limited access to healthcare.      : Continues to have high vent requirements and high sedation requirements. SVT noted and amiodarone initiated.     11/15: Remains intubated on 500/16/10/55%.  Still has bilateral wheezing on auscultation.  Slight rise in creatinine noted but urine output is good.  No arrhythmias this morning.  Remains on amiodarone, propofol, fentanyl and heparin infusions.  : continues to require high sedation and high vent support. Will attempt SBT  : tolerated SBT for >2 hours. Will repeat today  : Sedated with propofol 40ug/kg/min, RAAS -1 to -2. AC 12 Vt 0.5 FiO2 0.4 PEEP 6. Doing well on SBTs but there is concern that she has too much anxiety to withdraw sedation and extubate  : Sedated with propofol 50ug/kg/min, RAAS -1 to -2. VD 7; AC 12 Vt 0.5 FiO2 0.4 PEEP 6. Doing well on SBTs but there is concern that she has too much anxiety to withdraw sedation and extubate. Started on buspirone . Will add sertraline  : Sedated with propofol 45ug/kg/min & fentanyl 75ug/hr, RAAS -1 to -2. VD 8; AC 12 Vt 0.5 FiO2 0.4 PEEP 6. Doing well on SBTs but there is concern

## 2024-11-28 NOTE — PROGRESS NOTES
Nephrology follow up          Patient: Kim Markham MRN: [id number]  SSN: [id number]    YOB: 1975  Age: 49 y.o.  Sex: female      Subjective:   The patient is seen in ICU  She got intubated yesterday due to respiratory distress  Status post bronch and mucous plug removal  Resolving hypernatremia  On D5 IV fluids plus water flushes via NG  Resolving GUREA  On single pressor support        No past surgical history on file.   No family history on file.  Social History     Tobacco Use    Smoking status: Every Day     Current packs/day: 1.00     Types: Cigarettes    Smokeless tobacco: Not on file   Substance Use Topics    Alcohol use: Not on file      Current Facility-Administered Medications   Medication Dose Route Frequency Provider Last Rate Last Admin    aspirin chewable tablet 81 mg  81 mg Oral Daily Mee Elizabeth MD   81 mg at 11/28/24 0900    midodrine (PROAMATINE) tablet 5 mg  5 mg Oral TID WC Mee Elizabeth MD   5 mg at 11/28/24 0900    norepinephrine (LEVOPHED) 16 mg in sodium chloride 0.9 % 250 mL infusion  1-100 mcg/min IntraVENous Continuous Bernice Saldana APRN - CNP 1.9 mL/hr at 11/28/24 0600 2 mcg/min at 11/28/24 0600    hydrocortisone sodium succinate PF (SOLU-CORTEF) injection 100 mg  100 mg IntraVENous Q8H Mee Elizabeth MD   100 mg at 11/28/24 0901    vancomycin (VANCOCIN) 1,000 mg in sodium chloride 0.9 % 250 mL IVPB ([id number])  1,000 mg IntraVENous Q12H Mee Elizabeth MD   Stopped at 11/27/24 2339    Vancomycin Pharmacy Dosing   Other RX Placeholder Carol Mccarty MD        sodium chloride flush 0.9 % injection 5-40 mL  5-40 mL IntraVENous 2 times per day Mee Elizabeth MD   10 mL at 11/28/24 0902    sodium chloride flush 0.9 % injection 5-40 mL  5-40 mL IntraVENous PRN Mee Elizabeth MD        0.9 % sodium chloride infusion   IntraVENous PRN Mee Elizabeth MD        potassium chloride 20 mEq/50 mL IVPB (Central Line)  20 mEq  SubCUTAneous PRN Mee Elizabeth MD        dextrose 10 % infusion   IntraVENous Continuous PRN Mee Elizabeth MD        influenza split vaccine (PF) (AFLURIA;FLUARIX) injection 0.5 mL  0.5 mL IntraMUSCular Prior to discharge Mee Elizabeth MD        atorvastatin (LIPITOR) tablet 40 mg  40 mg Per NG tube Nightly Prabhu Ramos APRN - NP   40 mg at 11/27/24 2046    nitroGLYCERIN (NITROSTAT) SL tablet 0.4 mg  0.4 mg SubLINGual Q5 Min PRN Ezra Triplett MD   0.4 mg at 11/12/24 2049    sodium chloride flush 0.9 % injection 5-40 mL  5-40 mL IntraVENous 2 times per day Kelley Medina APRN - CNP   10 mL at 11/27/24 2054    sodium chloride flush 0.9 % injection 5-40 mL  5-40 mL IntraVENous PRN Eric Medinaa APRN - CNP        0.9 % sodium chloride infusion   IntraVENous PRN Kelley Medina APRN - CNP            Allergies   Allergen Reactions    Latex        Review of Systems:  Unable to obtain    Objective:     Vitals:    11/28/24 0900 11/28/24 0930 11/28/24 1000 11/28/24 1030   BP: (!) 110/55 (!) 97/53 (!) 110/57 108/63   Pulse: (!) 101 94 87 82   Resp: 30 22 21 19   Temp:       TempSrc:       SpO2: 94% 97% 94% 99%   Weight:       Height:            Physical Exam:  General: NAD/On sedation  Eyes: sclera anicteric  Oral Cavity: ET in place  Neck: no JVD  Respiratory.  On ventilatory support.  Heart: normal sounds  Abdomen: soft and non tender   : no rios  Lower Extremities: no edema  Skin: no rash  Neuro: on sedation        Assessment/Plan:   Hypernatremia  From free water deficit and on diuretics  Sodium was 160  Resolving, Na 157  Agree to hold Lasix  Currently on D5W at 125 cc/h  on water flushes 200 cc every 3 hourly via NG tube.    Acute kidney injury  2/2 prerenal azotemia from volume depletion  Resolving GUERA  Last BUN/creatinine 38.0.74.  IVF  Water flushes via NG tube  On no potential nephrotoxins.    Hypercalcemia  Total calcium peaked at 10.9  Resolving and last calcium 9.4.  Could be

## 2024-11-28 NOTE — PROGRESS NOTES
Vancomycin Dosing Consult  Kim Markham is a 49 y.o. female with leukocytosis. Pharmacy was consulted by Dr. Mccarty to dose and monitor vancomycin. Today is day 3.    Antibiotic regimen: Vancomycin + Zosyn    Temp (24hrs), Av.2 °F (37.9 °C), Min:99.8 °F (37.7 °C), Max:100.6 °F (38.1 °C)    Recent Labs     24  0130 24  1148 24  0445 24  1043 24  1646 24  2228 24  0420   WBC 28.9*  --  29.3*  --   --   --  19.8*   CREATININE 1.21*   < > 1.14*   < > 0.89 0.84 0.74   BUN 59*   < > 49*   < > 40* 38* 38*    < > = values in this interval not displayed.     Est CrCl: 102 mL/min  Concomitant nephrotoxic drugs: Loop diuretics    Cultures:    Blood x 2:  No growth x 2 days    MRSA Swab: Not detected      Target range: AUC/LILLIE 400-600    Recent level history:  Date/Time Dose & Interval Measured Level (mcg/mL) Associated AUC/LILLIE    at 0945 Vanc 1 g q24h 13.0 504        Assessment/Plan:   Blood cultures show no growth thus far.  WBC is trending down (19.8)  Dose reduced to 1 gram q12h on  vanc level resulted at 13.0 with a predicted AUC at SS of 504.  Continue dose and order next level on  at noon  BMP and CBC ordered for at least 3 days.  Antimicrobial stop date 7 days

## 2024-11-29 LAB
ANION GAP SERPL CALC-SCNC: 1 MMOL/L (ref 2–12)
ANION GAP SERPL CALC-SCNC: 4 MMOL/L (ref 2–12)
ANION GAP SERPL CALC-SCNC: 6 MMOL/L (ref 2–12)
ARTERIAL PATENCY WRIST A: YES
BASE EXCESS BLDA CALC-SCNC: 0.3 MMOL/L (ref 0–3)
BDY SITE: ABNORMAL
BUN SERPL-MCNC: 23 MG/DL (ref 6–20)
BUN SERPL-MCNC: 27 MG/DL (ref 6–20)
BUN SERPL-MCNC: 28 MG/DL (ref 6–20)
BUN/CREAT SERPL: 42 (ref 12–20)
BUN/CREAT SERPL: 43 (ref 12–20)
BUN/CREAT SERPL: 47 (ref 12–20)
CA-I BLD-MCNC: 1.18 MMOL/L (ref 1.13–1.32)
CA-I BLD-MCNC: 8.3 MG/DL (ref 8.5–10.1)
CA-I BLD-MCNC: 8.5 MG/DL (ref 8.5–10.1)
CA-I BLD-MCNC: 8.5 MG/DL (ref 8.5–10.1)
CHLORIDE SERPL-SCNC: 119 MMOL/L (ref 97–108)
CHLORIDE SERPL-SCNC: 119 MMOL/L (ref 97–108)
CHLORIDE SERPL-SCNC: 122 MMOL/L (ref 97–108)
CO2 SERPL-SCNC: 23 MMOL/L (ref 21–32)
CO2 SERPL-SCNC: 25 MMOL/L (ref 21–32)
CO2 SERPL-SCNC: 25 MMOL/L (ref 21–32)
COHGB MFR BLD: 0.3 % (ref 1–2)
CREAT SERPL-MCNC: 0.54 MG/DL (ref 0.55–1.02)
CREAT SERPL-MCNC: 0.6 MG/DL (ref 0.55–1.02)
CREAT SERPL-MCNC: 0.64 MG/DL (ref 0.55–1.02)
ERYTHROCYTE [DISTWIDTH] IN BLOOD BY AUTOMATED COUNT: 14.1 % (ref 11.5–14.5)
FIO2 ON VENT: 35 %
GAS FLOW.O2 SETTING OXYMISER: 16
GLUCOSE BLD STRIP.AUTO-MCNC: 105 MG/DL (ref 65–100)
GLUCOSE BLD STRIP.AUTO-MCNC: 114 MG/DL (ref 65–100)
GLUCOSE BLD STRIP.AUTO-MCNC: 132 MG/DL (ref 65–100)
GLUCOSE BLD STRIP.AUTO-MCNC: 145 MG/DL (ref 65–100)
GLUCOSE SERPL-MCNC: 124 MG/DL (ref 65–100)
GLUCOSE SERPL-MCNC: 145 MG/DL (ref 65–100)
GLUCOSE SERPL-MCNC: 151 MG/DL (ref 65–100)
HCO3 BLDA-SCNC: 24 MMOL/L (ref 22–26)
HCT VFR BLD AUTO: 27.9 % (ref 35–47)
HGB BLD-MCNC: 8.5 G/DL (ref 11.5–16)
MCH RBC QN AUTO: 28.5 PG (ref 26–34)
MCHC RBC AUTO-ENTMCNC: 30.5 G/DL (ref 30–36.5)
MCV RBC AUTO: 93.6 FL (ref 80–99)
METHGB MFR BLD: 0.7 % (ref 0–1.4)
NRBC # BLD: 0 K/UL (ref 0–0.01)
NRBC BLD-RTO: 0 PER 100 WBC
OXYHGB MFR BLD: 94.7 % (ref 95–99)
PCO2 BLDA: 35 MMHG (ref 35–45)
PEEP RESPIRATORY: 10
PERFORMED BY:: ABNORMAL
PH BLDA: 7.45 (ref 7.35–7.45)
PLATELET # BLD AUTO: 164 K/UL (ref 150–400)
PMV BLD AUTO: 13.6 FL (ref 8.9–12.9)
PO2 BLDA: 80 MMHG (ref 80–100)
POTASSIUM SERPL-SCNC: 3.2 MMOL/L (ref 3.5–5.1)
POTASSIUM SERPL-SCNC: 3.5 MMOL/L (ref 3.5–5.1)
POTASSIUM SERPL-SCNC: 3.6 MMOL/L (ref 3.5–5.1)
RBC # BLD AUTO: 2.98 M/UL (ref 3.8–5.2)
SAO2 % BLD: 96 % (ref 95–99)
SAO2% DEVICE SAO2% SENSOR NAME: ABNORMAL
SODIUM SERPL-SCNC: 145 MMOL/L (ref 136–145)
SODIUM SERPL-SCNC: 148 MMOL/L (ref 136–145)
SODIUM SERPL-SCNC: 151 MMOL/L (ref 136–145)
SPECIMEN SITE: ABNORMAL
VENTILATION MODE VENT: ABNORMAL
WBC # BLD AUTO: 12.5 K/UL (ref 3.6–11)

## 2024-11-29 PROCEDURE — 6370000000 HC RX 637 (ALT 250 FOR IP): Performed by: STUDENT IN AN ORGANIZED HEALTH CARE EDUCATION/TRAINING PROGRAM

## 2024-11-29 PROCEDURE — 94761 N-INVAS EAR/PLS OXIMETRY MLT: CPT

## 2024-11-29 PROCEDURE — 6360000002 HC RX W HCPCS: Performed by: STUDENT IN AN ORGANIZED HEALTH CARE EDUCATION/TRAINING PROGRAM

## 2024-11-29 PROCEDURE — 87205 SMEAR GRAM STAIN: CPT

## 2024-11-29 PROCEDURE — 82330 ASSAY OF CALCIUM: CPT

## 2024-11-29 PROCEDURE — 87070 CULTURE OTHR SPECIMN AEROBIC: CPT

## 2024-11-29 PROCEDURE — 94003 VENT MGMT INPAT SUBQ DAY: CPT

## 2024-11-29 PROCEDURE — 36415 COLL VENOUS BLD VENIPUNCTURE: CPT

## 2024-11-29 PROCEDURE — 85027 COMPLETE CBC AUTOMATED: CPT

## 2024-11-29 PROCEDURE — 94640 AIRWAY INHALATION TREATMENT: CPT

## 2024-11-29 PROCEDURE — 6370000000 HC RX 637 (ALT 250 FOR IP): Performed by: INTERNAL MEDICINE

## 2024-11-29 PROCEDURE — 2580000003 HC RX 258: Performed by: NURSE PRACTITIONER

## 2024-11-29 PROCEDURE — 80048 BASIC METABOLIC PNL TOTAL CA: CPT

## 2024-11-29 PROCEDURE — 36600 WITHDRAWAL OF ARTERIAL BLOOD: CPT

## 2024-11-29 PROCEDURE — 2700000000 HC OXYGEN THERAPY PER DAY

## 2024-11-29 PROCEDURE — 2580000003 HC RX 258: Performed by: STUDENT IN AN ORGANIZED HEALTH CARE EDUCATION/TRAINING PROGRAM

## 2024-11-29 PROCEDURE — 82803 BLOOD GASES ANY COMBINATION: CPT

## 2024-11-29 PROCEDURE — 99222 1ST HOSP IP/OBS MODERATE 55: CPT | Performed by: OTOLARYNGOLOGY

## 2024-11-29 PROCEDURE — 6370000000 HC RX 637 (ALT 250 FOR IP)

## 2024-11-29 PROCEDURE — 82962 GLUCOSE BLOOD TEST: CPT

## 2024-11-29 PROCEDURE — 2000000000 HC ICU R&B

## 2024-11-29 RX ORDER — HYDROCORTISONE SODIUM SUCCINATE 100 MG/2ML
50 INJECTION INTRAMUSCULAR; INTRAVENOUS EVERY 8 HOURS
Status: DISCONTINUED | OUTPATIENT
Start: 2024-11-29 | End: 2024-11-30

## 2024-11-29 RX ORDER — AMIODARONE HYDROCHLORIDE 200 MG/1
200 TABLET ORAL DAILY
Status: DISCONTINUED | OUTPATIENT
Start: 2024-11-29 | End: 2024-12-09

## 2024-11-29 RX ORDER — PROPOFOL 10 MG/ML
5-50 INJECTION, EMULSION INTRAVENOUS CONTINUOUS
Status: DISCONTINUED | OUTPATIENT
Start: 2024-11-29 | End: 2024-12-06

## 2024-11-29 RX ORDER — QUETIAPINE FUMARATE 25 MG/1
12.5 TABLET, FILM COATED ORAL 2 TIMES DAILY
Status: DISCONTINUED | OUTPATIENT
Start: 2024-11-29 | End: 2024-12-01

## 2024-11-29 RX ADMIN — FAMOTIDINE 20 MG: 20 TABLET, FILM COATED ORAL at 08:26

## 2024-11-29 RX ADMIN — SODIUM CHLORIDE, PRESERVATIVE FREE 10 ML: 5 INJECTION INTRAVENOUS at 23:39

## 2024-11-29 RX ADMIN — PROPOFOL 10 MCG/KG/MIN: 10 INJECTION, EMULSION INTRAVENOUS at 19:30

## 2024-11-29 RX ADMIN — SODIUM CHLORIDE, PRESERVATIVE FREE 10 ML: 5 INJECTION INTRAVENOUS at 08:27

## 2024-11-29 RX ADMIN — QUETIAPINE FUMARATE 12.5 MG: 25 TABLET ORAL at 23:35

## 2024-11-29 RX ADMIN — ASPIRIN 81 MG 81 MG: 81 TABLET ORAL at 08:26

## 2024-11-29 RX ADMIN — PIPERACILLIN SODIUM AND TAZOBACTAM SODIUM 4500 MG: 4; .5 INJECTION, POWDER, LYOPHILIZED, FOR SOLUTION INTRAVENOUS at 23:50

## 2024-11-29 RX ADMIN — BUSPIRONE HYDROCHLORIDE 7.5 MG: 5 TABLET ORAL at 23:37

## 2024-11-29 RX ADMIN — HYDROCORTISONE SODIUM SUCCINATE 100 MG: 100 INJECTION, POWDER, FOR SOLUTION INTRAMUSCULAR; INTRAVENOUS at 00:56

## 2024-11-29 RX ADMIN — IPRATROPIUM BROMIDE AND ALBUTEROL SULFATE 1 DOSE: 2.5; .5 SOLUTION RESPIRATORY (INHALATION) at 01:46

## 2024-11-29 RX ADMIN — Medication 5 ML: at 08:28

## 2024-11-29 RX ADMIN — ENOXAPARIN SODIUM 30 MG: 100 INJECTION SUBCUTANEOUS at 23:39

## 2024-11-29 RX ADMIN — IPRATROPIUM BROMIDE AND ALBUTEROL SULFATE 1 DOSE: 2.5; .5 SOLUTION RESPIRATORY (INHALATION) at 19:59

## 2024-11-29 RX ADMIN — POTASSIUM CHLORIDE 10 MEQ: 7.46 INJECTION, SOLUTION INTRAVENOUS at 05:19

## 2024-11-29 RX ADMIN — HYDROCORTISONE SODIUM SUCCINATE 50 MG: 100 INJECTION, POWDER, FOR SOLUTION INTRAMUSCULAR; INTRAVENOUS at 17:02

## 2024-11-29 RX ADMIN — PROPOFOL 35 MCG/KG/MIN: 10 INJECTION, EMULSION INTRAVENOUS at 02:38

## 2024-11-29 RX ADMIN — PIPERACILLIN SODIUM AND TAZOBACTAM SODIUM 4500 MG: 4; .5 INJECTION, POWDER, LYOPHILIZED, FOR SOLUTION INTRAVENOUS at 13:43

## 2024-11-29 RX ADMIN — POTASSIUM CHLORIDE 10 MEQ: 7.46 INJECTION, SOLUTION INTRAVENOUS at 03:59

## 2024-11-29 RX ADMIN — POTASSIUM CHLORIDE 10 MEQ: 7.46 INJECTION, SOLUTION INTRAVENOUS at 01:32

## 2024-11-29 RX ADMIN — BUSPIRONE HYDROCHLORIDE 7.5 MG: 5 TABLET ORAL at 08:26

## 2024-11-29 RX ADMIN — PROPOFOL 35 MCG/KG/MIN: 10 INJECTION, EMULSION INTRAVENOUS at 06:40

## 2024-11-29 RX ADMIN — Medication 5 ML: at 13:40

## 2024-11-29 RX ADMIN — BUDESONIDE INHALATION 500 MCG: 0.5 SUSPENSION RESPIRATORY (INHALATION) at 19:59

## 2024-11-29 RX ADMIN — DEXTROSE MONOHYDRATE: 50 INJECTION, SOLUTION INTRAVENOUS at 02:43

## 2024-11-29 RX ADMIN — IPRATROPIUM BROMIDE AND ALBUTEROL SULFATE 1 DOSE: 2.5; .5 SOLUTION RESPIRATORY (INHALATION) at 07:51

## 2024-11-29 RX ADMIN — MIDODRINE HYDROCHLORIDE 5 MG: 5 TABLET ORAL at 07:46

## 2024-11-29 RX ADMIN — PIPERACILLIN SODIUM AND TAZOBACTAM SODIUM 4500 MG: 4; .5 INJECTION, POWDER, LYOPHILIZED, FOR SOLUTION INTRAVENOUS at 06:05

## 2024-11-29 RX ADMIN — SERTRALINE HYDROCHLORIDE 50 MG: 50 TABLET ORAL at 08:26

## 2024-11-29 RX ADMIN — BUDESONIDE INHALATION 500 MCG: 0.5 SUSPENSION RESPIRATORY (INHALATION) at 08:20

## 2024-11-29 RX ADMIN — HYDROCORTISONE SODIUM SUCCINATE 50 MG: 100 INJECTION, POWDER, FOR SOLUTION INTRAMUSCULAR; INTRAVENOUS at 08:38

## 2024-11-29 RX ADMIN — IPRATROPIUM BROMIDE AND ALBUTEROL SULFATE 1 DOSE: 2.5; .5 SOLUTION RESPIRATORY (INHALATION) at 14:52

## 2024-11-29 RX ADMIN — POTASSIUM CHLORIDE 10 MEQ: 7.46 INJECTION, SOLUTION INTRAVENOUS at 02:42

## 2024-11-29 RX ADMIN — ENOXAPARIN SODIUM 30 MG: 100 INJECTION SUBCUTANEOUS at 08:28

## 2024-11-29 RX ADMIN — MIDODRINE HYDROCHLORIDE 5 MG: 5 TABLET ORAL at 12:18

## 2024-11-29 RX ADMIN — AMIODARONE HYDROCHLORIDE 200 MG: 200 TABLET ORAL at 08:26

## 2024-11-29 RX ADMIN — Medication 5 ML: at 23:50

## 2024-11-29 RX ADMIN — LORAZEPAM 1 MG: 2 INJECTION INTRAMUSCULAR; INTRAVENOUS at 13:38

## 2024-11-29 RX ADMIN — MIDODRINE HYDROCHLORIDE 5 MG: 5 TABLET ORAL at 17:02

## 2024-11-29 RX ADMIN — POTASSIUM CHLORIDE 40 MEQ: 1.5 POWDER, FOR SOLUTION ORAL at 08:42

## 2024-11-29 RX ADMIN — ATORVASTATIN CALCIUM 40 MG: 40 TABLET, FILM COATED ORAL at 23:35

## 2024-11-29 ASSESSMENT — PAIN SCALES - GENERAL
PAINLEVEL_OUTOF10: 0

## 2024-11-29 ASSESSMENT — PULMONARY FUNCTION TESTS
PIF_VALUE: 28
PIF_VALUE: 28
PIF_VALUE: 21
PIF_VALUE: 28
PIF_VALUE: 28
PIF_VALUE: 21
PIF_VALUE: 28
PIF_VALUE: 29
PIF_VALUE: 21
PIF_VALUE: 28
PIF_VALUE: 28
PIF_VALUE: 21
PIF_VALUE: 28
PIF_VALUE: 29
PIF_VALUE: 28

## 2024-11-29 NOTE — RT PROTOCOL NOTE
Patient suctioned via endotracheal tube for large amount of thick tan secretions during routine assessment. Disconnect alarm on ventilator noted post suctioning. Vent connections noted and intact. ET tube in correct position with bilateral breath sounds and equal chest rise noted. Patient disconnected from ventilator and bagged via ambu bag at 15 lpm with CO2 detector attached. Positive color change noted. ADÁN LUIS-NP at bedside. Chest xray ordered. Ventilator switched out. Placed on new vent at previous settings. Patient tolerating well with good volumes and pressures. Vital signs remain within satisfactory limits.

## 2024-11-29 NOTE — CARE COORDINATION
CM reviewed Pt medicals, Pt is on the vent.     [facility] is following Pt for admission.     Per IDR  Pt remains on the vent.    Pt SBT, probably a Trach.

## 2024-11-29 NOTE — PROGRESS NOTES
CRITICAL CARE ADMISSION NOTE    Name: Kim Markham   : 1975   MRN: [id number]   Date: 2024      Diagnoses/problem list:   Altered mental status  COPD exacerbation  GUERA  Hypernatremia  Leukocytosis    HPI   Kim Markham is a 49 y.o. female with known past medical history significant for asthma and COPD who treated her symptoms today with Primatene Mist over-the-counter says that she went into fast heart rate became short of breath immediately.  No other exacerbating or relieving factors which presents to the emergency room with no treatment prior to arrival.  Patient reports that she had a few day history of shortness of breath and thought she had a pneumonia. She has been taking OTC cough syrup and cough medicine as well as her inhaler. She reports difficulty affording medications and has limited access to healthcare.      : Continues to have high vent requirements and high sedation requirements. SVT noted and amiodarone initiated.   11/15: Remains intubated on 500/16/10/55%.  Still has bilateral wheezing on auscultation.  Slight rise in creatinine noted but urine output is good.  No arrhythmias this morning.  Remains on amiodarone, propofol, fentanyl and heparin infusions.  : continues to require high sedation and high vent support. Will attempt SBT  : tolerated SBT for >2 hours. Will repeat today  : Sedated with propofol 40ug/kg/min, RAAS -1 to -2. AC 12 Vt 0.5 FiO2 0.4 PEEP 6. Doing well on SBTs but there is concern that she has too much anxiety to withdraw sedation and extubate  : Sedated with propofol 50ug/kg/min, RAAS -1 to -2. VD 7; AC 12 Vt 0.5 FiO2 0.4 PEEP 6. Doing well on SBTs but there is concern that she has too much anxiety to withdraw sedation and extubate. Started on buspirone . Will add sertraline  : Sedated with propofol 45ug/kg/min & fentanyl 75ug/hr, RAAS -1 to -2. VD 8; AC 12 Vt 0.5 FiO2 0.4 PEEP 6. Doing well on SBTs but there is concern

## 2024-11-29 NOTE — PROGRESS NOTES
Nephrology follow up          Patient: Kim Markham MRN: [id number]  SSN: [id number]    YOB: 1975  Age: 49 y.o.  Sex: female      Subjective:   The patient is seen in ICU  On ventilatory support/awake  Resolving hypernatremia  On D5 IV fluids plus water flushes via NG  Resolving GUERA  Off single pressor support        No past surgical history on file.   No family history on file.  Social History     Tobacco Use    Smoking status: Every Day     Current packs/day: 1.00     Types: Cigarettes    Smokeless tobacco: Not on file   Substance Use Topics    Alcohol use: Not on file      Current Facility-Administered Medications   Medication Dose Route Frequency Provider Last Rate Last Admin    hydrocortisone sodium succinate PF (SOLU-CORTEF) injection 50 mg  50 mg IntraVENous Q8H Mee Elizabeth MD   50 mg at 11/29/24 0838    amiodarone (CORDARONE) tablet 200 mg  200 mg Oral Daily Mee Elizabeth MD   200 mg at 11/29/24 0826    aspirin chewable tablet 81 mg  81 mg Oral Daily Mee Elizabeth MD   81 mg at 11/29/24 0826    midodrine (PROAMATINE) tablet 5 mg  5 mg Oral TID WC Mee Elizabeth MD   5 mg at 11/29/24 0746    LORazepam (ATIVAN) injection 1 mg  1 mg IntraVENous Q4H PRN Mee Elizabeth MD   1 mg at 11/28/24 1311    scopolamine (TRANSDERM-SCOP) transdermal patch 1 patch  1 patch TransDERmal Q72H Mee Elizabeth MD   1 patch at 11/28/24 2102    sodium chloride flush 0.9 % injection 5-40 mL  5-40 mL IntraVENous 2 times per day Mee Elizabeth MD   10 mL at 11/29/24 0827    sodium chloride flush 0.9 % injection 5-40 mL  5-40 mL IntraVENous PRN Mee Elizabeth MD        0.9 % sodium chloride infusion   IntraVENous PRN Mee Elizabeth MD        potassium chloride 20 mEq/50 mL IVPB (Central Line)  20 mEq IntraVENous PRN Mee Elizabeth MD        Or    potassium chloride 10 mEq/100 mL IVPB (Peripheral Line)  10 mEq IntraVENous PRN Mee Elizabeth

## 2024-11-29 NOTE — CONSULTS
Otolaryngology-HNS Consult    Subjective:     Date of Consultation:  November 29, 2024    Referring Physician: Glenn    History of Present Illness:   Patient is a 49 y.o.  female who is being seen for tracheostomy consult.  Admitted to the hospital 11/12/2024, with mental status change and hypoxia.  Has been intubated then extubated then back to the floor.  Now back to the ICU and intubated again for 3 days.  Biggest issue has been decreased mental status and poor tolerance of secretions leading to need for reintubation.  Otolaryngology service has been consulted for tracheostomy placement.    Patient Active Problem List    Diagnosis Date Noted    Acute right-sided congestive heart failure ([facility]) 11/26/2024    Essential hypertension 11/26/2024    GUERA (acute kidney injury) ([facility]) 11/26/2024    SVT (supraventricular tachycardia) ([facility]) 11/12/2024    Acute respiratory failure 01/03/2024    COPD exacerbation ([facility]) 01/02/2024    Acute asthma exacerbation 08/11/2023    COPD (chronic obstructive pulmonary disease) ([facility]) 08/11/2023    COPD with acute exacerbation ([facility]) 09/16/2022       Past Medical History:   Diagnosis Date    Asthma     COPD (chronic obstructive pulmonary disease) ([facility])       No family history on file.   Social History     Tobacco Use    Smoking status: Every Day     Current packs/day: 1.00     Types: Cigarettes    Smokeless tobacco: Not on file   Substance Use Topics    Alcohol use: Not on file     No past surgical history on file.   Current Facility-Administered Medications   Medication Dose Route Frequency    hydrocortisone sodium succinate PF (SOLU-CORTEF) injection 50 mg  50 mg IntraVENous Q8H    amiodarone (CORDARONE) tablet 200 mg  200 mg Oral Daily    QUEtiapine (SEROQUEL) tablet 12.5 mg  12.5 mg Per NG tube BID    aspirin chewable tablet 81 mg  81 mg Oral Daily    midodrine (PROAMATINE) tablet 5 mg  5 mg Oral TID WC    LORazepam (ATIVAN) injection 1 mg  1 mg IntraVENous Q4H PRN    scopolamine

## 2024-11-30 LAB
ANION GAP SERPL CALC-SCNC: 4 MMOL/L (ref 2–12)
ARTERIAL PATENCY WRIST A: YES
BASE DEFICIT BLDA-SCNC: 0.8 MMOL/L
BDY SITE: ABNORMAL
BODY TEMPERATURE: 98.6
BUN SERPL-MCNC: 24 MG/DL (ref 6–20)
BUN/CREAT SERPL: 41 (ref 12–20)
CA-I BLD-MCNC: 1.22 MMOL/L (ref 1.13–1.32)
CA-I BLD-MCNC: 8.9 MG/DL (ref 8.5–10.1)
CHLORIDE SERPL-SCNC: 119 MMOL/L (ref 97–108)
CO2 SERPL-SCNC: 25 MMOL/L (ref 21–32)
COHGB MFR BLD: 0.3 % (ref 1–2)
CREAT SERPL-MCNC: 0.59 MG/DL (ref 0.55–1.02)
ERYTHROCYTE [DISTWIDTH] IN BLOOD BY AUTOMATED COUNT: 13.4 % (ref 11.5–14.5)
FERRITIN SERPL-MCNC: 443 NG/ML (ref 8–252)
FIO2 ON VENT: 35 %
GAS FLOW.O2 SETTING OXYMISER: 16
GLUCOSE BLD STRIP.AUTO-MCNC: 127 MG/DL (ref 65–100)
GLUCOSE BLD STRIP.AUTO-MCNC: 96 MG/DL (ref 65–100)
GLUCOSE BLD STRIP.AUTO-MCNC: 99 MG/DL (ref 65–100)
GLUCOSE BLD STRIP.AUTO-MCNC: 99 MG/DL (ref 65–100)
GLUCOSE SERPL-MCNC: 107 MG/DL (ref 65–100)
HCO3 BLDA-SCNC: 23 MMOL/L (ref 22–26)
HCT VFR BLD AUTO: 29.5 % (ref 35–47)
HGB BLD-MCNC: 9.1 G/DL (ref 11.5–16)
IRON SATN MFR SERPL: 37 % (ref 20–50)
IRON SERPL-MCNC: 48 UG/DL (ref 35–150)
MCH RBC QN AUTO: 28.3 PG (ref 26–34)
MCHC RBC AUTO-ENTMCNC: 30.8 G/DL (ref 30–36.5)
MCV RBC AUTO: 91.6 FL (ref 80–99)
METHGB MFR BLD: 0.3 % (ref 0–1.4)
NRBC # BLD: 0 K/UL (ref 0–0.01)
NRBC BLD-RTO: 0 PER 100 WBC
OXYHGB MFR BLD: 96.7 % (ref 95–99)
PCO2 BLDA: 34 MMHG (ref 35–45)
PEEP RESPIRATORY: 10
PERFORMED BY:: ABNORMAL
PERFORMED BY:: ABNORMAL
PERFORMED BY:: NORMAL
PH BLDA: 7.44 (ref 7.35–7.45)
PLATELET # BLD AUTO: 190 K/UL (ref 150–400)
PMV BLD AUTO: 13.4 FL (ref 8.9–12.9)
PO2 BLDA: 101 MMHG (ref 80–100)
POTASSIUM SERPL-SCNC: 3.4 MMOL/L (ref 3.5–5.1)
RBC # BLD AUTO: 3.22 M/UL (ref 3.8–5.2)
SAO2 % BLD: 97 % (ref 95–99)
SAO2% DEVICE SAO2% SENSOR NAME: ABNORMAL
SODIUM SERPL-SCNC: 148 MMOL/L (ref 136–145)
SPECIMEN SITE: ABNORMAL
TIBC SERPL-MCNC: 130 UG/DL (ref 250–450)
WBC # BLD AUTO: 11.6 K/UL (ref 3.6–11)

## 2024-11-30 PROCEDURE — 6370000000 HC RX 637 (ALT 250 FOR IP): Performed by: INTERNAL MEDICINE

## 2024-11-30 PROCEDURE — 6370000000 HC RX 637 (ALT 250 FOR IP): Performed by: STUDENT IN AN ORGANIZED HEALTH CARE EDUCATION/TRAINING PROGRAM

## 2024-11-30 PROCEDURE — 6360000002 HC RX W HCPCS: Performed by: STUDENT IN AN ORGANIZED HEALTH CARE EDUCATION/TRAINING PROGRAM

## 2024-11-30 PROCEDURE — 94761 N-INVAS EAR/PLS OXIMETRY MLT: CPT

## 2024-11-30 PROCEDURE — 6370000000 HC RX 637 (ALT 250 FOR IP)

## 2024-11-30 PROCEDURE — 80048 BASIC METABOLIC PNL TOTAL CA: CPT

## 2024-11-30 PROCEDURE — 2580000003 HC RX 258: Performed by: NURSE PRACTITIONER

## 2024-11-30 PROCEDURE — 82330 ASSAY OF CALCIUM: CPT

## 2024-11-30 PROCEDURE — 82803 BLOOD GASES ANY COMBINATION: CPT

## 2024-11-30 PROCEDURE — 82962 GLUCOSE BLOOD TEST: CPT

## 2024-11-30 PROCEDURE — 2000000000 HC ICU R&B

## 2024-11-30 PROCEDURE — 36415 COLL VENOUS BLD VENIPUNCTURE: CPT

## 2024-11-30 PROCEDURE — 85027 COMPLETE CBC AUTOMATED: CPT

## 2024-11-30 PROCEDURE — 2700000000 HC OXYGEN THERAPY PER DAY

## 2024-11-30 PROCEDURE — 82728 ASSAY OF FERRITIN: CPT

## 2024-11-30 PROCEDURE — 2580000003 HC RX 258: Performed by: STUDENT IN AN ORGANIZED HEALTH CARE EDUCATION/TRAINING PROGRAM

## 2024-11-30 PROCEDURE — 94003 VENT MGMT INPAT SUBQ DAY: CPT

## 2024-11-30 PROCEDURE — 83540 ASSAY OF IRON: CPT

## 2024-11-30 PROCEDURE — 94640 AIRWAY INHALATION TREATMENT: CPT

## 2024-11-30 PROCEDURE — 36600 WITHDRAWAL OF ARTERIAL BLOOD: CPT

## 2024-11-30 RX ORDER — POTASSIUM CHLORIDE 1.5 G/1.58G
40 POWDER, FOR SOLUTION ORAL 2 TIMES DAILY
Status: DISCONTINUED | OUTPATIENT
Start: 2024-11-30 | End: 2024-12-01

## 2024-11-30 RX ORDER — TAMSULOSIN HYDROCHLORIDE 0.4 MG/1
0.4 CAPSULE ORAL DAILY
Status: DISCONTINUED | OUTPATIENT
Start: 2024-11-30 | End: 2025-01-09

## 2024-11-30 RX ORDER — POTASSIUM CHLORIDE 1.5 G/1.58G
40 POWDER, FOR SOLUTION ORAL ONCE
Status: COMPLETED | OUTPATIENT
Start: 2024-11-30 | End: 2024-11-30

## 2024-11-30 RX ADMIN — IPRATROPIUM BROMIDE AND ALBUTEROL SULFATE 1 DOSE: 2.5; .5 SOLUTION RESPIRATORY (INHALATION) at 15:33

## 2024-11-30 RX ADMIN — PROPOFOL 10 MCG/KG/MIN: 10 INJECTION, EMULSION INTRAVENOUS at 23:13

## 2024-11-30 RX ADMIN — PIPERACILLIN SODIUM AND TAZOBACTAM SODIUM 4500 MG: 4; .5 INJECTION, POWDER, LYOPHILIZED, FOR SOLUTION INTRAVENOUS at 08:44

## 2024-11-30 RX ADMIN — PROPOFOL 10 MCG/KG/MIN: 10 INJECTION, EMULSION INTRAVENOUS at 11:28

## 2024-11-30 RX ADMIN — MIDODRINE HYDROCHLORIDE 5 MG: 5 TABLET ORAL at 17:28

## 2024-11-30 RX ADMIN — Medication 5 ML: at 18:30

## 2024-11-30 RX ADMIN — ASPIRIN 81 MG 81 MG: 81 TABLET ORAL at 08:47

## 2024-11-30 RX ADMIN — TAMSULOSIN HYDROCHLORIDE 0.4 MG: 0.4 CAPSULE ORAL at 11:24

## 2024-11-30 RX ADMIN — AMIODARONE HYDROCHLORIDE 200 MG: 200 TABLET ORAL at 08:46

## 2024-11-30 RX ADMIN — LORAZEPAM 1 MG: 2 INJECTION INTRAMUSCULAR; INTRAVENOUS at 00:14

## 2024-11-30 RX ADMIN — SERTRALINE HYDROCHLORIDE 50 MG: 50 TABLET ORAL at 08:47

## 2024-11-30 RX ADMIN — POTASSIUM CHLORIDE 40 MEQ: 1.5 POWDER, FOR SOLUTION ORAL at 08:46

## 2024-11-30 RX ADMIN — QUETIAPINE FUMARATE 12.5 MG: 25 TABLET ORAL at 08:46

## 2024-11-30 RX ADMIN — MIDODRINE HYDROCHLORIDE 5 MG: 5 TABLET ORAL at 08:46

## 2024-11-30 RX ADMIN — ACETAMINOPHEN 650 MG: 325 TABLET ORAL at 21:24

## 2024-11-30 RX ADMIN — IPRATROPIUM BROMIDE AND ALBUTEROL SULFATE 1 DOSE: 2.5; .5 SOLUTION RESPIRATORY (INHALATION) at 03:14

## 2024-11-30 RX ADMIN — LORAZEPAM 1 MG: 2 INJECTION INTRAMUSCULAR; INTRAVENOUS at 21:17

## 2024-11-30 RX ADMIN — BUDESONIDE INHALATION 500 MCG: 0.5 SUSPENSION RESPIRATORY (INHALATION) at 19:45

## 2024-11-30 RX ADMIN — BUSPIRONE HYDROCHLORIDE 7.5 MG: 5 TABLET ORAL at 21:26

## 2024-11-30 RX ADMIN — POLYETHYLENE GLYCOL 3350 17 G: 17 POWDER, FOR SOLUTION ORAL at 08:45

## 2024-11-30 RX ADMIN — DOCUSATE SODIUM 100 MG: 50 LIQUID ORAL at 08:45

## 2024-11-30 RX ADMIN — Medication 5 ML: at 22:12

## 2024-11-30 RX ADMIN — IPRATROPIUM BROMIDE AND ALBUTEROL SULFATE 1 DOSE: 2.5; .5 SOLUTION RESPIRATORY (INHALATION) at 19:45

## 2024-11-30 RX ADMIN — BUSPIRONE HYDROCHLORIDE 7.5 MG: 5 TABLET ORAL at 08:46

## 2024-11-30 RX ADMIN — SODIUM CHLORIDE, PRESERVATIVE FREE 10 ML: 5 INJECTION INTRAVENOUS at 21:28

## 2024-11-30 RX ADMIN — HYDROCORTISONE SODIUM SUCCINATE 50 MG: 100 INJECTION, POWDER, FOR SOLUTION INTRAMUSCULAR; INTRAVENOUS at 08:45

## 2024-11-30 RX ADMIN — FAMOTIDINE 20 MG: 20 TABLET, FILM COATED ORAL at 08:46

## 2024-11-30 RX ADMIN — MIDODRINE HYDROCHLORIDE 5 MG: 5 TABLET ORAL at 11:24

## 2024-11-30 RX ADMIN — ENOXAPARIN SODIUM 30 MG: 100 INJECTION SUBCUTANEOUS at 21:23

## 2024-11-30 RX ADMIN — POTASSIUM CHLORIDE 40 MEQ: 1.5 POWDER, FOR SOLUTION ORAL at 21:35

## 2024-11-30 RX ADMIN — PIPERACILLIN SODIUM AND TAZOBACTAM SODIUM 4500 MG: 4; .5 INJECTION, POWDER, LYOPHILIZED, FOR SOLUTION INTRAVENOUS at 17:30

## 2024-11-30 RX ADMIN — POTASSIUM CHLORIDE 40 MEQ: 1.5 POWDER, FOR SOLUTION ORAL at 11:37

## 2024-11-30 RX ADMIN — PIPERACILLIN SODIUM AND TAZOBACTAM SODIUM 4500 MG: 4; .5 INJECTION, POWDER, LYOPHILIZED, FOR SOLUTION INTRAVENOUS at 23:31

## 2024-11-30 RX ADMIN — Medication 5 ML: at 09:02

## 2024-11-30 RX ADMIN — BUDESONIDE INHALATION 500 MCG: 0.5 SUSPENSION RESPIRATORY (INHALATION) at 08:47

## 2024-11-30 RX ADMIN — HYDROCORTISONE SODIUM SUCCINATE 50 MG: 100 INJECTION, POWDER, FOR SOLUTION INTRAMUSCULAR; INTRAVENOUS at 00:14

## 2024-11-30 RX ADMIN — ATORVASTATIN CALCIUM 40 MG: 40 TABLET, FILM COATED ORAL at 21:23

## 2024-11-30 RX ADMIN — ENOXAPARIN SODIUM 30 MG: 100 INJECTION SUBCUTANEOUS at 08:45

## 2024-11-30 RX ADMIN — QUETIAPINE FUMARATE 12.5 MG: 25 TABLET ORAL at 21:23

## 2024-11-30 RX ADMIN — Medication 5 ML: at 11:24

## 2024-11-30 RX ADMIN — IPRATROPIUM BROMIDE AND ALBUTEROL SULFATE 1 DOSE: 2.5; .5 SOLUTION RESPIRATORY (INHALATION) at 08:47

## 2024-11-30 RX ADMIN — SODIUM CHLORIDE, PRESERVATIVE FREE 10 ML: 5 INJECTION INTRAVENOUS at 08:50

## 2024-11-30 ASSESSMENT — PULMONARY FUNCTION TESTS
PIF_VALUE: 21
PIF_VALUE: 28
PIF_VALUE: 23
PIF_VALUE: 21
PIF_VALUE: 39
PIF_VALUE: 28
PIF_VALUE: 21
PIF_VALUE: 28
PIF_VALUE: 21
PIF_VALUE: 21
PIF_VALUE: 23
PIF_VALUE: 21
PIF_VALUE: 28
PIF_VALUE: 21
PIF_VALUE: 28
PIF_VALUE: 21
PIF_VALUE: 21
PIF_VALUE: 28
PIF_VALUE: 23
PIF_VALUE: 26
PIF_VALUE: 21
PIF_VALUE: 28
PIF_VALUE: 21

## 2024-11-30 ASSESSMENT — PAIN SCALES - GENERAL
PAINLEVEL_OUTOF10: 0

## 2024-11-30 ASSESSMENT — PAIN - FUNCTIONAL ASSESSMENT
PAIN_FUNCTIONAL_ASSESSMENT: ACTIVITIES ARE NOT PREVENTED
PAIN_FUNCTIONAL_ASSESSMENT: ACTIVITIES ARE NOT PREVENTED

## 2024-11-30 ASSESSMENT — PAIN DESCRIPTION - LOCATION: LOCATION: OTHER (COMMENT)

## 2024-11-30 NOTE — PROGRESS NOTES
Nephrology follow up          Patient: Kim Markham MRN: [id number]  SSN: [id number]    YOB: 1975  Age: 49 y.o.  Sex: female      Subjective:   The patient is seen in ICU  On ventilatory support/awake  Hypernatremia Na 148  Off D5 IV fluid  on water flushes via NG  Resolving GUERA  Off single pressor support        No past surgical history on file.   No family history on file.  Social History     Tobacco Use    Smoking status: Every Day     Current packs/day: 1.00     Types: Cigarettes    Smokeless tobacco: Not on file   Substance Use Topics    Alcohol use: Not on file      Current Facility-Administered Medications   Medication Dose Route Frequency Provider Last Rate Last Admin    hydrocortisone sodium succinate PF (SOLU-CORTEF) injection 50 mg  50 mg IntraVENous Q8H Mee Elizabeth MD   50 mg at 11/30/24 0014    amiodarone (CORDARONE) tablet 200 mg  200 mg Oral Daily Mee Elizabeth MD   200 mg at 11/29/24 0826    QUEtiapine (SEROQUEL) tablet 12.5 mg  12.5 mg Per NG tube BID Mee Elizabeth MD   12.5 mg at 11/29/24 2335    propofol infusion  5-50 mcg/kg/min IntraVENous Continuous Mee Elizabeth MD 7.6 mL/hr at 11/29/24 1930 10 mcg/kg/min at 11/29/24 1930    aspirin chewable tablet 81 mg  81 mg Oral Daily Mee Elizabeth MD   81 mg at 11/29/24 0826    midodrine (PROAMATINE) tablet 5 mg  5 mg Oral TID WC Mee Elizabeth MD   5 mg at 11/29/24 1702    LORazepam (ATIVAN) injection 1 mg  1 mg IntraVENous Q4H PRN Mee Elizabeth MD   1 mg at 11/30/24 0014    scopolamine (TRANSDERM-SCOP) transdermal patch 1 patch  1 patch TransDERmal Q72H Mee Elizabeth MD   1 patch at 11/28/24 2102    0.9 % sodium chloride infusion   IntraVENous PRN Mee Elizabeth MD        potassium chloride 20 mEq/50 mL IVPB (Central Line)  20 mEq IntraVENous PRN Mee Elizabeth MD        Or    potassium chloride 10 mEq/100 mL IVPB (Peripheral Line)  10 mEq IntraVENous PRN Klein,

## 2024-11-30 NOTE — PLAN OF CARE
Problem: Discharge Planning  Goal: Discharge to home or other facility with appropriate resources  Recent Flowsheet Documentation  Taken 11/30/2024 0800 by Kasandra Rodriguez RN  Discharge to home or other facility with appropriate resources:   Identify barriers to discharge with patient and caregiver   Identify discharge learning needs (meds, wound care, etc)     Problem: ABCDS Injury Assessment  Goal: Absence of physical injury  Recent Flowsheet Documentation  Taken 11/30/2024 0800 by Kasandra Rodriguez RN  Absence of Physical Injury: Implement safety measures based on patient assessment     Problem: Safety - Adult  Goal: Free from fall injury  Recent Flowsheet Documentation  Taken 11/30/2024 0800 by Kasandra Rodriguez RN  Free From Fall Injury:   Instruct family/caregiver on patient safety   Based on caregiver fall risk screen, instruct family/caregiver to ask for assistance with transferring infant if caregiver noted to have fall risk factors     Problem: Confusion  Goal: Confusion, delirium, dementia, or psychosis is improved or at baseline  Description: INTERVENTIONS:  1. Assess for possible contributors to thought disturbance, including medications, impaired vision or hearing, underlying metabolic abnormalities, dehydration, psychiatric diagnoses, and notify attending LIP  2. [place] high risk fall precautions, as indicated  3. Provide frequent short contacts to provide reality reorientation, refocusing and direction  4. Decrease environmental stimuli, including noise as appropriate  5. Monitor and intervene to maintain adequate nutrition, hydration, elimination, sleep and activity  6. If unable to ensure safety without constant attention obtain sitter and review sitter guidelines with assigned personnel  7. Initiate Psychosocial CNS and Spiritual Care consult, as indicated  Recent Flowsheet Documentation  Taken 11/30/2024 0800 by Kasandra Rodriguez RN  Effect of thought disturbance (confusion, delirium,

## 2024-11-30 NOTE — PROGRESS NOTES
CRITICAL CARE ADMISSION NOTE    Name: Kim Markham   : 1975   MRN: [id number]   Date: 2024      Diagnoses/problem list:   Altered mental status  COPD exacerbation  GUERA  Hypernatremia  Leukocytosis    HPI   Kim Markham is a 49 y.o. female with known past medical history significant for asthma and COPD who treated her symptoms today with Primatene Mist over-the-counter says that she went into fast heart rate became short of breath immediately.  No other exacerbating or relieving factors which presents to the emergency room with no treatment prior to arrival.  Patient reports that she had a few day history of shortness of breath and thought she had a pneumonia. She has been taking OTC cough syrup and cough medicine as well as her inhaler. She reports difficulty affording medications and has limited access to healthcare.      : Continues to have high vent requirements and high sedation requirements. SVT noted and amiodarone initiated.   11/15: Remains intubated on 500/16/10/55%.  Still has bilateral wheezing on auscultation.  Slight rise in creatinine noted but urine output is good.  No arrhythmias this morning.  Remains on amiodarone, propofol, fentanyl and heparin infusions.  : continues to require high sedation and high vent support. Will attempt SBT  : tolerated SBT for >2 hours. Will repeat today  : Sedated with propofol 40ug/kg/min, RAAS -1 to -2. AC 12 Vt 0.5 FiO2 0.4 PEEP 6. Doing well on SBTs but there is concern that she has too much anxiety to withdraw sedation and extubate  : Sedated with propofol 50ug/kg/min, RAAS -1 to -2. VD 7; AC 12 Vt 0.5 FiO2 0.4 PEEP 6. Doing well on SBTs but there is concern that she has too much anxiety to withdraw sedation and extubate. Started on buspirone . Will add sertraline  : Sedated with propofol 45ug/kg/min & fentanyl 75ug/hr, RAAS -1 to -2. VD 8; AC 12 Vt 0.5 FiO2 0.4 PEEP 6. Doing well on SBTs but there is concern  hypernatremia  - CT chest 11/26 with no pneumonia, but mucus plugging  - Intubated and likely will need trach due to inability to clear own secretions  - Scopolamine patch added for increased oral secretions    CARDIOVASCULAR:   SVT  Acute on Chronic HFpEF (NYHA II, AHA B)  - Secondary to excessive inhaler use  - Initially treated with amiodarone infusion. Converted to PO amiodarone  - Holding metoprolol in setting of NE for sedation and concern for sepsis  - added midodrine 5mg TID to discontinue NE  - Will need further optimization for chronic HFrEF with GDMT once more stable  - Added amiodarone 200 mg daily    GASTROINTESTINAL:   Nutrition: NPO  -TF due to AMS at goal  Bowel regimen  -Colace 100mg po daily  -bisacodyl PRN  GI Px  -Pepcid 20mg      RENAL/ELECTROLYTE:   GUERA-non-oliguric   Hypernatremia  - goal K>=4, Mg>=2, Phos >3  - daily BMP  - strict I/O's; daily weights  - avoid nephrotoxic medications  - nephrology consulted  - holding lasix in setting of hypernatremia and GUERA  - D5W@100 ml/hr discontinued  - Increased free water flushes with tube feeds as well; currently 200ml q2 hours  - Urinary retention with rios placed after several straight caths  - Added flomax for urinary retention    ENDOCRINE:   - SSI with POCT    HEMATOLOGY/ONCOLOGY:   No acute concerns  VTE Px  -Goal Hb>=7  -Trend CBC,PTT/INR  -Enoxaparin, AC/AP   -SCDs    ID/MICRO:   Leukocytosis  -Trend fevers, WBC  -Blood cx x2, Urine Cx, Sputum Cx done  -Continue Zosyn  -Solumedrol discontinued    ICU DAILY CHECKLIST     Code Status:full  DVT Prophylaxis:lovenox  T/L/D: PIV  Diet: NPO  Activity Level:up with assistance  ABCDEF Bundle/Checklist Completed: Yes  Disposition: Stay in ICU  Multidisciplinary Rounds Completed:  Yes    OBJECTIVE:     Blood pressure (!) 101/59, pulse 85, temperature 99.3 °F (37.4 °C), temperature source Bladder, resp. rate 26, height 1.727 m (5' 7.99\"), weight 135.2 kg (298 lb 1 oz), SpO2 95%.  Physical Exam  Gen:

## 2024-12-01 LAB
ANION GAP SERPL CALC-SCNC: 6 MMOL/L (ref 2–12)
ARTERIAL PATENCY WRIST A: YES
BACTERIA SPEC CULT: NORMAL
BASE DEFICIT BLDA-SCNC: 1 MMOL/L
BDY SITE: ABNORMAL
BODY TEMPERATURE: 99.4
BUN SERPL-MCNC: 23 MG/DL (ref 6–20)
BUN/CREAT SERPL: 42 (ref 12–20)
CA-I BLD-MCNC: 8.5 MG/DL (ref 8.5–10.1)
CHLORIDE SERPL-SCNC: 120 MMOL/L (ref 97–108)
CO2 SERPL-SCNC: 21 MMOL/L (ref 21–32)
COHGB MFR BLD: 0.3 % (ref 1–2)
CREAT SERPL-MCNC: 0.55 MG/DL (ref 0.55–1.02)
ERYTHROCYTE [DISTWIDTH] IN BLOOD BY AUTOMATED COUNT: 13.8 % (ref 11.5–14.5)
FIO2 ON VENT: 35 %
GAS FLOW.O2 SETTING OXYMISER: 16
GLUCOSE BLD STRIP.AUTO-MCNC: 102 MG/DL (ref 65–100)
GLUCOSE BLD STRIP.AUTO-MCNC: 102 MG/DL (ref 65–100)
GLUCOSE BLD STRIP.AUTO-MCNC: 108 MG/DL (ref 65–100)
GLUCOSE BLD STRIP.AUTO-MCNC: 123 MG/DL (ref 65–100)
GLUCOSE BLD STRIP.AUTO-MCNC: 96 MG/DL (ref 65–100)
GLUCOSE SERPL-MCNC: 113 MG/DL (ref 65–100)
GRAM STN SPEC: NORMAL
HCO3 BLDA-SCNC: 22 MMOL/L (ref 22–26)
HCT VFR BLD AUTO: 28.1 % (ref 35–47)
HGB BLD-MCNC: 8.7 G/DL (ref 11.5–16)
Lab: NORMAL
MCH RBC QN AUTO: 28.5 PG (ref 26–34)
MCHC RBC AUTO-ENTMCNC: 31 G/DL (ref 30–36.5)
MCV RBC AUTO: 92.1 FL (ref 80–99)
METHGB MFR BLD: 0.5 % (ref 0–1.4)
NRBC # BLD: 0 K/UL (ref 0–0.01)
NRBC BLD-RTO: 0 PER 100 WBC
OXYHGB MFR BLD: 87.1 % (ref 95–99)
PCO2 BLDA: 32 MMHG (ref 35–45)
PEEP RESPIRATORY: 8
PERFORMED BY:: ABNORMAL
PERFORMED BY:: NORMAL
PH BLDA: 7.46 (ref 7.35–7.45)
PLATELET # BLD AUTO: 172 K/UL (ref 150–400)
PMV BLD AUTO: 13.3 FL (ref 8.9–12.9)
PO2 BLDA: 51 MMHG (ref 80–100)
POTASSIUM SERPL-SCNC: 3.4 MMOL/L (ref 3.5–5.1)
RBC # BLD AUTO: 3.05 M/UL (ref 3.8–5.2)
SAO2 % BLD: 88 % (ref 95–99)
SAO2% DEVICE SAO2% SENSOR NAME: ABNORMAL
SODIUM SERPL-SCNC: 147 MMOL/L (ref 136–145)
SPECIMEN SITE: ABNORMAL
VENTILATION MODE VENT: ABNORMAL
WBC # BLD AUTO: 11.1 K/UL (ref 3.6–11)

## 2024-12-01 PROCEDURE — 94640 AIRWAY INHALATION TREATMENT: CPT

## 2024-12-01 PROCEDURE — 85027 COMPLETE CBC AUTOMATED: CPT

## 2024-12-01 PROCEDURE — 6370000000 HC RX 637 (ALT 250 FOR IP): Performed by: STUDENT IN AN ORGANIZED HEALTH CARE EDUCATION/TRAINING PROGRAM

## 2024-12-01 PROCEDURE — 6360000002 HC RX W HCPCS: Performed by: STUDENT IN AN ORGANIZED HEALTH CARE EDUCATION/TRAINING PROGRAM

## 2024-12-01 PROCEDURE — 2580000003 HC RX 258: Performed by: NURSE PRACTITIONER

## 2024-12-01 PROCEDURE — 6370000000 HC RX 637 (ALT 250 FOR IP): Performed by: INTERNAL MEDICINE

## 2024-12-01 PROCEDURE — 36415 COLL VENOUS BLD VENIPUNCTURE: CPT

## 2024-12-01 PROCEDURE — 6370000000 HC RX 637 (ALT 250 FOR IP)

## 2024-12-01 PROCEDURE — 2000000000 HC ICU R&B

## 2024-12-01 PROCEDURE — 6370000000 HC RX 637 (ALT 250 FOR IP): Performed by: NURSE PRACTITIONER

## 2024-12-01 PROCEDURE — 80048 BASIC METABOLIC PNL TOTAL CA: CPT

## 2024-12-01 PROCEDURE — 36600 WITHDRAWAL OF ARTERIAL BLOOD: CPT

## 2024-12-01 PROCEDURE — 2700000000 HC OXYGEN THERAPY PER DAY

## 2024-12-01 PROCEDURE — 94761 N-INVAS EAR/PLS OXIMETRY MLT: CPT

## 2024-12-01 PROCEDURE — 82803 BLOOD GASES ANY COMBINATION: CPT

## 2024-12-01 PROCEDURE — 2580000003 HC RX 258: Performed by: STUDENT IN AN ORGANIZED HEALTH CARE EDUCATION/TRAINING PROGRAM

## 2024-12-01 PROCEDURE — 82962 GLUCOSE BLOOD TEST: CPT

## 2024-12-01 PROCEDURE — 94003 VENT MGMT INPAT SUBQ DAY: CPT

## 2024-12-01 RX ORDER — POTASSIUM CHLORIDE 1.5 G/1.58G
60 POWDER, FOR SOLUTION ORAL ONCE
Status: DISCONTINUED | OUTPATIENT
Start: 2024-12-01 | End: 2024-12-01

## 2024-12-01 RX ORDER — QUETIAPINE FUMARATE 25 MG/1
12.5 TABLET, FILM COATED ORAL ONCE
Status: COMPLETED | OUTPATIENT
Start: 2024-12-01 | End: 2024-12-01

## 2024-12-01 RX ORDER — QUETIAPINE FUMARATE 25 MG/1
25 TABLET, FILM COATED ORAL 2 TIMES DAILY
Status: DISCONTINUED | OUTPATIENT
Start: 2024-12-01 | End: 2024-12-03

## 2024-12-01 RX ORDER — MIDODRINE HYDROCHLORIDE 5 MG/1
10 TABLET ORAL
Status: DISCONTINUED | OUTPATIENT
Start: 2024-12-01 | End: 2024-12-04

## 2024-12-01 RX ADMIN — QUETIAPINE FUMARATE 12.5 MG: 25 TABLET ORAL at 08:17

## 2024-12-01 RX ADMIN — Medication 5 ML: at 17:21

## 2024-12-01 RX ADMIN — IPRATROPIUM BROMIDE AND ALBUTEROL SULFATE 1 DOSE: 2.5; .5 SOLUTION RESPIRATORY (INHALATION) at 01:14

## 2024-12-01 RX ADMIN — BUSPIRONE HYDROCHLORIDE 7.5 MG: 5 TABLET ORAL at 20:52

## 2024-12-01 RX ADMIN — ASPIRIN 81 MG 81 MG: 81 TABLET ORAL at 08:19

## 2024-12-01 RX ADMIN — LORAZEPAM 1 MG: 2 INJECTION INTRAMUSCULAR; INTRAVENOUS at 20:26

## 2024-12-01 RX ADMIN — FAMOTIDINE 20 MG: 20 TABLET, FILM COATED ORAL at 08:17

## 2024-12-01 RX ADMIN — POTASSIUM BICARBONATE 60 MEQ: 782 TABLET, EFFERVESCENT ORAL at 09:09

## 2024-12-01 RX ADMIN — QUETIAPINE FUMARATE 25 MG: 25 TABLET ORAL at 20:53

## 2024-12-01 RX ADMIN — LORAZEPAM 1 MG: 2 INJECTION INTRAMUSCULAR; INTRAVENOUS at 04:42

## 2024-12-01 RX ADMIN — TAMSULOSIN HYDROCHLORIDE 0.4 MG: 0.4 CAPSULE ORAL at 08:18

## 2024-12-01 RX ADMIN — AMIODARONE HYDROCHLORIDE 200 MG: 200 TABLET ORAL at 08:18

## 2024-12-01 RX ADMIN — Medication 5 ML: at 08:14

## 2024-12-01 RX ADMIN — SODIUM CHLORIDE, PRESERVATIVE FREE 10 ML: 5 INJECTION INTRAVENOUS at 20:53

## 2024-12-01 RX ADMIN — SERTRALINE HYDROCHLORIDE 50 MG: 50 TABLET ORAL at 08:17

## 2024-12-01 RX ADMIN — MIDODRINE HYDROCHLORIDE 10 MG: 5 TABLET ORAL at 11:54

## 2024-12-01 RX ADMIN — Medication 5 ML: at 11:53

## 2024-12-01 RX ADMIN — BUDESONIDE INHALATION 500 MCG: 0.5 SUSPENSION RESPIRATORY (INHALATION) at 08:36

## 2024-12-01 RX ADMIN — PIPERACILLIN SODIUM AND TAZOBACTAM SODIUM 4500 MG: 4; .5 INJECTION, POWDER, LYOPHILIZED, FOR SOLUTION INTRAVENOUS at 17:17

## 2024-12-01 RX ADMIN — POTASSIUM BICARBONATE 40 MEQ: 782 TABLET, EFFERVESCENT ORAL at 06:19

## 2024-12-01 RX ADMIN — PROPOFOL 10 MCG/KG/MIN: 10 INJECTION, EMULSION INTRAVENOUS at 21:05

## 2024-12-01 RX ADMIN — PIPERACILLIN SODIUM AND TAZOBACTAM SODIUM 4500 MG: 4; .5 INJECTION, POWDER, LYOPHILIZED, FOR SOLUTION INTRAVENOUS at 08:13

## 2024-12-01 RX ADMIN — BUDESONIDE INHALATION 500 MCG: 0.5 SUSPENSION RESPIRATORY (INHALATION) at 20:18

## 2024-12-01 RX ADMIN — ENOXAPARIN SODIUM 30 MG: 100 INJECTION SUBCUTANEOUS at 20:52

## 2024-12-01 RX ADMIN — PIPERACILLIN SODIUM AND TAZOBACTAM SODIUM 4500 MG: 4; .5 INJECTION, POWDER, LYOPHILIZED, FOR SOLUTION INTRAVENOUS at 23:31

## 2024-12-01 RX ADMIN — IPRATROPIUM BROMIDE AND ALBUTEROL SULFATE 1 DOSE: 2.5; .5 SOLUTION RESPIRATORY (INHALATION) at 08:37

## 2024-12-01 RX ADMIN — MIDODRINE HYDROCHLORIDE 10 MG: 5 TABLET ORAL at 08:16

## 2024-12-01 RX ADMIN — DOCUSATE SODIUM 100 MG: 50 LIQUID ORAL at 08:16

## 2024-12-01 RX ADMIN — MIDODRINE HYDROCHLORIDE 10 MG: 5 TABLET ORAL at 17:17

## 2024-12-01 RX ADMIN — ENOXAPARIN SODIUM 30 MG: 100 INJECTION SUBCUTANEOUS at 08:14

## 2024-12-01 RX ADMIN — POLYETHYLENE GLYCOL 3350 17 G: 17 POWDER, FOR SOLUTION ORAL at 08:14

## 2024-12-01 RX ADMIN — BUSPIRONE HYDROCHLORIDE 7.5 MG: 5 TABLET ORAL at 08:17

## 2024-12-01 RX ADMIN — QUETIAPINE FUMARATE 12.5 MG: 25 TABLET ORAL at 09:01

## 2024-12-01 RX ADMIN — PROPOFOL 10 MCG/KG/MIN: 10 INJECTION, EMULSION INTRAVENOUS at 10:56

## 2024-12-01 RX ADMIN — Medication 5 ML: at 20:25

## 2024-12-01 RX ADMIN — IPRATROPIUM BROMIDE AND ALBUTEROL SULFATE 1 DOSE: 2.5; .5 SOLUTION RESPIRATORY (INHALATION) at 15:46

## 2024-12-01 RX ADMIN — ATORVASTATIN CALCIUM 40 MG: 40 TABLET, FILM COATED ORAL at 20:52

## 2024-12-01 RX ADMIN — SODIUM CHLORIDE, PRESERVATIVE FREE 10 ML: 5 INJECTION INTRAVENOUS at 08:21

## 2024-12-01 RX ADMIN — POTASSIUM CHLORIDE 40 MEQ: 1.5 POWDER, FOR SOLUTION ORAL at 21:15

## 2024-12-01 RX ADMIN — IPRATROPIUM BROMIDE AND ALBUTEROL SULFATE 1 DOSE: 2.5; .5 SOLUTION RESPIRATORY (INHALATION) at 20:18

## 2024-12-01 ASSESSMENT — PAIN SCALES - GENERAL
PAINLEVEL_OUTOF10: 0

## 2024-12-01 ASSESSMENT — PULMONARY FUNCTION TESTS
PIF_VALUE: 19
PIF_VALUE: 18
PIF_VALUE: 21
PIF_VALUE: 26
PIF_VALUE: 19
PIF_VALUE: 19
PIF_VALUE: 20
PIF_VALUE: 19
PIF_VALUE: 19
PIF_VALUE: 24
PIF_VALUE: 26
PIF_VALUE: 26
PIF_VALUE: 20
PIF_VALUE: 21
PIF_VALUE: 19
PIF_VALUE: 21
PIF_VALUE: 26
PIF_VALUE: 19
PIF_VALUE: 20
PIF_VALUE: 21
PIF_VALUE: 19
PIF_VALUE: 19
PIF_VALUE: 20
PIF_VALUE: 26
PIF_VALUE: 21
PIF_VALUE: 19
PIF_VALUE: 19

## 2024-12-01 NOTE — PLAN OF CARE
Problem: Discharge Planning  Goal: Discharge to home or other facility with appropriate resources  Outcome: Progressing  Flowsheets (Taken 11/30/2024 2000)  Discharge to home or other facility with appropriate resources: Identify barriers to discharge with patient and caregiver     Problem: ABCDS Injury Assessment  Goal: Absence of physical injury  Outcome: Progressing     Problem: Safety - Adult  Goal: Free from fall injury  Outcome: Progressing     Problem: Confusion  Goal: Confusion, delirium, dementia, or psychosis is improved or at baseline  Description: INTERVENTIONS:  1. Assess for possible contributors to thought disturbance, including medications, impaired vision or hearing, underlying metabolic abnormalities, dehydration, psychiatric diagnoses, and notify attending LIP  2. [place] high risk fall precautions, as indicated  3. Provide frequent short contacts to provide reality reorientation, refocusing and direction  4. Decrease environmental stimuli, including noise as appropriate  5. Monitor and intervene to maintain adequate nutrition, hydration, elimination, sleep and activity  6. If unable to ensure safety without constant attention obtain sitter and review sitter guidelines with assigned personnel  7. Initiate Psychosocial CNS and Spiritual Care consult, as indicated  Outcome: Progressing  Flowsheets (Taken 11/30/2024 2000)  Effect of thought disturbance (confusion, delirium, dementia, or psychosis) are managed with adequate functional status: Decrease environmental stimuli, including noise as appropriate     Problem: Chronic Conditions and Co-morbidities  Goal: Patient's chronic conditions and co-morbidity symptoms are monitored and maintained or improved  Outcome: Progressing  Flowsheets (Taken 11/30/2024 2000)  Care Plan - Patient's Chronic Conditions and Co-Morbidity Symptoms are Monitored and Maintained or Improved: Monitor and assess patient's chronic conditions and comorbid symptoms for  Maintain proper alignment of affected body part  Outcome: Progressing  Goal: Return ADL status to a safe level of function  Outcome: Progressing  Flowsheets (Taken 11/30/2024 2000)  Return ADL Status to a Safe Level of Function: Administer medication as ordered     Problem: Gastrointestinal - Adult  Goal: Minimal or absence of nausea and vomiting  Outcome: Progressing  Flowsheets (Taken 11/30/2024 2000)  Minimal or absence of nausea and vomiting: Administer IV fluids as ordered to ensure adequate hydration  Goal: Maintains or returns to baseline bowel function  Outcome: Progressing  Flowsheets (Taken 11/30/2024 2000)  Maintains or returns to baseline bowel function: Assess bowel function  Goal: Maintains adequate nutritional intake  Outcome: Progressing  Flowsheets (Taken 11/30/2024 2000)  Maintains adequate nutritional intake: Monitor percentage of each meal consumed  Goal: Establish and maintain optimal ostomy function  Outcome: Progressing  Flowsheets (Taken 11/30/2024 2000)  Establish and maintain optimal ostomy function: Monitor output from ostomies     Problem: Genitourinary - Adult  Goal: Absence of urinary retention  Outcome: Progressing  Flowsheets (Taken 11/30/2024 2000)  Absence of urinary retention: Monitor intake/output and perform bladder scan as needed  Goal: Urinary catheter remains patent  Outcome: Progressing  Flowsheets (Taken 11/30/2024 2000)  Urinary catheter remains patent: Assess patency of urinary catheter     Problem: Infection - Adult  Goal: Absence of infection at discharge  Outcome: Progressing  Flowsheets (Taken 11/30/2024 2000)  Absence of infection at discharge: Assess and monitor for signs and symptoms of infection  Goal: Absence of infection during hospitalization  Outcome: Progressing  Flowsheets (Taken 11/30/2024 2000)  Absence of infection during hospitalization: Assess and monitor for signs and symptoms of infection  Goal: Absence of fever/infection during anticipated neutropenic

## 2024-12-01 NOTE — PLAN OF CARE
Problem: Discharge Planning  Goal: Discharge to home or other facility with appropriate resources  Recent Flowsheet Documentation  Taken 12/1/2024 0800 by Kasandra Rodriguez RN  Discharge to home or other facility with appropriate resources:   Identify barriers to discharge with patient and caregiver   Arrange for needed discharge resources and transportation as appropriate   Identify discharge learning needs (meds, wound care, etc)   Arrange for interpreters to assist at discharge as needed  12/1/2024 0017 by Bernice Vasques RN  Outcome: Progressing  Flowsheets (Taken 11/30/2024 2000)  Discharge to home or other facility with appropriate resources: Identify barriers to discharge with patient and caregiver     Problem: ABCDS Injury Assessment  Goal: Absence of physical injury  Recent Flowsheet Documentation  Taken 12/1/2024 0800 by Kasandra Rodriguez RN  Absence of Physical Injury: Implement safety measures based on patient assessment  12/1/2024 0017 by Bernice Vasques RN  Outcome: Progressing     Problem: Safety - Adult  Goal: Free from fall injury  Recent Flowsheet Documentation  Taken 12/1/2024 0800 by Kasandra Rodriguez RN  Free From Fall Injury:   Based on caregiver fall risk screen, instruct family/caregiver to ask for assistance with transferring infant if caregiver noted to have fall risk factors   Instruct family/caregiver on patient safety  12/1/2024 0017 by Bernice Vasques RN  Outcome: Progressing     Problem: Confusion  Goal: Confusion, delirium, dementia, or psychosis is improved or at baseline  Description: INTERVENTIONS:  1. Assess for possible contributors to thought disturbance, including medications, impaired vision or hearing, underlying metabolic abnormalities, dehydration, psychiatric diagnoses, and notify attending LIP  2. [place] high risk fall precautions, as indicated  3. Provide frequent short contacts to provide reality reorientation, refocusing and direction  4. Decrease environmental

## 2024-12-01 NOTE — PROGRESS NOTES
CRITICAL CARE ADMISSION NOTE    Name: Kim Markham   : 1975   MRN: [id number]   Date: 2024      Diagnoses/problem list:   Altered mental status  COPD exacerbation  GUERA  Hypernatremia  Leukocytosis    HPI   Kim Markham is a 49 y.o. female with known past medical history significant for asthma and COPD who treated her symptoms today with Primatene Mist over-the-counter says that she went into fast heart rate became short of breath immediately.  No other exacerbating or relieving factors which presents to the emergency room with no treatment prior to arrival.  Patient reports that she had a few day history of shortness of breath and thought she had a pneumonia. She has been taking OTC cough syrup and cough medicine as well as her inhaler. She reports difficulty affording medications and has limited access to healthcare.      : Continues to have high vent requirements and high sedation requirements. SVT noted and amiodarone initiated.   11/15: Remains intubated on 500/16/10/55%.  Still has bilateral wheezing on auscultation.  Slight rise in creatinine noted but urine output is good.  No arrhythmias this morning.  Remains on amiodarone, propofol, fentanyl and heparin infusions.  : continues to require high sedation and high vent support. Will attempt SBT  : tolerated SBT for >2 hours. Will repeat today  : Sedated with propofol 40ug/kg/min, RAAS -1 to -2. AC 12 Vt 0.5 FiO2 0.4 PEEP 6. Doing well on SBTs but there is concern that she has too much anxiety to withdraw sedation and extubate  : Sedated with propofol 50ug/kg/min, RAAS -1 to -2. VD 7; AC 12 Vt 0.5 FiO2 0.4 PEEP 6. Doing well on SBTs but there is concern that she has too much anxiety to withdraw sedation and extubate. Started on buspirone . Will add sertraline  : Sedated with propofol 45ug/kg/min & fentanyl 75ug/hr, RAAS -1 to -2. VD 8; AC 12 Vt 0.5 FiO2 0.4 PEEP 6. Doing well on SBTs but there is concern

## 2024-12-02 ENCOUNTER — APPOINTMENT (OUTPATIENT)
Facility: HOSPITAL | Age: 49
End: 2024-12-02
Payer: MEDICAID

## 2024-12-02 LAB
ANION GAP SERPL CALC-SCNC: 5 MMOL/L (ref 2–12)
BACTERIA SPEC CULT: NORMAL
BACTERIA SPEC CULT: NORMAL
BUN SERPL-MCNC: 19 MG/DL (ref 6–20)
BUN/CREAT SERPL: 38 (ref 12–20)
CA-I BLD-MCNC: 8.8 MG/DL (ref 8.5–10.1)
CHLORIDE SERPL-SCNC: 113 MMOL/L (ref 97–108)
CO2 SERPL-SCNC: 25 MMOL/L (ref 21–32)
CREAT SERPL-MCNC: 0.5 MG/DL (ref 0.55–1.02)
EKG ATRIAL RATE: 69 BPM
EKG DIAGNOSIS: NORMAL
EKG P AXIS: 67 DEGREES
EKG P-R INTERVAL: 142 MS
EKG Q-T INTERVAL: 422 MS
EKG QRS DURATION: 90 MS
EKG QTC CALCULATION (BAZETT): 452 MS
EKG R AXIS: 42 DEGREES
EKG T AXIS: 45 DEGREES
EKG VENTRICULAR RATE: 69 BPM
ERYTHROCYTE [DISTWIDTH] IN BLOOD BY AUTOMATED COUNT: 13.9 % (ref 11.5–14.5)
GLUCOSE BLD STRIP.AUTO-MCNC: 107 MG/DL (ref 65–100)
GLUCOSE BLD STRIP.AUTO-MCNC: 110 MG/DL (ref 65–100)
GLUCOSE BLD STRIP.AUTO-MCNC: 93 MG/DL (ref 65–100)
GLUCOSE BLD STRIP.AUTO-MCNC: 95 MG/DL (ref 65–100)
GLUCOSE SERPL-MCNC: 107 MG/DL (ref 65–100)
HCT VFR BLD AUTO: 25.2 % (ref 35–47)
HGB BLD-MCNC: 8 G/DL (ref 11.5–16)
Lab: NORMAL
Lab: NORMAL
MAGNESIUM SERPL-MCNC: 1.8 MG/DL (ref 1.6–2.4)
MCH RBC QN AUTO: 28.7 PG (ref 26–34)
MCHC RBC AUTO-ENTMCNC: 31.7 G/DL (ref 30–36.5)
MCV RBC AUTO: 90.3 FL (ref 80–99)
NRBC # BLD: 0 K/UL (ref 0–0.01)
NRBC BLD-RTO: 0 PER 100 WBC
PERFORMED BY:: ABNORMAL
PERFORMED BY:: ABNORMAL
PERFORMED BY:: NORMAL
PERFORMED BY:: NORMAL
PHOSPHATE SERPL-MCNC: 2.8 MG/DL (ref 2.6–4.7)
PLATELET # BLD AUTO: 181 K/UL (ref 150–400)
PMV BLD AUTO: 13.3 FL (ref 8.9–12.9)
POTASSIUM SERPL-SCNC: 3.5 MMOL/L (ref 3.5–5.1)
RBC # BLD AUTO: 2.79 M/UL (ref 3.8–5.2)
SODIUM SERPL-SCNC: 143 MMOL/L (ref 136–145)
WBC # BLD AUTO: 9.6 K/UL (ref 3.6–11)

## 2024-12-02 PROCEDURE — 6360000002 HC RX W HCPCS: Performed by: NURSE PRACTITIONER

## 2024-12-02 PROCEDURE — 82962 GLUCOSE BLOOD TEST: CPT

## 2024-12-02 PROCEDURE — 2580000003 HC RX 258: Performed by: STUDENT IN AN ORGANIZED HEALTH CARE EDUCATION/TRAINING PROGRAM

## 2024-12-02 PROCEDURE — 93005 ELECTROCARDIOGRAM TRACING: CPT | Performed by: INTERNAL MEDICINE

## 2024-12-02 PROCEDURE — 80048 BASIC METABOLIC PNL TOTAL CA: CPT

## 2024-12-02 PROCEDURE — 83735 ASSAY OF MAGNESIUM: CPT

## 2024-12-02 PROCEDURE — 6370000000 HC RX 637 (ALT 250 FOR IP): Performed by: STUDENT IN AN ORGANIZED HEALTH CARE EDUCATION/TRAINING PROGRAM

## 2024-12-02 PROCEDURE — 71045 X-RAY EXAM CHEST 1 VIEW: CPT

## 2024-12-02 PROCEDURE — P9045 ALBUMIN (HUMAN), 5%, 250 ML: HCPCS | Performed by: NURSE PRACTITIONER

## 2024-12-02 PROCEDURE — 6360000002 HC RX W HCPCS: Performed by: STUDENT IN AN ORGANIZED HEALTH CARE EDUCATION/TRAINING PROGRAM

## 2024-12-02 PROCEDURE — 6370000000 HC RX 637 (ALT 250 FOR IP): Performed by: INTERNAL MEDICINE

## 2024-12-02 PROCEDURE — 2580000003 HC RX 258: Performed by: NURSE PRACTITIONER

## 2024-12-02 PROCEDURE — 84100 ASSAY OF PHOSPHORUS: CPT

## 2024-12-02 PROCEDURE — 2700000000 HC OXYGEN THERAPY PER DAY

## 2024-12-02 PROCEDURE — 6370000000 HC RX 637 (ALT 250 FOR IP)

## 2024-12-02 PROCEDURE — 94640 AIRWAY INHALATION TREATMENT: CPT

## 2024-12-02 PROCEDURE — 6360000002 HC RX W HCPCS: Performed by: INTERNAL MEDICINE

## 2024-12-02 PROCEDURE — 2000000000 HC ICU R&B

## 2024-12-02 PROCEDURE — 85027 COMPLETE CBC AUTOMATED: CPT

## 2024-12-02 PROCEDURE — 36415 COLL VENOUS BLD VENIPUNCTURE: CPT

## 2024-12-02 PROCEDURE — 2500000003 HC RX 250 WO HCPCS: Performed by: INTERNAL MEDICINE

## 2024-12-02 PROCEDURE — 94761 N-INVAS EAR/PLS OXIMETRY MLT: CPT

## 2024-12-02 PROCEDURE — 93005 ELECTROCARDIOGRAM TRACING: CPT | Performed by: STUDENT IN AN ORGANIZED HEALTH CARE EDUCATION/TRAINING PROGRAM

## 2024-12-02 PROCEDURE — 94003 VENT MGMT INPAT SUBQ DAY: CPT

## 2024-12-02 RX ORDER — FUROSEMIDE 10 MG/ML
10 INJECTION INTRAMUSCULAR; INTRAVENOUS ONCE
Status: COMPLETED | OUTPATIENT
Start: 2024-12-02 | End: 2024-12-02

## 2024-12-02 RX ORDER — DEXMEDETOMIDINE HYDROCHLORIDE 4 UG/ML
.1-1.5 INJECTION, SOLUTION INTRAVENOUS CONTINUOUS
Status: DISCONTINUED | OUTPATIENT
Start: 2024-12-02 | End: 2024-12-06

## 2024-12-02 RX ORDER — NOREPINEPHRINE BITARTRATE 0.06 MG/ML
1-100 INJECTION, SOLUTION INTRAVENOUS CONTINUOUS
Status: DISCONTINUED | OUTPATIENT
Start: 2024-12-02 | End: 2024-12-06

## 2024-12-02 RX ORDER — ALBUMIN HUMAN 50 G/1000ML
25 SOLUTION INTRAVENOUS ONCE
Status: COMPLETED | OUTPATIENT
Start: 2024-12-02 | End: 2024-12-02

## 2024-12-02 RX ORDER — 0.9 % SODIUM CHLORIDE 0.9 %
250 INTRAVENOUS SOLUTION INTRAVENOUS ONCE
Status: COMPLETED | OUTPATIENT
Start: 2024-12-02 | End: 2024-12-02

## 2024-12-02 RX ADMIN — FAMOTIDINE 20 MG: 20 TABLET, FILM COATED ORAL at 08:23

## 2024-12-02 RX ADMIN — PIPERACILLIN SODIUM AND TAZOBACTAM SODIUM 4500 MG: 4; .5 INJECTION, POWDER, LYOPHILIZED, FOR SOLUTION INTRAVENOUS at 08:21

## 2024-12-02 RX ADMIN — TAMSULOSIN HYDROCHLORIDE 0.4 MG: 0.4 CAPSULE ORAL at 08:22

## 2024-12-02 RX ADMIN — BUDESONIDE INHALATION 500 MCG: 0.5 SUSPENSION RESPIRATORY (INHALATION) at 08:21

## 2024-12-02 RX ADMIN — LORAZEPAM 1 MG: 2 INJECTION INTRAMUSCULAR; INTRAVENOUS at 01:31

## 2024-12-02 RX ADMIN — IPRATROPIUM BROMIDE AND ALBUTEROL SULFATE 1 DOSE: 2.5; .5 SOLUTION RESPIRATORY (INHALATION) at 20:15

## 2024-12-02 RX ADMIN — POTASSIUM BICARBONATE 40 MEQ: 782 TABLET, EFFERVESCENT ORAL at 20:48

## 2024-12-02 RX ADMIN — ASPIRIN 81 MG 81 MG: 81 TABLET ORAL at 08:24

## 2024-12-02 RX ADMIN — SODIUM CHLORIDE, PRESERVATIVE FREE 10 ML: 5 INJECTION INTRAVENOUS at 20:48

## 2024-12-02 RX ADMIN — SODIUM CHLORIDE 250 ML: 9 INJECTION, SOLUTION INTRAVENOUS at 03:42

## 2024-12-02 RX ADMIN — SERTRALINE HYDROCHLORIDE 50 MG: 50 TABLET ORAL at 08:24

## 2024-12-02 RX ADMIN — MIDODRINE HYDROCHLORIDE 10 MG: 5 TABLET ORAL at 16:30

## 2024-12-02 RX ADMIN — BUDESONIDE INHALATION 500 MCG: 0.5 SUSPENSION RESPIRATORY (INHALATION) at 20:15

## 2024-12-02 RX ADMIN — QUETIAPINE FUMARATE 25 MG: 25 TABLET ORAL at 08:23

## 2024-12-02 RX ADMIN — SODIUM CHLORIDE, PRESERVATIVE FREE 10 ML: 5 INJECTION INTRAVENOUS at 08:24

## 2024-12-02 RX ADMIN — BUSPIRONE HYDROCHLORIDE 7.5 MG: 5 TABLET ORAL at 08:23

## 2024-12-02 RX ADMIN — Medication 5 ML: at 14:35

## 2024-12-02 RX ADMIN — MIDODRINE HYDROCHLORIDE 10 MG: 5 TABLET ORAL at 12:45

## 2024-12-02 RX ADMIN — QUETIAPINE FUMARATE 25 MG: 25 TABLET ORAL at 20:48

## 2024-12-02 RX ADMIN — Medication 5 ML: at 08:33

## 2024-12-02 RX ADMIN — ALBUMIN (HUMAN) 25 G: 12.5 INJECTION, SOLUTION INTRAVENOUS at 22:40

## 2024-12-02 RX ADMIN — DOCUSATE SODIUM 100 MG: 50 LIQUID ORAL at 08:21

## 2024-12-02 RX ADMIN — HALOPERIDOL LACTATE 5 MG: 5 INJECTION, SOLUTION INTRAMUSCULAR at 02:00

## 2024-12-02 RX ADMIN — IPRATROPIUM BROMIDE AND ALBUTEROL SULFATE 1 DOSE: 2.5; .5 SOLUTION RESPIRATORY (INHALATION) at 01:50

## 2024-12-02 RX ADMIN — ATORVASTATIN CALCIUM 40 MG: 40 TABLET, FILM COATED ORAL at 20:48

## 2024-12-02 RX ADMIN — IPRATROPIUM BROMIDE AND ALBUTEROL SULFATE 1 DOSE: 2.5; .5 SOLUTION RESPIRATORY (INHALATION) at 14:42

## 2024-12-02 RX ADMIN — MIDODRINE HYDROCHLORIDE 10 MG: 5 TABLET ORAL at 08:22

## 2024-12-02 RX ADMIN — BUSPIRONE HYDROCHLORIDE 7.5 MG: 5 TABLET ORAL at 20:48

## 2024-12-02 RX ADMIN — POLYETHYLENE GLYCOL 3350 17 G: 17 POWDER, FOR SOLUTION ORAL at 08:21

## 2024-12-02 RX ADMIN — DEXMEDETOMIDINE HYDROCHLORIDE 0.2 MCG/KG/HR: 400 INJECTION, SOLUTION INTRAVENOUS at 12:44

## 2024-12-02 RX ADMIN — ENOXAPARIN SODIUM 30 MG: 100 INJECTION SUBCUTANEOUS at 20:48

## 2024-12-02 RX ADMIN — PROPOFOL 10 MCG/KG/MIN: 10 INJECTION, EMULSION INTRAVENOUS at 07:29

## 2024-12-02 RX ADMIN — SENNOSIDES 8.6 MG: 8.6 TABLET, FILM COATED ORAL at 20:48

## 2024-12-02 RX ADMIN — AMIODARONE HYDROCHLORIDE 200 MG: 200 TABLET ORAL at 08:23

## 2024-12-02 RX ADMIN — PROPOFOL 10 MCG/KG/MIN: 10 INJECTION, EMULSION INTRAVENOUS at 16:30

## 2024-12-02 RX ADMIN — Medication 5 ML: at 16:58

## 2024-12-02 RX ADMIN — ENOXAPARIN SODIUM 30 MG: 100 INJECTION SUBCUTANEOUS at 08:22

## 2024-12-02 RX ADMIN — IPRATROPIUM BROMIDE AND ALBUTEROL SULFATE 1 DOSE: 2.5; .5 SOLUTION RESPIRATORY (INHALATION) at 08:21

## 2024-12-02 RX ADMIN — PIPERACILLIN SODIUM AND TAZOBACTAM SODIUM 4500 MG: 4; .5 INJECTION, POWDER, LYOPHILIZED, FOR SOLUTION INTRAVENOUS at 16:32

## 2024-12-02 RX ADMIN — FUROSEMIDE 10 MG: 10 INJECTION, SOLUTION INTRAMUSCULAR; INTRAVENOUS at 12:44

## 2024-12-02 RX ADMIN — POTASSIUM BICARBONATE 40 MEQ: 782 TABLET, EFFERVESCENT ORAL at 08:22

## 2024-12-02 RX ADMIN — Medication 5 ML: at 20:49

## 2024-12-02 ASSESSMENT — PULMONARY FUNCTION TESTS
PIF_VALUE: 24
PIF_VALUE: 26
PIF_VALUE: 24
PIF_VALUE: 25
PIF_VALUE: 24
PIF_VALUE: 25
PIF_VALUE: 24
PIF_VALUE: 24
PIF_VALUE: 23
PIF_VALUE: 24
PIF_VALUE: 27
PIF_VALUE: 24
PIF_VALUE: 24

## 2024-12-02 ASSESSMENT — PAIN SCALES - GENERAL
PAINLEVEL_OUTOF10: 0

## 2024-12-02 NOTE — PROGRESS NOTES
ENT Brief Update Note:     Will tentatively plan for tracheostomy tomorrow morning if stable from medical/ICU perspective.    -Please hold tube feeds at midnight  -Hold a.m. Lovenox tomorrow morning       MD Rogelio Moreno [facility]  [address] [place] [address] [address]  [place] [address]  Office Phone: [phone]

## 2024-12-02 NOTE — PROGRESS NOTES
1639: Patient had 5 beat run of Vtach. Rhythm strip printed and carried over to chart. MD notified. EKG ordered and done, MD made aware of EKG results. No new orders.

## 2024-12-02 NOTE — PROGRESS NOTES
CRITICAL CARE ADMISSION NOTE    Name: Kim Markham   : 1975   MRN: [id number]   Date: 2024      Diagnoses/problem list:   Altered mental status  COPD exacerbation  GUERA  Hypernatremia  Leukocytosis    HPI   Kim Markham is a 49 y.o. female with known past medical history significant for asthma and COPD who treated her symptoms today with Primatene Mist over-the-counter says that she went into fast heart rate became short of breath immediately.  No other exacerbating or relieving factors which presents to the emergency room with no treatment prior to arrival.  Patient reports that she had a few day history of shortness of breath and thought she had a pneumonia. She has been taking OTC cough syrup and cough medicine as well as her inhaler. She reports difficulty affording medications and has limited access to healthcare.      : Continues to have high vent requirements and high sedation requirements. SVT noted and amiodarone initiated.   11/15: Remains intubated on 500/16/10/55%.  Still has bilateral wheezing on auscultation.  Slight rise in creatinine noted but urine output is good.  No arrhythmias this morning.  Remains on amiodarone, propofol, fentanyl and heparin infusions.  : continues to require high sedation and high vent support. Will attempt SBT  : tolerated SBT for >2 hours. Will repeat today  : Sedated with propofol 40ug/kg/min, RAAS -1 to -2. AC 12 Vt 0.5 FiO2 0.4 PEEP 6. Doing well on SBTs but there is concern that she has too much anxiety to withdraw sedation and extubate  : Sedated with propofol 50ug/kg/min, RAAS -1 to -2. VD 7; AC 12 Vt 0.5 FiO2 0.4 PEEP 6. Doing well on SBTs but there is concern that she has too much anxiety to withdraw sedation and extubate. Started on buspirone . Will add sertraline  : Sedated with propofol 45ug/kg/min & fentanyl 75ug/hr, RAAS -1 to -2. VD 8; AC 12 Vt 0.5 FiO2 0.4 PEEP 6. Doing well on SBTs but there is concern  input(s): \"INR\", \"APTT\" in the last 72 hours.    Invalid input(s): \"PTP\"    Lipid Panel No results found for: \"CHOL\", \"CHOLPOCT\", \"CHLST\", \"CHOLV\", \"508923\", \"HDL\", \"HDLC\", \"LDL\", \"LDLC\", \"VLDLC\", \"VLDL\"   BNP Invalid input(s): \"BNPP\"   Liver Enzymes No results for input(s): \"TP\" in the last 72 hours.    Invalid input(s): \"ALB\", \"TBIL\", \"AP\", \"SGOT\", \"GPT\", \"DBIL\"   Thyroid Studies Lab Results   Component Value Date/Time    TSH 0.55 01/03/2024 08:16 PM              CRITICAL CARE DOCUMENTATION  I had a face to face encounter with the patient, reviewed and interpreted patient data including clinical events, labs, images, vital signs, I/O's, and examined patient.  I have discussed the case and the plan and management of the patient's care with the consulting services, the bedside nurses and the respiratory therapist.      NOTE OF PERSONAL INVOLVEMENT IN CARE   This patient has a high probability of imminent, clinically significant deterioration, which requires the highest level of preparedness to intervene urgently. I participated in the decision-making and personally managed or directed the management of the following life and organ supporting interventions that required my frequent assessment to treat or prevent imminent deterioration.    I personally spent 35 minutes of critical care time.  This is time spent at this critically ill patient's bedside actively involved in patient care as well as the coordination of care.  This does not include any procedural time which has been billed separately.          Abdulaziz Rico M.D.  Pulmonary Critical Care & Sleep Medicine

## 2024-12-02 NOTE — CARE COORDINATION
CM reviewed Pt medicals, Pt lives with her boyfriend, still worked, and was IND with ADL.    Per doctors note will plan on Trach later this week.     Per IDR  Pt is on the vent.    Plan is to get her a trach later this week.     Problem, Pt does not have insurance.     Completely alert.

## 2024-12-02 NOTE — PLAN OF CARE
Problem: Discharge Planning  Goal: Discharge to home or other facility with appropriate resources  Recent Flowsheet Documentation  Taken 12/2/2024 0800 by Kasandra Rodriguez RN  Discharge to home or other facility with appropriate resources:   Identify barriers to discharge with patient and caregiver   Identify discharge learning needs (meds, wound care, etc)   Arrange for needed discharge resources and transportation as appropriate  12/2/2024 0357 by Bernice Vasques RN  Outcome: Progressing     Problem: ABCDS Injury Assessment  Goal: Absence of physical injury  Recent Flowsheet Documentation  Taken 12/2/2024 0800 by Kasandra Rodriguez RN  Absence of Physical Injury: Implement safety measures based on patient assessment  12/2/2024 0357 by Bernice Vasques RN  Outcome: Progressing     Problem: Safety - Adult  Goal: Free from fall injury  Recent Flowsheet Documentation  Taken 12/2/2024 0800 by Kasandra Rodriguez RN  Free From Fall Injury:   Instruct family/caregiver on patient safety   Based on caregiver fall risk screen, instruct family/caregiver to ask for assistance with transferring infant if caregiver noted to have fall risk factors  12/2/2024 0357 by Bernice Vasques RN  Outcome: Progressing     Problem: Confusion  Goal: Confusion, delirium, dementia, or psychosis is improved or at baseline  Description: INTERVENTIONS:  1. Assess for possible contributors to thought disturbance, including medications, impaired vision or hearing, underlying metabolic abnormalities, dehydration, psychiatric diagnoses, and notify attending LIP  2. [place] high risk fall precautions, as indicated  3. Provide frequent short contacts to provide reality reorientation, refocusing and direction  4. Decrease environmental stimuli, including noise as appropriate  5. Monitor and intervene to maintain adequate nutrition, hydration, elimination, sleep and activity  6. If unable to ensure safety without constant attention obtain sitter and

## 2024-12-02 NOTE — PROGRESS NOTES
Nephrology follow up          Patient: Kim Markham MRN: [id number]  SSN: [id number]    YOB: 1975  Age: 49 y.o.  Sex: female      Subjective:   The patient is seen in ICU  On ventilatory support/awake  Hypernatremia Na 143.  Off D5 IV fluid  on water flushes via NG  Resolving GUERA  Off single pressor support  Hypokalemia    No past surgical history on file.   No family history on file.  Social History     Tobacco Use    Smoking status: Every Day     Current packs/day: 1.00     Types: Cigarettes    Smokeless tobacco: Not on file   Substance Use Topics    Alcohol use: Not on file      Current Facility-Administered Medications   Medication Dose Route Frequency Provider Last Rate Last Admin    midodrine (PROAMATINE) tablet 10 mg  10 mg Oral TID WC Mee Elizabeth MD   10 mg at 12/02/24 0822    QUEtiapine (SEROQUEL) tablet 25 mg  25 mg Per NG tube BID Mee Elizabeth MD   25 mg at 12/02/24 0823    potassium bicarb-citric acid (EFFER-K) effervescent tablet 40 mEq  40 mEq Oral BID Mee Elizabeth MD   40 mEq at 12/02/24 0822    tamsulosin (FLOMAX) capsule 0.4 mg  0.4 mg Oral Daily Mee Elizabeth MD   0.4 mg at 12/02/24 0822    amiodarone (CORDARONE) tablet 200 mg  200 mg Oral Daily Mee Elizabeth MD   200 mg at 12/02/24 0823    propofol infusion  5-50 mcg/kg/min IntraVENous Continuous Mee Elizabeth MD 7.6 mL/hr at 12/02/24 0729 10 mcg/kg/min at 12/02/24 0729    aspirin chewable tablet 81 mg  81 mg Oral Daily Mee Elizabeth MD   81 mg at 12/02/24 0824    LORazepam (ATIVAN) injection 1 mg  1 mg IntraVENous Q4H PRN Mee Elizabeth MD   1 mg at 12/02/24 0131    scopolamine (TRANSDERM-SCOP) transdermal patch 1 patch  1 patch TransDERmal Q72H Mee Elizabeth MD   1 patch at 12/01/24 2104    0.9 % sodium chloride infusion   IntraVENous PRN Mee Elizabeth MD        potassium chloride 20 mEq/50 mL IVPB (Central Line)  20 mEq IntraVENous PRN Mee Elizabeth  MD        Or    potassium chloride 10 mEq/100 mL IVPB (Peripheral Line)  10 mEq IntraVENous PRN Mee Elizabeth  mL/hr at 11/29/24 0519 10 mEq at 11/29/24 0519    magnesium sulfate 2000 mg in 50 mL IVPB premix  2,000 mg IntraVENous PRN Mee Elizabeth MD        enoxaparin Sodium (LOVENOX) injection 30 mg  30 mg SubCUTAneous BID Mee Elizabeth MD   30 mg at 12/02/24 0822    ondansetron (ZOFRAN-ODT) disintegrating tablet 4 mg  4 mg Oral Q8H PRN Mee Elizabeth MD        Or    ondansetron (ZOFRAN) injection 4 mg  4 mg IntraVENous Q6H PRN Mee Elizabeth MD        polyethylene glycol (GLYCOLAX) packet 17 g  17 g Oral Daily PRN Mee Elizabeth MD        acetaminophen (TYLENOL) tablet 650 mg  650 mg Oral Q6H PRN Mee Elizabeth MD   650 mg at 11/30/24 2124    Or    acetaminophen (TYLENOL) suppository 650 mg  650 mg Rectal Q6H PRN eMe Elizabeth MD        ipratropium 0.5 mg-albuterol 2.5 mg (DUONEB) nebulizer solution 1 Dose  1 Dose Inhalation Q6H RT Mee Elizabeth MD   1 Dose at 12/02/24 0821    saliva substitute (BIOTENE/MOUTH KOTE) liquid   Oral PRN Mee Elizabeth MD        piperacillin-tazobactam (ZOSYN) 4,500 mg in sodium chloride 0.9 % 100 mL IVPB (mini-bag)  4,500 mg IntraVENous Q8H Wojciech Padron PA-C 25 mL/hr at 12/02/24 0821 4,500 mg at 12/02/24 0821    magic mouthwash 5 mL  5 mL Swish & Spit 4x daily Jason Ortiz R, DO   5 mL at 12/02/24 0833    budesonide (PULMICORT) nebulizer suspension 500 mcg  0.5 mg Nebulization BID RT Jason Ortiz R, DO   500 mcg at 12/02/24 0821    haloperidol lactate (HALDOL) injection 5 mg  5 mg IntraMUSCular Q6H PRN Bernice Saldana, APRN - CNP   5 mg at 12/02/24 0200    sodium phosphate 15 mmol in sodium chloride 0.9 % 250 mL IVPB  15 mmol IntraVENous PRN Jerad Burton MD        sertraline (ZOLOFT) tablet 50 mg  50 mg Oral Daily Jerad Burton MD   50 mg at 12/02/24 0824    busPIRone (BUSPAR) tablet 7.5 mg  7.5 mg Oral BID

## 2024-12-02 NOTE — PLAN OF CARE
Problem: Discharge Planning  Goal: Discharge to home or other facility with appropriate resources  Outcome: Progressing     Problem: ABCDS Injury Assessment  Goal: Absence of physical injury  Outcome: Progressing     Problem: Safety - Adult  Goal: Free from fall injury  Outcome: Progressing     Problem: Confusion  Goal: Confusion, delirium, dementia, or psychosis is improved or at baseline  Description: INTERVENTIONS:  1. Assess for possible contributors to thought disturbance, including medications, impaired vision or hearing, underlying metabolic abnormalities, dehydration, psychiatric diagnoses, and notify attending LIP  2. [place] high risk fall precautions, as indicated  3. Provide frequent short contacts to provide reality reorientation, refocusing and direction  4. Decrease environmental stimuli, including noise as appropriate  5. Monitor and intervene to maintain adequate nutrition, hydration, elimination, sleep and activity  6. If unable to ensure safety without constant attention obtain sitter and review sitter guidelines with assigned personnel  7. Initiate Psychosocial CNS and Spiritual Care consult, as indicated  Outcome: Progressing     Problem: Chronic Conditions and Co-morbidities  Goal: Patient's chronic conditions and co-morbidity symptoms are monitored and maintained or improved  Outcome: Progressing     Problem: Neurosensory - Adult  Goal: Achieves stable or improved neurological status  Outcome: Progressing  Goal: Absence of seizures  Outcome: Progressing  Goal: Remains free of injury related to seizures activity  Outcome: Progressing  Goal: Achieves maximal functionality and self care  Outcome: Progressing     Problem: Respiratory - Adult  Goal: Achieves optimal ventilation and oxygenation  Outcome: Progressing     Problem: Cardiovascular - Adult  Goal: Maintains optimal cardiac output and hemodynamic stability  Outcome: Progressing  Goal: Absence of cardiac dysrhythmias or at

## 2024-12-02 NOTE — PROGRESS NOTES
Notified Intensivist of low BP/MAP despite decreasing propofol gtt rate. Order received for 250ml NS bolus. Pt is resting comfortably and awakens briefly to name call

## 2024-12-03 ENCOUNTER — APPOINTMENT (OUTPATIENT)
Facility: HOSPITAL | Age: 49
End: 2024-12-03
Payer: MEDICAID

## 2024-12-03 LAB
ANION GAP SERPL CALC-SCNC: 6 MMOL/L (ref 2–12)
BUN SERPL-MCNC: 18 MG/DL (ref 6–20)
BUN/CREAT SERPL: 38 (ref 12–20)
CA-I BLD-MCNC: 8.9 MG/DL (ref 8.5–10.1)
CHLORIDE SERPL-SCNC: 109 MMOL/L (ref 97–108)
CO2 SERPL-SCNC: 27 MMOL/L (ref 21–32)
CREAT SERPL-MCNC: 0.48 MG/DL (ref 0.55–1.02)
EKG ATRIAL RATE: 57 BPM
EKG DIAGNOSIS: NORMAL
EKG P AXIS: 33 DEGREES
EKG P-R INTERVAL: 132 MS
EKG Q-T INTERVAL: 488 MS
EKG QRS DURATION: 110 MS
EKG QTC CALCULATION (BAZETT): 474 MS
EKG R AXIS: 2 DEGREES
EKG T AXIS: 61 DEGREES
EKG VENTRICULAR RATE: 57 BPM
ERYTHROCYTE [DISTWIDTH] IN BLOOD BY AUTOMATED COUNT: 14 % (ref 11.5–14.5)
GLUCOSE BLD STRIP.AUTO-MCNC: 76 MG/DL (ref 65–100)
GLUCOSE BLD STRIP.AUTO-MCNC: 83 MG/DL (ref 65–100)
GLUCOSE BLD STRIP.AUTO-MCNC: 85 MG/DL (ref 65–100)
GLUCOSE BLD STRIP.AUTO-MCNC: 88 MG/DL (ref 65–100)
GLUCOSE SERPL-MCNC: 97 MG/DL (ref 65–100)
HCT VFR BLD AUTO: 25.8 % (ref 35–47)
HGB BLD-MCNC: 8.2 G/DL (ref 11.5–16)
INR PPP: 1 (ref 0.9–1.1)
MCH RBC QN AUTO: 28.7 PG (ref 26–34)
MCHC RBC AUTO-ENTMCNC: 31.8 G/DL (ref 30–36.5)
MCV RBC AUTO: 90.2 FL (ref 80–99)
NRBC # BLD: 0.02 K/UL (ref 0–0.01)
NRBC BLD-RTO: 0.2 PER 100 WBC
PERFORMED BY:: NORMAL
PLATELET # BLD AUTO: 216 K/UL (ref 150–400)
PMV BLD AUTO: 13.3 FL (ref 8.9–12.9)
POTASSIUM SERPL-SCNC: 3.6 MMOL/L (ref 3.5–5.1)
PROTHROMBIN TIME: 13.2 SEC (ref 11.9–14.6)
RBC # BLD AUTO: 2.86 M/UL (ref 3.8–5.2)
SODIUM SERPL-SCNC: 142 MMOL/L (ref 136–145)
WBC # BLD AUTO: 10 K/UL (ref 3.6–11)

## 2024-12-03 PROCEDURE — 6360000002 HC RX W HCPCS: Performed by: STUDENT IN AN ORGANIZED HEALTH CARE EDUCATION/TRAINING PROGRAM

## 2024-12-03 PROCEDURE — 6370000000 HC RX 637 (ALT 250 FOR IP): Performed by: INTERNAL MEDICINE

## 2024-12-03 PROCEDURE — 85027 COMPLETE CBC AUTOMATED: CPT

## 2024-12-03 PROCEDURE — 71045 X-RAY EXAM CHEST 1 VIEW: CPT

## 2024-12-03 PROCEDURE — 2700000000 HC OXYGEN THERAPY PER DAY

## 2024-12-03 PROCEDURE — 82962 GLUCOSE BLOOD TEST: CPT

## 2024-12-03 PROCEDURE — 2580000003 HC RX 258: Performed by: INTERNAL MEDICINE

## 2024-12-03 PROCEDURE — 99233 SBSQ HOSP IP/OBS HIGH 50: CPT | Performed by: STUDENT IN AN ORGANIZED HEALTH CARE EDUCATION/TRAINING PROGRAM

## 2024-12-03 PROCEDURE — 80048 BASIC METABOLIC PNL TOTAL CA: CPT

## 2024-12-03 PROCEDURE — 6370000000 HC RX 637 (ALT 250 FOR IP)

## 2024-12-03 PROCEDURE — 6370000000 HC RX 637 (ALT 250 FOR IP): Performed by: STUDENT IN AN ORGANIZED HEALTH CARE EDUCATION/TRAINING PROGRAM

## 2024-12-03 PROCEDURE — 94640 AIRWAY INHALATION TREATMENT: CPT

## 2024-12-03 PROCEDURE — 2580000003 HC RX 258: Performed by: NURSE PRACTITIONER

## 2024-12-03 PROCEDURE — 2580000003 HC RX 258: Performed by: STUDENT IN AN ORGANIZED HEALTH CARE EDUCATION/TRAINING PROGRAM

## 2024-12-03 PROCEDURE — 2000000000 HC ICU R&B

## 2024-12-03 PROCEDURE — 36415 COLL VENOUS BLD VENIPUNCTURE: CPT

## 2024-12-03 PROCEDURE — 85610 PROTHROMBIN TIME: CPT

## 2024-12-03 PROCEDURE — 94761 N-INVAS EAR/PLS OXIMETRY MLT: CPT

## 2024-12-03 RX ORDER — SODIUM CHLORIDE, SODIUM LACTATE, POTASSIUM CHLORIDE, AND CALCIUM CHLORIDE .6; .31; .03; .02 G/100ML; G/100ML; G/100ML; G/100ML
500 INJECTION, SOLUTION INTRAVENOUS ONCE
Status: DISCONTINUED | OUTPATIENT
Start: 2024-12-03 | End: 2024-12-03

## 2024-12-03 RX ORDER — 0.9 % SODIUM CHLORIDE 0.9 %
500 INTRAVENOUS SOLUTION INTRAVENOUS ONCE
Status: COMPLETED | OUTPATIENT
Start: 2024-12-03 | End: 2024-12-03

## 2024-12-03 RX ORDER — QUETIAPINE FUMARATE 25 MG/1
25 TABLET, FILM COATED ORAL 3 TIMES DAILY
Status: DISCONTINUED | OUTPATIENT
Start: 2024-12-03 | End: 2024-12-04

## 2024-12-03 RX ADMIN — Medication 5 ML: at 16:22

## 2024-12-03 RX ADMIN — LORAZEPAM 1 MG: 2 INJECTION INTRAMUSCULAR; INTRAVENOUS at 20:22

## 2024-12-03 RX ADMIN — ENOXAPARIN SODIUM 30 MG: 100 INJECTION SUBCUTANEOUS at 20:16

## 2024-12-03 RX ADMIN — POTASSIUM BICARBONATE 40 MEQ: 782 TABLET, EFFERVESCENT ORAL at 20:16

## 2024-12-03 RX ADMIN — MIDODRINE HYDROCHLORIDE 10 MG: 5 TABLET ORAL at 16:22

## 2024-12-03 RX ADMIN — ATORVASTATIN CALCIUM 40 MG: 40 TABLET, FILM COATED ORAL at 20:16

## 2024-12-03 RX ADMIN — PROPOFOL 20 MCG/KG/MIN: 10 INJECTION, EMULSION INTRAVENOUS at 10:23

## 2024-12-03 RX ADMIN — IPRATROPIUM BROMIDE AND ALBUTEROL SULFATE 1 DOSE: 2.5; .5 SOLUTION RESPIRATORY (INHALATION) at 01:45

## 2024-12-03 RX ADMIN — BUSPIRONE HYDROCHLORIDE 7.5 MG: 5 TABLET ORAL at 20:16

## 2024-12-03 RX ADMIN — BUSPIRONE HYDROCHLORIDE 7.5 MG: 5 TABLET ORAL at 11:21

## 2024-12-03 RX ADMIN — SERTRALINE HYDROCHLORIDE 50 MG: 50 TABLET ORAL at 11:24

## 2024-12-03 RX ADMIN — AMIODARONE HYDROCHLORIDE 200 MG: 200 TABLET ORAL at 11:24

## 2024-12-03 RX ADMIN — MIDODRINE HYDROCHLORIDE 10 MG: 5 TABLET ORAL at 11:25

## 2024-12-03 RX ADMIN — PIPERACILLIN SODIUM AND TAZOBACTAM SODIUM 4500 MG: 4; .5 INJECTION, POWDER, LYOPHILIZED, FOR SOLUTION INTRAVENOUS at 00:02

## 2024-12-03 RX ADMIN — LORAZEPAM 1 MG: 2 INJECTION INTRAMUSCULAR; INTRAVENOUS at 01:42

## 2024-12-03 RX ADMIN — BUDESONIDE INHALATION 500 MCG: 0.5 SUSPENSION RESPIRATORY (INHALATION) at 08:05

## 2024-12-03 RX ADMIN — SENNOSIDES 8.6 MG: 8.6 TABLET, FILM COATED ORAL at 20:16

## 2024-12-03 RX ADMIN — SODIUM CHLORIDE, PRESERVATIVE FREE 10 ML: 5 INJECTION INTRAVENOUS at 11:29

## 2024-12-03 RX ADMIN — IPRATROPIUM BROMIDE AND ALBUTEROL SULFATE 1 DOSE: 2.5; .5 SOLUTION RESPIRATORY (INHALATION) at 08:05

## 2024-12-03 RX ADMIN — Medication 5 ML: at 20:50

## 2024-12-03 RX ADMIN — SODIUM CHLORIDE 500 ML: 9 INJECTION, SOLUTION INTRAVENOUS at 19:29

## 2024-12-03 RX ADMIN — PROPOFOL 10 MCG/KG/MIN: 10 INJECTION, EMULSION INTRAVENOUS at 02:35

## 2024-12-03 RX ADMIN — LORAZEPAM 1 MG: 2 INJECTION INTRAMUSCULAR; INTRAVENOUS at 06:40

## 2024-12-03 RX ADMIN — IPRATROPIUM BROMIDE AND ALBUTEROL SULFATE 1 DOSE: 2.5; .5 SOLUTION RESPIRATORY (INHALATION) at 14:22

## 2024-12-03 RX ADMIN — QUETIAPINE FUMARATE 25 MG: 25 TABLET ORAL at 20:16

## 2024-12-03 RX ADMIN — FAMOTIDINE 20 MG: 20 TABLET, FILM COATED ORAL at 11:25

## 2024-12-03 RX ADMIN — POTASSIUM BICARBONATE 40 MEQ: 782 TABLET, EFFERVESCENT ORAL at 11:30

## 2024-12-03 RX ADMIN — QUETIAPINE FUMARATE 25 MG: 25 TABLET ORAL at 15:12

## 2024-12-03 RX ADMIN — SODIUM CHLORIDE, PRESERVATIVE FREE 10 ML: 5 INJECTION INTRAVENOUS at 20:16

## 2024-12-03 RX ADMIN — IPRATROPIUM BROMIDE AND ALBUTEROL SULFATE 1 DOSE: 2.5; .5 SOLUTION RESPIRATORY (INHALATION) at 19:17

## 2024-12-03 RX ADMIN — ASPIRIN 81 MG 81 MG: 81 TABLET ORAL at 11:25

## 2024-12-03 RX ADMIN — TAMSULOSIN HYDROCHLORIDE 0.4 MG: 0.4 CAPSULE ORAL at 11:25

## 2024-12-03 RX ADMIN — PROPOFOL 25 MCG/KG/MIN: 10 INJECTION, EMULSION INTRAVENOUS at 15:10

## 2024-12-03 RX ADMIN — BUDESONIDE INHALATION 500 MCG: 0.5 SUSPENSION RESPIRATORY (INHALATION) at 19:17

## 2024-12-03 RX ADMIN — Medication 5 ML: at 11:26

## 2024-12-03 RX ADMIN — PROPOFOL 15 MCG/KG/MIN: 10 INJECTION, EMULSION INTRAVENOUS at 22:33

## 2024-12-03 ASSESSMENT — PULMONARY FUNCTION TESTS
PIF_VALUE: 27
PIF_VALUE: 24
PIF_VALUE: 23
PIF_VALUE: 24
PIF_VALUE: 24
PIF_VALUE: 25
PIF_VALUE: 24
PIF_VALUE: 27
PIF_VALUE: 27
PIF_VALUE: 24
PIF_VALUE: 23
PIF_VALUE: 21
PIF_VALUE: 23
PIF_VALUE: 34
PIF_VALUE: 24
PIF_VALUE: 27
PIF_VALUE: 23
PIF_VALUE: 24
PIF_VALUE: 24
PIF_VALUE: 20
PIF_VALUE: 26
PIF_VALUE: 24
PIF_VALUE: 21
PIF_VALUE: 27
PIF_VALUE: 24
PIF_VALUE: 24
PIF_VALUE: 22
PIF_VALUE: 24
PIF_VALUE: 22
PIF_VALUE: 24

## 2024-12-03 ASSESSMENT — PAIN SCALES - GENERAL
PAINLEVEL_OUTOF10: 0

## 2024-12-03 NOTE — PROGRESS NOTES
Otolaryngology-HNS Consult    Subjective:     Date of Consultation:  December 3, 2024    Referring Physician: Glenn    History of Present Illness:   Patient is a 49 y.o.  female who is being seen for tracheostomy consult.  Admitted to the hospital 11/12/2024, with mental status change and hypoxia.  Has been intubated then extubated then back to the floor.  Now back to the ICU and intubated again for 3 days.  Biggest issue has been decreased mental status and poor tolerance of secretions leading to need for reintubation.  Otolaryngology service has been consulted for tracheostomy placement.    Interval Hx:   12/3/24:   Was planned for tracheostomy today, but canceled due to difficulty with obtaining consent from next of kin.    Vent settings: FiO2 40%, PEEP 5    Labs, imaging, and notes reviewed.    Patient Active Problem List    Diagnosis Date Noted    Acute right-sided congestive heart failure ([facility]) 11/26/2024    Essential hypertension 11/26/2024    GUERA (acute kidney injury) ([facility]) 11/26/2024    SVT (supraventricular tachycardia) ([facility]) 11/12/2024    Acute respiratory failure 01/03/2024    COPD exacerbation ([facility]) 01/02/2024    Acute asthma exacerbation 08/11/2023    COPD (chronic obstructive pulmonary disease) ([facility]) 08/11/2023    COPD with acute exacerbation ([facility]) 09/16/2022     Past Medical History:   Diagnosis Date    Asthma     COPD (chronic obstructive pulmonary disease) ([facility])       No family history on file.   Social History     Tobacco Use    Smoking status: Every Day     Current packs/day: 1.00     Types: Cigarettes    Smokeless tobacco: Not on file   Substance Use Topics    Alcohol use: Not on file     No past surgical history on file.   Current Facility-Administered Medications   Medication Dose Route Frequency    dexmedeTOMIDine (PRECEDEX) 400 mcg in sodium chloride 0.9 % 100 mL infusion  0.1-1.5 mcg/kg/hr IntraVENous Continuous    norepinephrine (LEVOPHED) 16 mg in sodium chloride 0.9 % 250 mL infusion

## 2024-12-03 NOTE — NURSE NAVIGATOR
RN-NN notes pt is admitted for a primary diagnosis of COPD exacerbation with a secondary diagnosis of HF exacerbation due to Asthma inhaler over use. Pt is still intubated at this time. RN-NN unable to educate at this time.

## 2024-12-03 NOTE — CONSULTS
Clinical Ethics Consultation Note    History and Context:  Ms. Kim Markham is a 49-year-old female who arrived at the ED with complaints of shortness of breath and rapid heart rate. Pt was subsequently admitted with diagnoses of acute hypoxemic respiratory failure and ventricular tachycardia with additional medical diagnoses arising throughout her admission.    The pt is currently intubated in the ICU. The pt has a long-time boyfriend involved in her care.    Length of Stay: 21  Valid medical advanced directives?: No    Process:  Ethics was consulted by nursing to identify a decision maker. Ethics initially discussed the case with the ICU director then did a chart review when the formal consult was placed.    Ethical Question(s) and Concern(s):  Given that Ms. Markham is intubated and currently unable to make decisions for herself, who is the appropriate surrogate decision maker for her?    Analysis:  Ms. Markham has an ACP document that appoints her boyfriend, Theo Natarajan has her decision maker. However, Mr. Natarajan witnessed the document which makes the ACP legally questionable. Nevertheless, the presumed intent of the document was to appoint Mr. Natarajan as the pt's surrogate decision maker. Even though this document is legally questionable, the pt's autonomously expressed wishes are important in our ethical analysis.     A [employer] search identified the pt's mother, and the care team has been able to get a hold of the pt's mother per the ICU director. Given our legal obligations to follow the decision-maker hierarchy, and our ethical commitment to respect pt autonomy, ethics makes the following recommendations:    Recommendations:  Ask the pt's mother and boyfriend to make joint medical decisions for the pt.   When the pt is able to express her wishes again, consult chaplains or palliative to redo her ACP documents.      Closing:  Ethics will sign off. Thank you for including Ethics in the care of Ms.

## 2024-12-03 NOTE — PROGRESS NOTES
Unable to obtain consent for trach d/t no NOK. Discussed with CM, MDs,  escalated to unit manager.

## 2024-12-03 NOTE — PROGRESS NOTES
S/w Erick boyfriend of 24 years and provided update. Per Erick, pt has mother (Kalee Markham). Erick is attempting to find mother's phone number. He said he would call back if he is able to find her number.

## 2024-12-03 NOTE — PLAN OF CARE
Problem: Safety - Adult  Goal: Free from fall injury  12/2/2024 2041 by Madyson Valentin RN  Outcome: Progressing  12/2/2024 1012 by Kasandra Rodriguez RN  Outcome: Progressing  Flowsheets (Taken 12/2/2024 0800)  Free From Fall Injury:   Instruct family/caregiver on patient safety   Based on caregiver fall risk screen, instruct family/caregiver to ask for assistance with transferring infant if caregiver noted to have fall risk factors     Problem: Metabolic/Fluid and Electrolytes - Adult  Goal: Electrolytes maintained within normal limits  12/2/2024 2041 by Madyson Valentin RN  Outcome: Progressing  Flowsheets (Taken 12/2/2024 2000)  Electrolytes maintained within normal limits:   Monitor labs and assess patient for signs and symptoms of electrolyte imbalances   Administer electrolyte replacement as ordered   Monitor response to electrolyte replacements, including repeat lab results as appropriate  12/2/2024 1012 by Kasandra Rodriguez RN  Outcome: Progressing  Flowsheets (Taken 12/2/2024 0800)  Electrolytes maintained within normal limits:   Monitor labs and assess patient for signs and symptoms of electrolyte imbalances   Administer electrolyte replacement as ordered   Monitor response to electrolyte replacements, including repeat lab results as appropriate   Fluid restriction as ordered  Goal: Hemodynamic stability and optimal renal function maintained  12/2/2024 2041 by Madyson Valentin RN  Outcome: Progressing  Flowsheets (Taken 12/2/2024 2000)  Hemodynamic stability and optimal renal function maintained:   Monitor labs and assess for signs and symptoms of volume excess or deficit   Monitor intake, output and patient weight  12/2/2024 1012 by Kasandra Rodriguez RN  Outcome: Progressing  Flowsheets (Taken 12/2/2024 0800)  Hemodynamic stability and optimal renal function maintained:   Monitor labs and assess for signs and symptoms of volume excess or deficit   Monitor urine specific gravity, serum osmolarity and serum  (Taken 12/2/2024 2000)  Absence of infection during hospitalization:   Assess and monitor for signs and symptoms of infection   Monitor lab/diagnostic results   Monitor all insertion sites i.e., indwelling lines, tubes and drains  12/2/2024 1012 by Kasandra Rodriguez RN  Outcome: Progressing  Flowsheets (Taken 12/2/2024 0800)  Absence of infection during hospitalization:   Assess and monitor for signs and symptoms of infection   Monitor all insertion sites i.e., indwelling lines, tubes and drains   Monitor endotracheal (as able) and nasal secretions for changes in amount and color   Administer medications as ordered   Instruct and encourage patient and family to use good hand hygiene technique   Identify and instruct in appropriate isolation precautions for identified infection/condition  Goal: Absence of fever/infection during anticipated neutropenic period  12/2/2024 2041 by Madyson Valentin RN  Outcome: Progressing  Flowsheets (Taken 12/2/2024 2000)  Absence of fever/infection during anticipated neutropenic period: Monitor white blood cell count  12/2/2024 1012 by Kasandra Rodriguez RN  Outcome: Progressing  Flowsheets (Taken 12/2/2024 0800)  Absence of fever/infection during anticipated neutropenic period:   Monitor white blood cell count   Administer growth factors as ordered       Problem: Skin/Tissue Integrity  Goal: Absence of new skin breakdown  Description: 1.  Monitor for areas of redness and/or skin breakdown  2.  Assess vascular access sites hourly  3.  Every 4-6 hours minimum:  Change oxygen saturation probe site  4.  Every 4-6 hours:  If on nasal continuous positive airway pressure, respiratory therapy assess nares and determine need for appliance change or resting period.  12/2/2024 2041 by Madyson Valentin RN  Outcome: Progressing  12/2/2024 1012 by Kasandra Rodriguez RN  Outcome: Progressing

## 2024-12-03 NOTE — CARE COORDINATION
CM reviewed Pt medicals, Pt remains on the vent.  Pt may get a trach today.     CM sent a referral to [facility] and asked them to please screen Pt for Medicaid.  Pt will need a LTAC Hospital when she is medically stable and ready for D/C.    KARTHIKEYAN asked for a [employer] search, apparently Pt boyfriend of 20t years is the only family member Pt has.     Pt had expressed to CM when she first came into the hospital that she wanted her boyfriend to make decisions for her.     Waiting for [employer] search.  Pt needs a trach.     Per IDR  Pt remains on the vent.  Pt awake, failed SBT  Pt pulled out NG over night.    If they cannot find LNOK will need to do Guardianship.     1:50  [employer] search was done, found the Pt mother.    Kalee Markham [phone]    Pt mother has asked that Pt be trached    Medicaid application submitted today under tracking# [id number].

## 2024-12-03 NOTE — PROGRESS NOTES
CRITICAL CARE ADMISSION NOTE    Name: Kim Markham   : 1975   MRN: [id number]   Date: 12/3/2024        Subjective:  No major overnight event  Finish Leo  Plan to go for tracheostomy however consent was not obtained today and procedure was canceled  Later on we found patient has some mother, spoke over the phone who is agreeable for the procedure  Still on propofol  Currently on AC/VC FiO2 40% PEEP of 5    Diagnoses/problem list:   Altered mental status  COPD exacerbation  GUERA  Hypernatremia  Leukocytosis    HPI   Kim Markham is a 49 y.o. female with known past medical history significant for asthma and COPD who treated her symptoms today with Primatene Mist over-the-counter says that she went into fast heart rate became short of breath immediately.  No other exacerbating or relieving factors which presents to the emergency room with no treatment prior to arrival.  Patient reports that she had a few day history of shortness of breath and thought she had a pneumonia. She has been taking OTC cough syrup and cough medicine as well as her inhaler. She reports difficulty affording medications and has limited access to healthcare.      : Continues to have high vent requirements and high sedation requirements. SVT noted and amiodarone initiated.   11/15: Remains intubated on 500/16/10/55%.  Still has bilateral wheezing on auscultation.  Slight rise in creatinine noted but urine output is good.  No arrhythmias this morning.  Remains on amiodarone, propofol, fentanyl and heparin infusions.  : continues to require high sedation and high vent support. Will attempt SBT  : tolerated SBT for >2 hours. Will repeat today  : Sedated with propofol 40ug/kg/min, RAAS -1 to -2. AC 12 Vt 0.5 FiO2 0.4 PEEP 6. Doing well on SBTs but there is concern that she has too much anxiety to withdraw sedation and extubate  : Sedated with propofol 50ug/kg/min, RAAS -1 to -2. VD 7; AC 12 Vt 0.5 FiO2 0.4 PEEP  There are linear and bandlike opacities Benefiel  bilaterally in the lower lobes consistent with areas of atelectasis. There is  some bronchial wall thickening mucus and debris within basilar segmental bronchi  bilaterally especially in the posterior segments consistent with mucous plugging  and/or bronchitis.    4 mm subpleural nodule posteriorly at the right lung apex is stable dating back  to 9/16/2022 and consistent with benign nodule.  INCIDENTALLY IMAGED UPPER ABDOMEN: Small right adrenal nodule is stable when  compared 9/16/2022. Most likely this is an adenoma but not well characterized on  this exam. High density within the gallbladder is probably due to sludge or  vicarious excretion of contrast material into the gallbladder. NG tube tip is in  the stomach..  BONES: No destructive bone lesion. Old right rib fractures are noted.    Impression  1. There is no pneumonia. There are bibasilar areas of linear and bandlike  opacities consistent with atelectasis. There is some bronchial wall thickening  mucus and debris within lower lobe bronchi consistent with mucous plugging  and/or bronchitis.    Electronically signed by NATANAEL MARTINEZ        EKG Results for orders placed or performed during the hospital encounter of 11/12/24   EKG 12 Lead    Collection Time: 12/02/24  5:55 AM   Result Value Ref Range    Ventricular Rate 69 BPM    Atrial Rate 69 BPM    P-R Interval 142 ms    QRS Duration 90 ms    Q-T Interval 422 ms    QTc Calculation (Bazett) 452 ms    P Axis 67 degrees    R Axis 42 degrees    T Axis 45 degrees    Diagnosis       Normal sinus rhythm  Low voltage QRS  Cannot rule out Inferior infarct , age undetermined  Abnormal ECG  No previous ECGs available  Confirmed by KEVIN HATHAWAY MD ([id number]) on 12/2/2024 9:47:34 AM          ECHO No results found for this or any previous visit.     PFT No results found for: \"FVC\", \"FEV1\"       Labs: Results:       Chemistry No results displayed because visit has over 200

## 2024-12-03 NOTE — PROGRESS NOTES
Pt has become hypotensive, Propofol weaned without improvement in BP. S/w Dr. Rico. MD ordered fluid bolus to be given and if no improvement start levophed. Will continue to monitor.

## 2024-12-04 ENCOUNTER — APPOINTMENT (OUTPATIENT)
Facility: HOSPITAL | Age: 49
End: 2024-12-04
Payer: MEDICAID

## 2024-12-04 ENCOUNTER — ANESTHESIA (OUTPATIENT)
Facility: HOSPITAL | Age: 49
End: 2024-12-04
Payer: MEDICAID

## 2024-12-04 ENCOUNTER — ANESTHESIA EVENT (OUTPATIENT)
Facility: HOSPITAL | Age: 49
End: 2024-12-04
Payer: MEDICAID

## 2024-12-04 LAB
ALBUMIN SERPL-MCNC: 2.2 G/DL (ref 3.5–5)
ALBUMIN/GLOB SERPL: 0.6 (ref 1.1–2.2)
ALP SERPL-CCNC: 59 U/L (ref 45–117)
ALT SERPL-CCNC: 103 U/L (ref 12–78)
ANION GAP SERPL CALC-SCNC: 4 MMOL/L (ref 2–12)
ARTERIAL PATENCY WRIST A: YES
AST SERPL W P-5'-P-CCNC: 28 U/L (ref 15–37)
BASE EXCESS BLDA CALC-SCNC: 3.8 MMOL/L (ref 0–3)
BASOPHILS # BLD: 0 K/UL (ref 0–0.1)
BASOPHILS NFR BLD: 0 % (ref 0–1)
BDY SITE: ABNORMAL
BILIRUB SERPL-MCNC: 0.3 MG/DL (ref 0.2–1)
BODY TEMPERATURE: 98.4
BUN SERPL-MCNC: 14 MG/DL (ref 6–20)
BUN/CREAT SERPL: 32 (ref 12–20)
CA-I BLD-MCNC: 9.1 MG/DL (ref 8.5–10.1)
CHLORIDE SERPL-SCNC: 113 MMOL/L (ref 97–108)
CK SERPL-CCNC: 72 U/L (ref 26–192)
CO2 SERPL-SCNC: 28 MMOL/L (ref 21–32)
COHGB MFR BLD: 0.3 % (ref 1–2)
CREAT SERPL-MCNC: 0.44 MG/DL (ref 0.55–1.02)
DIFFERENTIAL METHOD BLD: ABNORMAL
EOSINOPHIL # BLD: 0.4 K/UL (ref 0–0.4)
EOSINOPHIL NFR BLD: 5 % (ref 0–7)
ERYTHROCYTE [DISTWIDTH] IN BLOOD BY AUTOMATED COUNT: 14.2 % (ref 11.5–14.5)
FIO2 ON VENT: 40 %
GAS FLOW.O2 SETTING OXYMISER: 16
GLOBULIN SER CALC-MCNC: 3.6 G/DL (ref 2–4)
GLUCOSE BLD STRIP.AUTO-MCNC: 105 MG/DL (ref 65–100)
GLUCOSE BLD STRIP.AUTO-MCNC: 84 MG/DL (ref 65–100)
GLUCOSE BLD STRIP.AUTO-MCNC: 89 MG/DL (ref 65–100)
GLUCOSE BLD STRIP.AUTO-MCNC: 96 MG/DL (ref 65–100)
GLUCOSE SERPL-MCNC: 87 MG/DL (ref 65–100)
HCO3 BLDA-SCNC: 28 MMOL/L (ref 22–26)
HCT VFR BLD AUTO: 25.3 % (ref 35–47)
HGB BLD-MCNC: 7.9 G/DL (ref 11.5–16)
IMM GRANULOCYTES # BLD AUTO: 0 K/UL
IMM GRANULOCYTES NFR BLD AUTO: 0 %
LYMPHOCYTES # BLD: 1.1 K/UL (ref 0.8–3.5)
LYMPHOCYTES NFR BLD: 16 % (ref 12–49)
MAGNESIUM SERPL-MCNC: 2 MG/DL (ref 1.6–2.4)
MCH RBC QN AUTO: 28.4 PG (ref 26–34)
MCHC RBC AUTO-ENTMCNC: 31.2 G/DL (ref 30–36.5)
MCV RBC AUTO: 91 FL (ref 80–99)
METHGB MFR BLD: 0.3 % (ref 0–1.4)
MONOCYTES # BLD: 0.4 K/UL (ref 0–1)
MONOCYTES NFR BLD: 6 % (ref 5–13)
NEUTS BAND NFR BLD MANUAL: 2 % (ref 0–6)
NEUTS SEG # BLD: 5.1 K/UL (ref 1.8–8)
NEUTS SEG NFR BLD: 71 % (ref 32–75)
NRBC # BLD: 0 K/UL (ref 0–0.01)
NRBC BLD-RTO: 0 PER 100 WBC
OXYHGB MFR BLD: 92.3 % (ref 95–99)
PCO2 BLDA: 39 MMHG (ref 35–45)
PEEP RESPIRATORY: 6
PERFORMED BY:: ABNORMAL
PERFORMED BY:: ABNORMAL
PERFORMED BY:: NORMAL
PH BLDA: 7.47 (ref 7.35–7.45)
PLATELET # BLD AUTO: 233 K/UL (ref 150–400)
PMV BLD AUTO: 13 FL (ref 8.9–12.9)
PO2 BLDA: 65 MMHG (ref 80–100)
POTASSIUM SERPL-SCNC: 3.8 MMOL/L (ref 3.5–5.1)
PROT SERPL-MCNC: 5.8 G/DL (ref 6.4–8.2)
RBC # BLD AUTO: 2.78 M/UL (ref 3.8–5.2)
RBC MORPH BLD: ABNORMAL
SAO2 % BLD: 93 % (ref 95–99)
SAO2% DEVICE SAO2% SENSOR NAME: ABNORMAL
SODIUM SERPL-SCNC: 145 MMOL/L (ref 136–145)
SPECIMEN SITE: ABNORMAL
VENTILATION MODE VENT: ABNORMAL
VT SETTING VENT: 400
WBC # BLD AUTO: 7 K/UL (ref 3.6–11)

## 2024-12-04 PROCEDURE — 2580000003 HC RX 258: Performed by: NURSE PRACTITIONER

## 2024-12-04 PROCEDURE — 94640 AIRWAY INHALATION TREATMENT: CPT

## 2024-12-04 PROCEDURE — 0B110F4 BYPASS TRACHEA TO CUTANEOUS WITH TRACHEOSTOMY DEVICE, OPEN APPROACH: ICD-10-PCS | Performed by: OTOLARYNGOLOGY

## 2024-12-04 PROCEDURE — 3600000002 HC SURGERY LEVEL 2 BASE: Performed by: OTOLARYNGOLOGY

## 2024-12-04 PROCEDURE — 2580000003 HC RX 258

## 2024-12-04 PROCEDURE — 6370000000 HC RX 637 (ALT 250 FOR IP): Performed by: STUDENT IN AN ORGANIZED HEALTH CARE EDUCATION/TRAINING PROGRAM

## 2024-12-04 PROCEDURE — 6370000000 HC RX 637 (ALT 250 FOR IP): Performed by: INTERNAL MEDICINE

## 2024-12-04 PROCEDURE — 2000000000 HC ICU R&B

## 2024-12-04 PROCEDURE — 6360000002 HC RX W HCPCS: Performed by: OTOLARYNGOLOGY

## 2024-12-04 PROCEDURE — 83735 ASSAY OF MAGNESIUM: CPT

## 2024-12-04 PROCEDURE — 6360000002 HC RX W HCPCS: Performed by: STUDENT IN AN ORGANIZED HEALTH CARE EDUCATION/TRAINING PROGRAM

## 2024-12-04 PROCEDURE — 2700000000 HC OXYGEN THERAPY PER DAY

## 2024-12-04 PROCEDURE — 6360000002 HC RX W HCPCS: Performed by: INTERNAL MEDICINE

## 2024-12-04 PROCEDURE — 3600000012 HC SURGERY LEVEL 2 ADDTL 15MIN: Performed by: OTOLARYNGOLOGY

## 2024-12-04 PROCEDURE — 2500000003 HC RX 250 WO HCPCS: Performed by: NURSE PRACTITIONER

## 2024-12-04 PROCEDURE — 2500000003 HC RX 250 WO HCPCS

## 2024-12-04 PROCEDURE — 82803 BLOOD GASES ANY COMBINATION: CPT

## 2024-12-04 PROCEDURE — 6360000002 HC RX W HCPCS

## 2024-12-04 PROCEDURE — 2720000010 HC SURG SUPPLY STERILE: Performed by: OTOLARYNGOLOGY

## 2024-12-04 PROCEDURE — 36415 COLL VENOUS BLD VENIPUNCTURE: CPT

## 2024-12-04 PROCEDURE — 71045 X-RAY EXAM CHEST 1 VIEW: CPT

## 2024-12-04 PROCEDURE — 2709999900 HC NON-CHARGEABLE SUPPLY: Performed by: OTOLARYNGOLOGY

## 2024-12-04 PROCEDURE — 6370000000 HC RX 637 (ALT 250 FOR IP)

## 2024-12-04 PROCEDURE — P9045 ALBUMIN (HUMAN), 5%, 250 ML: HCPCS

## 2024-12-04 PROCEDURE — 99232 SBSQ HOSP IP/OBS MODERATE 35: CPT | Performed by: OTOLARYNGOLOGY

## 2024-12-04 PROCEDURE — 36600 WITHDRAWAL OF ARTERIAL BLOOD: CPT

## 2024-12-04 PROCEDURE — 94761 N-INVAS EAR/PLS OXIMETRY MLT: CPT

## 2024-12-04 PROCEDURE — 80053 COMPREHEN METABOLIC PANEL: CPT

## 2024-12-04 PROCEDURE — 94003 VENT MGMT INPAT SUBQ DAY: CPT

## 2024-12-04 PROCEDURE — 82962 GLUCOSE BLOOD TEST: CPT

## 2024-12-04 PROCEDURE — 3700000001 HC ADD 15 MINUTES (ANESTHESIA): Performed by: OTOLARYNGOLOGY

## 2024-12-04 PROCEDURE — 82550 ASSAY OF CK (CPK): CPT

## 2024-12-04 PROCEDURE — 3700000000 HC ANESTHESIA ATTENDED CARE: Performed by: OTOLARYNGOLOGY

## 2024-12-04 PROCEDURE — 85025 COMPLETE CBC W/AUTO DIFF WBC: CPT

## 2024-12-04 PROCEDURE — 31600 PLANNED TRACHEOSTOMY: CPT | Performed by: OTOLARYNGOLOGY

## 2024-12-04 RX ORDER — FUROSEMIDE 10 MG/ML
20 INJECTION INTRAMUSCULAR; INTRAVENOUS ONCE
Status: COMPLETED | OUTPATIENT
Start: 2024-12-04 | End: 2024-12-04

## 2024-12-04 RX ORDER — ROCURONIUM BROMIDE 10 MG/ML
INJECTION, SOLUTION INTRAVENOUS
Status: DISCONTINUED | OUTPATIENT
Start: 2024-12-04 | End: 2024-12-04 | Stop reason: SDUPTHER

## 2024-12-04 RX ORDER — MIDAZOLAM HYDROCHLORIDE 1 MG/ML
INJECTION, SOLUTION INTRAMUSCULAR; INTRAVENOUS
Status: DISCONTINUED | OUTPATIENT
Start: 2024-12-04 | End: 2024-12-04 | Stop reason: SDUPTHER

## 2024-12-04 RX ORDER — SODIUM CHLORIDE, SODIUM LACTATE, POTASSIUM CHLORIDE, CALCIUM CHLORIDE 600; 310; 30; 20 MG/100ML; MG/100ML; MG/100ML; MG/100ML
INJECTION, SOLUTION INTRAVENOUS
Status: DISCONTINUED | OUTPATIENT
Start: 2024-12-04 | End: 2024-12-04 | Stop reason: SDUPTHER

## 2024-12-04 RX ORDER — ALBUMIN HUMAN 50 G/1000ML
SOLUTION INTRAVENOUS
Status: DISCONTINUED | OUTPATIENT
Start: 2024-12-04 | End: 2024-12-04 | Stop reason: SDUPTHER

## 2024-12-04 RX ORDER — QUETIAPINE FUMARATE 25 MG/1
50 TABLET, FILM COATED ORAL 3 TIMES DAILY
Status: DISCONTINUED | OUTPATIENT
Start: 2024-12-04 | End: 2024-12-12

## 2024-12-04 RX ORDER — 0.9 % SODIUM CHLORIDE 0.9 %
500 INTRAVENOUS SOLUTION INTRAVENOUS ONCE
Status: COMPLETED | OUTPATIENT
Start: 2024-12-04 | End: 2024-12-04

## 2024-12-04 RX ORDER — GLYCOPYRROLATE 0.2 MG/ML
INJECTION INTRAMUSCULAR; INTRAVENOUS
Status: DISCONTINUED | OUTPATIENT
Start: 2024-12-04 | End: 2024-12-04 | Stop reason: SDUPTHER

## 2024-12-04 RX ORDER — MIDODRINE HYDROCHLORIDE 5 MG/1
20 TABLET ORAL
Status: DISCONTINUED | OUTPATIENT
Start: 2024-12-04 | End: 2024-12-06

## 2024-12-04 RX ORDER — ONDANSETRON 2 MG/ML
INJECTION INTRAMUSCULAR; INTRAVENOUS
Status: DISCONTINUED | OUTPATIENT
Start: 2024-12-04 | End: 2024-12-04 | Stop reason: SDUPTHER

## 2024-12-04 RX ORDER — NOREPINEPHRINE BITARTRATE 1 MG/ML
INJECTION, SOLUTION INTRAVENOUS
Status: DISCONTINUED | OUTPATIENT
Start: 2024-12-04 | End: 2024-12-04 | Stop reason: SDUPTHER

## 2024-12-04 RX ORDER — DEXAMETHASONE SODIUM PHOSPHATE 4 MG/ML
INJECTION, SOLUTION INTRA-ARTICULAR; INTRALESIONAL; INTRAMUSCULAR; INTRAVENOUS; SOFT TISSUE
Status: DISCONTINUED | OUTPATIENT
Start: 2024-12-04 | End: 2024-12-04 | Stop reason: SDUPTHER

## 2024-12-04 RX ORDER — CEFAZOLIN SODIUM 1 G/3ML
INJECTION, POWDER, FOR SOLUTION INTRAMUSCULAR; INTRAVENOUS
Status: DISCONTINUED | OUTPATIENT
Start: 2024-12-04 | End: 2024-12-04 | Stop reason: SDUPTHER

## 2024-12-04 RX ORDER — LIDOCAINE HYDROCHLORIDE AND EPINEPHRINE 10; 10 MG/ML; UG/ML
INJECTION, SOLUTION INFILTRATION; PERINEURAL PRN
Status: DISCONTINUED | OUTPATIENT
Start: 2024-12-04 | End: 2024-12-04 | Stop reason: HOSPADM

## 2024-12-04 RX ORDER — SODIUM CHLORIDE, SODIUM LACTATE, POTASSIUM CHLORIDE, AND CALCIUM CHLORIDE .6; .31; .03; .02 G/100ML; G/100ML; G/100ML; G/100ML
500 INJECTION, SOLUTION INTRAVENOUS ONCE
Status: DISCONTINUED | OUTPATIENT
Start: 2024-12-04 | End: 2024-12-04

## 2024-12-04 RX ORDER — FENTANYL CITRATE 50 UG/ML
INJECTION, SOLUTION INTRAMUSCULAR; INTRAVENOUS
Status: DISCONTINUED | OUTPATIENT
Start: 2024-12-04 | End: 2024-12-04 | Stop reason: SDUPTHER

## 2024-12-04 RX ADMIN — IPRATROPIUM BROMIDE AND ALBUTEROL SULFATE 1 DOSE: 2.5; .5 SOLUTION RESPIRATORY (INHALATION) at 09:04

## 2024-12-04 RX ADMIN — PHENYLEPHRINE HYDROCHLORIDE 25 MCG/MIN: 10 INJECTION INTRAVENOUS at 10:39

## 2024-12-04 RX ADMIN — SODIUM CHLORIDE 500 ML: 9 INJECTION, SOLUTION INTRAVENOUS at 07:50

## 2024-12-04 RX ADMIN — NOREPINEPHRINE BITARTRATE 8 MCG: 1 INJECTION, SOLUTION, CONCENTRATE INTRAVENOUS at 10:39

## 2024-12-04 RX ADMIN — ALBUMIN (HUMAN) 250 ML: 12.5 INJECTION, SOLUTION INTRAVENOUS at 10:54

## 2024-12-04 RX ADMIN — SERTRALINE HYDROCHLORIDE 50 MG: 50 TABLET ORAL at 08:11

## 2024-12-04 RX ADMIN — SODIUM CHLORIDE, PRESERVATIVE FREE 10 ML: 5 INJECTION INTRAVENOUS at 08:13

## 2024-12-04 RX ADMIN — ENOXAPARIN SODIUM 30 MG: 100 INJECTION SUBCUTANEOUS at 20:07

## 2024-12-04 RX ADMIN — PROPOFOL 15 MCG/KG/MIN: 10 INJECTION, EMULSION INTRAVENOUS at 23:26

## 2024-12-04 RX ADMIN — FAMOTIDINE 20 MG: 20 TABLET, FILM COATED ORAL at 08:12

## 2024-12-04 RX ADMIN — LORAZEPAM 1 MG: 2 INJECTION INTRAMUSCULAR; INTRAVENOUS at 20:08

## 2024-12-04 RX ADMIN — SENNOSIDES 8.6 MG: 8.6 TABLET, FILM COATED ORAL at 20:07

## 2024-12-04 RX ADMIN — MIDAZOLAM 2 MG: 1 INJECTION INTRAMUSCULAR; INTRAVENOUS at 10:13

## 2024-12-04 RX ADMIN — MIDODRINE HYDROCHLORIDE 20 MG: 5 TABLET ORAL at 11:48

## 2024-12-04 RX ADMIN — BUDESONIDE INHALATION 500 MCG: 0.5 SUSPENSION RESPIRATORY (INHALATION) at 09:04

## 2024-12-04 RX ADMIN — Medication 5 ML: at 20:08

## 2024-12-04 RX ADMIN — QUETIAPINE FUMARATE 25 MG: 25 TABLET ORAL at 08:12

## 2024-12-04 RX ADMIN — TAMSULOSIN HYDROCHLORIDE 0.4 MG: 0.4 CAPSULE ORAL at 08:11

## 2024-12-04 RX ADMIN — ROCURONIUM BROMIDE 30 MG: 10 INJECTION, SOLUTION INTRAVENOUS at 11:07

## 2024-12-04 RX ADMIN — Medication 5 ML: at 08:12

## 2024-12-04 RX ADMIN — Medication 5 ML: at 11:48

## 2024-12-04 RX ADMIN — POTASSIUM BICARBONATE 40 MEQ: 782 TABLET, EFFERVESCENT ORAL at 20:07

## 2024-12-04 RX ADMIN — Medication 5 ML: at 15:01

## 2024-12-04 RX ADMIN — ALBUMIN (HUMAN) 250 ML: 12.5 INJECTION, SOLUTION INTRAVENOUS at 10:33

## 2024-12-04 RX ADMIN — IPRATROPIUM BROMIDE AND ALBUTEROL SULFATE 1 DOSE: 2.5; .5 SOLUTION RESPIRATORY (INHALATION) at 03:20

## 2024-12-04 RX ADMIN — IPRATROPIUM BROMIDE AND ALBUTEROL SULFATE 1 DOSE: 2.5; .5 SOLUTION RESPIRATORY (INHALATION) at 15:03

## 2024-12-04 RX ADMIN — BUSPIRONE HYDROCHLORIDE 7.5 MG: 5 TABLET ORAL at 20:07

## 2024-12-04 RX ADMIN — MIDODRINE HYDROCHLORIDE 10 MG: 5 TABLET ORAL at 08:11

## 2024-12-04 RX ADMIN — SODIUM CHLORIDE, PRESERVATIVE FREE 10 ML: 5 INJECTION INTRAVENOUS at 20:08

## 2024-12-04 RX ADMIN — Medication 5 MCG/MIN: at 11:52

## 2024-12-04 RX ADMIN — CEFAZOLIN 3000 MG: 1 INJECTION, POWDER, FOR SOLUTION INTRAMUSCULAR; INTRAVENOUS at 10:33

## 2024-12-04 RX ADMIN — ROCURONIUM BROMIDE 30 MG: 10 INJECTION, SOLUTION INTRAVENOUS at 10:42

## 2024-12-04 RX ADMIN — SODIUM CHLORIDE, POTASSIUM CHLORIDE, SODIUM LACTATE AND CALCIUM CHLORIDE: 600; 310; 30; 20 INJECTION, SOLUTION INTRAVENOUS at 10:33

## 2024-12-04 RX ADMIN — MIDODRINE HYDROCHLORIDE 20 MG: 5 TABLET ORAL at 15:00

## 2024-12-04 RX ADMIN — FENTANYL CITRATE 50 MCG: 50 INJECTION INTRAMUSCULAR; INTRAVENOUS at 11:08

## 2024-12-04 RX ADMIN — QUETIAPINE FUMARATE 50 MG: 25 TABLET ORAL at 12:25

## 2024-12-04 RX ADMIN — BUSPIRONE HYDROCHLORIDE 7.5 MG: 5 TABLET ORAL at 08:12

## 2024-12-04 RX ADMIN — LORAZEPAM 1 MG: 2 INJECTION INTRAMUSCULAR; INTRAVENOUS at 08:52

## 2024-12-04 RX ADMIN — PROPOFOL 35 MCG/KG/MIN: 10 INJECTION, EMULSION INTRAVENOUS at 14:01

## 2024-12-04 RX ADMIN — POTASSIUM BICARBONATE 40 MEQ: 782 TABLET, EFFERVESCENT ORAL at 08:11

## 2024-12-04 RX ADMIN — GLYCOPYRROLATE 0.4 MG: 0.2 INJECTION INTRAMUSCULAR; INTRAVENOUS at 10:54

## 2024-12-04 RX ADMIN — LORAZEPAM 1 MG: 2 INJECTION INTRAMUSCULAR; INTRAVENOUS at 03:31

## 2024-12-04 RX ADMIN — BUDESONIDE INHALATION 500 MCG: 0.5 SUSPENSION RESPIRATORY (INHALATION) at 20:08

## 2024-12-04 RX ADMIN — FENTANYL CITRATE 50 MCG: 50 INJECTION INTRAMUSCULAR; INTRAVENOUS at 10:33

## 2024-12-04 RX ADMIN — AMIODARONE HYDROCHLORIDE 200 MG: 200 TABLET ORAL at 08:11

## 2024-12-04 RX ADMIN — ATORVASTATIN CALCIUM 40 MG: 40 TABLET, FILM COATED ORAL at 20:07

## 2024-12-04 RX ADMIN — IPRATROPIUM BROMIDE AND ALBUTEROL SULFATE 1 DOSE: 2.5; .5 SOLUTION RESPIRATORY (INHALATION) at 20:08

## 2024-12-04 RX ADMIN — DEXAMETHASONE SODIUM PHOSPHATE 8 MG: 4 INJECTION, SOLUTION INTRA-ARTICULAR; INTRALESIONAL; INTRAMUSCULAR; INTRAVENOUS; SOFT TISSUE at 10:54

## 2024-12-04 RX ADMIN — ROCURONIUM BROMIDE 40 MG: 10 INJECTION, SOLUTION INTRAVENOUS at 10:14

## 2024-12-04 RX ADMIN — FUROSEMIDE 20 MG: 10 INJECTION, SOLUTION INTRAMUSCULAR; INTRAVENOUS at 11:47

## 2024-12-04 RX ADMIN — QUETIAPINE FUMARATE 50 MG: 25 TABLET ORAL at 20:07

## 2024-12-04 RX ADMIN — PROPOFOL 50 MCG/KG/MIN: 10 INJECTION, EMULSION INTRAVENOUS at 11:49

## 2024-12-04 RX ADMIN — PROPOFOL 15 MCG/KG/MIN: 10 INJECTION, EMULSION INTRAVENOUS at 04:56

## 2024-12-04 RX ADMIN — ASPIRIN 81 MG 81 MG: 81 TABLET ORAL at 08:11

## 2024-12-04 RX ADMIN — ONDANSETRON 4 MG: 2 INJECTION INTRAMUSCULAR; INTRAVENOUS at 10:54

## 2024-12-04 ASSESSMENT — PULMONARY FUNCTION TESTS
PIF_VALUE: 22
PIF_VALUE: 21
PIF_VALUE: 23
PIF_VALUE: 28
PIF_VALUE: 21
PIF_VALUE: 19
PIF_VALUE: 24
PIF_VALUE: 22
PIF_VALUE: 15
PIF_VALUE: 22
PIF_VALUE: 15
PIF_VALUE: 15
PIF_VALUE: 35
PIF_VALUE: 15
PIF_VALUE: 25
PIF_VALUE: 28
PIF_VALUE: 23
PIF_VALUE: 21
PIF_VALUE: 21
PIF_VALUE: 27
PIF_VALUE: 25
PIF_VALUE: 21
PIF_VALUE: 25
PIF_VALUE: 15
PIF_VALUE: 15
PIF_VALUE: 40
PIF_VALUE: 25
PIF_VALUE: 15
PIF_VALUE: 21
PIF_VALUE: 22

## 2024-12-04 ASSESSMENT — PAIN SCALES - GENERAL
PAINLEVEL_OUTOF10: 0

## 2024-12-04 NOTE — PLAN OF CARE
Problem: Chronic Conditions and Co-morbidities  Goal: Patient's chronic conditions and co-morbidity symptoms are monitored and maintained or improved  Outcome: Not Progressing     Problem: Respiratory - Adult  Goal: Achieves optimal ventilation and oxygenation  Outcome: Not Progressing     Problem: Safety - Medical Restraint  Goal: Remains free of injury from restraints (Restraint for Interference with Medical Device)  Description: INTERVENTIONS:  1. Determine that other, less restrictive measures have been tried or would not be effective before applying the restraint  2. Evaluate the patient's condition at the time of restraint application  3. Inform patient/family regarding the reason for restraint  4. Q2H: Monitor safety, psychosocial status, comfort, nutrition and hydration  Outcome: Not Progressing  Flowsheets (Taken 12/3/2024 1043 by Paola Conley RN)  Remains free of injury from restraints (restraint for interference with medical device): (increased sedation level prior to restraint initiation)   Determine that other, less restrictive measures have been tried or would not be effective before applying the restraint   Evaluate the patient's condition at the time of restraint application   Inform patient/family regarding the reason for restraint   Every 2 hours: Monitor safety, psychosocial status, comfort, nutrition and hydration     Problem: Discharge Planning  Goal: Discharge to home or other facility with appropriate resources  Outcome: Not Progressing     Problem: ABCDS Injury Assessment  Goal: Absence of physical injury  Outcome: Not Progressing     Problem: Safety - Adult  Goal: Free from fall injury  Outcome: Not Progressing     Problem: Confusion  Goal: Confusion, delirium, dementia, or psychosis is improved or at baseline  Description: INTERVENTIONS:  1. Assess for possible contributors to thought disturbance, including medications, impaired vision or hearing, underlying metabolic abnormalities,

## 2024-12-04 NOTE — PROGRESS NOTES
Otolaryngology-HNS Consult    Subjective:     Date of Consultation:  December 4, 2024    Referring Physician: Glenn    History of Present Illness:   Patient is a 49 y.o.  female who is being seen for tracheostomy consult.  Admitted to the hospital 11/12/2024, with mental status change and hypoxia.  Has been intubated then extubated then back to the floor.  Now back to the ICU and intubated again for 3 days.  Biggest issue has been decreased mental status and poor tolerance of secretions leading to need for reintubation.  Otolaryngology service has been consulted for tracheostomy placement.    Interval Hx:   12/3/24:   Was planned for tracheostomy today, but canceled due to difficulty with obtaining consent from next of kin.    Vent settings: FiO2 40%, PEEP 5    Labs, imaging, and notes reviewed.    Interval hx: 12/4/2024  No change, mother is established and decision maker     Patient Active Problem List    Diagnosis Date Noted    Acute right-sided congestive heart failure ([facility]) 11/26/2024    Essential hypertension 11/26/2024    GUERA (acute kidney injury) ([facility]) 11/26/2024    SVT (supraventricular tachycardia) ([facility]) 11/12/2024    Acute respiratory failure 01/03/2024    COPD exacerbation ([facility]) 01/02/2024    Acute asthma exacerbation 08/11/2023    COPD (chronic obstructive pulmonary disease) ([facility]) 08/11/2023    COPD with acute exacerbation ([facility]) 09/16/2022     Past Medical History:   Diagnosis Date    Asthma     COPD (chronic obstructive pulmonary disease) ([facility])       History reviewed. No pertinent family history.   Social History     Tobacco Use    Smoking status: Every Day     Current packs/day: 1.00     Types: Cigarettes    Smokeless tobacco: Not on file   Substance Use Topics    Alcohol use: Not on file     History reviewed. No pertinent surgical history.   Current Facility-Administered Medications   Medication Dose Route Frequency    furosemide (LASIX) injection 20 mg  20 mg IntraVENous Once    QUEtiapine  IVPB  15 mmol IntraVENous PRN    sertraline (ZOLOFT) tablet 50 mg  50 mg Oral Daily    busPIRone (BUSPAR) tablet 7.5 mg  7.5 mg Oral BID    [Held by provider] docusate (COLACE) 50 MG/5ML liquid 100 mg  100 mg Oral Daily    senna (SENOKOT) tablet 8.6 mg  1 tablet Oral Nightly    famotidine (PEPCID) tablet 20 mg  20 mg Oral Daily    [Held by provider] polyethylene glycol (GLYCOLAX) packet 17 g  17 g Per NG tube Daily    metoprolol (LOPRESSOR) injection 5 mg  5 mg IntraVENous Q6H PRN    insulin lispro (HUMALOG,ADMELOG) injection vial 0-8 Units  0-8 Units SubCUTAneous 4x Daily AC & HS    glucose chewable tablet 16 g  4 tablet Oral PRN    dextrose bolus 10% 125 mL  125 mL IntraVENous PRN    Or    dextrose bolus 10% 250 mL  250 mL IntraVENous PRN    glucagon injection 1 mg  1 mg SubCUTAneous PRN    dextrose 10 % infusion   IntraVENous Continuous PRN    influenza split vaccine (PF) (AFLURIA;FLUARIX) injection 0.5 mL  0.5 mL IntraMUSCular Prior to discharge    atorvastatin (LIPITOR) tablet 40 mg  40 mg Per NG tube Nightly    nitroGLYCERIN (NITROSTAT) SL tablet 0.4 mg  0.4 mg SubLINGual Q5 Min PRN    sodium chloride flush 0.9 % injection 5-40 mL  5-40 mL IntraVENous 2 times per day    sodium chloride flush 0.9 % injection 5-40 mL  5-40 mL IntraVENous PRN      Allergies   Allergen Reactions    Latex         Review of Systems:  Review of systems not obtained due to patient factors.     Objective:     Patient Vitals for the past 8 hrs:   BP Temp Temp src Pulse Resp SpO2 Height Weight   12/04/24 0906 -- -- -- 68 24 100 % -- --   12/04/24 0900 101/62 -- -- 67 21 100 % -- --   12/04/24 0800 (!) 90/49 -- -- 58 16 100 % -- --   12/04/24 0700 (!) 91/52 98.5 °F (36.9 °C) Bladder 58 17 100 % -- --   12/04/24 0600 (!) 91/49 -- -- 66 17 98 % -- --   12/04/24 0500 (!) 98/52 -- -- 75 19 95 % -- --   12/04/24 0449 -- -- -- 75 17 96 % 1.727 m (5' 7.99\") --   12/04/24 0400 128/68 98.7 °F (37.1 °C) Bladder 84 24 94 % -- 133.1 kg (293 lb 6.9

## 2024-12-04 NOTE — ANESTHESIA PRE PROCEDURE
Department of Anesthesiology  Preprocedure Note       Name:  Kim Markham   Age:  49 y.o.  :  1975                                          MRN:  [id number]         Date:  2024      Surgeon: Surgeon(s):  Chaya Loyd MD    Procedure: Procedure(s):  TRACHEOSTOMY    Medications prior to admission:   Prior to Admission medications    Medication Sig Start Date End Date Taking? Authorizing Provider   amiodarone (PACERONE) 400 MG tablet Take 1 tablet by mouth 2 times daily for 8 doses 24  Kuldeep Nuñez MD   amiodarone (CORDARONE) 200 MG tablet Take 1 tablet by mouth daily 24   Kuldeep Nuñez MD   montelukast (SINGULAIR) 10 MG tablet Take 1 tablet by mouth nightly 24   Kuldeep Nuñez MD   budesonide-formoterol (SYMBICORT) 160-4.5 MCG/ACT AERO Inhale 2 puffs into the lungs 2 times daily 24   Kuldeep Nuñez MD   guaiFENesin 1200 MG TB12 Take 1,200 mg by mouth in the morning and 1,200 mg in the evening. 22   Automatic Reconciliation, Ar   pantoprazole (PROTONIX) 40 MG tablet Take 1 tablet by mouth daily 22   Automatic Reconciliation, Ar       Current medications:    Current Facility-Administered Medications   Medication Dose Route Frequency Provider Last Rate Last Admin    sodium chloride 0.9 % bolus 500 mL  500 mL IntraVENous Once Sabina Chacko APRN - .8 mL/hr at 24 0750 500 mL at 24 0750    QUEtiapine (SEROQUEL) tablet 25 mg  25 mg Per NG tube TID Abdulaziz Rico MD   25 mg at 24 0812    dexmedeTOMIDine (PRECEDEX) 400 mcg in sodium chloride 0.9 % 100 mL infusion  0.1-1.5 mcg/kg/hr IntraVENous Continuous Abdulaziz Rico MD   Stopped at 24 1626    norepinephrine (LEVOPHED) 16 mg in sodium chloride 0.9 % 250 mL infusion  1-100 mcg/min IntraVENous Continuous Mattice, Tsana N, APRN - CNP   Held at 24 0706    midodrine (PROAMATINE) tablet 10 mg  10 mg Oral TID Mee Dejesus MD

## 2024-12-04 NOTE — CARE COORDINATION
CM reviewed Pt medicals, Pt remains on the vent.      Medicaid application submitted today under tracking# [id number].     Pt mother wants Pt to get a trach.    CM reopened the referral to [facility] to asked if they can accept Pt with Medicaid pending.     1:25  CM called Erick Pt boyfriend to discuss D/C planning. Erick stated that whatever is best for her.   Erick stated that he felt that Pt mother needs to make the decision.   CM let erick know that Ethics has stated th ask the Pt mother and boyfriend to make a joint medical decision for the Pt.    KARTHIKEYAN called Pt mother and she stated that she gives  permission to send to several [facility] to inquire which one can accept pt.    KARTHIKEYAN talked with Pt mother about [facility]. Pt mother stated that it was ok to send [facility] Pt information.     1:50  KARTHIKEYAN spoke with Ana Liaison for [facility].   Ana stated that they can accept Pt with Medicaid pending.     Ana stated that she will send Pt medicals up to Admin to view to see if they can accept her.

## 2024-12-04 NOTE — OP NOTE
Operative Note      Patient: Kim Markham  YOB: 1975  MRN: [id number]    Date of Procedure: 12/4/2024    Pre-Op Diagnosis Codes:   Respiratory failure   Ventilator dependence  Acute hypoxemic respiratory failure     Post-Op Diagnosis: Same       Procedure(s):  TRACHEOSTOMY    Surgeon(s):  Chaya Loyd MD    Assistant:   Surgical Assistant: Buddy Smith    Anesthesia: General    Estimated Blood Loss (mL): Minimal    Complications: None    Specimens:   * No specimens in log *    Implants:  * No implants in log *      Drains:   NG/OG/NJ/NE Tube Nasogastric 14 fr Left nostril (Active)   Surrounding Skin Clean, dry & intact 12/04/24 0800   Securement device Adhesive based felipe 12/04/24 0800   Status Clamped 12/04/24 0800   Placement Verified External Catheter Length;Gastric Contents 12/04/24 0800   NG/OG/NJ/NE External Measurement (cm) 63 cm 12/04/24 0800   Drainage Appearance None 12/04/24 0800   Tube Feeding Standard with Fiber 12/03/24 2000   Tube feeding/verify rate (mL/hr) 50 mL/hr 12/03/24 2000   Free Water/Flush (mL) 25 mL 12/04/24 0000   Action Taken Placement verified (comment);Repositioned 12/04/24 0400   Residual Volume (ml) 0 ml 12/04/24 0400       Rectal Tube (Active)   Site Assessment Clean, dry & intact 12/04/24 0800   Stool Appearance Loose 12/04/24 0800   Stool Color Brown 12/04/24 0800   Stool Amount Small 12/04/24 0800   Rectal Tube Output (mL) 50 ml 12/04/24 0400       Urinary Catheter 11/27/24 Alvares-Temperature (Active)   Catheter Indications Urinary retention (acute or chronic), continuous bladder irrigation or bladder outlet obstruction 12/04/24 0800   Site Assessment No urethral drainage 12/04/24 0800   Urine Color Yellow 12/04/24 0800   Urine Appearance Sediment 12/04/24 0800   Urine Odor Malodorous 12/04/24 0800   Collection Container Standard 12/04/24 0800   Securement Method Securing device (Describe) 12/04/24 0800   Catheter Care  Perineal wipes 12/04/24 0800    Catheter Best Practices  Drainage tube clipped to bed;Catheter secured to thigh;Tamper seal intact;Bag below bladder;Bag not on floor;Lack of dependent loop in tubing;Drainage bag less than half full 12/04/24 0800   Status Patent;Draining 12/04/24 0800   Output (mL) 200 mL 12/04/24 0800       [REMOVED] NG/OG/NJ/NE Tube Nasogastric 16 fr Right nostril (Removed)   Surrounding Skin Clean, dry & intact 12/03/24 0000   Securement device Adhesive based felipe 12/03/24 0000   Status Clamped 12/03/24 0000   Placement Verified Respiratory Status;External Catheter Length;Gastric Contents 12/03/24 0000   NG/OG/NJ/NE External Measurement (cm) 65 cm 12/03/24 0000   Drainage Appearance None 12/03/24 0000   Tube Feeding Peptide Based 12/02/24 2000   Tube feeding/verify rate (mL/hr) 70 mL/hr 12/02/24 2000   Tube Feeding Intake (mL) 280 ml 12/03/24 0000   Tube Feeding Supplement Amount (mL) 200 12/02/24 0800   Free Water/Flush (mL) 525 mL 12/03/24 0000   Output (mL) 0 ml 12/02/24 1200   Action Taken Placement verified (comment) 12/03/24 0000   Residual Volume (ml) 10 ml 12/03/24 0000       [REMOVED] External Urinary Catheter (Removed)   Site Assessment Clean,dry & intact 11/26/24 1600   Placement Repositioned 11/26/24 1600   Securement Method Securing device (Describe) 11/25/24 1200   Catheter Care Catheter/Wick replaced;Suction Canister/Tubing changed 11/26/24 0945   Perineal Care Yes 11/26/24 1600   Suction 40 mmgHg continuous 11/26/24 1600   Urine Color Lynette 11/26/24 1600   Urine Appearance Hazy 11/26/24 1600   Urine Odor Malodorous 11/26/24 1600   Output (mL) 100 mL 11/26/24 1200       Findings:  Infection Present At Time Of Surgery (PATOS) (choose all levels that have infection present):  No infection present  Thyroid isthmus divided  Tracheostomy between 2-3 tracheal rings     Detailed Description of Procedure:   The patient was brought to the operating room from ICU and placed in the supine position on the operating room

## 2024-12-04 NOTE — PROGRESS NOTES
S/w ARMEN, mother, to obtain consent for trach, however, unable to obtain consent surgeon had not called her yet. Reached out to ENT. MD to call ARMEN in the morning.

## 2024-12-04 NOTE — PROGRESS NOTES
CRITICAL CARE ADMISSION NOTE    Name: Kim Markham   : 1975   MRN: [id number]   Date: 2024        Subjective:  No major overnight event  Finish Leo Restrepo with concerned on tracheostomy so tracheostomy was not done yesterday however is going for tracheostomy today  Still on propofol  Currently on AC/VC FiO2 40% PEEP of 5  Glucose is 84 ABG is acceptable chest x-ray shows mild pulmonary edema    Diagnoses/problem list:   Altered mental status  COPD exacerbation  GUERA  Hypernatremia  Leukocytosis    HPI   Kim Markham is a 49 y.o. female with known past medical history significant for asthma and COPD who treated her symptoms today with Primatene Mist over-the-counter says that she went into fast heart rate became short of breath immediately.  No other exacerbating or relieving factors which presents to the emergency room with no treatment prior to arrival.  Patient reports that she had a few day history of shortness of breath and thought she had a pneumonia. She has been taking OTC cough syrup and cough medicine as well as her inhaler. She reports difficulty affording medications and has limited access to healthcare.      : Continues to have high vent requirements and high sedation requirements. SVT noted and amiodarone initiated.   11/15: Remains intubated on 500/16/10/55%.  Still has bilateral wheezing on auscultation.  Slight rise in creatinine noted but urine output is good.  No arrhythmias this morning.  Remains on amiodarone, propofol, fentanyl and heparin infusions.  : continues to require high sedation and high vent support. Will attempt SBT  : tolerated SBT for >2 hours. Will repeat today  : Sedated with propofol 40ug/kg/min, RAAS -1 to -2. AC 12 Vt 0.5 FiO2 0.4 PEEP 6. Doing well on SBTs but there is concern that she has too much anxiety to withdraw sedation and extubate  : Sedated with propofol 50ug/kg/min, RAAS -1 to -2. VD 7; AC 12 Vt 0.5 FiO2 0.4 PEEP 6.

## 2024-12-04 NOTE — PROGRESS NOTES
Comprehensive Nutrition Assessment    Type and Reason for Visit:  Reassess (Goal)    Nutrition Recommendations/Plan:   NPO, advance as medically appropriate.    When medically appropriate, restart TF of Vital AF 1.2 [place](Peptide Based) via NGT at 40 mL/hr, advance by 10 mL/hr every 4 hrs as tolerated to goal rate of 70 mL/hr.  Decrease water flushes to 120 mL q4hrs.  Provides: 2016 kcal(90%), 126 gm protein(79%), 2081 mL H2O(93%).                        +298 kcal(103%) from Propofol at 11.3 mL/hr.     Continue to monitor and record TF rate, flushes, tolerance, BM in I/Os.     Malnutrition Assessment:  Malnutrition Status:  No malnutrition (11/27/24 1010)    Context:  Acute Illness       Nutrition Assessment:    Admitted for SVT. Pt intubated in early a.m. of 11/13 in CCU. Brief nutrition assessment done- insufficient info gathered. (11/14) Pt remains vented, now off levo, sedated on propofol and fentanyl. NGT in place, hemo stable for EN initiation; however, will hold off for 24-36 hrs. Report of plan for SBT today. No noted h/o wt loss nor poor intakes PTA. RD to provide regimen for when medically able to start TF. (11/18) Pt remains intubated and sedated in CCU. Pt tolerating TF at goal rate. (11/20) Pt remains vented, increasing sedation need on propofol and fentanyl. TF tolerated at goal rate. (11/27) Pt extubated, transferred to cardiac tele unit, Pt later w/ medical declined, returned to ICU <7 hrs later. Pt reintubated last evening(11/26), started on TF. Pt remains vented, on minimal levo, sedated on propofol. TF started last evning, tolerated at goal rate. RD to modify to meet new EENs. Possible need for trach per IDRs. (12/4) Pt remains vented, went for trach at time of visit. NGT remains in place, possible plans to use for EN when medically appropriate. Minimal levo now restarted and, increased propofol noted. RD to modify regimen. Labs: Cl 113, Cr 0.44, , H/H 7.9/25.3. Meds: Levophed 1 mcg,  goal  Previous Goal Met: Goal(s) Achieved    Nutrition Monitoring and Evaluation:   Behavioral-Environmental Outcomes: None Identified  Food/Nutrient Intake Outcomes: Enteral Nutrition Intake/Tolerance, Supplement Intake  Physical Signs/Symptoms Outcomes: Biochemical Data, Diarrhea, Weight, Fluid Status or Edema    Discharge Planning:    Too soon to determine     Mahi Vera RD  Contact: ext. [phone] or [employer].

## 2024-12-05 ENCOUNTER — APPOINTMENT (OUTPATIENT)
Facility: HOSPITAL | Age: 49
End: 2024-12-05
Payer: MEDICAID

## 2024-12-05 LAB
ALBUMIN SERPL-MCNC: 2.5 G/DL (ref 3.5–5)
ALBUMIN/GLOB SERPL: 0.7 (ref 1.1–2.2)
ALP SERPL-CCNC: 57 U/L (ref 45–117)
ALT SERPL-CCNC: 83 U/L (ref 12–78)
ANION GAP SERPL CALC-SCNC: 6 MMOL/L (ref 2–12)
ARTERIAL PATENCY WRIST A: YES
ARTERIAL PATENCY WRIST A: YES
AST SERPL W P-5'-P-CCNC: 22 U/L (ref 15–37)
BASE EXCESS BLDA CALC-SCNC: 0.2 MMOL/L (ref 0–3)
BASE EXCESS BLDA CALC-SCNC: 3.2 MMOL/L (ref 0–3)
BASOPHILS # BLD: 0 K/UL (ref 0–0.1)
BASOPHILS NFR BLD: 0 % (ref 0–1)
BDY SITE: ABNORMAL
BDY SITE: ABNORMAL
BILIRUB SERPL-MCNC: 0.3 MG/DL (ref 0.2–1)
BODY TEMPERATURE: 97.9
BODY TEMPERATURE: 99.1
BUN SERPL-MCNC: 13 MG/DL (ref 6–20)
BUN/CREAT SERPL: 36 (ref 12–20)
CA-I BLD-MCNC: 1.24 MMOL/L (ref 1.13–1.32)
CA-I BLD-MCNC: 9.2 MG/DL (ref 8.5–10.1)
CHLORIDE SERPL-SCNC: 113 MMOL/L (ref 97–108)
CK SERPL-CCNC: 61 U/L (ref 26–192)
CO2 SERPL-SCNC: 25 MMOL/L (ref 21–32)
COHGB MFR BLD: 0.3 % (ref 1–2)
COHGB MFR BLD: 0.3 % (ref 1–2)
CREAT SERPL-MCNC: 0.36 MG/DL (ref 0.55–1.02)
DIFFERENTIAL METHOD BLD: ABNORMAL
EOSINOPHIL # BLD: 0.2 K/UL (ref 0–0.4)
EOSINOPHIL NFR BLD: 2 % (ref 0–7)
ERYTHROCYTE [DISTWIDTH] IN BLOOD BY AUTOMATED COUNT: 14.4 % (ref 11.5–14.5)
FIO2 ON VENT: 50 %
FIO2 ON VENT: 50 %
GAS FLOW.O2 O2 DELIVERY SYS: 20 L/MIN
GAS FLOW.O2 SETTING OXYMISER: 12
GLOBULIN SER CALC-MCNC: 3.5 G/DL (ref 2–4)
GLUCOSE BLD STRIP.AUTO-MCNC: 76 MG/DL (ref 65–100)
GLUCOSE BLD STRIP.AUTO-MCNC: 85 MG/DL (ref 65–100)
GLUCOSE BLD STRIP.AUTO-MCNC: 85 MG/DL (ref 65–100)
GLUCOSE BLD STRIP.AUTO-MCNC: 91 MG/DL (ref 65–100)
GLUCOSE SERPL-MCNC: 86 MG/DL (ref 65–100)
HCO3 BLDA-SCNC: 24 MMOL/L (ref 22–26)
HCO3 BLDA-SCNC: 28 MMOL/L (ref 22–26)
HCT VFR BLD AUTO: 26 % (ref 35–47)
HGB BLD-MCNC: 8.3 G/DL (ref 11.5–16)
IMM GRANULOCYTES # BLD AUTO: 0.1 K/UL (ref 0–0.04)
IMM GRANULOCYTES NFR BLD AUTO: 2 % (ref 0–0.5)
LYMPHOCYTES # BLD: 1.1 K/UL (ref 0.8–3.5)
LYMPHOCYTES NFR BLD: 12 % (ref 12–49)
MAGNESIUM SERPL-MCNC: 2 MG/DL (ref 1.6–2.4)
MCH RBC QN AUTO: 29 PG (ref 26–34)
MCHC RBC AUTO-ENTMCNC: 31.9 G/DL (ref 30–36.5)
MCV RBC AUTO: 90.9 FL (ref 80–99)
METHGB MFR BLD: 0.5 % (ref 0–1.4)
METHGB MFR BLD: 0.6 % (ref 0–1.4)
MONOCYTES # BLD: 0.6 K/UL (ref 0–1)
MONOCYTES NFR BLD: 7 % (ref 5–13)
NEUTS SEG # BLD: 6.8 K/UL (ref 1.8–8)
NEUTS SEG NFR BLD: 77 % (ref 32–75)
NRBC # BLD: 0 K/UL (ref 0–0.01)
NRBC BLD-RTO: 0 PER 100 WBC
OXYHGB MFR BLD: 94.8 % (ref 95–99)
OXYHGB MFR BLD: 95.5 % (ref 95–99)
PCO2 BLDA: 36 MMHG (ref 35–45)
PCO2 BLDA: 44 MMHG (ref 35–45)
PEEP RESPIRATORY: 6
PERFORMED BY:: ABNORMAL
PERFORMED BY:: ABNORMAL
PERFORMED BY:: NORMAL
PH BLDA: 7.43 (ref 7.35–7.45)
PH BLDA: 7.44 (ref 7.35–7.45)
PLATELET # BLD AUTO: 266 K/UL (ref 150–400)
PMV BLD AUTO: 12.6 FL (ref 8.9–12.9)
PO2 BLDA: 85 MMHG (ref 80–100)
PO2 BLDA: 90 MMHG (ref 80–100)
POTASSIUM SERPL-SCNC: 3.9 MMOL/L (ref 3.5–5.1)
PROT SERPL-MCNC: 6 G/DL (ref 6.4–8.2)
RBC # BLD AUTO: 2.86 M/UL (ref 3.8–5.2)
SAO2 % BLD: 96 % (ref 95–99)
SAO2 % BLD: 96 % (ref 95–99)
SAO2% DEVICE SAO2% SENSOR NAME: ABNORMAL
SAO2% DEVICE SAO2% SENSOR NAME: ABNORMAL
SODIUM SERPL-SCNC: 144 MMOL/L (ref 136–145)
SPECIMEN SITE: ABNORMAL
SPECIMEN SITE: ABNORMAL
TRIGL SERPL-MCNC: 90 MG/DL
VENTILATION MODE VENT: ABNORMAL
WBC # BLD AUTO: 8.8 K/UL (ref 3.6–11)

## 2024-12-05 PROCEDURE — 6370000000 HC RX 637 (ALT 250 FOR IP): Performed by: STUDENT IN AN ORGANIZED HEALTH CARE EDUCATION/TRAINING PROGRAM

## 2024-12-05 PROCEDURE — 2580000003 HC RX 258: Performed by: NURSE PRACTITIONER

## 2024-12-05 PROCEDURE — 6370000000 HC RX 637 (ALT 250 FOR IP)

## 2024-12-05 PROCEDURE — 6370000000 HC RX 637 (ALT 250 FOR IP): Performed by: INTERNAL MEDICINE

## 2024-12-05 PROCEDURE — 82803 BLOOD GASES ANY COMBINATION: CPT

## 2024-12-05 PROCEDURE — 36600 WITHDRAWAL OF ARTERIAL BLOOD: CPT

## 2024-12-05 PROCEDURE — 6360000002 HC RX W HCPCS: Performed by: STUDENT IN AN ORGANIZED HEALTH CARE EDUCATION/TRAINING PROGRAM

## 2024-12-05 PROCEDURE — 2000000000 HC ICU R&B

## 2024-12-05 PROCEDURE — 82330 ASSAY OF CALCIUM: CPT

## 2024-12-05 PROCEDURE — 6360000002 HC RX W HCPCS: Performed by: INTERNAL MEDICINE

## 2024-12-05 PROCEDURE — 71045 X-RAY EXAM CHEST 1 VIEW: CPT

## 2024-12-05 PROCEDURE — 83735 ASSAY OF MAGNESIUM: CPT

## 2024-12-05 PROCEDURE — 51701 INSERT BLADDER CATHETER: CPT

## 2024-12-05 PROCEDURE — 36415 COLL VENOUS BLD VENIPUNCTURE: CPT

## 2024-12-05 PROCEDURE — 2700000000 HC OXYGEN THERAPY PER DAY

## 2024-12-05 PROCEDURE — 82550 ASSAY OF CK (CPK): CPT

## 2024-12-05 PROCEDURE — 94640 AIRWAY INHALATION TREATMENT: CPT

## 2024-12-05 PROCEDURE — 2580000003 HC RX 258: Performed by: INTERNAL MEDICINE

## 2024-12-05 PROCEDURE — 94003 VENT MGMT INPAT SUBQ DAY: CPT

## 2024-12-05 PROCEDURE — 94761 N-INVAS EAR/PLS OXIMETRY MLT: CPT

## 2024-12-05 PROCEDURE — 80053 COMPREHEN METABOLIC PANEL: CPT

## 2024-12-05 PROCEDURE — 99232 SBSQ HOSP IP/OBS MODERATE 35: CPT | Performed by: OTOLARYNGOLOGY

## 2024-12-05 PROCEDURE — 51798 US URINE CAPACITY MEASURE: CPT

## 2024-12-05 PROCEDURE — 84478 ASSAY OF TRIGLYCERIDES: CPT

## 2024-12-05 PROCEDURE — 82962 GLUCOSE BLOOD TEST: CPT

## 2024-12-05 PROCEDURE — 85025 COMPLETE CBC W/AUTO DIFF WBC: CPT

## 2024-12-05 RX ORDER — OXYCODONE AND ACETAMINOPHEN 5; 325 MG/1; MG/1
2 TABLET ORAL EVERY 4 HOURS PRN
Status: DISCONTINUED | OUTPATIENT
Start: 2024-12-05 | End: 2024-12-08

## 2024-12-05 RX ORDER — FUROSEMIDE 10 MG/ML
20 INJECTION INTRAMUSCULAR; INTRAVENOUS ONCE
Status: COMPLETED | OUTPATIENT
Start: 2024-12-05 | End: 2024-12-05

## 2024-12-05 RX ORDER — OXYCODONE HYDROCHLORIDE 5 MG/1
5 TABLET ORAL EVERY 8 HOURS PRN
Status: DISCONTINUED | OUTPATIENT
Start: 2024-12-05 | End: 2024-12-05 | Stop reason: SDUPTHER

## 2024-12-05 RX ORDER — DEXTROSE MONOHYDRATE AND SODIUM CHLORIDE 5; .45 G/100ML; G/100ML
INJECTION, SOLUTION INTRAVENOUS CONTINUOUS
Status: DISCONTINUED | OUTPATIENT
Start: 2024-12-05 | End: 2024-12-05

## 2024-12-05 RX ORDER — OXYCODONE AND ACETAMINOPHEN 5; 325 MG/1; MG/1
1 TABLET ORAL EVERY 4 HOURS PRN
Status: DISCONTINUED | OUTPATIENT
Start: 2024-12-05 | End: 2024-12-08

## 2024-12-05 RX ADMIN — LORAZEPAM 1 MG: 2 INJECTION INTRAMUSCULAR; INTRAVENOUS at 01:56

## 2024-12-05 RX ADMIN — IPRATROPIUM BROMIDE AND ALBUTEROL SULFATE 1 DOSE: 2.5; .5 SOLUTION RESPIRATORY (INHALATION) at 20:05

## 2024-12-05 RX ADMIN — BUSPIRONE HYDROCHLORIDE 7.5 MG: 5 TABLET ORAL at 08:09

## 2024-12-05 RX ADMIN — OXYCODONE 5 MG: 5 TABLET ORAL at 11:21

## 2024-12-05 RX ADMIN — ENOXAPARIN SODIUM 30 MG: 100 INJECTION SUBCUTANEOUS at 21:58

## 2024-12-05 RX ADMIN — BUDESONIDE INHALATION 500 MCG: 0.5 SUSPENSION RESPIRATORY (INHALATION) at 20:05

## 2024-12-05 RX ADMIN — OXYCODONE HYDROCHLORIDE AND ACETAMINOPHEN 2 TABLET: 5; 325 TABLET ORAL at 22:09

## 2024-12-05 RX ADMIN — MIDODRINE HYDROCHLORIDE 20 MG: 5 TABLET ORAL at 08:08

## 2024-12-05 RX ADMIN — IPRATROPIUM BROMIDE AND ALBUTEROL SULFATE 1 DOSE: 2.5; .5 SOLUTION RESPIRATORY (INHALATION) at 03:37

## 2024-12-05 RX ADMIN — Medication 5 ML: at 08:09

## 2024-12-05 RX ADMIN — SERTRALINE HYDROCHLORIDE 50 MG: 50 TABLET ORAL at 08:08

## 2024-12-05 RX ADMIN — FAMOTIDINE 20 MG: 20 TABLET, FILM COATED ORAL at 08:08

## 2024-12-05 RX ADMIN — QUETIAPINE FUMARATE 50 MG: 25 TABLET ORAL at 15:41

## 2024-12-05 RX ADMIN — ENOXAPARIN SODIUM 30 MG: 100 INJECTION SUBCUTANEOUS at 08:07

## 2024-12-05 RX ADMIN — Medication 5 ML: at 22:01

## 2024-12-05 RX ADMIN — POTASSIUM BICARBONATE 40 MEQ: 782 TABLET, EFFERVESCENT ORAL at 08:08

## 2024-12-05 RX ADMIN — POTASSIUM BICARBONATE 40 MEQ: 782 TABLET, EFFERVESCENT ORAL at 21:59

## 2024-12-05 RX ADMIN — MIDODRINE HYDROCHLORIDE 20 MG: 5 TABLET ORAL at 17:25

## 2024-12-05 RX ADMIN — TAMSULOSIN HYDROCHLORIDE 0.4 MG: 0.4 CAPSULE ORAL at 08:08

## 2024-12-05 RX ADMIN — MIDODRINE HYDROCHLORIDE 20 MG: 5 TABLET ORAL at 11:21

## 2024-12-05 RX ADMIN — LORAZEPAM 1 MG: 2 INJECTION INTRAMUSCULAR; INTRAVENOUS at 08:08

## 2024-12-05 RX ADMIN — AMIODARONE HYDROCHLORIDE 200 MG: 200 TABLET ORAL at 08:08

## 2024-12-05 RX ADMIN — BUSPIRONE HYDROCHLORIDE 7.5 MG: 5 TABLET ORAL at 21:59

## 2024-12-05 RX ADMIN — QUETIAPINE FUMARATE 50 MG: 25 TABLET ORAL at 21:59

## 2024-12-05 RX ADMIN — Medication 5 ML: at 17:25

## 2024-12-05 RX ADMIN — PROPOFOL 15 MCG/KG/MIN: 10 INJECTION, EMULSION INTRAVENOUS at 06:13

## 2024-12-05 RX ADMIN — BUDESONIDE INHALATION 500 MCG: 0.5 SUSPENSION RESPIRATORY (INHALATION) at 07:54

## 2024-12-05 RX ADMIN — ASPIRIN 81 MG 81 MG: 81 TABLET ORAL at 08:09

## 2024-12-05 RX ADMIN — QUETIAPINE FUMARATE 50 MG: 25 TABLET ORAL at 08:08

## 2024-12-05 RX ADMIN — IPRATROPIUM BROMIDE AND ALBUTEROL SULFATE 1 DOSE: 2.5; .5 SOLUTION RESPIRATORY (INHALATION) at 14:20

## 2024-12-05 RX ADMIN — ATORVASTATIN CALCIUM 40 MG: 40 TABLET, FILM COATED ORAL at 21:59

## 2024-12-05 RX ADMIN — FUROSEMIDE 20 MG: 10 INJECTION, SOLUTION INTRAMUSCULAR; INTRAVENOUS at 12:30

## 2024-12-05 RX ADMIN — SODIUM CHLORIDE, PRESERVATIVE FREE 10 ML: 5 INJECTION INTRAVENOUS at 21:59

## 2024-12-05 RX ADMIN — SODIUM CHLORIDE, PRESERVATIVE FREE 10 ML: 5 INJECTION INTRAVENOUS at 08:09

## 2024-12-05 RX ADMIN — IPRATROPIUM BROMIDE AND ALBUTEROL SULFATE 1 DOSE: 2.5; .5 SOLUTION RESPIRATORY (INHALATION) at 07:54

## 2024-12-05 RX ADMIN — Medication 5 ML: at 12:30

## 2024-12-05 RX ADMIN — DEXTROSE AND SODIUM CHLORIDE: 5; 450 INJECTION, SOLUTION INTRAVENOUS at 12:05

## 2024-12-05 ASSESSMENT — PULMONARY FUNCTION TESTS
PIF_VALUE: 25
PIF_VALUE: 23
PIF_VALUE: 23
PIF_VALUE: 26
PIF_VALUE: 17
PIF_VALUE: 23
PIF_VALUE: 17
PIF_VALUE: 24
PIF_VALUE: 17
PIF_VALUE: 17
PIF_VALUE: 23
PIF_VALUE: 24
PIF_VALUE: 17
PIF_VALUE: 24
PIF_VALUE: 23
PIF_VALUE: 25
PIF_VALUE: 24
PIF_VALUE: 31
PIF_VALUE: 23
PIF_VALUE: 11
PIF_VALUE: 17
PIF_VALUE: 17

## 2024-12-05 ASSESSMENT — PAIN SCALES - GENERAL
PAINLEVEL_OUTOF10: 7
PAINLEVEL_OUTOF10: 0
PAINLEVEL_OUTOF10: 0

## 2024-12-05 ASSESSMENT — PAIN - FUNCTIONAL ASSESSMENT: PAIN_FUNCTIONAL_ASSESSMENT: ACTIVITIES ARE NOT PREVENTED

## 2024-12-05 ASSESSMENT — PAIN DESCRIPTION - ORIENTATION: ORIENTATION: ANTERIOR

## 2024-12-05 ASSESSMENT — PAIN DESCRIPTION - LOCATION: LOCATION: HEAD

## 2024-12-05 ASSESSMENT — PAIN DESCRIPTION - DESCRIPTORS: DESCRIPTORS: ACHING

## 2024-12-05 ASSESSMENT — PAIN SCALES - WONG BAKER: WONGBAKER_NUMERICALRESPONSE: NO HURT

## 2024-12-05 NOTE — PROGRESS NOTES
Otolaryngology-HNS Consult    Subjective:     Date of Consultation:  December 5, 2024    Referring Physician: Glenn    History of Present Illness:   Patient is a 49 y.o.  female who is being seen for tracheostomy consult.  Admitted to the hospital 11/12/2024, with mental status change and hypoxia.  Has been intubated then extubated then back to the floor.  Now back to the ICU and intubated again for 3 days.  Biggest issue has been decreased mental status and poor tolerance of secretions leading to need for reintubation.  Otolaryngology service has been consulted for tracheostomy placement.    Interval Hx:   12/3/24:   Was planned for tracheostomy today, but canceled due to difficulty with obtaining consent from next of kin.    Vent settings: FiO2 40%, PEEP 5    Labs, imaging, and notes reviewed.    Interval hx: 12/4/2024  No change, mother is established and decision maker     Interval hx 12/5/2024  POD 1 s/p tracheostomy     Patient Active Problem List    Diagnosis Date Noted    Acute right-sided congestive heart failure (HCC) 11/26/2024    Essential hypertension 11/26/2024    GUERA (acute kidney injury) ([facility]) 11/26/2024    SVT (supraventricular tachycardia) ([facility]) 11/12/2024    Acute respiratory failure 01/03/2024    COPD exacerbation ([facility]) 01/02/2024    Acute asthma exacerbation 08/11/2023    COPD (chronic obstructive pulmonary disease) ([facility]) 08/11/2023    COPD with acute exacerbation ([facility]) 09/16/2022     Past Medical History:   Diagnosis Date    Asthma     COPD (chronic obstructive pulmonary disease) ([facility])       History reviewed. No pertinent family history.   Social History     Tobacco Use    Smoking status: Every Day     Current packs/day: 1.00     Types: Cigarettes    Smokeless tobacco: Not on file   Substance Use Topics    Alcohol use: Not on file     History reviewed. No pertinent surgical history.   Current Facility-Administered Medications   Medication Dose Route Frequency    dextrose 5 % and 0.45 % sodium  ventilation  POD 1 s/p tracheostomy     Plan:     Routine trach care  Will cut sutures day 5-7   Vent weaning per ICU team   Call if any issues      MD Rogelio Birch [facility]  [address]  [place] [address]  Office Phone: [phone]

## 2024-12-05 NOTE — PROGRESS NOTES
CRITICAL CARE ADMISSION NOTE    Name: Kim Markham   : 1975   MRN: [id number]   Date: 2024        Subjective:  No major overnight event  Tolerated tracheostomy well no complication noted  Alvares was removed and required straight cath 2 times  Finish Zosyn  Coming off sedation  Currently on trach collar  Glucose is 84 ABG is acceptable chest x-ray shows mild pulmonary edema    Diagnoses/problem list:   Altered mental status  COPD exacerbation  GUERA  Hypernatremia  Leukocytosis    HPI   Kim Markham is a 49 y.o. female with known past medical history significant for asthma and COPD who treated her symptoms today with Primatene Mist over-the-counter says that she went into fast heart rate became short of breath immediately.  No other exacerbating or relieving factors which presents to the emergency room with no treatment prior to arrival.  Patient reports that she had a few day history of shortness of breath and thought she had a pneumonia. She has been taking OTC cough syrup and cough medicine as well as her inhaler. She reports difficulty affording medications and has limited access to healthcare.      : Continues to have high vent requirements and high sedation requirements. SVT noted and amiodarone initiated.   11/15: Remains intubated on 500/16/10/55%.  Still has bilateral wheezing on auscultation.  Slight rise in creatinine noted but urine output is good.  No arrhythmias this morning.  Remains on amiodarone, propofol, fentanyl and heparin infusions.  : continues to require high sedation and high vent support. Will attempt SBT  : tolerated SBT for >2 hours. Will repeat today  : Sedated with propofol 40ug/kg/min, RAAS -1 to -2. AC 12 Vt 0.5 FiO2 0.4 PEEP 6. Doing well on SBTs but there is concern that she has too much anxiety to withdraw sedation and extubate  : Sedated with propofol 50ug/kg/min, RAAS -1 to -2. VD 7; AC 12 Vt 0.5 FiO2 0.4 PEEP 6. Doing well on SBTs but  has over 200 results.      No results displayed because visit has over 200 results.          CBC w/Diff Recent Labs     12/03/24  0240 12/04/24  0225 12/05/24  0325   WBC 10.0 7.0 8.8   RBC 2.86* 2.78* 2.86*   HGB 8.2* 7.9* 8.3*   HCT 25.8* 25.3* 26.0*    233 266      Cardiac Enzymes No results for input(s): \"CPK\" in the last 72 hours.    Invalid input(s): \"CKRMB\", \"CKND1\", \"TROIP\", \"DAVID\"   Coagulation Recent Labs     12/03/24  0240   INR 1.0       Lipid Panel No results found for: \"CHOL\", \"CHOLPOCT\", \"CHLST\", \"CHOLV\", \"872919\", \"HDL\", \"HDLC\", \"LDL\", \"LDLC\", \"VLDLC\", \"VLDL\"   BNP Invalid input(s): \"BNPP\"   Liver Enzymes No results for input(s): \"TP\" in the last 72 hours.    Invalid input(s): \"ALB\", \"TBIL\", \"AP\", \"SGOT\", \"GPT\", \"DBIL\"   Thyroid Studies Lab Results   Component Value Date/Time    TSH 0.55 01/03/2024 08:16 PM              CRITICAL CARE DOCUMENTATION  I had a face to face encounter with the patient, reviewed and interpreted patient data including clinical events, labs, images, vital signs, I/O's, and examined patient.  I have discussed the case and the plan and management of the patient's care with the consulting services, the bedside nurses and the respiratory therapist.      NOTE OF PERSONAL INVOLVEMENT IN CARE   This patient has a high probability of imminent, clinically significant deterioration, which requires the highest level of preparedness to intervene urgently. I participated in the decision-making and personally managed or directed the management of the following life and organ supporting interventions that required my frequent assessment to treat or prevent imminent deterioration.    I personally spent 40 minutes of critical care time.  This is time spent at this critically ill patient's bedside actively involved in patient care as well as the coordination of care.  This does not include any procedural time which has been billed separately.          Abdulaziz Rico M.D.  Pulmonary Critical

## 2024-12-05 NOTE — PROGRESS NOTES
OT eval order received and acknowledged. OT eval attempted at 0845 however Ms Bojorquezgens not appropriate for OT at this time. Will continue to follow patient and attempt OT eval at a later time. Thank you.

## 2024-12-05 NOTE — CARE COORDINATION
CM reviewed Pt medicals, Pt remains on the vent.    Pt lived with her boyfriend and was IND with ADL.     Ana, [facility] for [facility] have sent Pt medicals to Admin to review.       Per IDR  Plan is for SBT today.    PT/OT have been consulted, going to wait until tomorrow to evaluate Pt.

## 2024-12-05 NOTE — PROGRESS NOTES
Patient came off to trach collar doing well  ABG reviewed on trach collar and is acceptable  Will continue with the trach collar for now  Plan to continue trach collar 24/7  OT and PT to evaluate and follow        Abdulaziz Rico M.D.  Pulmonary Critical Care & Sleep Medicine

## 2024-12-05 NOTE — PLAN OF CARE
Problem: Discharge Planning  Goal: Discharge to home or other facility with appropriate resources  Outcome: Not Progressing     Problem: ABCDS Injury Assessment  Goal: Absence of physical injury  Outcome: Not Progressing     Problem: Safety - Adult  Goal: Free from fall injury  12/4/2024 2202 by Madyson Valentin RN  Outcome: Not Progressing  12/4/2024 1045 by Jasmin Rodriguez RN  Outcome: Progressing     Problem: Confusion  Goal: Confusion, delirium, dementia, or psychosis is improved or at baseline  Description: INTERVENTIONS:  1. Assess for possible contributors to thought disturbance, including medications, impaired vision or hearing, underlying metabolic abnormalities, dehydration, psychiatric diagnoses, and notify attending LIP  2. [place] high risk fall precautions, as indicated  3. Provide frequent short contacts to provide reality reorientation, refocusing and direction  4. Decrease environmental stimuli, including noise as appropriate  5. Monitor and intervene to maintain adequate nutrition, hydration, elimination, sleep and activity  6. If unable to ensure safety without constant attention obtain sitter and review sitter guidelines with assigned personnel  7. Initiate Psychosocial CNS and Spiritual Care consult, as indicated  Outcome: Not Progressing     Problem: Chronic Conditions and Co-morbidities  Goal: Patient's chronic conditions and co-morbidity symptoms are monitored and maintained or improved  Outcome: Not Progressing     Problem: Neurosensory - Adult  Goal: Achieves stable or improved neurological status  12/4/2024 2202 by Madyson Valentin RN  Outcome: Not Progressing  12/4/2024 1045 by Jasmin Rodriguez RN  Outcome: Progressing  Goal: Absence of seizures  Outcome: Not Progressing  Goal: Remains free of injury related to seizures activity  Outcome: Not Progressing  Goal: Achieves maximal functionality and self care  Outcome: Not Progressing     Problem: Respiratory - Adult  Goal: Achieves optimal  integrity remains intact  Outcome: Not Progressing  Goal: Incisions, wounds, or drain sites healing without S/S of infection  Outcome: Not Progressing  Goal: Oral mucous membranes remain intact  Outcome: Not Progressing

## 2024-12-05 NOTE — PROGRESS NOTES
Spiritual Health History and Assessment/Progress Note  [facility]    Initial Encounter,  , Adjustment to illness,      Name: Kim Markham MRN: [id number]    Age: 49 y.o.     Sex: female   Language: English   Denominational: None   SVT (supraventricular tachycardia) (HCC)     Date: 12/5/2024            Total Time Calculated: 34 min              Spiritual Assessment began in [facility] [place] ICU        Referral/Consult From: Nurse   Encounter Overview/Reason: Initial Encounter  Service Provided For: Patient    Susanne, Belief, Meaning:   Patient unable to assess at this time  Family/Friends No family/friends present      Importance and Influence:  Patient unable to assess at this time  Family/Friends No family/friends present    Community:  Patient Other: Support system identified in Ethics chart note  Family/Friends No family/friends present    Assessment and Plan of Care:     Patient Interventions include: Facilitated expression of thoughts and feelings and Other: Assisted pt communication with the staff.  Family/Friends Interventions include: No family/friends present    Patient Plan of Care: Spiritual Care available upon further referral  Family/Friends Plan of Care: No family/friends present     responded to referral from [facility] to provide spiritual/emotional support to the pt. Pt is unable to communicate verbally at this time.  provided pen and paper to assist pt communicate her requests.  collaborated with RN. Pt became frustrated and tearful as she attempted to make her requests known. /RN were able to identify what pt was requesting.     Electronically signed by ANAYELI Esparza on 12/5/2024 at 4:21 PM

## 2024-12-06 LAB
ALBUMIN SERPL-MCNC: 2.5 G/DL (ref 3.5–5)
ALBUMIN/GLOB SERPL: 0.7 (ref 1.1–2.2)
ALP SERPL-CCNC: 62 U/L (ref 45–117)
ALT SERPL-CCNC: 79 U/L (ref 12–78)
ANION GAP SERPL CALC-SCNC: 5 MMOL/L (ref 2–12)
AST SERPL W P-5'-P-CCNC: 21 U/L (ref 15–37)
BASOPHILS # BLD: 0 K/UL (ref 0–0.1)
BASOPHILS NFR BLD: 1 % (ref 0–1)
BILIRUB SERPL-MCNC: 0.4 MG/DL (ref 0.2–1)
BUN SERPL-MCNC: 12 MG/DL (ref 6–20)
BUN/CREAT SERPL: 26 (ref 12–20)
CA-I BLD-MCNC: 9 MG/DL (ref 8.5–10.1)
CHLORIDE SERPL-SCNC: 112 MMOL/L (ref 97–108)
CK SERPL-CCNC: 38 U/L (ref 26–192)
CO2 SERPL-SCNC: 27 MMOL/L (ref 21–32)
CREAT SERPL-MCNC: 0.47 MG/DL (ref 0.55–1.02)
DIFFERENTIAL METHOD BLD: ABNORMAL
EOSINOPHIL # BLD: 0.3 K/UL (ref 0–0.4)
EOSINOPHIL NFR BLD: 5 % (ref 0–7)
ERYTHROCYTE [DISTWIDTH] IN BLOOD BY AUTOMATED COUNT: 14.8 % (ref 11.5–14.5)
GLOBULIN SER CALC-MCNC: 3.5 G/DL (ref 2–4)
GLUCOSE BLD STRIP.AUTO-MCNC: 72 MG/DL (ref 65–100)
GLUCOSE BLD STRIP.AUTO-MCNC: 81 MG/DL (ref 65–100)
GLUCOSE BLD STRIP.AUTO-MCNC: 82 MG/DL (ref 65–100)
GLUCOSE BLD STRIP.AUTO-MCNC: 87 MG/DL (ref 65–100)
GLUCOSE SERPL-MCNC: 122 MG/DL (ref 65–100)
HCT VFR BLD AUTO: 28 % (ref 35–47)
HGB BLD-MCNC: 8.6 G/DL (ref 11.5–16)
IMM GRANULOCYTES # BLD AUTO: 0.1 K/UL (ref 0–0.04)
IMM GRANULOCYTES NFR BLD AUTO: 1 % (ref 0–0.5)
LYMPHOCYTES # BLD: 1 K/UL (ref 0.8–3.5)
LYMPHOCYTES NFR BLD: 15 % (ref 12–49)
MAGNESIUM SERPL-MCNC: 1.9 MG/DL (ref 1.6–2.4)
MCH RBC QN AUTO: 28.6 PG (ref 26–34)
MCHC RBC AUTO-ENTMCNC: 30.7 G/DL (ref 30–36.5)
MCV RBC AUTO: 93 FL (ref 80–99)
MONOCYTES # BLD: 0.5 K/UL (ref 0–1)
MONOCYTES NFR BLD: 8 % (ref 5–13)
NEUTS SEG # BLD: 4.8 K/UL (ref 1.8–8)
NEUTS SEG NFR BLD: 70 % (ref 32–75)
NRBC # BLD: 0 K/UL (ref 0–0.01)
NRBC BLD-RTO: 0 PER 100 WBC
PERFORMED BY:: NORMAL
PLATELET # BLD AUTO: 264 K/UL (ref 150–400)
PMV BLD AUTO: 12.3 FL (ref 8.9–12.9)
POTASSIUM SERPL-SCNC: 3.8 MMOL/L (ref 3.5–5.1)
PROT SERPL-MCNC: 6 G/DL (ref 6.4–8.2)
RBC # BLD AUTO: 3.01 M/UL (ref 3.8–5.2)
SODIUM SERPL-SCNC: 144 MMOL/L (ref 136–145)
WBC # BLD AUTO: 6.7 K/UL (ref 3.6–11)

## 2024-12-06 PROCEDURE — 94761 N-INVAS EAR/PLS OXIMETRY MLT: CPT

## 2024-12-06 PROCEDURE — 82550 ASSAY OF CK (CPK): CPT

## 2024-12-06 PROCEDURE — 80053 COMPREHEN METABOLIC PANEL: CPT

## 2024-12-06 PROCEDURE — 6370000000 HC RX 637 (ALT 250 FOR IP): Performed by: STUDENT IN AN ORGANIZED HEALTH CARE EDUCATION/TRAINING PROGRAM

## 2024-12-06 PROCEDURE — 6360000002 HC RX W HCPCS: Performed by: STUDENT IN AN ORGANIZED HEALTH CARE EDUCATION/TRAINING PROGRAM

## 2024-12-06 PROCEDURE — 2700000000 HC OXYGEN THERAPY PER DAY

## 2024-12-06 PROCEDURE — 6370000000 HC RX 637 (ALT 250 FOR IP): Performed by: INTERNAL MEDICINE

## 2024-12-06 PROCEDURE — 2580000003 HC RX 258: Performed by: NURSE PRACTITIONER

## 2024-12-06 PROCEDURE — 36415 COLL VENOUS BLD VENIPUNCTURE: CPT

## 2024-12-06 PROCEDURE — 82962 GLUCOSE BLOOD TEST: CPT

## 2024-12-06 PROCEDURE — 83735 ASSAY OF MAGNESIUM: CPT

## 2024-12-06 PROCEDURE — 2000000000 HC ICU R&B

## 2024-12-06 PROCEDURE — 97530 THERAPEUTIC ACTIVITIES: CPT

## 2024-12-06 PROCEDURE — 92507 TX SP LANG VOICE COMM INDIV: CPT

## 2024-12-06 PROCEDURE — 6370000000 HC RX 637 (ALT 250 FOR IP)

## 2024-12-06 PROCEDURE — 97535 SELF CARE MNGMENT TRAINING: CPT

## 2024-12-06 PROCEDURE — 94640 AIRWAY INHALATION TREATMENT: CPT

## 2024-12-06 PROCEDURE — 92597 ORAL SPEECH DEVICE EVAL: CPT

## 2024-12-06 PROCEDURE — 85025 COMPLETE CBC W/AUTO DIFF WBC: CPT

## 2024-12-06 RX ORDER — MIDODRINE HYDROCHLORIDE 5 MG/1
15 TABLET ORAL 2 TIMES DAILY WITH MEALS
Status: DISCONTINUED | OUTPATIENT
Start: 2024-12-07 | End: 2024-12-09

## 2024-12-06 RX ORDER — MIDODRINE HYDROCHLORIDE 5 MG/1
20 TABLET ORAL DAILY
Status: DISCONTINUED | OUTPATIENT
Start: 2024-12-07 | End: 2024-12-09

## 2024-12-06 RX ADMIN — IPRATROPIUM BROMIDE AND ALBUTEROL SULFATE 1 DOSE: 2.5; .5 SOLUTION RESPIRATORY (INHALATION) at 08:13

## 2024-12-06 RX ADMIN — OXYCODONE HYDROCHLORIDE AND ACETAMINOPHEN 2 TABLET: 5; 325 TABLET ORAL at 08:33

## 2024-12-06 RX ADMIN — Medication 5 ML: at 12:13

## 2024-12-06 RX ADMIN — BUSPIRONE HYDROCHLORIDE 7.5 MG: 5 TABLET ORAL at 21:54

## 2024-12-06 RX ADMIN — Medication 5 ML: at 21:57

## 2024-12-06 RX ADMIN — MIDODRINE HYDROCHLORIDE 20 MG: 5 TABLET ORAL at 12:12

## 2024-12-06 RX ADMIN — POTASSIUM BICARBONATE 40 MEQ: 782 TABLET, EFFERVESCENT ORAL at 21:53

## 2024-12-06 RX ADMIN — OXYCODONE HYDROCHLORIDE AND ACETAMINOPHEN 1 TABLET: 5; 325 TABLET ORAL at 04:07

## 2024-12-06 RX ADMIN — IPRATROPIUM BROMIDE AND ALBUTEROL SULFATE 1 DOSE: 2.5; .5 SOLUTION RESPIRATORY (INHALATION) at 04:39

## 2024-12-06 RX ADMIN — SODIUM CHLORIDE, PRESERVATIVE FREE 10 ML: 5 INJECTION INTRAVENOUS at 08:26

## 2024-12-06 RX ADMIN — MIDODRINE HYDROCHLORIDE 20 MG: 5 TABLET ORAL at 17:03

## 2024-12-06 RX ADMIN — QUETIAPINE FUMARATE 50 MG: 25 TABLET ORAL at 12:13

## 2024-12-06 RX ADMIN — POTASSIUM BICARBONATE 40 MEQ: 782 TABLET, EFFERVESCENT ORAL at 08:24

## 2024-12-06 RX ADMIN — MIDODRINE HYDROCHLORIDE 20 MG: 5 TABLET ORAL at 07:33

## 2024-12-06 RX ADMIN — LORAZEPAM 1 MG: 2 INJECTION INTRAMUSCULAR; INTRAVENOUS at 06:46

## 2024-12-06 RX ADMIN — POLYETHYLENE GLYCOL 3350 17 G: 17 POWDER, FOR SOLUTION ORAL at 08:25

## 2024-12-06 RX ADMIN — IPRATROPIUM BROMIDE AND ALBUTEROL SULFATE 1 DOSE: 2.5; .5 SOLUTION RESPIRATORY (INHALATION) at 19:49

## 2024-12-06 RX ADMIN — SERTRALINE HYDROCHLORIDE 50 MG: 50 TABLET ORAL at 08:25

## 2024-12-06 RX ADMIN — ENOXAPARIN SODIUM 30 MG: 100 INJECTION SUBCUTANEOUS at 08:33

## 2024-12-06 RX ADMIN — ATORVASTATIN CALCIUM 40 MG: 40 TABLET, FILM COATED ORAL at 21:54

## 2024-12-06 RX ADMIN — Medication 5 ML: at 08:24

## 2024-12-06 RX ADMIN — BUDESONIDE INHALATION 500 MCG: 0.5 SUSPENSION RESPIRATORY (INHALATION) at 19:53

## 2024-12-06 RX ADMIN — ENOXAPARIN SODIUM 30 MG: 100 INJECTION SUBCUTANEOUS at 21:56

## 2024-12-06 RX ADMIN — Medication 5 ML: at 17:02

## 2024-12-06 RX ADMIN — AMIODARONE HYDROCHLORIDE 200 MG: 200 TABLET ORAL at 08:25

## 2024-12-06 RX ADMIN — BUDESONIDE INHALATION 500 MCG: 0.5 SUSPENSION RESPIRATORY (INHALATION) at 08:13

## 2024-12-06 RX ADMIN — QUETIAPINE FUMARATE 50 MG: 25 TABLET ORAL at 21:54

## 2024-12-06 RX ADMIN — BUSPIRONE HYDROCHLORIDE 7.5 MG: 5 TABLET ORAL at 08:26

## 2024-12-06 RX ADMIN — FAMOTIDINE 20 MG: 20 TABLET, FILM COATED ORAL at 08:25

## 2024-12-06 RX ADMIN — ASPIRIN 81 MG 81 MG: 81 TABLET ORAL at 08:24

## 2024-12-06 RX ADMIN — OXYCODONE HYDROCHLORIDE AND ACETAMINOPHEN 2 TABLET: 5; 325 TABLET ORAL at 21:54

## 2024-12-06 RX ADMIN — SODIUM CHLORIDE, PRESERVATIVE FREE 10 ML: 5 INJECTION INTRAVENOUS at 21:58

## 2024-12-06 RX ADMIN — IPRATROPIUM BROMIDE AND ALBUTEROL SULFATE 1 DOSE: 2.5; .5 SOLUTION RESPIRATORY (INHALATION) at 14:59

## 2024-12-06 RX ADMIN — TAMSULOSIN HYDROCHLORIDE 0.4 MG: 0.4 CAPSULE ORAL at 08:25

## 2024-12-06 RX ADMIN — QUETIAPINE FUMARATE 50 MG: 25 TABLET ORAL at 08:25

## 2024-12-06 ASSESSMENT — PULMONARY FUNCTION TESTS
PIF_VALUE: 16
PIF_VALUE: 17
PIF_VALUE: 12
PIF_VALUE: 17
PIF_VALUE: 16
PIF_VALUE: 10
PIF_VALUE: 13
PIF_VALUE: 17
PIF_VALUE: 11
PIF_VALUE: 17
PIF_VALUE: 16
PIF_VALUE: 17
PIF_VALUE: 16
PIF_VALUE: 17

## 2024-12-06 ASSESSMENT — PAIN DESCRIPTION - LOCATION
LOCATION: BACK

## 2024-12-06 ASSESSMENT — PAIN DESCRIPTION - FREQUENCY: FREQUENCY: INTERMITTENT

## 2024-12-06 ASSESSMENT — PAIN - FUNCTIONAL ASSESSMENT
PAIN_FUNCTIONAL_ASSESSMENT: ACTIVITIES ARE NOT PREVENTED

## 2024-12-06 ASSESSMENT — PAIN DESCRIPTION - DESCRIPTORS
DESCRIPTORS: ACHING
DESCRIPTORS: ACHING
DESCRIPTORS: DISCOMFORT

## 2024-12-06 ASSESSMENT — PAIN SCALES - GENERAL
PAINLEVEL_OUTOF10: 6
PAINLEVEL_OUTOF10: 0
PAINLEVEL_OUTOF10: 10

## 2024-12-06 ASSESSMENT — PAIN DESCRIPTION - ORIENTATION
ORIENTATION: LOWER

## 2024-12-06 ASSESSMENT — PAIN DESCRIPTION - PAIN TYPE: TYPE: ACUTE PAIN

## 2024-12-06 ASSESSMENT — PAIN DESCRIPTION - ONSET: ONSET: PROGRESSIVE

## 2024-12-06 NOTE — PLAN OF CARE
PHYSICAL THERAPY REEVALUATION  Patient: Kim Markham (49 y.o. female)  Date: 12/6/2024  Primary Diagnosis: Acute right-sided congestive heart failure (HCC) [I50.811]  SVT (supraventricular tachycardia) (HCC) [I47.10]  Essential hypertension [I10]  Hypoxia [R09.02]  COPD exacerbation (HCC) [J44.1]  Procedure(s) (LRB):  TRACHEOSTOMY (N/A) 2 Days Post-Op   Precautions: Fall Risk, Bed Alarm                      Recommendations for nursing mobility: Frequent repositioning to prevent skin breakdown and Assist x2    In place during session: Peripheral IV, High Flow 20L/min 45%, Tracheostomy heated high flow, Flexi-seal, EKG/telemetry , Pulse ox, and Nasogastric Tube  Chart, physical therapy assessment, plan of care, and goals were reviewed.      ASSESSMENT  Patient initially seen for PT evaluation 11/25/2024 and 0 skilled PT sessions since evalution. Patient seen today for PT reevaluation s/t new orders after transfer to ICU on 11/26/2024. Patient A&O WFL. Pt lying in semi-supine position upon arrival, agreeable to session. Patient transferred to ICU on 11/26/2024 from  for airway management and AMS.  Intubated and sedated secondary to AMS and concern for airway protection with mucous plugging 11/26/2024. Tracheostomy 12/4/2024     Based on the objective data described, the patient currently presents with impaired functional mobility, decreased ROM, impaired strength, poor body mechanics, decreased activity tolerance, poor safety awareness, and impaired balance. (See below for objective details and assist levels).    Overall pt tolerated session fair/poor today, currently with PT reassessment. Patient transfer to eob with max assist x 2. Patient sat at eob ~3 minutes before fatiguing and requesting to transfer back to supine. Static sit balance : max/total assist x 1.noted minimal movement in LLE. Patient quads 3/5.  Pt will benefit from continued skilled PT to address above deficits and return to PLOF, PT goals and

## 2024-12-06 NOTE — PLAN OF CARE
wounds, or drain sites healing without S/S of infection  Outcome: Progressing  Goal: Oral mucous membranes remain intact  Outcome: Progressing

## 2024-12-06 NOTE — CARE COORDINATION
CM reviewed Pt medicals, Pt has a trach.  Pt lives with her boyfriend and was IND with ADL.    [facility] medicals for [facility].     Per IDR  No real issues, was C/O pain.   PT/OT are working with Pt.

## 2024-12-06 NOTE — PROGRESS NOTES
OCCUPATIONAL THERAPY TREATMENT: WEEKLY REASSESSMENT    Patient: Kim Markham (49 y.o. female)  Date: 12/6/2024  Primary Diagnosis: Acute right-sided congestive heart failure (HCC) [I50.811]  SVT (supraventricular tachycardia) (HCC) [I47.10]  Essential hypertension [I10]  Hypoxia [R09.02]  COPD exacerbation (HCC) [J44.1]  Procedure(s) (LRB):  TRACHEOSTOMY (N/A) 2 Days Post-Op   Precautions: NPO, Fall Risk, General Precautions                Recommendations for nursing mobility: Frequent repositioning to prevent skin breakdown and Assist x2    In place during session: Peripheral IV, High Flow 20L/min 45%, External Catheter, Flexi-seal, EKG/telemetry , and Pulse ox; Tracheostomy heated high flow, Nasogastric Tube  Chart, occupational therapy assessment, plan of care, and goals were reviewed.  ASSESSMENT  Ms. Markham s a 49 y.o. female who presented to [facility] with c/o increased heart rate and SOB, was admitted 11/12/2024, then intubated on mechanical ventilator 11/14/2024, extubated 11/23/2024, was seen for an initial OT Evaluation on 11/25/2024 and was being treated for SVT. Since her initial OT evaluation 11/25/2024, she was seen 0 skilled OT sessions. She was seen today for an OT reevaluation s/t new orders after transfer to ICU 11/26/2024 from [facility] due to AMS and airway management. On the ICU, she was sedated and intubated due to AMS and concern for airway protection with mucous plugging 11/26/2024. Most recently, a tracheostomy was placed 12/4/2024. Today she was A & O WFL and upon arrival, she was lying semi-supine and agreeable to session.      Based on the objective data described, the patient currently presents with impaired functional mobility, decreased independence in ADLs, and decreased activity tolerance. (See below for objective details and assist levels).    Overall she tolerated session with additional time coupled with needing assistance of 2. Since her initial evaluation on 11/25/2024, her [facility]  training/education    Frequency/Duration: 2-3x/week    Recommend next OT session: UB dressing and UB bathing    Recommendation for discharge: (in order for the patient to meet his/her long term goals): Moderate intensity short-term skilled occupational therapy up to 5x/week    Potential barriers for safe discharge: pt is a high fall risk and pt is not safe to be alone    IF patient discharges home will need the following DME: bedside commode, gait belt, hospital bed, and wheelchair      SUBJECTIVE:   Patient stated “Thank you ( mouthed the words).”    OBJECTIVE DATA SUMMARY:   Cognitive/Behavioral Status:  Orientation  Overall Orientation Status: Within Functional Limits  Orientation Level:  (Consistently reponded to her name, responses delayed but appropriate.)  Cognition  Overall Cognitive Status: WFL  Arousal/Alertness: Delayed responses to stimuli  Following Commands: Follows one step commands with increased time  Attention Span: Attends with cues to redirect  Initiation: Requires cues for some  Sequencing: Requires cues for some    Functional Mobility and Transfers for ADLs:  Bed Mobility:  Bed Mobility Training  Bed Mobility Training:  (Sat on EOB with support of 2 up to 3 minutes.)  Overall Level of Assistance: Maximum assistance;Assist X2;Additional time  Interventions: Verbal cues  Rolling: Maximum assistance;Additional time;Assist X2  Supine to Sit: Maximum assistance;Additional time;Assist X2  Sit to Supine: Maximum assistance;Additional time;Assist X2  Scooting: Maximum assistance;Additional time;Assist X2    Transfers:  Transfer Training  Transfer Training: No    Balance:  Balance  Sitting: Impaired  Sitting - Static: Poor (constant support)  Sitting - Dynamic: Not tested    ADL Intervention:      Feeding: NPO       Grooming: Dependent/Total       UE Bathing: Dependent/Total      LE Bathing: Dependent/Total       UE Dressing: Dependent/Total        LE Dressing: Dependent/Total

## 2024-12-06 NOTE — PROGRESS NOTES
CRITICAL CARE ADMISSION NOTE    Name: Kim Markham   : 1975   MRN: [id number]   Date: 2024        Subjective:  No major overnight event  Tolerated tracheostomy well no complication noted  Tolerate trach collar well last night however required to be on a BiPAP overnight  Finish Zosyn  Coming off sedation  Currently on trach collar  Glucose is 84 ABG is acceptable chest x-ray shows mild pulmonary edema    Diagnoses/problem list:   Altered mental status  COPD exacerbation  GUERA  Hypernatremia  Leukocytosis    HPI   Kim Markham is a 49 y.o. female with known past medical history significant for asthma and COPD who treated her symptoms today with Primatene Mist over-the-counter says that she went into fast heart rate became short of breath immediately.  No other exacerbating or relieving factors which presents to the emergency room with no treatment prior to arrival.  Patient reports that she had a few day history of shortness of breath and thought she had a pneumonia. She has been taking OTC cough syrup and cough medicine as well as her inhaler. She reports difficulty affording medications and has limited access to healthcare.      : Continues to have high vent requirements and high sedation requirements. SVT noted and amiodarone initiated.   11/15: Remains intubated on 500/16/10/55%.  Still has bilateral wheezing on auscultation.  Slight rise in creatinine noted but urine output is good.  No arrhythmias this morning.  Remains on amiodarone, propofol, fentanyl and heparin infusions.  : continues to require high sedation and high vent support. Will attempt SBT  : tolerated SBT for >2 hours. Will repeat today  : Sedated with propofol 40ug/kg/min, RAAS -1 to -2. AC 12 Vt 0.5 FiO2 0.4 PEEP 6. Doing well on SBTs but there is concern that she has too much anxiety to withdraw sedation and extubate  : Sedated with propofol 50ug/kg/min, RAAS -1 to -2. VD 7; AC 12 Vt 0.5 FiO2 0.4 PEEP

## 2024-12-06 NOTE — PROGRESS NOTES
OT tx attempted at 1020 however Ms. Markham BP fluctuated this AM ranging from 80/63 to 126/81 . Will continue to follow and re-attempt OT at a later time. Thank you.

## 2024-12-06 NOTE — PLAN OF CARE
Speech LAnguage Pathology EVALUATION    Patient: Kim Markham (49 y.o. female)  Date: 12/6/2024  Primary Diagnosis: Acute right-sided congestive heart failure (HCC) [I50.811]  SVT (supraventricular tachycardia) (HCC) [I47.10]  Essential hypertension [I10]  Hypoxia [R09.02]  COPD exacerbation (HCC) [J44.1]  Procedure(s) (LRB):  TRACHEOSTOMY (N/A) 2 Days Post-Op   Precautions: aspiration, PMV, NPO, Fall Risk, General Precautions                  PMV RECOMMENDATIONS: with supervision with RT, SLP or RN only, ENSURE CUFF IS DEFLATED PRIOR TO PLACEMENT OF PMV. Warning labels affixed to  balloon and wall above HOB.     ASSESSMENT :  Based on the objective data described below, the patient presents with fair to good tolerance of PMV with fatigue noted and increased secretions following doffing of PMV.     Pt seen for initial PMV assessment. Clinician spoke with Dr. Rico, Dr. Loyd and RT prior to assessment. PMV trials approved by all.    Pt is POD 2 of Trach placement #6 Shiley cuffed on 12-4-2024. Tracheostomy placed due to copious secretions, poor airway clearance, reintubations. Pt was placed on trach collar 12-5-2024 ~ 230 pm and tolerated well. Pt placed back on vent for BiPAP overnight. Per discussion with MD, pt will require nocturnal BiPAP indefinitely and pt is appropriate for PMV trial.   Pt currently has NG tube, TF not running at time of assessment. Nsg reports TF will resume. Pt with inline 02 and suction. Pt currently on 20 L, 40% Fi02.  Pt with AMS and is in bilateral restraints.  Pt follows commands and participates well, positioned upright in bed.     RT present to deflate cuff prior to SLP assessment and humidified trach collar placed for assessment.  RT suctioned pt prior to PMV placement. Pt tolerates finger occlusion with rough voicing but appropriate. Pt with increased labored respirations at rest that improve during assessment.   Pt tolerates PMV placement without air stacking and vitals

## 2024-12-07 ENCOUNTER — APPOINTMENT (OUTPATIENT)
Facility: HOSPITAL | Age: 49
End: 2024-12-07
Payer: MEDICAID

## 2024-12-07 LAB
ALBUMIN SERPL-MCNC: 2.3 G/DL (ref 3.5–5)
ALBUMIN/GLOB SERPL: 0.7 (ref 1.1–2.2)
ALP SERPL-CCNC: 65 U/L (ref 45–117)
ALT SERPL-CCNC: 64 U/L (ref 12–78)
ANION GAP SERPL CALC-SCNC: 7 MMOL/L (ref 2–12)
AST SERPL W P-5'-P-CCNC: 19 U/L (ref 15–37)
BASOPHILS # BLD: 0 K/UL (ref 0–0.1)
BASOPHILS NFR BLD: 0 % (ref 0–1)
BILIRUB SERPL-MCNC: 0.4 MG/DL (ref 0.2–1)
BUN SERPL-MCNC: 13 MG/DL (ref 6–20)
BUN/CREAT SERPL: 33 (ref 12–20)
CA-I BLD-MCNC: 8.8 MG/DL (ref 8.5–10.1)
CHLORIDE SERPL-SCNC: 113 MMOL/L (ref 97–108)
CK SERPL-CCNC: 65 U/L (ref 26–192)
CO2 SERPL-SCNC: 24 MMOL/L (ref 21–32)
CREAT SERPL-MCNC: 0.39 MG/DL (ref 0.55–1.02)
DIFFERENTIAL METHOD BLD: ABNORMAL
EOSINOPHIL # BLD: 0.3 K/UL (ref 0–0.4)
EOSINOPHIL NFR BLD: 4 % (ref 0–7)
ERYTHROCYTE [DISTWIDTH] IN BLOOD BY AUTOMATED COUNT: 14.7 % (ref 11.5–14.5)
GLOBULIN SER CALC-MCNC: 3.5 G/DL (ref 2–4)
GLUCOSE BLD STRIP.AUTO-MCNC: 104 MG/DL (ref 65–100)
GLUCOSE BLD STRIP.AUTO-MCNC: 115 MG/DL (ref 65–100)
GLUCOSE BLD STRIP.AUTO-MCNC: 97 MG/DL (ref 65–100)
GLUCOSE BLD STRIP.AUTO-MCNC: 98 MG/DL (ref 65–100)
GLUCOSE SERPL-MCNC: 85 MG/DL (ref 65–100)
HCT VFR BLD AUTO: 28.8 % (ref 35–47)
HGB BLD-MCNC: 8.9 G/DL (ref 11.5–16)
IMM GRANULOCYTES # BLD AUTO: 0.1 K/UL (ref 0–0.04)
IMM GRANULOCYTES NFR BLD AUTO: 1 % (ref 0–0.5)
LYMPHOCYTES # BLD: 1.1 K/UL (ref 0.8–3.5)
LYMPHOCYTES NFR BLD: 13 % (ref 12–49)
MAGNESIUM SERPL-MCNC: 1.8 MG/DL (ref 1.6–2.4)
MCH RBC QN AUTO: 28.9 PG (ref 26–34)
MCHC RBC AUTO-ENTMCNC: 30.9 G/DL (ref 30–36.5)
MCV RBC AUTO: 93.5 FL (ref 80–99)
MONOCYTES # BLD: 0.6 K/UL (ref 0–1)
MONOCYTES NFR BLD: 8 % (ref 5–13)
NEUTS SEG # BLD: 6 K/UL (ref 1.8–8)
NEUTS SEG NFR BLD: 74 % (ref 32–75)
NRBC # BLD: 0 K/UL (ref 0–0.01)
NRBC BLD-RTO: 0 PER 100 WBC
PERFORMED BY:: ABNORMAL
PERFORMED BY:: ABNORMAL
PERFORMED BY:: NORMAL
PERFORMED BY:: NORMAL
PHOSPHATE SERPL-MCNC: 2.6 MG/DL (ref 2.6–4.7)
PLATELET # BLD AUTO: 289 K/UL (ref 150–400)
PMV BLD AUTO: 11.7 FL (ref 8.9–12.9)
POTASSIUM SERPL-SCNC: 3.7 MMOL/L (ref 3.5–5.1)
PROT SERPL-MCNC: 5.8 G/DL (ref 6.4–8.2)
RBC # BLD AUTO: 3.08 M/UL (ref 3.8–5.2)
SODIUM SERPL-SCNC: 144 MMOL/L (ref 136–145)
WBC # BLD AUTO: 8 K/UL (ref 3.6–11)

## 2024-12-07 PROCEDURE — 36415 COLL VENOUS BLD VENIPUNCTURE: CPT

## 2024-12-07 PROCEDURE — 6370000000 HC RX 637 (ALT 250 FOR IP): Performed by: STUDENT IN AN ORGANIZED HEALTH CARE EDUCATION/TRAINING PROGRAM

## 2024-12-07 PROCEDURE — 6370000000 HC RX 637 (ALT 250 FOR IP): Performed by: INTERNAL MEDICINE

## 2024-12-07 PROCEDURE — 6360000002 HC RX W HCPCS: Performed by: STUDENT IN AN ORGANIZED HEALTH CARE EDUCATION/TRAINING PROGRAM

## 2024-12-07 PROCEDURE — 2580000003 HC RX 258: Performed by: NURSE PRACTITIONER

## 2024-12-07 PROCEDURE — 83735 ASSAY OF MAGNESIUM: CPT

## 2024-12-07 PROCEDURE — 82962 GLUCOSE BLOOD TEST: CPT

## 2024-12-07 PROCEDURE — 2000000000 HC ICU R&B

## 2024-12-07 PROCEDURE — 6360000002 HC RX W HCPCS: Performed by: NURSE PRACTITIONER

## 2024-12-07 PROCEDURE — 94640 AIRWAY INHALATION TREATMENT: CPT

## 2024-12-07 PROCEDURE — 71045 X-RAY EXAM CHEST 1 VIEW: CPT

## 2024-12-07 PROCEDURE — 85025 COMPLETE CBC W/AUTO DIFF WBC: CPT

## 2024-12-07 PROCEDURE — 84100 ASSAY OF PHOSPHORUS: CPT

## 2024-12-07 PROCEDURE — 6360000002 HC RX W HCPCS: Performed by: INTERNAL MEDICINE

## 2024-12-07 PROCEDURE — 6370000000 HC RX 637 (ALT 250 FOR IP)

## 2024-12-07 PROCEDURE — 92507 TX SP LANG VOICE COMM INDIV: CPT

## 2024-12-07 PROCEDURE — 94761 N-INVAS EAR/PLS OXIMETRY MLT: CPT

## 2024-12-07 PROCEDURE — 80053 COMPREHEN METABOLIC PANEL: CPT

## 2024-12-07 PROCEDURE — 2700000000 HC OXYGEN THERAPY PER DAY

## 2024-12-07 PROCEDURE — 82550 ASSAY OF CK (CPK): CPT

## 2024-12-07 RX ORDER — FUROSEMIDE 10 MG/ML
20 INJECTION INTRAMUSCULAR; INTRAVENOUS ONCE
Status: COMPLETED | OUTPATIENT
Start: 2024-12-07 | End: 2024-12-07

## 2024-12-07 RX ADMIN — HALOPERIDOL LACTATE 5 MG: 5 INJECTION, SOLUTION INTRAMUSCULAR at 00:42

## 2024-12-07 RX ADMIN — AMIODARONE HYDROCHLORIDE 200 MG: 200 TABLET ORAL at 08:50

## 2024-12-07 RX ADMIN — BUSPIRONE HYDROCHLORIDE 7.5 MG: 5 TABLET ORAL at 20:11

## 2024-12-07 RX ADMIN — Medication 5 ML: at 20:16

## 2024-12-07 RX ADMIN — IPRATROPIUM BROMIDE AND ALBUTEROL SULFATE 1 DOSE: 2.5; .5 SOLUTION RESPIRATORY (INHALATION) at 00:38

## 2024-12-07 RX ADMIN — QUETIAPINE FUMARATE 50 MG: 25 TABLET ORAL at 14:00

## 2024-12-07 RX ADMIN — FAMOTIDINE 20 MG: 20 TABLET, FILM COATED ORAL at 08:49

## 2024-12-07 RX ADMIN — SERTRALINE HYDROCHLORIDE 50 MG: 50 TABLET ORAL at 08:49

## 2024-12-07 RX ADMIN — BUDESONIDE INHALATION 500 MCG: 0.5 SUSPENSION RESPIRATORY (INHALATION) at 09:19

## 2024-12-07 RX ADMIN — MIDODRINE HYDROCHLORIDE 15 MG: 5 TABLET ORAL at 08:51

## 2024-12-07 RX ADMIN — ENOXAPARIN SODIUM 30 MG: 100 INJECTION SUBCUTANEOUS at 08:50

## 2024-12-07 RX ADMIN — SODIUM CHLORIDE, PRESERVATIVE FREE 10 ML: 5 INJECTION INTRAVENOUS at 20:15

## 2024-12-07 RX ADMIN — Medication 5 ML: at 10:01

## 2024-12-07 RX ADMIN — TAMSULOSIN HYDROCHLORIDE 0.4 MG: 0.4 CAPSULE ORAL at 08:49

## 2024-12-07 RX ADMIN — Medication 5 ML: at 16:54

## 2024-12-07 RX ADMIN — QUETIAPINE FUMARATE 50 MG: 25 TABLET ORAL at 20:11

## 2024-12-07 RX ADMIN — SENNOSIDES 8.6 MG: 8.6 TABLET, FILM COATED ORAL at 20:15

## 2024-12-07 RX ADMIN — ENOXAPARIN SODIUM 30 MG: 100 INJECTION SUBCUTANEOUS at 20:58

## 2024-12-07 RX ADMIN — POTASSIUM BICARBONATE 40 MEQ: 782 TABLET, EFFERVESCENT ORAL at 20:15

## 2024-12-07 RX ADMIN — IPRATROPIUM BROMIDE AND ALBUTEROL SULFATE 1 DOSE: 2.5; .5 SOLUTION RESPIRATORY (INHALATION) at 13:58

## 2024-12-07 RX ADMIN — ASPIRIN 81 MG 81 MG: 81 TABLET ORAL at 08:49

## 2024-12-07 RX ADMIN — BUDESONIDE INHALATION 500 MCG: 0.5 SUSPENSION RESPIRATORY (INHALATION) at 19:49

## 2024-12-07 RX ADMIN — Medication 5 ML: at 14:00

## 2024-12-07 RX ADMIN — POTASSIUM BICARBONATE 40 MEQ: 782 TABLET, EFFERVESCENT ORAL at 08:50

## 2024-12-07 RX ADMIN — ATORVASTATIN CALCIUM 40 MG: 40 TABLET, FILM COATED ORAL at 20:12

## 2024-12-07 RX ADMIN — QUETIAPINE FUMARATE 50 MG: 25 TABLET ORAL at 08:50

## 2024-12-07 RX ADMIN — BUSPIRONE HYDROCHLORIDE 7.5 MG: 5 TABLET ORAL at 08:49

## 2024-12-07 RX ADMIN — FUROSEMIDE 20 MG: 10 INJECTION, SOLUTION INTRAMUSCULAR; INTRAVENOUS at 11:50

## 2024-12-07 RX ADMIN — IPRATROPIUM BROMIDE AND ALBUTEROL SULFATE 1 DOSE: 2.5; .5 SOLUTION RESPIRATORY (INHALATION) at 19:49

## 2024-12-07 RX ADMIN — IPRATROPIUM BROMIDE AND ALBUTEROL SULFATE 1 DOSE: 2.5; .5 SOLUTION RESPIRATORY (INHALATION) at 09:19

## 2024-12-07 RX ADMIN — OXYCODONE HYDROCHLORIDE AND ACETAMINOPHEN 2 TABLET: 5; 325 TABLET ORAL at 11:49

## 2024-12-07 ASSESSMENT — PAIN DESCRIPTION - ORIENTATION: ORIENTATION: MID;LOWER

## 2024-12-07 ASSESSMENT — PAIN SCALES - GENERAL
PAINLEVEL_OUTOF10: 7
PAINLEVEL_OUTOF10: 0

## 2024-12-07 ASSESSMENT — PULMONARY FUNCTION TESTS
PIF_VALUE: 13
PIF_VALUE: 19
PIF_VALUE: 19
PIF_VALUE: 13
PIF_VALUE: 21
PIF_VALUE: 19
PIF_VALUE: 20
PIF_VALUE: 19

## 2024-12-07 ASSESSMENT — PAIN DESCRIPTION - DESCRIPTORS: DESCRIPTORS: ACHING

## 2024-12-07 ASSESSMENT — PAIN DESCRIPTION - LOCATION: LOCATION: BACK

## 2024-12-07 NOTE — PLAN OF CARE
decompensation or poor tolerance within 7 days.     Patient will demonstrate independent use of PMV precautions, donning and doffing PMV within 7 days.     Patient will participate in full sw assessment within 7 days.     Patient will participate in MBS within 7 days.   12/6/2024 1634 by Elena Scott, SLP  Outcome: Progressing

## 2024-12-07 NOTE — PROGRESS NOTES
CRITICAL CARE ADMISSION NOTE    Name: Kim Markham   : 1975   MRN: [id number]   Date: 2024        Subjective: 12 7  No major overnight event  Tolerated tracheostomy well no complication noted  Tolerate trach collar well last night however required to be on a BiPAP overnight  Secretions are slowly improving, tolerating PMV as per speech  Finish Zosyn  Coming off sedation    Chest x-ray shows mild pulmonary edema will try diuresis    Diagnoses/problem list:   Altered mental status  COPD exacerbation  GUERA  Hypernatremia  Leukocytosis    HPI   Kim Markham is a 49 y.o. female with known past medical history significant for asthma and COPD who treated her symptoms today with Primatene Mist over-the-counter says that she went into fast heart rate became short of breath immediately.  No other exacerbating or relieving factors which presents to the emergency room with no treatment prior to arrival.  Patient reports that she had a few day history of shortness of breath and thought she had a pneumonia. She has been taking OTC cough syrup and cough medicine as well as her inhaler. She reports difficulty affording medications and has limited access to healthcare.      : Continues to have high vent requirements and high sedation requirements. SVT noted and amiodarone initiated.   11/15: Remains intubated on 500/16/10/55%.  Still has bilateral wheezing on auscultation.  Slight rise in creatinine noted but urine output is good.  No arrhythmias this morning.  Remains on amiodarone, propofol, fentanyl and heparin infusions.  : continues to require high sedation and high vent support. Will attempt SBT  : tolerated SBT for >2 hours. Will repeat today  : Sedated with propofol 40ug/kg/min, RAAS -1 to -2. AC 12 Vt 0.5 FiO2 0.4 PEEP 6. Doing well on SBTs but there is concern that she has too much anxiety to withdraw sedation and extubate  : Sedated with propofol 50ug/kg/min, RAAS -1 to -2. VD

## 2024-12-07 NOTE — PLAN OF CARE
Speech LAnguage Pathology TREATMENT    Patient: Kim Markham (49 y.o. female)  Date: 12/7/2024  Primary Diagnosis: Acute right-sided congestive heart failure (HCC) [I50.811]  SVT (supraventricular tachycardia) (HCC) [I47.10]  Essential hypertension [I10]  Hypoxia [R09.02]  COPD exacerbation (HCC) [J44.1]  Procedure(s) (LRB):  TRACHEOSTOMY (N/A) 3 Days Post-Op   Precautions: Respiratory, NPO, Fall Risk, General Precautions                  DIET RECOMMENDATIONS: NPO    SWALLOW SAFETY PRECAUTIONS: NGT feeding precautions    PMV RECOMMENDATIONS: With SLP only    ASSESSMENT :  Based on the objective data described below, the patient presents with fair to guarded tolerance of PMV with fatigue noted, increased WOB, changes in O2 stat level.     Pt seen for PMV re-assessment. Clinician spoke with RT prior to assessment.   Pt is POD 2 of Trach placement #6 Shiley cuffed on 12-4-2024. Tracheostomy placed due to copious secretions, poor airway clearance, re-intubations. Pt was placed on trach collar 12-5-2024.  Pt currently has NG tube, TF not running at time of assessment.  Pt with inline 02 and suction. Pt currently on 20 L, 40% Fi02.  Pt with AMS and is in bilateral restraints.  Pt follows commands and participates, positioned upright in bed.      Cuff already deflated upon entry of room.   RT present for assessment.  RT suctioned pt prior to PMV placement, as patient with audible upper airway congestion.   Pt tolerates PMV placement without air stacking though VS deviate present after ~5 minutes PMV placement.    Blood Pressure:  Pt's BP ranged from 118/86 at baseline and during PMV placement, following PMV removal, /99.   O2 Saturation:  O2 levels 94% prior to PMV placement, during PMV trial O2 initially tolerated then drops to 87-88% and slowly improves to 89-90%, though decreased to 88% and does not resume. PMV removed. Once RT places HF in position, O2 resumes to 94%.   Heart Rate:  Initial HR 84, during PMV  placement, initially tolerated though increases to 110, with removal of PMV resumes to 86.     Pt with moderate amount of clear secretions that she expectorates via or cavity intermittently.   Pt denies anxiety and reports good tolerance of PMV placement. Vocal quality is reduced and raspy; patient with hx of smoking. Patient effectively communicates odynophagia during swallowing of secretions and declines po trials on this date and is not medically appropriate for swallow assessment today.     Patient is currently not appropriate to don/dof PMV independently and is currently in bilateral restraints. Patient did not tolerate PMV trial as well as yesterday's trial.     Upon exiting room, Patient's VSS and RN made aware of PMV trials on this date.      PMV trials with ST only.      Patient will benefit from skilled intervention to address the above impairments.     GOALS:  Problem: SLP Adult - Impaired Communication  Goal: By Discharge: Demonstrates communication skills at highest level of function for planned discharge setting.  See evaluation for individualized goals.  Description: Patient will tolerate PMV with RN, RT or SLP supervision without s/s decompensation or poor tolerance within 7 days.     Patient will demonstrate independent use of PMV precautions, donning and doffing PMV within 7 days.     Patient will participate in full sw assessment within 7 days.     Patient will participate in MBS within 7 days.   Outcome: Progressing     PLAN :  Recommendations and Planned Interventions:  Diet: NPO    PMV Placement Recommendations: Speech/RN/Trained family member & Staff  Acute SLP Services: Yes, patient will be followed by speech-language pathology 5x/week to address goals. Patient's rehabilitation potential is considered to be Guarded.    Discharge Recommendations: Continue to assess pending progress     SUBJECTIVE:   Thumbs up to trial PMV placement    OBJECTIVE:     Past Medical History:   Diagnosis Date

## 2024-12-08 LAB
ALBUMIN SERPL-MCNC: 2.4 G/DL (ref 3.5–5)
ALBUMIN/GLOB SERPL: 0.7 (ref 1.1–2.2)
ALP SERPL-CCNC: 70 U/L (ref 45–117)
ALT SERPL-CCNC: 56 U/L (ref 12–78)
ANION GAP SERPL CALC-SCNC: 4 MMOL/L (ref 2–12)
AST SERPL W P-5'-P-CCNC: 12 U/L (ref 15–37)
BASOPHILS # BLD: 0 K/UL (ref 0–0.1)
BASOPHILS NFR BLD: 0 % (ref 0–1)
BILIRUB SERPL-MCNC: 0.3 MG/DL (ref 0.2–1)
BUN SERPL-MCNC: 15 MG/DL (ref 6–20)
BUN/CREAT SERPL: 36 (ref 12–20)
CA-I BLD-MCNC: 9.2 MG/DL (ref 8.5–10.1)
CHLORIDE SERPL-SCNC: 106 MMOL/L (ref 97–108)
CO2 SERPL-SCNC: 31 MMOL/L (ref 21–32)
CREAT SERPL-MCNC: 0.42 MG/DL (ref 0.55–1.02)
DIFFERENTIAL METHOD BLD: ABNORMAL
EOSINOPHIL # BLD: 0.3 K/UL (ref 0–0.4)
EOSINOPHIL NFR BLD: 4 % (ref 0–7)
ERYTHROCYTE [DISTWIDTH] IN BLOOD BY AUTOMATED COUNT: 14.8 % (ref 11.5–14.5)
GLOBULIN SER CALC-MCNC: 3.6 G/DL (ref 2–4)
GLUCOSE BLD STRIP.AUTO-MCNC: 111 MG/DL (ref 65–100)
GLUCOSE BLD STRIP.AUTO-MCNC: 123 MG/DL (ref 65–100)
GLUCOSE BLD STRIP.AUTO-MCNC: 129 MG/DL (ref 65–100)
GLUCOSE SERPL-MCNC: 122 MG/DL (ref 65–100)
HCT VFR BLD AUTO: 27.8 % (ref 35–47)
HGB BLD-MCNC: 8.6 G/DL (ref 11.5–16)
IMM GRANULOCYTES # BLD AUTO: 0.1 K/UL (ref 0–0.04)
IMM GRANULOCYTES NFR BLD AUTO: 1 % (ref 0–0.5)
LYMPHOCYTES # BLD: 1.1 K/UL (ref 0.8–3.5)
LYMPHOCYTES NFR BLD: 12 % (ref 12–49)
MAGNESIUM SERPL-MCNC: 1.8 MG/DL (ref 1.6–2.4)
MCH RBC QN AUTO: 28.7 PG (ref 26–34)
MCHC RBC AUTO-ENTMCNC: 30.9 G/DL (ref 30–36.5)
MCV RBC AUTO: 92.7 FL (ref 80–99)
MONOCYTES # BLD: 0.6 K/UL (ref 0–1)
MONOCYTES NFR BLD: 7 % (ref 5–13)
NEUTS SEG # BLD: 6.5 K/UL (ref 1.8–8)
NEUTS SEG NFR BLD: 76 % (ref 32–75)
NRBC # BLD: 0 K/UL (ref 0–0.01)
NRBC BLD-RTO: 0 PER 100 WBC
PERFORMED BY:: ABNORMAL
PLATELET # BLD AUTO: 299 K/UL (ref 150–400)
PMV BLD AUTO: 12 FL (ref 8.9–12.9)
POTASSIUM SERPL-SCNC: 3.8 MMOL/L (ref 3.5–5.1)
PROT SERPL-MCNC: 6 G/DL (ref 6.4–8.2)
RBC # BLD AUTO: 3 M/UL (ref 3.8–5.2)
SODIUM SERPL-SCNC: 141 MMOL/L (ref 136–145)
WBC # BLD AUTO: 8.5 K/UL (ref 3.6–11)

## 2024-12-08 PROCEDURE — 6370000000 HC RX 637 (ALT 250 FOR IP): Performed by: INTERNAL MEDICINE

## 2024-12-08 PROCEDURE — 6360000002 HC RX W HCPCS: Performed by: INTERNAL MEDICINE

## 2024-12-08 PROCEDURE — 2700000000 HC OXYGEN THERAPY PER DAY

## 2024-12-08 PROCEDURE — 6370000000 HC RX 637 (ALT 250 FOR IP): Performed by: STUDENT IN AN ORGANIZED HEALTH CARE EDUCATION/TRAINING PROGRAM

## 2024-12-08 PROCEDURE — 36415 COLL VENOUS BLD VENIPUNCTURE: CPT

## 2024-12-08 PROCEDURE — 85025 COMPLETE CBC W/AUTO DIFF WBC: CPT

## 2024-12-08 PROCEDURE — 82962 GLUCOSE BLOOD TEST: CPT

## 2024-12-08 PROCEDURE — 94761 N-INVAS EAR/PLS OXIMETRY MLT: CPT

## 2024-12-08 PROCEDURE — 6370000000 HC RX 637 (ALT 250 FOR IP)

## 2024-12-08 PROCEDURE — 2000000000 HC ICU R&B

## 2024-12-08 PROCEDURE — 94640 AIRWAY INHALATION TREATMENT: CPT

## 2024-12-08 PROCEDURE — 80053 COMPREHEN METABOLIC PANEL: CPT

## 2024-12-08 PROCEDURE — 2580000003 HC RX 258: Performed by: NURSE PRACTITIONER

## 2024-12-08 PROCEDURE — 6360000002 HC RX W HCPCS: Performed by: STUDENT IN AN ORGANIZED HEALTH CARE EDUCATION/TRAINING PROGRAM

## 2024-12-08 PROCEDURE — 83735 ASSAY OF MAGNESIUM: CPT

## 2024-12-08 RX ORDER — OXYCODONE HYDROCHLORIDE 5 MG/1
5 TABLET ORAL EVERY 6 HOURS PRN
Status: DISCONTINUED | OUTPATIENT
Start: 2024-12-08 | End: 2024-12-09

## 2024-12-08 RX ORDER — ACETYLCYSTEINE 200 MG/ML
600 SOLUTION ORAL; RESPIRATORY (INHALATION)
Status: DISPENSED | OUTPATIENT
Start: 2024-12-08 | End: 2024-12-11

## 2024-12-08 RX ORDER — GUAIFENESIN 600 MG/1
600 TABLET, EXTENDED RELEASE ORAL 2 TIMES DAILY
Status: DISCONTINUED | OUTPATIENT
Start: 2024-12-08 | End: 2024-12-09

## 2024-12-08 RX ORDER — ACETAMINOPHEN 160 MG/5ML
650 LIQUID ORAL EVERY 4 HOURS PRN
Status: DISCONTINUED | OUTPATIENT
Start: 2024-12-08 | End: 2024-12-09

## 2024-12-08 RX ADMIN — BUDESONIDE INHALATION 500 MCG: 0.5 SUSPENSION RESPIRATORY (INHALATION) at 21:02

## 2024-12-08 RX ADMIN — DOCUSATE SODIUM 100 MG: 50 LIQUID ORAL at 08:13

## 2024-12-08 RX ADMIN — IPRATROPIUM BROMIDE AND ALBUTEROL SULFATE 1 DOSE: 2.5; .5 SOLUTION RESPIRATORY (INHALATION) at 01:11

## 2024-12-08 RX ADMIN — GUAIFENESIN 600 MG: 600 TABLET, EXTENDED RELEASE ORAL at 11:21

## 2024-12-08 RX ADMIN — ENOXAPARIN SODIUM 30 MG: 100 INJECTION SUBCUTANEOUS at 20:49

## 2024-12-08 RX ADMIN — FAMOTIDINE 20 MG: 20 TABLET, FILM COATED ORAL at 08:13

## 2024-12-08 RX ADMIN — OXYCODONE HYDROCHLORIDE AND ACETAMINOPHEN 1 TABLET: 5; 325 TABLET ORAL at 00:10

## 2024-12-08 RX ADMIN — ENOXAPARIN SODIUM 30 MG: 100 INJECTION SUBCUTANEOUS at 08:22

## 2024-12-08 RX ADMIN — ASPIRIN 81 MG 81 MG: 81 TABLET ORAL at 08:13

## 2024-12-08 RX ADMIN — IPRATROPIUM BROMIDE AND ALBUTEROL SULFATE 1 DOSE: 2.5; .5 SOLUTION RESPIRATORY (INHALATION) at 21:02

## 2024-12-08 RX ADMIN — ACETYLCYSTEINE 600 MG: 200 SOLUTION ORAL; RESPIRATORY (INHALATION) at 21:01

## 2024-12-08 RX ADMIN — BUSPIRONE HYDROCHLORIDE 7.5 MG: 5 TABLET ORAL at 08:12

## 2024-12-08 RX ADMIN — BUDESONIDE INHALATION 500 MCG: 0.5 SUSPENSION RESPIRATORY (INHALATION) at 07:55

## 2024-12-08 RX ADMIN — TAMSULOSIN HYDROCHLORIDE 0.4 MG: 0.4 CAPSULE ORAL at 08:13

## 2024-12-08 RX ADMIN — Medication 5 ML: at 16:28

## 2024-12-08 RX ADMIN — OXYCODONE HYDROCHLORIDE AND ACETAMINOPHEN 2 TABLET: 5; 325 TABLET ORAL at 17:33

## 2024-12-08 RX ADMIN — POTASSIUM BICARBONATE 40 MEQ: 782 TABLET, EFFERVESCENT ORAL at 08:14

## 2024-12-08 RX ADMIN — MIDODRINE HYDROCHLORIDE 15 MG: 5 TABLET ORAL at 11:21

## 2024-12-08 RX ADMIN — Medication 5 ML: at 08:30

## 2024-12-08 RX ADMIN — AMIODARONE HYDROCHLORIDE 200 MG: 200 TABLET ORAL at 08:13

## 2024-12-08 RX ADMIN — SODIUM CHLORIDE, PRESERVATIVE FREE 10 ML: 5 INJECTION INTRAVENOUS at 08:30

## 2024-12-08 RX ADMIN — BUSPIRONE HYDROCHLORIDE 7.5 MG: 5 TABLET ORAL at 20:42

## 2024-12-08 RX ADMIN — MIDODRINE HYDROCHLORIDE 20 MG: 5 TABLET ORAL at 16:28

## 2024-12-08 RX ADMIN — ATORVASTATIN CALCIUM 40 MG: 40 TABLET, FILM COATED ORAL at 20:42

## 2024-12-08 RX ADMIN — SODIUM CHLORIDE, PRESERVATIVE FREE 10 ML: 5 INJECTION INTRAVENOUS at 20:43

## 2024-12-08 RX ADMIN — GUAIFENESIN 600 MG: 600 TABLET, EXTENDED RELEASE ORAL at 20:44

## 2024-12-08 RX ADMIN — IPRATROPIUM BROMIDE AND ALBUTEROL SULFATE 1 DOSE: 2.5; .5 SOLUTION RESPIRATORY (INHALATION) at 13:40

## 2024-12-08 RX ADMIN — MIDODRINE HYDROCHLORIDE 15 MG: 5 TABLET ORAL at 07:19

## 2024-12-08 RX ADMIN — Medication 5 ML: at 14:03

## 2024-12-08 RX ADMIN — POTASSIUM BICARBONATE 40 MEQ: 782 TABLET, EFFERVESCENT ORAL at 20:42

## 2024-12-08 RX ADMIN — Medication 5 ML: at 20:43

## 2024-12-08 RX ADMIN — QUETIAPINE FUMARATE 50 MG: 25 TABLET ORAL at 20:42

## 2024-12-08 RX ADMIN — IPRATROPIUM BROMIDE AND ALBUTEROL SULFATE 1 DOSE: 2.5; .5 SOLUTION RESPIRATORY (INHALATION) at 07:55

## 2024-12-08 RX ADMIN — SENNOSIDES 8.6 MG: 8.6 TABLET, FILM COATED ORAL at 20:43

## 2024-12-08 RX ADMIN — QUETIAPINE FUMARATE 50 MG: 25 TABLET ORAL at 14:43

## 2024-12-08 RX ADMIN — QUETIAPINE FUMARATE 50 MG: 25 TABLET ORAL at 08:13

## 2024-12-08 RX ADMIN — OXYCODONE HYDROCHLORIDE AND ACETAMINOPHEN 1 TABLET: 5; 325 TABLET ORAL at 07:19

## 2024-12-08 RX ADMIN — SERTRALINE HYDROCHLORIDE 50 MG: 50 TABLET ORAL at 08:13

## 2024-12-08 ASSESSMENT — PULMONARY FUNCTION TESTS
PIF_VALUE: 13
PIF_VALUE: 20
PIF_VALUE: 20
PIF_VALUE: 13
PIF_VALUE: 20
PIF_VALUE: 15
PIF_VALUE: 20
PIF_VALUE: 20
PIF_VALUE: 14
PIF_VALUE: 20

## 2024-12-08 ASSESSMENT — PAIN DESCRIPTION - DESCRIPTORS: DESCRIPTORS: ACHING

## 2024-12-08 ASSESSMENT — PAIN DESCRIPTION - LOCATION
LOCATION: BACK
LOCATION: BACK

## 2024-12-08 ASSESSMENT — PAIN SCALES - GENERAL
PAINLEVEL_OUTOF10: 7
PAINLEVEL_OUTOF10: 6
PAINLEVEL_OUTOF10: 0
PAINLEVEL_OUTOF10: 0
PAINLEVEL_OUTOF10: 5
PAINLEVEL_OUTOF10: 6
PAINLEVEL_OUTOF10: 5
PAINLEVEL_OUTOF10: 0

## 2024-12-08 ASSESSMENT — PAIN DESCRIPTION - ORIENTATION: ORIENTATION: LEFT;MID

## 2024-12-08 NOTE — PROGRESS NOTES
Patient sleeping soundly upon entry of room and RN requesting ST not disturb patient as she has a mildly elevated temperature and is not feeling well today. Hold PMV trials today. ST to follow tomorrow.

## 2024-12-08 NOTE — PROGRESS NOTES
Called to patient's room by RN, patient requesting oxygen to be turned up.  When I arrive to patient's room, SPO2 98%.  I explained to the patient that her oxygen levels were good, however I could turn the flow up so she felt like she was getting more air.  Increased to 40lpm.  Patient still anxious and I knew from taking care of her previously that she is very hot natured.  I turned the heat down to 34 degrees.  I again explained to the patient that she was doing well.  Communicated to the RN my actions.

## 2024-12-08 NOTE — PROGRESS NOTES
Had patient on t- piece 20L 40%.  Patient put on spont with ps of 7, fi02 of 45 and peep of 5 per report and MD note.

## 2024-12-08 NOTE — PROGRESS NOTES
CRITICAL CARE PROGRESS NOTE    Name: Kim Markham   : 1975   MRN: [id number]   Date: 2024      Diagnoses/problem list:   Acute hypoxic hypercapnic respiratory failure s/p trach on   COPD exacerbation   Supraventricular tachycardia  Acute encephalopathy   Acute kidney injury     24-hour events:   On HFNC 20 L 40% FiO2 this morning.  Denied any chest pain or shortness of breath.  Her only complaint is that the room is too hot.  Not on any drips at this time.  Tolerating tube feeds.    Assessment and plan:   Kim Markham is a 49 y.o. female with known past medical history significant for asthma and COPD who treated her symptoms today with Primatene Mist over-the-counter says that she went into fast heart rate became short of breath immediately.  She initially presented on .  She eventually was extubated but needed reintubation due to lung collapse.  She eventually underwent tracheostomy on .    NEUROLOGICAL:    Mentation improved, AOx3 now  Avoid sedating medications  Continue buspirone and sertraline  Continue Seroquel  Pain management  Delirium precautions    PULMONOLOGY:   Continue DuoNebs  Continue Mucomyst nebs  Continue Pulmicort  HFNC/trach collar as tolerated  BiPAP via as needed during night  Scopolamine patch for secretions  PMV per SLP    CARDIOVASCULAR:   Midodrine to keep MAP above 65  Echo  with normal LVEF, diastolic dysfunction  Continue aspirin and statin  Continue amiodarone for recurrent SVT    GASTROINTESTINAL:   Continue tube feeds as tolerated  SLP consult to evaluate readiness for oral feeding    RENAL/ELECTROLYTE:   Creatinine stable at this time  Monitor UOP closely  Correct electrolytes  Continue Flomax    ENDOCRINE:   ISS for BG control    HEMATOLOGY/ONCOLOGY:   Lovenox for VTE prophylaxis    ID/MICRO:   Previously completed 7 days of empiric Zosyn on   No concern for active infection at this time    ICU DAILY CHECKLIST     Code Status: Full  DVT

## 2024-12-09 LAB
ANION GAP SERPL CALC-SCNC: 7 MMOL/L (ref 2–12)
BUN SERPL-MCNC: 13 MG/DL (ref 6–20)
BUN/CREAT SERPL: 36 (ref 12–20)
CA-I BLD-MCNC: 9.3 MG/DL (ref 8.5–10.1)
CHLORIDE SERPL-SCNC: 104 MMOL/L (ref 97–108)
CO2 SERPL-SCNC: 30 MMOL/L (ref 21–32)
CREAT SERPL-MCNC: 0.36 MG/DL (ref 0.55–1.02)
ERYTHROCYTE [DISTWIDTH] IN BLOOD BY AUTOMATED COUNT: 14.5 % (ref 11.5–14.5)
GLUCOSE BLD STRIP.AUTO-MCNC: 106 MG/DL (ref 65–100)
GLUCOSE BLD STRIP.AUTO-MCNC: 109 MG/DL (ref 65–100)
GLUCOSE BLD STRIP.AUTO-MCNC: 110 MG/DL (ref 65–100)
GLUCOSE BLD STRIP.AUTO-MCNC: 116 MG/DL (ref 65–100)
GLUCOSE SERPL-MCNC: 99 MG/DL (ref 65–100)
HCT VFR BLD AUTO: 28.1 % (ref 35–47)
HGB BLD-MCNC: 8.7 G/DL (ref 11.5–16)
MAGNESIUM SERPL-MCNC: 1.9 MG/DL (ref 1.6–2.4)
MCH RBC QN AUTO: 28.5 PG (ref 26–34)
MCHC RBC AUTO-ENTMCNC: 31 G/DL (ref 30–36.5)
MCV RBC AUTO: 92.1 FL (ref 80–99)
NRBC # BLD: 0 K/UL (ref 0–0.01)
NRBC BLD-RTO: 0 PER 100 WBC
PERFORMED BY:: ABNORMAL
PHOSPHATE SERPL-MCNC: 2.9 MG/DL (ref 2.6–4.7)
PLATELET # BLD AUTO: 318 K/UL (ref 150–400)
PMV BLD AUTO: 12.3 FL (ref 8.9–12.9)
POTASSIUM SERPL-SCNC: 3.9 MMOL/L (ref 3.5–5.1)
RBC # BLD AUTO: 3.05 M/UL (ref 3.8–5.2)
SODIUM SERPL-SCNC: 141 MMOL/L (ref 136–145)
WBC # BLD AUTO: 8.5 K/UL (ref 3.6–11)

## 2024-12-09 PROCEDURE — 6370000000 HC RX 637 (ALT 250 FOR IP): Performed by: STUDENT IN AN ORGANIZED HEALTH CARE EDUCATION/TRAINING PROGRAM

## 2024-12-09 PROCEDURE — 6360000002 HC RX W HCPCS: Performed by: STUDENT IN AN ORGANIZED HEALTH CARE EDUCATION/TRAINING PROGRAM

## 2024-12-09 PROCEDURE — 97530 THERAPEUTIC ACTIVITIES: CPT

## 2024-12-09 PROCEDURE — 6370000000 HC RX 637 (ALT 250 FOR IP): Performed by: INTERNAL MEDICINE

## 2024-12-09 PROCEDURE — 2700000000 HC OXYGEN THERAPY PER DAY

## 2024-12-09 PROCEDURE — 2500000003 HC RX 250 WO HCPCS: Performed by: INTERNAL MEDICINE

## 2024-12-09 PROCEDURE — 6360000002 HC RX W HCPCS: Performed by: INTERNAL MEDICINE

## 2024-12-09 PROCEDURE — 83735 ASSAY OF MAGNESIUM: CPT

## 2024-12-09 PROCEDURE — 94761 N-INVAS EAR/PLS OXIMETRY MLT: CPT

## 2024-12-09 PROCEDURE — 94640 AIRWAY INHALATION TREATMENT: CPT

## 2024-12-09 PROCEDURE — 36415 COLL VENOUS BLD VENIPUNCTURE: CPT

## 2024-12-09 PROCEDURE — 84100 ASSAY OF PHOSPHORUS: CPT

## 2024-12-09 PROCEDURE — 6370000000 HC RX 637 (ALT 250 FOR IP)

## 2024-12-09 PROCEDURE — 94667 MNPJ CHEST WALL 1ST: CPT

## 2024-12-09 PROCEDURE — 82962 GLUCOSE BLOOD TEST: CPT

## 2024-12-09 PROCEDURE — 80048 BASIC METABOLIC PNL TOTAL CA: CPT

## 2024-12-09 PROCEDURE — 92507 TX SP LANG VOICE COMM INDIV: CPT

## 2024-12-09 PROCEDURE — 2000000000 HC ICU R&B

## 2024-12-09 PROCEDURE — 2580000003 HC RX 258: Performed by: NURSE PRACTITIONER

## 2024-12-09 PROCEDURE — 85027 COMPLETE CBC AUTOMATED: CPT

## 2024-12-09 RX ORDER — MIDODRINE HYDROCHLORIDE 5 MG/1
15 TABLET ORAL DAILY
Status: DISCONTINUED | OUTPATIENT
Start: 2024-12-09 | End: 2024-12-10

## 2024-12-09 RX ORDER — FAMOTIDINE 20 MG/1
20 TABLET, FILM COATED ORAL DAILY
Status: DISCONTINUED | OUTPATIENT
Start: 2024-12-10 | End: 2024-12-12

## 2024-12-09 RX ORDER — INSULIN LISPRO 100 [IU]/ML
0-8 INJECTION, SOLUTION INTRAVENOUS; SUBCUTANEOUS EVERY 6 HOURS SCHEDULED
Status: DISCONTINUED | OUTPATIENT
Start: 2024-12-09 | End: 2024-12-11

## 2024-12-09 RX ORDER — BUSPIRONE HYDROCHLORIDE 5 MG/1
7.5 TABLET ORAL 2 TIMES DAILY
Status: DISCONTINUED | OUTPATIENT
Start: 2024-12-09 | End: 2024-12-12

## 2024-12-09 RX ORDER — MIDODRINE HYDROCHLORIDE 5 MG/1
10 TABLET ORAL 2 TIMES DAILY WITH MEALS
Status: DISCONTINUED | OUTPATIENT
Start: 2024-12-10 | End: 2024-12-10

## 2024-12-09 RX ORDER — OXYCODONE HYDROCHLORIDE 5 MG/1
5 TABLET ORAL EVERY 6 HOURS PRN
Status: DISCONTINUED | OUTPATIENT
Start: 2024-12-09 | End: 2024-12-10 | Stop reason: SDUPTHER

## 2024-12-09 RX ORDER — SENNOSIDES A AND B 8.6 MG/1
1 TABLET, FILM COATED ORAL NIGHTLY
Status: DISCONTINUED | OUTPATIENT
Start: 2024-12-09 | End: 2024-12-12

## 2024-12-09 RX ORDER — ASPIRIN 81 MG/1
81 TABLET, CHEWABLE ORAL DAILY
Status: DISCONTINUED | OUTPATIENT
Start: 2024-12-10 | End: 2024-12-12

## 2024-12-09 RX ORDER — POLYETHYLENE GLYCOL 3350 17 G/17G
17 POWDER, FOR SOLUTION ORAL DAILY PRN
Status: DISCONTINUED | OUTPATIENT
Start: 2024-12-09 | End: 2024-12-12

## 2024-12-09 RX ORDER — GUAIFENESIN 200 MG/10ML
300 LIQUID ORAL 4 TIMES DAILY
Status: DISCONTINUED | OUTPATIENT
Start: 2024-12-09 | End: 2024-12-12

## 2024-12-09 RX ORDER — ACETAMINOPHEN 160 MG/5ML
650 LIQUID ORAL EVERY 4 HOURS PRN
Status: DISCONTINUED | OUTPATIENT
Start: 2024-12-09 | End: 2024-12-12

## 2024-12-09 RX ORDER — AMIODARONE HYDROCHLORIDE 200 MG/1
200 TABLET ORAL DAILY
Status: DISCONTINUED | OUTPATIENT
Start: 2024-12-10 | End: 2024-12-12

## 2024-12-09 RX ADMIN — ENOXAPARIN SODIUM 30 MG: 100 INJECTION SUBCUTANEOUS at 09:03

## 2024-12-09 RX ADMIN — TAMSULOSIN HYDROCHLORIDE 0.4 MG: 0.4 CAPSULE ORAL at 09:04

## 2024-12-09 RX ADMIN — BUSPIRONE HYDROCHLORIDE 7.5 MG: 5 TABLET ORAL at 20:43

## 2024-12-09 RX ADMIN — MIDODRINE HYDROCHLORIDE 15 MG: 5 TABLET ORAL at 20:43

## 2024-12-09 RX ADMIN — QUETIAPINE FUMARATE 50 MG: 25 TABLET ORAL at 20:43

## 2024-12-09 RX ADMIN — IPRATROPIUM BROMIDE AND ALBUTEROL SULFATE 1 DOSE: 2.5; .5 SOLUTION RESPIRATORY (INHALATION) at 13:50

## 2024-12-09 RX ADMIN — MIDODRINE HYDROCHLORIDE 15 MG: 5 TABLET ORAL at 11:26

## 2024-12-09 RX ADMIN — FAMOTIDINE 20 MG: 20 TABLET, FILM COATED ORAL at 09:04

## 2024-12-09 RX ADMIN — DOCUSATE SODIUM 100 MG: 50 LIQUID ORAL at 09:05

## 2024-12-09 RX ADMIN — BUDESONIDE INHALATION 500 MCG: 0.5 SUSPENSION RESPIRATORY (INHALATION) at 07:47

## 2024-12-09 RX ADMIN — ENOXAPARIN SODIUM 30 MG: 100 INJECTION SUBCUTANEOUS at 20:42

## 2024-12-09 RX ADMIN — GUAIFENESIN 300 MG: 100 SOLUTION ORAL at 11:25

## 2024-12-09 RX ADMIN — POTASSIUM BICARBONATE 40 MEQ: 782 TABLET, EFFERVESCENT ORAL at 20:42

## 2024-12-09 RX ADMIN — BUSPIRONE HYDROCHLORIDE 7.5 MG: 5 TABLET ORAL at 09:04

## 2024-12-09 RX ADMIN — MIDODRINE HYDROCHLORIDE 15 MG: 5 TABLET ORAL at 09:05

## 2024-12-09 RX ADMIN — ATORVASTATIN CALCIUM 40 MG: 40 TABLET, FILM COATED ORAL at 20:43

## 2024-12-09 RX ADMIN — ASPIRIN 81 MG 81 MG: 81 TABLET ORAL at 09:04

## 2024-12-09 RX ADMIN — AMIODARONE HYDROCHLORIDE 200 MG: 200 TABLET ORAL at 09:04

## 2024-12-09 RX ADMIN — GUAIFENESIN 300 MG: 100 SOLUTION ORAL at 20:42

## 2024-12-09 RX ADMIN — QUETIAPINE FUMARATE 50 MG: 25 TABLET ORAL at 09:10

## 2024-12-09 RX ADMIN — SODIUM CHLORIDE, PRESERVATIVE FREE 10 ML: 5 INJECTION INTRAVENOUS at 09:03

## 2024-12-09 RX ADMIN — GUAIFENESIN 300 MG: 100 SOLUTION ORAL at 17:47

## 2024-12-09 RX ADMIN — QUETIAPINE FUMARATE 50 MG: 25 TABLET ORAL at 15:06

## 2024-12-09 RX ADMIN — IPRATROPIUM BROMIDE AND ALBUTEROL SULFATE 1 DOSE: 2.5; .5 SOLUTION RESPIRATORY (INHALATION) at 19:33

## 2024-12-09 RX ADMIN — IPRATROPIUM BROMIDE AND ALBUTEROL SULFATE 1 DOSE: 2.5; .5 SOLUTION RESPIRATORY (INHALATION) at 02:22

## 2024-12-09 RX ADMIN — SENNOSIDES 8.6 MG: 8.6 TABLET, FILM COATED ORAL at 20:43

## 2024-12-09 RX ADMIN — BUDESONIDE INHALATION 500 MCG: 0.5 SUSPENSION RESPIRATORY (INHALATION) at 19:33

## 2024-12-09 RX ADMIN — SODIUM CHLORIDE, PRESERVATIVE FREE 10 ML: 5 INJECTION INTRAVENOUS at 20:43

## 2024-12-09 RX ADMIN — ACETYLCYSTEINE 600 MG: 200 SOLUTION ORAL; RESPIRATORY (INHALATION) at 07:47

## 2024-12-09 RX ADMIN — POTASSIUM BICARBONATE 40 MEQ: 782 TABLET, EFFERVESCENT ORAL at 09:04

## 2024-12-09 RX ADMIN — IPRATROPIUM BROMIDE AND ALBUTEROL SULFATE 1 DOSE: 2.5; .5 SOLUTION RESPIRATORY (INHALATION) at 07:47

## 2024-12-09 RX ADMIN — SERTRALINE HYDROCHLORIDE 50 MG: 50 TABLET ORAL at 09:04

## 2024-12-09 RX ADMIN — ACETYLCYSTEINE 600 MG: 200 SOLUTION ORAL; RESPIRATORY (INHALATION) at 19:33

## 2024-12-09 ASSESSMENT — PULMONARY FUNCTION TESTS
PIF_VALUE: 20

## 2024-12-09 ASSESSMENT — PAIN SCALES - WONG BAKER: WONGBAKER_NUMERICALRESPONSE: NO HURT

## 2024-12-09 ASSESSMENT — PAIN SCALES - GENERAL
PAINLEVEL_OUTOF10: 0

## 2024-12-09 NOTE — CARE COORDINATION
CM reviewed Pt medicals, Pt has a trach.  Pt lives with her boyfriend and was IND with ADL.        [facility] Pt medicals for acceptance.     CM will send updated medicals.     Per IDR  No PEG, NG tube.   Speech eval    No complaints.

## 2024-12-09 NOTE — CARE COORDINATION
CM completed UAI for patient.  Still pending physician signature.  CM will reach out to physicians for signing.

## 2024-12-09 NOTE — PLAN OF CARE
OCCUPATIONAL THERAPY TREATMENT  Patient: Kim Markham (49 y.o. female)  Date: 12/9/2024  Primary Diagnosis: Acute right-sided congestive heart failure (HCC) [I50.811]  SVT (supraventricular tachycardia) (HCC) [I47.10]  Essential hypertension [I10]  Hypoxia [R09.02]  COPD exacerbation (HCC) [J44.1]  Procedure(s) (LRB):  TRACHEOSTOMY (N/A) 5 Days Post-Op   Precautions: NPO, Fall Risk, General Precautions                Recommendations for nursing mobility: Encourage HEP in prep for ADLs/mobility; see handout for details, Frequent repositioning to prevent skin breakdown, LE elevation for management of edema, and Assist x2    In place during session: Peripheral IV, Tracheostomy shiley cuffed, Alvares Catheter, EKG/telemetry , Pulse ox, Nasogastric Tube, and continuous ICU monitoring  Chart, occupational therapy assessment, plan of care, and goals were reviewed.  ASSESSMENT  Patient continues with skilled OT services and is progressing towards goals. Pt semi supine upon OT arrival, agreeable to session. Pt A&O x 4 with written answers. Pt completed semi supine UE therex to increase strength, AROM, and coordination in prep for functional mobility and ADLs. Pt continues to demo decreased LUE strength/AROM and coordination. Pt req'd frequent RB during UE therex. 2 lb weight introduced for elbow flexion/extension and pt tolerated well with no c/o of pain or fatigue (See below for objective details and assist levels).     Overall pt tolerated session fair today with no c/o pain. Current OT recommendations for discharge Moderate intensity short-term skilled occupational therapy up to 5x/week. Will continue to benefit from skilled OT services, and will continue to progress as tolerated.      Start of Session End of Session   SPO2 (%) 96 96     GOALS:    Problem: Occupational Therapy - Adult  Goal: By Discharge: Performs self-care activities at highest level of function for planned discharge setting.  See evaluation for

## 2024-12-09 NOTE — PLAN OF CARE
Speech LAnguage Pathology TREATMENT    Patient: Kim Markham (49 y.o. female)  Date: 12/9/2024  Primary Diagnosis: Acute right-sided congestive heart failure (HCC) [I50.811]  SVT (supraventricular tachycardia) (HCC) [I47.10]  Essential hypertension [I10]  Hypoxia [R09.02]  COPD exacerbation (HCC) [J44.1]  Procedure(s) (LRB):  TRACHEOSTOMY (N/A) 5 Days Post-Op   Precautions: aspiration,  NPO, Fall Risk, General Precautions                    DIET RECOMMENDATIONS:  cont NPO, NG TF as per MD/ RD recs- full sw assessment to follow.     PMV RECOMMENDATIONS: per discussion with Dr. Aguayo, will cont PMV trials WITH STRICT SUPERVISION WITH SLP/RT OR NSG, ONLY AS TOLERATED.   ASSESSMENT :  Based on the objective data described below, the patient presents with limited tolerance of PMV this date.     Pt seen for follow-up with RT present. Clinician spoke with nsg and RT prior to follow-up. Increased secretions reported.  SLP notes reviewed- pt has not tolerated PMV trials since initial assessment.     Pt currently on 27%, 40 L, via trach collar. (#6 Shiley, cuffed) RT reduced to 20 L prior to PMV trial. RT deflated  balloon and suctioned pt via trach with deep suction and pt was also suctioned orally with Yaunker's to clear secretions. Pt appears anxious but does respond well to encouragement and is agreeable to trialing PMV placement.   Pt tolerates PMV for several minutes with 02 sats 92-93%, HR 90s (stable) and Bp 120s/80s. No air stacking.  However, pt requested deep suctioning and pt noted to desat to 80s' with RT suctioning and noted difficulty recovering.  PMV removed prior to suctioning.  Slow return of 02 sats to low 90s.  RT increased to 30L and 30% Fi02.  With short replacement of PMV, pt was unable to maintain improved 02 sats.  PMV removed and placed on table in room. Warning labels are in place.     Discussed with Dr. Aguayo at length. MD reports will trial new med to aid in secretion management.  Will

## 2024-12-10 ENCOUNTER — APPOINTMENT (OUTPATIENT)
Facility: HOSPITAL | Age: 49
End: 2024-12-10
Payer: MEDICAID

## 2024-12-10 LAB
ANION GAP SERPL CALC-SCNC: 4 MMOL/L (ref 2–12)
BUN SERPL-MCNC: 10 MG/DL (ref 6–20)
BUN/CREAT SERPL: 30 (ref 12–20)
CA-I BLD-MCNC: 9.4 MG/DL (ref 8.5–10.1)
CHLORIDE SERPL-SCNC: 107 MMOL/L (ref 97–108)
CO2 SERPL-SCNC: 30 MMOL/L (ref 21–32)
CREAT SERPL-MCNC: 0.33 MG/DL (ref 0.55–1.02)
ERYTHROCYTE [DISTWIDTH] IN BLOOD BY AUTOMATED COUNT: 14.6 % (ref 11.5–14.5)
GLUCOSE BLD STRIP.AUTO-MCNC: 111 MG/DL (ref 65–100)
GLUCOSE BLD STRIP.AUTO-MCNC: 114 MG/DL (ref 65–100)
GLUCOSE BLD STRIP.AUTO-MCNC: 115 MG/DL (ref 65–100)
GLUCOSE BLD STRIP.AUTO-MCNC: 116 MG/DL (ref 65–100)
GLUCOSE BLD STRIP.AUTO-MCNC: 118 MG/DL (ref 65–100)
GLUCOSE SERPL-MCNC: 105 MG/DL (ref 65–100)
HCT VFR BLD AUTO: 31 % (ref 35–47)
HGB BLD-MCNC: 9.3 G/DL (ref 11.5–16)
MCH RBC QN AUTO: 27.9 PG (ref 26–34)
MCHC RBC AUTO-ENTMCNC: 30 G/DL (ref 30–36.5)
MCV RBC AUTO: 93.1 FL (ref 80–99)
NRBC # BLD: 0 K/UL (ref 0–0.01)
NRBC BLD-RTO: 0 PER 100 WBC
PERFORMED BY:: ABNORMAL
PHOSPHATE SERPL-MCNC: 3 MG/DL (ref 2.6–4.7)
PLATELET # BLD AUTO: 338 K/UL (ref 150–400)
PMV BLD AUTO: 12.4 FL (ref 8.9–12.9)
POTASSIUM SERPL-SCNC: 4.2 MMOL/L (ref 3.5–5.1)
RBC # BLD AUTO: 3.33 M/UL (ref 3.8–5.2)
SODIUM SERPL-SCNC: 141 MMOL/L (ref 136–145)
WBC # BLD AUTO: 7.7 K/UL (ref 3.6–11)

## 2024-12-10 PROCEDURE — 2500000003 HC RX 250 WO HCPCS: Performed by: INTERNAL MEDICINE

## 2024-12-10 PROCEDURE — 94640 AIRWAY INHALATION TREATMENT: CPT

## 2024-12-10 PROCEDURE — 6360000002 HC RX W HCPCS: Performed by: STUDENT IN AN ORGANIZED HEALTH CARE EDUCATION/TRAINING PROGRAM

## 2024-12-10 PROCEDURE — 36410 VNPNXR 3YR/> PHY/QHP DX/THER: CPT

## 2024-12-10 PROCEDURE — 6370000000 HC RX 637 (ALT 250 FOR IP): Performed by: INTERNAL MEDICINE

## 2024-12-10 PROCEDURE — 85027 COMPLETE CBC AUTOMATED: CPT

## 2024-12-10 PROCEDURE — 6360000002 HC RX W HCPCS: Performed by: INTERNAL MEDICINE

## 2024-12-10 PROCEDURE — 94761 N-INVAS EAR/PLS OXIMETRY MLT: CPT

## 2024-12-10 PROCEDURE — 92507 TX SP LANG VOICE COMM INDIV: CPT

## 2024-12-10 PROCEDURE — 2000000000 HC ICU R&B

## 2024-12-10 PROCEDURE — 80048 BASIC METABOLIC PNL TOTAL CA: CPT

## 2024-12-10 PROCEDURE — 6370000000 HC RX 637 (ALT 250 FOR IP)

## 2024-12-10 PROCEDURE — 71045 X-RAY EXAM CHEST 1 VIEW: CPT

## 2024-12-10 PROCEDURE — 2580000003 HC RX 258: Performed by: NURSE PRACTITIONER

## 2024-12-10 PROCEDURE — 92610 EVALUATE SWALLOWING FUNCTION: CPT

## 2024-12-10 PROCEDURE — 2700000000 HC OXYGEN THERAPY PER DAY

## 2024-12-10 PROCEDURE — 92526 ORAL FUNCTION THERAPY: CPT

## 2024-12-10 PROCEDURE — 94668 MNPJ CHEST WALL SBSQ: CPT

## 2024-12-10 PROCEDURE — 84100 ASSAY OF PHOSPHORUS: CPT

## 2024-12-10 PROCEDURE — 82962 GLUCOSE BLOOD TEST: CPT

## 2024-12-10 PROCEDURE — 6370000000 HC RX 637 (ALT 250 FOR IP): Performed by: STUDENT IN AN ORGANIZED HEALTH CARE EDUCATION/TRAINING PROGRAM

## 2024-12-10 PROCEDURE — 36415 COLL VENOUS BLD VENIPUNCTURE: CPT

## 2024-12-10 PROCEDURE — 99232 SBSQ HOSP IP/OBS MODERATE 35: CPT | Performed by: STUDENT IN AN ORGANIZED HEALTH CARE EDUCATION/TRAINING PROGRAM

## 2024-12-10 PROCEDURE — 94669 MECHANICAL CHEST WALL OSCILL: CPT

## 2024-12-10 RX ORDER — GLYCOPYRROLATE 0.2 MG/ML
0.1 INJECTION INTRAMUSCULAR; INTRAVENOUS 3 TIMES DAILY
Status: DISCONTINUED | OUTPATIENT
Start: 2024-12-10 | End: 2024-12-11

## 2024-12-10 RX ORDER — FLUCONAZOLE 2 MG/ML
200 INJECTION, SOLUTION INTRAVENOUS ONCE
Status: COMPLETED | OUTPATIENT
Start: 2024-12-10 | End: 2024-12-10

## 2024-12-10 RX ORDER — GLYCOPYRROLATE 0.2 MG/ML
0.1 INJECTION INTRAMUSCULAR; INTRAVENOUS 3 TIMES DAILY
Status: DISCONTINUED | OUTPATIENT
Start: 2024-12-10 | End: 2024-12-10

## 2024-12-10 RX ORDER — GLYCOPYRROLATE 1 MG/1
1 TABLET ORAL ONCE
Status: COMPLETED | OUTPATIENT
Start: 2024-12-10 | End: 2024-12-10

## 2024-12-10 RX ORDER — MIDODRINE HYDROCHLORIDE 5 MG/1
5 TABLET ORAL 3 TIMES DAILY PRN
Status: DISCONTINUED | OUTPATIENT
Start: 2024-12-10 | End: 2024-12-12

## 2024-12-10 RX ORDER — HYDROCODONE BITARTRATE AND ACETAMINOPHEN 5; 325 MG/1; MG/1
1 TABLET ORAL EVERY 6 HOURS PRN
Status: DISCONTINUED | OUTPATIENT
Start: 2024-12-10 | End: 2024-12-12

## 2024-12-10 RX ADMIN — GUAIFENESIN 300 MG: 100 SOLUTION ORAL at 12:09

## 2024-12-10 RX ADMIN — FLUCONAZOLE 200 MG: 200 INJECTION, SOLUTION INTRAVENOUS at 18:19

## 2024-12-10 RX ADMIN — GLYCOPYRROLATE 0.1 MG: 0.2 INJECTION INTRAMUSCULAR; INTRAVENOUS at 12:09

## 2024-12-10 RX ADMIN — GUAIFENESIN 300 MG: 100 SOLUTION ORAL at 08:48

## 2024-12-10 RX ADMIN — ACETYLCYSTEINE 600 MG: 200 SOLUTION ORAL; RESPIRATORY (INHALATION) at 07:31

## 2024-12-10 RX ADMIN — QUETIAPINE FUMARATE 50 MG: 25 TABLET ORAL at 20:06

## 2024-12-10 RX ADMIN — QUETIAPINE FUMARATE 50 MG: 25 TABLET ORAL at 08:49

## 2024-12-10 RX ADMIN — FAMOTIDINE 20 MG: 20 TABLET, FILM COATED ORAL at 08:50

## 2024-12-10 RX ADMIN — QUETIAPINE FUMARATE 50 MG: 25 TABLET ORAL at 15:54

## 2024-12-10 RX ADMIN — TAMSULOSIN HYDROCHLORIDE 0.4 MG: 0.4 CAPSULE ORAL at 08:49

## 2024-12-10 RX ADMIN — GLYCOPYRROLATE 1 MG: 1 TABLET ORAL at 15:54

## 2024-12-10 RX ADMIN — AMIODARONE HYDROCHLORIDE 200 MG: 200 TABLET ORAL at 08:50

## 2024-12-10 RX ADMIN — GUAIFENESIN 300 MG: 100 SOLUTION ORAL at 15:54

## 2024-12-10 RX ADMIN — ACETYLCYSTEINE 600 MG: 200 SOLUTION ORAL; RESPIRATORY (INHALATION) at 20:06

## 2024-12-10 RX ADMIN — ASPIRIN 81 MG: 81 TABLET, CHEWABLE ORAL at 08:49

## 2024-12-10 RX ADMIN — IPRATROPIUM BROMIDE AND ALBUTEROL SULFATE 1 DOSE: 2.5; .5 SOLUTION RESPIRATORY (INHALATION) at 07:31

## 2024-12-10 RX ADMIN — SERTRALINE HYDROCHLORIDE 50 MG: 50 TABLET, FILM COATED ORAL at 08:48

## 2024-12-10 RX ADMIN — IPRATROPIUM BROMIDE AND ALBUTEROL SULFATE 1 DOSE: 2.5; .5 SOLUTION RESPIRATORY (INHALATION) at 20:06

## 2024-12-10 RX ADMIN — POTASSIUM BICARBONATE 40 MEQ: 782 TABLET, EFFERVESCENT ORAL at 08:48

## 2024-12-10 RX ADMIN — GUAIFENESIN 300 MG: 100 SOLUTION ORAL at 20:03

## 2024-12-10 RX ADMIN — BUDESONIDE INHALATION 500 MCG: 0.5 SUSPENSION RESPIRATORY (INHALATION) at 20:06

## 2024-12-10 RX ADMIN — OXYCODONE HYDROCHLORIDE 5 MG: 5 TABLET ORAL at 08:49

## 2024-12-10 RX ADMIN — ENOXAPARIN SODIUM 30 MG: 100 INJECTION SUBCUTANEOUS at 08:48

## 2024-12-10 RX ADMIN — IPRATROPIUM BROMIDE AND ALBUTEROL SULFATE 1 DOSE: 2.5; .5 SOLUTION RESPIRATORY (INHALATION) at 14:13

## 2024-12-10 RX ADMIN — IPRATROPIUM BROMIDE AND ALBUTEROL SULFATE 1 DOSE: 2.5; .5 SOLUTION RESPIRATORY (INHALATION) at 01:43

## 2024-12-10 RX ADMIN — SENNOSIDES 8.6 MG: 8.6 TABLET, FILM COATED ORAL at 20:05

## 2024-12-10 RX ADMIN — HYDROCODONE BITARTRATE AND ACETAMINOPHEN 1 TABLET: 5; 325 TABLET ORAL at 20:23

## 2024-12-10 RX ADMIN — BUSPIRONE HYDROCHLORIDE 7.5 MG: 5 TABLET ORAL at 08:48

## 2024-12-10 RX ADMIN — POTASSIUM BICARBONATE 40 MEQ: 782 TABLET, EFFERVESCENT ORAL at 20:03

## 2024-12-10 RX ADMIN — SODIUM CHLORIDE, PRESERVATIVE FREE 10 ML: 5 INJECTION INTRAVENOUS at 08:56

## 2024-12-10 RX ADMIN — ATORVASTATIN CALCIUM 40 MG: 40 TABLET, FILM COATED ORAL at 20:05

## 2024-12-10 RX ADMIN — BUSPIRONE HYDROCHLORIDE 7.5 MG: 5 TABLET ORAL at 20:06

## 2024-12-10 RX ADMIN — SODIUM CHLORIDE, PRESERVATIVE FREE 10 ML: 5 INJECTION INTRAVENOUS at 20:06

## 2024-12-10 RX ADMIN — BUDESONIDE INHALATION 500 MCG: 0.5 SUSPENSION RESPIRATORY (INHALATION) at 07:31

## 2024-12-10 RX ADMIN — GLYCOPYRROLATE 0.1 MG: 0.2 INJECTION INTRAMUSCULAR; INTRAVENOUS at 20:06

## 2024-12-10 RX ADMIN — ENOXAPARIN SODIUM 30 MG: 100 INJECTION SUBCUTANEOUS at 20:03

## 2024-12-10 ASSESSMENT — PAIN SCALES - GENERAL
PAINLEVEL_OUTOF10: 7
PAINLEVEL_OUTOF10: 7
PAINLEVEL_OUTOF10: 0
PAINLEVEL_OUTOF10: 0
PAINLEVEL_OUTOF10: 7
PAINLEVEL_OUTOF10: 5
PAINLEVEL_OUTOF10: 0
PAINLEVEL_OUTOF10: 7

## 2024-12-10 ASSESSMENT — PAIN SCALES - WONG BAKER
WONGBAKER_NUMERICALRESPONSE: NO HURT

## 2024-12-10 ASSESSMENT — PAIN DESCRIPTION - LOCATION
LOCATION: BACK

## 2024-12-10 ASSESSMENT — PAIN DESCRIPTION - PAIN TYPE: TYPE: ACUTE PAIN

## 2024-12-10 ASSESSMENT — PAIN DESCRIPTION - DESCRIPTORS
DESCRIPTORS: THROBBING
DESCRIPTORS: THROBBING

## 2024-12-10 ASSESSMENT — PAIN DESCRIPTION - ORIENTATION
ORIENTATION: LOWER

## 2024-12-10 ASSESSMENT — PAIN DESCRIPTION - FREQUENCY: FREQUENCY: INTERMITTENT

## 2024-12-10 NOTE — PROGRESS NOTES
CRITICAL CARE PROGRESS NOTE    Name: Kim Markham   : 1975   MRN: [id number]   Date: 12/10/2024      Diagnoses/problem list:   Acute hypoxic hypercapnic respiratory failure s/p trach on   COPD exacerbation   Supraventricular tachycardia  Acute encephalopathy   Acute kidney injury     24-hour events:   Remains on HFNC via trach collar 30 L 35% FiO2.  Increased secretions noted.  Chest x-ray unchanged.  Receiving chest physical therapy and Mucomyst nebs.  Complained of some acute on chronic back pain.    Assessment and plan:   Kim Markham is a 49 y.o. female with known past medical history significant for asthma and COPD who treated her symptoms today with Primatene Mist over-the-counter says that she went into fast heart rate became short of breath immediately.  She initially presented on .  She eventually was extubated but needed reintubation due to lung collapse.  She eventually underwent tracheostomy on .    NEUROLOGICAL:    Mentation improved, AOx3 now  Avoid sedating medications  Continue buspirone and sertraline  Continue Seroquel  Pain management  Delirium precautions    PULMONOLOGY:   Continue DuoNebs  Continue Mucomyst nebs  Chest PT Q6h with vest  Continue Pulmicort  HFNC/trach collar as tolerated  BiPAP via as needed during night  Glycopyrrolate for secretions  PMV per SLP    CARDIOVASCULAR:   Midodrine to keep MAP above 65  Echo  with normal LVEF, diastolic dysfunction  Continue aspirin and statin  Continue amiodarone for recurrent SVT    GASTROINTESTINAL:   Continue tube feeds as tolerated    RENAL/ELECTROLYTE:   Creatinine stable at this time  Monitor UOP closely  Correct electrolytes  Continue Flomax    ENDOCRINE:   ISS for BG control    HEMATOLOGY/ONCOLOGY:   Lovenox for VTE prophylaxis    ID/MICRO:   Previously completed 7 days of empiric Zosyn on   No concern for active infection at this time    ICU DAILY CHECKLIST     Code Status: Full  DVT Prophylaxis:

## 2024-12-10 NOTE — PROGRESS NOTES
Otolaryngology-HNS Consult    Subjective:     Date of Consultation:  December 10, 2024    Referring Physician: Glenn    History of Present Illness:   Patient is a 49 y.o.  female who is being seen for tracheostomy consult.  Admitted to the hospital 11/12/2024, with mental status change and hypoxia.  Has been intubated then extubated then back to the floor.  Now back to the ICU and intubated again for 3 days.  Biggest issue has been decreased mental status and poor tolerance of secretions leading to need for reintubation.  Otolaryngology service has been consulted for tracheostomy placement.    Interval Hx:   12/3/24:   Was planned for tracheostomy today, but canceled due to difficulty with obtaining consent from next of kin.    Vent settings: FiO2 40%, PEEP 5    Labs, imaging, and notes reviewed.    12/4/2024:  No change, mother is established and decision maker     12/5/24:   POD 1 s/p tracheostomy     Interval hx 12/10/2024:  POD 5 s/p tracheostomy.  On trach collar, tolerating it well.  Did not tolerate Passy-Cayden valve trial due to increased secretions    Patient Active Problem List    Diagnosis Date Noted    Acute right-sided congestive heart failure (HCC) 11/26/2024    Essential hypertension 11/26/2024    GUERA (acute kidney injury) ([facility]) 11/26/2024    SVT (supraventricular tachycardia) ([facility]) 11/12/2024    Acute respiratory failure 01/03/2024    COPD exacerbation ([facility]) 01/02/2024    Acute asthma exacerbation 08/11/2023    COPD (chronic obstructive pulmonary disease) ([facility]) 08/11/2023    COPD with acute exacerbation ([facility]) 09/16/2022     Past Medical History:   Diagnosis Date    Asthma     COPD (chronic obstructive pulmonary disease) ([facility])       History reviewed. No pertinent family history.   Social History     Tobacco Use    Smoking status: Every Day     Current packs/day: 1.00     Types: Cigarettes    Smokeless tobacco: Not on file   Substance Use Topics    Alcohol use: Not on file     Past Surgical History:  studies independently review by me -   CXR (12/2/24):   IMPRESSION:     1. ET tube is in satisfactory position.     2. Low lung volumes and mild basilar atelectasis.    CXR 12/10/24:   IMPRESSION:  Mild pulmonary edema and small pleural effusions unchanged.    Assessment:     Acute respiratory failure  Prolonged ventilation  POD 5 s/p tracheostomy     Plan:     Routine trach care  Keep tracheostomy Velcro ties on tight -do not want accidental trach dislodgment  Can consider tracheostomy changed to uncuffed once stable on trach collar  Respiratory support per ICU team  Labs, radiology, and notes reviewed  Call if any issues    Chay Deleon MD   [facility]  [address]  [place] [address]  Office Phone: [phone]

## 2024-12-10 NOTE — CARE COORDINATION
CM reviewed Pt medicals, [facility] stated:   \"Please confirm the source of the fever. Is she on abx? Any new cultures? NG tube still in place (SLP to determine PO intake). Are sutures for trach still in place? Unable to accept until the NG tube has been removed and she is tolerating PO and the trach sutures have been removed.\"    CM sent updated medicals to [facility] and [facility].     Per IDR  Hoping once they get secretions under control Pt will be able to swallow.     Pt does not have a PEG.

## 2024-12-10 NOTE — PROGRESS NOTES
Discussed BP trend with Dr. Olivier MD to adjust midodrine dose. Also, MD to change robinul med d/t no IV access. Midline pending. Continue with plan for midline placement. MD ok to leave pt with no IV access until vascular access team arrives. Will continue to monitor.

## 2024-12-10 NOTE — PLAN OF CARE
Speech LAnguage Pathology Dysphagia EVALUATION and PMV treatment    Patient: Kim Markham (49 y.o. female)  Date: 12/10/2024  Primary Diagnosis: Acute right-sided congestive heart failure (HCC) [I50.811]  SVT (supraventricular tachycardia) (HCC) [I47.10]  Essential hypertension [I10]  Hypoxia [R09.02]  COPD exacerbation (HCC) [J44.1]  Procedure(s) (LRB):  TRACHEOSTOMY (N/A) 6 Days Post-Op   Precautions: Aspiration NPO, Fall Risk, General Precautions                DIET RECOMMENDATIONS: NPO and w/ TF and meds via NGT ,    PMV RECOMMENDATIONS: PMV as tolerated with placement by RT, SLP or RN only, ENSURE CUFF IS DEFLATED PRIOR TO PLACEMENT OF PMV. Warning labels affixed to  balloon and wall above HOB.     ASSESSMENT :  ST TX: Discussed w/ joann and MD, patient appropriate for PMV trials and bedside swallow evaluation.   Robinul and chest PT initiated.   Patient w/ Shiley 6 cuffed trach w/ cuff deflated (sutures present) on trach on 30% FiO2 25Lw/ inline trach collar. Clinician suctioned patient w/ minimal clear secretions, donned PMV, and applied trach collar. Warning label noted on cuff line.   Baseline vitals: , O2: 92%, RR28 prior to donning PMV.  Vitals stable through session at 30 mins : HR99, O2: 97% and RR: 24.   Hoarse vocal quality per patient she has \"froggy\" voice from smoking and it is near baseline. Free air movement w/o evidence of air stacking. Mirror work noted to identify trach and PMV and use of diagrams for trach vs normal vs PMV breathing this decreased anxiety.     SW Eval and tx: Based on the objective data described below, the patient presents with mild pharyngeal dysphagia and concerns for esophageal dysphagia. White coating noted hard and soft palate and lateral sulci. Concerns for thrush patient also reports sore throat. NGT noted in L nare. Tolerating TF per RN.   Patient reports belching food, chronic GERD and some early satiety prior to admission.   Oral phase is wfl for

## 2024-12-10 NOTE — PROGRESS NOTES
Comprehensive Nutrition Assessment    Type and Reason for Visit:  Reassess (Early Goal)    Nutrition Recommendations/Plan:   NPO, advance as medically appropriate.     Decrease TF of Vital AF 1.2 [place](Peptide Based) via NGT to new goal rate of 60 mL/hr.  Continue water flushes of 120 mL q4hrs.  Provides: 2016 kcal(99%), 108 gm protein(110%), 1886 mL H2O(92%).    Continue to monitor and record TF rate, flushes, tolerance, BM in I/Os.     Malnutrition Assessment:  Malnutrition Status:  No malnutrition (11/27/24 1010)    Context:  Acute Illness       Nutrition Assessment:    Admitted for SVT. Pt intubated in early a.m. of 11/13 in CCU. Brief nutrition assessment done- insufficient info gathered. (11/14) Pt remains vented, now off levo, sedated on propofol and fentanyl. NGT in place, hemo stable for EN initiation; however, will hold off for 24-36 hrs. Report of plan for SBT today. RD to provide regimen for when medically able to start TF. (11/18) Pt remains intubated and sedated in CCU. Pt tolerating TF at goal rate. (11/20) Pt remains vented, increasing sedation need on propofol and fentanyl. TF tolerated at goal rate. (11/27) Pt extubated, transferred to cardiac tele unit, Pt later w/ medical declined, returned to ICU <7 hrs later. Pt reintubated last evening(11/26), started on TF. Pt remains vented, on minimal levo, sedated on propofol. TF started last evning, tolerated at goal rate. RD to modify to meet new EENs. Possible need for trach per IDRs. (12/4) Pt remains vented, went for trach at time of visit. NGT remains in place, possible plans to use for EN when medically appropriate. Minimal levo now restarted and, increased propofol noted. RD to modify regimen. (12/10) Pt remains in ICU, on trach, off pressor support, no sedation. Report of significant secretions. NGT feeds tolerated at goal rate. Continue regimen to meet nutritional needs. Labs: Cr 0.33, POC <120, H/H 9.3/31.0. Meds: senna, flomax, duoneb, pepcid,

## 2024-12-10 NOTE — PROGRESS NOTES
Pt has had multiple PIVs. PIV infiltrated with c/o of pain when being flushed. Discussed history of multiple PIVs, pt current condition and hx with intensivist. After discussion, MD ordered for a midline to be placed. No need to contact nephrology per Dr. Aguayo. MD discontinued nephrology consult. Currently, pt has no IV access. Midline placement pending. Will continue to monitor.

## 2024-12-10 NOTE — PLAN OF CARE
Problem: Neurosensory - Adult  Goal: Achieves maximal functionality and self care  Outcome: Not Progressing     Problem: Musculoskeletal - Adult  Goal: Return mobility to safest level of function  Outcome: Not Progressing  Goal: Return ADL status to a safe level of function  Outcome: Not Progressing     Problem: Discharge Planning  Goal: Discharge to home or other facility with appropriate resources  Outcome: Progressing     Problem: ABCDS Injury Assessment  Goal: Absence of physical injury  Outcome: Progressing     Problem: Safety - Adult  Goal: Free from fall injury  Outcome: Progressing     Problem: Confusion  Goal: Confusion, delirium, dementia, or psychosis is improved or at baseline  Description: INTERVENTIONS:  1. Assess for possible contributors to thought disturbance, including medications, impaired vision or hearing, underlying metabolic abnormalities, dehydration, psychiatric diagnoses, and notify attending LIP  2. [place] high risk fall precautions, as indicated  3. Provide frequent short contacts to provide reality reorientation, refocusing and direction  4. Decrease environmental stimuli, including noise as appropriate  5. Monitor and intervene to maintain adequate nutrition, hydration, elimination, sleep and activity  6. If unable to ensure safety without constant attention obtain sitter and review sitter guidelines with assigned personnel  7. Initiate Psychosocial CNS and Spiritual Care consult, as indicated  Outcome: Progressing     Problem: Chronic Conditions and Co-morbidities  Goal: Patient's chronic conditions and co-morbidity symptoms are monitored and maintained or improved  Outcome: Progressing     Problem: Neurosensory - Adult  Goal: Achieves stable or improved neurological status  Outcome: Progressing  Goal: Absence of seizures  Outcome: Progressing  Goal: Remains free of injury related to seizures activity  Outcome: Progressing     Problem: Respiratory - Adult  Goal: Achieves optimal  nutrition and hydration needs  6. Q15M: Perform safety checks including skin, circulation, sensory, respiratory and psychological status  7. Ensure continuous observation  8. Identify and implement measures to help patient regain control, assess readiness for release and initiate progressive release per policy  Outcome: Progressing     Problem: Safety - Medical Restraint  Goal: Remains free of injury from restraints (Restraint for Interference with Medical Device)  Description: INTERVENTIONS:  1. Determine that other, less restrictive measures have been tried or would not be effective before applying the restraint  2. Evaluate the patient's condition at the time of restraint application  3. Inform patient/family regarding the reason for restraint  4. Q2H: Monitor safety, psychosocial status, comfort, nutrition and hydration  Outcome: Progressing     Problem: Pain  Goal: Verbalizes/displays adequate comfort level or baseline comfort level  Outcome: Progressing     Problem: Skin/Tissue Integrity  Goal: Absence of new skin breakdown  Description: 1.  Monitor for areas of redness and/or skin breakdown  2.  Assess vascular access sites hourly  3.  Every 4-6 hours minimum:  Change oxygen saturation probe site  4.  Every 4-6 hours:  If on nasal continuous positive airway pressure, respiratory therapy assess nares and determine need for appliance change or resting period.  Outcome: Progressing     Problem: Skin/Tissue Integrity - Adult  Goal: Skin integrity remains intact  Outcome: Progressing  Goal: Incisions, wounds, or drain sites healing without S/S of infection  Outcome: Progressing  Goal: Oral mucous membranes remain intact  Outcome: Progressing     Problem: Occupational Therapy - Adult  Goal: By Discharge: Performs self-care activities at highest level of function for planned discharge setting.  See evaluation for individualized goals.  Description: FUNCTIONAL STATUS PRIOR TO ADMISSION:  Pt was independent w/ ADLs

## 2024-12-11 ENCOUNTER — APPOINTMENT (OUTPATIENT)
Facility: HOSPITAL | Age: 49
End: 2024-12-11
Payer: MEDICAID

## 2024-12-11 LAB
ANION GAP SERPL CALC-SCNC: 3 MMOL/L (ref 2–12)
BUN SERPL-MCNC: 13 MG/DL (ref 6–20)
BUN/CREAT SERPL: 38 (ref 12–20)
CA-I BLD-MCNC: 9.2 MG/DL (ref 8.5–10.1)
CHLORIDE SERPL-SCNC: 107 MMOL/L (ref 97–108)
CO2 SERPL-SCNC: 30 MMOL/L (ref 21–32)
CREAT SERPL-MCNC: 0.34 MG/DL (ref 0.55–1.02)
ERYTHROCYTE [DISTWIDTH] IN BLOOD BY AUTOMATED COUNT: 14.9 % (ref 11.5–14.5)
GLUCOSE BLD STRIP.AUTO-MCNC: 100 MG/DL (ref 65–100)
GLUCOSE BLD STRIP.AUTO-MCNC: 107 MG/DL (ref 65–100)
GLUCOSE BLD STRIP.AUTO-MCNC: 114 MG/DL (ref 65–100)
GLUCOSE SERPL-MCNC: 95 MG/DL (ref 65–100)
HCT VFR BLD AUTO: 27 % (ref 35–47)
HGB BLD-MCNC: 8.2 G/DL (ref 11.5–16)
MCH RBC QN AUTO: 28.2 PG (ref 26–34)
MCHC RBC AUTO-ENTMCNC: 30.4 G/DL (ref 30–36.5)
MCV RBC AUTO: 92.8 FL (ref 80–99)
NRBC # BLD: 0 K/UL (ref 0–0.01)
NRBC BLD-RTO: 0 PER 100 WBC
PERFORMED BY:: ABNORMAL
PERFORMED BY:: ABNORMAL
PERFORMED BY:: NORMAL
PHOSPHATE SERPL-MCNC: 3.4 MG/DL (ref 2.6–4.7)
PLATELET # BLD AUTO: 319 K/UL (ref 150–400)
PMV BLD AUTO: 11.4 FL (ref 8.9–12.9)
POTASSIUM SERPL-SCNC: 4.1 MMOL/L (ref 3.5–5.1)
RBC # BLD AUTO: 2.91 M/UL (ref 3.8–5.2)
SODIUM SERPL-SCNC: 140 MMOL/L (ref 136–145)
WBC # BLD AUTO: 8 K/UL (ref 3.6–11)

## 2024-12-11 PROCEDURE — 97530 THERAPEUTIC ACTIVITIES: CPT

## 2024-12-11 PROCEDURE — 6360000002 HC RX W HCPCS: Performed by: STUDENT IN AN ORGANIZED HEALTH CARE EDUCATION/TRAINING PROGRAM

## 2024-12-11 PROCEDURE — 92526 ORAL FUNCTION THERAPY: CPT

## 2024-12-11 PROCEDURE — 94640 AIRWAY INHALATION TREATMENT: CPT

## 2024-12-11 PROCEDURE — 6370000000 HC RX 637 (ALT 250 FOR IP)

## 2024-12-11 PROCEDURE — 2500000003 HC RX 250 WO HCPCS: Performed by: INTERNAL MEDICINE

## 2024-12-11 PROCEDURE — 92611 MOTION FLUOROSCOPY/SWALLOW: CPT

## 2024-12-11 PROCEDURE — 94761 N-INVAS EAR/PLS OXIMETRY MLT: CPT

## 2024-12-11 PROCEDURE — 82962 GLUCOSE BLOOD TEST: CPT

## 2024-12-11 PROCEDURE — 2700000000 HC OXYGEN THERAPY PER DAY

## 2024-12-11 PROCEDURE — 6370000000 HC RX 637 (ALT 250 FOR IP): Performed by: INTERNAL MEDICINE

## 2024-12-11 PROCEDURE — 80048 BASIC METABOLIC PNL TOTAL CA: CPT

## 2024-12-11 PROCEDURE — 2000000000 HC ICU R&B

## 2024-12-11 PROCEDURE — 6370000000 HC RX 637 (ALT 250 FOR IP): Performed by: STUDENT IN AN ORGANIZED HEALTH CARE EDUCATION/TRAINING PROGRAM

## 2024-12-11 PROCEDURE — 85027 COMPLETE CBC AUTOMATED: CPT

## 2024-12-11 PROCEDURE — 6360000002 HC RX W HCPCS: Performed by: INTERNAL MEDICINE

## 2024-12-11 PROCEDURE — 36415 COLL VENOUS BLD VENIPUNCTURE: CPT

## 2024-12-11 PROCEDURE — 2580000003 HC RX 258: Performed by: NURSE PRACTITIONER

## 2024-12-11 PROCEDURE — 92507 TX SP LANG VOICE COMM INDIV: CPT

## 2024-12-11 PROCEDURE — 84100 ASSAY OF PHOSPHORUS: CPT

## 2024-12-11 PROCEDURE — 74230 X-RAY XM SWLNG FUNCJ C+: CPT

## 2024-12-11 RX ORDER — GLYCOPYRROLATE 1 MG/1
1 TABLET ORAL 3 TIMES DAILY
Status: DISCONTINUED | OUTPATIENT
Start: 2024-12-11 | End: 2024-12-12

## 2024-12-11 RX ORDER — FLUCONAZOLE 100 MG/1
100 TABLET ORAL DAILY
Status: DISCONTINUED | OUTPATIENT
Start: 2024-12-11 | End: 2024-12-12

## 2024-12-11 RX ADMIN — GLYCOPYRROLATE 1 MG: 1 TABLET ORAL at 16:06

## 2024-12-11 RX ADMIN — GUAIFENESIN 300 MG: 100 SOLUTION ORAL at 16:06

## 2024-12-11 RX ADMIN — BUDESONIDE INHALATION 500 MCG: 0.5 SUSPENSION RESPIRATORY (INHALATION) at 19:42

## 2024-12-11 RX ADMIN — BARIUM SULFATE 60 ML: 0.81 POWDER, FOR SUSPENSION ORAL at 13:55

## 2024-12-11 RX ADMIN — FAMOTIDINE 20 MG: 20 TABLET, FILM COATED ORAL at 09:13

## 2024-12-11 RX ADMIN — SODIUM CHLORIDE, PRESERVATIVE FREE 10 ML: 5 INJECTION INTRAVENOUS at 09:17

## 2024-12-11 RX ADMIN — GUAIFENESIN 300 MG: 100 SOLUTION ORAL at 09:11

## 2024-12-11 RX ADMIN — POTASSIUM BICARBONATE 40 MEQ: 782 TABLET, EFFERVESCENT ORAL at 21:15

## 2024-12-11 RX ADMIN — GLYCOPYRROLATE 1 MG: 1 TABLET ORAL at 21:14

## 2024-12-11 RX ADMIN — SENNOSIDES 8.6 MG: 8.6 TABLET, FILM COATED ORAL at 21:14

## 2024-12-11 RX ADMIN — TAMSULOSIN HYDROCHLORIDE 0.4 MG: 0.4 CAPSULE ORAL at 09:14

## 2024-12-11 RX ADMIN — BARIUM SULFATE 60 ML: 400 SUSPENSION ORAL at 13:55

## 2024-12-11 RX ADMIN — BARIUM SULFATE 60 ML: 400 PASTE ORAL at 13:55

## 2024-12-11 RX ADMIN — ASPIRIN 81 MG: 81 TABLET, CHEWABLE ORAL at 09:15

## 2024-12-11 RX ADMIN — QUETIAPINE FUMARATE 50 MG: 25 TABLET ORAL at 21:14

## 2024-12-11 RX ADMIN — ENOXAPARIN SODIUM 30 MG: 100 INJECTION SUBCUTANEOUS at 21:14

## 2024-12-11 RX ADMIN — GLYCOPYRROLATE 0.1 MG: 0.2 INJECTION INTRAMUSCULAR; INTRAVENOUS at 09:16

## 2024-12-11 RX ADMIN — BUSPIRONE HYDROCHLORIDE 7.5 MG: 5 TABLET ORAL at 09:12

## 2024-12-11 RX ADMIN — SODIUM CHLORIDE, PRESERVATIVE FREE 10 ML: 5 INJECTION INTRAVENOUS at 22:20

## 2024-12-11 RX ADMIN — HYDROCODONE BITARTRATE AND ACETAMINOPHEN 1 TABLET: 5; 325 TABLET ORAL at 19:04

## 2024-12-11 RX ADMIN — BUSPIRONE HYDROCHLORIDE 7.5 MG: 5 TABLET ORAL at 21:14

## 2024-12-11 RX ADMIN — POTASSIUM BICARBONATE 40 MEQ: 782 TABLET, EFFERVESCENT ORAL at 09:12

## 2024-12-11 RX ADMIN — IPRATROPIUM BROMIDE AND ALBUTEROL SULFATE 1 DOSE: 2.5; .5 SOLUTION RESPIRATORY (INHALATION) at 19:42

## 2024-12-11 RX ADMIN — IPRATROPIUM BROMIDE AND ALBUTEROL SULFATE 1 DOSE: 2.5; .5 SOLUTION RESPIRATORY (INHALATION) at 01:57

## 2024-12-11 RX ADMIN — ENOXAPARIN SODIUM 30 MG: 100 INJECTION SUBCUTANEOUS at 09:16

## 2024-12-11 RX ADMIN — BUDESONIDE INHALATION 500 MCG: 0.5 SUSPENSION RESPIRATORY (INHALATION) at 08:01

## 2024-12-11 RX ADMIN — AMIODARONE HYDROCHLORIDE 200 MG: 200 TABLET ORAL at 09:14

## 2024-12-11 RX ADMIN — IPRATROPIUM BROMIDE AND ALBUTEROL SULFATE 1 DOSE: 2.5; .5 SOLUTION RESPIRATORY (INHALATION) at 08:01

## 2024-12-11 RX ADMIN — FLUCONAZOLE 100 MG: 100 TABLET ORAL at 12:32

## 2024-12-11 RX ADMIN — QUETIAPINE FUMARATE 50 MG: 25 TABLET ORAL at 09:15

## 2024-12-11 RX ADMIN — GLYCOPYRROLATE 1 MG: 1 TABLET ORAL at 12:30

## 2024-12-11 RX ADMIN — QUETIAPINE FUMARATE 50 MG: 25 TABLET ORAL at 16:06

## 2024-12-11 RX ADMIN — ATORVASTATIN CALCIUM 40 MG: 40 TABLET, FILM COATED ORAL at 21:14

## 2024-12-11 RX ADMIN — GUAIFENESIN 300 MG: 100 SOLUTION ORAL at 21:15

## 2024-12-11 RX ADMIN — SERTRALINE HYDROCHLORIDE 50 MG: 50 TABLET, FILM COATED ORAL at 09:12

## 2024-12-11 ASSESSMENT — PAIN SCALES - WONG BAKER
WONGBAKER_NUMERICALRESPONSE: NO HURT
WONGBAKER_NUMERICALRESPONSE: NO HURT

## 2024-12-11 ASSESSMENT — PAIN SCALES - GENERAL
PAINLEVEL_OUTOF10: 8
PAINLEVEL_OUTOF10: 0
PAINLEVEL_OUTOF10: 0

## 2024-12-11 ASSESSMENT — PAIN DESCRIPTION - LOCATION: LOCATION: THROAT

## 2024-12-11 NOTE — PROGRESS NOTES
OCCUPATIONAL THERAPY TREATMENT  Patient: Kim Markham (49 y.o. female)  Date: 12/11/2024  Primary Diagnosis: Acute right-sided congestive heart failure (HCC) [I50.811]  SVT (supraventricular tachycardia) (HCC) [I47.10]  Essential hypertension [I10]  Hypoxia [R09.02]  COPD exacerbation (HCC) [J44.1]  Procedure(s) (LRB):  TRACHEOSTOMY (N/A) 7 Days Post-Op   Precautions: NPO, Fall Risk, General Precautions                Recommendations for nursing mobility: Encourage HEP in prep for ADLs/mobility; see handout for details, Frequent repositioning to prevent skin breakdown, LE elevation for management of edema, and Assist x2    In place during session: High Flow 20L/min 36%, Tracheostomy w/ Shiley 6 cuff, EKG/telemetry , and Pulse ox  Chart, occupational therapy assessment, plan of care, and goals were reviewed.  ASSESSMENT  Patient continues with skilled OT services and is progressing towards goals. Pt semi supine upon GARDUNO/PTA arrival, agreeable to session. Pt A&O x 4. Pt just returned from MBS test.  Pt pt unable to don slipper socks, pt unable to flex knees or complete in figure 4.  Pt started to sit EOB,  however, when attempted, pt reported she was to fatigued. Pt returned semi supine and completed UB dressing ([facility] gown).  Pt demonstrates decrease strength and ROM in L UE.  Pt left semi supine in bed with all  needs met. (See below for objective details and assist levels).     Overall pt tolerated session fair today with UB dressing. Current OT recommendations for discharge Moderate intensity short-term skilled occupational therapy up to 5x/week. Will continue to benefit from skilled OT services, and will continue to progress as tolerated.      Start of Session End of Session   SPO2 (%) 93 95   Heart Rate (BPM) 101 105     GOALS:    Problem: Occupational Therapy - Adult  Goal: By Discharge: Performs self-care activities at highest level of function for planned discharge setting.  See

## 2024-12-11 NOTE — CARE COORDINATION
13:20PM Patient is now being followed by LTACH ([facility]).      MAINE Fontanez      08:16AM T-piece 20L.  Trach in place.  IR consult pending.      Patient declined at [facility] on 4 Dec 2024. Declined d/t they do not accept Medicaid pending.     Per admissions on 10 Dec 2024 \"decline this pt due to pt received PRN Haldol 5mg on 12/7 at 12:42a.m. Received PRN Ativan on 12/6, twice on 12/5 and 3 times on 12/4.\"     Patient declined at LTACH's ([facility]; [facility]; and [facility]) d/t no payor source.      [facility] [place]      Patient screened by public benefits. Medicaid pending.      MAINE Fontanez       Detail Level: Simple

## 2024-12-11 NOTE — PLAN OF CARE
Problem: Neurosensory - Adult  Goal: Achieves maximal functionality and self care  Outcome: Not Progressing  Flowsheets (Taken 12/10/2024 0801 by Paola Conley, RN)  Achieves maximal functionality and self care: Monitor swallowing and airway patency with patient fatigue and changes in neurological status     Problem: Musculoskeletal - Adult  Goal: Return mobility to safest level of function  Outcome: Not Progressing  Flowsheets (Taken 12/10/2024 0801 by Paola Conley, RN)  Return Mobility to Safest Level of Function:   Assess patient stability and activity tolerance for standing, transferring and ambulating with or without assistive devices   Obtain physical therapy/occupational therapy consults as needed  Goal: Return ADL status to a safe level of function  Outcome: Not Progressing  Flowsheets (Taken 12/10/2024 0801 by Paola Conley RN)  Return ADL Status to a Safe Level of Function:   Administer medication as ordered   Assess activities of daily living deficits and provide assistive devices as needed   Obtain physical therapy/occupational therapy consults as needed   Assist and instruct patient to increase activity and self care as tolerated     Problem: Discharge Planning  Goal: Discharge to home or other facility with appropriate resources  Outcome: Progressing  Flowsheets (Taken 12/10/2024 0801 by Paola Conley, RN)  Discharge to home or other facility with appropriate resources:   Identify barriers to discharge with patient and caregiver   Arrange for needed discharge resources and transportation as appropriate   Identify discharge learning needs (meds, wound care, etc)     Problem: ABCDS Injury Assessment  Goal: Absence of physical injury  Outcome: Progressing     Problem: Safety - Adult  Goal: Free from fall injury  Outcome: Progressing     Problem: Confusion  Goal: Confusion, delirium, dementia, or psychosis is improved or at baseline  Description: INTERVENTIONS:  1. Assess for possible  application  3. Inform patient/family regarding the reason for restraint  4. Q2H: Monitor safety, psychosocial status, comfort, nutrition and hydration  Outcome: Progressing     Problem: Pain  Goal: Verbalizes/displays adequate comfort level or baseline comfort level  Outcome: Progressing  Flowsheets (Taken 12/10/2024 0800 by Paola Conley RN)  Verbalizes/displays adequate comfort level or baseline comfort level:   Encourage patient to monitor pain and request assistance   Assess pain using appropriate pain scale   Administer analgesics based on type and severity of pain and evaluate response   Implement non-pharmacological measures as appropriate and evaluate response     Problem: Skin/Tissue Integrity  Goal: Absence of new skin breakdown  Description: 1.  Monitor for areas of redness and/or skin breakdown  2.  Assess vascular access sites hourly  3.  Every 4-6 hours minimum:  Change oxygen saturation probe site  4.  Every 4-6 hours:  If on nasal continuous positive airway pressure, respiratory therapy assess nares and determine need for appliance change or resting period.  Outcome: Progressing     Problem: Skin/Tissue Integrity - Adult  Goal: Skin integrity remains intact  Outcome: Progressing  Flowsheets (Taken 12/10/2024 0801 by Paola Conley RN)  Skin Integrity Remains Intact: Monitor for areas of redness and/or skin breakdown  Goal: Incisions, wounds, or drain sites healing without S/S of infection  Outcome: Progressing  Flowsheets (Taken 12/10/2024 0801 by Paola Conley RN)  Incisions, Wounds, or Drain Sites Healing Without Sign and Symptoms of Infection:   TWICE DAILY: Assess and document skin integrity   TWICE DAILY: Assess and document dressing/incision, wound bed, drain sites and surrounding tissue  Goal: Oral mucous membranes remain intact  Outcome: Progressing  Flowsheets (Taken 12/10/2024 0801 by Paola Conley RN)  Oral Mucous Membranes Remain Intact:   Assess oral mucosa and hygiene

## 2024-12-11 NOTE — PLAN OF CARE
SPEECH PATHOLOGY MODIFIED BARIUM SWALLOW STUDY    Patient: Kim Markham (49 y.o. female)  Date: 12/11/2024  Primary Diagnosis: Acute right-sided congestive heart failure (HCC) [I50.811]  SVT (supraventricular tachycardia) ([facility]) [I47.10]  Essential hypertension [I10]  Hypoxia [R09.02]  COPD exacerbation ([facility]) [J44.1]  Procedure(s) (LRB):  TRACHEOSTOMY (N/A) 7 Days Post-Op   Precautions: Aspiration, Fall Risk, General Precautions                DIET RECOMMENDATIONS: Full liquid and moderately thick liquids, meds crushed if able in applesauce or pudding,    SWALLOW SAFETY PRECAUTIONS: Wear PMV w/ all PO intake, 1:1 assistance with ALL PO intake, do not allow to self feed, all PO via spoon, slow rate of intake, small bites/sips, cue for double swallow, cue for effortful swallow, liquid wash, remain upright 1 hour after PO and do not eat 3 hours before bedtime.     PMV RECOMMENDATIONS: PMV as tolerated  ENSURE CUFF IS DEFLATED PRIOR TO PLACEMENT OF PMV.   ASSESSMENT :  Patient arrived for MBS w/ PMV donned to trach hub on trach collar 10L. Tracheal suctioning provided prior  to MBS.   Based on the objective data described below, the patient presents with moderate pharyngeal dysphagia and concerns for esophageal dysphagia.  Oral phase c/b good bolus control and manipulation w/ minimal oral residue.  All consistencies initiate at the level of the vallecula. Overall, minimal swallow delay appreciated.  Moderate reduction in BOT retraction, HLE, and pharyngeal constriction. Limited to absent hyoid protraction. Epiglottis w/ significantly reduced inversion however there was improvement w/ continued trials and w/ weight of bolus. Reduced pressure drive noted.  Laryngeal penetration during the swallow w/ subsequent aspiration w/ thin trials. W/ larger volume there is greater degree of penetration and aspiration. Of note w/ smaller trials patient is able to clear aspiration. However, w/ larger volume thin aspiration is

## 2024-12-11 NOTE — PLAN OF CARE
PHYSICAL THERAPY TREATMENT     Patient: Kim Markham (49 y.o. female)  Date: 12/11/2024  Diagnosis: Acute right-sided congestive heart failure (HCC) [I50.811]  SVT (supraventricular tachycardia) (HCC) [I47.10]  Essential hypertension [I10]  Hypoxia [R09.02]  COPD exacerbation (HCC) [J44.1] SVT (supraventricular tachycardia) (HCC)  Procedure(s) (LRB):  TRACHEOSTOMY (N/A) 7 Days Post-Op  Precautions: NPO, Fall Risk, General Precautions                      Recommendations for nursing mobility: Encourage HEP in prep for ADLs/mobility; see handout for details, Frequent repositioning to prevent skin breakdown, and Assist x2    In place during session: High Flow  L/min  %, Tracheostomy  , EKG/telemetry , and Pulse ox  Chart, physical therapy assessment, plan of care and goals were reviewed.  ASSESSMENT  Patient continues with skilled PT services and is progressing towards goals. Pt supine in bed upon PT arrival, agreeable to session. (See below for objective details and assist levels).     Overall pt tolerated session fair today. Pt completed partial transfer to eob with min/mod Ax2 with verbal and tactile cues required for hand placement. Pt had LLE off the bed and RLE still on the bed. Pt sat there for ~ 1 minute before stating that she needed to lay back down and that she couldn't go any further. Pt reports that she has been busy today with tests and moving around a bit so she was tired. Pt returned supine in bed. Pt performed supine LE therex with no c/o pain or discomfort. Pt left supine in bed with all needs met. Will continue to benefit from skilled PT services, and will continue to progress as tolerated. Current PT DC recommendation Moderate intensity short-term skilled physical therapy up to 5x/week once medically appropriate.    GOALS:  Problem: Physical Therapy - Adult  Goal: By Discharge: Performs mobility at highest level of function for planned discharge setting.  See evaluation for

## 2024-12-11 NOTE — PROGRESS NOTES
SPEECH LANGUAGE PATHOLOGY DYSPHAGIA EVALUATION AND PMV TREATMENT     Patient: Kim Markham (49 y.o. female)  Date: 12/10/2024  Primary Diagnosis: Acute right-sided congestive heart failure (HCC) [I50.811]  SVT (supraventricular tachycardia) (HCC) [I47.10]  Essential hypertension [I10]  Hypoxia [R09.02]  COPD exacerbation (HCC) [J44.1]  Procedure(s) (LRB):  TRACHEOSTOMY (N/A) 6 Days Post-Op   Precautions: Aspiration NPO, Fall Risk, General Precautions                DIET RECOMMENDATIONS: NPO and w/ TF and meds via NGT ,     PMV RECOMMENDATIONS: PMV as tolerated with placement by RT, SLP or RN only, ENSURE CUFF IS DEFLATED PRIOR TO PLACEMENT OF PMV. Warning labels affixed to  balloon and wall above HOB.      ASSESSMENT :  ST TX: Discussed w/ nsg patient tolerated PMV earlier today and continues w/ improved secretion management.   Patient w/ Shiley 6 cuffed trach w/ cuff deflated (sutures present) on trach on 30% FiO2 20Lw/ inline trach collar. Clinician suctioned patient w/ minimal clear secretions, donned PMV, and applied trach collar. Warning label noted on cuff line.   Baseline vitals: HR:93, O2: 94%, RR23 prior to donning PMV.  Vitals stable throughout session.   Reviewed rescue breathing strategies for when anxious and provided mirror work on donning/doffing PMV.      SW  tx:   Patient reports excitement for MBS today and getting NGT out. Hopeful does not need to be replaced.   Oral phase is wfl for consistencies administered.  Pharyngeal phase c/b minimal swallow delay and HLE is reduced upon palpation. Atypical audible swallow noted  however this decreased from evaluation. Belching noted. Overt s/s of pen/asp observed w/ thin c/b throat clear.   Educated on purpose of MBS and ST POC.  Verbal order obtained for MBS.  At completion of session, PMV doffed and in-line trach collar applied. Vitals stable HR: 98, O2: 93% and RR: 27.   GOALS:     Problem: SLP Adult - Impaired Swallowing  Goal: By Discharge:  states she is excited for swallow test.      OBJECTIVE:       Prior Level of Function/Home Situation:   Social/Functional History  Lives With: Spouse  Type of Home:  (hotel)  Home Layout: One level  Bathroom Shower/Tub: Tub/Shower unit  Bathroom Toilet: Standard  Home Equipment: Walker - Rolling, Cane  Has the patient had two or more falls in the past year or any fall with injury in the past year?: No  Prior Level of Assist for ADLs: Independent  Prior Level of Assist for Transfers: Independent  Occupation: Full time employment     Cognitive and Communication Status:  Neurologic State: Alert  Orientation Level: Oriented x4  Cognition: Follows commands     Baseline Assessment:  Current Diet : NPO  Current Liquid Diet : NPO  Patient Complaint: sore throat        General:   O2 Device: Trach mask  Current Diet : NPO  Current Liquid Diet : NPO  Dentition: Adequate  Patient Positioning: Upright in bed     Dysphagia:  Oral Assessment:  Oral Motor   Labial: No impairment  Dentition: Intact  Oral Hygiene: Moist  Lingual: No impairment  Velum: No Impairment  Mandible: No impairment  P.O. Trials:  Consistencies Administered: Ice Chips;Thin - teaspoon;Mildly Thick- teaspoon     Voice:   Hoarse w/ PMV donned to trach hub     After Treatment:  Patient left in no apparent distress in bed, Call bell left within reach, and Nursing notified      Pain:  VAS (numerical) 0     COMMUNICATION/EDUCATION:   Swallow safety precautions, Aspiration precautions, Compensatory strategies, Prognosis and SLP POC, and PMV   education provided to Patient and Nurse via handout provided , explanation, and teach back, all questions/concerns addressed. Patient verbalizes understanding and requires reinforcement.     The patient's plan of care including recommendations, planned interventions, and recommended diet changes were discussed with: Registered nurse and Physician.     Patient/family have participated as able in goal setting and plan of care and

## 2024-12-11 NOTE — PROGRESS NOTES
CRITICAL CARE PROGRESS NOTE    Name: Kim Markham   : 1975   MRN: [id number]   Date: 2024      Diagnoses/problem list:   Acute hypoxic hypercapnic respiratory failure s/p trach on   COPD exacerbation   Supraventricular tachycardia  Acute encephalopathy   Acute kidney injury     24-hour events:   Remains on HFNC via trach collar 20 L 35% FiO2.  Secretions significantly improved today.  Looking much better overall this morning.    Assessment and plan:   Kim Markham is a 49 y.o. female with known past medical history significant for asthma and COPD who treated her symptoms today with Primatene Mist over-the-counter says that she went into fast heart rate became short of breath immediately.  She initially presented on .  She eventually was extubated but needed reintubation due to lung collapse.  She eventually underwent tracheostomy on .    NEUROLOGICAL:    Mentation improved, alert and oriented  Avoid sedating medications  Continue buspirone and sertraline  Continue Seroquel  Pain management  Delirium precautions    PULMONOLOGY:   Continue DuoNebs  Continue Mucomyst nebs  Continue Pulmicort  HFNC/trach collar as tolerated  BiPAP via trach as needed during night  Glycopyrrolate for secretions  PMV per SLP    CARDIOVASCULAR:   Midodrine to keep MAP above 65  Echo  with normal LVEF, diastolic dysfunction  Continue aspirin and statin  Continue amiodarone for recurrent SVT    GASTROINTESTINAL:   Continue tube feeds as tolerated    RENAL/ELECTROLYTE:   Creatinine stable at this time  Monitor UOP closely  Correct electrolytes  Continue Flomax    ENDOCRINE:   ISS for BG control    HEMATOLOGY/ONCOLOGY:   Lovenox for VTE prophylaxis    ID/MICRO:   Previously completed 7 days of empiric Zosyn on   No concern for active infection at this time    ICU DAILY CHECKLIST     Code Status: Full  DVT Prophylaxis: Lovenox  T/L/D: PIV  SUP: Pepcid  Diet: Tube feeds  Activity Level: Passive ROM as

## 2024-12-12 LAB
ANION GAP SERPL CALC-SCNC: 4 MMOL/L (ref 2–12)
BUN SERPL-MCNC: 10 MG/DL (ref 6–20)
BUN/CREAT SERPL: 24 (ref 12–20)
CA-I BLD-MCNC: 10.2 MG/DL (ref 8.5–10.1)
CHLORIDE SERPL-SCNC: 106 MMOL/L (ref 97–108)
CO2 SERPL-SCNC: 33 MMOL/L (ref 21–32)
CREAT SERPL-MCNC: 0.41 MG/DL (ref 0.55–1.02)
ERYTHROCYTE [DISTWIDTH] IN BLOOD BY AUTOMATED COUNT: 14.8 % (ref 11.5–14.5)
GLUCOSE BLD STRIP.AUTO-MCNC: 85 MG/DL (ref 65–100)
GLUCOSE SERPL-MCNC: 86 MG/DL (ref 65–100)
HCT VFR BLD AUTO: 30.1 % (ref 35–47)
HGB BLD-MCNC: 9.3 G/DL (ref 11.5–16)
MAGNESIUM SERPL-MCNC: 2 MG/DL (ref 1.6–2.4)
MCH RBC QN AUTO: 28.7 PG (ref 26–34)
MCHC RBC AUTO-ENTMCNC: 30.9 G/DL (ref 30–36.5)
MCV RBC AUTO: 92.9 FL (ref 80–99)
NRBC # BLD: 0 K/UL (ref 0–0.01)
NRBC BLD-RTO: 0 PER 100 WBC
PERFORMED BY:: NORMAL
PLATELET # BLD AUTO: 319 K/UL (ref 150–400)
PMV BLD AUTO: 11.4 FL (ref 8.9–12.9)
POTASSIUM SERPL-SCNC: 4.2 MMOL/L (ref 3.5–5.1)
RBC # BLD AUTO: 3.24 M/UL (ref 3.8–5.2)
SODIUM SERPL-SCNC: 143 MMOL/L (ref 136–145)
WBC # BLD AUTO: 6.3 K/UL (ref 3.6–11)

## 2024-12-12 PROCEDURE — 80048 BASIC METABOLIC PNL TOTAL CA: CPT

## 2024-12-12 PROCEDURE — 6370000000 HC RX 637 (ALT 250 FOR IP): Performed by: INTERNAL MEDICINE

## 2024-12-12 PROCEDURE — 6360000002 HC RX W HCPCS: Performed by: STUDENT IN AN ORGANIZED HEALTH CARE EDUCATION/TRAINING PROGRAM

## 2024-12-12 PROCEDURE — 97530 THERAPEUTIC ACTIVITIES: CPT

## 2024-12-12 PROCEDURE — 92526 ORAL FUNCTION THERAPY: CPT

## 2024-12-12 PROCEDURE — 94761 N-INVAS EAR/PLS OXIMETRY MLT: CPT

## 2024-12-12 PROCEDURE — 2500000003 HC RX 250 WO HCPCS: Performed by: INTERNAL MEDICINE

## 2024-12-12 PROCEDURE — 36415 COLL VENOUS BLD VENIPUNCTURE: CPT

## 2024-12-12 PROCEDURE — 97535 SELF CARE MNGMENT TRAINING: CPT

## 2024-12-12 PROCEDURE — 2700000000 HC OXYGEN THERAPY PER DAY

## 2024-12-12 PROCEDURE — 2580000003 HC RX 258: Performed by: NURSE PRACTITIONER

## 2024-12-12 PROCEDURE — 83735 ASSAY OF MAGNESIUM: CPT

## 2024-12-12 PROCEDURE — 6370000000 HC RX 637 (ALT 250 FOR IP): Performed by: STUDENT IN AN ORGANIZED HEALTH CARE EDUCATION/TRAINING PROGRAM

## 2024-12-12 PROCEDURE — 85027 COMPLETE CBC AUTOMATED: CPT

## 2024-12-12 PROCEDURE — 94640 AIRWAY INHALATION TREATMENT: CPT

## 2024-12-12 PROCEDURE — 82962 GLUCOSE BLOOD TEST: CPT

## 2024-12-12 PROCEDURE — 92507 TX SP LANG VOICE COMM INDIV: CPT

## 2024-12-12 PROCEDURE — 2060000000 HC ICU INTERMEDIATE R&B

## 2024-12-12 RX ORDER — SENNOSIDES A AND B 8.6 MG/1
1 TABLET, FILM COATED ORAL NIGHTLY
Status: DISCONTINUED | OUTPATIENT
Start: 2024-12-12 | End: 2025-01-08

## 2024-12-12 RX ORDER — POTASSIUM CHLORIDE 1500 MG/1
40 TABLET, EXTENDED RELEASE ORAL ONCE
Status: DISCONTINUED | OUTPATIENT
Start: 2024-12-12 | End: 2024-12-12

## 2024-12-12 RX ORDER — AMIODARONE HYDROCHLORIDE 200 MG/1
200 TABLET ORAL DAILY
Status: DISCONTINUED | OUTPATIENT
Start: 2024-12-13 | End: 2024-12-24

## 2024-12-12 RX ORDER — GUAIFENESIN 200 MG/10ML
300 LIQUID ORAL 4 TIMES DAILY
Status: DISCONTINUED | OUTPATIENT
Start: 2024-12-12 | End: 2025-01-08

## 2024-12-12 RX ORDER — POTASSIUM CHLORIDE 1.5 G/1.58G
40 POWDER, FOR SOLUTION ORAL 2 TIMES DAILY
Status: DISCONTINUED | OUTPATIENT
Start: 2024-12-12 | End: 2024-12-12

## 2024-12-12 RX ORDER — FAMOTIDINE 20 MG/1
20 TABLET, FILM COATED ORAL DAILY
Status: DISCONTINUED | OUTPATIENT
Start: 2024-12-13 | End: 2025-01-08

## 2024-12-12 RX ORDER — MIDODRINE HYDROCHLORIDE 5 MG/1
5 TABLET ORAL 3 TIMES DAILY PRN
Status: DISCONTINUED | OUTPATIENT
Start: 2024-12-12 | End: 2025-01-10 | Stop reason: HOSPADM

## 2024-12-12 RX ORDER — ATORVASTATIN CALCIUM 40 MG/1
40 TABLET, FILM COATED ORAL NIGHTLY
Status: DISCONTINUED | OUTPATIENT
Start: 2024-12-12 | End: 2025-01-10 | Stop reason: HOSPADM

## 2024-12-12 RX ORDER — FLUCONAZOLE 100 MG/1
100 TABLET ORAL DAILY
Status: DISCONTINUED | OUTPATIENT
Start: 2024-12-13 | End: 2025-01-08

## 2024-12-12 RX ORDER — QUETIAPINE FUMARATE 25 MG/1
50 TABLET, FILM COATED ORAL 3 TIMES DAILY
Status: DISCONTINUED | OUTPATIENT
Start: 2024-12-12 | End: 2025-01-08

## 2024-12-12 RX ORDER — GLYCOPYRROLATE 1 MG/1
1 TABLET ORAL 3 TIMES DAILY
Status: DISCONTINUED | OUTPATIENT
Start: 2024-12-12 | End: 2025-01-08

## 2024-12-12 RX ORDER — HYDROCODONE BITARTRATE AND ACETAMINOPHEN 5; 325 MG/1; MG/1
1 TABLET ORAL EVERY 6 HOURS PRN
Status: DISCONTINUED | OUTPATIENT
Start: 2024-12-12 | End: 2025-01-10 | Stop reason: HOSPADM

## 2024-12-12 RX ORDER — ASPIRIN 81 MG/1
81 TABLET, CHEWABLE ORAL DAILY
Status: DISCONTINUED | OUTPATIENT
Start: 2024-12-13 | End: 2025-01-10 | Stop reason: HOSPADM

## 2024-12-12 RX ORDER — POTASSIUM CHLORIDE 1500 MG/1
40 TABLET, EXTENDED RELEASE ORAL 2 TIMES DAILY
Status: DISCONTINUED | OUTPATIENT
Start: 2024-12-12 | End: 2024-12-12

## 2024-12-12 RX ORDER — ACETAMINOPHEN 160 MG/5ML
650 LIQUID ORAL EVERY 4 HOURS PRN
Status: DISCONTINUED | OUTPATIENT
Start: 2024-12-12 | End: 2025-01-10 | Stop reason: HOSPADM

## 2024-12-12 RX ORDER — BUSPIRONE HYDROCHLORIDE 5 MG/1
7.5 TABLET ORAL 2 TIMES DAILY
Status: DISCONTINUED | OUTPATIENT
Start: 2024-12-12 | End: 2025-01-08

## 2024-12-12 RX ORDER — POLYETHYLENE GLYCOL 3350 17 G/17G
17 POWDER, FOR SOLUTION ORAL DAILY PRN
Status: DISCONTINUED | OUTPATIENT
Start: 2024-12-12 | End: 2025-01-10 | Stop reason: HOSPADM

## 2024-12-12 RX ADMIN — GLYCOPYRROLATE 1 MG: 1 TABLET ORAL at 08:36

## 2024-12-12 RX ADMIN — FLUCONAZOLE 100 MG: 100 TABLET ORAL at 08:32

## 2024-12-12 RX ADMIN — GLYCOPYRROLATE 1 MG: 1 TABLET ORAL at 22:23

## 2024-12-12 RX ADMIN — QUETIAPINE FUMARATE 50 MG: 25 TABLET ORAL at 12:09

## 2024-12-12 RX ADMIN — BUDESONIDE INHALATION 500 MCG: 0.5 SUSPENSION RESPIRATORY (INHALATION) at 21:14

## 2024-12-12 RX ADMIN — GUAIFENESIN 300 MG: 100 SOLUTION ORAL at 12:09

## 2024-12-12 RX ADMIN — TAMSULOSIN HYDROCHLORIDE 0.4 MG: 0.4 CAPSULE ORAL at 08:36

## 2024-12-12 RX ADMIN — SODIUM CHLORIDE, PRESERVATIVE FREE 10 ML: 5 INJECTION INTRAVENOUS at 21:33

## 2024-12-12 RX ADMIN — ENOXAPARIN SODIUM 30 MG: 100 INJECTION SUBCUTANEOUS at 21:55

## 2024-12-12 RX ADMIN — ENOXAPARIN SODIUM 30 MG: 100 INJECTION SUBCUTANEOUS at 08:38

## 2024-12-12 RX ADMIN — SENNOSIDES 8.6 MG: 8.6 TABLET, FILM COATED ORAL at 22:22

## 2024-12-12 RX ADMIN — HYDROCODONE BITARTRATE AND ACETAMINOPHEN 1 TABLET: 5; 325 TABLET ORAL at 06:51

## 2024-12-12 RX ADMIN — IPRATROPIUM BROMIDE AND ALBUTEROL SULFATE 1 DOSE: 2.5; .5 SOLUTION RESPIRATORY (INHALATION) at 07:57

## 2024-12-12 RX ADMIN — IPRATROPIUM BROMIDE AND ALBUTEROL SULFATE 1 DOSE: 2.5; .5 SOLUTION RESPIRATORY (INHALATION) at 13:36

## 2024-12-12 RX ADMIN — GUAIFENESIN 300 MG: 100 SOLUTION ORAL at 22:16

## 2024-12-12 RX ADMIN — ATORVASTATIN CALCIUM 40 MG: 40 TABLET, FILM COATED ORAL at 22:21

## 2024-12-12 RX ADMIN — ASPIRIN 81 MG: 81 TABLET, CHEWABLE ORAL at 08:32

## 2024-12-12 RX ADMIN — POTASSIUM BICARBONATE 40 MEQ: 782 TABLET, EFFERVESCENT ORAL at 21:54

## 2024-12-12 RX ADMIN — IPRATROPIUM BROMIDE AND ALBUTEROL SULFATE 1 DOSE: 2.5; .5 SOLUTION RESPIRATORY (INHALATION) at 01:21

## 2024-12-12 RX ADMIN — BUSPIRONE HYDROCHLORIDE 7.5 MG: 5 TABLET ORAL at 08:32

## 2024-12-12 RX ADMIN — BUSPIRONE HYDROCHLORIDE 7.5 MG: 5 TABLET ORAL at 22:21

## 2024-12-12 RX ADMIN — GUAIFENESIN 300 MG: 100 SOLUTION ORAL at 08:38

## 2024-12-12 RX ADMIN — SODIUM CHLORIDE, PRESERVATIVE FREE 10 ML: 5 INJECTION INTRAVENOUS at 08:38

## 2024-12-12 RX ADMIN — POTASSIUM BICARBONATE 40 MEQ: 782 TABLET, EFFERVESCENT ORAL at 09:05

## 2024-12-12 RX ADMIN — SERTRALINE HYDROCHLORIDE 50 MG: 50 TABLET, FILM COATED ORAL at 08:36

## 2024-12-12 RX ADMIN — QUETIAPINE FUMARATE 50 MG: 25 TABLET ORAL at 08:37

## 2024-12-12 RX ADMIN — BUDESONIDE INHALATION 500 MCG: 0.5 SUSPENSION RESPIRATORY (INHALATION) at 07:57

## 2024-12-12 RX ADMIN — QUETIAPINE FUMARATE 50 MG: 25 TABLET ORAL at 22:22

## 2024-12-12 RX ADMIN — AMIODARONE HYDROCHLORIDE 200 MG: 200 TABLET ORAL at 08:37

## 2024-12-12 RX ADMIN — FAMOTIDINE 20 MG: 20 TABLET, FILM COATED ORAL at 08:36

## 2024-12-12 RX ADMIN — IPRATROPIUM BROMIDE AND ALBUTEROL SULFATE 1 DOSE: 2.5; .5 SOLUTION RESPIRATORY (INHALATION) at 21:14

## 2024-12-12 RX ADMIN — GLYCOPYRROLATE 1 MG: 1 TABLET ORAL at 12:09

## 2024-12-12 ASSESSMENT — PAIN DESCRIPTION - LOCATION: LOCATION: BACK

## 2024-12-12 ASSESSMENT — PAIN DESCRIPTION - DESCRIPTORS: DESCRIPTORS: ACHING;GNAWING

## 2024-12-12 ASSESSMENT — PAIN SCALES - GENERAL: PAINLEVEL_OUTOF10: 8

## 2024-12-12 NOTE — PROGRESS NOTES
Hospitalist Progress Note               Daily Progress Note: 12/12/2024      Hospital Day: 31     Chief complaint:   Chief Complaint   Patient presents with    Shortness of Breath        Brief HPI/ Hospital course to date:  Kim Markham is a 49 y.o. female with known past medical history significant for asthma and COPD who treated her symptoms today with Primatene Mist over-the-counter says that she went into fast heart rate became short of breath immediately.  No other exacerbating or relieving factors which presents to the emergency room with no treatment prior to arrival.  Patient reports that she had a few day history of shortness of breath and thought she had a pneumonia. She has been taking OTC cough syrup and cough medicine as well as her inhaler. She reports difficulty affording medications and has limited access to healthcare.     Patient was placed in ICU due to increased work of breathing.  V. tach was noted and patient was started on nitroglycerin infusion.  Tachycardia worsened by epinephrine and your inhaler.  Patient was placed on Rocephin, azithromycin, and Solu-Medrol for COPD exacerbation.  Respiratory status worsened on 11/13 AM and patient was intubated.  As for tachycardia, echo was performed and showed EF of 60 to 65%, LVH, abnormal diastolic function, RV moderately dilated.  CTA ruled out PE.  Duplex showed no DVT.  Cardiology consulted and started patient on aspirin, Plavix, and statin.  Patient was also noted to be SVT and required amiodarone infusions.  Due to hypotension, she required Levophed.  Patient was diuresed with Lasix and NG tube was placed with tube feedings.  Restarted on sertraline for anxiety.  As for atrial arrhythmia, amiodarone was discharged and metoprolol was increased.  Patient to follow-up with EP outpatient.  Patient was extubated 11/23.  Patient failed extubation and was reintubated on 11/26 due to lung collapse.  Bronchoscopy was performed on 11/26 due to

## 2024-12-12 NOTE — CARE COORDINATION
CM reviewed Pt medicals,[facility] stated that they will have to decline Pt due to:  \"Sosa~ I just got final word from Susana that we will have to decline this pt due to pt Received PRN Haldol 5mg on 12/7 at 12:42a.m. Received PRN Ativan on 12/6, twice on 12/5 and 3 times on 12/4.\"        CM called Alf Tamayo for [facility], left a VM requesting a return call.    Per IDR  PT/OT is recommending an IRF    CM will send a referral to [facility] and asked them to reconsider.    CM spoke with Alf Carreon for [facility]. CM asked him to please take another look at Pt for admission.    [facility]auri stated that they do not have jerry beds for the rest of the month.     Even though Pt is Medicaid pending they have to treat it like a jerry bed.

## 2024-12-12 NOTE — PROGRESS NOTES
OCCUPATIONAL THERAPY TREATMENT  Patient: Kim Markham (49 y.o. female)  Date: 12/12/2024  Primary Diagnosis: Acute right-sided congestive heart failure (HCC) [I50.811]  SVT (supraventricular tachycardia) (HCC) [I47.10]  Essential hypertension [I10]  Hypoxia [R09.02]  COPD exacerbation (HCC) [J44.1]  Procedure(s) (LRB):  TRACHEOSTOMY (N/A) 8 Days Post-Op   Precautions: NPO, Fall Risk, General Precautions                Recommendations for nursing mobility: Encourage HEP in prep for ADLs/mobility; see handout for details, Frequent repositioning to prevent skin breakdown, Use of bed/chair alarm for safety, and Assist x2    In place during session: Peripheral IV, High Flow 20L/min 35%, Tracheostomy Shiley Cuff, EKG/telemetry , and Pulse ox  Chart, occupational therapy assessment, plan of care, and goals were reviewed.  ASSESSMENT  Patient continues with skilled OT services and is progressing towards goals. Pt presented  semi supine upon GARDUNO arrival, agreeable to session. Pt A&O x 4. Pt transferred to sitting EOB with Mod A x2.  Pt with fair sitting balance requiring occasional support.  Pt sat EOB and fed herself ~5% of breakfast.  Pt required constant vcs for swallow precautions and not to speak until she swallowed 2-3 times.  Pt with 3-4 coughing spells during self feeding.  Pt required vcs to take small bites and required min A to get correct amount on spoon at first.  After a few times, pt was able to manage scooping correct portions. Pt was educated on energy conservation during task.  Pt wanted to keep eating, but was fatiguing.  Therapists encouraged pt to rest.  Pt was returned to bed with Max A x2 and was left with head in high fowlers.  Pt educated on sitting up for at least an hour.  She was also instructed to call nursing to assist with remaining breakfast after resting.  Pt verbalized understanding. Pt left with all needs met. (See below for objective details and assist levels).     Overall pt  solutions;Assistance required to identify errors made;Assistance required to correct errors made;Decreased awareness of errors  Initiation: Requires cues for some  Sequencing: Requires cues for some    Functional Mobility and Transfers for ADLs:  Bed Mobility:  Bed Mobility Training  Bed Mobility Training: Yes  Interventions: Manual cues;Verbal cues  Supine to Sit: Moderate assistance;Assist X2;Additional time  Sit to Supine: Maximum assistance;Assist X2  Scooting: Maximum assistance;Additional time;Assist X2    Transfers:  Transfer Training  Transfer Training: No      Balance:  Balance  Sitting: Impaired  Sitting - Static: Fair (occasional);Prop sitting  Sitting - Dynamic: Fair (occasional)      ADL Intervention:         Feeding: Moderate assistance   Feeding Skilled Clinical Factors: Assistance to follow precautions, A to scoop small amouts of food and A to feed self after a few minutes secondary fatigue      Pain Ratin/10   Pain Intervention(s):   pain is at a level acceptable to the patient    Activity Tolerance:   Fair     After treatment patient left in no apparent distress:   Bed locked and returned to lowest position, Patient left in no apparent distress in bed, Call bell within reach, Bed/ chair alarm activated, Side rails x3, and Updated patient's board on functional status and mobility recommendations, and nsg updated     COMMUNICATION/EDUCATION:   The patient’s plan of care was discussed with: Physical therapy assistant, Occupational therapist, and Registered nurse  PT/OT sessions occurred together for increased safety of pt and clinician.    Patient Education  Education Given To: Patient  Education Provided: Plan of Care;ADL Adaptive Strategies;Precautions  Education Method: Demonstration;Verbal  Barriers to Learning: Cognition  Education Outcome: Verbalized understanding;Demonstrated understanding;Continued education needed    Thank you for this referral.  MARGARET Sanchez  Minutes: 58

## 2024-12-12 NOTE — PLAN OF CARE
Speech LAnguage Pathology Dysphagia and PMV TREATMENT    Patient: Kim Markham (49 y.o. female)  Date: 12/12/2024  Primary Diagnosis: Acute right-sided congestive heart failure (HCC) [I50.811]  SVT (supraventricular tachycardia) ([facility]) [I47.10]  Essential hypertension [I10]  Hypoxia [R09.02]  COPD exacerbation ([facility]) [J44.1]  Procedure(s) (LRB):  TRACHEOSTOMY (N/A) 8 Days Post-Op   Precautions: Aspiration, Fall Risk, General Precautions                DIET RECOMMENDATIONS: Full liquid moderately thick w/ meds crushed     SWALLOW SAFETY PRECAUTIONS: Wear PMV w/ all PO intake, 1:1 assistance with ALL PO intake, do not allow to self feed, all PO via spoon, slow rate of intake, small bites/sips, cue for double swallow, cue for effortful swallow, liquid wash, remain upright 1 hour after PO and do not eat 3 hours before bedtime.     PMV RECOMMENDATIONS: PMV as tolerated with placement by medical staff or patient. ENSURE CUFF IS DEFLATED PRIOR TO PLACEMENT OF PMV. Warning labels affixed to  balloon and wall above HOB.     ASSESSMENT :  ST TX: Patient sitting up in bed w/ PMV donned upon arrival w/ c/o of soreness around trach. Patient w/ erythema noted w/ saturated split gauze. Split gauze removed area cleaned and new gauze placed. Discussed w/ MD rec wound care consult. She may benefit from silicone foam dressing. Discussed w/ Dr. Ramírez, plan for trach change to Shiley 6 cuffless trach.   Vitals stable throughout session: HR:98, O2: 97%, RR23.   Patient able to mary jo/doff PMV w/ and w/o mirror w/ min cues.     SW  tx:   Reviewed MBS and swallow strategies. Patient able to independently recall and demonstrate strategies.   Introduced swallow exercises: Leonie x25, Supraglottic x10, and effortful swallow x25.  Patient highly motivated and engaged during session.     GOALS:    Problem: SLP Adult - Impaired Swallowing  Goal: By Discharge: Advance to least restrictive diet without signs or symptoms of aspiration for

## 2024-12-12 NOTE — PROGRESS NOTES
CRITICAL CARE PROGRESS NOTE    Name: Kim Markham   : 1975   MRN: [id number]   Date: 2024      Diagnoses/problem list:   Acute hypoxic hypercapnic respiratory failure s/p trach on   COPD exacerbation   Supraventricular tachycardia  Acute encephalopathy   Acute kidney injury     24-hour events:   On 35% trach collar this morning. Doing well overall. Was able to sit at edge of bed with help of PT/OT.     Assessment and plan:   Kim Markham is a 49 y.o. female with known past medical history significant for asthma and COPD who treated her symptoms today with Primatene Mist over-the-counter says that she went into fast heart rate became short of breath immediately.  She initially presented on .  She eventually was extubated but needed reintubation due to lung collapse.  She eventually underwent tracheostomy on .    NEUROLOGICAL:    Mentation improved, alert and oriented  Avoid sedating medications  Continue buspirone and sertraline  Continue Seroquel  Pain management  Delirium precautions  PT/OT    PULMONOLOGY:   Continue DuoNebs  Continue Mucomyst nebs  Continue Pulmicort  HFNC/trach collar as tolerated  BiPAP via trach as needed during night  Glycopyrrolate for secretions  PMV per SLP    CARDIOVASCULAR:   BP stable without vasopressors  Echo  with normal LVEF, diastolic dysfunction  Continue aspirin and statin  Continue amiodarone for recurrent SVT    GASTROINTESTINAL:   Thickened liquids per SLP    RENAL/ELECTROLYTE:   Creatinine stable at this time  Monitor UOP closely  Correct electrolytes  Continue Flomax    ENDOCRINE:   ISS for BG control    HEMATOLOGY/ONCOLOGY:   Lovenox for VTE prophylaxis    ID/MICRO:   Previously completed 7 days of empiric Zosyn on   No concern for active infection at this time    ICU DAILY CHECKLIST     Code Status: Full  DVT Prophylaxis: Lovenox  T/L/D: PIV  SUP: Pepcid  Diet: Thick liquids  Activity Level: Passive ROM as tolerated   ABCDEF  Bundle/Checklist Completed: Yes  Disposition: Transfer to [facility]/tele  Multidisciplinary Rounds Completed: Yes    OBJECTIVE:     Blood pressure (!) 139/97, pulse 95, temperature 98.1 °F (36.7 °C), temperature source Oral, resp. rate 22, height 1.727 m (5' 7.99\"), weight 134.3 kg (296 lb 1.2 oz), SpO2 93%.  Physical Exam  Gen: Not in distress  HEENT: NC/AT, supple  Cardiac: Regular rate and rhythm  Pulm: Clear to auscultation bilaterally  ABD: Soft, non distended, non tender  Extremities: no significant edema  Neuro: A/O X 3, no focal deficits, following simple commands     Labs and Data: Reviewed 12/12/24  Medications: Reviewed 12/12/24  Imaging: Reviewed 12/12/24    Intake/Output:     Intake/Output Summary (Last 24 hours) at 12/12/2024 1144  Last data filed at 12/12/2024 0800  Gross per 24 hour   Intake 200 ml   Output 2500 ml   Net -2300 ml       Lisa Aguayo MD  Critical Care Medicine  [facility]

## 2024-12-12 NOTE — ANESTHESIA POSTPROCEDURE EVALUATION
Department of Anesthesiology  Postprocedure Note    Patient: Kim Markham  MRN: [id number]  YOB: 1975    Procedure Summary       Date: 12/04/24 Room / Location: [facility] MAIN OR 02 / [facility] MAIN OR    Anesthesia Start: 1013 Anesthesia Stop: 1139    Procedure: TRACHEOSTOMY (Neck) Diagnosis:       Tracheostomy status (HCC)      (Tracheostomy status (HCC) [Z93.0])    Surgeons: Chaya Loyd MD Responsible Provider: Ron Silva MD    Anesthesia Type: General ASA Status: 4            Anesthesia Type: General    Dewayne Phase I:      Dewayne Phase II:      Anesthesia Post Evaluation    Patient location during evaluation: PACU  Patient participation: complete - patient cannot participate  Level of consciousness: awake  Pain score: 0  Airway patency: patent  Nausea & Vomiting: no nausea and no vomiting  Cardiovascular status: hemodynamically stable  Respiratory status: acceptable  Hydration status: stable  Multimodal analgesia pain management approach        No notable events documented.

## 2024-12-12 NOTE — PLAN OF CARE
SUBJECTIVE:   Patient stated “I'm still doing okay.”    OBJECTIVE DATA SUMMARY:   Critical Behavior:  Orientation  Orientation Level: Oriented X4  Cognition  Overall Cognitive Status: Exceptions  Arousal/Alertness: Appears intact  Following Commands: Follows one step commands consistently  Attention Span: Attends with cues to redirect  Memory: Decreased recall of precautions;Decreased short term memory;Decreased recall of recent events  Safety Judgement: Decreased awareness of need for assistance;Decreased awareness of need for safety  Problem Solving: Assistance required to implement solutions;Assistance required to identify errors made;Assistance required to correct errors made;Decreased awareness of errors  Initiation: Requires cues for some  Sequencing: Requires cues for some  Functional Mobility Training:  Bed Mobility:  Bed Mobility Training  Bed Mobility Training: Yes  Interventions: Manual cues;Verbal cues  Supine to Sit: Moderate assistance;Assist X2;Additional time  Sit to Supine: Maximum assistance;Assist X2  Scooting: Maximum assistance;Additional time;Assist X2  Transfers:  Transfer Training  Transfer Training: No  Balance:  Balance  Sitting: Impaired  Sitting - Static: Fair (occasional);Prop sitting  Sitting - Dynamic: Fair (occasional)  Wheelchair Mobility:  Wheelchair Management  Wheelchair Management: No  Ambulation/Gait Training:  Gait  Gait Training: No    Therapeutic Exercises:     EXERCISE   Sets   Reps   Active Active Assist   Passive Self ROM   Comments   Ankle Pumps  10 [x] [] [] []    Quad Sets/Glut Sets   [] [] [] []    Hamstring Sets   [] [] [] []    Short Arc Quads   [] [] [] []    Heel Slides  10 [x] [] [] []    Straight Leg Raises   [] [] [] []    Hip abd/add   [] [] [] []    Long Arc Quads  2 [x] [] [] []    Marching   [] [] [] []    Seated HR/TR   [] [] [] []       [] [] [] []       Pain Ratin/10 reported    Activity Tolerance:   Fair     After treatment patient left in no

## 2024-12-13 LAB
ANION GAP SERPL CALC-SCNC: 5 MMOL/L (ref 2–12)
BUN SERPL-MCNC: 8 MG/DL (ref 6–20)
BUN/CREAT SERPL: 16 (ref 12–20)
CA-I BLD-MCNC: 10.3 MG/DL (ref 8.5–10.1)
CHLORIDE SERPL-SCNC: 107 MMOL/L (ref 97–108)
CO2 SERPL-SCNC: 29 MMOL/L (ref 21–32)
CREAT SERPL-MCNC: 0.5 MG/DL (ref 0.55–1.02)
ERYTHROCYTE [DISTWIDTH] IN BLOOD BY AUTOMATED COUNT: 15 % (ref 11.5–14.5)
GLUCOSE SERPL-MCNC: 90 MG/DL (ref 65–100)
HCT VFR BLD AUTO: 32.7 % (ref 35–47)
HGB BLD-MCNC: 10.2 G/DL (ref 11.5–16)
MCH RBC QN AUTO: 28.3 PG (ref 26–34)
MCHC RBC AUTO-ENTMCNC: 31.2 G/DL (ref 30–36.5)
MCV RBC AUTO: 90.6 FL (ref 80–99)
NRBC # BLD: 0 K/UL (ref 0–0.01)
NRBC BLD-RTO: 0 PER 100 WBC
PLATELET # BLD AUTO: 325 K/UL (ref 150–400)
PMV BLD AUTO: 11.3 FL (ref 8.9–12.9)
POTASSIUM SERPL-SCNC: 3.9 MMOL/L (ref 3.5–5.1)
RBC # BLD AUTO: 3.61 M/UL (ref 3.8–5.2)
SODIUM SERPL-SCNC: 141 MMOL/L (ref 136–145)
WBC # BLD AUTO: 5.9 K/UL (ref 3.6–11)

## 2024-12-13 PROCEDURE — 92526 ORAL FUNCTION THERAPY: CPT

## 2024-12-13 PROCEDURE — 94761 N-INVAS EAR/PLS OXIMETRY MLT: CPT

## 2024-12-13 PROCEDURE — 2500000003 HC RX 250 WO HCPCS: Performed by: INTERNAL MEDICINE

## 2024-12-13 PROCEDURE — 6360000002 HC RX W HCPCS: Performed by: STUDENT IN AN ORGANIZED HEALTH CARE EDUCATION/TRAINING PROGRAM

## 2024-12-13 PROCEDURE — 2060000000 HC ICU INTERMEDIATE R&B

## 2024-12-13 PROCEDURE — 6370000000 HC RX 637 (ALT 250 FOR IP)

## 2024-12-13 PROCEDURE — 94640 AIRWAY INHALATION TREATMENT: CPT

## 2024-12-13 PROCEDURE — 6370000000 HC RX 637 (ALT 250 FOR IP): Performed by: INTERNAL MEDICINE

## 2024-12-13 PROCEDURE — 97530 THERAPEUTIC ACTIVITIES: CPT

## 2024-12-13 PROCEDURE — 36415 COLL VENOUS BLD VENIPUNCTURE: CPT

## 2024-12-13 PROCEDURE — 2580000003 HC RX 258: Performed by: NURSE PRACTITIONER

## 2024-12-13 PROCEDURE — 85027 COMPLETE CBC AUTOMATED: CPT

## 2024-12-13 PROCEDURE — 80048 BASIC METABOLIC PNL TOTAL CA: CPT

## 2024-12-13 PROCEDURE — 6370000000 HC RX 637 (ALT 250 FOR IP): Performed by: STUDENT IN AN ORGANIZED HEALTH CARE EDUCATION/TRAINING PROGRAM

## 2024-12-13 PROCEDURE — 92507 TX SP LANG VOICE COMM INDIV: CPT

## 2024-12-13 PROCEDURE — 2700000000 HC OXYGEN THERAPY PER DAY

## 2024-12-13 RX ORDER — LIDOCAINE 4 G/G
1 PATCH TOPICAL DAILY
Status: DISCONTINUED | OUTPATIENT
Start: 2024-12-13 | End: 2025-01-10 | Stop reason: HOSPADM

## 2024-12-13 RX ADMIN — GLYCOPYRROLATE 1 MG: 1 TABLET ORAL at 13:44

## 2024-12-13 RX ADMIN — BUDESONIDE INHALATION 500 MCG: 0.5 SUSPENSION RESPIRATORY (INHALATION) at 21:00

## 2024-12-13 RX ADMIN — GUAIFENESIN 300 MG: 100 SOLUTION ORAL at 16:46

## 2024-12-13 RX ADMIN — IPRATROPIUM BROMIDE AND ALBUTEROL SULFATE 1 DOSE: 2.5; .5 SOLUTION RESPIRATORY (INHALATION) at 14:43

## 2024-12-13 RX ADMIN — GUAIFENESIN 300 MG: 100 SOLUTION ORAL at 21:41

## 2024-12-13 RX ADMIN — SODIUM CHLORIDE, PRESERVATIVE FREE 10 ML: 5 INJECTION INTRAVENOUS at 21:23

## 2024-12-13 RX ADMIN — ENOXAPARIN SODIUM 30 MG: 100 INJECTION SUBCUTANEOUS at 10:18

## 2024-12-13 RX ADMIN — QUETIAPINE FUMARATE 50 MG: 25 TABLET ORAL at 21:42

## 2024-12-13 RX ADMIN — ATORVASTATIN CALCIUM 40 MG: 40 TABLET, FILM COATED ORAL at 21:42

## 2024-12-13 RX ADMIN — BUSPIRONE HYDROCHLORIDE 7.5 MG: 5 TABLET ORAL at 10:17

## 2024-12-13 RX ADMIN — QUETIAPINE FUMARATE 50 MG: 25 TABLET ORAL at 10:18

## 2024-12-13 RX ADMIN — GUAIFENESIN 300 MG: 100 SOLUTION ORAL at 13:45

## 2024-12-13 RX ADMIN — IPRATROPIUM BROMIDE AND ALBUTEROL SULFATE 1 DOSE: 2.5; .5 SOLUTION RESPIRATORY (INHALATION) at 02:57

## 2024-12-13 RX ADMIN — FLUCONAZOLE 100 MG: 100 TABLET ORAL at 10:17

## 2024-12-13 RX ADMIN — GLYCOPYRROLATE 1 MG: 1 TABLET ORAL at 21:42

## 2024-12-13 RX ADMIN — POTASSIUM BICARBONATE 40 MEQ: 782 TABLET, EFFERVESCENT ORAL at 10:18

## 2024-12-13 RX ADMIN — ASPIRIN 81 MG 81 MG: 81 TABLET ORAL at 10:18

## 2024-12-13 RX ADMIN — FAMOTIDINE 20 MG: 20 TABLET, FILM COATED ORAL at 10:17

## 2024-12-13 RX ADMIN — SENNOSIDES 8.6 MG: 8.6 TABLET, FILM COATED ORAL at 21:42

## 2024-12-13 RX ADMIN — SERTRALINE 50 MG: 50 TABLET, FILM COATED ORAL at 10:18

## 2024-12-13 RX ADMIN — SODIUM CHLORIDE, PRESERVATIVE FREE 10 ML: 5 INJECTION INTRAVENOUS at 10:19

## 2024-12-13 RX ADMIN — BUSPIRONE HYDROCHLORIDE 7.5 MG: 5 TABLET ORAL at 21:42

## 2024-12-13 RX ADMIN — AMIODARONE HYDROCHLORIDE 200 MG: 200 TABLET ORAL at 10:17

## 2024-12-13 RX ADMIN — BUDESONIDE INHALATION 500 MCG: 0.5 SUSPENSION RESPIRATORY (INHALATION) at 08:03

## 2024-12-13 RX ADMIN — QUETIAPINE FUMARATE 50 MG: 25 TABLET ORAL at 13:44

## 2024-12-13 RX ADMIN — TAMSULOSIN HYDROCHLORIDE 0.4 MG: 0.4 CAPSULE ORAL at 10:18

## 2024-12-13 RX ADMIN — GLYCOPYRROLATE 1 MG: 1 TABLET ORAL at 10:18

## 2024-12-13 RX ADMIN — GUAIFENESIN 300 MG: 100 SOLUTION ORAL at 10:18

## 2024-12-13 RX ADMIN — IPRATROPIUM BROMIDE AND ALBUTEROL SULFATE 1 DOSE: 2.5; .5 SOLUTION RESPIRATORY (INHALATION) at 21:00

## 2024-12-13 RX ADMIN — ENOXAPARIN SODIUM 30 MG: 100 INJECTION SUBCUTANEOUS at 21:39

## 2024-12-13 RX ADMIN — POTASSIUM BICARBONATE 40 MEQ: 782 TABLET, EFFERVESCENT ORAL at 21:38

## 2024-12-13 RX ADMIN — IPRATROPIUM BROMIDE AND ALBUTEROL SULFATE 1 DOSE: 2.5; .5 SOLUTION RESPIRATORY (INHALATION) at 08:03

## 2024-12-13 NOTE — PLAN OF CARE
Problem: Discharge Planning  Goal: Discharge to home or other facility with appropriate resources  12/13/2024 0838 by Ruiz Simon RN  Outcome: Progressing  12/12/2024 2134 by Jesica Cardenas RN  Outcome: Progressing     Problem: ABCDS Injury Assessment  Goal: Absence of physical injury  12/13/2024 0838 by Ruiz Simon RN  Outcome: Progressing  12/12/2024 2134 by Jesica Cardenas RN  Outcome: Progressing     Problem: Safety - Adult  Goal: Free from fall injury  12/13/2024 0838 by Ruiz Simon RN  Outcome: Progressing  12/12/2024 2134 by Jesica Cardenas RN  Outcome: Progressing     Problem: Confusion  Goal: Confusion, delirium, dementia, or psychosis is improved or at baseline  Description: INTERVENTIONS:  1. Assess for possible contributors to thought disturbance, including medications, impaired vision or hearing, underlying metabolic abnormalities, dehydration, psychiatric diagnoses, and notify attending LIP  2. [place] high risk fall precautions, as indicated  3. Provide frequent short contacts to provide reality reorientation, refocusing and direction  4. Decrease environmental stimuli, including noise as appropriate  5. Monitor and intervene to maintain adequate nutrition, hydration, elimination, sleep and activity  6. If unable to ensure safety without constant attention obtain sitter and review sitter guidelines with assigned personnel  7. Initiate Psychosocial CNS and Spiritual Care consult, as indicated  12/13/2024 0838 by Ruiz Simon RN  Outcome: Progressing  12/12/2024 2134 by Jesica Cardenas RN  Outcome: Progressing     Problem: Chronic Conditions and Co-morbidities  Goal: Patient's chronic conditions and co-morbidity symptoms are monitored and maintained or improved  12/13/2024 0838 by Ruiz Simon RN  Outcome: Progressing  12/12/2024 2134 by Jesica Cardenas RN  Outcome: Progressing     Problem: Neurosensory - Adult  Goal: Achieves stable or improved neurological  Progressing     Problem: Pain  Goal: Verbalizes/displays adequate comfort level or baseline comfort level  12/13/2024 0838 by Ruiz Simon RN  Outcome: Progressing  12/12/2024 2134 by Jesica Cardenas RN  Outcome: Progressing     Problem: Skin/Tissue Integrity  Goal: Absence of new skin breakdown  Description: 1.  Monitor for areas of redness and/or skin breakdown  2.  Assess vascular access sites hourly  3.  Every 4-6 hours minimum:  Change oxygen saturation probe site  4.  Every 4-6 hours:  If on nasal continuous positive airway pressure, respiratory therapy assess nares and determine need for appliance change or resting period.  12/13/2024 0838 by Ruiz Simon RN  Outcome: Progressing  12/12/2024 2134 by Jesica Cardenas RN  Outcome: Progressing     Problem: Skin/Tissue Integrity - Adult  Goal: Skin integrity remains intact  12/13/2024 0838 by Ruiz Simon RN  Outcome: Progressing  12/12/2024 2134 by Jesica Cardenas RN  Outcome: Progressing  Flowsheets (Taken 12/12/2024 0800 by Janina Garcia RN)  Skin Integrity Remains Intact: Monitor for areas of redness and/or skin breakdown  Goal: Incisions, wounds, or drain sites healing without S/S of infection  12/13/2024 0838 by Ruiz Simon RN  Outcome: Progressing  12/12/2024 2134 by Jesica Cardenas RN  Outcome: Progressing  Goal: Oral mucous membranes remain intact  12/13/2024 0838 by Ruiz Simon RN  Outcome: Progressing  12/12/2024 2134 by Jesica Cardenas RN  Outcome: Progressing

## 2024-12-13 NOTE — WOUND CARE
Wound Care Note:    Wound Care into see patient because of wounds to neck    Skin Care & Pressure Relief Recommendations:  Minimize layers of linen  Pads under patient to optimize support surface and microclimate  Turn/reposition approximately every 2 hours.  Pillow Wedges  Manage incontinence  Promote continence; Skin Protective lotion to buttocks and sacrum daily and as needed with incontinence care    Offload heels with pillows or offloading boots.    Support surface: Foam    [facility] from secretions and possible pressure.  Partial-thickness wounds.  Pink/red wound bed to right and pink wound bed with slough to left.    -For wounds to left and right [facility]: cleanse gently with saline moistened cotton swab and apply a cut-to-fit gentle lite foam between [facility] collar and skin to reduce pressure to skin daily and prn for soiling.  Keep skin clean and dry.       Prevention:  Apply Optifoam Border Sacral foam dressing to sacrum and Border Heel foams to both heels every three days to redistribute pressure from bony prominence and prevent pressure and friction injury to skin.  Rn is to gently peel back foam dressing for shift integumentary assessment and re-secure.  Maintain the external  incontinence management system to manage  incontinence.  Use foam wedge to turn q2h at 30 degree angle to offload sacrum.  Float heels with 1-2 pillows lengthwise while in bed for offloading of the heels.  Maintain HOB at 30 degrees or less, if not contraindicated, to reduce pressure to buttocks and sacrum.  Raise foot of bed to help prevent friction and shear injury from sliding down in the bed.  Continue to work with PT/OT to address mobility deficits.  Re-consult WCN for other skin/wound care concerns.

## 2024-12-13 NOTE — PLAN OF CARE
PHYSICAL THERAPY REEVALUATION  Patient: Kim Markham (49 y.o. female)  Date: 12/13/2024  Primary Diagnosis: Acute right-sided congestive heart failure (HCC) [I50.811]  SVT (supraventricular tachycardia) (HCC) [I47.10]  Essential hypertension [I10]  Hypoxia [R09.02]  COPD exacerbation (HCC) [J44.1]  Procedure(s) (LRB):  TRACHEOSTOMY (N/A) 9 Days Post-Op   Precautions: Fall Risk                      Recommendations for nursing mobility: Encourage HEP in prep for ADLs/mobility; see handout for details, Frequent repositioning to prevent skin breakdown, Use of bed/chair alarm for safety, and Assist x2    In place during session: Tracheostomy trach collaron RA and External Catheter  Chart, physical therapy assessment, plan of care, and goals were reviewed.      ASSESSMENT  Patient initially seen for PT evaluation 11/25/24 and 3 skilled PT sessions since evalution. Patient seen today for PT reevaluation s/t LOS. Patient A&O x4. Pt semi-supine upon arrival, agreeable to session.      Based on the objective data described, the patient currently presents with impaired functional mobility, decreased ROM, impaired strength, decreased activity tolerance, poor attention/concentration, and impaired balance. (See below for objective details and assist levels).    Overall pt tolerated session fair today, currently with no pain reported, but pt asking to use the nurse's phone because she needed to call the [facility].  RN states that she has been talking about a black cat in her room today as well.  Pt demonstrating improvement in functional mobility over the past few visits.  Pt requiring less A today for bed mobility, (min-mod A x 1) and demos improvement in sitting balance.  Pt attempted sit to stand transfer x 2 attempts in order to get to bedside commode but even with max A x 1, but unable to come to full standing. Pt performed LE exercises at EOB with cues for proper technique. Pt assisted back to bed and placed on bedpan for BM

## 2024-12-13 NOTE — PLAN OF CARE
Speech LAnguage Pathology Dysphagia and PMV   TREATMENT    Patient: Kim Markham (49 y.o. female)  Date: 12/13/2024  Primary Diagnosis: Acute right-sided congestive heart failure (HCC) [I50.811]  SVT (supraventricular tachycardia) (HCC) [I47.10]  Essential hypertension [I10]  Hypoxia [R09.02]  COPD exacerbation ([facility]) [J44.1]  Procedure(s) (LRB):  TRACHEOSTOMY (N/A) 9 Days Post-Op   Precautions: Aspiration Fall Risk, General Precautions                DIET RECOMMENDATIONS: Puree and moderately thick liquids, meds crushed if able in applesauce or pudding,    SWALLOW SAFETY PRECAUTIONS: Wear PMV w/ all PO intake, 1:1 assistance with ALL PO intake, do not allow to self feed, all PO via spoon, slow rate of intake, small bites/sips, cue for double swallow, cue for effortful swallow, liquid wash, remain upright 1 hour after PO and do not eat 3 hours before bedtime.        PMV RECOMMENDATIONS: PMV as tolerated with placement by medical staff or patient.   ASSESSMENT :    ST TX: Patient sitting up in bed w/ in-line trach collar on upon arrival w/  at bedside. Patient  s/p trach change to Shiley 6 cuffless. Patient able to mary jo/doff PMV. Dry split gauze applied. Discussed w/ MD and wound care concerns for erythema around trach.   Vitals stable throughout session: HR:98, O2: 97%, RR23.   Patient able to mary jo/doff PMV w/ and w/o mirror w/ min cues.  Cont to wear PMV as tolerated.        tx:   Reviewed MBS and swallow strategies. Patient able to independently recall and demonstrate strategies.   Completed swallow exercises: Leonie x15, Supraglottic x10, and effortful swallow x25.  Patient given exercisees to perform outside of tx session Leonie and supraglottic 10 reps 3x/day.  PO trials w/ mildly thick there is increased coughing noted. However, patient does have delayed cough w/ moderately thick liquids though appears decreased today. Continue to closely monitor for clinical indicators aspiration .  Patient  highly motivated and engaged during session.     GOALS:    Problem: SLP Adult - Impaired Communication  Goal: By Discharge: Demonstrates communication skills at highest level of function for planned discharge setting.  See evaluation for individualized goals.  Description: Patient will tolerate PMV with RN, RT or SLP supervision without s/s decompensation or poor tolerance within 7 days.     Patient will demonstrate independent use of PMV precautions, donning and doffing PMV within 7 days.     Patient will participate in full sw assessment within 7 days.     Patient will participate in MBS within 7 days.   Outcome: Completed     Problem: SLP Adult - Impaired Swallowing  Goal: By Discharge: Advance to least restrictive diet without signs or symptoms of aspiration for planned discharge setting.  See evaluation for individualized goals.  Description: Speech Therapy Swallow Goals  Initiated 11/23/2024  -Patient will tolerate PO diet without clinical indicators of aspiration given mod cues within 7 day(s).        -Patient will tolerate advanced PO trials without clinical indicators of aspiration given mod cues within 7 day(s).      -Patient will participate in modified barium swallow study within 7 day(s) as indicated.      -Patient will demonstrate understanding of swallow safety precautions and aspiration precautions, diet recs with min cues within 7day(s).    -Patient/caregiver goal: PO diet           Outcome: Progressing        PLAN :  Recommendations and Planned Interventions:  DIET RECOMMENDATIONS: Puree and moderately thick liquids, meds crushed if able in applesauce or pudding,    SWALLOW SAFETY PRECAUTIONS: Wear PMV w/ all PO intake, 1:1 assistance with ALL PO intake, do not allow to self feed, all PO via spoon, slow rate of intake, small bites/sips, cue for double swallow, cue for effortful swallow, liquid wash, remain upright 1 hour after PO and do not eat 3 hours before bedtime.        PMV RECOMMENDATIONS:

## 2024-12-13 NOTE — PLAN OF CARE
Problem: Discharge Planning  Goal: Discharge to home or other facility with appropriate resources  Outcome: Progressing     Problem: ABCDS Injury Assessment  Goal: Absence of physical injury  Outcome: Progressing     Problem: Safety - Adult  Goal: Free from fall injury  Outcome: Progressing     Problem: Confusion  Goal: Confusion, delirium, dementia, or psychosis is improved or at baseline  Description: INTERVENTIONS:  1. Assess for possible contributors to thought disturbance, including medications, impaired vision or hearing, underlying metabolic abnormalities, dehydration, psychiatric diagnoses, and notify attending LIP  2. [place] high risk fall precautions, as indicated  3. Provide frequent short contacts to provide reality reorientation, refocusing and direction  4. Decrease environmental stimuli, including noise as appropriate  5. Monitor and intervene to maintain adequate nutrition, hydration, elimination, sleep and activity  6. If unable to ensure safety without constant attention obtain sitter and review sitter guidelines with assigned personnel  7. Initiate Psychosocial CNS and Spiritual Care consult, as indicated  Outcome: Progressing     Problem: Chronic Conditions and Co-morbidities  Goal: Patient's chronic conditions and co-morbidity symptoms are monitored and maintained or improved  Outcome: Progressing     Problem: Neurosensory - Adult  Goal: Achieves stable or improved neurological status  Outcome: Progressing  Flowsheets (Taken 12/12/2024 0800 by Janina Garcia RN)  Achieves stable or improved neurological status: Assess for and report changes in neurological status  Goal: Absence of seizures  Outcome: Progressing  Goal: Remains free of injury related to seizures activity  Outcome: Progressing  Goal: Achieves maximal functionality and self care  Outcome: Progressing     Problem: Respiratory - Adult  Goal: Achieves optimal ventilation and oxygenation  Outcome: Progressing     Problem:  Musculoskeletal - Adult  Goal: Return mobility to safest level of function  Outcome: Progressing  Goal: Maintain proper alignment of affected body part  Outcome: Progressing  Goal: Return ADL status to a safe level of function  Outcome: Progressing     Problem: Gastrointestinal - Adult  Goal: Minimal or absence of nausea and vomiting  Outcome: Progressing  Goal: Maintains or returns to baseline bowel function  Outcome: Progressing  Goal: Maintains adequate nutritional intake  Outcome: Progressing  Goal: Establish and maintain optimal ostomy function  Outcome: Progressing     Problem: Genitourinary - Adult  Goal: Absence of urinary retention  Outcome: Progressing  Goal: Urinary catheter remains patent  Outcome: Progressing     Problem: Metabolic/Fluid and Electrolytes - Adult  Goal: Electrolytes maintained within normal limits  Outcome: Progressing  Goal: Hemodynamic stability and optimal renal function maintained  Outcome: Progressing  Goal: Glucose maintained within prescribed range  Outcome: Progressing     Problem: Skin/Tissue Integrity - Adult  Goal: Skin integrity remains intact  Outcome: Progressing  Flowsheets (Taken 12/12/2024 0800 by Janina Garcia RN)  Skin Integrity Remains Intact: Monitor for areas of redness and/or skin breakdown  Goal: Incisions, wounds, or drain sites healing without S/S of infection  Outcome: Progressing  Goal: Oral mucous membranes remain intact  Outcome: Progressing

## 2024-12-13 NOTE — PROGRESS NOTES
Received Order for Telemetry     Kim Markham   1975   [id number]   Acute right-sided congestive heart failure (HCC) [I50.811]  SVT (supraventricular tachycardia) (HCC) [I47.10]  Essential hypertension [I10]  Hypoxia [R09.02]  COPD exacerbation (HCC) [J44.1]   Melody Syed MD     Tele Box # [id number] placed on patient at  1900 pm  ER Room # ICU NO CALL  Admitting to Room 471  Verified with Primary Nurse POP at  1900 pm

## 2024-12-13 NOTE — PROGRESS NOTES
Hospitalist Progress Note               Daily Progress Note: 12/13/2024      Hospital Day: 32     Chief complaint:   Chief Complaint   Patient presents with    Shortness of Breath        Brief HPI/ Hospital course to date:  Kim Markham is a 49 y.o. female with known past medical history significant for asthma and COPD who treated her symptoms today with Primatene Mist over-the-counter says that she went into fast heart rate became short of breath immediately.  No other exacerbating or relieving factors which presents to the emergency room with no treatment prior to arrival.  Patient reports that she had a few day history of shortness of breath and thought she had a pneumonia. She has been taking OTC cough syrup and cough medicine as well as her inhaler. She reports difficulty affording medications and has limited access to healthcare.     Patient was placed in ICU due to increased work of breathing.  V. tach was noted and patient was started on nitroglycerin infusion.  Tachycardia worsened by epinephrine and your inhaler.  Patient was placed on Rocephin, azithromycin, and Solu-Medrol for COPD exacerbation.  Respiratory status worsened on 11/13 AM and patient was intubated.  As for tachycardia, echo was performed and showed EF of 60 to 65%, LVH, abnormal diastolic function, RV moderately dilated.  CTA ruled out PE.  Duplex showed no DVT.  Cardiology consulted and started patient on aspirin, Plavix, and statin.  Patient was also noted to be SVT and required amiodarone infusions.  Due to hypotension, she required Levophed.  Patient was diuresed with Lasix and NG tube was placed with tube feedings.  Restarted on sertraline for anxiety.  As for atrial arrhythmia, amiodarone was discharged and metoprolol was increased.  Patient to follow-up with EP outpatient.  Patient was extubated 11/23.  Patient failed extubation and was reintubated on 11/26 due to lung collapse.  Bronchoscopy was performed on 11/26 due to

## 2024-12-13 NOTE — PROGRESS NOTES
CM reviewed medical record. Patient recommended for intermittent therapy 3 hours per day. CM noted [facility] stated they will not have a jerry bed, as patient is still medicaid pending, until January of 2025.     CM sent referral today to see if [facility] had a jerry bed. They have jerry beds and are emailing CM a FA application. They said she is still fatiguing easily and not ready yet for IRF, but they will follow.

## 2024-12-14 LAB
ANION GAP SERPL CALC-SCNC: 5 MMOL/L (ref 2–12)
BUN SERPL-MCNC: 8 MG/DL (ref 6–20)
BUN/CREAT SERPL: 15 (ref 12–20)
CA-I BLD-MCNC: 10.3 MG/DL (ref 8.5–10.1)
CHLORIDE SERPL-SCNC: 106 MMOL/L (ref 97–108)
CO2 SERPL-SCNC: 29 MMOL/L (ref 21–32)
CREAT SERPL-MCNC: 0.53 MG/DL (ref 0.55–1.02)
ERYTHROCYTE [DISTWIDTH] IN BLOOD BY AUTOMATED COUNT: 14.8 % (ref 11.5–14.5)
GLUCOSE SERPL-MCNC: 99 MG/DL (ref 65–100)
HCT VFR BLD AUTO: 32.6 % (ref 35–47)
HGB BLD-MCNC: 10 G/DL (ref 11.5–16)
MCH RBC QN AUTO: 27.9 PG (ref 26–34)
MCHC RBC AUTO-ENTMCNC: 30.7 G/DL (ref 30–36.5)
MCV RBC AUTO: 91.1 FL (ref 80–99)
NRBC # BLD: 0 K/UL (ref 0–0.01)
NRBC BLD-RTO: 0 PER 100 WBC
PLATELET # BLD AUTO: 296 K/UL (ref 150–400)
PMV BLD AUTO: 11 FL (ref 8.9–12.9)
POTASSIUM SERPL-SCNC: 4 MMOL/L (ref 3.5–5.1)
RBC # BLD AUTO: 3.58 M/UL (ref 3.8–5.2)
SODIUM SERPL-SCNC: 140 MMOL/L (ref 136–145)
WBC # BLD AUTO: 5.9 K/UL (ref 3.6–11)

## 2024-12-14 PROCEDURE — 94761 N-INVAS EAR/PLS OXIMETRY MLT: CPT

## 2024-12-14 PROCEDURE — 6370000000 HC RX 637 (ALT 250 FOR IP)

## 2024-12-14 PROCEDURE — 85027 COMPLETE CBC AUTOMATED: CPT

## 2024-12-14 PROCEDURE — 6360000002 HC RX W HCPCS: Performed by: STUDENT IN AN ORGANIZED HEALTH CARE EDUCATION/TRAINING PROGRAM

## 2024-12-14 PROCEDURE — 94640 AIRWAY INHALATION TREATMENT: CPT

## 2024-12-14 PROCEDURE — 2700000000 HC OXYGEN THERAPY PER DAY

## 2024-12-14 PROCEDURE — 6370000000 HC RX 637 (ALT 250 FOR IP): Performed by: INTERNAL MEDICINE

## 2024-12-14 PROCEDURE — 36415 COLL VENOUS BLD VENIPUNCTURE: CPT

## 2024-12-14 PROCEDURE — 97530 THERAPEUTIC ACTIVITIES: CPT

## 2024-12-14 PROCEDURE — 2060000000 HC ICU INTERMEDIATE R&B

## 2024-12-14 PROCEDURE — 2500000003 HC RX 250 WO HCPCS: Performed by: INTERNAL MEDICINE

## 2024-12-14 PROCEDURE — 6370000000 HC RX 637 (ALT 250 FOR IP): Performed by: OTOLARYNGOLOGY

## 2024-12-14 PROCEDURE — 80048 BASIC METABOLIC PNL TOTAL CA: CPT

## 2024-12-14 PROCEDURE — 2580000003 HC RX 258: Performed by: NURSE PRACTITIONER

## 2024-12-14 PROCEDURE — 6370000000 HC RX 637 (ALT 250 FOR IP): Performed by: STUDENT IN AN ORGANIZED HEALTH CARE EDUCATION/TRAINING PROGRAM

## 2024-12-14 RX ORDER — IPRATROPIUM BROMIDE AND ALBUTEROL SULFATE 2.5; .5 MG/3ML; MG/3ML
1 SOLUTION RESPIRATORY (INHALATION) EVERY 4 HOURS PRN
Status: DISCONTINUED | OUTPATIENT
Start: 2024-12-14 | End: 2025-01-10 | Stop reason: HOSPADM

## 2024-12-14 RX ADMIN — POTASSIUM BICARBONATE 40 MEQ: 782 TABLET, EFFERVESCENT ORAL at 09:42

## 2024-12-14 RX ADMIN — QUETIAPINE FUMARATE 50 MG: 25 TABLET ORAL at 21:18

## 2024-12-14 RX ADMIN — POTASSIUM BICARBONATE 40 MEQ: 782 TABLET, EFFERVESCENT ORAL at 21:20

## 2024-12-14 RX ADMIN — ATORVASTATIN CALCIUM 40 MG: 40 TABLET, FILM COATED ORAL at 21:18

## 2024-12-14 RX ADMIN — IPRATROPIUM BROMIDE AND ALBUTEROL SULFATE 1 DOSE: 2.5; .5 SOLUTION RESPIRATORY (INHALATION) at 14:26

## 2024-12-14 RX ADMIN — FAMOTIDINE 20 MG: 20 TABLET, FILM COATED ORAL at 09:41

## 2024-12-14 RX ADMIN — GLYCOPYRROLATE 1 MG: 1 TABLET ORAL at 09:42

## 2024-12-14 RX ADMIN — IPRATROPIUM BROMIDE AND ALBUTEROL SULFATE 1 DOSE: 2.5; .5 SOLUTION RESPIRATORY (INHALATION) at 01:53

## 2024-12-14 RX ADMIN — GUAIFENESIN 300 MG: 100 SOLUTION ORAL at 18:50

## 2024-12-14 RX ADMIN — BUSPIRONE HYDROCHLORIDE 7.5 MG: 5 TABLET ORAL at 21:18

## 2024-12-14 RX ADMIN — IPRATROPIUM BROMIDE AND ALBUTEROL SULFATE 1 DOSE: 2.5; .5 SOLUTION RESPIRATORY (INHALATION) at 16:09

## 2024-12-14 RX ADMIN — IPRATROPIUM BROMIDE AND ALBUTEROL SULFATE 1 DOSE: 2.5; .5 SOLUTION RESPIRATORY (INHALATION) at 08:26

## 2024-12-14 RX ADMIN — FLUCONAZOLE 100 MG: 100 TABLET ORAL at 09:43

## 2024-12-14 RX ADMIN — GUAIFENESIN 300 MG: 100 SOLUTION ORAL at 15:52

## 2024-12-14 RX ADMIN — AMIODARONE HYDROCHLORIDE 200 MG: 200 TABLET ORAL at 09:42

## 2024-12-14 RX ADMIN — TAMSULOSIN HYDROCHLORIDE 0.4 MG: 0.4 CAPSULE ORAL at 09:42

## 2024-12-14 RX ADMIN — BUDESONIDE INHALATION 500 MCG: 0.5 SUSPENSION RESPIRATORY (INHALATION) at 21:15

## 2024-12-14 RX ADMIN — QUETIAPINE FUMARATE 50 MG: 25 TABLET ORAL at 15:53

## 2024-12-14 RX ADMIN — BUDESONIDE INHALATION 500 MCG: 0.5 SUSPENSION RESPIRATORY (INHALATION) at 08:26

## 2024-12-14 RX ADMIN — IPRATROPIUM BROMIDE AND ALBUTEROL SULFATE 1 DOSE: 2.5; .5 SOLUTION RESPIRATORY (INHALATION) at 21:15

## 2024-12-14 RX ADMIN — ENOXAPARIN SODIUM 30 MG: 100 INJECTION SUBCUTANEOUS at 09:42

## 2024-12-14 RX ADMIN — GLYCOPYRROLATE 1 MG: 1 TABLET ORAL at 15:53

## 2024-12-14 RX ADMIN — GUAIFENESIN 300 MG: 100 SOLUTION ORAL at 09:42

## 2024-12-14 RX ADMIN — QUETIAPINE FUMARATE 50 MG: 25 TABLET ORAL at 09:41

## 2024-12-14 RX ADMIN — ASPIRIN 81 MG 81 MG: 81 TABLET ORAL at 09:42

## 2024-12-14 RX ADMIN — BUSPIRONE HYDROCHLORIDE 7.5 MG: 5 TABLET ORAL at 09:42

## 2024-12-14 RX ADMIN — ENOXAPARIN SODIUM 30 MG: 100 INJECTION SUBCUTANEOUS at 21:19

## 2024-12-14 RX ADMIN — SODIUM CHLORIDE, PRESERVATIVE FREE 10 ML: 5 INJECTION INTRAVENOUS at 21:21

## 2024-12-14 RX ADMIN — GUAIFENESIN 300 MG: 100 SOLUTION ORAL at 21:17

## 2024-12-14 RX ADMIN — SERTRALINE 50 MG: 50 TABLET, FILM COATED ORAL at 09:41

## 2024-12-14 RX ADMIN — SENNOSIDES 8.6 MG: 8.6 TABLET, FILM COATED ORAL at 21:20

## 2024-12-14 RX ADMIN — SODIUM CHLORIDE, PRESERVATIVE FREE 10 ML: 5 INJECTION INTRAVENOUS at 09:43

## 2024-12-14 RX ADMIN — GLYCOPYRROLATE 1 MG: 1 TABLET ORAL at 21:18

## 2024-12-14 NOTE — PLAN OF CARE
PHYSICAL THERAPY TREATMENT     Patient: Kim Markham (49 y.o. female)  Date: 12/14/2024  Diagnosis: Acute right-sided congestive heart failure (HCC) [I50.811]  SVT (supraventricular tachycardia) (HCC) [I47.10]  Essential hypertension [I10]  Hypoxia [R09.02]  COPD exacerbation (HCC) [J44.1] SVT (supraventricular tachycardia) (HCC)  Procedure(s) (LRB):  TRACHEOSTOMY (N/A) 10 Days Post-Op  Precautions: Fall Risk                      Recommendations for nursing mobility: Out of bed to chair for meals, Encourage HEP in prep for ADLs/mobility; see handout for details, Use of bed/chair alarm for safety, and Assist x1    In place during session: Peripheral IV, High Flow 20L/min 35%, External Catheter, EKG/telemetry , and Pulse ox  Chart, physical therapy assessment, plan of care and goals were reviewed.  ASSESSMENT  Patient continues with skilled PT services and is progressing towards goals. Pt nicole supine upon PT arrival, agreeable to session. Pt A&O x 4. (See below for objective details and assist levels).     Overall pt tolerated session fair today with bed mobility, therex and sitting tolerance. Pt demo's significant improvement with bed mobility to date. Attempted transfer training however pt refused, reports was unable to demo yesterday. Educated on importance of attempting OOB mobility with therapist and importance of weightbearing to improve strengthening. Pt requested to pull on writers shoulders instead of RW, educated on safety and proper body mechanics however pt continues to refuse. Able to tolerate sitting EOB >20 mins with improved sitting balance noted. Demo'd scooting seated EOB and supine to HOB with CGA-Katlyn. Pt encouraged to sit EOB for meals with nursing to improve sitting tolerance and mobility. Reports dizziness with mobility BP monitored.  Will continue to benefit from skilled PT services, and will continue to progress as tolerated. Current PT DC recommendation High intensity and

## 2024-12-14 NOTE — PLAN OF CARE
Problem: Discharge Planning  Goal: Discharge to home or other facility with appropriate resources  12/13/2024 2130 by Jesica Cardenas RN  Outcome: Progressing  12/13/2024 0838 by Ruiz Simon RN  Outcome: Progressing     Problem: ABCDS Injury Assessment  Goal: Absence of physical injury  12/13/2024 2130 by Jesica Cardenas RN  Outcome: Progressing  12/13/2024 0838 by Ruiz Simon RN  Outcome: Progressing     Problem: Safety - Adult  Goal: Free from fall injury  12/13/2024 2130 by Jesica Cardenas RN  Outcome: Progressing  12/13/2024 0838 by Ruiz Simon RN  Outcome: Progressing     Problem: Confusion  Goal: Confusion, delirium, dementia, or psychosis is improved or at baseline  Description: INTERVENTIONS:  1. Assess for possible contributors to thought disturbance, including medications, impaired vision or hearing, underlying metabolic abnormalities, dehydration, psychiatric diagnoses, and notify attending LIP  2. [place] high risk fall precautions, as indicated  3. Provide frequent short contacts to provide reality reorientation, refocusing and direction  4. Decrease environmental stimuli, including noise as appropriate  5. Monitor and intervene to maintain adequate nutrition, hydration, elimination, sleep and activity  6. If unable to ensure safety without constant attention obtain sitter and review sitter guidelines with assigned personnel  7. Initiate Psychosocial CNS and Spiritual Care consult, as indicated  12/13/2024 2130 by Jesica Cardenas RN  Outcome: Progressing  12/13/2024 0838 by Ruiz Simon RN  Outcome: Progressing     Problem: Chronic Conditions and Co-morbidities  Goal: Patient's chronic conditions and co-morbidity symptoms are monitored and maintained or improved  12/13/2024 2130 by Jseica Cardenas RN  Outcome: Progressing  12/13/2024 0838 by Ruiz Simon RN  Outcome: Progressing     Problem: Neurosensory - Adult  Goal: Achieves stable or improved neurological  Progressing  Goal: Glucose maintained within prescribed range  12/13/2024 2130 by Jesica Cardenas RN  Outcome: Progressing  12/13/2024 0838 by Ruiz Simon RN  Outcome: Progressing     Problem: Skin/Tissue Integrity - Adult  Goal: Skin integrity remains intact  12/13/2024 2130 by Jesica Cardenas RN  Outcome: Progressing  12/13/2024 0838 by Ruiz Simon RN  Outcome: Progressing  Goal: Incisions, wounds, or drain sites healing without S/S of infection  12/13/2024 2130 by Jesica Cardenas RN  Outcome: Progressing  12/13/2024 0838 by Ruiz Simon RN  Outcome: Progressing  Goal: Oral mucous membranes remain intact  12/13/2024 2130 by Jesica Cardenas RN  Outcome: Progressing  12/13/2024 0838 by Ruiz Simon RN  Outcome: Progressing     Problem: Safety - Violent/Self-destructive Restraint  Goal: Remains free of injury from restraints (Restraint for Violent/Self-Destructive Behavior)  Description: INTERVENTIONS:  1. Determine that de-escalation and other, less restrictive measures have been tried or would not be effective before applying the restraint  2. Identify and document the criteria for restraint  3. Evaluate the patient's condition at the time of restraint application  4. Inform patient/family regarding the reason for restraint/seclusion  5. Q2H: Monitor comfort, nutrition and hydration needs  6. Q15M: Perform safety checks including skin, circulation, sensory, respiratory and psychological status  7. Ensure continuous observation  8. Identify and implement measures to help patient regain control, assess readiness for release and initiate progressive release per policy  12/13/2024 2130 by Jesica Cardenas RN  Outcome: Progressing  12/13/2024 0838 by Ruiz Simon RN  Outcome: Progressing     Problem: Safety - Medical Restraint  Goal: Remains free of injury from restraints (Restraint for Interference with Medical Device)  Description: INTERVENTIONS:  1. Determine that other, less restrictive

## 2024-12-14 NOTE — PROGRESS NOTES
Hospitalist Progress Note               Daily Progress Note: 12/14/2024      Hospital Day: 33     Chief complaint:   Chief Complaint   Patient presents with    Shortness of Breath        Brief HPI/ Hospital course to date:  Kim Markham is a 49 y.o. female with known past medical history significant for asthma and COPD who treated her symptoms today with Primatene Mist over-the-counter says that she went into fast heart rate became short of breath immediately.  No other exacerbating or relieving factors which presents to the emergency room with no treatment prior to arrival.  Patient reports that she had a few day history of shortness of breath and thought she had a pneumonia. She has been taking OTC cough syrup and cough medicine as well as her inhaler. She reports difficulty affording medications and has limited access to healthcare.     Patient was placed in ICU due to increased work of breathing.  V. tach was noted and patient was started on nitroglycerin infusion.  Tachycardia worsened by epinephrine and your inhaler.  Patient was placed on Rocephin, azithromycin, and Solu-Medrol for COPD exacerbation.  Respiratory status worsened on 11/13 AM and patient was intubated.  As for tachycardia, echo was performed and showed EF of 60 to 65%, LVH, abnormal diastolic function, RV moderately dilated.  CTA ruled out PE.  Duplex showed no DVT.  Cardiology consulted and started patient on aspirin, Plavix, and statin.  Patient was also noted to be SVT and required amiodarone infusions.  Due to hypotension, she required Levophed.  Patient was diuresed with Lasix and NG tube was placed with tube feedings.  Restarted on sertraline for anxiety.  As for atrial arrhythmia, amiodarone was discharged and metoprolol was increased.  Patient to follow-up with EP outpatient.  Patient was extubated 11/23.  Patient failed extubation and was reintubated on 11/26 due to lung collapse.  Bronchoscopy was performed on 11/26 due to

## 2024-12-14 NOTE — PLAN OF CARE
RN  Outcome: Progressing  12/13/2024 2130 by Jesica Cardenas RN  Outcome: Progressing  Goal: Glucose maintained within prescribed range  12/14/2024 0931 by Kristyn Solo RN  Outcome: Progressing  12/13/2024 2130 by Jesica Cardenas RN  Outcome: Progressing     Problem: Safety - Violent/Self-destructive Restraint  Goal: Remains free of injury from restraints (Restraint for Violent/Self-Destructive Behavior)  Description: INTERVENTIONS:  1. Determine that de-escalation and other, less restrictive measures have been tried or would not be effective before applying the restraint  2. Identify and document the criteria for restraint  3. Evaluate the patient's condition at the time of restraint application  4. Inform patient/family regarding the reason for restraint/seclusion  5. Q2H: Monitor comfort, nutrition and hydration needs  6. Q15M: Perform safety checks including skin, circulation, sensory, respiratory and psychological status  7. Ensure continuous observation  8. Identify and implement measures to help patient regain control, assess readiness for release and initiate progressive release per policy  12/14/2024 0931 by Kristyn Solo RN  Outcome: Progressing  12/13/2024 2130 by Jesica Cardenas RN  Outcome: Progressing     Problem: Safety - Medical Restraint  Goal: Remains free of injury from restraints (Restraint for Interference with Medical Device)  Description: INTERVENTIONS:  1. Determine that other, less restrictive measures have been tried or would not be effective before applying the restraint  2. Evaluate the patient's condition at the time of restraint application  3. Inform patient/family regarding the reason for restraint  4. Q2H: Monitor safety, psychosocial status, comfort, nutrition and hydration  12/14/2024 0931 by Kristyn Solo RN  Outcome: Progressing  12/13/2024 2130 by Jesica Cardenas RN  Outcome: Progressing     Problem: Pain  Goal: Verbalizes/displays adequate comfort level  or baseline comfort level  12/14/2024 0931 by Kristyn Solo RN  Outcome: Progressing  12/13/2024 2130 by Jesica Cardenas RN  Outcome: Progressing     Problem: Skin/Tissue Integrity  Goal: Absence of new skin breakdown  Description: 1.  Monitor for areas of redness and/or skin breakdown  2.  Assess vascular access sites hourly  3.  Every 4-6 hours minimum:  Change oxygen saturation probe site  4.  Every 4-6 hours:  If on nasal continuous positive airway pressure, respiratory therapy assess nares and determine need for appliance change or resting period.  12/14/2024 0931 by Kristyn Solo RN  Outcome: Progressing  12/13/2024 2130 by Jesica Cardenas RN  Outcome: Progressing     Problem: Skin/Tissue Integrity - Adult  Goal: Skin integrity remains intact  12/14/2024 0931 by Kristyn Solo RN  Outcome: Progressing  12/13/2024 2130 by Jesica Cardenas RN  Outcome: Progressing  Goal: Incisions, wounds, or drain sites healing without S/S of infection  12/14/2024 0931 by Kristyn Solo RN  Outcome: Progressing  12/13/2024 2130 by Jesica Cardenas RN  Outcome: Progressing  Goal: Oral mucous membranes remain intact  12/14/2024 0931 by Kristyn Solo RN  Outcome: Progressing  12/13/2024 2130 by Jesica Cardenas RN  Outcome: Progressing

## 2024-12-15 LAB
ANION GAP SERPL CALC-SCNC: 6 MMOL/L (ref 2–12)
BUN SERPL-MCNC: 15 MG/DL (ref 6–20)
BUN/CREAT SERPL: 30 (ref 12–20)
CA-I BLD-MCNC: 9.7 MG/DL (ref 8.5–10.1)
CHLORIDE SERPL-SCNC: 106 MMOL/L (ref 97–108)
CO2 SERPL-SCNC: 31 MMOL/L (ref 21–32)
CREAT SERPL-MCNC: 0.5 MG/DL (ref 0.55–1.02)
ERYTHROCYTE [DISTWIDTH] IN BLOOD BY AUTOMATED COUNT: 14.9 % (ref 11.5–14.5)
GLUCOSE SERPL-MCNC: 92 MG/DL (ref 65–100)
HCT VFR BLD AUTO: 31.1 % (ref 35–47)
HGB BLD-MCNC: 9.5 G/DL (ref 11.5–16)
MCH RBC QN AUTO: 28.2 PG (ref 26–34)
MCHC RBC AUTO-ENTMCNC: 30.5 G/DL (ref 30–36.5)
MCV RBC AUTO: 92.3 FL (ref 80–99)
NRBC # BLD: 0 K/UL (ref 0–0.01)
NRBC BLD-RTO: 0 PER 100 WBC
PLATELET # BLD AUTO: 268 K/UL (ref 150–400)
PMV BLD AUTO: 11.7 FL (ref 8.9–12.9)
POTASSIUM SERPL-SCNC: 4.1 MMOL/L (ref 3.5–5.1)
RBC # BLD AUTO: 3.37 M/UL (ref 3.8–5.2)
SODIUM SERPL-SCNC: 143 MMOL/L (ref 136–145)
WBC # BLD AUTO: 5.8 K/UL (ref 3.6–11)

## 2024-12-15 PROCEDURE — 85027 COMPLETE CBC AUTOMATED: CPT

## 2024-12-15 PROCEDURE — 80048 BASIC METABOLIC PNL TOTAL CA: CPT

## 2024-12-15 PROCEDURE — 6370000000 HC RX 637 (ALT 250 FOR IP)

## 2024-12-15 PROCEDURE — 6370000000 HC RX 637 (ALT 250 FOR IP): Performed by: STUDENT IN AN ORGANIZED HEALTH CARE EDUCATION/TRAINING PROGRAM

## 2024-12-15 PROCEDURE — 6370000000 HC RX 637 (ALT 250 FOR IP): Performed by: INTERNAL MEDICINE

## 2024-12-15 PROCEDURE — 6360000002 HC RX W HCPCS: Performed by: STUDENT IN AN ORGANIZED HEALTH CARE EDUCATION/TRAINING PROGRAM

## 2024-12-15 PROCEDURE — 2700000000 HC OXYGEN THERAPY PER DAY

## 2024-12-15 PROCEDURE — 2060000000 HC ICU INTERMEDIATE R&B

## 2024-12-15 PROCEDURE — 94761 N-INVAS EAR/PLS OXIMETRY MLT: CPT

## 2024-12-15 PROCEDURE — 2580000003 HC RX 258: Performed by: NURSE PRACTITIONER

## 2024-12-15 PROCEDURE — 97110 THERAPEUTIC EXERCISES: CPT

## 2024-12-15 PROCEDURE — 94640 AIRWAY INHALATION TREATMENT: CPT

## 2024-12-15 PROCEDURE — 2500000003 HC RX 250 WO HCPCS: Performed by: INTERNAL MEDICINE

## 2024-12-15 PROCEDURE — 36415 COLL VENOUS BLD VENIPUNCTURE: CPT

## 2024-12-15 RX ADMIN — IPRATROPIUM BROMIDE AND ALBUTEROL SULFATE 1 DOSE: 2.5; .5 SOLUTION RESPIRATORY (INHALATION) at 14:46

## 2024-12-15 RX ADMIN — BUSPIRONE HYDROCHLORIDE 7.5 MG: 5 TABLET ORAL at 21:45

## 2024-12-15 RX ADMIN — FLUCONAZOLE 100 MG: 100 TABLET ORAL at 07:47

## 2024-12-15 RX ADMIN — IPRATROPIUM BROMIDE AND ALBUTEROL SULFATE 1 DOSE: 2.5; .5 SOLUTION RESPIRATORY (INHALATION) at 21:06

## 2024-12-15 RX ADMIN — GUAIFENESIN 300 MG: 100 SOLUTION ORAL at 17:08

## 2024-12-15 RX ADMIN — SODIUM CHLORIDE, PRESERVATIVE FREE 10 ML: 5 INJECTION INTRAVENOUS at 07:47

## 2024-12-15 RX ADMIN — GUAIFENESIN 300 MG: 100 SOLUTION ORAL at 13:38

## 2024-12-15 RX ADMIN — FAMOTIDINE 20 MG: 20 TABLET, FILM COATED ORAL at 07:49

## 2024-12-15 RX ADMIN — GLYCOPYRROLATE 1 MG: 1 TABLET ORAL at 13:38

## 2024-12-15 RX ADMIN — BUSPIRONE HYDROCHLORIDE 7.5 MG: 5 TABLET ORAL at 07:48

## 2024-12-15 RX ADMIN — QUETIAPINE FUMARATE 50 MG: 25 TABLET ORAL at 07:48

## 2024-12-15 RX ADMIN — ATORVASTATIN CALCIUM 40 MG: 40 TABLET, FILM COATED ORAL at 21:45

## 2024-12-15 RX ADMIN — QUETIAPINE FUMARATE 50 MG: 25 TABLET ORAL at 13:38

## 2024-12-15 RX ADMIN — POTASSIUM BICARBONATE 40 MEQ: 782 TABLET, EFFERVESCENT ORAL at 21:41

## 2024-12-15 RX ADMIN — IPRATROPIUM BROMIDE AND ALBUTEROL SULFATE 1 DOSE: 2.5; .5 SOLUTION RESPIRATORY (INHALATION) at 07:59

## 2024-12-15 RX ADMIN — GUAIFENESIN 300 MG: 100 SOLUTION ORAL at 07:49

## 2024-12-15 RX ADMIN — GUAIFENESIN 300 MG: 100 SOLUTION ORAL at 21:46

## 2024-12-15 RX ADMIN — SODIUM CHLORIDE, PRESERVATIVE FREE 10 ML: 5 INJECTION INTRAVENOUS at 21:47

## 2024-12-15 RX ADMIN — BUDESONIDE INHALATION 500 MCG: 0.5 SUSPENSION RESPIRATORY (INHALATION) at 07:59

## 2024-12-15 RX ADMIN — IPRATROPIUM BROMIDE AND ALBUTEROL SULFATE 1 DOSE: 2.5; .5 SOLUTION RESPIRATORY (INHALATION) at 02:02

## 2024-12-15 RX ADMIN — ENOXAPARIN SODIUM 30 MG: 100 INJECTION SUBCUTANEOUS at 07:50

## 2024-12-15 RX ADMIN — ASPIRIN 81 MG 81 MG: 81 TABLET ORAL at 07:49

## 2024-12-15 RX ADMIN — SENNOSIDES 8.6 MG: 8.6 TABLET, FILM COATED ORAL at 21:46

## 2024-12-15 RX ADMIN — POTASSIUM BICARBONATE 40 MEQ: 782 TABLET, EFFERVESCENT ORAL at 07:48

## 2024-12-15 RX ADMIN — AMIODARONE HYDROCHLORIDE 200 MG: 200 TABLET ORAL at 07:48

## 2024-12-15 RX ADMIN — BUDESONIDE INHALATION 500 MCG: 0.5 SUSPENSION RESPIRATORY (INHALATION) at 21:11

## 2024-12-15 RX ADMIN — SERTRALINE 50 MG: 50 TABLET, FILM COATED ORAL at 07:48

## 2024-12-15 RX ADMIN — QUETIAPINE FUMARATE 50 MG: 25 TABLET ORAL at 21:46

## 2024-12-15 RX ADMIN — TAMSULOSIN HYDROCHLORIDE 0.4 MG: 0.4 CAPSULE ORAL at 07:49

## 2024-12-15 RX ADMIN — GLYCOPYRROLATE 1 MG: 1 TABLET ORAL at 07:49

## 2024-12-15 RX ADMIN — ENOXAPARIN SODIUM 30 MG: 100 INJECTION SUBCUTANEOUS at 21:46

## 2024-12-15 RX ADMIN — GLYCOPYRROLATE 1 MG: 1 TABLET ORAL at 21:46

## 2024-12-15 ASSESSMENT — PAIN SCALES - GENERAL
PAINLEVEL_OUTOF10: 0
PAINLEVEL_OUTOF10: 0

## 2024-12-15 NOTE — PROGRESS NOTES
Hospitalist Progress Note               Daily Progress Note: 12/15/2024      Hospital Day: 34     Chief complaint:   Chief Complaint   Patient presents with    Shortness of Breath        Brief HPI/ Hospital course to date:  Kim Markham is a 49 y.o. female with known past medical history significant for asthma and COPD who treated her symptoms today with Primatene Mist over-the-counter says that she went into fast heart rate became short of breath immediately.  No other exacerbating or relieving factors which presents to the emergency room with no treatment prior to arrival.  Patient reports that she had a few day history of shortness of breath and thought she had a pneumonia. She has been taking OTC cough syrup and cough medicine as well as her inhaler. She reports difficulty affording medications and has limited access to healthcare.     Patient was placed in ICU due to increased work of breathing.  V. tach was noted and patient was started on nitroglycerin infusion.  Tachycardia worsened by epinephrine and your inhaler.  Patient was placed on Rocephin, azithromycin, and Solu-Medrol for COPD exacerbation.  Respiratory status worsened on 11/13 AM and patient was intubated.  As for tachycardia, echo was performed and showed EF of 60 to 65%, LVH, abnormal diastolic function, RV moderately dilated.  CTA ruled out PE.  Duplex showed no DVT.  Cardiology consulted and started patient on aspirin, Plavix, and statin.  Patient was also noted to be SVT and required amiodarone infusions.  Due to hypotension, she required Levophed.  Patient was diuresed with Lasix and NG tube was placed with tube feedings.  Restarted on sertraline for anxiety.  As for atrial arrhythmia, amiodarone was discharged and metoprolol was increased.  Patient to follow-up with EP outpatient.  Patient was extubated 11/23.  Patient failed extubation and was reintubated on 11/26 due to lung collapse.  Bronchoscopy was performed on 11/26 due to  serial monitoring for renal impairment and electrolyte derangements  [] Critical electrolyte abnormalities requiring IV replacement and close serial monitoring  [] SQ Insulin SS- monitoring serial FSBS for Hypoglycemic adverse drug reaction  [] Other -   [] Change in code status:    [] Decision to escalate care:    [] Major surgery/procedure with associated risk factors:    ----------------------------------------------------------------------  C. Data (any 2)  [x] Discussed current management and discharge planning options with Case Management.  [] Discussed management of the case with:    [x] Telemetry personally reviewed and interpreted as documented above    [] Imaging personally reviewed and interpreted, includes:    [x] Data Review (any 3)  [x] All available Consultant notes from yesterday/today were reviewed  [x] All current labs were reviewed and interpreted for clinical significance   [x] Appropriate follow-up labs were ordered  [] Collateral history obtained from:       Time spent with patient including counseling, chart review and nursing communication: 38 minutes    Melody Syed MD

## 2024-12-15 NOTE — PLAN OF CARE
Problem: ABCDS Injury Assessment  Goal: Absence of physical injury  Outcome: Progressing     Problem: Safety - Adult  Goal: Free from fall injury  Outcome: Progressing     Problem: Confusion  Goal: Confusion, delirium, dementia, or psychosis is improved or at baseline  Description: INTERVENTIONS:  1. Assess for possible contributors to thought disturbance, including medications, impaired vision or hearing, underlying metabolic abnormalities, dehydration, psychiatric diagnoses, and notify attending LIP  2. Cash high risk fall precautions, as indicated  3. Provide frequent short contacts to provide reality reorientation, refocusing and direction  4. Decrease environmental stimuli, including noise as appropriate  5. Monitor and intervene to maintain adequate nutrition, hydration, elimination, sleep and activity  6. If unable to ensure safety without constant attention obtain sitter and review sitter guidelines with assigned personnel  7. Initiate Psychosocial CNS and Spiritual Care consult, as indicated  Outcome: Progressing

## 2024-12-15 NOTE — PLAN OF CARE
Problem: Physical Therapy - Adult  Goal: By Discharge: Performs mobility at highest level of function for planned discharge setting.  See evaluation for individualized goals.  Description: FUNCTIONAL STATUS PRIOR TO ADMISSION: Patient was modified independent using a rolling walker and single point cane for functional mobility.    HOME SUPPORT PRIOR TO ADMISSION: The patient lived with spouse but did not require assistance.    Physical Therapy Goals  Initiated 11/25/2024  Goals reassessed and revised 12/6/2024  Pt stated goal: to go home  Pt will be I with LE HEP in 7 days.  Pt will perform bed mobility with CGA assist x 1 in 7 days  Patient to perform sit to stand transfer with mod A in 7 days.  Pt to perform stand pivot transfer from bed to chair with max A in 7 days.    Will add ambulation goals as pt progresses.  Outcome: Progressing            PHYSICAL THERAPY TREATMENT     Patient: Kim Markham (49 y.o. female)  Date: 12/15/2024  Diagnosis: Acute right-sided congestive heart failure (HCC) [I50.811]  SVT (supraventricular tachycardia) (HCC) [I47.10]  Essential hypertension [I10]  Hypoxia [R09.02]  COPD exacerbation (HCC) [J44.1] SVT (supraventricular tachycardia) (HCC)  Procedure(s) (LRB):  TRACHEOSTOMY (N/A) 11 Days Post-Op  Precautions: Fall Risk                      Recommendations for nursing mobility: Encourage HEP in prep for ADLs/mobility; see handout for details, Frequent repositioning to prevent skin breakdown, Use of bed/chair alarm for safety, and LE elevation for management of edema    In place during session: Tracheostomy  , External Catheter, and EKG/telemetry   Chart, physical therapy assessment, plan of care and goals were reviewed.  ASSESSMENT  Patient continues with skilled PT services and is progressing towards goals. Pt received semi supine in bed upon PTA's arrival, agreeable to session. Pt A&O x 4. Pt completed BLE ex in supine with rest breaks. She declined OOB/EOB activities, she was  [x] [] [] []    Long Arc Quads   [] [] [] []    Marching   [] [] [] []    Seated HR/TR   [] [] [] []       [] [] [] []       Pain Ratin/10 reported    Activity Tolerance:   Poor, requires frequent rest breaks, and observed shortness of breath on exertion    After treatment patient left in no apparent distress:   Bed locked and returned to lowest position, Patient left in no apparent distress in bed and placed in chair position., Call bell within reach, Bed/ chair alarm activated, Caregiver / family present, and Side rails x3, and nsg updated     COMMUNICATION/COLLABORATION:   The patient’s plan of care was discussed with: Registered nurse and Respiratory therapist    Patient Education  Education Given To: Patient  Education Provided: Role of Therapy;Home Exercise Program;Energy Conservation  Education Method: Demonstration;Verbal  Barriers to Learning: None  Education Outcome: Demonstrated understanding;Verbalized understanding;Continued education needed    Claudia Steele PTA  Minutes: 25

## 2024-12-16 LAB
ANION GAP SERPL CALC-SCNC: 7 MMOL/L (ref 2–12)
BUN SERPL-MCNC: 15 MG/DL (ref 6–20)
BUN/CREAT SERPL: 30 (ref 12–20)
CA-I BLD-MCNC: 10 MG/DL (ref 8.5–10.1)
CHLORIDE SERPL-SCNC: 106 MMOL/L (ref 97–108)
CO2 SERPL-SCNC: 28 MMOL/L (ref 21–32)
CREAT SERPL-MCNC: 0.5 MG/DL (ref 0.55–1.02)
ERYTHROCYTE [DISTWIDTH] IN BLOOD BY AUTOMATED COUNT: 15 % (ref 11.5–14.5)
GLUCOSE BLD STRIP.AUTO-MCNC: 118 MG/DL (ref 65–100)
GLUCOSE SERPL-MCNC: 87 MG/DL (ref 65–100)
HCT VFR BLD AUTO: 32.3 % (ref 35–47)
HGB BLD-MCNC: 10 G/DL (ref 11.5–16)
MCH RBC QN AUTO: 28.4 PG (ref 26–34)
MCHC RBC AUTO-ENTMCNC: 31 G/DL (ref 30–36.5)
MCV RBC AUTO: 91.8 FL (ref 80–99)
NRBC # BLD: 0 K/UL (ref 0–0.01)
NRBC BLD-RTO: 0 PER 100 WBC
PERFORMED BY:: ABNORMAL
PLATELET # BLD AUTO: 253 K/UL (ref 150–400)
PMV BLD AUTO: 12.2 FL (ref 8.9–12.9)
POTASSIUM SERPL-SCNC: 3.9 MMOL/L (ref 3.5–5.1)
RBC # BLD AUTO: 3.52 M/UL (ref 3.8–5.2)
SODIUM SERPL-SCNC: 141 MMOL/L (ref 136–145)
WBC # BLD AUTO: 7 K/UL (ref 3.6–11)

## 2024-12-16 PROCEDURE — 6370000000 HC RX 637 (ALT 250 FOR IP): Performed by: STUDENT IN AN ORGANIZED HEALTH CARE EDUCATION/TRAINING PROGRAM

## 2024-12-16 PROCEDURE — 2580000003 HC RX 258: Performed by: NURSE PRACTITIONER

## 2024-12-16 PROCEDURE — 36415 COLL VENOUS BLD VENIPUNCTURE: CPT

## 2024-12-16 PROCEDURE — 6360000002 HC RX W HCPCS: Performed by: STUDENT IN AN ORGANIZED HEALTH CARE EDUCATION/TRAINING PROGRAM

## 2024-12-16 PROCEDURE — 80048 BASIC METABOLIC PNL TOTAL CA: CPT

## 2024-12-16 PROCEDURE — 97530 THERAPEUTIC ACTIVITIES: CPT

## 2024-12-16 PROCEDURE — 82962 GLUCOSE BLOOD TEST: CPT

## 2024-12-16 PROCEDURE — 2060000000 HC ICU INTERMEDIATE R&B

## 2024-12-16 PROCEDURE — 2500000003 HC RX 250 WO HCPCS: Performed by: INTERNAL MEDICINE

## 2024-12-16 PROCEDURE — 94640 AIRWAY INHALATION TREATMENT: CPT

## 2024-12-16 PROCEDURE — 94761 N-INVAS EAR/PLS OXIMETRY MLT: CPT

## 2024-12-16 PROCEDURE — 6370000000 HC RX 637 (ALT 250 FOR IP): Performed by: INTERNAL MEDICINE

## 2024-12-16 PROCEDURE — 92526 ORAL FUNCTION THERAPY: CPT

## 2024-12-16 PROCEDURE — 85027 COMPLETE CBC AUTOMATED: CPT

## 2024-12-16 PROCEDURE — 6370000000 HC RX 637 (ALT 250 FOR IP)

## 2024-12-16 PROCEDURE — 2700000000 HC OXYGEN THERAPY PER DAY

## 2024-12-16 RX ADMIN — POTASSIUM BICARBONATE 40 MEQ: 782 TABLET, EFFERVESCENT ORAL at 21:07

## 2024-12-16 RX ADMIN — FLUCONAZOLE 100 MG: 100 TABLET ORAL at 09:01

## 2024-12-16 RX ADMIN — IPRATROPIUM BROMIDE AND ALBUTEROL SULFATE 1 DOSE: 2.5; .5 SOLUTION RESPIRATORY (INHALATION) at 01:45

## 2024-12-16 RX ADMIN — IPRATROPIUM BROMIDE AND ALBUTEROL SULFATE 1 DOSE: 2.5; .5 SOLUTION RESPIRATORY (INHALATION) at 08:05

## 2024-12-16 RX ADMIN — QUETIAPINE FUMARATE 50 MG: 25 TABLET ORAL at 13:07

## 2024-12-16 RX ADMIN — BUDESONIDE INHALATION 500 MCG: 0.5 SUSPENSION RESPIRATORY (INHALATION) at 08:05

## 2024-12-16 RX ADMIN — ENOXAPARIN SODIUM 30 MG: 100 INJECTION SUBCUTANEOUS at 09:02

## 2024-12-16 RX ADMIN — ATORVASTATIN CALCIUM 40 MG: 40 TABLET, FILM COATED ORAL at 21:11

## 2024-12-16 RX ADMIN — SENNOSIDES 8.6 MG: 8.6 TABLET, FILM COATED ORAL at 21:11

## 2024-12-16 RX ADMIN — GUAIFENESIN 300 MG: 100 SOLUTION ORAL at 09:02

## 2024-12-16 RX ADMIN — GUAIFENESIN 300 MG: 100 SOLUTION ORAL at 13:07

## 2024-12-16 RX ADMIN — GLYCOPYRROLATE 1 MG: 1 TABLET ORAL at 09:01

## 2024-12-16 RX ADMIN — BUSPIRONE HYDROCHLORIDE 7.5 MG: 5 TABLET ORAL at 21:09

## 2024-12-16 RX ADMIN — SODIUM CHLORIDE, PRESERVATIVE FREE 10 ML: 5 INJECTION INTRAVENOUS at 09:02

## 2024-12-16 RX ADMIN — IPRATROPIUM BROMIDE AND ALBUTEROL SULFATE 1 DOSE: 2.5; .5 SOLUTION RESPIRATORY (INHALATION) at 13:27

## 2024-12-16 RX ADMIN — POTASSIUM BICARBONATE 40 MEQ: 782 TABLET, EFFERVESCENT ORAL at 09:01

## 2024-12-16 RX ADMIN — IPRATROPIUM BROMIDE AND ALBUTEROL SULFATE 1 DOSE: 2.5; .5 SOLUTION RESPIRATORY (INHALATION) at 19:48

## 2024-12-16 RX ADMIN — GUAIFENESIN 300 MG: 100 SOLUTION ORAL at 17:03

## 2024-12-16 RX ADMIN — QUETIAPINE FUMARATE 50 MG: 25 TABLET ORAL at 09:01

## 2024-12-16 RX ADMIN — GLYCOPYRROLATE 1 MG: 1 TABLET ORAL at 13:07

## 2024-12-16 RX ADMIN — ASPIRIN 81 MG 81 MG: 81 TABLET ORAL at 09:01

## 2024-12-16 RX ADMIN — GUAIFENESIN 300 MG: 100 SOLUTION ORAL at 21:06

## 2024-12-16 RX ADMIN — BUSPIRONE HYDROCHLORIDE 7.5 MG: 5 TABLET ORAL at 09:01

## 2024-12-16 RX ADMIN — FAMOTIDINE 20 MG: 20 TABLET, FILM COATED ORAL at 09:08

## 2024-12-16 RX ADMIN — SODIUM CHLORIDE, PRESERVATIVE FREE 10 ML: 5 INJECTION INTRAVENOUS at 21:11

## 2024-12-16 RX ADMIN — SERTRALINE 50 MG: 50 TABLET, FILM COATED ORAL at 09:01

## 2024-12-16 RX ADMIN — BUDESONIDE INHALATION 500 MCG: 0.5 SUSPENSION RESPIRATORY (INHALATION) at 19:48

## 2024-12-16 RX ADMIN — ENOXAPARIN SODIUM 30 MG: 100 INJECTION SUBCUTANEOUS at 21:05

## 2024-12-16 RX ADMIN — TAMSULOSIN HYDROCHLORIDE 0.4 MG: 0.4 CAPSULE ORAL at 09:01

## 2024-12-16 RX ADMIN — GLYCOPYRROLATE 1 MG: 1 TABLET ORAL at 21:11

## 2024-12-16 RX ADMIN — QUETIAPINE FUMARATE 50 MG: 25 TABLET ORAL at 21:09

## 2024-12-16 RX ADMIN — AMIODARONE HYDROCHLORIDE 200 MG: 200 TABLET ORAL at 09:01

## 2024-12-16 NOTE — PLAN OF CARE
PHYSICAL THERAPY TREATMENT     Patient: Kim Markham (49 y.o. female)  Date: 12/16/2024  Diagnosis: Acute right-sided congestive heart failure (HCC) [I50.811]  SVT (supraventricular tachycardia) (HCC) [I47.10]  Essential hypertension [I10]  Hypoxia [R09.02]  COPD exacerbation (HCC) [J44.1] SVT (supraventricular tachycardia) (HCC)  Procedure(s) (LRB):  TRACHEOSTOMY (N/A) 12 Days Post-Op  Precautions: Fall Risk                      Recommendations for nursing mobility: Out of bed to chair for meals, Encourage HEP in prep for ADLs/mobility; see handout for details, Frequent repositioning to prevent skin breakdown, Use of bed/chair alarm for safety, Use of BSC for toileting , AD and gt belt for bed to chair , and Assist x2    In place during session: Tracheostomy with speaking valve in place, T-piece FiO2 30%, 20L/min, External Catheter, and EKG/telemetry   Chart, physical therapy assessment, plan of care and goals were reviewed.  ASSESSMENT  Patient continues with skilled PT services and is progressing towards goals. Pt seated EOB independently upon PT arrival, agreeable to session. Pt A&O x 4. (See below for objective details and assist levels).     Overall pt tolerated session well today with c/o 7/10 L flank, LLE pain. On PT/GARDUNO entry, pt requires encouragement to attempt OOB mobility d/t fatigue. Pt completed 2xSTS with mod-maxAx2 for anterior weight shift, RW stabilization, line mgmt, trunk stability. Pt maintained static stand ~10 sec prior to requiring seated rest break; spO2 96% and HR increased to 126bpm with exertion. Pt instructed to complete PLB and HR recovered to 106bpm <10sec. On second stand attempt, pt required slightly increased assist level d/t fatigue, however once standing pt was able to amb 3 lateral feet with RW, gait belt and modAx2; pt demos overall shakiness, decreased weight acceptance through forefoot and BUE, and requires verbal cues for sequencing. Pt returned to sitting EOB,  discharge: pts current support system is unable to meet their requirements for physical assistance, pt is a high fall risk, pt is not safe to be alone, and concern for pt safely navigating or managing the home environment.    IF patient discharges home will need the following DME:continuing to assess with progress     SUBJECTIVE:   Patient stated “I already stood today and it took two big guys.”    OBJECTIVE DATA SUMMARY:   Critical Behavior:  Orientation  Orientation Level: Oriented X4  Cognition  Initiation: Requires cues for some  Sequencing: Requires cues for some    Functional Mobility Training:  Bed Mobility:  Bed Mobility Training  Bed Mobility Training: Yes  Sit to Supine: Contact-guard assistance;Additional time;Assist X1  Scooting: Contact-guard assistance;Additional time;Assist X1  Transfers:  Transfer Training  Transfer Training: Yes  Interventions: Verbal cues;Tactile cues;Safety awareness training;Manual cues  Sit to Stand: Moderate assistance;Maximum assistance;Assist X2;Additional time (Mod A x2 for first STS and mod/max A x2 for second STS)  Stand to Sit: Moderate assistance;Assist X2;Assist X1  Balance:  Balance  Sitting: Intact  Sitting - Static: Good (unsupported)  Sitting - Dynamic: Good (unsupported)  Standing: Impaired  Standing - Static: Constant support;Fair;Poor  Standing - Dynamic: Constant support;Poor  Wheelchair Mobility:     Ambulation/Gait Training:                                Gait  Gait Training: Yes  Overall Level of Assistance: Moderate assistance;Assist X2;Additional time;Adaptive equipment  Distance (ft): 3 Feet  Assistive Device: Walker, rolling;Gait belt  Interventions: Verbal cues;Safety awareness training;Weight shifting training/pressure relief  Base of Support: Other (comment) (decreased weight acceptance on forefeet and UE)  Speed/Laura: Slow  Gait Abnormalities: Decreased step clearance;Other (comment) (overall unsteadiness and shakiness, likely 2/2 prolonged

## 2024-12-16 NOTE — PLAN OF CARE
OCCUPATIONAL THERAPY TREATMENT  Patient: Kim Markham (49 y.o. female)  Date: 12/16/2024  Primary Diagnosis: Acute right-sided congestive heart failure (HCC) [I50.811]  SVT (supraventricular tachycardia) (HCC) [I47.10]  Essential hypertension [I10]  Hypoxia [R09.02]  COPD exacerbation (HCC) [J44.1]  Procedure(s) (LRB):  TRACHEOSTOMY (N/A) 12 Days Post-Op   Precautions: Fall Risk                Recommendations for nursing mobility: Out of bed to chair for meals, Encourage HEP in prep for ADLs/mobility; see handout for details, Frequent repositioning to prevent skin breakdown, Use of bed/chair alarm for safety, AD and gt belt for bed to chair , Assist x1, and Assist x2    In place during session: Peripheral IV, High Flow 20L/min 30%, and Tracheostomy EKG   Chart, occupational therapy assessment, plan of care, and goals were reviewed.    ASSESSMENT  Patient continues with skilled OT services and is progressing towards goals.  Pt received seated EOB upon arrival, AXO x 4 and agreeable to GARDUNO/PTA tx at this time. Pt cooperative and demonstrated fair effort during activities with encouragement and additional time d/t fatigue. Pt unable to adjust barby socks d/t fatigue and decreased anterior reach.  Pt completed 2 STS with mod A x2 first trial and mod/max second trial using AD with verbal/tactile cues for correct hand placement and posture. Pt able to lateral side step towards HOB with second trial , mod A x2. Pt stood ~ 20 seconds. Hr 126 and SpO2 at 97% with functional tf's. In long sitting, Pt engaged in barby theraband  exercises with education and cueing provided for proper technique/pacing self/achieve full ROM needed to maintain/improve strength to increase performance in ADL'S and functional tf's, see grid below.  Pt left with speech therapist. (See below for objective details and assist levels)     Overall, pt limited by generalized weakness, pain and decreased activity tolerance that interferes with performance    Potential barriers for safe discharge pts current support system is unable to meet their requirements for physical assistance, pt has impaired cognition, pt is a high fall risk, pt is not safe to be alone, and concern for pt safely navigating or managing the home environment.    IF patient discharges home will need the following DME: continuing to assess with progress       SUBJECTIVE:   Patient stated “I did my grooming earlier today.”    OBJECTIVE DATA SUMMARY:   Cognitive/Behavioral Status:  Orientation  Orientation Level: Oriented X4  Cognition  Initiation: Requires cues for some  Sequencing: Requires cues for some    Functional Mobility and Transfers for ADLs:  Bed Mobility:  Bed Mobility Training  Bed Mobility Training: Yes  Sit to Supine: Contact-guard assistance;Additional time;Assist X1  Scooting: Contact-guard assistance;Additional time;Assist X1    Transfers:  Transfer Training  Interventions: Verbal cues;Tactile cues;Safety awareness training;Manual cues  Sit to Stand: Moderate assistance;Maximum assistance;Assist X2;Additional time (Mod A x2 for first STS and mod/max A x2 for second STS)  Stand to Sit: Moderate assistance;Assist X2;Assist X1      Balance:  Balance  Sitting: Intact  Sitting - Static: Good (unsupported)  Sitting - Dynamic: Good (unsupported)  Standing: Impaired  Standing - Static: Constant support;Fair;Poor  Standing - Dynamic: Constant support;Poor      ADL Intervention:       Grooming: Setup   Grooming Skilled Clinical Factors: Long sitting:  washing hands with CHG wipe          LE Dressing: Dependent/Total  LE Dressing Skilled Clinical Factors: Assist to adjust barby socks                   Therapeutic exercise: Greeen theraband    Exercise Sets Reps AROM AAROM PROM Self PROM Comments   Shoulder horiz. abd 1 10 [x] [] [] [] Long sitting        Pain Ratin/10 lower back/ L side  Pain Intervention(s):   rest and repositioning    Activity Tolerance:   Fair  and requires rest

## 2024-12-16 NOTE — PROGRESS NOTES
Comprehensive Nutrition Assessment    Type and Reason for Visit:  Reassess (Follow Up)    Nutrition Recommendations/Plan:   Continue ONS as ordered; encourage intakes >75%  Monitor wt, intakes, labs, fluid status, and bowel fxn     Malnutrition Assessment:  Malnutrition Status:  At risk for malnutrition (12/16/24 1320)    Context:  Acute Illness     Findings of the 6 clinical characteristics of malnutrition:  Energy Intake:  Mild decrease in energy intake  Weight Loss:  Mild weight loss     Body Fat Loss:  No body fat loss     Muscle Mass Loss:  No muscle mass loss    Fluid Accumulation:  No fluid accumulation     Strength:  Not Performed    Nutrition Assessment:    Admitted for SVT. Pt intubated in early a.m. of 11/13 in CCU. Brief nutrition assessment done- insufficient info gathered. (11/14) Pt on vent, now off levo, on propofol and fentanyl. NGT in place, hemo stable for EN initiation; however, will hold off for 24-36 hrs. Plan for SBT today. (11/18) Pt  intubated and sedated in CCU. Tolerating TF at goal. (11/20) Pt vented, increasing propofol and fentanyl. TF tolerated at goal rate. (11/27) Pt extubated, transferred to cardiac tele unit, Pt later w/ medical decline, returned to ICU <7 hrs later. Pt reintubated last evening (11/26) started on TF. Pt remains vented, on minimal levo, sedated on propofol. TF started last evening, tolerated at goal rate. RD to modify to meet new EENs. Possible need for trach per IDRs. (12/4) Pt remains vented, went for trach at time of visit. NGT remains in place, possible plans to use for EN when medically appropriate. Minimal levo now restarted and, increased propofol noted. RD to modify regimen. (12/10) Pt remains in ICU, on trach, off pressor support, no sedation. Report of significant secretions. NGT feeds tolerated at goal rate. Continue regimen to meet nutritional needs. (12/16) NGT removed 12/11, now on puree, honey thick w/ 50-75% intakes w/ LCHP ONS BID w/ variable  time  Coordination of Nutrition Care: Continue to monitor while inpatient  Plan of Care discussed with: pt    Goals:  Goals: Meet at least 75% of estimated needs, by next RD assessment  Type of Goal: New goal  Previous Goal Met: Goal(s) Achieved    Nutrition Monitoring and Evaluation:   Behavioral-Environmental Outcomes: None Identified  Food/Nutrient Intake Outcomes: Supplement Intake, Food and Nutrient Intake  Physical Signs/Symptoms Outcomes: GI Status, Meal Time Behavior, Weight, Biochemical Data, Fluid Status or Edema    Discharge Planning:    Too soon to determine     Bernice Miller RD  Contact: [phone]

## 2024-12-16 NOTE — PLAN OF CARE
Speech LAnguage Pathology Dysphagia TREATMENT    Patient: Kim Markham (49 y.o. female)  Date: 12/16/2024  Primary Diagnosis: Acute right-sided congestive heart failure (HCC) [I50.811]  SVT (supraventricular tachycardia) (HCC) [I47.10]  Essential hypertension [I10]  Hypoxia [R09.02]  COPD exacerbation (HCC) [J44.1]  Procedure(s) (LRB):  TRACHEOSTOMY (N/A) 12 Days Post-Op   Precautions: aspiration,  Fall Risk     Time In: 1353  Time Out: 1403    DIET RECOMMENDATIONS: Puree and moderately thick liquids, meds crushed if able in applesauce or pudding,     SWALLOW SAFETY PRECAUTIONS: Wear PMV w/ all PO intake, 1:1 assistance with ALL PO intake, do not allow to self feed, all PO via spoon, slow rate of intake, small bites/sips, cue for double swallow, cue for effortful swallow, liquid wash, remain upright 1 hour after PO and do not eat 3 hours before bedtime.      PMV RECOMMENDATIONS: PMV as tolerated with placement by medical staff or patient.   ASSESSMENT :  Pt seen for follow-up after PT/OT session.   Pt alert, agreeable, pleasant and talkative. Pt's speech is non-dysarthric, vocal quality WFL and note is made of some difficulty with thought cohesion at conversational level.    Pt participates well and is highly engaged throughout session. Fatigue is noted s/p PT/OT session. Pt was observed to stand and side step with PT/OT, increased HR noted, which improved with rest.       PMV donned upon clinician entry. Pt demonstrates donning/doffing PMV largely independently with min trach collar adjustment provided by clinician.  Pt had questions re: PVM use.  Reviewed with pt re: PMV precautions including not to wear while sleeping or while receiving breathing treatment.  Pt able to re-state in response to questions after initial review. Reviewed with pt recs to eat with PMV donned and physiology of swallow with PMV present.  Pt expresses understanding and demonstrates understanding via response to questions.     Pt was  able to recall 2/3 swallowing exercises independently.   Leonie x 10, pt performs easily and without cues.   Effortful swallow with double swallows with trials of thin water via half spoon full, puree and small pieces of solids moistened with applesauce. Oral phase WFL.  Pharyngeal phase with mild weakness to palpation. Min cough x 1 throughout all trials. Otherwise, no overt s/s aspiration and vocal quality dry/strong throughout.  Pt instructed to remain upright following PO intake.     RT arrived for breathing treatment. Inner cannula noted to have dried secretions- RT changed inner cannula.     Patient will benefit from skilled intervention to address the above impairments.    GOALS:  Problem: SLP Adult - Impaired Swallowing  Goal: By Discharge: Advance to least restrictive diet without signs or symptoms of aspiration for planned discharge setting.  See evaluation for individualized goals.  Description: Speech Therapy Swallow Goals  Initiated 11/23/2024  -Patient will tolerate PO diet without clinical indicators of aspiration given mod cues within 7 day(s).        -Patient will tolerate advanced PO trials without clinical indicators of aspiration given mod cues within 7 day(s).      -Patient will participate in modified barium swallow study within 7 day(s) as indicated.      -Patient will demonstrate understanding of swallow safety precautions and aspiration precautions, diet recs with min cues within 7day(s).    -Patient/caregiver goal: PO diet       Outcome: Progressing       PLAN :  Recommendations and Planned Interventions:  DIET RECOMMENDATIONS: Puree and moderately thick liquids, meds crushed if able in applesauce or pudding,     SWALLOW SAFETY PRECAUTIONS: Wear PMV w/ all PO intake, 1:1 assistance with ALL PO intake, do not allow to self feed, all PO via spoon, slow rate of intake, small bites/sips, cue for double swallow, cue for effortful swallow, liquid wash, remain upright 1 hour after PO and do not

## 2024-12-16 NOTE — PLAN OF CARE
Problem: ABCDS Injury Assessment  Goal: Absence of physical injury  Outcome: Progressing     Problem: Safety - Adult  Goal: Free from fall injury  Outcome: Progressing     Problem: Chronic Conditions and Co-morbidities  Goal: Patient's chronic conditions and co-morbidity symptoms are monitored and maintained or improved  Outcome: Progressing     Problem: Physical Therapy - Adult  Goal: By Discharge: Performs mobility at highest level of function for planned discharge setting.  See evaluation for individualized goals.  Description: FUNCTIONAL STATUS PRIOR TO ADMISSION: Patient was modified independent using a rolling walker and single point cane for functional mobility.    HOME SUPPORT PRIOR TO ADMISSION: The patient lived with spouse but did not require assistance.    Physical Therapy Goals  Initiated 11/25/2024  Goals reassessed and revised 12/6/2024  Pt stated goal: to go home  Pt will be I with [facility] in 7 days.  Pt will perform bed mobility with CGA assist x 1 in 7 days  Patient to perform sit to stand transfer with mod A in 7 days.  Pt to perform stand pivot transfer from bed to chair with max A in 7 days.    Will add ambulation goals as pt progresses.  12/15/2024 1509 by Claudia Steele, WILLIAM  Outcome: Progressing

## 2024-12-16 NOTE — PROGRESS NOTES
Patient has been hospitalized for 34 days. Recs from PT/OT for 3 hours of intense therapy daily. Patient still medicaid pending and will need jerry bed, but [facility] will not have a jerry bed until January. CM contacted [facility] and received financial aid forms for patient to complete in am.

## 2024-12-16 NOTE — PROGRESS NOTES
Hospitalist Progress Note               Daily Progress Note: 12/16/2024      Hospital Day: 35     Chief complaint:   Chief Complaint   Patient presents with    Shortness of Breath        Brief HPI/ Hospital course to date:  Kim Markham is a 49 y.o. female with known past medical history significant for asthma and COPD who treated her symptoms today with Primatene Mist over-the-counter says that she went into fast heart rate became short of breath immediately.  No other exacerbating or relieving factors which presents to the emergency room with no treatment prior to arrival.  Patient reports that she had a few day history of shortness of breath and thought she had a pneumonia. She has been taking OTC cough syrup and cough medicine as well as her inhaler. She reports difficulty affording medications and has limited access to healthcare.     Patient was placed in ICU due to increased work of breathing.  V. tach was noted and patient was started on nitroglycerin infusion.  Tachycardia worsened by epinephrine and your inhaler.  Patient was placed on Rocephin, azithromycin, and Solu-Medrol for COPD exacerbation.  Respiratory status worsened on 11/13 AM and patient was intubated.  As for tachycardia, echo was performed and showed EF of 60 to 65%, LVH, abnormal diastolic function, RV moderately dilated.  CTA ruled out PE.  Duplex showed no DVT.  Cardiology consulted and started patient on aspirin, Plavix, and statin.  Patient was also noted to be SVT and required amiodarone infusions.  Due to hypotension, she required Levophed.  Patient was diuresed with Lasix and NG tube was placed with tube feedings.  Restarted on sertraline for anxiety.  As for atrial arrhythmia, amiodarone was discharged and metoprolol was increased.  Patient to follow-up with EP outpatient.  Patient was extubated 11/23.  Patient failed extubation and was reintubated on 11/26 due to lung collapse.  Bronchoscopy was performed on 11/26 due to  IV diuresis requiring serial monitoring for renal impairment and electrolyte derangements  [] Critical electrolyte abnormalities requiring IV replacement and close serial monitoring  [] SQ Insulin SS- monitoring serial FSBS for Hypoglycemic adverse drug reaction  [] Other -   [] Change in code status:    [] Decision to escalate care:    [] Major surgery/procedure with associated risk factors:    ----------------------------------------------------------------------  C. Data (any 2)  [x] Discussed current management and discharge planning options with Case Management.  [] Discussed management of the case with:    [x] Telemetry personally reviewed and interpreted as documented above    [] Imaging personally reviewed and interpreted, includes:    [x] Data Review (any 3)  [x] All available Consultant notes from yesterday/today were reviewed  [x] All current labs were reviewed and interpreted for clinical significance   [x] Appropriate follow-up labs were ordered  [] Collateral history obtained from:       Time spent with patient including counseling, chart review and nursing communication: 38 minutes    Melody Syed MD

## 2024-12-17 LAB
ANION GAP SERPL CALC-SCNC: 6 MMOL/L (ref 2–12)
BUN SERPL-MCNC: 15 MG/DL (ref 6–20)
BUN/CREAT SERPL: 23 (ref 12–20)
CA-I BLD-MCNC: 10 MG/DL (ref 8.5–10.1)
CHLORIDE SERPL-SCNC: 105 MMOL/L (ref 97–108)
CO2 SERPL-SCNC: 28 MMOL/L (ref 21–32)
CREAT SERPL-MCNC: 0.65 MG/DL (ref 0.55–1.02)
ERYTHROCYTE [DISTWIDTH] IN BLOOD BY AUTOMATED COUNT: 14.9 % (ref 11.5–14.5)
FOLATE SERPL-MCNC: 15.4 NG/ML (ref 5–21)
GLUCOSE SERPL-MCNC: 94 MG/DL (ref 65–100)
HCT VFR BLD AUTO: 33 % (ref 35–47)
HGB BLD-MCNC: 10.2 G/DL (ref 11.5–16)
MCH RBC QN AUTO: 28.1 PG (ref 26–34)
MCHC RBC AUTO-ENTMCNC: 30.9 G/DL (ref 30–36.5)
MCV RBC AUTO: 90.9 FL (ref 80–99)
NRBC # BLD: 0 K/UL (ref 0–0.01)
NRBC BLD-RTO: 0 PER 100 WBC
PLATELET # BLD AUTO: 229 K/UL (ref 150–400)
PMV BLD AUTO: 11.2 FL (ref 8.9–12.9)
POTASSIUM SERPL-SCNC: 3.7 MMOL/L (ref 3.5–5.1)
RBC # BLD AUTO: 3.63 M/UL (ref 3.8–5.2)
SODIUM SERPL-SCNC: 139 MMOL/L (ref 136–145)
VIT B12 SERPL-MCNC: 750 PG/ML (ref 193–986)
WBC # BLD AUTO: 6.6 K/UL (ref 3.6–11)

## 2024-12-17 PROCEDURE — 6370000000 HC RX 637 (ALT 250 FOR IP): Performed by: STUDENT IN AN ORGANIZED HEALTH CARE EDUCATION/TRAINING PROGRAM

## 2024-12-17 PROCEDURE — 82607 VITAMIN B-12: CPT

## 2024-12-17 PROCEDURE — 2500000003 HC RX 250 WO HCPCS: Performed by: NURSE PRACTITIONER

## 2024-12-17 PROCEDURE — 85027 COMPLETE CBC AUTOMATED: CPT

## 2024-12-17 PROCEDURE — 80048 BASIC METABOLIC PNL TOTAL CA: CPT

## 2024-12-17 PROCEDURE — 82746 ASSAY OF FOLIC ACID SERUM: CPT

## 2024-12-17 PROCEDURE — 6370000000 HC RX 637 (ALT 250 FOR IP)

## 2024-12-17 PROCEDURE — 1100000000 HC RM PRIVATE

## 2024-12-17 PROCEDURE — 94640 AIRWAY INHALATION TREATMENT: CPT

## 2024-12-17 PROCEDURE — 6370000000 HC RX 637 (ALT 250 FOR IP): Performed by: INTERNAL MEDICINE

## 2024-12-17 PROCEDURE — 36415 COLL VENOUS BLD VENIPUNCTURE: CPT

## 2024-12-17 PROCEDURE — 6360000002 HC RX W HCPCS: Performed by: STUDENT IN AN ORGANIZED HEALTH CARE EDUCATION/TRAINING PROGRAM

## 2024-12-17 PROCEDURE — 99232 SBSQ HOSP IP/OBS MODERATE 35: CPT | Performed by: STUDENT IN AN ORGANIZED HEALTH CARE EDUCATION/TRAINING PROGRAM

## 2024-12-17 PROCEDURE — 2700000000 HC OXYGEN THERAPY PER DAY

## 2024-12-17 PROCEDURE — 2500000003 HC RX 250 WO HCPCS: Performed by: INTERNAL MEDICINE

## 2024-12-17 PROCEDURE — 92526 ORAL FUNCTION THERAPY: CPT

## 2024-12-17 RX ADMIN — SODIUM CHLORIDE, PRESERVATIVE FREE 10 ML: 5 INJECTION INTRAVENOUS at 21:19

## 2024-12-17 RX ADMIN — ASPIRIN 81 MG 81 MG: 81 TABLET ORAL at 08:57

## 2024-12-17 RX ADMIN — BUSPIRONE HYDROCHLORIDE 7.5 MG: 5 TABLET ORAL at 09:00

## 2024-12-17 RX ADMIN — TAMSULOSIN HYDROCHLORIDE 0.4 MG: 0.4 CAPSULE ORAL at 08:57

## 2024-12-17 RX ADMIN — IPRATROPIUM BROMIDE AND ALBUTEROL SULFATE 1 DOSE: 2.5; .5 SOLUTION RESPIRATORY (INHALATION) at 13:30

## 2024-12-17 RX ADMIN — BUDESONIDE INHALATION 500 MCG: 0.5 SUSPENSION RESPIRATORY (INHALATION) at 06:20

## 2024-12-17 RX ADMIN — IPRATROPIUM BROMIDE AND ALBUTEROL SULFATE 1 DOSE: 2.5; .5 SOLUTION RESPIRATORY (INHALATION) at 02:07

## 2024-12-17 RX ADMIN — QUETIAPINE FUMARATE 50 MG: 25 TABLET ORAL at 21:15

## 2024-12-17 RX ADMIN — SENNOSIDES 8.6 MG: 8.6 TABLET, FILM COATED ORAL at 21:16

## 2024-12-17 RX ADMIN — BUSPIRONE HYDROCHLORIDE 7.5 MG: 5 TABLET ORAL at 21:15

## 2024-12-17 RX ADMIN — GLYCOPYRROLATE 1 MG: 1 TABLET ORAL at 21:16

## 2024-12-17 RX ADMIN — FAMOTIDINE 20 MG: 20 TABLET, FILM COATED ORAL at 08:57

## 2024-12-17 RX ADMIN — ENOXAPARIN SODIUM 30 MG: 100 INJECTION SUBCUTANEOUS at 08:57

## 2024-12-17 RX ADMIN — QUETIAPINE FUMARATE 50 MG: 25 TABLET ORAL at 13:44

## 2024-12-17 RX ADMIN — GLYCOPYRROLATE 1 MG: 1 TABLET ORAL at 13:44

## 2024-12-17 RX ADMIN — GUAIFENESIN 300 MG: 100 SOLUTION ORAL at 08:56

## 2024-12-17 RX ADMIN — GLYCOPYRROLATE 1 MG: 1 TABLET ORAL at 08:57

## 2024-12-17 RX ADMIN — GUAIFENESIN 300 MG: 100 SOLUTION ORAL at 16:45

## 2024-12-17 RX ADMIN — POTASSIUM BICARBONATE 40 MEQ: 782 TABLET, EFFERVESCENT ORAL at 21:12

## 2024-12-17 RX ADMIN — BUDESONIDE INHALATION 500 MCG: 0.5 SUSPENSION RESPIRATORY (INHALATION) at 19:50

## 2024-12-17 RX ADMIN — FLUCONAZOLE 100 MG: 100 TABLET ORAL at 08:57

## 2024-12-17 RX ADMIN — ATORVASTATIN CALCIUM 40 MG: 40 TABLET, FILM COATED ORAL at 21:16

## 2024-12-17 RX ADMIN — ENOXAPARIN SODIUM 30 MG: 100 INJECTION SUBCUTANEOUS at 21:16

## 2024-12-17 RX ADMIN — IPRATROPIUM BROMIDE AND ALBUTEROL SULFATE 1 DOSE: 2.5; .5 SOLUTION RESPIRATORY (INHALATION) at 06:19

## 2024-12-17 RX ADMIN — SERTRALINE 50 MG: 50 TABLET, FILM COATED ORAL at 08:57

## 2024-12-17 RX ADMIN — IPRATROPIUM BROMIDE AND ALBUTEROL SULFATE 1 DOSE: 2.5; .5 SOLUTION RESPIRATORY (INHALATION) at 19:50

## 2024-12-17 RX ADMIN — GUAIFENESIN 300 MG: 100 SOLUTION ORAL at 13:43

## 2024-12-17 RX ADMIN — AMIODARONE HYDROCHLORIDE 200 MG: 200 TABLET ORAL at 08:57

## 2024-12-17 RX ADMIN — QUETIAPINE FUMARATE 50 MG: 25 TABLET ORAL at 08:57

## 2024-12-17 RX ADMIN — POTASSIUM BICARBONATE 40 MEQ: 782 TABLET, EFFERVESCENT ORAL at 08:57

## 2024-12-17 RX ADMIN — GUAIFENESIN 300 MG: 100 SOLUTION ORAL at 21:14

## 2024-12-17 ASSESSMENT — PAIN SCALES - GENERAL
PAINLEVEL_OUTOF10: 0
PAINLEVEL_OUTOF10: 0

## 2024-12-17 NOTE — PROGRESS NOTES
Smoking status: Every Day     Current packs/day: 1.00     Types: Cigarettes    Smokeless tobacco: Not on file   Substance Use Topics    Alcohol use: Not on file     Past Surgical History:   Procedure Laterality Date    TRACHEOSTOMY N/A 12/4/2024    TRACHEOSTOMY performed by Chaya Loyd MD at [facility] MAIN OR      Current Facility-Administered Medications   Medication Dose Route Frequency    ipratropium 0.5 mg-albuterol 2.5 mg (DUONEB) nebulizer solution 1 Dose  1 Dose Inhalation Q4H PRN    lidocaine 4 % external patch 1 patch  1 patch TransDERmal Daily    potassium bicarb-citric acid (EFFER-K) effervescent tablet 40 mEq  40 mEq Oral BID    amiodarone (CORDARONE) tablet 200 mg  200 mg Oral Daily    aspirin chewable tablet 81 mg  81 mg Oral Daily    atorvastatin (LIPITOR) tablet 40 mg  40 mg Oral Nightly    busPIRone (BUSPAR) tablet 7.5 mg  7.5 mg Oral BID    famotidine (PEPCID) tablet 20 mg  20 mg Oral Daily    fluconazole (DIFLUCAN) tablet 100 mg  100 mg Oral Daily    glycopyrrolate (ROBINUL) tablet 1 mg  1 mg Oral TID    guaiFENesin (ROBITUSSIN) 100 MG/5ML liquid 300 mg  300 mg Oral 4x daily    QUEtiapine (SEROQUEL) tablet 50 mg  50 mg Oral TID    senna (SENOKOT) tablet 8.6 mg  1 tablet Oral Nightly    sertraline (ZOLOFT) tablet 50 mg  50 mg Oral Daily    acetaminophen (TYLENOL) 160 MG/5ML solution 650 mg  650 mg Oral Q4H PRN    HYDROcodone-acetaminophen (NORCO) 5-325 MG per tablet 1 tablet  1 tablet Oral Q6H PRN    midodrine (PROAMATINE) tablet 5 mg  5 mg Oral TID PRN    polyethylene glycol (GLYCOLAX) packet 17 g  17 g Oral Daily PRN    tamsulosin (FLOMAX) capsule 0.4 mg  0.4 mg Oral Daily    0.9 % sodium chloride infusion   IntraVENous PRN    potassium chloride 20 mEq/50 mL IVPB (Central Line)  20 mEq IntraVENous PRN    Or    potassium chloride 10 mEq/100 mL IVPB (Peripheral Line)  10 mEq IntraVENous PRN    magnesium sulfate 2000 mg in 50 mL IVPB premix  2,000 mg IntraVENous PRN    enoxaparin Sodium (LOVENOX)  injection 30 mg  30 mg SubCUTAneous BID    ondansetron (ZOFRAN-ODT) disintegrating tablet 4 mg  4 mg Oral Q8H PRN    Or    ondansetron (ZOFRAN) injection 4 mg  4 mg IntraVENous Q6H PRN    ipratropium 0.5 mg-albuterol 2.5 mg (DUONEB) nebulizer solution 1 Dose  1 Dose Inhalation Q6H RT    saliva substitute (BIOTENE/MOUTH KOTE) liquid   Oral PRN    budesonide (PULMICORT) nebulizer suspension 500 mcg  0.5 mg Nebulization BID RT    sodium phosphate 15 mmol in sodium chloride 0.9 % 250 mL IVPB  15 mmol IntraVENous PRN    [Held by provider] polyethylene glycol (GLYCOLAX) packet 17 g  17 g Per NG tube Daily    glucose chewable tablet 16 g  4 tablet Oral PRN    dextrose bolus 10% 125 mL  125 mL IntraVENous PRN    Or    dextrose bolus 10% 250 mL  250 mL IntraVENous PRN    glucagon injection 1 mg  1 mg SubCUTAneous PRN    dextrose 10 % infusion   IntraVENous Continuous PRN    influenza split vaccine (PF) (AFLURIA;FLUARIX) injection 0.5 mL  0.5 mL IntraMUSCular Prior to discharge    sodium chloride flush 0.9 % injection 5-40 mL  5-40 mL IntraVENous 2 times per day    sodium chloride flush 0.9 % injection 5-40 mL  5-40 mL IntraVENous PRN      Allergies   Allergen Reactions    Latex       Review of Systems:  Review of systems not obtained due to patient factors.     Objective:     Patient Vitals for the past 8 hrs:   BP Temp Temp src Pulse Resp SpO2   24 1354 -- -- -- 95 20 95 %   24 1333 -- -- -- (!) 101 22 97 %   24 1212 121/69 98.8 °F (37.1 °C) Oral 93 20 97 %   24 0852 -- -- -- 84 20 96 %   24 0800 113/68 98.8 °F (37.1 °C) Oral 92 16 96 %     Temp (24hrs), Av.5 °F (36.9 °C), Min:98.1 °F (36.7 °C), Max:98.8 °F (37.1 °C)    12/15 1901 -  0700  In: 600 [P.O.:600]  Out: 2600 [Urine:2600]    Physical Exam:   /69   Pulse 95   Temp 98.8 °F (37.1 °C) (Oral)   Resp 20   Ht 1.727 m (5' 7.99\")   Wt 122 kg (268 lb 15.4 oz)   SpO2 95%   BMI 40.91 kg/m²   General:  Comfortable   Head:

## 2024-12-17 NOTE — PROGRESS NOTES
Other -   [] Change in code status:    [] Decision to escalate care:    [] Major surgery/procedure with associated risk factors:    ----------------------------------------------------------------------  C. Data (any 2)  [x] Discussed current management and discharge planning options with Case Management.  [] Discussed management of the case with:    [x] Telemetry personally reviewed and interpreted as documented above    [] Imaging personally reviewed and interpreted, includes:    [x] Data Review (any 3)  [x] All available Consultant notes from yesterday/today were reviewed  [x] All current labs were reviewed and interpreted for clinical significance   [x] Appropriate follow-up labs were ordered  [] Collateral history obtained from:       Time spent with patient including counseling, chart review and nursing communication: 38 minutes    Martínez Holloway MD

## 2024-12-17 NOTE — PLAN OF CARE
Problem: Discharge Planning  Goal: Discharge to home or other facility with appropriate resources  12/17/2024 1041 by Michi Persaud RN  Outcome: Progressing  12/16/2024 2118 by Moise Padilla RN  Outcome: Progressing  Flowsheets (Taken 12/16/2024 0800 by Josh Shultz RN)  Discharge to home or other facility with appropriate resources: Identify barriers to discharge with patient and caregiver     Problem: ABCDS Injury Assessment  Goal: Absence of physical injury  12/17/2024 1041 by Michi Persaud RN  Outcome: Progressing  12/16/2024 2118 by Moise Padilla RN  Outcome: Progressing     Problem: Safety - Adult  Goal: Free from fall injury  12/17/2024 1041 by Michi Persaud RN  Outcome: Progressing  12/16/2024 2118 by Moise Padilla RN  Outcome: Progressing     Problem: Confusion  Goal: Confusion, delirium, dementia, or psychosis is improved or at baseline  Description: INTERVENTIONS:  1. Assess for possible contributors to thought disturbance, including medications, impaired vision or hearing, underlying metabolic abnormalities, dehydration, psychiatric diagnoses, and notify attending LIP  2. [place] high risk fall precautions, as indicated  3. Provide frequent short contacts to provide reality reorientation, refocusing and direction  4. Decrease environmental stimuli, including noise as appropriate  5. Monitor and intervene to maintain adequate nutrition, hydration, elimination, sleep and activity  6. If unable to ensure safety without constant attention obtain sitter and review sitter guidelines with assigned personnel  7. Initiate Psychosocial CNS and Spiritual Care consult, as indicated  12/16/2024 2118 by Moise Padilla RN  Outcome: Progressing  Flowsheets (Taken 12/16/2024 0800 by Josh Shultz RN)  Effect of thought disturbance (confusion, delirium, dementia, or psychosis) are managed with adequate functional status:   Assess for  RN)  Absence of fever/infection during anticipated neutropenic period: Monitor white blood cell count     Problem: Metabolic/Fluid and Electrolytes - Adult  Goal: Electrolytes maintained within normal limits  12/16/2024 2118 by Moise Padilla RN  Outcome: Progressing  Flowsheets (Taken 12/16/2024 0800 by Josh Shultz RN)  Electrolytes maintained within normal limits: Monitor labs and assess patient for signs and symptoms of electrolyte imbalances  Goal: Hemodynamic stability and optimal renal function maintained  12/16/2024 2118 by Moise Padilla RN  Outcome: Progressing  Flowsheets (Taken 12/16/2024 0800 by Josh Shultz RN)  Hemodynamic stability and optimal renal function maintained: Monitor labs and assess for signs and symptoms of volume excess or deficit  Goal: Glucose maintained within prescribed range  12/16/2024 2118 by Moise Padilla RN  Outcome: Progressing  Flowsheets (Taken 12/16/2024 0800 by Josh Shultz RN)  Glucose maintained within prescribed range: Monitor blood glucose as ordered     Problem: Safety - Violent/Self-destructive Restraint  Goal: Remains free of injury from restraints (Restraint for Violent/Self-Destructive Behavior)  Description: INTERVENTIONS:  1. Determine that de-escalation and other, less restrictive measures have been tried or would not be effective before applying the restraint  2. Identify and document the criteria for restraint  3. Evaluate the patient's condition at the time of restraint application  4. Inform patient/family regarding the reason for restraint/seclusion  5. Q2H: Monitor comfort, nutrition and hydration needs  6. Q15M: Perform safety checks including skin, circulation, sensory, respiratory and psychological status  7. Ensure continuous observation  8. Identify and implement measures to help patient regain control, assess readiness for release and initiate progressive release per

## 2024-12-17 NOTE — PLAN OF CARE
Problem: Discharge Planning  Goal: Discharge to home or other facility with appropriate resources  Outcome: Progressing  Flowsheets (Taken 12/16/2024 0800 by Josh Shultz, RN)  Discharge to home or other facility with appropriate resources: Identify barriers to discharge with patient and caregiver     Problem: ABCDS Injury Assessment  Goal: Absence of physical injury  Outcome: Progressing     Problem: Safety - Adult  Goal: Free from fall injury  Outcome: Progressing     Problem: Confusion  Goal: Confusion, delirium, dementia, or psychosis is improved or at baseline  Description: INTERVENTIONS:  1. Assess for possible contributors to thought disturbance, including medications, impaired vision or hearing, underlying metabolic abnormalities, dehydration, psychiatric diagnoses, and notify attending LIP  2. [place] high risk fall precautions, as indicated  3. Provide frequent short contacts to provide reality reorientation, refocusing and direction  4. Decrease environmental stimuli, including noise as appropriate  5. Monitor and intervene to maintain adequate nutrition, hydration, elimination, sleep and activity  6. If unable to ensure safety without constant attention obtain sitter and review sitter guidelines with assigned personnel  7. Initiate Psychosocial CNS and Spiritual Care consult, as indicated  Outcome: Progressing  Flowsheets (Taken 12/16/2024 0800 by Josh Shultz, RN)  Effect of thought disturbance (confusion, delirium, dementia, or psychosis) are managed with adequate functional status:   Assess for contributors to thought disturbance, including medications, impaired vision or hearing, underlying metabolic abnormalities, dehydration, psychiatric diagnoses, notify LIP   [place] high risk fall precautions, as indicated     Problem: Chronic Conditions and Co-morbidities  Goal: Patient's chronic conditions and co-morbidity symptoms are monitored and maintained or improved  Outcome:

## 2024-12-17 NOTE — PLAN OF CARE
Speech LAnguage Pathology Dysphagia TREATMENT    Patient: Kim Markham (49 y.o. female)  Date: 12/17/2024  Primary Diagnosis: Acute right-sided congestive heart failure (HCC) [I50.811]  SVT (supraventricular tachycardia) ([facility]) [I47.10]  Essential hypertension [I10]  Hypoxia [R09.02]  COPD exacerbation ([facility]) [J44.1]  Procedure(s) (LRB):  TRACHEOSTOMY (N/A) 13 Days Post-Op   Precautions: Aspiration Fall Risk                DIET RECOMMENDATIONS: Puree and mildly thick liquids, meds crushed if able in applesauce or pudding,     SWALLOW SAFETY PRECAUTIONS: Wear PMV w/ all PO intake, 1:1 assistance with ALL PO intake, do not allow to self feed, all PO via spoon, slow rate of intake, small bites/sips, cue for double swallow, cue for effortful swallow, liquid wash, remain upright 1 hour after PO and do not eat 3 hours before bedtime.         PMV RECOMMENDATIONS: PMV as tolerated with placement by medical staff or patient.     ASSESSMENT :  Notified by ENT plans for capping trials and decannulation if tolerates for 24 hours.   Patient asleep w/ PMV donned to trach hub on 28% FiO2 10L. She reports she likes to wear the PMV while sleeping. Patient now cleared to wear while sleeping as plans for capping trials.   Completed swallow exercises: Leonie x20, Supraglottic x10, and effortful swallow x25.  Patient given exercisees to perform outside of tx session Leonie and supraglottic 10 reps 3x/day. Patient reports she is doing her exercises.   PO trials w/ mildly thick and ice chips. There is improved swallow initiation and decreased audible swallow. No overt s/s of pen/asp observed.   Continue to closely monitor for clinical indicators aspiration .  Patient highly motivated and engaged during session.     GOALS:    Problem: SLP Adult - Impaired Swallowing  Goal: By Discharge: Advance to least restrictive diet without signs or symptoms of aspiration for planned discharge setting.  See evaluation for individualized  unit  Bathroom Toilet: Standard  Home Equipment: Walker - Rolling, Cane  Has the patient had two or more falls in the past year or any fall with injury in the past year?: No  Prior Level of Assist for ADLs: Independent  Prior Level of Assist for Transfers: Independent  Occupation: Full time employment    Cognitive and Communication Status:  Neurologic State: Alert  Orientation Level: Oriented x4  Cognition: Follows commands      Dysphagia:  Oral Assessment:     P.O. Trials:  Consistencies Administered: Ice Chips;Mildly Thick - cup;Mildly Thick- teaspoon         After Treatment:  Patient left in no apparent distress in bed, Call bell left within reach, Nursing notified, and Updated patient's board with:  diet recommendations and aspiration precautions     Pain:  VAS (numerical) 0    COMMUNICATION/EDUCATION:   Swallow safety precautions, Aspiration precautions, GERD precautions, Diet recommendations, Compensatory strategies, Prognosis and SLP POC, and exercises  education provided to Patient and Nurse via explanation and teach back, all questions/concerns addressed. Patient verbalizes understanding and requires reinforcement.    The patient's plan of care including recommendations, planned interventions, and recommended diet changes were discussed with: Registered nurse.    Patient/family have participated as able in goal setting and plan of care and Patient/family agree to work toward stated goals and plan of care    Thank you,  Elena Liao M.S., M.Ed., CCC-SLP  Minutes: 19

## 2024-12-17 NOTE — PROGRESS NOTES
Otolaryngology-HNS Consult    Subjective:     Date of Consultation:  December 17, 2024    Referring Physician: Glenn    History of Present Illness:   Patient is a 49 y.o.  female who is being seen for tracheostomy consult.  Admitted to the hospital 11/12/2024, with mental status change and hypoxia.  Has been intubated then extubated then back to the floor.  Now back to the ICU and intubated again for 3 days.  Biggest issue has been decreased mental status and poor tolerance of secretions leading to need for reintubation.  Otolaryngology service has been consulted for tracheostomy placement.    Interval Hx:   12/3/24:   Was planned for tracheostomy today, but canceled due to difficulty with obtaining consent from next of kin.    Vent settings: FiO2 40%, PEEP 5    Labs, imaging, and notes reviewed.    12/4/2024:  No change, mother is established and decision maker     12/5/24:   POD 1 s/p tracheostomy     Interval hx 12/17/2024:  POD 5 s/p tracheostomy.  On trach collar, tolerating it well.  Did not tolerate Passy-Cayden valve trial due to increased secretions    Patient Active Problem List    Diagnosis Date Noted    Acute right-sided congestive heart failure (HCC) 11/26/2024    Essential hypertension 11/26/2024    GUERA (acute kidney injury) ([facility]) 11/26/2024    SVT (supraventricular tachycardia) ([facility]) 11/12/2024    Acute respiratory failure 01/03/2024    COPD exacerbation ([facility]) 01/02/2024    Acute asthma exacerbation 08/11/2023    COPD (chronic obstructive pulmonary disease) ([facility]) 08/11/2023    COPD with acute exacerbation ([facility]) 09/16/2022     Past Medical History:   Diagnosis Date    Asthma     COPD (chronic obstructive pulmonary disease) ([facility])       History reviewed. No pertinent family history.   Social History     Tobacco Use    Smoking status: Every Day     Current packs/day: 1.00     Types: Cigarettes    Smokeless tobacco: Not on file   Substance Use Topics    Alcohol use: Not on file     Past Surgical History:

## 2024-12-18 PROCEDURE — 6370000000 HC RX 637 (ALT 250 FOR IP): Performed by: INTERNAL MEDICINE

## 2024-12-18 PROCEDURE — 94640 AIRWAY INHALATION TREATMENT: CPT

## 2024-12-18 PROCEDURE — 97530 THERAPEUTIC ACTIVITIES: CPT

## 2024-12-18 PROCEDURE — 6370000000 HC RX 637 (ALT 250 FOR IP): Performed by: STUDENT IN AN ORGANIZED HEALTH CARE EDUCATION/TRAINING PROGRAM

## 2024-12-18 PROCEDURE — 2500000003 HC RX 250 WO HCPCS: Performed by: NURSE PRACTITIONER

## 2024-12-18 PROCEDURE — 1100000000 HC RM PRIVATE

## 2024-12-18 PROCEDURE — 92507 TX SP LANG VOICE COMM INDIV: CPT

## 2024-12-18 PROCEDURE — 94761 N-INVAS EAR/PLS OXIMETRY MLT: CPT

## 2024-12-18 PROCEDURE — 6370000000 HC RX 637 (ALT 250 FOR IP)

## 2024-12-18 PROCEDURE — 6360000002 HC RX W HCPCS: Performed by: STUDENT IN AN ORGANIZED HEALTH CARE EDUCATION/TRAINING PROGRAM

## 2024-12-18 PROCEDURE — 2700000000 HC OXYGEN THERAPY PER DAY

## 2024-12-18 PROCEDURE — 92526 ORAL FUNCTION THERAPY: CPT

## 2024-12-18 PROCEDURE — 2500000003 HC RX 250 WO HCPCS: Performed by: INTERNAL MEDICINE

## 2024-12-18 RX ADMIN — BUSPIRONE HYDROCHLORIDE 7.5 MG: 5 TABLET ORAL at 09:56

## 2024-12-18 RX ADMIN — GLYCOPYRROLATE 1 MG: 1 TABLET ORAL at 21:28

## 2024-12-18 RX ADMIN — ASPIRIN 81 MG 81 MG: 81 TABLET ORAL at 09:57

## 2024-12-18 RX ADMIN — FLUCONAZOLE 100 MG: 100 TABLET ORAL at 09:56

## 2024-12-18 RX ADMIN — TAMSULOSIN HYDROCHLORIDE 0.4 MG: 0.4 CAPSULE ORAL at 09:55

## 2024-12-18 RX ADMIN — AMIODARONE HYDROCHLORIDE 200 MG: 200 TABLET ORAL at 09:57

## 2024-12-18 RX ADMIN — BUSPIRONE HYDROCHLORIDE 7.5 MG: 5 TABLET ORAL at 21:27

## 2024-12-18 RX ADMIN — BUDESONIDE INHALATION 500 MCG: 0.5 SUSPENSION RESPIRATORY (INHALATION) at 07:23

## 2024-12-18 RX ADMIN — BUDESONIDE INHALATION 500 MCG: 0.5 SUSPENSION RESPIRATORY (INHALATION) at 19:24

## 2024-12-18 RX ADMIN — QUETIAPINE FUMARATE 50 MG: 25 TABLET ORAL at 21:28

## 2024-12-18 RX ADMIN — IPRATROPIUM BROMIDE AND ALBUTEROL SULFATE 1 DOSE: 2.5; .5 SOLUTION RESPIRATORY (INHALATION) at 01:00

## 2024-12-18 RX ADMIN — GUAIFENESIN 300 MG: 100 SOLUTION ORAL at 12:49

## 2024-12-18 RX ADMIN — ENOXAPARIN SODIUM 30 MG: 100 INJECTION SUBCUTANEOUS at 21:30

## 2024-12-18 RX ADMIN — SODIUM CHLORIDE, PRESERVATIVE FREE 10 ML: 5 INJECTION INTRAVENOUS at 21:30

## 2024-12-18 RX ADMIN — SENNOSIDES 8.6 MG: 8.6 TABLET, FILM COATED ORAL at 21:28

## 2024-12-18 RX ADMIN — IPRATROPIUM BROMIDE AND ALBUTEROL SULFATE 1 DOSE: 2.5; .5 SOLUTION RESPIRATORY (INHALATION) at 07:23

## 2024-12-18 RX ADMIN — POTASSIUM BICARBONATE 40 MEQ: 782 TABLET, EFFERVESCENT ORAL at 09:57

## 2024-12-18 RX ADMIN — GUAIFENESIN 300 MG: 100 SOLUTION ORAL at 17:16

## 2024-12-18 RX ADMIN — IPRATROPIUM BROMIDE AND ALBUTEROL SULFATE 1 DOSE: 2.5; .5 SOLUTION RESPIRATORY (INHALATION) at 13:35

## 2024-12-18 RX ADMIN — QUETIAPINE FUMARATE 50 MG: 25 TABLET ORAL at 12:49

## 2024-12-18 RX ADMIN — GLYCOPYRROLATE 1 MG: 1 TABLET ORAL at 09:57

## 2024-12-18 RX ADMIN — HYDROCODONE BITARTRATE AND ACETAMINOPHEN 1 TABLET: 5; 325 TABLET ORAL at 21:34

## 2024-12-18 RX ADMIN — POTASSIUM BICARBONATE 40 MEQ: 782 TABLET, EFFERVESCENT ORAL at 21:28

## 2024-12-18 RX ADMIN — FAMOTIDINE 20 MG: 20 TABLET, FILM COATED ORAL at 09:57

## 2024-12-18 RX ADMIN — ATORVASTATIN CALCIUM 40 MG: 40 TABLET, FILM COATED ORAL at 21:27

## 2024-12-18 RX ADMIN — GLYCOPYRROLATE 1 MG: 1 TABLET ORAL at 12:49

## 2024-12-18 RX ADMIN — SODIUM CHLORIDE, PRESERVATIVE FREE 10 ML: 5 INJECTION INTRAVENOUS at 09:58

## 2024-12-18 RX ADMIN — ENOXAPARIN SODIUM 30 MG: 100 INJECTION SUBCUTANEOUS at 09:57

## 2024-12-18 RX ADMIN — IPRATROPIUM BROMIDE AND ALBUTEROL SULFATE 1 DOSE: 2.5; .5 SOLUTION RESPIRATORY (INHALATION) at 19:24

## 2024-12-18 RX ADMIN — SERTRALINE 50 MG: 50 TABLET, FILM COATED ORAL at 09:55

## 2024-12-18 RX ADMIN — GUAIFENESIN 300 MG: 100 SOLUTION ORAL at 09:58

## 2024-12-18 RX ADMIN — GUAIFENESIN 300 MG: 100 SOLUTION ORAL at 21:27

## 2024-12-18 RX ADMIN — QUETIAPINE FUMARATE 50 MG: 25 TABLET ORAL at 09:55

## 2024-12-18 ASSESSMENT — PAIN SCALES - GENERAL
PAINLEVEL_OUTOF10: 7
PAINLEVEL_OUTOF10: 4
PAINLEVEL_OUTOF10: 7

## 2024-12-18 ASSESSMENT — PAIN DESCRIPTION - ORIENTATION
ORIENTATION: LOWER
ORIENTATION: LOWER

## 2024-12-18 ASSESSMENT — PAIN DESCRIPTION - DESCRIPTORS
DESCRIPTORS: THROBBING
DESCRIPTORS: ACHING

## 2024-12-18 ASSESSMENT — PAIN DESCRIPTION - LOCATION
LOCATION: BACK

## 2024-12-18 ASSESSMENT — PAIN DESCRIPTION - PAIN TYPE: TYPE: ACUTE PAIN

## 2024-12-18 NOTE — PLAN OF CARE
PHYSICAL THERAPY TREATMENT     Patient: Kim Markham (49 y.o. female)  Date: 12/18/2024  Diagnosis: Acute right-sided congestive heart failure (HCC) [I50.811]  SVT (supraventricular tachycardia) (HCC) [I47.10]  Essential hypertension [I10]  Hypoxia [R09.02]  COPD exacerbation (HCC) [J44.1] SVT (supraventricular tachycardia) (HCC)  Procedure(s) (LRB):  TRACHEOSTOMY (N/A) 14 Days Post-Op  Precautions: Fall Risk                      Recommendations for nursing mobility: Out of bed to chair for meals, Encourage HEP in prep for ADLs/mobility; see handout for details, Frequent repositioning to prevent skin breakdown, Kelly Stedy for bed to chair transfers , and Assist x2    In place during session: External Catheter and EKG/telemetry   Chart, physical therapy assessment, plan of care and goals were reviewed.  ASSESSMENT  Patient continues with skilled PT services and is progressing towards goals. Pt sitting up in the bed upon PT arrival, agreeable to session. Pt A&O x 4. (See below for objective details and assist levels).     Overall pt tolerated session well today and remains motivated for therapy. Patient was able to progress with bed mobility, SBA. Patient was mod A x2 for OOB mobility and transfers. One time patient req max A x2 for transfers. Patient demos LLE pain however was able to ambulate approx 3 ft x2 to the recliner and back to bed. Shuffled gait noted with increased fatigue and HR elevated with mobility. Patient able to complete chair transfers but requested to go back to bed and wait until tomorrow when therapy can assist with back to bed. Tolerated LE therex well today and has been completing IND. Will continue to benefit from skilled PT services, and will continue to progress as tolerated. Current PT DC recommendation High intensity and comprehensive skilled physical therapy in a multidisciplinary setting as patient is working towards tolerating up to 3 hours of therapy/day x 5-7 days/week  Status: WFL    Functional Mobility Training:  Bed Mobility:  Bed Mobility Training  Bed Mobility Training: Yes  Supine to Sit: Contact-guard assistance  Sit to Supine: Contact-guard assistance  Scooting: Contact-guard assistance  Transfers:  Transfer Training  Transfer Training: Yes  Interventions: Verbal cues;Tactile cues;Safety awareness training;Manual cues  Sit to Stand: Moderate assistance;Maximum assistance;Assist X2;Additional time  Stand to Sit: Moderate assistance;Assist X2  Bed to Chair: Moderate assistance;Adaptive equipment;Additional time;Assist X2  Balance:  Balance  Sitting: Intact  Sitting - Static: Good (unsupported)  Sitting - Dynamic: Good (unsupported)  Standing: Impaired  Standing - Static: Constant support;Fair;Poor  Standing - Dynamic: Constant support;Poor    Ambulation/Gait Training:   Gait  Gait Training: Yes  Overall Level of Assistance: Moderate assistance;Assist X2  Distance (ft): 6 Feet (3ft x2)  Assistive Device: Walker, rolling;Gait belt  Interventions: Verbal cues;Safety awareness training;Weight shifting training/pressure relief  Speed/Laura: Slow       Therapeutic Exercises:     EXERCISE   Sets   Reps   Active Active Assist   Passive Self ROM   Comments   Ankle Pumps  10 [x] [] [] []    Quad Sets/Glut Sets   [] [] [] []    Hamstring Sets   [] [] [] []    Short Arc Quads   [] [] [] []    Heel Slides   [] [] [] []    Straight Leg Raises   [] [] [] []    Hip abd/add   [] [] [] []    Long Arc Quads  10 [x] [] [] []    Marching   [] [] [] []    Seated HR/TR   [] [] [] []       [] [] [] []       Pain Ratin/10 LLE reported    Activity Tolerance:   Fair  and requires rest breaks    After treatment patient left in no apparent distress:   Bed locked and returned to lowest position, Patient left in no apparent distress in bed, Call bell within reach, and Side rails x3, and nsg updated     COMMUNICATION/COLLABORATION:   The patient’s plan of care was discussed with: Occupational therapy

## 2024-12-18 NOTE — CARE COORDINATION
CM met with patient at bedside and obtained [facility] application for [facility] bed.  CM uploaded application through secure referral platform.  CM awaiting response from [facility].

## 2024-12-18 NOTE — PROGRESS NOTES
.4 Eyes Skin Assessment     NAME:  Kim Markham  YOB: 1975  MEDICAL RECORD NUMBER:  [id number]    The patient is being assessed for  Other weekly assessment    I agree that at least one RN has performed a thorough Head to Toe Skin Assessment on the patient. ALL assessment sites listed below have been assessed.      Areas assessed by both nurses:    Head, Face, Ears, Shoulders, Back, Chest, Arms, Elbows, Hands, Sacrum. Buttock, Coccyx, Ischium, Legs. Feet and Heels, and Under Medical Devices         Does the Patient have a Wound? Yes wound(s) were present on assessment. LDA wound assessment was Initiated and completed by RN    Erythema and skin breakdown around trach site       Jerson Prevention initiated by RN: Yes  Wound Care Orders initiated by RN: Yes    Pressure Injury (Stage 3,4, Unstageable, DTI, NWPT, and Complex wounds) if present, place Wound referral order by RN under : No    New Ostomies, if present place, Ostomy referral order under : No     Nurse 1 eSignature: Electronically signed by LAKESHA HERNANDEZ RN on 12/18/24 at 12:49 AM EST    **SHARE this note so that the co-signing nurse can place an eSignature**    Nurse 2 eSignature: Electronically signed by Latasha Thornton RN on 12/18/24 at 12:52 AM EST

## 2024-12-18 NOTE — PROGRESS NOTES
Patient's trach capped at 1100. No respiratory distress noted, patient conversing and breathing comfortably. Educated patient and nurse on process and importance of capping, as well as what to do in the event of sudden shortness of breath. Understanding verbalized. Will continue to check on patient.    Per MD: if patient can remain capped for 24 hours, then she can be decannulated.

## 2024-12-18 NOTE — PROGRESS NOTES
OCCUPATIONAL THERAPY TREATMENT: WEEKLY REASSESSMENT    Patient: Kim Markham (49 y.o. female)  Date: 12/18/2024  Primary Diagnosis: Acute right-sided congestive heart failure (HCC) [I50.811]  SVT (supraventricular tachycardia) (HCC) [I47.10]  Essential hypertension [I10]  Hypoxia [R09.02]  COPD exacerbation (HCC) [J44.1]  Procedure(s) (LRB):  TRACHEOSTOMY (N/A) 14 Days Post-Op   Precautions: Fall Risk                Recommendations for nursing mobility: Out of bed to chair for meals, Encourage HEP in prep for ADLs/mobility; see handout for details, Frequent repositioning to prevent skin breakdown, Use of bed/chair alarm for safety, Use of BSC for toileting , AD and gt belt for bed to chair , Kelly Stedy for bed to chair transfers , and Assist x2    In place during session: External Catheter and trach (capped)  Chart, occupational therapy assessment, plan of care, and goals were reviewed.  ASSESSMENT  Patient initially seen for OT evaluation on 11/25/24 and 5 skilled OT sessions since evalution. Patient seen today for OT reevaluation s/t LOS. Patient A&O x4. Pt semi supine upon arrival, agreeable to session.      Based on the objective data described, the patient currently presents with impaired functional mobility, decreased independence in ADLs, impaired ability to perform high-level IADLs, impaired strength, decreased activity tolerance, decreased coordination, and impaired balance. (See below for objective details and assist levels).    Overall pt tolerated session fair today, currently with mild c/o pain in LLE. Pt transferring from semi supine to sitting up at EOB with CGA. Pt transferring into standing with mod/max Ax2 and use of RW. Pt able to take side steps at HOB before returning to sitting EOB. HR increasing to 114 with stand. Pt taking seated rest break. Pt flexing at trunk to reach b/l socks, however unable to fully reach and requiring max A for sock management. Pt verbally educated on use of sock  aide and potential benefit to pt. Pt verbalizing understanding. Pt transferring again into standing with mod/max A x2 and use of RW. Pt then transferring to recliner chair with mod ax2. Pt taking seated rest break in chair ~5 minutes -- pt declining to stay in chair today, and transferring back to EOB with mod ax2. Pt transferring back into semi supine position with CGA. Pt completing face washing task with set up A while back in bed. Pt left with all needs met/in reach and pt in no acute distress. Pt continues to benefit from skilled OT to address the above impairments.return to PLOF, OT goals and POC reviewed on this date and updated to reflect current progress. Potential barriers for safe discharge: pts current support system is unable to meet their requirements for physical assistance, pt is a high fall risk, pt is not safe to be alone, and concern for pt safely navigating or managing the home environment. Current OT DC recommendation Moderate intensity short-term skilled occupational therapy up to 5x/week once medically appropriate.         GOALS:  Occupational Therapy Goals:  Initiated 11/25/2024  Patient/Family stated goal: improve mobility and engagement in ADLs  1.  Patient will perform seated grooming with set up A within 7 day(s).  2.  Patient will perform upper body dressing seated at EOB with set up A  within 7 day(s).  3.  Patient will perform lower body dressing with min A and adaptive strategies/equipment as needed.   4.  Patient will perform toilet transfers with Minimal Assist within 7 day(s).  5.  Patient will perform all aspects of toileting with Minimal Assist within 7 day(s).  6.  Patient will participate in upper extremity therapeutic exercise/activities with  Jennings within 7 day(s).   Outcome: Progressing     PLAN  Recommendations and Planned Interventions:   self care training, therapeutic activities, functional mobility training, balance training, therapeutic exercise, endurance

## 2024-12-18 NOTE — PROGRESS NOTES
Hospitalist Progress Note               Daily Progress Note: 12/18/2024      Hospital Day: 37     Chief complaint:   Chief Complaint   Patient presents with    Shortness of Breath        Brief HPI/ Hospital course to date:  Kim Markham is a 49 y.o. female with known past medical history significant for asthma and COPD who treated her symptoms today with Primatene Mist over-the-counter says that she went into fast heart rate became short of breath immediately.  No other exacerbating or relieving factors which presents to the emergency room with no treatment prior to arrival.  Patient reports that she had a few day history of shortness of breath and thought she had a pneumonia. She has been taking OTC cough syrup and cough medicine as well as her inhaler. She reports difficulty affording medications and has limited access to healthcare.     Patient was placed in ICU due to increased work of breathing.  V. tach was noted and patient was started on nitroglycerin infusion.  Tachycardia worsened by epinephrine and your inhaler.  Patient was placed on Rocephin, azithromycin, and Solu-Medrol for COPD exacerbation.  Respiratory status worsened on 11/13 AM and patient was intubated.  As for tachycardia, echo was performed and showed EF of 60 to 65%, LVH, abnormal diastolic function, RV moderately dilated.  CTA ruled out PE.  Duplex showed no DVT.  Cardiology consulted and started patient on aspirin, Plavix, and statin.  Patient was also noted to be SVT and required amiodarone infusions.  Due to hypotension, she required Levophed.  Patient was diuresed with Lasix and NG tube was placed with tube feedings.  Restarted on sertraline for anxiety.  As for atrial arrhythmia, amiodarone was discharged and metoprolol was increased.  Patient to follow-up with EP outpatient.  Patient was extubated 11/23.  Patient failed extubation and was reintubated on 11/26 due to lung collapse.  Bronchoscopy was performed on 11/26 due to  electrolyte abnormalities requiring IV replacement and close serial monitoring  [] SQ Insulin SS- monitoring serial FSBS for Hypoglycemic adverse drug reaction  [] Other -   [] Change in code status:    [] Decision to escalate care:    [] Major surgery/procedure with associated risk factors:    ----------------------------------------------------------------------  C. Data (any 2)  [x] Discussed current management and discharge planning options with Case Management.  [] Discussed management of the case with:    [x] Telemetry personally reviewed and interpreted as documented above    [] Imaging personally reviewed and interpreted, includes:    [x] Data Review (any 3)  [x] All available Consultant notes from yesterday/today were reviewed  [x] All current labs were reviewed and interpreted for clinical significance   [x] Appropriate follow-up labs were ordered  [] Collateral history obtained from:       Time spent with patient including counseling, chart review and nursing communication: 38 minutes    Martínez Holloway MD

## 2024-12-18 NOTE — PLAN OF CARE
Speech LAnguage Pathology Dysphagia TREATMENT    Patient: Kim Markham (49 y.o. female)  Date: 12/18/2024  Primary Diagnosis: Acute right-sided congestive heart failure (HCC) [I50.811]  SVT (supraventricular tachycardia) ([facility]) [I47.10]  Essential hypertension [I10]  Hypoxia [R09.02]  COPD exacerbation ([facility]) [J44.1]  Procedure(s) (LRB):  TRACHEOSTOMY (N/A) 14 Days Post-Op   Precautions: Aspiration Fall Risk                DIET RECOMMENDATIONS: Minced and moist and thin liquids, meds whole with applesauce or pudding,    SWALLOW SAFETY PRECAUTIONS: Wear PMV or cap w/ all PO intake, 1:1 assistance with ALL PO intake, do not allow to self feed, all PO via spoon, slow rate of intake, small bites/sips, cue for double swallow, cue for effortful swallow, liquid wash, remain upright 1 hour after PO and do not eat 3 hours before bedtime.     ASSESSMENT :  Patient sitting EOB upon arrival w/ PMV donned to trach hub. Patient reported frustration with no capping last night. Notified MD and RT order placed and RT placed cap on patient. Patient educated on donning/doffing cap if needed. Plan if patient tolerates >24 hours she will be decannulated by ENT.  Patient reports pain from matted hair and requests clinician to cut matts. This was performed w/ patient help and hair was washed. Vitals stable following capping throughout session.   O2: 96% HR: 111  Completed swallow exercises: Leonie x20, Supraglottic x10, and effortful swallow x25.  Patient given exercisees to perform outside of tx session Leonie and supraglottic 10 reps 3x/day. Patient reports she is doing her exercises.   PO trials w/ thin liquids, soft solids, and ice chips.   Oral phase is wfl. Timely swallow initiation and HLE is wfl upon palpation.   No overt s/s of pen/asp observed.  Sw fxn continues to improve subjectively at bedside.   Continue to closely monitor for clinical indicators aspiration .  Patient highly motivated and engaged during session.

## 2024-12-18 NOTE — PLAN OF CARE
Problem: Discharge Planning  Goal: Discharge to home or other facility with appropriate resources  12/17/2024 1933 by Ellyn Canela RN  Outcome: Progressing  12/17/2024 1041 by Michi Persaud RN  Outcome: Progressing     Problem: ABCDS Injury Assessment  Goal: Absence of physical injury  12/17/2024 1933 by Ellyn Canela RN  Outcome: Progressing  12/17/2024 1041 by Michi Persaud RN  Outcome: Progressing     Problem: Safety - Adult  Goal: Free from fall injury  12/17/2024 1933 by Ellyn Canela RN  Outcome: Progressing  12/17/2024 1041 by Michi Persaud RN  Outcome: Progressing     Problem: Confusion  Goal: Confusion, delirium, dementia, or psychosis is improved or at baseline  Description: INTERVENTIONS:  1. Assess for possible contributors to thought disturbance, including medications, impaired vision or hearing, underlying metabolic abnormalities, dehydration, psychiatric diagnoses, and notify attending LIP  2. [place] high risk fall precautions, as indicated  3. Provide frequent short contacts to provide reality reorientation, refocusing and direction  4. Decrease environmental stimuli, including noise as appropriate  5. Monitor and intervene to maintain adequate nutrition, hydration, elimination, sleep and activity  6. If unable to ensure safety without constant attention obtain sitter and review sitter guidelines with assigned personnel  7. Initiate Psychosocial CNS and Spiritual Care consult, as indicated  Outcome: Progressing     Problem: Chronic Conditions and Co-morbidities  Goal: Patient's chronic conditions and co-morbidity symptoms are monitored and maintained or improved  Outcome: Progressing     Problem: Neurosensory - Adult  Goal: Achieves stable or improved neurological status  Outcome: Progressing  Goal: Absence of seizures  Outcome: Progressing  Goal: Remains free of injury related to seizures activity  Outcome: Progressing  Goal: Achieves maximal functionality and self care  Outcome: Progressing    Advance to least restrictive diet without signs or symptoms of aspiration for planned discharge setting.  See evaluation for individualized goals.  Description: Speech Therapy Swallow Goals  Initiated 11/23/2024  -Patient will tolerate PO diet without clinical indicators of aspiration given mod cues within 7 day(s).        -Patient will tolerate advanced PO trials without clinical indicators of aspiration given mod cues within 7 day(s).      -Patient will participate in modified barium swallow study within 7 day(s) as indicated.      -Patient will demonstrate understanding of swallow safety precautions and aspiration precautions, diet recs with min cues within 7day(s).    -Patient/caregiver goal: PO diet           12/17/2024 1635 by Elena Liao, SLP  Outcome: Progressing

## 2024-12-18 NOTE — CARE COORDINATION
Patient is medicaid pending and has no payor source for post acute care services at this time.  Patient is pending [facility] at [facility] inpatient rehab.  CM has message [facility] this morning to see where we stand with this process.  Patient has new trach placed on 12/4/24.

## 2024-12-19 PROCEDURE — 2500000003 HC RX 250 WO HCPCS: Performed by: NURSE PRACTITIONER

## 2024-12-19 PROCEDURE — 94761 N-INVAS EAR/PLS OXIMETRY MLT: CPT

## 2024-12-19 PROCEDURE — 97530 THERAPEUTIC ACTIVITIES: CPT

## 2024-12-19 PROCEDURE — 92507 TX SP LANG VOICE COMM INDIV: CPT

## 2024-12-19 PROCEDURE — 2500000003 HC RX 250 WO HCPCS: Performed by: INTERNAL MEDICINE

## 2024-12-19 PROCEDURE — 6360000002 HC RX W HCPCS: Performed by: STUDENT IN AN ORGANIZED HEALTH CARE EDUCATION/TRAINING PROGRAM

## 2024-12-19 PROCEDURE — 6370000000 HC RX 637 (ALT 250 FOR IP): Performed by: INTERNAL MEDICINE

## 2024-12-19 PROCEDURE — 1100000000 HC RM PRIVATE

## 2024-12-19 PROCEDURE — 94640 AIRWAY INHALATION TREATMENT: CPT

## 2024-12-19 PROCEDURE — 97535 SELF CARE MNGMENT TRAINING: CPT

## 2024-12-19 PROCEDURE — 92526 ORAL FUNCTION THERAPY: CPT

## 2024-12-19 PROCEDURE — 99232 SBSQ HOSP IP/OBS MODERATE 35: CPT | Performed by: OTOLARYNGOLOGY

## 2024-12-19 PROCEDURE — 6370000000 HC RX 637 (ALT 250 FOR IP): Performed by: STUDENT IN AN ORGANIZED HEALTH CARE EDUCATION/TRAINING PROGRAM

## 2024-12-19 PROCEDURE — 6370000000 HC RX 637 (ALT 250 FOR IP)

## 2024-12-19 RX ADMIN — QUETIAPINE FUMARATE 50 MG: 25 TABLET ORAL at 13:11

## 2024-12-19 RX ADMIN — GUAIFENESIN 300 MG: 100 SOLUTION ORAL at 13:11

## 2024-12-19 RX ADMIN — BUDESONIDE INHALATION 500 MCG: 0.5 SUSPENSION RESPIRATORY (INHALATION) at 09:04

## 2024-12-19 RX ADMIN — SERTRALINE 50 MG: 50 TABLET, FILM COATED ORAL at 08:29

## 2024-12-19 RX ADMIN — QUETIAPINE FUMARATE 50 MG: 25 TABLET ORAL at 08:29

## 2024-12-19 RX ADMIN — BUSPIRONE HYDROCHLORIDE 7.5 MG: 5 TABLET ORAL at 08:29

## 2024-12-19 RX ADMIN — ENOXAPARIN SODIUM 30 MG: 100 INJECTION SUBCUTANEOUS at 21:14

## 2024-12-19 RX ADMIN — POTASSIUM BICARBONATE 40 MEQ: 782 TABLET, EFFERVESCENT ORAL at 21:10

## 2024-12-19 RX ADMIN — GLYCOPYRROLATE 1 MG: 1 TABLET ORAL at 21:10

## 2024-12-19 RX ADMIN — IPRATROPIUM BROMIDE AND ALBUTEROL SULFATE 1 DOSE: 2.5; .5 SOLUTION RESPIRATORY (INHALATION) at 22:08

## 2024-12-19 RX ADMIN — GLYCOPYRROLATE 1 MG: 1 TABLET ORAL at 13:11

## 2024-12-19 RX ADMIN — AMIODARONE HYDROCHLORIDE 200 MG: 200 TABLET ORAL at 08:29

## 2024-12-19 RX ADMIN — GUAIFENESIN 300 MG: 100 SOLUTION ORAL at 16:45

## 2024-12-19 RX ADMIN — FLUCONAZOLE 100 MG: 100 TABLET ORAL at 08:30

## 2024-12-19 RX ADMIN — BUDESONIDE INHALATION 500 MCG: 0.5 SUSPENSION RESPIRATORY (INHALATION) at 22:08

## 2024-12-19 RX ADMIN — QUETIAPINE FUMARATE 50 MG: 25 TABLET ORAL at 21:09

## 2024-12-19 RX ADMIN — IPRATROPIUM BROMIDE AND ALBUTEROL SULFATE 1 DOSE: 2.5; .5 SOLUTION RESPIRATORY (INHALATION) at 15:24

## 2024-12-19 RX ADMIN — POTASSIUM BICARBONATE 40 MEQ: 782 TABLET, EFFERVESCENT ORAL at 08:28

## 2024-12-19 RX ADMIN — ENOXAPARIN SODIUM 30 MG: 100 INJECTION SUBCUTANEOUS at 08:30

## 2024-12-19 RX ADMIN — GLYCOPYRROLATE 1 MG: 1 TABLET ORAL at 08:29

## 2024-12-19 RX ADMIN — SODIUM CHLORIDE, PRESERVATIVE FREE 5 ML: 5 INJECTION INTRAVENOUS at 09:10

## 2024-12-19 RX ADMIN — IPRATROPIUM BROMIDE AND ALBUTEROL SULFATE 1 DOSE: 2.5; .5 SOLUTION RESPIRATORY (INHALATION) at 09:04

## 2024-12-19 RX ADMIN — ATORVASTATIN CALCIUM 40 MG: 40 TABLET, FILM COATED ORAL at 21:09

## 2024-12-19 RX ADMIN — BUSPIRONE HYDROCHLORIDE 7.5 MG: 5 TABLET ORAL at 21:09

## 2024-12-19 RX ADMIN — TAMSULOSIN HYDROCHLORIDE 0.4 MG: 0.4 CAPSULE ORAL at 08:29

## 2024-12-19 RX ADMIN — GUAIFENESIN 300 MG: 100 SOLUTION ORAL at 08:30

## 2024-12-19 RX ADMIN — SODIUM CHLORIDE, PRESERVATIVE FREE 10 ML: 5 INJECTION INTRAVENOUS at 21:14

## 2024-12-19 RX ADMIN — GUAIFENESIN 300 MG: 100 SOLUTION ORAL at 21:11

## 2024-12-19 RX ADMIN — SENNOSIDES 8.6 MG: 8.6 TABLET, FILM COATED ORAL at 21:10

## 2024-12-19 RX ADMIN — SODIUM CHLORIDE, PRESERVATIVE FREE 10 ML: 5 INJECTION INTRAVENOUS at 08:30

## 2024-12-19 RX ADMIN — ASPIRIN 81 MG 81 MG: 81 TABLET ORAL at 08:30

## 2024-12-19 RX ADMIN — ACETAMINOPHEN 650 MG: 650 SOLUTION ORAL at 17:06

## 2024-12-19 RX ADMIN — FAMOTIDINE 20 MG: 20 TABLET, FILM COATED ORAL at 08:29

## 2024-12-19 RX ADMIN — IPRATROPIUM BROMIDE AND ALBUTEROL SULFATE 1 DOSE: 2.5; .5 SOLUTION RESPIRATORY (INHALATION) at 01:51

## 2024-12-19 ASSESSMENT — PAIN DESCRIPTION - ORIENTATION: ORIENTATION: LOWER

## 2024-12-19 ASSESSMENT — PAIN SCALES - GENERAL
PAINLEVEL_OUTOF10: 2
PAINLEVEL_OUTOF10: 8
PAINLEVEL_OUTOF10: 6
PAINLEVEL_OUTOF10: 8

## 2024-12-19 ASSESSMENT — PAIN SCALES - WONG BAKER
WONGBAKER_NUMERICALRESPONSE: HURTS A LITTLE BIT
WONGBAKER_NUMERICALRESPONSE: HURTS WHOLE LOT

## 2024-12-19 ASSESSMENT — PAIN DESCRIPTION - ONSET: ONSET: PROGRESSIVE

## 2024-12-19 ASSESSMENT — PAIN DESCRIPTION - LOCATION: LOCATION: BACK

## 2024-12-19 ASSESSMENT — PAIN - FUNCTIONAL ASSESSMENT: PAIN_FUNCTIONAL_ASSESSMENT: ACTIVITIES ARE NOT PREVENTED

## 2024-12-19 ASSESSMENT — PAIN DESCRIPTION - FREQUENCY: FREQUENCY: INTERMITTENT

## 2024-12-19 ASSESSMENT — PAIN DESCRIPTION - DESCRIPTORS: DESCRIPTORS: THROBBING

## 2024-12-19 NOTE — PLAN OF CARE
PHYSICAL THERAPY TREATMENT     Patient: Kim Markham (49 y.o. female)  Date: 12/19/2024  Diagnosis: Acute right-sided congestive heart failure (HCC) [I50.811]  SVT (supraventricular tachycardia) (HCC) [I47.10]  Essential hypertension [I10]  Hypoxia [R09.02]  COPD exacerbation (HCC) [J44.1] SVT (supraventricular tachycardia) (HCC)  Procedure(s) (LRB):  TRACHEOSTOMY (N/A) 15 Days Post-Op  Precautions: Fall Risk                      Recommendations for nursing mobility: Out of bed to chair for meals, Encourage HEP in prep for ADLs/mobility; see handout for details, Frequent repositioning to prevent skin breakdown, Use of bed/chair alarm for safety, Use of BSC for toileting , AD and gt belt for bed to chair , Anastasiia Stedy for bed to chair transfers , and Assist x2    In place during session: External Catheter and EKG/telemetry   Chart, physical therapy assessment, plan of care and goals were reviewed.  ASSESSMENT  Patient continues with skilled PT services and is progressing towards goals. Pt in bed upon PT arrival, agreeable to session. Pt A&O x 4. (See below for objective details and assist levels).     Overall pt tolerated session well today with improved mobility.  Patient remains very motivated for therapy and was able to progress well with OOB mobility. Patient demos SBA for bed mobility and impaired OOB mobility. Patient was able to complete sit<>stand transfers with mod A x2 and amb a few steps to recliner. Pt does appear anxious at times during mobility and reports LLE discomfort. Amb with slow, shuffled gt using RW, mod A x2. Pt amb to BSC and then back to recliner. Slightly more assist needed for STS transfers from the lower recliner. Completed seated therex well and educated on anastasiia stedy machine to use more frequently during the day with nursing staff to be able to use recliner and BSC more often. Will continue to benefit from skilled PT services, and will continue to progress as tolerated.  continuing to assess with progress     SUBJECTIVE:   Patient stated “I am supposed to get this out today” - referring to the trach    OBJECTIVE DATA SUMMARY:   Critical Behavior:  Orientation  Overall Orientation Status: Within Functional Limits  Orientation Level: Oriented X4  Cognition  Following Commands: Follows one step commands with repetition;Follows one step commands consistently;Follows multistep commands with increased time  Attention Span: Attends with cues to redirect  Safety Judgement: Decreased awareness of need for safety  Problem Solving: Assistance required to implement solutions;Assistance required to correct errors made;Decreased awareness of errors;Assistance required to identify errors made  Insights: Decreased awareness of deficits  Initiation: Requires cues for some  Sequencing: Requires cues for some    Functional Mobility Training:  Bed Mobility:  Bed Mobility Training  Interventions: Manual cues;Verbal cues;Safety awareness training  Rolling: Stand-by assistance  Supine to Sit: Stand-by assistance  Scooting: Stand-by assistance  Transfers:  Transfer Training  Transfer Training: Yes  Interventions: Verbal cues;Tactile cues;Safety awareness training;Manual cues  Sit to Stand: Moderate assistance;Assist X2  Stand to Sit: Moderate assistance;Assist X2  Bed to Chair: Moderate assistance;Assist X2  Balance:  Balance  Sitting: Intact  Sitting - Static: Good (unsupported)  Sitting - Dynamic: Good (unsupported)  Standing: Impaired  Standing - Static: Constant support;Fair  Standing - Dynamic: Constant support;Poor;Fair  Wheelchair Mobility:     Ambulation/Gait Training:                                Gait  Gait Training: Yes  Overall Level of Assistance: Moderate assistance;Assist X2  Distance (ft): 10 Feet (3ft to chair, 3 ft to BSC, 4 ft to recliner)  Assistive Device: Walker, rolling;Gait belt  Interventions: Verbal cues;Safety awareness training;Weight shifting training/pressure

## 2024-12-19 NOTE — CARE COORDINATION
Patient vocalized that she is currently homeless and has no where to go after rehab, based on this information, [facility] has declined patient.  CM has spoke with [facility] inpatient rehab liaison this morning, they will not have jerry beds until after the first of the year.  CM spoke with therapy, patient is progressing but not at a level to be able to go home safely.  CM met with patient at bedside and discussed her going to a medicaid pending SNF.  Patient is agreeable at this time for CM to send out multiple referrals to see who would be able to accept her.  CM sent multiple SNF referrals in hopes of acceptance. Current barrier is trying to find a Medicaid pending placement with a trach.

## 2024-12-19 NOTE — PLAN OF CARE
Speech LAnguage Pathology Dysphagia TREATMENT    Patient: Kim Markham (49 y.o. female)  Date: 12/19/2024  Primary Diagnosis: Acute right-sided congestive heart failure (HCC) [I50.811]  SVT (supraventricular tachycardia) (HCC) [I47.10]  Essential hypertension [I10]  Hypoxia [R09.02]  COPD exacerbation ([facility]) [J44.1]  Procedure(s) (LRB):  TRACHEOSTOMY (N/A) 15 Days Post-Op   Precautions: Aspiration Fall Risk                DIET RECOMMENDATIONS: Soft and bite sized and mildly thick liquids, meds whole with applesauce or pudding,    SWALLOW SAFETY PRECAUTIONS: Rec slow rate of intake, no straws, double swallow, small bites/sips, effortful swallow, and remain upright 30 minutes after PO intake.       ASSESSMENT :  Patient sitting in bed s/p decannulation.  at bedside reports he is glad she is doing better.   Patient able to recall strategies for hand placement during vocalization and coughing. She continues w/ strong vocal quality. O2: 95% HR 94  Completed swallow exercises: Leonie x20, Supraglottic x10, and effortful swallow x25.  Patient reports she is doing her exercises.   PO trials w/ thin liquids, and solids  Oral phase is wfl. Timely swallow initiation and HLE is wfl upon palpation.   No overt s/s of pen/asp observed.  Sw fxn continues to improve subjectively at bedside.   Continue to closely monitor for clinical indicators aspiration .  Patient highly motivated and engaged during session.       GOALS:    Problem: SLP Adult - Impaired Swallowing  Goal: By Discharge: Advance to least restrictive diet without signs or symptoms of aspiration for planned discharge setting.  See evaluation for individualized goals.  Description: Speech Therapy Swallow Goals  Initiated 11/23/2024  -Patient will tolerate PO diet without clinical indicators of aspiration given mod cues within 7 day(s).        -Patient will tolerate advanced PO trials without clinical indicators of aspiration given mod cues within 7  Moist  Current Liquid Diet : Mildly Thick        General:         O2 Device: None (Room air)     Current Diet : Minced and Moist  Current Liquid Diet : Mildly Thick  Dentition: Adequate  Patient Positioning: Upright in bed    Dysphagia:  Oral Assessment:     P.O. Trials:  Consistencies Administered: Thin - cup;Easy to chew       Voice:   wfl      After Treatment:  Patient left in no apparent distress in bed, Call bell left within reach, Nursing notified, Caregiver present, Bed alarm activated, and Updated patient's board with:  diet recommendations and aspiration precautions     Pain:  VAS (numerical) 0    COMMUNICATION/EDUCATION:   Swallow safety precautions, Aspiration precautions, GERD precautions, Diet recommendations, Compensatory strategies, and Prognosis and SLP POC education provided to Patient, Family, and Nurse via explanation and teach back, all questions/concerns addressed. Patient verbalizes understanding.    The patient's plan of care including recommendations, planned interventions, and recommended diet changes were discussed with: Registered nurse, Physician, and Case management.    Patient/family have participated as able in goal setting and plan of care and Patient/family agree to work toward stated goals and plan of care    Thank you,  Elena Liao M.S., M.Ed., CCC-SLP  Minutes: 37

## 2024-12-19 NOTE — PROGRESS NOTES
Discharge paperwork started. Put in next dose due date. Print and send with patient when patient is ready to leave. Waiting for placement, per CM note.

## 2024-12-19 NOTE — PROGRESS NOTES
Upon walking in room to do Mrs. Markham breathing treatment, she had been decannulated by Dr. Ramírez.  Mrs. Markham on room air and tolerating well. Spo2=95% at this time.  Elena from speech at bedside also.

## 2024-12-19 NOTE — PROGRESS NOTES
functional transfers.  Current OT recommendations for discharge Moderate intensity short-term skilled occupational therapy up to 5x/week. Will continue to benefit from skilled OT services, and will continue to progress as tolerated.      Start of Session   SPO2 (%) 96   Heart Rate (BPM) 119     GOALS:    Problem: Occupational Therapy - Adult  Goal: By Discharge: Performs self-care activities at highest level of function for planned discharge setting.  See evaluation for individualized goals.  Description: FUNCTIONAL STATUS PRIOR TO ADMISSION:  Pt was independent w/ ADLs and IADLs, ambulating w/ a cane or RW and working full time prior to admission.    HOME SUPPORT: The patient lived with spouse but did not require assistance.    Occupational Therapy Goals:  Initiated 11/25/2024  Patient/Family stated goal: improve mobility and engagement in ADLs  1.  Patient will perform grooming with Barron within 7 day(s).  2.  Patient will perform upper body dressing with Barron within 7 day(s).  3.  Patient will perform lower body dressing with Barron within 7 day(s).  4.  Patient will perform toilet transfers with Modified Barron  within 7 day(s).  5.  Patient will perform all aspects of toileting with Barron within 7 day(s).  6.  Patient will participate in upper extremity therapeutic exercise/activities with Modified Barron within 7 day(s).      Outcome: Progressing        PLAN :  Patient continues to benefit from skilled intervention to address functional impairments. Continue treatment per established plan of care to address goals.    Recommend next OT session: LB dressing    Recommendation for discharge: (in order for the patient to meet his/her long term goals): Moderate intensity short-term skilled occupational therapy up to 5x/week    Potential barriers for safe discharge: pts current support system is unable to meet their requirements for physical assistance, pt is a high fall risk,  pt is not safe to be alone, and pt is homeless.     IF patient discharges home will need the following DME: continuing to assess with progress     SUBJECTIVE:   Patient stated “You'll come back to get me back in bed won't you?”      OBJECTIVE DATA SUMMARY:   Cognitive/Behavioral Status:  Orientation  Overall Orientation Status: Within Functional Limits  Orientation Level: Oriented X4  Cognition  Overall Cognitive Status: WFL  Arousal/Alertness: Appears intact  Following Commands: Follows one step commands with repetition;Follows one step commands consistently;Follows multistep commands with increased time  Attention Span: Attends with cues to redirect  Memory: Decreased recall of precautions;Decreased short term memory;Decreased recall of recent events  Safety Judgement: Decreased awareness of need for safety  Problem Solving: Assistance required to implement solutions;Assistance required to correct errors made;Decreased awareness of errors;Assistance required to identify errors made  Insights: Decreased awareness of deficits  Initiation: Requires cues for some  Sequencing: Requires cues for some    Functional Mobility and Transfers for ADLs:  Bed Mobility:  Bed Mobility Training  Bed Mobility Training: Yes  Interventions: Manual cues;Verbal cues;Safety awareness training  Rolling: Stand-by assistance  Supine to Sit: Stand-by assistance  Scooting: Stand-by assistance    Transfers:  Transfer Training  Transfer Training: Yes  Interventions: Verbal cues;Tactile cues;Safety awareness training;Manual cues  Sit to Stand: Moderate assistance;Assist X2  Stand to Sit: Moderate assistance;Assist X2  Bed to Chair: Moderate assistance;Assist X2      Balance:  Balance  Sitting: Intact  Sitting - Static: Good (unsupported)  Sitting - Dynamic: Good (unsupported)  Standing: Impaired  Standing - Static: Constant support;Fair  Standing - Dynamic: Constant support;Poor;Fair      ADL Intervention:    Toileting:

## 2024-12-19 NOTE — PLAN OF CARE
patent  12/18/2024 2100 by Ellyn Canela RN  Outcome: Progressing     Problem: Infection - Adult  Goal: Absence of infection at discharge  12/18/2024 2100 by Ellyn Canela RN  Outcome: Progressing  Goal: Absence of infection during hospitalization  12/18/2024 2100 by Ellyn Canela RN  Outcome: Progressing  Goal: Absence of fever/infection during anticipated neutropenic period  12/18/2024 2100 by Ellyn Canela RN  Outcome: Progressing     Problem: Metabolic/Fluid and Electrolytes - Adult  Goal: Electrolytes maintained within normal limits  12/18/2024 2100 by Ellyn Canela RN  Outcome: Progressing  Goal: Hemodynamic stability and optimal renal function maintained  12/18/2024 2100 by Ellyn Canela RN  Outcome: Progressing  Goal: Glucose maintained within prescribed range  12/18/2024 2100 by Ellyn Canela RN  Outcome: Progressing     Problem: Safety - Violent/Self-destructive Restraint  Goal: Remains free of injury from restraints (Restraint for Violent/Self-Destructive Behavior)  Description: INTERVENTIONS:  1. Determine that de-escalation and other, less restrictive measures have been tried or would not be effective before applying the restraint  2. Identify and document the criteria for restraint  3. Evaluate the patient's condition at the time of restraint application  4. Inform patient/family regarding the reason for restraint/seclusion  5. Q2H: Monitor comfort, nutrition and hydration needs  6. Q15M: Perform safety checks including skin, circulation, sensory, respiratory and psychological status  7. Ensure continuous observation  8. Identify and implement measures to help patient regain control, assess readiness for release and initiate progressive release per policy  12/18/2024 2100 by Ellyn Canela RN  Outcome: Progressing     Problem: Safety - Medical Restraint  Goal: Remains free of injury from restraints (Restraint for Interference with Medical Device)  Description: INTERVENTIONS:  1. Determine that  other, less restrictive measures have been tried or would not be effective before applying the restraint  2. Evaluate the patient's condition at the time of restraint application  3. Inform patient/family regarding the reason for restraint  4. Q2H: Monitor safety, psychosocial status, comfort, nutrition and hydration  12/18/2024 2100 by Ellyn Canela RN  Outcome: Progressing     Problem: Pain  Goal: Verbalizes/displays adequate comfort level or baseline comfort level  12/18/2024 2100 by Ellyn Canela RN  Outcome: Progressing     Problem: Skin/Tissue Integrity  Goal: Absence of new skin breakdown  Description: 1.  Monitor for areas of redness and/or skin breakdown  2.  Assess vascular access sites hourly  3.  Every 4-6 hours minimum:  Change oxygen saturation probe site  4.  Every 4-6 hours:  If on nasal continuous positive airway pressure, respiratory therapy assess nares and determine need for appliance change or resting period.  12/18/2024 2100 by Ellyn Canela RN  Outcome: Progressing     Problem: Skin/Tissue Integrity - Adult  Goal: Skin integrity remains intact  12/18/2024 2100 by Ellyn Canela RN  Outcome: Progressing  Goal: Incisions, wounds, or drain sites healing without S/S of infection  12/18/2024 2100 by Ellyn Canela RN  Outcome: Progressing  Goal: Oral mucous membranes remain intact  12/18/2024 2100 by Ellyn Canela RN  Outcome: Progressing

## 2024-12-19 NOTE — PROGRESS NOTES
Lab Results   Component Value Date    HGBA1C 8.0 (H) 07/30/2016   Obtain hgbA1c. Prandal and SSI and adjust accordingly once eating.        Otolaryngology-HNS Consult    Subjective:     Date of Consultation:  December 19, 2024    Referring Physician: Glenn    History of Present Illness:   Patient is a 49 y.o.  female who is being seen for tracheostomy consult.  Admitted to the hospital 11/12/2024, with mental status change and hypoxia.  Has been intubated then extubated then back to the floor.  Now back to the ICU and intubated again for 3 days.  Biggest issue has been decreased mental status and poor tolerance of secretions leading to need for reintubation.  Otolaryngology service has been consulted for tracheostomy placement.    Interval Hx:   12/3/24:   Was planned for tracheostomy today, but canceled due to difficulty with obtaining consent from next of kin.    Vent settings: FiO2 40%, PEEP 5    Labs, imaging, and notes reviewed.    12/4/2024:  No change, mother is established and decision maker     12/5/24:   POD 1 s/p tracheostomy     12/10/24:   POD 5 s/p tracheostomy.  On trach collar, tolerating it well.  Did not tolerate Passy-Cayden valve trial due to increased secretions    Interval hx 12/19/2024:  POD 12 s/p trach.   Trach change last week, tolerating Passy-Goodyear valve well.  No issues with finger occlusion on examination today.  SLP notes and swallow study results reviewed    12/19/2024  Did well with trach capping trial.    Patient Active Problem List    Diagnosis Date Noted    Acute right-sided congestive heart failure ([facility]) 11/26/2024    Essential hypertension 11/26/2024    GUEAR (acute kidney injury) ([facility]) 11/26/2024    SVT (supraventricular tachycardia) ([facility]) 11/12/2024    Acute respiratory failure 01/03/2024    COPD exacerbation ([facility]) 01/02/2024    Acute asthma exacerbation 08/11/2023    COPD (chronic obstructive pulmonary disease) ([facility]) 08/11/2023    COPD with acute exacerbation ([facility]) 09/16/2022     Past Medical History:   Diagnosis Date    Asthma     COPD (chronic obstructive pulmonary disease) ([facility])       History reviewed. No pertinent  Resp 20   Ht 1.727 m (5' 7.99\")   Wt 123 kg (271 lb 2.7 oz)   SpO2 95%   BMI 41.24 kg/m²   General:  Comfortable   Head:  Normocephalic, without obvious abnormality, atraumatic.   Eyes:  Conjunctivae/corneas clear   Abdomen:   Soft, non-tender.    Neck: 6.0 uncuffed trach, in position.  Tracheostomy is removed, stoma is clean.  Xeroform, 4 x 4 pressure dressing applied.   Skin: Skin color, texture, turgor normal   Lymph nodes: No cervical nodes felt     Labs:    Latest Reference Range & Units 12/17/24 02:46   Sodium 136 - 145 mmol/L 139   Potassium 3.5 - 5.1 mmol/L 3.7   Chloride 97 - 108 mmol/L 105   CARBON DIOXIDE 21 - 32 mmol/L 28   BUN,BUNPL 6 - 20 mg/dL 15   Creatinine 0.55 - 1.02 mg/dL 0.65   Bun/Cre 12 - 20   23 (H)   Anion Gap 2 - 12 mmol/L 6   Est, Glom Filt Rate >60 ml/min/1.73m2 >90   Glucose 65 - 100 mg/dL 94   Calcium 8.5 - 10.1 mg/dL 10.0   WBC 3.6 - 11.0 K/uL 6.6   RBC 3.80 - 5.20 M/uL 3.63 (L)   Hemoglobin Quant 11.5 - 16.0 g/dL 10.2 (L)   Hematocrit 35.0 - 47.0 % 33.0 (L)   MCV 80.0 - 99.0 FL 90.9   MCH 26.0 - 34.0 PG 28.1   MCHC 30.0 - 36.5 g/dL 30.9   MPV 8.9 - 12.9 FL 11.2   RDW 11.5 - 14.5 % 14.9 (H)   Platelet Count 150 - 400 K/uL 229   (H): Data is abnormally high  (L): Data is abnormally low    Radiology: Imaging studies independently review by me -   CXR (12/10/24):   IMPRESSION:  Mild pulmonary edema and small pleural effusions unchanged.    MBS 12/11/24:   IMPRESSION:  Modified barium swallow as above.  Please see the separately  dictated Speech Pathology report for additional details and recommendations.    Assessment:     Acute respiratory failure  Prolonged ventilation  Status post tracheostomy    Plan:     Trach has been decannulated.  I have instructed patient on how to keep pressure over the stoma during phonation or any coughing.  Instructed regarding wound care.  I would expect secondary closure within the next 1 to 2 weeks.

## 2024-12-19 NOTE — PROGRESS NOTES
effort. Breath sounds CTA in all lung fields b/l. No retractions noted  Abdomen: BS+. Soft, nontender. No TTP in all quadrants. No guarding or rigidity   Back: No obvious deformities and mild tenderness at SI joint on left side with no surrounding erythema  Lower Extremity: No edema   Neurological: CN III-XII grossly intact. Muscle strength 5/5 in UE and 5/5 in LE b/l. No facial drooping or slurring of words.   Psychiatric: Appropriate mood and affect          Data Review:     Medications reviewed  Current Facility-Administered Medications   Medication Dose Route Frequency    ipratropium 0.5 mg-albuterol 2.5 mg (DUONEB) nebulizer solution 1 Dose  1 Dose Inhalation Q4H PRN    lidocaine 4 % external patch 1 patch  1 patch TransDERmal Daily    potassium bicarb-citric acid (EFFER-K) effervescent tablet 40 mEq  40 mEq Oral BID    amiodarone (CORDARONE) tablet 200 mg  200 mg Oral Daily    aspirin chewable tablet 81 mg  81 mg Oral Daily    atorvastatin (LIPITOR) tablet 40 mg  40 mg Oral Nightly    busPIRone (BUSPAR) tablet 7.5 mg  7.5 mg Oral BID    famotidine (PEPCID) tablet 20 mg  20 mg Oral Daily    fluconazole (DIFLUCAN) tablet 100 mg  100 mg Oral Daily    glycopyrrolate (ROBINUL) tablet 1 mg  1 mg Oral TID    guaiFENesin (ROBITUSSIN) 100 MG/5ML liquid 300 mg  300 mg Oral 4x daily    QUEtiapine (SEROQUEL) tablet 50 mg  50 mg Oral TID    senna (SENOKOT) tablet 8.6 mg  1 tablet Oral Nightly    sertraline (ZOLOFT) tablet 50 mg  50 mg Oral Daily    acetaminophen (TYLENOL) 160 MG/5ML solution 650 mg  650 mg Oral Q4H PRN    HYDROcodone-acetaminophen (NORCO) 5-325 MG per tablet 1 tablet  1 tablet Oral Q6H PRN    midodrine (PROAMATINE) tablet 5 mg  5 mg Oral TID PRN    polyethylene glycol (GLYCOLAX) packet 17 g  17 g Oral Daily PRN    tamsulosin (FLOMAX) capsule 0.4 mg  0.4 mg Oral Daily    0.9 % sodium chloride infusion   IntraVENous PRN    potassium chloride 20 mEq/50 mL IVPB (Central Line)  20 mEq IntraVENous PRN    Or     requiring serial monitoring for renal impairment and electrolyte derangements  [] Critical electrolyte abnormalities requiring IV replacement and close serial monitoring  [] SQ Insulin SS- monitoring serial FSBS for Hypoglycemic adverse drug reaction  [] Other -   [] Change in code status:    [] Decision to escalate care:    [] Major surgery/procedure with associated risk factors:    ----------------------------------------------------------------------  C. Data (any 2)  [x] Discussed current management and discharge planning options with Case Management.  [] Discussed management of the case with:    [x] Telemetry personally reviewed and interpreted as documented above    [] Imaging personally reviewed and interpreted, includes:    [x] Data Review (any 3)  [x] All available Consultant notes from yesterday/today were reviewed  [x] All current labs were reviewed and interpreted for clinical significance   [x] Appropriate follow-up labs were ordered  [] Collateral history obtained from:       Time spent with patient including counseling, chart review and nursing communication: 38 minutes    Martínez Holloway MD

## 2024-12-19 NOTE — PLAN OF CARE
Problem: Discharge Planning  Goal: Discharge to home or other facility with appropriate resources  12/18/2024 2100 by Ellyn Canela RN  Outcome: Progressing  12/18/2024 1041 by Rogelio Rivera RN  Outcome: Progressing     Problem: ABCDS Injury Assessment  Goal: Absence of physical injury  12/18/2024 2100 by Ellyn Canela RN  Outcome: Progressing  12/18/2024 1041 by Rogelio Rivera RN  Outcome: Progressing     Problem: Safety - Adult  Goal: Free from fall injury  12/18/2024 2100 by Ellyn Canela RN  Outcome: Progressing  12/18/2024 1041 by Rogelio Rivera RN  Outcome: Progressing     Problem: Confusion  Goal: Confusion, delirium, dementia, or psychosis is improved or at baseline  Description: INTERVENTIONS:  1. Assess for possible contributors to thought disturbance, including medications, impaired vision or hearing, underlying metabolic abnormalities, dehydration, psychiatric diagnoses, and notify attending LIP  2. [place] high risk fall precautions, as indicated  3. Provide frequent short contacts to provide reality reorientation, refocusing and direction  4. Decrease environmental stimuli, including noise as appropriate  5. Monitor and intervene to maintain adequate nutrition, hydration, elimination, sleep and activity  6. If unable to ensure safety without constant attention obtain sitter and review sitter guidelines with assigned personnel  7. Initiate Psychosocial CNS and Spiritual Care consult, as indicated  Outcome: Progressing     Problem: Chronic Conditions and Co-morbidities  Goal: Patient's chronic conditions and co-morbidity symptoms are monitored and maintained or improved  Outcome: Progressing     Problem: Neurosensory - Adult  Goal: Achieves stable or improved neurological status  Outcome: Progressing  Goal: Absence of seizures  Outcome: Progressing  Goal: Remains free of injury related to seizures activity  Outcome: Progressing  Goal: Achieves maximal functionality and self  restraint  2. Identify and document the criteria for restraint  3. Evaluate the patient's condition at the time of restraint application  4. Inform patient/family regarding the reason for restraint/seclusion  5. Q2H: Monitor comfort, nutrition and hydration needs  6. Q15M: Perform safety checks including skin, circulation, sensory, respiratory and psychological status  7. Ensure continuous observation  8. Identify and implement measures to help patient regain control, assess readiness for release and initiate progressive release per policy  Outcome: Progressing     Problem: Safety - Medical Restraint  Goal: Remains free of injury from restraints (Restraint for Interference with Medical Device)  Description: INTERVENTIONS:  1. Determine that other, less restrictive measures have been tried or would not be effective before applying the restraint  2. Evaluate the patient's condition at the time of restraint application  3. Inform patient/family regarding the reason for restraint  4. Q2H: Monitor safety, psychosocial status, comfort, nutrition and hydration  Outcome: Progressing     Problem: Pain  Goal: Verbalizes/displays adequate comfort level or baseline comfort level  Outcome: Progressing     Problem: Skin/Tissue Integrity  Goal: Absence of new skin breakdown  Description: 1.  Monitor for areas of redness and/or skin breakdown  2.  Assess vascular access sites hourly  3.  Every 4-6 hours minimum:  Change oxygen saturation probe site  4.  Every 4-6 hours:  If on nasal continuous positive airway pressure, respiratory therapy assess nares and determine need for appliance change or resting period.  Outcome: Progressing     Problem: Skin/Tissue Integrity - Adult  Goal: Skin integrity remains intact  Outcome: Progressing  Goal: Incisions, wounds, or drain sites healing without S/S of infection  Outcome: Progressing  Goal: Oral mucous membranes remain intact  Outcome: Progressing     Problem: SLP Adult - Impaired

## 2024-12-20 PROCEDURE — 6360000002 HC RX W HCPCS: Performed by: STUDENT IN AN ORGANIZED HEALTH CARE EDUCATION/TRAINING PROGRAM

## 2024-12-20 PROCEDURE — 2500000003 HC RX 250 WO HCPCS: Performed by: NURSE PRACTITIONER

## 2024-12-20 PROCEDURE — 94761 N-INVAS EAR/PLS OXIMETRY MLT: CPT

## 2024-12-20 PROCEDURE — 94668 MNPJ CHEST WALL SBSQ: CPT

## 2024-12-20 PROCEDURE — 94640 AIRWAY INHALATION TREATMENT: CPT

## 2024-12-20 PROCEDURE — 6370000000 HC RX 637 (ALT 250 FOR IP): Performed by: STUDENT IN AN ORGANIZED HEALTH CARE EDUCATION/TRAINING PROGRAM

## 2024-12-20 PROCEDURE — 6370000000 HC RX 637 (ALT 250 FOR IP): Performed by: INTERNAL MEDICINE

## 2024-12-20 PROCEDURE — 6370000000 HC RX 637 (ALT 250 FOR IP)

## 2024-12-20 PROCEDURE — 92526 ORAL FUNCTION THERAPY: CPT

## 2024-12-20 PROCEDURE — 2500000003 HC RX 250 WO HCPCS: Performed by: INTERNAL MEDICINE

## 2024-12-20 PROCEDURE — 97530 THERAPEUTIC ACTIVITIES: CPT

## 2024-12-20 PROCEDURE — 1100000000 HC RM PRIVATE

## 2024-12-20 PROCEDURE — 97535 SELF CARE MNGMENT TRAINING: CPT

## 2024-12-20 RX ORDER — IPRATROPIUM BROMIDE AND ALBUTEROL SULFATE 2.5; .5 MG/3ML; MG/3ML
1 SOLUTION RESPIRATORY (INHALATION)
Status: DISCONTINUED | OUTPATIENT
Start: 2024-12-20 | End: 2024-12-24

## 2024-12-20 RX ADMIN — GUAIFENESIN 300 MG: 100 SOLUTION ORAL at 20:38

## 2024-12-20 RX ADMIN — BUDESONIDE INHALATION 500 MCG: 0.5 SUSPENSION RESPIRATORY (INHALATION) at 21:07

## 2024-12-20 RX ADMIN — POTASSIUM BICARBONATE 40 MEQ: 782 TABLET, EFFERVESCENT ORAL at 07:54

## 2024-12-20 RX ADMIN — FAMOTIDINE 20 MG: 20 TABLET, FILM COATED ORAL at 07:51

## 2024-12-20 RX ADMIN — QUETIAPINE FUMARATE 50 MG: 25 TABLET ORAL at 20:37

## 2024-12-20 RX ADMIN — SERTRALINE 50 MG: 50 TABLET, FILM COATED ORAL at 07:50

## 2024-12-20 RX ADMIN — GUAIFENESIN 300 MG: 100 SOLUTION ORAL at 07:53

## 2024-12-20 RX ADMIN — IPRATROPIUM BROMIDE AND ALBUTEROL SULFATE 1 DOSE: 2.5; .5 SOLUTION RESPIRATORY (INHALATION) at 01:45

## 2024-12-20 RX ADMIN — QUETIAPINE FUMARATE 50 MG: 25 TABLET ORAL at 13:28

## 2024-12-20 RX ADMIN — SODIUM CHLORIDE, PRESERVATIVE FREE 10 ML: 5 INJECTION INTRAVENOUS at 07:54

## 2024-12-20 RX ADMIN — SODIUM CHLORIDE, PRESERVATIVE FREE 10 ML: 5 INJECTION INTRAVENOUS at 20:40

## 2024-12-20 RX ADMIN — BUSPIRONE HYDROCHLORIDE 7.5 MG: 5 TABLET ORAL at 07:50

## 2024-12-20 RX ADMIN — ASPIRIN 81 MG 81 MG: 81 TABLET ORAL at 07:56

## 2024-12-20 RX ADMIN — BUDESONIDE INHALATION 500 MCG: 0.5 SUSPENSION RESPIRATORY (INHALATION) at 07:52

## 2024-12-20 RX ADMIN — GLYCOPYRROLATE 1 MG: 1 TABLET ORAL at 20:36

## 2024-12-20 RX ADMIN — POTASSIUM BICARBONATE 40 MEQ: 782 TABLET, EFFERVESCENT ORAL at 20:39

## 2024-12-20 RX ADMIN — IPRATROPIUM BROMIDE AND ALBUTEROL SULFATE 1 DOSE: 2.5; .5 SOLUTION RESPIRATORY (INHALATION) at 21:07

## 2024-12-20 RX ADMIN — GLYCOPYRROLATE 1 MG: 1 TABLET ORAL at 13:28

## 2024-12-20 RX ADMIN — SENNOSIDES 8.6 MG: 8.6 TABLET, FILM COATED ORAL at 20:36

## 2024-12-20 RX ADMIN — ENOXAPARIN SODIUM 30 MG: 100 INJECTION SUBCUTANEOUS at 07:53

## 2024-12-20 RX ADMIN — ENOXAPARIN SODIUM 30 MG: 100 INJECTION SUBCUTANEOUS at 20:42

## 2024-12-20 RX ADMIN — GLYCOPYRROLATE 1 MG: 1 TABLET ORAL at 07:50

## 2024-12-20 RX ADMIN — GUAIFENESIN 300 MG: 100 SOLUTION ORAL at 13:28

## 2024-12-20 RX ADMIN — AMIODARONE HYDROCHLORIDE 200 MG: 200 TABLET ORAL at 07:50

## 2024-12-20 RX ADMIN — TAMSULOSIN HYDROCHLORIDE 0.4 MG: 0.4 CAPSULE ORAL at 07:51

## 2024-12-20 RX ADMIN — ACETAMINOPHEN 650 MG: 650 SOLUTION ORAL at 13:35

## 2024-12-20 RX ADMIN — BUSPIRONE HYDROCHLORIDE 7.5 MG: 5 TABLET ORAL at 20:36

## 2024-12-20 RX ADMIN — ATORVASTATIN CALCIUM 40 MG: 40 TABLET, FILM COATED ORAL at 20:37

## 2024-12-20 RX ADMIN — ACETAMINOPHEN 650 MG: 650 SOLUTION ORAL at 20:45

## 2024-12-20 RX ADMIN — GUAIFENESIN 300 MG: 100 SOLUTION ORAL at 18:18

## 2024-12-20 RX ADMIN — FLUCONAZOLE 100 MG: 100 TABLET ORAL at 07:50

## 2024-12-20 RX ADMIN — IPRATROPIUM BROMIDE AND ALBUTEROL SULFATE 1 DOSE: 2.5; .5 SOLUTION RESPIRATORY (INHALATION) at 07:51

## 2024-12-20 RX ADMIN — QUETIAPINE FUMARATE 50 MG: 25 TABLET ORAL at 07:50

## 2024-12-20 ASSESSMENT — PAIN SCALES - WONG BAKER: WONGBAKER_NUMERICALRESPONSE: HURTS LITTLE MORE

## 2024-12-20 ASSESSMENT — PAIN SCALES - GENERAL
PAINLEVEL_OUTOF10: 3
PAINLEVEL_OUTOF10: 4
PAINLEVEL_OUTOF10: 8
PAINLEVEL_OUTOF10: 6
PAINLEVEL_OUTOF10: 0
PAINLEVEL_OUTOF10: 0

## 2024-12-20 ASSESSMENT — PAIN DESCRIPTION - LOCATION: LOCATION: LEG

## 2024-12-20 ASSESSMENT — PAIN DESCRIPTION - ORIENTATION: ORIENTATION: LEFT

## 2024-12-20 ASSESSMENT — PAIN DESCRIPTION - DESCRIPTORS: DESCRIPTORS: ACHING

## 2024-12-20 NOTE — PLAN OF CARE
PHYSICAL THERAPY TREATMENT     Patient: Kim Markham (49 y.o. female)  Date: 12/20/2024  Diagnosis: Acute right-sided congestive heart failure (HCC) [I50.811]  SVT (supraventricular tachycardia) (HCC) [I47.10]  Essential hypertension [I10]  Hypoxia [R09.02]  COPD exacerbation (HCC) [J44.1] SVT (supraventricular tachycardia) (HCC)  Procedure(s) (LRB):  TRACHEOSTOMY (N/A) 16 Days Post-Op  Precautions: Fall Risk                      Recommendations for nursing mobility: Out of bed to chair for meals, Encourage HEP in prep for ADLs/mobility; see handout for details, Frequent repositioning to prevent skin breakdown, Use of BSC for toileting , AD and gt belt for bed to chair , Assist x1, and Assist x2    In place during session: EKG/telemetry   Chart, physical therapy assessment, plan of care and goals were reviewed.  ASSESSMENT  Patient continues with skilled PT services and is progressing towards goals. Pt sitting on bsc with GARDUNO upon PT arrival, agreeable to session. (See below for objective details and assist levels).     Overall pt tolerated session fair today. Pt stood from bsc and ambulated ~5 ft to bedside chair with RW and cga/min Ax2 for safety. Gait was slow and fairly steady with no lob or knee buckling noted. Pt practiced sit to stand from the chair and performed min/mod Ax1. Pt sat in recliner chair and educated on LE therex. Pt left sitting up with all needs met. Will continue to benefit from skilled PT services, and will continue to progress as tolerated. Current PT DC recommendation High intensity and comprehensive skilled physical therapy in a multidisciplinary setting as patient is working towards tolerating up to 3 hours of therapy/day x 5-7 days/week once medically appropriate.    GOALS:  Problem: Physical Therapy - Adult  Goal: By Discharge: Performs mobility at highest level of function for planned discharge setting.  See evaluation for individualized goals.  Description: FUNCTIONAL  STATUS PRIOR TO ADMISSION: Patient was modified independent using a rolling walker and single point cane for functional mobility.    HOME SUPPORT PRIOR TO ADMISSION: The patient lived with spouse but did not require assistance.    Physical Therapy Goals  Initiated 11/25/2024  Goals reassessed and revised 12/6/2024  Pt stated goal: to go home  Pt will be I with LE HEP in 7 days.  Pt will perform bed mobility with CGA assist x 1 in 7 days  Patient to perform sit to stand transfer with mod A in 7 days.  Pt to perform stand pivot transfer from bed to chair with max A in 7 days.    Will add ambulation goals as pt progresses.  Outcome: Progressing     PLAN :  Patient continues to benefit from skilled intervention to address functional impairments. Continue treatment per established plan of care to address goals.    Recommendation for discharge: (in order for the patient to meet his/her long term goals)  High intensity and comprehensive skilled physical therapy in a multidisciplinary setting as patient is working towards tolerating up to 3 hours of therapy/day x 5-7 days/week    Potential barriers for safe discharge: pt has poor safety awareness, pt has impaired cognition, pt is a high fall risk, pt is not safe to be alone, and concern for pt safely navigating or managing the home environment.    IF patient discharges home will need the following DME:continuing to assess with progress     SUBJECTIVE:   Patient stated “I was worried I wasn't going to be able to get out of this chair.”    OBJECTIVE DATA SUMMARY:   Critical Behavior:  Orientation  Orientation Level: Oriented X4  Cognition  Attention Span: Appears intact  Sequencing: Requires cues for some  Functional Mobility Training:  Bed Mobility:  Bed Mobility Training  Bed Mobility Training: Yes  Interventions: Verbal cues  Rolling: Supervision  Supine to Sit: Supervision  Scooting: Supervision  Transfers:  Transfer Training  Interventions: Safety awareness

## 2024-12-20 NOTE — DISCHARGE SUMMARY
Physician Discharge Summary     Patient ID:    Kim Markham  [id number]  49 y.o.  1975    Admit date: 11/12/2024    Discharge date : 12/20/2024      Final Diagnoses:   Acute right-sided congestive heart failure (HCC) [I50.811]  SVT (supraventricular tachycardia) (HCC) [I47.10]  Essential hypertension [I10]  Hypoxia [R09.02]  COPD exacerbation ([facility]) [J44.1]  Acute respiratory failure with hypoxia 2/2 to COPD   S/p Intubation   S/p tracheostomy 12/4  Volume overload   Anemia  Chronic back pain  Hypernatremia  Hypercalcemia  Volume overload 2/2 CHF exacerabtion  Acute hypoxic respiratory failure 2/2 CHF exacerbation requiring Intubation and tracheostomy    Reason for Hospitalization:    Acute hypoxic respiratory failure 2/2 CHF exacerbation requiring Intubation   COPD exacerbation     Hospital Course:     Kim Markham is a 49 y.o. female with known past medical history significant for asthma and COPD who treated her symptoms today with Primatene Mist over-the-counter says that she went into fast heart rate became short of breath immediately.  No other exacerbating or relieving factors which presents to the emergency room with no treatment prior to arrival.  Patient reports that she had a few day history of shortness of breath and thought she had a pneumonia. She has been taking OTC cough syrup and cough medicine as well as her inhaler. She reports difficulty affording medications and has limited access to healthcare.      Patient was placed in ICU due to increased work of breathing.  V. tach was noted and patient was started on nitroglycerin infusion.  Tachycardia worsened by epinephrine and your inhaler.  Patient was placed on Rocephin, azithromycin, and Solu-Medrol for COPD exacerbation.  Respiratory status worsened on 11/13 AM and patient was intubated.  As for tachycardia, echo was performed and showed EF of 60 to 65%, LVH, abnormal diastolic function, RV moderately dilated.  CTA  nightly     pantoprazole 40 MG tablet  Commonly known as: PROTONIX           * This list has 2 medication(s) that are the same as other medications prescribed for you. Read the directions carefully, and ask your doctor or other care provider to review them with you.                    Follow up Care:    Follow-up Information       Follow up With Specialties Details Why Contact Info    Sheeba Gardner PA-C Physician Assistant Go on 12/6/2024 at 2pm [address]  [address]  [facility] [address] [phone]      Dinorah Segal APRN - NP Nurse Practitioner Follow up on 3/3/2025 time at at 1400, the address is [address] [place] [address] [address] [place] [address], phone #[phone] [address] [facility] [address]  [facility] [address] [phone]                   Diet:  regular diet    Disposition:  Home.    Advanced Directive:   FULL    DNR      Discharge Exam:                     Vitals:    12/20/24 1132   BP: 120/86   Pulse: 96   Resp: 18   Temp: 97.9 °F (36.6 °C)   SpO2: 96%      General:  Alert, cooperative, no distress, appears stated age.   Lungs:   Clear to auscultation bilaterally.   Chest wall:  No tenderness or deformity.   Heart:  Regular rate and rhythm, S1, S2 normal, no murmur, click, rub or gallop.   Abdomen:   Soft, non-tender. Bowel sounds normal. No masses,  No organomegaly.   Extremities: Extremities normal, atraumatic, no cyanosis or edema.   Pulses: 2+ and symmetric all extremities.   Skin: Skin color, texture, turgor normal. No rashes or lesions   Neurologic: CNII-XII intact. No gross sensory or motor deficits             Significant Diagnostic Studies:   11/12/2024: BUN 15 mg/dL (Ref range: 6 - 20 mg/dL); Calcium 8.8 mg/dL (Ref range: 8.5 - 10.1 mg/dL); Chloride 108 mmol/L (Ref range: 97 - 108 mmol/L); CO2 25 mmol/L (Ref range: 21 - 32 mmol/L); Creatinine 0.85 mg/dL (Ref range: 0.55 - 1.02 mg/dL); Glucose 168 mg/dL (H; Ref range: 65 - 100 mg/dL); Hematocrit 44.4 % (Ref range: 35.0 -

## 2024-12-20 NOTE — CARE COORDINATION
Patient has been accepted at [facility] located in [place], [place] pending financial approval.  CM has messaged [facility] this morning for an update. UAI has been completed and uploaded to [facility] for review.

## 2024-12-20 NOTE — PLAN OF CARE
OCCUPATIONAL THERAPY TREATMENT  Patient: Kim Markham (49 y.o. female)  Date: 12/20/2024  Primary Diagnosis: Acute right-sided congestive heart failure (HCC) [I50.811]  SVT (supraventricular tachycardia) (HCC) [I47.10]  Essential hypertension [I10]  Hypoxia [R09.02]  COPD exacerbation (HCC) [J44.1]  Procedure(s) (LRB):  TRACHEOSTOMY (N/A) 16 Days Post-Op   Precautions: Fall Risk                Recommendations for nursing mobility: Out of bed to chair for meals, Encourage HEP in prep for ADLs/mobility; see handout for details, Frequent repositioning to prevent skin breakdown, Use of BSC for toileting , AD and gt belt for bed to chair , Assist x1, and Assist x2    In place during session: External Catheter and EKG/telemetry   Chart, occupational therapy assessment, plan of care, and goals were reviewed.    ASSESSMENT  Patient continues with skilled OT services and is progressing towards goals.  Pt received semi-supine in bed upon arrival, AXO x 4 and agreeable to GARDUNO tx at this time. Pt cooperative and demonstrated fair effort during activities.  /86.  Pt supervision with bed mobility>EOB using bed rail. EOB, pt set-up for simple grooming. Pt completed SPT bed>BSC with mod A x1 using gait belt with verbal cues for correct technique and hand placement. PTA arrived for tf. Min A x2 to stand from BSC, with TA for toileting hygiene d/t decreased dynamic sitting balance. Pt completed BSC>chair tf using AD with min A x2 with vc's for RW mgmt and hand placement for safety with mod A x1 for controlled descent. Pt left with PTA. Pt much improved with functional tf's this visit.  (See below for objective details and assist levels)     Overall, pt limited by generalized weakness that interferes with performance in ADL's and functional tf's safely.  Will continue to progress. Current OT recommendations for discharge High intensity and comprehensive skilled occupational therapy in a multidisciplinary setting as patient  is working towards tolerating up to 3 hours of therapy/day x 5-7 days/week.           Start of session End of session   SPO2 (%) 96 95   Heart Rate (BPM) 96 99         GOALS:    Problem: Occupational Therapy - Adult  Goal: By Discharge: Performs self-care activities at highest level of function for planned discharge setting.  See evaluation for individualized goals.  Description: FUNCTIONAL STATUS PRIOR TO ADMISSION:  Pt was independent w/ ADLs and IADLs, ambulating w/ a cane or RW and working full time prior to admission.    HOME SUPPORT: The patient lived with spouse but did not require assistance.    Occupational Therapy Goals:  Initiated 11/25/2024  Patient/Family stated goal: improve mobility and engagement in ADLs  1.  Patient will perform grooming with [place] within 7 day(s).  2.  Patient will perform upper body dressing with [place] within 7 day(s).  3.  Patient will perform lower body dressing with [place] within 7 day(s).  4.  Patient will perform toilet transfers with Modified [place]  within 7 day(s).  5.  Patient will perform all aspects of toileting with [place] within 7 day(s).  6.  Patient will participate in upper extremity therapeutic exercise/activities with Modified [place] within 7 day(s).      Outcome: Progressing            PLAN :  Patient continues to benefit from skilled intervention to address functional impairments.  Continue treatment per established plan of care to address goals.    Recommend next OT session: LB dressing and Seated grooming    Recommendation for discharge: (in order for the patient to meet his/her long term goals): High intensity and comprehensive skilled occupational therapy in a multidisciplinary setting as patient is working towards tolerating up to 3 hours of therapy/day x 5-7 days/week.     Potential barriers for safe discharge pt is a high fall risk, pt is not safe to be alone, and concern for pt safely navigating or managing the home

## 2024-12-20 NOTE — PLAN OF CARE
Problem: Discharge Planning  Goal: Discharge to home or other facility with appropriate resources  12/20/2024 0815 by Rogelio Rivera RN  Outcome: Not Progressing  12/20/2024 0252 by Felipe Mathew RN  Outcome: Progressing  Flowsheets (Taken 12/19/2024 2058)  Discharge to home or other facility with appropriate resources: Identify barriers to discharge with patient and caregiver     Problem: ABCDS Injury Assessment  Goal: Absence of physical injury  12/20/2024 0815 by Rogelio Rivera RN  Outcome: Progressing  12/20/2024 0252 by Felipe Mathew RN  Outcome: Progressing     Problem: Safety - Adult  Goal: Free from fall injury  12/20/2024 0815 by Rogelio Rivera RN  Outcome: Progressing  12/20/2024 0252 by Felipe Mathew RN  Outcome: Progressing     Problem: Confusion  Goal: Confusion, delirium, dementia, or psychosis is improved or at baseline  Description: INTERVENTIONS:  1. Assess for possible contributors to thought disturbance, including medications, impaired vision or hearing, underlying metabolic abnormalities, dehydration, psychiatric diagnoses, and notify attending LIP  2. [place] high risk fall precautions, as indicated  3. Provide frequent short contacts to provide reality reorientation, refocusing and direction  4. Decrease environmental stimuli, including noise as appropriate  5. Monitor and intervene to maintain adequate nutrition, hydration, elimination, sleep and activity  6. If unable to ensure safety without constant attention obtain sitter and review sitter guidelines with assigned personnel  7. Initiate Psychosocial CNS and Spiritual Care consult, as indicated  12/20/2024 0815 by Rogelio Rivera RN  Outcome: Progressing  12/20/2024 0252 by Felipe Mathew RN  Outcome: Progressing  Flowsheets (Taken 12/19/2024 2058)  Effect of thought disturbance (confusion, delirium, dementia, or psychosis) are managed with adequate functional status: Assess for contributors to thought disturbance,  including medications, impaired vision or hearing, underlying metabolic abnormalities, dehydration, psychiatric diagnoses, notify LIP     Problem: Chronic Conditions and Co-morbidities  Goal: Patient's chronic conditions and co-morbidity symptoms are monitored and maintained or improved  12/20/2024 0815 by Rogelio Rivera RN  Outcome: Progressing  12/20/2024 0252 by Felipe Mathew RN  Outcome: Progressing  Flowsheets (Taken 12/19/2024 2058)  Care Plan - Patient's Chronic Conditions and Co-Morbidity Symptoms are Monitored and Maintained or Improved: Monitor and assess patient's chronic conditions and comorbid symptoms for stability, deterioration, or improvement     Problem: Neurosensory - Adult  Goal: Achieves stable or improved neurological status  12/20/2024 0815 by Rogelio Rivera RN  Outcome: Progressing  12/20/2024 0252 by Felipe Mathew RN  Outcome: Progressing  Flowsheets (Taken 12/19/2024 2058)  Achieves stable or improved neurological status: Assess for and report changes in neurological status     Problem: Discharge Planning  Goal: Discharge to home or other facility with appropriate resources  12/20/2024 0815 by Rogelio Rivera RN  Outcome: Not Progressing  12/20/2024 0252 by Felipe Mathew RN  Outcome: Progressing  Flowsheets (Taken 12/19/2024 2058)  Discharge to home or other facility with appropriate resources: Identify barriers to discharge with patient and caregiver

## 2024-12-20 NOTE — CARE COORDINATION
Patient is still in accepted status with [facility] located in [place], [place].  KARTHIKEYAN called their liaison [place] [phone] this morning to inquire about patient being able to come to their facility.  She stated that she is awaiting corporate approval at this time. She will let CM know if they are actually able to accept patient.  CM received notification that their are no beds at [facility] for patient today and they will check in with their sister facilities.

## 2024-12-20 NOTE — PLAN OF CARE
Speech LAnguage Pathology Dysphagia TREATMENT    Patient: Kim Markham (49 y.o. female)  Date: 12/20/2024  Primary Diagnosis: Acute right-sided congestive heart failure (HCC) [I50.811]  SVT (supraventricular tachycardia) (HCC) [I47.10]  Essential hypertension [I10]  Hypoxia [R09.02]  COPD exacerbation (HCC) [J44.1]  Procedure(s) (LRB):  TRACHEOSTOMY (N/A) 16 Days Post-Op   Precautions: Aspiration Fall Risk                DIET RECOMMENDATIONS: Easy to chew, mildly thick liquids, and free water protocol b/w meals following oral care , meds whole with applesauce or pudding,    SWALLOW SAFETY PRECAUTIONS: Rec slow rate of intake, small bites/sips, effortful swallow, double swallow and remain upright 30 minutes after PO intake.       ASSESSMENT :  Patient sitting in chair on RA w/ stable vitals throughout session. She reports needed replacement of gauze and tape because air was escaping through stoma. RT assisted and patient able to demonstrate applying pressure when coughing and talking.   Completed swallow exercises: Leonie x15, Supraglottic x10, and effortful swallow x10.  She continues to do her exercises.   PO trials w/ thin liquids, and solids (ham sandwich)  Oral phase c/b slow but adequate mastication. Timely swallow initiation and HLE is wfl upon palpation. Swallow does appear more effortful w/ solids however she denies residue states she is just taking her time.   No overt s/s of pen/asp observed.  Continue to closely monitor for clinical indicators aspiration .  Patient highly motivated and engaged during session.     GOALS:    Problem: SLP Adult - Impaired Swallowing  Goal: By Discharge: Advance to least restrictive diet without signs or symptoms of aspiration for planned discharge setting.  See evaluation for individualized goals.  Description: Speech Therapy Swallow Goals  Initiated 11/23/2024  -Patient will tolerate PO diet without clinical indicators of aspiration given mod cues within 7 day(s).    Communication Status:  Neurologic State: Alert  Orientation Level: Oriented x4  Cognition: Follows commands    Baseline Assessment:  Current Diet : Soft and Bite-Sized  Current Liquid Diet : Mildly Thick       General:         O2 Device: None (Room air)     Current Diet : Soft and Bite-Sized  Current Liquid Diet : Mildly Thick  Dentition: Adequate  Patient Positioning: Upright in chair    Dysphagia:  Oral Assessment:     P.O. Trials:  Consistencies Administered: Regular;Thin - cup;Mildly Thick - cup (ham marj)    Voice:   wfl    After Treatment:  Patient left in no apparent distress sitting up in chair, Call bell left within reach, and Nursing notified     Pain:  VAS (numerical) 0    COMMUNICATION/EDUCATION:   Swallow safety precautions, Aspiration precautions, GERD precautions, Diet recommendations, and Compensatory strategies education provided to Patient and Nurse via explanation and teach back, all questions/concerns addressed. Patient verbalizes understanding and demonstrates understanding.    The patient's plan of care including recommendations, planned interventions, and recommended diet changes were discussed with: Registered nurse.    Patient/family have participated as able in goal setting and plan of care and Patient/family agree to work toward stated goals and plan of care    Thank you,  Elena Liao M.S., M.Ed., CCC-SLP  Minutes: 32

## 2024-12-20 NOTE — PROGRESS NOTES
Upon entering room to do breathing treatment, significant air movement could be heard coming from patient's old trach site. Supplies obtained, old trach site rewrapped to better occlude airway. Patient reports feeling less air movement through trach site, is able to phonate without applying pressure to old trach site.    If patient needs stoma redressed to occlude air flow, please reach out to respiratory therapy

## 2024-12-21 PROCEDURE — 6360000002 HC RX W HCPCS: Performed by: STUDENT IN AN ORGANIZED HEALTH CARE EDUCATION/TRAINING PROGRAM

## 2024-12-21 PROCEDURE — 6370000000 HC RX 637 (ALT 250 FOR IP): Performed by: INTERNAL MEDICINE

## 2024-12-21 PROCEDURE — 2500000003 HC RX 250 WO HCPCS: Performed by: NURSE PRACTITIONER

## 2024-12-21 PROCEDURE — 2500000003 HC RX 250 WO HCPCS: Performed by: INTERNAL MEDICINE

## 2024-12-21 PROCEDURE — 1100000000 HC RM PRIVATE

## 2024-12-21 PROCEDURE — 6370000000 HC RX 637 (ALT 250 FOR IP): Performed by: STUDENT IN AN ORGANIZED HEALTH CARE EDUCATION/TRAINING PROGRAM

## 2024-12-21 PROCEDURE — 94640 AIRWAY INHALATION TREATMENT: CPT

## 2024-12-21 PROCEDURE — 6370000000 HC RX 637 (ALT 250 FOR IP)

## 2024-12-21 RX ADMIN — BUSPIRONE HYDROCHLORIDE 7.5 MG: 5 TABLET ORAL at 22:02

## 2024-12-21 RX ADMIN — GUAIFENESIN 300 MG: 100 SOLUTION ORAL at 10:03

## 2024-12-21 RX ADMIN — SODIUM CHLORIDE, PRESERVATIVE FREE 10 ML: 5 INJECTION INTRAVENOUS at 10:10

## 2024-12-21 RX ADMIN — QUETIAPINE FUMARATE 50 MG: 25 TABLET ORAL at 14:44

## 2024-12-21 RX ADMIN — GLYCOPYRROLATE 1 MG: 1 TABLET ORAL at 22:03

## 2024-12-21 RX ADMIN — POTASSIUM BICARBONATE 40 MEQ: 782 TABLET, EFFERVESCENT ORAL at 10:01

## 2024-12-21 RX ADMIN — SERTRALINE 50 MG: 50 TABLET, FILM COATED ORAL at 10:02

## 2024-12-21 RX ADMIN — IPRATROPIUM BROMIDE AND ALBUTEROL SULFATE 1 DOSE: 2.5; .5 SOLUTION RESPIRATORY (INHALATION) at 08:38

## 2024-12-21 RX ADMIN — BUDESONIDE INHALATION 500 MCG: 0.5 SUSPENSION RESPIRATORY (INHALATION) at 20:45

## 2024-12-21 RX ADMIN — GUAIFENESIN 300 MG: 100 SOLUTION ORAL at 22:02

## 2024-12-21 RX ADMIN — ENOXAPARIN SODIUM 30 MG: 100 INJECTION SUBCUTANEOUS at 10:05

## 2024-12-21 RX ADMIN — ATORVASTATIN CALCIUM 40 MG: 40 TABLET, FILM COATED ORAL at 22:03

## 2024-12-21 RX ADMIN — SODIUM CHLORIDE, PRESERVATIVE FREE 10 ML: 5 INJECTION INTRAVENOUS at 22:04

## 2024-12-21 RX ADMIN — ASPIRIN 81 MG 81 MG: 81 TABLET ORAL at 10:03

## 2024-12-21 RX ADMIN — GLYCOPYRROLATE 1 MG: 1 TABLET ORAL at 14:44

## 2024-12-21 RX ADMIN — FLUCONAZOLE 100 MG: 100 TABLET ORAL at 10:03

## 2024-12-21 RX ADMIN — QUETIAPINE FUMARATE 50 MG: 25 TABLET ORAL at 22:03

## 2024-12-21 RX ADMIN — TAMSULOSIN HYDROCHLORIDE 0.4 MG: 0.4 CAPSULE ORAL at 10:02

## 2024-12-21 RX ADMIN — GUAIFENESIN 300 MG: 100 SOLUTION ORAL at 17:08

## 2024-12-21 RX ADMIN — GUAIFENESIN 300 MG: 100 SOLUTION ORAL at 14:44

## 2024-12-21 RX ADMIN — AMIODARONE HYDROCHLORIDE 200 MG: 200 TABLET ORAL at 10:02

## 2024-12-21 RX ADMIN — BUDESONIDE INHALATION 500 MCG: 0.5 SUSPENSION RESPIRATORY (INHALATION) at 08:38

## 2024-12-21 RX ADMIN — POTASSIUM BICARBONATE 40 MEQ: 782 TABLET, EFFERVESCENT ORAL at 22:01

## 2024-12-21 RX ADMIN — ENOXAPARIN SODIUM 30 MG: 100 INJECTION SUBCUTANEOUS at 22:00

## 2024-12-21 RX ADMIN — SENNOSIDES 8.6 MG: 8.6 TABLET, FILM COATED ORAL at 22:03

## 2024-12-21 RX ADMIN — BUSPIRONE HYDROCHLORIDE 7.5 MG: 5 TABLET ORAL at 10:03

## 2024-12-21 RX ADMIN — GLYCOPYRROLATE 1 MG: 1 TABLET ORAL at 10:02

## 2024-12-21 RX ADMIN — FAMOTIDINE 20 MG: 20 TABLET, FILM COATED ORAL at 10:02

## 2024-12-21 RX ADMIN — IPRATROPIUM BROMIDE AND ALBUTEROL SULFATE 1 DOSE: 2.5; .5 SOLUTION RESPIRATORY (INHALATION) at 20:45

## 2024-12-21 RX ADMIN — QUETIAPINE FUMARATE 50 MG: 25 TABLET ORAL at 10:02

## 2024-12-21 RX ADMIN — ACETAMINOPHEN 650 MG: 650 SOLUTION ORAL at 05:08

## 2024-12-21 ASSESSMENT — PAIN SCALES - GENERAL
PAINLEVEL_OUTOF10: 0
PAINLEVEL_OUTOF10: 8
PAINLEVEL_OUTOF10: 3

## 2024-12-21 ASSESSMENT — PAIN DESCRIPTION - DESCRIPTORS: DESCRIPTORS: ACHING

## 2024-12-21 ASSESSMENT — PAIN DESCRIPTION - ORIENTATION: ORIENTATION: LEFT;RIGHT

## 2024-12-21 ASSESSMENT — PAIN SCALES - WONG BAKER: WONGBAKER_NUMERICALRESPONSE: NO HURT

## 2024-12-21 ASSESSMENT — PAIN DESCRIPTION - LOCATION: LOCATION: LEG

## 2024-12-21 NOTE — DISCHARGE SUMMARY
Physician Discharge Summary     Patient ID:    Kim Markham  [id number]  49 y.o.  1975    Admit date: 11/12/2024    Discharge date : 12/21/2024      Final Diagnoses:   Acute right-sided congestive heart failure (HCC) [I50.811]  SVT (supraventricular tachycardia) (HCC) [I47.10]  Essential hypertension [I10]  Hypoxia [R09.02]  COPD exacerbation ([facility]) [J44.1]  Acute respiratory failure with hypoxia 2/2 to COPD   S/p Intubation   S/p tracheostomy 12/4  Volume overload   Anemia  Chronic back pain  Hypernatremia  Hypercalcemia  Volume overload 2/2 CHF exacerabtion  Acute hypoxic respiratory failure 2/2 CHF exacerbation requiring Intubation and tracheostomy    Reason for Hospitalization:    Acute hypoxic respiratory failure 2/2 CHF exacerbation requiring Intubation   COPD exacerbation     Hospital Course:     Kim Markham is a 49 y.o. female with known past medical history significant for asthma and COPD who treated her symptoms today with Primatene Mist over-the-counter says that she went into fast heart rate became short of breath immediately.  No other exacerbating or relieving factors which presents to the emergency room with no treatment prior to arrival.  Patient reports that she had a few day history of shortness of breath and thought she had a pneumonia. She has been taking OTC cough syrup and cough medicine as well as her inhaler. She reports difficulty affording medications and has limited access to healthcare.      Patient was placed in ICU due to increased work of breathing.  V. tach was noted and patient was started on nitroglycerin infusion.  Tachycardia worsened by epinephrine and your inhaler.  Patient was placed on Rocephin, azithromycin, and Solu-Medrol for COPD exacerbation.  Respiratory status worsened on 11/13 AM and patient was intubated.  As for tachycardia, echo was performed and showed EF of 60 to 65%, LVH, abnormal diastolic function, RV moderately dilated.  CTA  47.0 %); Hemoglobin 13.9 g/dL (Ref range: 11.5 - 16.0 g/dL); Potassium 4.2 mmol/L (Ref range: 3.5 - 5.1 mmol/L); Sodium 139 mmol/L (Ref range: 136 - 145 mmol/L)  11/13/2024: BUN 19 mg/dL (Ref range: 6 - 20 mg/dL); Calcium 8.5 mg/dL (Ref range: 8.5 - 10.1 mg/dL); Chloride 104 mmol/L (Ref range: 97 - 108 mmol/L); CO2 28 mmol/L (Ref range: 21 - 32 mmol/L); Creatinine 1.23 mg/dL (H; Ref range: 0.55 - 1.02 mg/dL); Glucose 129 mg/dL (H; Ref range: 65 - 100 mg/dL); Hematocrit 41.1 % (Ref range: 35.0 - 47.0 %); Hemoglobin 12.8 g/dL (Ref range: 11.5 - 16.0 g/dL); Potassium 4.3 mmol/L (Ref range: 3.5 - 5.1 mmol/L); Sodium 139 mmol/L (Ref range: 136 - 145 mmol/L)  No results for input(s): \"WBC\", \"HGB\", \"HCT\", \"PLT\" in the last 72 hours.  No results for input(s): \"NA\", \"K\", \"CL\", \"CO2\", \"BUN\", \"CREATININE\", \"GLUCOSE\", \"CALCIUM\", \"MG\", \"PHOS\", \"URICACID\", \"LABURIC\" in the last 72 hours.  No results for input(s): \"AST\", \"ALT\", \"ALKPHOS\", \"BILITOT\", \"LABALBU\", \"GLOB\", \"GGT\", \"AMYLASE\", \"LIPASE\" in the last 72 hours.    Invalid input(s): \"GPT\", \"PROT\"  No results for input(s): \"INR\", \"PROTIME\", \"APTT\" in the last 72 hours.   No results for input(s): \"IRON\", \"TIBC\" in the last 72 hours.    Invalid input(s): \"PSAT\", \"FERR\"   No results for input(s): \"PH\", \"PCO2\", \"PO2\" in the last 72 hours.  No results for input(s): \"CKTOTAL\", \"CKMB\", \"TROPONINI\" in the last 72 hours.  Lab Results   Component Value Date/Time    POCGLU 118 12/16/2024 08:13 PM    POCGLU 85 12/12/2024 10:36 PM    POCGLU 100 12/11/2024 12:27 PM    POCGLU 107 12/11/2024 06:28 AM    POCGLU 114 12/11/2024 12:01 AM         Discharge time spent 35 minutes    Signed:  Martínez Holloway MD  12/21/2024  12:23 PM

## 2024-12-21 NOTE — PLAN OF CARE
Problem: Discharge Planning  Goal: Discharge to home or other facility with appropriate resources  Outcome: Progressing     Problem: ABCDS Injury Assessment  Goal: Absence of physical injury  Outcome: Progressing     Problem: Safety - Adult  Goal: Free from fall injury  Outcome: Progressing     Problem: Confusion  Goal: Confusion, delirium, dementia, or psychosis is improved or at baseline  Description: INTERVENTIONS:  1. Assess for possible contributors to thought disturbance, including medications, impaired vision or hearing, underlying metabolic abnormalities, dehydration, psychiatric diagnoses, and notify attending LIP  2. [place] high risk fall precautions, as indicated  3. Provide frequent short contacts to provide reality reorientation, refocusing and direction  4. Decrease environmental stimuli, including noise as appropriate  5. Monitor and intervene to maintain adequate nutrition, hydration, elimination, sleep and activity  6. If unable to ensure safety without constant attention obtain sitter and review sitter guidelines with assigned personnel  7. Initiate Psychosocial CNS and Spiritual Care consult, as indicated  Outcome: Progressing     Problem: Chronic Conditions and Co-morbidities  Goal: Patient's chronic conditions and co-morbidity symptoms are monitored and maintained or improved  Outcome: Progressing     Problem: Neurosensory - Adult  Goal: Achieves stable or improved neurological status  Outcome: Progressing     Problem: Musculoskeletal - Adult  Goal: Return mobility to safest level of function  Outcome: Progressing     Problem: Gastrointestinal - Adult  Goal: Minimal or absence of nausea and vomiting  Outcome: Progressing  Goal: Maintains or returns to baseline bowel function  Outcome: Progressing  Goal: Maintains adequate nutritional intake  Outcome: Progressing     Problem: Genitourinary - Adult  Goal: Absence of urinary retention  Outcome: Progressing     Problem: Safety -  Violent/Self-destructive Restraint  Goal: Remains free of injury from restraints (Restraint for Violent/Self-Destructive Behavior)  Description: INTERVENTIONS:  1. Determine that de-escalation and other, less restrictive measures have been tried or would not be effective before applying the restraint  2. Identify and document the criteria for restraint  3. Evaluate the patient's condition at the time of restraint application  4. Inform patient/family regarding the reason for restraint/seclusion  5. Q2H: Monitor comfort, nutrition and hydration needs  6. Q15M: Perform safety checks including skin, circulation, sensory, respiratory and psychological status  7. Ensure continuous observation  8. Identify and implement measures to help patient regain control, assess readiness for release and initiate progressive release per policy  Outcome: Progressing     Problem: Safety - Medical Restraint  Goal: Remains free of injury from restraints (Restraint for Interference with Medical Device)  Description: INTERVENTIONS:  1. Determine that other, less restrictive measures have been tried or would not be effective before applying the restraint  2. Evaluate the patient's condition at the time of restraint application  3. Inform patient/family regarding the reason for restraint  4. Q2H: Monitor safety, psychosocial status, comfort, nutrition and hydration  Outcome: Progressing

## 2024-12-22 LAB
GLUCOSE BLD STRIP.AUTO-MCNC: 139 MG/DL (ref 65–100)
PERFORMED BY:: ABNORMAL

## 2024-12-22 PROCEDURE — 2500000003 HC RX 250 WO HCPCS: Performed by: INTERNAL MEDICINE

## 2024-12-22 PROCEDURE — 6370000000 HC RX 637 (ALT 250 FOR IP): Performed by: INTERNAL MEDICINE

## 2024-12-22 PROCEDURE — 2500000003 HC RX 250 WO HCPCS: Performed by: NURSE PRACTITIONER

## 2024-12-22 PROCEDURE — 6370000000 HC RX 637 (ALT 250 FOR IP)

## 2024-12-22 PROCEDURE — 6370000000 HC RX 637 (ALT 250 FOR IP): Performed by: STUDENT IN AN ORGANIZED HEALTH CARE EDUCATION/TRAINING PROGRAM

## 2024-12-22 PROCEDURE — 94761 N-INVAS EAR/PLS OXIMETRY MLT: CPT

## 2024-12-22 PROCEDURE — 94668 MNPJ CHEST WALL SBSQ: CPT

## 2024-12-22 PROCEDURE — 94640 AIRWAY INHALATION TREATMENT: CPT

## 2024-12-22 PROCEDURE — 6360000002 HC RX W HCPCS: Performed by: STUDENT IN AN ORGANIZED HEALTH CARE EDUCATION/TRAINING PROGRAM

## 2024-12-22 PROCEDURE — 82962 GLUCOSE BLOOD TEST: CPT

## 2024-12-22 PROCEDURE — 1100000000 HC RM PRIVATE

## 2024-12-22 PROCEDURE — 92526 ORAL FUNCTION THERAPY: CPT

## 2024-12-22 RX ADMIN — POTASSIUM BICARBONATE 40 MEQ: 782 TABLET, EFFERVESCENT ORAL at 09:07

## 2024-12-22 RX ADMIN — GUAIFENESIN 300 MG: 100 SOLUTION ORAL at 13:01

## 2024-12-22 RX ADMIN — SERTRALINE 50 MG: 50 TABLET, FILM COATED ORAL at 09:09

## 2024-12-22 RX ADMIN — BUDESONIDE INHALATION 500 MCG: 0.5 SUSPENSION RESPIRATORY (INHALATION) at 07:49

## 2024-12-22 RX ADMIN — QUETIAPINE FUMARATE 50 MG: 25 TABLET ORAL at 09:09

## 2024-12-22 RX ADMIN — IPRATROPIUM BROMIDE AND ALBUTEROL SULFATE 1 DOSE: 2.5; .5 SOLUTION RESPIRATORY (INHALATION) at 20:49

## 2024-12-22 RX ADMIN — TAMSULOSIN HYDROCHLORIDE 0.4 MG: 0.4 CAPSULE ORAL at 09:09

## 2024-12-22 RX ADMIN — SODIUM CHLORIDE, PRESERVATIVE FREE 10 ML: 5 INJECTION INTRAVENOUS at 21:56

## 2024-12-22 RX ADMIN — GUAIFENESIN 300 MG: 100 SOLUTION ORAL at 17:31

## 2024-12-22 RX ADMIN — FLUCONAZOLE 100 MG: 100 TABLET ORAL at 09:09

## 2024-12-22 RX ADMIN — GUAIFENESIN 300 MG: 100 SOLUTION ORAL at 21:55

## 2024-12-22 RX ADMIN — BUSPIRONE HYDROCHLORIDE 7.5 MG: 5 TABLET ORAL at 09:09

## 2024-12-22 RX ADMIN — ATORVASTATIN CALCIUM 40 MG: 40 TABLET, FILM COATED ORAL at 21:54

## 2024-12-22 RX ADMIN — SENNOSIDES 8.6 MG: 8.6 TABLET, FILM COATED ORAL at 21:55

## 2024-12-22 RX ADMIN — POTASSIUM BICARBONATE 40 MEQ: 782 TABLET, EFFERVESCENT ORAL at 21:55

## 2024-12-22 RX ADMIN — GLYCOPYRROLATE 1 MG: 1 TABLET ORAL at 13:01

## 2024-12-22 RX ADMIN — ENOXAPARIN SODIUM 30 MG: 100 INJECTION SUBCUTANEOUS at 21:54

## 2024-12-22 RX ADMIN — GLYCOPYRROLATE 1 MG: 1 TABLET ORAL at 09:10

## 2024-12-22 RX ADMIN — BUDESONIDE INHALATION 500 MCG: 0.5 SUSPENSION RESPIRATORY (INHALATION) at 20:49

## 2024-12-22 RX ADMIN — QUETIAPINE FUMARATE 50 MG: 25 TABLET ORAL at 13:01

## 2024-12-22 RX ADMIN — GUAIFENESIN 300 MG: 100 SOLUTION ORAL at 09:09

## 2024-12-22 RX ADMIN — ASPIRIN 81 MG 81 MG: 81 TABLET ORAL at 09:10

## 2024-12-22 RX ADMIN — QUETIAPINE FUMARATE 50 MG: 25 TABLET ORAL at 21:55

## 2024-12-22 RX ADMIN — BUSPIRONE HYDROCHLORIDE 7.5 MG: 5 TABLET ORAL at 21:54

## 2024-12-22 RX ADMIN — AMIODARONE HYDROCHLORIDE 200 MG: 200 TABLET ORAL at 09:08

## 2024-12-22 RX ADMIN — ACETAMINOPHEN 650 MG: 650 SOLUTION ORAL at 21:55

## 2024-12-22 RX ADMIN — IPRATROPIUM BROMIDE AND ALBUTEROL SULFATE 1 DOSE: 2.5; .5 SOLUTION RESPIRATORY (INHALATION) at 07:49

## 2024-12-22 RX ADMIN — ENOXAPARIN SODIUM 30 MG: 100 INJECTION SUBCUTANEOUS at 09:07

## 2024-12-22 RX ADMIN — GLYCOPYRROLATE 1 MG: 1 TABLET ORAL at 21:54

## 2024-12-22 RX ADMIN — SODIUM CHLORIDE, PRESERVATIVE FREE 10 ML: 5 INJECTION INTRAVENOUS at 09:08

## 2024-12-22 RX ADMIN — FAMOTIDINE 20 MG: 20 TABLET, FILM COATED ORAL at 09:10

## 2024-12-22 ASSESSMENT — PAIN SCALES - GENERAL
PAINLEVEL_OUTOF10: 3
PAINLEVEL_OUTOF10: 0

## 2024-12-22 ASSESSMENT — PAIN DESCRIPTION - LOCATION: LOCATION: HEAD

## 2024-12-22 ASSESSMENT — PAIN SCALES - WONG BAKER: WONGBAKER_NUMERICALRESPONSE: NO HURT

## 2024-12-22 ASSESSMENT — PAIN DESCRIPTION - DESCRIPTORS: DESCRIPTORS: ACHING

## 2024-12-22 NOTE — PLAN OF CARE
Speech LAnguage Pathology Dysphagia TREATMENT    Patient: Kim Markham (49 y.o. female)  Date: 12/22/2024  Primary Diagnosis: Acute right-sided congestive heart failure ([facility]) [I50.811]  SVT (supraventricular tachycardia) ([facility]) [I47.10]  Essential hypertension [I10]  Hypoxia [R09.02]  COPD exacerbation ([facility]) [J44.1]  Procedure(s) (LRB):  TRACHEOSTOMY (N/A) 18 Days Post-Op   Precautions: Aspiration Risk; Fall Risk                  ASSESSMENT :  Patient sitting upright in bed with stable vitals throughout session. She reports air was escaping through stoma and SLP noticed when she was transferring from the bed to the bedside commode via the walker, air was escaping. Completed swallow exercises: Leonie x15, Supraglottic x10, and effortful swallow x10. She reported she has been completing her exercises.   PO trials w/ thin liquids (pepsi). Timely swallow initiation and HLE is wfl upon palpation. She exhibited good effortful swallows.   No overt s/s of pen/asp observed.  Continue to closely monitor for clinical indicators aspiration .    Patient will benefit from skilled intervention to address the above impairments.     PLAN :  Recommendations and Planned Interventions:  Diet: Easy to chew and thin liquids  Aspiration/GERD precautions.     Acute SLP Services: Yes, SLP will continue to follow per plan of care.    Discharge Recommendations: To Be Determined     SUBJECTIVE:   I've been doing well.    OBJECTIVE:     Past Medical History:   Diagnosis Date    Asthma     COPD (chronic obstructive pulmonary disease) ([facility])      Past Surgical History:   Procedure Laterality Date    TRACHEOSTOMY N/A 12/4/2024    TRACHEOSTOMY performed by Chaya Loyd MD at [facility] MAIN OR     Prior Level of Function/Home Situation:   Social/Functional History  Lives With: Spouse  Type of Home:  (hotel)  Home Layout: One level  Bathroom Shower/Tub: Tub/Shower unit  Bathroom Toilet: Standard  Home Equipment: Walker - Rolling, Cane  Has the patient

## 2024-12-22 NOTE — DISCHARGE SUMMARY
Physician Discharge Summary     Patient ID:    Kim Markham  [id number]  49 y.o.  1975    Admit date: 11/12/2024    Discharge date : 12/22/2024      Final Diagnoses:   Acute right-sided congestive heart failure (HCC) [I50.811]  SVT (supraventricular tachycardia) (HCC) [I47.10]  Essential hypertension [I10]  Hypoxia [R09.02]  COPD exacerbation ([facility]) [J44.1]  Acute respiratory failure with hypoxia 2/2 to COPD   S/p Intubation   S/p tracheostomy 12/4  Volume overload   Anemia  Chronic back pain  Hypernatremia  Hypercalcemia  Volume overload 2/2 CHF exacerabtion  Acute hypoxic respiratory failure 2/2 CHF exacerbation requiring Intubation and tracheostomy    Reason for Hospitalization:    Acute hypoxic respiratory failure 2/2 CHF exacerbation requiring Intubation   COPD exacerbation     Hospital Course:     Kim Markham is a 49 y.o. female with known past medical history significant for asthma and COPD who treated her symptoms today with Primatene Mist over-the-counter says that she went into fast heart rate became short of breath immediately.  No other exacerbating or relieving factors which presents to the emergency room with no treatment prior to arrival.  Patient reports that she had a few day history of shortness of breath and thought she had a pneumonia. She has been taking OTC cough syrup and cough medicine as well as her inhaler. She reports difficulty affording medications and has limited access to healthcare.      Patient was placed in ICU due to increased work of breathing.  V. tach was noted and patient was started on nitroglycerin infusion.  Tachycardia worsened by epinephrine and your inhaler.  Patient was placed on Rocephin, azithromycin, and Solu-Medrol for COPD exacerbation.  Respiratory status worsened on 11/13 AM and patient was intubated.  As for tachycardia, echo was performed and showed EF of 60 to 65%, LVH, abnormal diastolic function, RV moderately dilated.  CTA

## 2024-12-22 NOTE — PLAN OF CARE
Problem: Discharge Planning  Goal: Discharge to home or other facility with appropriate resources  12/21/2024 2038 by Nathalia Goode RN  Outcome: Progressing  12/21/2024 1508 by Ceci Arias RN  Outcome: Progressing     Problem: ABCDS Injury Assessment  Goal: Absence of physical injury  12/21/2024 2038 by Nathalia Goode RN  Outcome: Progressing  12/21/2024 1508 by Ceci Arias RN  Outcome: Progressing     Problem: Safety - Adult  Goal: Free from fall injury  12/21/2024 2038 by Nathalia Goode RN  Outcome: Progressing  12/21/2024 1508 by Ceci Arias RN  Outcome: Progressing     Problem: Confusion  Goal: Confusion, delirium, dementia, or psychosis is improved or at baseline  Description: INTERVENTIONS:  1. Assess for possible contributors to thought disturbance, including medications, impaired vision or hearing, underlying metabolic abnormalities, dehydration, psychiatric diagnoses, and notify attending LIP  2. [place] high risk fall precautions, as indicated  3. Provide frequent short contacts to provide reality reorientation, refocusing and direction  4. Decrease environmental stimuli, including noise as appropriate  5. Monitor and intervene to maintain adequate nutrition, hydration, elimination, sleep and activity  6. If unable to ensure safety without constant attention obtain sitter and review sitter guidelines with assigned personnel  7. Initiate Psychosocial CNS and Spiritual Care consult, as indicated  12/21/2024 2038 by Nathalia Goode RN  Outcome: Progressing  12/21/2024 1508 by Ceci Arias RN  Outcome: Progressing     Problem: Chronic Conditions and Co-morbidities  Goal: Patient's chronic conditions and co-morbidity symptoms are monitored and maintained or improved  12/21/2024 2038 by Nathalia Goode RN  Outcome: Progressing  12/21/2024 1508 by Ceci Arias RN  Outcome: Progressing     Problem: Neurosensory - Adult  Goal: Achieves  Progressing     Problem: Safety - Medical Restraint  Goal: Remains free of injury from restraints (Restraint for Interference with Medical Device)  Description: INTERVENTIONS:  1. Determine that other, less restrictive measures have been tried or would not be effective before applying the restraint  2. Evaluate the patient's condition at the time of restraint application  3. Inform patient/family regarding the reason for restraint  4. Q2H: Monitor safety, psychosocial status, comfort, nutrition and hydration  12/21/2024 2038 by Nathalia Goode RN  Outcome: Progressing  12/21/2024 1508 by Ceci Arias RN  Outcome: Progressing     Problem: Skin/Tissue Integrity  Goal: Absence of new skin breakdown  Description: 1.  Monitor for areas of redness and/or skin breakdown  2.  Assess vascular access sites hourly  3.  Every 4-6 hours minimum:  Change oxygen saturation probe site  4.  Every 4-6 hours:  If on nasal continuous positive airway pressure, respiratory therapy assess nares and determine need for appliance change or resting period.  12/21/2024 2038 by Nathalia Goode RN  Outcome: Progressing  12/21/2024 1508 by Ceci Arias RN  Outcome: Progressing

## 2024-12-23 PROCEDURE — 6360000002 HC RX W HCPCS: Performed by: STUDENT IN AN ORGANIZED HEALTH CARE EDUCATION/TRAINING PROGRAM

## 2024-12-23 PROCEDURE — 94640 AIRWAY INHALATION TREATMENT: CPT

## 2024-12-23 PROCEDURE — 94761 N-INVAS EAR/PLS OXIMETRY MLT: CPT

## 2024-12-23 PROCEDURE — 2500000003 HC RX 250 WO HCPCS: Performed by: INTERNAL MEDICINE

## 2024-12-23 PROCEDURE — 6370000000 HC RX 637 (ALT 250 FOR IP): Performed by: INTERNAL MEDICINE

## 2024-12-23 PROCEDURE — 6370000000 HC RX 637 (ALT 250 FOR IP): Performed by: STUDENT IN AN ORGANIZED HEALTH CARE EDUCATION/TRAINING PROGRAM

## 2024-12-23 PROCEDURE — 2500000003 HC RX 250 WO HCPCS: Performed by: NURSE PRACTITIONER

## 2024-12-23 PROCEDURE — 6370000000 HC RX 637 (ALT 250 FOR IP)

## 2024-12-23 PROCEDURE — 1100000000 HC RM PRIVATE

## 2024-12-23 PROCEDURE — 94668 MNPJ CHEST WALL SBSQ: CPT

## 2024-12-23 RX ADMIN — GUAIFENESIN 300 MG: 100 SOLUTION ORAL at 09:11

## 2024-12-23 RX ADMIN — FAMOTIDINE 20 MG: 20 TABLET, FILM COATED ORAL at 09:12

## 2024-12-23 RX ADMIN — QUETIAPINE FUMARATE 50 MG: 25 TABLET ORAL at 13:35

## 2024-12-23 RX ADMIN — SODIUM CHLORIDE, PRESERVATIVE FREE 10 ML: 5 INJECTION INTRAVENOUS at 21:12

## 2024-12-23 RX ADMIN — POTASSIUM BICARBONATE 40 MEQ: 782 TABLET, EFFERVESCENT ORAL at 09:13

## 2024-12-23 RX ADMIN — SODIUM CHLORIDE, PRESERVATIVE FREE 10 ML: 5 INJECTION INTRAVENOUS at 09:13

## 2024-12-23 RX ADMIN — BUDESONIDE INHALATION 500 MCG: 0.5 SUSPENSION RESPIRATORY (INHALATION) at 09:35

## 2024-12-23 RX ADMIN — SENNOSIDES 8.6 MG: 8.6 TABLET, FILM COATED ORAL at 21:11

## 2024-12-23 RX ADMIN — IPRATROPIUM BROMIDE AND ALBUTEROL SULFATE 1 DOSE: 2.5; .5 SOLUTION RESPIRATORY (INHALATION) at 21:45

## 2024-12-23 RX ADMIN — QUETIAPINE FUMARATE 50 MG: 25 TABLET ORAL at 09:13

## 2024-12-23 RX ADMIN — FLUCONAZOLE 100 MG: 100 TABLET ORAL at 09:12

## 2024-12-23 RX ADMIN — SERTRALINE 50 MG: 50 TABLET, FILM COATED ORAL at 09:13

## 2024-12-23 RX ADMIN — IPRATROPIUM BROMIDE AND ALBUTEROL SULFATE 1 DOSE: 2.5; .5 SOLUTION RESPIRATORY (INHALATION) at 09:35

## 2024-12-23 RX ADMIN — ASPIRIN 81 MG 81 MG: 81 TABLET ORAL at 09:12

## 2024-12-23 RX ADMIN — TAMSULOSIN HYDROCHLORIDE 0.4 MG: 0.4 CAPSULE ORAL at 09:13

## 2024-12-23 RX ADMIN — ATORVASTATIN CALCIUM 40 MG: 40 TABLET, FILM COATED ORAL at 21:11

## 2024-12-23 RX ADMIN — GUAIFENESIN 300 MG: 100 SOLUTION ORAL at 21:10

## 2024-12-23 RX ADMIN — QUETIAPINE FUMARATE 50 MG: 25 TABLET ORAL at 21:11

## 2024-12-23 RX ADMIN — ENOXAPARIN SODIUM 30 MG: 100 INJECTION SUBCUTANEOUS at 09:13

## 2024-12-23 RX ADMIN — AMIODARONE HYDROCHLORIDE 200 MG: 200 TABLET ORAL at 09:12

## 2024-12-23 RX ADMIN — BUSPIRONE HYDROCHLORIDE 7.5 MG: 5 TABLET ORAL at 09:12

## 2024-12-23 RX ADMIN — POTASSIUM BICARBONATE 40 MEQ: 782 TABLET, EFFERVESCENT ORAL at 21:12

## 2024-12-23 RX ADMIN — GUAIFENESIN 300 MG: 100 SOLUTION ORAL at 17:09

## 2024-12-23 RX ADMIN — GLYCOPYRROLATE 1 MG: 1 TABLET ORAL at 09:12

## 2024-12-23 RX ADMIN — GLYCOPYRROLATE 1 MG: 1 TABLET ORAL at 21:11

## 2024-12-23 RX ADMIN — ENOXAPARIN SODIUM 30 MG: 100 INJECTION SUBCUTANEOUS at 21:12

## 2024-12-23 RX ADMIN — BUDESONIDE INHALATION 500 MCG: 0.5 SUSPENSION RESPIRATORY (INHALATION) at 21:45

## 2024-12-23 RX ADMIN — BUSPIRONE HYDROCHLORIDE 7.5 MG: 5 TABLET ORAL at 21:11

## 2024-12-23 RX ADMIN — GLYCOPYRROLATE 1 MG: 1 TABLET ORAL at 13:35

## 2024-12-23 RX ADMIN — GUAIFENESIN 300 MG: 100 SOLUTION ORAL at 13:35

## 2024-12-23 NOTE — CARE COORDINATION
Cm met w/ pt at bedside to discuss dc planning. Pt updated on plan to dc to [facility] once they have a bed for the pt. SNF requested updated clinicals, sent.

## 2024-12-23 NOTE — PROGRESS NOTES
Unable to reach patient for follow up call after discharge. Patient's phone \"not accepting calls\"

## 2024-12-23 NOTE — DISCHARGE SUMMARY
Physician Discharge Summary     Patient ID:    Kim Markham  [id number]  49 y.o.  1975    Admit date: 11/12/2024    Discharge date : 12/23/2024      Final Diagnoses:   Acute right-sided congestive heart failure (HCC) [I50.811]  SVT (supraventricular tachycardia) (HCC) [I47.10]  Essential hypertension [I10]  Hypoxia [R09.02]  COPD exacerbation ([facility]) [J44.1]  Acute respiratory failure with hypoxia 2/2 to COPD   S/p Intubation   S/p tracheostomy 12/4  Volume overload   Anemia  Chronic back pain  Hypernatremia  Hypercalcemia  Volume overload 2/2 CHF exacerabtion  Acute hypoxic respiratory failure 2/2 CHF exacerbation requiring Intubation and tracheostomy    Reason for Hospitalization:    Acute hypoxic respiratory failure 2/2 CHF exacerbation requiring Intubation   COPD exacerbation     Hospital Course:     Kim Markham is a 49 y.o. female with known past medical history significant for asthma and COPD who treated her symptoms today with Primatene Mist over-the-counter says that she went into fast heart rate became short of breath immediately.  No other exacerbating or relieving factors which presents to the emergency room with no treatment prior to arrival.  Patient reports that she had a few day history of shortness of breath and thought she had a pneumonia. She has been taking OTC cough syrup and cough medicine as well as her inhaler. She reports difficulty affording medications and has limited access to healthcare.      Patient was placed in ICU due to increased work of breathing.  V. tach was noted and patient was started on nitroglycerin infusion.  Tachycardia worsened by epinephrine and your inhaler.  Patient was placed on Rocephin, azithromycin, and Solu-Medrol for COPD exacerbation.  Respiratory status worsened on 11/13 AM and patient was intubated.  As for tachycardia, echo was performed and showed EF of 60 to 65%, LVH, abnormal diastolic function, RV moderately dilated.  CTA  nightly     pantoprazole 40 MG tablet  Commonly known as: PROTONIX           * This list has 2 medication(s) that are the same as other medications prescribed for you. Read the directions carefully, and ask your doctor or other care provider to review them with you.                    Follow up Care:    Follow-up Information       Follow up With Specialties Details Why Contact Info    Sheeba Gardner PA-C Physician Assistant Go on 12/6/2024 at 2pm [address]  [address]  [facility] [address] [phone]      Dinorah Segal APRN - NP Nurse Practitioner Follow up on 3/3/2025 time at at 1400, the address is [address] [place] [address] [address] [place] [address], phone #[phone] [address] [facility] [address]  [facility] [address] [phone]                   Diet:  regular diet    Disposition:  Home.    Advanced Directive:   FULL    DNR      Discharge Exam:                     Vitals:    12/23/24 1102   BP: 117/77   Pulse: (!) 118   Resp: 20   Temp: 97.9 °F (36.6 °C)   SpO2: 92%      General:  Alert, cooperative, no distress, appears stated age.   Lungs:   Clear to auscultation bilaterally.   Chest wall:  No tenderness or deformity.   Heart:  Regular rate and rhythm, S1, S2 normal, no murmur, click, rub or gallop.   Abdomen:   Soft, non-tender. Bowel sounds normal. No masses,  No organomegaly.   Extremities: Extremities normal, atraumatic, no cyanosis or edema.   Pulses: 2+ and symmetric all extremities.   Skin: Skin color, texture, turgor normal. No rashes or lesions   Neurologic: CNII-XII intact. No gross sensory or motor deficits             Significant Diagnostic Studies:   11/12/2024: BUN 15 mg/dL (Ref range: 6 - 20 mg/dL); Calcium 8.8 mg/dL (Ref range: 8.5 - 10.1 mg/dL); Chloride 108 mmol/L (Ref range: 97 - 108 mmol/L); CO2 25 mmol/L (Ref range: 21 - 32 mmol/L); Creatinine 0.85 mg/dL (Ref range: 0.55 - 1.02 mg/dL); Glucose 168 mg/dL (H; Ref range: 65 - 100 mg/dL); Hematocrit 44.4 % (Ref range: 35.0

## 2024-12-23 NOTE — PLAN OF CARE
Problem: Discharge Planning  Goal: Discharge to home or other facility with appropriate resources  Outcome: Progressing  [place] (Taken 12/22/2024 0814 by Lauren Andre RN)  Discharge to home or other facility with appropriate resources: Identify barriers to discharge with patient and caregiver     Problem: ABCDS Injury Assessment  Goal: Absence of physical injury  Outcome: Progressing     Problem: Safety - Adult  Goal: Free from fall injury  Outcome: Progressing     Problem: Chronic Conditions and Co-morbidities  Goal: Patient's chronic conditions and co-morbidity symptoms are monitored and maintained or improved  Outcome: Progressing  [place] (Taken 12/22/2024 0814 by Lauren Andre RN)  Care Plan - Patient's Chronic Conditions and Co-Morbidity Symptoms are Monitored and Maintained or Improved: Monitor and assess patient's chronic conditions and comorbid symptoms for stability, deterioration, or improvement     Problem: Neurosensory - Adult  Goal: Achieves stable or improved neurological status  Outcome: Progressing  [place] (Taken 12/22/2024 0814 by Lauren Andre RN)  Achieves stable or improved neurological status: Assess for and report changes in neurological status  Goal: Absence of seizures  Outcome: Progressing  [place] (Taken 12/22/2024 0814 by Lauren Andre RN)  Absence of seizures: Monitor for seizure activity.  If seizure occurs, document type and location of movements and any associated apnea  Goal: Remains free of injury related to seizures activity  Outcome: Progressing  [place] (Taken 12/22/2024 0814 by Lauren Andre RN)  Remains free of injury related to seizure activity: Maintain airway, patient safety  and administer oxygen as ordered  Goal: Achieves maximal functionality and self care  Outcome: Progressing  Flowskg (Taken 12/22/2024 0814 by Lauren Andre RN)  Achieves maximal functionality and self care: Monitor swallowing and airway patency with

## 2024-12-23 NOTE — PROGRESS NOTES
wt, suspect mostly fluid driven, but given magnitude, suspect some true wt loss. Labs:  Meds: amiodarone, lipiotr, pulmicort, buspar, pepcid, diflucan, robinul, robitussin, glycolax, seroquel, senna.    Nutrition Related Findings:    NFPE w/o acute findings, well nourished. No N/V/ reported. Tolerating easy to chew/nectar thick. Last BM 12/22. Edema resolved. Wound Type: Surgical Incision (Throat)       Current Nutrition Intake & Therapies:    Average Meal Intake: %  Average Supplements Intake: Unable to assess  ADULT ORAL NUTRITION SUPPLEMENT; Breakfast, Dinner; Low Calorie/High Protein Oral Supplement  ADULT DIET; Easy to Chew; Mildly Thick (Nectar); No Drinking Straws; likes grit, mashed potatoes w/ extra gravy    Anthropometric Measures:  Height: 172.7 cm (5' 7.99\")  Ideal Body Weight (IBW): 140 lbs (64 kg)    Admission Body Weight: 126 kg (277 lb 11.2 oz)  Current Body Weight: 120 kg (264 lb 8.8 oz), 189 % IBW. Weight Source: Bed scale  Current BMI (kg/m2): 40.2  Usual Body Weight: 106.6 kg (235 lb) (11 mths ago per EMR)  % Weight Change (Calculated): 22  Weight Adjustment For: No Adjustment  BMI Categories: Obese Class 3 (BMI 40.0 or greater)    Estimated Daily Nutrient Needs:  Energy Requirements Based On: Kcal/kg  Weight Used for Energy Requirements: Adjusted  Energy (kcal/day): 5825-8278 kcals/day (25-30 kcals/kg adjBW)  Weight Used for Protein Requirements: Adjusted  Protein (g/day):  g/day (1.4-1.7 g/kg IBW)  Method Used for Fluid Requirements: 1 ml/kcal  Fluid (ml/day): 6095-3196 ml/day    Nutrition Diagnosis:   Increased nutrient needs related to impaired respiratory function as evidenced by intubation    Nutrition Interventions:   Food and/or Nutrient Delivery: Continue Current Diet, Continue Oral Nutrition Supplement  Nutrition Education/Counseling: No recommendation at this time  Coordination of Nutrition Care: Continue to monitor while inpatient  Plan of Care discussed with:

## 2024-12-23 NOTE — PLAN OF CARE
Problem: Discharge Planning  Goal: Discharge to home or other facility with appropriate resources  Outcome: Progressing     Problem: ABCDS Injury Assessment  Goal: Absence of physical injury  Outcome: Progressing     Problem: Safety - Adult  Goal: Free from fall injury  Outcome: Progressing     Problem: Confusion  Goal: Confusion, delirium, dementia, or psychosis is improved or at baseline  Description: INTERVENTIONS:  1. Assess for possible contributors to thought disturbance, including medications, impaired vision or hearing, underlying metabolic abnormalities, dehydration, psychiatric diagnoses, and notify attending LIP  2. Monitor high risk fall precautions, as indicated  3. Provide frequent short contacts to provide reality reorientation, refocusing and direction  4. Decrease environmental stimuli, including noise as appropriate  5. Monitor and intervene to maintain adequate nutrition, hydration, elimination, sleep and activity  6. If unable to ensure safety without constant attention obtain sitter and review sitter guidelines with assigned personnel  7. Initiate Psychosocial CNS and Spiritual Care consult, as indicated  Outcome: Progressing  Flowsheets (Taken 12/23/2024 5354)  Effect of thought disturbance (confusion, delirium, dementia, or psychosis) are managed with adequate functional status: Assess for contributors to thought disturbance, including medications, impaired vision or hearing, underlying metabolic abnormalities, dehydration, psychiatric diagnoses, notify LIP     Problem: Chronic Conditions and Co-morbidities  Goal: Patient's chronic conditions and co-morbidity symptoms are monitored and maintained or improved  Outcome: Progressing     Problem: Neurosensory - Adult  Goal: Achieves stable or improved neurological status  Outcome: Progressing  Goal: Absence of seizures  Outcome: Progressing  Goal: Remains free of injury related to seizures activity  Outcome: Progressing  Goal: Achieves maximal  before applying the restraint  2. Identify and document the criteria for restraint  3. Evaluate the patient's condition at the time of restraint application  4. Inform patient/family regarding the reason for restraint/seclusion  5. Q2H: Monitor comfort, nutrition and hydration needs  6. Q15M: Perform safety checks including skin, circulation, sensory, respiratory and psychological status  7. Ensure continuous observation  8. Identify and implement measures to help patient regain control, assess readiness for release and initiate progressive release per policy  Outcome: Progressing     Problem: Safety - Medical Restraint  Goal: Remains free of injury from restraints (Restraint for Interference with Medical Device)  Description: INTERVENTIONS:  1. Determine that other, less restrictive measures have been tried or would not be effective before applying the restraint  2. Evaluate the patient's condition at the time of restraint application  3. Inform patient/family regarding the reason for restraint  4. Q2H: Monitor safety, psychosocial status, comfort, nutrition and hydration  Outcome: Progressing     Problem: Pain  Goal: Verbalizes/displays adequate comfort level or baseline comfort level  Outcome: Progressing     Problem: Skin/Tissue Integrity  Goal: Absence of new skin breakdown  Description: 1.  Monitor for areas of redness and/or skin breakdown  2.  Assess vascular access sites hourly  3.  Every 4-6 hours minimum:  Change oxygen saturation probe site  4.  Every 4-6 hours:  If on nasal continuous positive airway pressure, respiratory therapy assess nares and determine need for appliance change or resting period.  Outcome: Progressing     Problem: Skin/Tissue Integrity - Adult  Goal: Skin integrity remains intact  Outcome: Progressing  Goal: Incisions, wounds, or drain sites healing without S/S of infection  Outcome: Progressing  Goal: Oral mucous membranes remain intact  Outcome: Progressing

## 2024-12-24 LAB
GLUCOSE BLD STRIP.AUTO-MCNC: 132 MG/DL (ref 65–100)
PERFORMED BY:: ABNORMAL

## 2024-12-24 PROCEDURE — 2500000003 HC RX 250 WO HCPCS: Performed by: INTERNAL MEDICINE

## 2024-12-24 PROCEDURE — 92526 ORAL FUNCTION THERAPY: CPT

## 2024-12-24 PROCEDURE — 94640 AIRWAY INHALATION TREATMENT: CPT

## 2024-12-24 PROCEDURE — 6370000000 HC RX 637 (ALT 250 FOR IP)

## 2024-12-24 PROCEDURE — 82962 GLUCOSE BLOOD TEST: CPT

## 2024-12-24 PROCEDURE — 6370000000 HC RX 637 (ALT 250 FOR IP): Performed by: STUDENT IN AN ORGANIZED HEALTH CARE EDUCATION/TRAINING PROGRAM

## 2024-12-24 PROCEDURE — 94761 N-INVAS EAR/PLS OXIMETRY MLT: CPT

## 2024-12-24 PROCEDURE — 2500000003 HC RX 250 WO HCPCS: Performed by: NURSE PRACTITIONER

## 2024-12-24 PROCEDURE — 6360000002 HC RX W HCPCS: Performed by: STUDENT IN AN ORGANIZED HEALTH CARE EDUCATION/TRAINING PROGRAM

## 2024-12-24 PROCEDURE — 1100000000 HC RM PRIVATE

## 2024-12-24 PROCEDURE — 97530 THERAPEUTIC ACTIVITIES: CPT

## 2024-12-24 PROCEDURE — 6370000000 HC RX 637 (ALT 250 FOR IP): Performed by: INTERNAL MEDICINE

## 2024-12-24 RX ORDER — METOPROLOL TARTRATE 25 MG/1
25 TABLET, FILM COATED ORAL 2 TIMES DAILY
Status: DISCONTINUED | OUTPATIENT
Start: 2024-12-24 | End: 2025-01-10 | Stop reason: HOSPADM

## 2024-12-24 RX ORDER — AMIODARONE HYDROCHLORIDE 200 MG/1
200 TABLET ORAL DAILY
Status: DISCONTINUED | OUTPATIENT
Start: 2024-12-24 | End: 2024-12-24

## 2024-12-24 RX ORDER — AMIODARONE HYDROCHLORIDE 200 MG/1
200 TABLET ORAL DAILY
Qty: 90 TABLET | Refills: 2 | Status: SHIPPED | OUTPATIENT
Start: 2024-12-24 | End: 2025-01-08 | Stop reason: HOSPADM

## 2024-12-24 RX ORDER — BUSPIRONE HYDROCHLORIDE 7.5 MG/1
7.5 TABLET ORAL 2 TIMES DAILY
Qty: 90 TABLET | Refills: 1 | Status: SHIPPED | OUTPATIENT
Start: 2024-12-24 | End: 2025-01-08 | Stop reason: HOSPADM

## 2024-12-24 RX ORDER — FUROSEMIDE 40 MG/1
20 TABLET ORAL DAILY
Status: DISCONTINUED | OUTPATIENT
Start: 2024-12-24 | End: 2025-01-10 | Stop reason: HOSPADM

## 2024-12-24 RX ORDER — ASPIRIN 81 MG/1
81 TABLET, CHEWABLE ORAL DAILY
Qty: 30 TABLET | Refills: 3 | Status: SHIPPED | OUTPATIENT
Start: 2024-12-24 | End: 2025-01-09

## 2024-12-24 RX ORDER — TAMSULOSIN HYDROCHLORIDE 0.4 MG/1
0.4 CAPSULE ORAL DAILY
Qty: 30 CAPSULE | Refills: 3 | Status: SHIPPED | OUTPATIENT
Start: 2024-12-24 | End: 2025-01-08 | Stop reason: HOSPADM

## 2024-12-24 RX ORDER — BUDESONIDE AND FORMOTEROL FUMARATE DIHYDRATE 160; 4.5 UG/1; UG/1
2 AEROSOL RESPIRATORY (INHALATION)
Status: DISCONTINUED | OUTPATIENT
Start: 2024-12-24 | End: 2025-01-10 | Stop reason: HOSPADM

## 2024-12-24 RX ORDER — ATORVASTATIN CALCIUM 40 MG/1
40 TABLET, FILM COATED ORAL NIGHTLY
Qty: 30 TABLET | Refills: 3 | Status: SHIPPED | OUTPATIENT
Start: 2024-12-24 | End: 2025-01-09

## 2024-12-24 RX ORDER — AMIODARONE HYDROCHLORIDE 200 MG/1
200 TABLET ORAL DAILY
Status: DISCONTINUED | OUTPATIENT
Start: 2024-12-25 | End: 2024-12-25

## 2024-12-24 RX ORDER — SENNOSIDES A AND B 8.6 MG/1
1 TABLET, FILM COATED ORAL NIGHTLY
Qty: 30 TABLET | Refills: 0 | Status: SHIPPED | OUTPATIENT
Start: 2024-12-24 | End: 2025-01-09

## 2024-12-24 RX ORDER — LIDOCAINE 4 G/G
1 PATCH TOPICAL DAILY
Qty: 10 EACH | Refills: 1 | Status: SHIPPED | OUTPATIENT
Start: 2024-12-24 | End: 2025-01-09

## 2024-12-24 RX ORDER — QUETIAPINE FUMARATE 50 MG/1
50 TABLET, FILM COATED ORAL 3 TIMES DAILY
Qty: 60 TABLET | Refills: 3 | Status: SHIPPED | OUTPATIENT
Start: 2024-12-24 | End: 2025-01-08 | Stop reason: HOSPADM

## 2024-12-24 RX ADMIN — BUDESONIDE INHALATION 500 MCG: 0.5 SUSPENSION RESPIRATORY (INHALATION) at 07:41

## 2024-12-24 RX ADMIN — METOPROLOL TARTRATE 25 MG: 25 TABLET, FILM COATED ORAL at 22:09

## 2024-12-24 RX ADMIN — GUAIFENESIN 300 MG: 100 SOLUTION ORAL at 13:27

## 2024-12-24 RX ADMIN — SODIUM CHLORIDE, PRESERVATIVE FREE 10 ML: 5 INJECTION INTRAVENOUS at 22:10

## 2024-12-24 RX ADMIN — QUETIAPINE FUMARATE 50 MG: 25 TABLET ORAL at 15:38

## 2024-12-24 RX ADMIN — BUSPIRONE HYDROCHLORIDE 7.5 MG: 5 TABLET ORAL at 22:09

## 2024-12-24 RX ADMIN — POTASSIUM BICARBONATE 40 MEQ: 782 TABLET, EFFERVESCENT ORAL at 22:08

## 2024-12-24 RX ADMIN — QUETIAPINE FUMARATE 50 MG: 25 TABLET ORAL at 22:09

## 2024-12-24 RX ADMIN — GLYCOPYRROLATE 1 MG: 1 TABLET ORAL at 22:09

## 2024-12-24 RX ADMIN — POTASSIUM BICARBONATE 40 MEQ: 782 TABLET, EFFERVESCENT ORAL at 09:28

## 2024-12-24 RX ADMIN — FLUCONAZOLE 100 MG: 100 TABLET ORAL at 09:29

## 2024-12-24 RX ADMIN — ATORVASTATIN CALCIUM 40 MG: 40 TABLET, FILM COATED ORAL at 22:09

## 2024-12-24 RX ADMIN — ENOXAPARIN SODIUM 30 MG: 100 INJECTION SUBCUTANEOUS at 22:09

## 2024-12-24 RX ADMIN — SODIUM CHLORIDE, PRESERVATIVE FREE 10 ML: 5 INJECTION INTRAVENOUS at 09:32

## 2024-12-24 RX ADMIN — GUAIFENESIN 300 MG: 100 SOLUTION ORAL at 09:32

## 2024-12-24 RX ADMIN — QUETIAPINE FUMARATE 50 MG: 25 TABLET ORAL at 09:29

## 2024-12-24 RX ADMIN — TAMSULOSIN HYDROCHLORIDE 0.4 MG: 0.4 CAPSULE ORAL at 09:30

## 2024-12-24 RX ADMIN — GUAIFENESIN 300 MG: 100 SOLUTION ORAL at 22:08

## 2024-12-24 RX ADMIN — SERTRALINE 50 MG: 50 TABLET, FILM COATED ORAL at 09:30

## 2024-12-24 RX ADMIN — AMIODARONE HYDROCHLORIDE 200 MG: 200 TABLET ORAL at 09:29

## 2024-12-24 RX ADMIN — EMPAGLIFLOZIN 10 MG: 10 TABLET, FILM COATED ORAL at 15:38

## 2024-12-24 RX ADMIN — BUSPIRONE HYDROCHLORIDE 7.5 MG: 5 TABLET ORAL at 09:29

## 2024-12-24 RX ADMIN — Medication 2 PUFF: at 19:48

## 2024-12-24 RX ADMIN — ENOXAPARIN SODIUM 30 MG: 100 INJECTION SUBCUTANEOUS at 09:31

## 2024-12-24 RX ADMIN — IPRATROPIUM BROMIDE AND ALBUTEROL SULFATE 1 DOSE: 2.5; .5 SOLUTION RESPIRATORY (INHALATION) at 07:41

## 2024-12-24 RX ADMIN — GLYCOPYRROLATE 1 MG: 1 TABLET ORAL at 15:38

## 2024-12-24 RX ADMIN — ACETAMINOPHEN 650 MG: 650 SOLUTION ORAL at 17:56

## 2024-12-24 RX ADMIN — FAMOTIDINE 20 MG: 20 TABLET, FILM COATED ORAL at 09:30

## 2024-12-24 RX ADMIN — GLYCOPYRROLATE 1 MG: 1 TABLET ORAL at 09:29

## 2024-12-24 RX ADMIN — SENNOSIDES 8.6 MG: 8.6 TABLET, FILM COATED ORAL at 22:09

## 2024-12-24 RX ADMIN — ASPIRIN 81 MG 81 MG: 81 TABLET ORAL at 09:29

## 2024-12-24 RX ADMIN — GUAIFENESIN 300 MG: 100 SOLUTION ORAL at 17:54

## 2024-12-24 ASSESSMENT — PAIN SCALES - GENERAL
PAINLEVEL_OUTOF10: 8
PAINLEVEL_OUTOF10: 0
PAINLEVEL_OUTOF10: 0

## 2024-12-24 NOTE — PROGRESS NOTES
Stoma dressing changed at this time. Site is clean, no drainage or bleeding. Patient phonating well, minimal air movement heard through stoma.

## 2024-12-24 NOTE — CARE COORDINATION
Patient has been accepted at [facility] and [facility] pending bed availability for patient medicaid pending.  CM has messaged both facilities this morning to see if they have a bed available for the patient as patient remains ready for discharge to the next level of care.      CM also messaged [facility] in [place], [place] this morning to inquire about when the [facility] beds will become available at the beginning of the new year, CM awaiting response at this time.

## 2024-12-24 NOTE — PROGRESS NOTES
Hospitalist Progress Note               Daily Progress Note: 12/24/2024      Hospital Day: 43     Chief complaint:   Chief Complaint   Patient presents with    Shortness of Breath        Subjective:   Patient is seen and examined today during bedside rounds.  Patient feels well and her breathing is much improved.  We are awaiting for patient to go to skilled nursing facility.      Assessment and plan    Acute respiratory failure with hypoxia 2/2 to COPD   S/p Intubation   S/p tracheostomy 12/4  -Present to the ICU in acute distress and required oxygen  -Respiratory status worsened on 11/13 AM and patient was intubated.  -Patient was extubated 11/23.  Patient failed extubation and was reintubated on 11/26 due to lung collapse.    -CT thorax 11/26: Bibasilar linear and bandlike opacities consistent with atelectasis along with bronchial wall thickening and debris in the lower lobe bronchi consistent with mucous plugging  -Bronchoscopy was performed on 11/26 due to concern of mucous plugging on chest x-ray.  There was severe mucous plugging noted on left side of endobronchial anatomy.  -Otherwise, ENT was consulted tracheostomy was performed on 12/4.    -PT recommends SNF and patient pending placement  -Speech following and recommend thickened liquids  -ENT decannulated trach, will heal by secondary intention    HFpEF   EF from 11/13/2024 6065%.  Mildly increased wall thickness.  Abnormal diastolic function.  Add Jardiance  Restart diuretics    History of present type II NSTEMI, likely demand ischemia given hypoxia. Peak troponin 248. Echo with EF 60-65%, LVH, RV moderately dilated. CTA chest negative for PE   Continue ASA  Can consider ischemic evaluation in the OP setting        COPD exacerbation, resolved  Will add triple therapy with tiotropium and Symbicort.  Same on discharge  DuoNebs as needed as needed     Atrial arrhythmia  -Noted to have NSVT and required amiodarone and Lopressor. Due to hypotension, also  Daily    fluconazole (DIFLUCAN) tablet 100 mg  100 mg Oral Daily    glycopyrrolate (ROBINUL) tablet 1 mg  1 mg Oral TID    guaiFENesin (ROBITUSSIN) 100 MG/5ML liquid 300 mg  300 mg Oral 4x daily    QUEtiapine (SEROQUEL) tablet 50 mg  50 mg Oral TID    senna (SENOKOT) tablet 8.6 mg  1 tablet Oral Nightly    sertraline (ZOLOFT) tablet 50 mg  50 mg Oral Daily    acetaminophen (TYLENOL) 160 MG/5ML solution 650 mg  650 mg Oral Q4H PRN    HYDROcodone-acetaminophen (NORCO) 5-325 MG per tablet 1 tablet  1 tablet Oral Q6H PRN    midodrine (PROAMATINE) tablet 5 mg  5 mg Oral TID PRN    polyethylene glycol (GLYCOLAX) packet 17 g  17 g Oral Daily PRN    tamsulosin (FLOMAX) capsule 0.4 mg  0.4 mg Oral Daily    0.9 % sodium chloride infusion   IntraVENous PRN    potassium chloride 20 mEq/50 mL IVPB (Central Line)  20 mEq IntraVENous PRN    Or    potassium chloride 10 mEq/100 mL IVPB (Peripheral Line)  10 mEq IntraVENous PRN    magnesium sulfate 2000 mg in 50 mL IVPB premix  2,000 mg IntraVENous PRN    enoxaparin Sodium (LOVENOX) injection 30 mg  30 mg SubCUTAneous BID    ondansetron (ZOFRAN-ODT) disintegrating tablet 4 mg  4 mg Oral Q8H PRN    Or    ondansetron (ZOFRAN) injection 4 mg  4 mg IntraVENous Q6H PRN    saliva substitute (BIOTENE/MOUTH KOTE) liquid   Oral PRN    sodium phosphate 15 mmol in sodium chloride 0.9 % 250 mL IVPB  15 mmol IntraVENous PRN    glucose chewable tablet 16 g  4 tablet Oral PRN    dextrose bolus 10% 125 mL  125 mL IntraVENous PRN    Or    dextrose bolus 10% 250 mL  250 mL IntraVENous PRN    glucagon injection 1 mg  1 mg SubCUTAneous PRN    dextrose 10 % infusion   IntraVENous Continuous PRN    influenza split vaccine (PF) (AFLURIA;FLUARIX) injection 0.5 mL  0.5 mL IntraMUSCular Prior to discharge    sodium chloride flush 0.9 % injection 5-40 mL  5-40 mL IntraVENous 2 times per day    sodium chloride flush 0.9 % injection 5-40 mL  5-40 mL IntraVENous PRN       Objective:   Physical Exam:     /70

## 2024-12-24 NOTE — PLAN OF CARE
Speech LAnguage Pathology Dysphagia RE-EVALUATION    Patient: Kim Markham (49 y.o. female)  Date: 12/24/2024  Primary Diagnosis: Acute right-sided congestive heart failure (HCC) [I50.811]  SVT (supraventricular tachycardia) ([facility]) [I47.10]  Essential hypertension [I10]  Hypoxia [R09.02]  COPD exacerbation ([facility]) [J44.1]  Procedure(s) (LRB):  TRACHEOSTOMY (N/A) 20 Days Post-Op   Precautions: aspiration,  Fall Risk     Time In: 1040  Time Out: 1058    DIET RECOMMENDATIONS: Easy to chew and thin liquids    SWALLOW SAFETY PRECAUTIONS: no straws, single sips, slow rate, double swallow, STRICT aspiration and GERD precautions, monitor pt closely for s/s aspiration, meds as tolerated  ASSESSMENT :  Based on the objective data described below, the patient presents with mild oropharyngeal dysphagia with pt demonstrating independent use of swallow safety precautions.     Pt seen for follow-up, positioned upright in bed.   Pt is pleasant and talkative. Pt reports she has been drinking thin Pepsi and water via small sips since last SLP session.   Pt with dressing on stoma and good vocal quality. Pt reports improved healing of stoma.     Leonie x 10  Supraglottic swallow x 6  Effortful swallows with double swallows with thin, via cup/straw (single sips) and soft solids x 25.  No overt s/s aspiration throughout. Dry vocal quality throughout.   Pt demonstrates independent use of sw safety precautions.     Patient will benefit from skilled intervention to address the above impairments.    GOALS:  Problem: SLP Adult - Impaired Swallowing  Goal: By Discharge: Advance to least restrictive diet without signs or symptoms of aspiration for planned discharge setting.  See evaluation for individualized goals.  Description: Speech Therapy Swallow Goals  Initiated 11/23/2024  -Patient will tolerate PO diet without clinical indicators of aspiration given mod cues within 7 day(s).        -Patient will tolerate advanced PO trials without  no apparent distress in bed and Call bell left within reach     Pain:  VAS (numerical) 5, back pain- has been addressed    COMMUNICATION/EDUCATION:   Swallow safety precautions, Aspiration precautions, Diet recommendations, and Compensatory strategies education provided to Patient via handout provided , demonstration, and teach back, all questions/concerns addressed. Patient verbalizes understanding and demonstrates understanding.    The patient's plan of care including recommendations, planned interventions, and recommended diet changes were discussed with: Physician.    Patient/family agree to work toward stated goals and plan of care    Thank you,  Elena Scott SLP  Minutes: 18

## 2024-12-24 NOTE — PLAN OF CARE
PHYSICAL THERAPY TREATMENT     Patient: Kim Markham (49 y.o. female)  Date: 12/24/2024  Diagnosis: Acute right-sided congestive heart failure (HCC) [I50.811]  SVT (supraventricular tachycardia) (HCC) [I47.10]  Essential hypertension [I10]  Hypoxia [R09.02]  COPD exacerbation (HCC) [J44.1] SVT (supraventricular tachycardia) (HCC)  Procedure(s) (LRB):  TRACHEOSTOMY (N/A) 20 Days Post-Op  Precautions: Fall Risk                      Recommendations for nursing mobility: Out of bed to chair for meals, Encourage HEP in prep for ADLs/mobility; see handout for details, Frequent repositioning to prevent skin breakdown, AD and gt belt for bed to chair , Amb to bathroom with AD and gait belt, and Assist x1    In place during session: External Catheter  Chart, physical therapy assessment, plan of care and goals were reviewed.  ASSESSMENT  Patient continues with skilled PT services and is progressing towards goals. Pt supine in bed upon PT arrival, agreeable to session. (See below for objective details and assist levels).     Overall pt tolerated session fair/well today. Pt transferred to eob with no hands on assistance. Pt stood from bed and ambulated ~20 ft with RW and cga/min. Gait was slow and steady with no lob or knee buckling noted. Pt returend sitting eob and reported wanting to sit up for awhile. Pt left sitting up with all needs met. Will continue to benefit from skilled PT services, and will continue to progress as tolerated. Current PT DC recommendation High intensity and comprehensive skilled physical therapy in a multidisciplinary setting as patient is working towards tolerating up to 3 hours of therapy/day x 5-7 days/week once medically appropriate.    GOALS:  Problem: Physical Therapy - Adult  Goal: By Discharge: Performs mobility at highest level of function for planned discharge setting.  See evaluation for individualized goals.  Description: FUNCTIONAL STATUS PRIOR TO ADMISSION: Patient was modified  required to identify errors made;Assistance required to generate solutions  Insights: Decreased awareness of deficits  Initiation: Requires cues for some  Sequencing: Requires cues for some  Functional Mobility Training:  Bed Mobility:  Bed Mobility Training  Bed Mobility Training: Yes  Rolling: Supervision  Supine to Sit: Supervision  Scooting: Supervision  Transfers:  Transfer Training  Transfer Training: Yes  Sit to Stand: Contact-guard assistance;Assist X1;Additional time  Stand to Sit: Contact-guard assistance;Additional time;Assist X1  Balance:  Balance  Sitting: Intact  Standing: Impaired  Standing - Static: Constant support;Fair  Standing - Dynamic: Constant support;Fair  Wheelchair Mobility:  Wheelchair Management  Wheelchair Management: No  Ambulation/Gait Training:  Gait  Gait Training: Yes  Overall Level of Assistance: Minimum assistance;Contact-guard assistance;Assist X1  Distance (ft): 20 Feet  Assistive Device: Walker, rolling;Gait belt  Speed/Laura: Slow  Step Length: Right shortened;Left shortened  Gait Abnormalities: Decreased step clearance     Pain Rating:  Pt c/o  L sided leg pain (calf) but no formal rating given.  Slightly tender to palpation, not warm, does not appear to be red      Activity Tolerance:   Fair     After treatment patient left in no apparent distress:   Bed locked and returned to lowest position, Patient left in no apparent distress in bed, Call bell within reach, Caregiver / family present, and Side rails x3, and nsg updated     COMMUNICATION/COLLABORATION:   The patient’s plan of care was discussed with: Registered nurse    Patient Education  Education Given To: Patient;Family  Education Provided: Role of Therapy;Energy Conservation;Transfer Training;Mobility Training;Plan of Care;Equipment;Fall Prevention Strategies;Precautions  Education Method: Demonstration;Verbal  Education Outcome: Demonstrated understanding;Verbalized understanding;Continued education needed    Preeti

## 2024-12-24 NOTE — DISCHARGE SUMMARY
Physician Discharge Summary     Patient ID:    Kim Markham  [id number]  49 y.o.  1975    Admit date: 11/12/2024    Discharge date : 12/24/2024      Final Diagnoses:   Acute right-sided congestive heart failure (HCC) [I50.811]  SVT (supraventricular tachycardia) (HCC) [I47.10]  Essential hypertension [I10]  Hypoxia [R09.02]  COPD exacerbation ([facility]) [J44.1]  Acute respiratory failure with hypoxia 2/2 to COPD   S/p Intubation   S/p tracheostomy 12/4  Volume overload   Anemia  Chronic back pain  Hypernatremia  Hypercalcemia  Volume overload 2/2 CHF exacerabtion  Acute hypoxic respiratory failure 2/2 CHF exacerbation requiring Intubation and tracheostomy    Reason for Hospitalization:    Acute hypoxic respiratory failure 2/2 CHF exacerbation requiring Intubation   COPD exacerbation     Hospital Course:     Kim Markham is a 49 y.o. female with known past medical history significant for asthma and COPD who treated her symptoms today with Primatene Mist over-the-counter says that she went into fast heart rate became short of breath immediately.  No other exacerbating or relieving factors which presents to the emergency room with no treatment prior to arrival.  Patient reports that she had a few day history of shortness of breath and thought she had a pneumonia. She has been taking OTC cough syrup and cough medicine as well as her inhaler. She reports difficulty affording medications and has limited access to healthcare.      Patient was placed in ICU due to increased work of breathing.  V. tach was noted and patient was started on nitroglycerin infusion.  Tachycardia worsened by epinephrine and your inhaler.  Patient was placed on Rocephin, azithromycin, and Solu-Medrol for COPD exacerbation.  Respiratory status worsened on 11/13 AM and patient was intubated.  As for tachycardia, echo was performed and showed EF of 60 to 65%, LVH, abnormal diastolic function, RV moderately dilated.  CTA  ruled out PE.  Duplex showed no DVT.  Cardiology consulted and started patient on aspirin, Plavix, and statin.  Patient was also noted to be SVT and required amiodarone infusions.  Due to hypotension, she required Levophed.  Patient was diuresed with Lasix and NG tube was placed with tube feedings.  Restarted on sertraline for anxiety.  As for atrial arrhythmia, amiodarone was discharged and metoprolol was increased.  Patient to follow-up with EP outpatient.  Patient was extubated 11/23.  Patient failed extubation and was reintubated on 11/26 due to lung collapse.  Bronchoscopy was performed on 11/26 due to concern of mucous plugging on chest x-ray.  There was severe mucous plugging noted on left side of endobronchial anatomy.  Otherwise, ENT was consulted tracheostomy was performed on 12/4.     Otherwise, nephrology was consulted due to hypernatremia with sodium of 160.  Fluids were adjusted as well as water flushes started with NG tube.  Patient was placed on trach collar 12/6.    Acute respiratory failure with hypoxia 2/2 to COPD   S/p Intubation   S/p tracheostomy 12/4  -Present to the ICU in acute distress and required oxygen  -Respiratory status worsened on 11/13 AM and patient was intubated.  -Patient was extubated 11/23.  Patient failed extubation and was reintubated on 11/26 due to lung collapse.    -CT thorax 11/26: Bibasilar linear and bandlike opacities consistent with atelectasis along with bronchial wall thickening and debris in the lower lobe bronchi consistent with mucous plugging  -Bronchoscopy was performed on 11/26 due to concern of mucous plugging on chest x-ray.  There was severe mucous plugging noted on left side of endobronchial anatomy.  -Otherwise, ENT was consulted tracheostomy was performed on 12/4.  Sutures removed 12/12  -Continue high flow nasal cannula and wean as tolerated.  Currently FiO2 35% with O2 20L/min  -Mild erythema and skin breakdown around site. Wound care consult ordered

## 2024-12-25 PROCEDURE — 6370000000 HC RX 637 (ALT 250 FOR IP): Performed by: INTERNAL MEDICINE

## 2024-12-25 PROCEDURE — 6370000000 HC RX 637 (ALT 250 FOR IP)

## 2024-12-25 PROCEDURE — 2500000003 HC RX 250 WO HCPCS: Performed by: NURSE PRACTITIONER

## 2024-12-25 PROCEDURE — 94640 AIRWAY INHALATION TREATMENT: CPT

## 2024-12-25 PROCEDURE — 94761 N-INVAS EAR/PLS OXIMETRY MLT: CPT

## 2024-12-25 PROCEDURE — 6360000002 HC RX W HCPCS: Performed by: STUDENT IN AN ORGANIZED HEALTH CARE EDUCATION/TRAINING PROGRAM

## 2024-12-25 PROCEDURE — 2500000003 HC RX 250 WO HCPCS: Performed by: INTERNAL MEDICINE

## 2024-12-25 PROCEDURE — 1100000000 HC RM PRIVATE

## 2024-12-25 RX ADMIN — POTASSIUM BICARBONATE 40 MEQ: 782 TABLET, EFFERVESCENT ORAL at 10:28

## 2024-12-25 RX ADMIN — SODIUM CHLORIDE, PRESERVATIVE FREE 10 ML: 5 INJECTION INTRAVENOUS at 21:41

## 2024-12-25 RX ADMIN — SENNOSIDES 8.6 MG: 8.6 TABLET, FILM COATED ORAL at 21:36

## 2024-12-25 RX ADMIN — QUETIAPINE FUMARATE 50 MG: 25 TABLET ORAL at 09:30

## 2024-12-25 RX ADMIN — GUAIFENESIN 300 MG: 100 SOLUTION ORAL at 09:29

## 2024-12-25 RX ADMIN — GUAIFENESIN 300 MG: 100 SOLUTION ORAL at 21:37

## 2024-12-25 RX ADMIN — FAMOTIDINE 20 MG: 20 TABLET, FILM COATED ORAL at 09:30

## 2024-12-25 RX ADMIN — GLYCOPYRROLATE 1 MG: 1 TABLET ORAL at 14:51

## 2024-12-25 RX ADMIN — SERTRALINE 50 MG: 50 TABLET, FILM COATED ORAL at 09:30

## 2024-12-25 RX ADMIN — POTASSIUM BICARBONATE 40 MEQ: 782 TABLET, EFFERVESCENT ORAL at 21:35

## 2024-12-25 RX ADMIN — GUAIFENESIN 300 MG: 100 SOLUTION ORAL at 18:14

## 2024-12-25 RX ADMIN — FLUCONAZOLE 100 MG: 100 TABLET ORAL at 09:30

## 2024-12-25 RX ADMIN — TAMSULOSIN HYDROCHLORIDE 0.4 MG: 0.4 CAPSULE ORAL at 09:30

## 2024-12-25 RX ADMIN — GUAIFENESIN 300 MG: 100 SOLUTION ORAL at 14:52

## 2024-12-25 RX ADMIN — EMPAGLIFLOZIN 10 MG: 10 TABLET, FILM COATED ORAL at 09:36

## 2024-12-25 RX ADMIN — BUSPIRONE HYDROCHLORIDE 7.5 MG: 5 TABLET ORAL at 09:29

## 2024-12-25 RX ADMIN — QUETIAPINE FUMARATE 50 MG: 25 TABLET ORAL at 21:37

## 2024-12-25 RX ADMIN — Medication 2 PUFF: at 08:42

## 2024-12-25 RX ADMIN — QUETIAPINE FUMARATE 50 MG: 25 TABLET ORAL at 14:51

## 2024-12-25 RX ADMIN — BUSPIRONE HYDROCHLORIDE 7.5 MG: 5 TABLET ORAL at 21:36

## 2024-12-25 RX ADMIN — ENOXAPARIN SODIUM 30 MG: 100 INJECTION SUBCUTANEOUS at 21:36

## 2024-12-25 RX ADMIN — POLYETHYLENE GLYCOL 3350 17 G: 17 POWDER, FOR SOLUTION ORAL at 18:17

## 2024-12-25 RX ADMIN — ACETAMINOPHEN 650 MG: 650 SOLUTION ORAL at 21:37

## 2024-12-25 RX ADMIN — ASPIRIN 81 MG 81 MG: 81 TABLET ORAL at 09:30

## 2024-12-25 RX ADMIN — ENOXAPARIN SODIUM 30 MG: 100 INJECTION SUBCUTANEOUS at 09:31

## 2024-12-25 RX ADMIN — GLYCOPYRROLATE 1 MG: 1 TABLET ORAL at 21:36

## 2024-12-25 RX ADMIN — SODIUM CHLORIDE, PRESERVATIVE FREE 10 ML: 5 INJECTION INTRAVENOUS at 09:31

## 2024-12-25 RX ADMIN — ATORVASTATIN CALCIUM 40 MG: 40 TABLET, FILM COATED ORAL at 21:36

## 2024-12-25 RX ADMIN — Medication 2 PUFF: at 20:14

## 2024-12-25 RX ADMIN — GLYCOPYRROLATE 1 MG: 1 TABLET ORAL at 09:30

## 2024-12-25 RX ADMIN — METOPROLOL TARTRATE 25 MG: 25 TABLET, FILM COATED ORAL at 09:30

## 2024-12-25 RX ADMIN — METOPROLOL TARTRATE 25 MG: 25 TABLET, FILM COATED ORAL at 21:37

## 2024-12-25 ASSESSMENT — PAIN DESCRIPTION - DESCRIPTORS: DESCRIPTORS: ACHING

## 2024-12-25 ASSESSMENT — PAIN DESCRIPTION - LOCATION: LOCATION: BACK

## 2024-12-25 ASSESSMENT — PAIN SCALES - GENERAL
PAINLEVEL_OUTOF10: 0
PAINLEVEL_OUTOF10: 7
PAINLEVEL_OUTOF10: 3

## 2024-12-25 NOTE — PLAN OF CARE
Goals reviewed, Care plan updated. No noted acute cardiac/respiratory distress, up ad teri in the room, IV saline locked,  rooming in.   Problem: Safety - Adult  Goal: Free from fall injury  Outcome: Progressing  Flowsheets (Taken 12/25/2024 0611)  Free From Fall Injury: Instruct family/caregiver on patient safety  Note: Remains free from fall/injury, patient refuses bed exit alarm.      Problem: Confusion  Goal: Confusion, delirium, dementia, or psychosis is improved or at baseline  Description: INTERVENTIONS:  1. Assess for possible contributors to thought disturbance, including medications, impaired vision or hearing, underlying metabolic abnormalities, dehydration, psychiatric diagnoses, and notify attending LIP  2. [place] high risk fall precautions, as indicated  3. Provide frequent short contacts to provide reality reorientation, refocusing and direction  4. Decrease environmental stimuli, including noise as appropriate  5. Monitor and intervene to maintain adequate nutrition, hydration, elimination, sleep and activity  6. If unable to ensure safety without constant attention obtain sitter and review sitter guidelines with assigned personnel  7. Initiate Psychosocial CNS and Spiritual Care consult, as indicated  Outcome: Progressing  Flowsheets (Taken 12/25/2024 0611)  Effect of thought disturbance (confusion, delirium, dementia, or psychosis) are managed with adequate functional status:   Assess for contributors to thought disturbance, including medications, impaired vision or hearing, underlying metabolic abnormalities, dehydration, psychiatric diagnoses, notify LIP   Monitor and intervene to maintain adequate nutrition, hydration, elimination, sleep and activity  Note: Patient is A&O x 4, no noted confusion.      Problem: Pain  Goal: Verbalizes/displays adequate comfort level or baseline comfort level  Outcome: Progressing  Flowsheets (Taken 12/25/2024 0611)  Verbalizes/displays adequate comfort  level or baseline comfort level:   Assess pain using appropriate pain scale   Administer analgesics based on type and severity of pain and evaluate response   Implement non-pharmacological measures as appropriate and evaluate response   Encourage patient to monitor pain and request assistance   Consider cultural and social influences on pain and pain management   Notify Licensed Independent Practitioner if interventions unsuccessful or patient reports new pain  Note: No c/o pain during HS.     Problem: Discharge Planning  Goal: Discharge to home or other facility with appropriate resources  Outcome: Not Progressing  Flowsheets (Taken 12/23/2024 2109 by Madyson San, RN)  Discharge to home or other facility with appropriate resources: Identify barriers to discharge with patient and caregiver  Note: Per CM note awaiting bed placement.

## 2024-12-25 NOTE — PROGRESS NOTES
Vascular duplex renal arterial complete   Final Result      XR CHEST PORTABLE   Final Result      Unchanged radiographic appearance.         Electronically signed by Josep Madera MD      XR CHEST PORTABLE   Final Result   Mild pulmonary edema and small left effusion with right basilar   atelectasis..      Electronically signed by Presley Pollack      XR CHEST PORTABLE   Final Result      1. No findings of pleural effusions or pneumonia.   2. Support apparatus as above.      Electronically signed by Jf Restrepo      CTA CHEST W WO CONTRAST   Final Result      1. No evidence of pulmonary embolism.   2. Centrilobular emphysema.   3. Very mild bibasilar atelectasis.         Electronically signed by Alex Prescott MD      Vascular duplex lower extremity venous bilateral   Final Result      XR CHEST PORTABLE   Final Result      Satisfactory line positions. Mild interstitial edema.                  EXAM: XR CHEST PORTABLE at 1127 hours      INDICATION: central line placement      COMPARISON: At 0232 hours.      FINDINGS: A portable AP radiograph of the chest was obtained at 1127 hours. The   patient is on a cardiac monitor. The endotracheal tube terminates about 3 cm of   the judit and the NG tube extends below the hemidiaphragm. The central line is   not visualized. A well-healed right posterior rib deformity is noted. The lungs   are clear. The cardiac and mediastinal contours and pulmonary vascularity are   normal.        IMPRESSION: Nonvisualization of the central line.         Electronically signed by Jailyn Espinoza      XR CHEST PORTABLE   Final Result      Satisfactory line positions. Mild interstitial edema.                  EXAM: XR CHEST PORTABLE at 1127 hours      INDICATION: central line placement      COMPARISON: At 0232 hours.      FINDINGS: A portable AP radiograph of the chest was obtained at 1127 hours. The   patient is on a cardiac monitor. The endotracheal tube terminates about 3 cm of   the judit and

## 2024-12-26 LAB
ANION GAP SERPL CALC-SCNC: 6 MMOL/L (ref 2–12)
BUN SERPL-MCNC: 6 MG/DL (ref 6–20)
BUN/CREAT SERPL: 8 (ref 12–20)
CA-I BLD-MCNC: 9.7 MG/DL (ref 8.5–10.1)
CHLORIDE SERPL-SCNC: 110 MMOL/L (ref 97–108)
CO2 SERPL-SCNC: 26 MMOL/L (ref 21–32)
CREAT SERPL-MCNC: 0.8 MG/DL (ref 0.55–1.02)
GLUCOSE SERPL-MCNC: 130 MG/DL (ref 65–100)
POTASSIUM SERPL-SCNC: 4 MMOL/L (ref 3.5–5.1)
SODIUM SERPL-SCNC: 142 MMOL/L (ref 136–145)

## 2024-12-26 PROCEDURE — 2500000003 HC RX 250 WO HCPCS: Performed by: INTERNAL MEDICINE

## 2024-12-26 PROCEDURE — 6370000000 HC RX 637 (ALT 250 FOR IP): Performed by: INTERNAL MEDICINE

## 2024-12-26 PROCEDURE — 2500000003 HC RX 250 WO HCPCS: Performed by: NURSE PRACTITIONER

## 2024-12-26 PROCEDURE — 80048 BASIC METABOLIC PNL TOTAL CA: CPT

## 2024-12-26 PROCEDURE — 6370000000 HC RX 637 (ALT 250 FOR IP)

## 2024-12-26 PROCEDURE — 36415 COLL VENOUS BLD VENIPUNCTURE: CPT

## 2024-12-26 PROCEDURE — 6360000002 HC RX W HCPCS: Performed by: STUDENT IN AN ORGANIZED HEALTH CARE EDUCATION/TRAINING PROGRAM

## 2024-12-26 PROCEDURE — 1100000000 HC RM PRIVATE

## 2024-12-26 PROCEDURE — 94761 N-INVAS EAR/PLS OXIMETRY MLT: CPT

## 2024-12-26 PROCEDURE — 94640 AIRWAY INHALATION TREATMENT: CPT

## 2024-12-26 PROCEDURE — 94668 MNPJ CHEST WALL SBSQ: CPT

## 2024-12-26 RX ADMIN — Medication 2 PUFF: at 08:15

## 2024-12-26 RX ADMIN — SENNOSIDES 8.6 MG: 8.6 TABLET, FILM COATED ORAL at 21:38

## 2024-12-26 RX ADMIN — GUAIFENESIN 300 MG: 100 SOLUTION ORAL at 13:00

## 2024-12-26 RX ADMIN — FLUCONAZOLE 100 MG: 100 TABLET ORAL at 09:18

## 2024-12-26 RX ADMIN — QUETIAPINE FUMARATE 50 MG: 25 TABLET ORAL at 21:38

## 2024-12-26 RX ADMIN — POLYETHYLENE GLYCOL 3350 17 G: 17 POWDER, FOR SOLUTION ORAL at 09:17

## 2024-12-26 RX ADMIN — GUAIFENESIN 300 MG: 100 SOLUTION ORAL at 21:37

## 2024-12-26 RX ADMIN — QUETIAPINE FUMARATE 50 MG: 25 TABLET ORAL at 09:18

## 2024-12-26 RX ADMIN — Medication 2 PUFF: at 19:59

## 2024-12-26 RX ADMIN — GUAIFENESIN 300 MG: 100 SOLUTION ORAL at 17:30

## 2024-12-26 RX ADMIN — POTASSIUM BICARBONATE 40 MEQ: 782 TABLET, EFFERVESCENT ORAL at 09:17

## 2024-12-26 RX ADMIN — GLYCOPYRROLATE 1 MG: 1 TABLET ORAL at 21:39

## 2024-12-26 RX ADMIN — BUSPIRONE HYDROCHLORIDE 7.5 MG: 5 TABLET ORAL at 09:24

## 2024-12-26 RX ADMIN — ENOXAPARIN SODIUM 30 MG: 100 INJECTION SUBCUTANEOUS at 09:18

## 2024-12-26 RX ADMIN — GLYCOPYRROLATE 1 MG: 1 TABLET ORAL at 13:00

## 2024-12-26 RX ADMIN — ATORVASTATIN CALCIUM 40 MG: 40 TABLET, FILM COATED ORAL at 21:39

## 2024-12-26 RX ADMIN — SODIUM CHLORIDE, PRESERVATIVE FREE 10 ML: 5 INJECTION INTRAVENOUS at 21:41

## 2024-12-26 RX ADMIN — ASPIRIN 81 MG 81 MG: 81 TABLET ORAL at 09:18

## 2024-12-26 RX ADMIN — GUAIFENESIN 300 MG: 100 SOLUTION ORAL at 09:17

## 2024-12-26 RX ADMIN — EMPAGLIFLOZIN 10 MG: 10 TABLET, FILM COATED ORAL at 09:18

## 2024-12-26 RX ADMIN — POTASSIUM BICARBONATE 40 MEQ: 782 TABLET, EFFERVESCENT ORAL at 21:37

## 2024-12-26 RX ADMIN — FUROSEMIDE 20 MG: 40 TABLET ORAL at 09:18

## 2024-12-26 RX ADMIN — BUSPIRONE HYDROCHLORIDE 7.5 MG: 5 TABLET ORAL at 21:39

## 2024-12-26 RX ADMIN — ENOXAPARIN SODIUM 30 MG: 100 INJECTION SUBCUTANEOUS at 21:40

## 2024-12-26 RX ADMIN — SODIUM CHLORIDE, PRESERVATIVE FREE 10 ML: 5 INJECTION INTRAVENOUS at 09:22

## 2024-12-26 RX ADMIN — FAMOTIDINE 20 MG: 20 TABLET, FILM COATED ORAL at 09:24

## 2024-12-26 RX ADMIN — METOPROLOL TARTRATE 25 MG: 25 TABLET, FILM COATED ORAL at 09:18

## 2024-12-26 RX ADMIN — METOPROLOL TARTRATE 25 MG: 25 TABLET, FILM COATED ORAL at 21:38

## 2024-12-26 RX ADMIN — TAMSULOSIN HYDROCHLORIDE 0.4 MG: 0.4 CAPSULE ORAL at 09:18

## 2024-12-26 RX ADMIN — QUETIAPINE FUMARATE 50 MG: 25 TABLET ORAL at 13:00

## 2024-12-26 RX ADMIN — GLYCOPYRROLATE 1 MG: 1 TABLET ORAL at 09:18

## 2024-12-26 RX ADMIN — SERTRALINE 50 MG: 50 TABLET, FILM COATED ORAL at 09:18

## 2024-12-26 ASSESSMENT — PAIN DESCRIPTION - ONSET: ONSET: GRADUAL

## 2024-12-26 ASSESSMENT — PAIN DESCRIPTION - LOCATION: LOCATION: HIP

## 2024-12-26 ASSESSMENT — PAIN DESCRIPTION - ORIENTATION: ORIENTATION: LEFT

## 2024-12-26 ASSESSMENT — PAIN SCALES - GENERAL
PAINLEVEL_OUTOF10: 3
PAINLEVEL_OUTOF10: 0
PAINLEVEL_OUTOF10: 1

## 2024-12-26 ASSESSMENT — PAIN - FUNCTIONAL ASSESSMENT: PAIN_FUNCTIONAL_ASSESSMENT: ACTIVITIES ARE NOT PREVENTED

## 2024-12-26 ASSESSMENT — PAIN DESCRIPTION - DESCRIPTORS: DESCRIPTORS: ACHING

## 2024-12-26 ASSESSMENT — PAIN DESCRIPTION - PAIN TYPE: TYPE: CHRONIC PAIN

## 2024-12-26 NOTE — PROGRESS NOTES
4 Eyes Skin Assessment     NAME:  Kim Markham  YOB: 1975  MEDICAL RECORD NUMBER:  [id number]    The patient is being assessed for  Other weekly skin assessment    I agree that at least one RN has performed a thorough Head to Toe Skin Assessment on the patient. ALL assessment sites listed below have been assessed.      Areas assessed by both nurses:    Head, Face, Ears, Shoulders, Back, Chest, Arms, Elbows, Hands, Sacrum. Buttock, Coccyx, Ischium, Legs. Feet and Heels, and Under Medical Devices         Does the Patient have a Wound? Yes wound(s) were present on assessment. LDA wound assessment was Initiated and completed by RN pt has wound on back of head, unsure if pressure related, does have scab.       Jerson Prevention initiated by RN: No  Wound Care Orders initiated by RN: No    Pressure Injury (Stage 3,4, Unstageable, DTI, NWPT, and Complex wounds) if present, place Wound referral order by RN under : No    New Ostomies, if present place, Ostomy referral order under : No     Nurse 1 eSignature: Electronically signed by RAJESH DOYLE RN on 12/26/24 at 1:42 AM EST    **SHARE this note so that the co-signing nurse can place an eSignature**    Nurse 2 eSignature: Electronically signed by Michaela Garcia LPN on 12/26/24 at 1:56 AM EST

## 2024-12-26 NOTE — PROGRESS NOTES
Result      1. The ET tube is in satisfactory position.      2. New, near complete opacification of the left lung which may be due to mucous   plugging.      Electronically signed by VIDHYA BEAR      XR CHEST PORTABLE   Final Result   1. NG tube tip is in the stomach.      Electronically signed by NATANAEL MARTINEZ      CT HEAD WO CONTRAST   Final Result   No acute intracranial process.   There is no intracranial mass or hemorrhage.      Electronically signed by MINDA REIS      CT CHEST WO CONTRAST   Final Result      1. There is no pneumonia. There are bibasilar areas of linear and bandlike   opacities consistent with atelectasis. There is some bronchial wall thickening   mucus and debris within lower lobe bronchi consistent with mucous plugging   and/or bronchitis.      Electronically signed by NATANAEL MARTINEZ      XR CHEST PORTABLE   Final Result   1. No change mild edema   2. Decrease in basilar atelectasis      Electronically signed by NATANAEL MARTINEZ      XR ABDOMEN (KUB) (SINGLE AP VIEW)   Final Result   NG tube tip in stomach         Electronically signed by Jailyn Espinoza      XR CHEST PORTABLE   Final Result   Stable.      Electronically signed by UBALDO ZHOU      XR CHEST PORTABLE   Final Result   Interval extubation and removal of right IJ line with persistent mild pulmonary   edema and small left pleural effusion. Bibasilar atelectasis.      Electronically signed by Tristian Florence      XR CHEST PORTABLE   Final Result   Shallow volumes, bibasilar atelectasis, small left pleural effusion.      Electronically signed by Rudy Sidhu      XR CHEST PORTABLE   Final Result      Improving pulmonary edema.         Electronically signed by Jailyn Espinoza      Vascular duplex renal arterial complete   Final Result      XR CHEST PORTABLE   Final Result      Unchanged radiographic appearance.         Electronically signed by Josep Madera MD      XR CHEST PORTABLE   Final Result   Mild pulmonary edema and small left  were ordered  [] Collateral history obtained from:             Valentina Zaman MD

## 2024-12-26 NOTE — CARE COORDINATION
0750: Chart reviewed.    Discharge summary/order noted.    Messages sent to [facility] and [facility] SNF(s) who have accepted patient inquiring about when a Medicaid pending bed will be available.    [facility] will not have a jerry bed until the first of the year.    0916: [facility]    1541: [facility]

## 2024-12-27 PROCEDURE — 6360000002 HC RX W HCPCS: Performed by: STUDENT IN AN ORGANIZED HEALTH CARE EDUCATION/TRAINING PROGRAM

## 2024-12-27 PROCEDURE — 6370000000 HC RX 637 (ALT 250 FOR IP): Performed by: INTERNAL MEDICINE

## 2024-12-27 PROCEDURE — 6370000000 HC RX 637 (ALT 250 FOR IP)

## 2024-12-27 PROCEDURE — 2500000003 HC RX 250 WO HCPCS: Performed by: NURSE PRACTITIONER

## 2024-12-27 PROCEDURE — 97535 SELF CARE MNGMENT TRAINING: CPT

## 2024-12-27 PROCEDURE — 94761 N-INVAS EAR/PLS OXIMETRY MLT: CPT

## 2024-12-27 PROCEDURE — 2500000003 HC RX 250 WO HCPCS: Performed by: INTERNAL MEDICINE

## 2024-12-27 PROCEDURE — 94640 AIRWAY INHALATION TREATMENT: CPT

## 2024-12-27 PROCEDURE — 97530 THERAPEUTIC ACTIVITIES: CPT

## 2024-12-27 PROCEDURE — 1100000000 HC RM PRIVATE

## 2024-12-27 RX ADMIN — SERTRALINE 50 MG: 50 TABLET, FILM COATED ORAL at 09:24

## 2024-12-27 RX ADMIN — FLUCONAZOLE 100 MG: 100 TABLET ORAL at 09:24

## 2024-12-27 RX ADMIN — QUETIAPINE FUMARATE 50 MG: 25 TABLET ORAL at 22:37

## 2024-12-27 RX ADMIN — SENNOSIDES 8.6 MG: 8.6 TABLET, FILM COATED ORAL at 22:37

## 2024-12-27 RX ADMIN — ENOXAPARIN SODIUM 30 MG: 100 INJECTION SUBCUTANEOUS at 09:24

## 2024-12-27 RX ADMIN — ACETAMINOPHEN 650 MG: 650 SOLUTION ORAL at 17:19

## 2024-12-27 RX ADMIN — GUAIFENESIN 300 MG: 100 SOLUTION ORAL at 22:36

## 2024-12-27 RX ADMIN — QUETIAPINE FUMARATE 50 MG: 25 TABLET ORAL at 09:24

## 2024-12-27 RX ADMIN — TAMSULOSIN HYDROCHLORIDE 0.4 MG: 0.4 CAPSULE ORAL at 09:24

## 2024-12-27 RX ADMIN — POTASSIUM BICARBONATE 40 MEQ: 782 TABLET, EFFERVESCENT ORAL at 09:24

## 2024-12-27 RX ADMIN — QUETIAPINE FUMARATE 50 MG: 25 TABLET ORAL at 13:42

## 2024-12-27 RX ADMIN — METOPROLOL TARTRATE 25 MG: 25 TABLET, FILM COATED ORAL at 22:36

## 2024-12-27 RX ADMIN — METOPROLOL TARTRATE 25 MG: 25 TABLET, FILM COATED ORAL at 09:24

## 2024-12-27 RX ADMIN — ENOXAPARIN SODIUM 30 MG: 100 INJECTION SUBCUTANEOUS at 22:36

## 2024-12-27 RX ADMIN — ASPIRIN 81 MG 81 MG: 81 TABLET ORAL at 09:24

## 2024-12-27 RX ADMIN — Medication 2 PUFF: at 19:57

## 2024-12-27 RX ADMIN — SODIUM CHLORIDE, PRESERVATIVE FREE 10 ML: 5 INJECTION INTRAVENOUS at 22:37

## 2024-12-27 RX ADMIN — ATORVASTATIN CALCIUM 40 MG: 40 TABLET, FILM COATED ORAL at 22:36

## 2024-12-27 RX ADMIN — FUROSEMIDE 20 MG: 40 TABLET ORAL at 09:24

## 2024-12-27 RX ADMIN — GUAIFENESIN 300 MG: 100 SOLUTION ORAL at 17:15

## 2024-12-27 RX ADMIN — BUSPIRONE HYDROCHLORIDE 7.5 MG: 5 TABLET ORAL at 22:36

## 2024-12-27 RX ADMIN — GLYCOPYRROLATE 1 MG: 1 TABLET ORAL at 13:42

## 2024-12-27 RX ADMIN — Medication 2 PUFF: at 09:01

## 2024-12-27 RX ADMIN — BUSPIRONE HYDROCHLORIDE 7.5 MG: 5 TABLET ORAL at 09:24

## 2024-12-27 RX ADMIN — EMPAGLIFLOZIN 10 MG: 10 TABLET, FILM COATED ORAL at 09:24

## 2024-12-27 RX ADMIN — SODIUM CHLORIDE, PRESERVATIVE FREE 10 ML: 5 INJECTION INTRAVENOUS at 09:25

## 2024-12-27 RX ADMIN — POTASSIUM BICARBONATE 40 MEQ: 782 TABLET, EFFERVESCENT ORAL at 22:34

## 2024-12-27 RX ADMIN — GUAIFENESIN 300 MG: 100 SOLUTION ORAL at 13:42

## 2024-12-27 RX ADMIN — GLYCOPYRROLATE 1 MG: 1 TABLET ORAL at 22:37

## 2024-12-27 RX ADMIN — GUAIFENESIN 300 MG: 100 SOLUTION ORAL at 09:23

## 2024-12-27 RX ADMIN — GLYCOPYRROLATE 1 MG: 1 TABLET ORAL at 09:24

## 2024-12-27 RX ADMIN — FAMOTIDINE 20 MG: 20 TABLET, FILM COATED ORAL at 09:24

## 2024-12-27 ASSESSMENT — PAIN SCALES - GENERAL
PAINLEVEL_OUTOF10: 0
PAINLEVEL_OUTOF10: 3
PAINLEVEL_OUTOF10: 1
PAINLEVEL_OUTOF10: 0

## 2024-12-27 ASSESSMENT — PAIN DESCRIPTION - DESCRIPTORS: DESCRIPTORS: ACHING

## 2024-12-27 ASSESSMENT — PAIN DESCRIPTION - ORIENTATION: ORIENTATION: POSTERIOR

## 2024-12-27 ASSESSMENT — PAIN DESCRIPTION - LOCATION: LOCATION: BACK

## 2024-12-27 NOTE — PROGRESS NOTES
Hospitalist Progress Note               Daily Progress Note: 12/27/2024      Hospital Day: 46     Chief complaint:   Chief Complaint   Patient presents with    Shortness of Breath        Subjective:   Patient is seen and examined today during bedside rounds.  No new complaints.  Patient has been rehab placement.  She is asking where she can remove the dressing after decannulation.  I told her to ask the nurse to contact ENT and ask when we can change the dressings.    Assessment and plan    Acute respiratory failure with hypoxia 2/2 to COPD   S/p Intubation   S/p tracheostomy 12/4  -Present to the ICU in acute distress and required oxygen  -Respiratory status worsened on 11/13 AM and patient was intubated.  -Patient was extubated 11/23.  Patient failed extubation and was reintubated on 11/26 due to lung collapse.    -CT thorax 11/26: Bibasilar linear and bandlike opacities consistent with atelectasis along with bronchial wall thickening and debris in the lower lobe bronchi consistent with mucous plugging  -Bronchoscopy was performed on 11/26 due to concern of mucous plugging on chest x-ray.  There was severe mucous plugging noted on left side of endobronchial anatomy.  -Otherwise, ENT was consulted tracheostomy was performed on 12/4.    -PT recommends SNF and patient pending placement  -Speech following and recommend thickened liquids  -ENT decannulated trach, will heal by secondary intention    HFpEF   EF from 11/13/2024 6065%.  Mildly increased wall thickness.  Abnormal diastolic function.  Add Jardiance  Restart diuretics    History of recent type II NSTEMI, likely demand ischemia given hypoxia. Peak troponin 248. Echo with EF 60-65%, LVH, RV moderately dilated. CTA chest negative for PE   Continue ASA  Can consider ischemic evaluation in the OP setting  Will give patient directions to call cardiology as she does not have established cardiology yet outpatient..  This admission admission she was seen by [facility]

## 2024-12-27 NOTE — CARE COORDINATION
0819: Chart reviewed.    Discharge order noted.    Message sent again to [facility] who states they can accept Medicaid pending if they have a bed available.    [facility] will not have a jerry bed until the first of the year.    1145: Updates attached in [facility].    KARTHIKEYAN telephoned [facility] leaving a VM requesting an update on an available Medicaid bed.    1335: KARTHIKEYAN provided [facility] jerry form to patient to fill out.    Lauri with [facility] picked jerry form up and is meeting with patient at this time.

## 2024-12-27 NOTE — PLAN OF CARE
Problem: Discharge Planning  Goal: Discharge to home or other facility with appropriate resources  12/26/2024 2039 by Nathalia Goode RN  Outcome: Progressing  12/26/2024 1028 by Jessica Ivey RN  Outcome: Progressing     Problem: ABCDS Injury Assessment  Goal: Absence of physical injury  12/26/2024 2039 by Nathalia Goode RN  Outcome: Progressing  12/26/2024 1028 by Jessica Ivey RN  Outcome: Progressing     Problem: Safety - Adult  Goal: Free from fall injury  12/26/2024 2039 by Nathalia Goode RN  Outcome: Progressing  12/26/2024 1028 by Jessica Ivey RN  Outcome: Progressing     Problem: Chronic Conditions and Co-morbidities  Goal: Patient's chronic conditions and co-morbidity symptoms are monitored and maintained or improved  12/26/2024 2039 by Nathalia Goode RN  Outcome: Progressing  12/26/2024 1028 by Jessica Ivey RN  Outcome: Progressing     Problem: Cardiovascular - Adult  Goal: Maintains optimal cardiac output and hemodynamic stability  12/26/2024 2039 by Nathalia Goode RN  Outcome: Progressing  12/26/2024 1028 by Jessica Ivey RN  Outcome: Progressing  Goal: Absence of cardiac dysrhythmias or at baseline  12/26/2024 2039 by Nathalia Goode RN  Outcome: Progressing  12/26/2024 1028 by Jessica Ivey RN  Outcome: Progressing     Problem: Musculoskeletal - Adult  Goal: Return mobility to safest level of function  12/26/2024 2039 by Nathalia Goode RN  Outcome: Progressing  12/26/2024 1028 by Jessica Ivey RN  Outcome: Progressing  Goal: Maintain proper alignment of affected body part  12/26/2024 2039 by Nathalia Goode RN  Outcome: Progressing  12/26/2024 1028 by Jessica Ivey RN  Outcome: Progressing  Goal: Return ADL status to a safe level of function  12/26/2024 2039 by Nathalia Goode RN  Outcome: Progressing  12/26/2024 1028 by Jessica Ivey RN  Outcome: Progressing     Problem: Metabolic/Fluid and

## 2024-12-27 NOTE — PLAN OF CARE
PHYSICAL THERAPY REEVALUATION  Patient: Kim Markham (49 y.o. female)  Date: 12/27/2024  Primary Diagnosis: Acute right-sided congestive heart failure (HCC) [I50.811]  SVT (supraventricular tachycardia) (HCC) [I47.10]  Essential hypertension [I10]  Hypoxia [R09.02]  COPD exacerbation (HCC) [J44.1]  Procedure(s) (LRB):  TRACHEOSTOMY (N/A) 23 Days Post-Op   Precautions: Fall Risk                      Recommendations for nursing mobility: Out of bed to chair for meals, Encourage HEP in prep for ADLs/mobility; see handout for details, Frequent repositioning to prevent skin breakdown, AD and gt belt for bed to chair , Amb to bathroom with AD and gait belt, and Assist x1    In place during session: EKG/telemetry   Chart, physical therapy assessment, plan of care, and goals were reviewed.      ASSESSMENT  Patient initially seen for PT evaluation 11/25/2024 and 12 skilled PT sessions since evalution. Patient seen today for PT reevaluation s/t LOS. Patient A&O x4. Pt seated EOB independently upon arrival, agreeable to session.      Based on the objective data described, the patient currently presents with impaired functional mobility, decreased independence in ADLs, impaired ability to perform high-level IADLs, impaired strength, decreased activity tolerance, and impaired balance. (See below for objective details and assist levels).    Overall pt tolerated session well today, currently with no c/o pain throughout session. Pt continues to demonstrate increased independence with all functional mobility and ADLs. Pt received seated EOB, demos good static and dynamic sitting balance EOB. Pt requested to use restroom prior to PT session, pt demos good safety awareness and need for DME, completes RW set-up without PT assistance. Pt continues to require SBA-CGA for all transfers for increased safety. Pt amb 10' with RW, gait belt and CGA; demos decreased step clearance and wide [place], however no overt LOB noted, demos good RW

## 2024-12-28 PROCEDURE — 6370000000 HC RX 637 (ALT 250 FOR IP)

## 2024-12-28 PROCEDURE — 2500000003 HC RX 250 WO HCPCS: Performed by: INTERNAL MEDICINE

## 2024-12-28 PROCEDURE — 6370000000 HC RX 637 (ALT 250 FOR IP): Performed by: INTERNAL MEDICINE

## 2024-12-28 PROCEDURE — 6360000002 HC RX W HCPCS: Performed by: STUDENT IN AN ORGANIZED HEALTH CARE EDUCATION/TRAINING PROGRAM

## 2024-12-28 PROCEDURE — 92526 ORAL FUNCTION THERAPY: CPT

## 2024-12-28 PROCEDURE — 94640 AIRWAY INHALATION TREATMENT: CPT

## 2024-12-28 PROCEDURE — 2500000003 HC RX 250 WO HCPCS: Performed by: NURSE PRACTITIONER

## 2024-12-28 PROCEDURE — 1100000000 HC RM PRIVATE

## 2024-12-28 RX ADMIN — POTASSIUM BICARBONATE 40 MEQ: 782 TABLET, EFFERVESCENT ORAL at 22:10

## 2024-12-28 RX ADMIN — ENOXAPARIN SODIUM 30 MG: 100 INJECTION SUBCUTANEOUS at 22:09

## 2024-12-28 RX ADMIN — SENNOSIDES 8.6 MG: 8.6 TABLET, FILM COATED ORAL at 22:10

## 2024-12-28 RX ADMIN — QUETIAPINE FUMARATE 50 MG: 25 TABLET ORAL at 22:10

## 2024-12-28 RX ADMIN — POTASSIUM BICARBONATE 40 MEQ: 782 TABLET, EFFERVESCENT ORAL at 09:45

## 2024-12-28 RX ADMIN — GUAIFENESIN 300 MG: 100 SOLUTION ORAL at 14:43

## 2024-12-28 RX ADMIN — Medication 2 PUFF: at 09:56

## 2024-12-28 RX ADMIN — GLYCOPYRROLATE 1 MG: 1 TABLET ORAL at 09:44

## 2024-12-28 RX ADMIN — GUAIFENESIN 300 MG: 100 SOLUTION ORAL at 17:20

## 2024-12-28 RX ADMIN — ASPIRIN 81 MG 81 MG: 81 TABLET ORAL at 09:44

## 2024-12-28 RX ADMIN — Medication 2 PUFF: at 20:55

## 2024-12-28 RX ADMIN — SODIUM CHLORIDE, PRESERVATIVE FREE 10 ML: 5 INJECTION INTRAVENOUS at 21:58

## 2024-12-28 RX ADMIN — QUETIAPINE FUMARATE 50 MG: 25 TABLET ORAL at 09:44

## 2024-12-28 RX ADMIN — GLYCOPYRROLATE 1 MG: 1 TABLET ORAL at 14:43

## 2024-12-28 RX ADMIN — ATORVASTATIN CALCIUM 40 MG: 40 TABLET, FILM COATED ORAL at 22:11

## 2024-12-28 RX ADMIN — BUSPIRONE HYDROCHLORIDE 7.5 MG: 5 TABLET ORAL at 09:44

## 2024-12-28 RX ADMIN — GUAIFENESIN 300 MG: 100 SOLUTION ORAL at 22:11

## 2024-12-28 RX ADMIN — EMPAGLIFLOZIN 10 MG: 10 TABLET, FILM COATED ORAL at 09:44

## 2024-12-28 RX ADMIN — GLYCOPYRROLATE 1 MG: 1 TABLET ORAL at 22:10

## 2024-12-28 RX ADMIN — FAMOTIDINE 20 MG: 20 TABLET, FILM COATED ORAL at 09:44

## 2024-12-28 RX ADMIN — FLUCONAZOLE 100 MG: 100 TABLET ORAL at 09:44

## 2024-12-28 RX ADMIN — GUAIFENESIN 300 MG: 100 SOLUTION ORAL at 09:44

## 2024-12-28 RX ADMIN — SODIUM CHLORIDE, PRESERVATIVE FREE 10 ML: 5 INJECTION INTRAVENOUS at 09:52

## 2024-12-28 RX ADMIN — TAMSULOSIN HYDROCHLORIDE 0.4 MG: 0.4 CAPSULE ORAL at 09:44

## 2024-12-28 RX ADMIN — ENOXAPARIN SODIUM 30 MG: 100 INJECTION SUBCUTANEOUS at 09:44

## 2024-12-28 RX ADMIN — QUETIAPINE FUMARATE 50 MG: 25 TABLET ORAL at 14:43

## 2024-12-28 RX ADMIN — METOPROLOL TARTRATE 25 MG: 25 TABLET, FILM COATED ORAL at 22:11

## 2024-12-28 RX ADMIN — SERTRALINE 50 MG: 50 TABLET, FILM COATED ORAL at 09:44

## 2024-12-28 RX ADMIN — METOPROLOL TARTRATE 25 MG: 25 TABLET, FILM COATED ORAL at 09:44

## 2024-12-28 RX ADMIN — BUSPIRONE HYDROCHLORIDE 7.5 MG: 5 TABLET ORAL at 22:10

## 2024-12-28 NOTE — PLAN OF CARE
[place], Melany, RN  Outcome: Progressing  12/27/2024 2223 by Nathalia Goode RN  Outcome: Progressing  Goal: Glucose maintained within prescribed range  12/28/2024 1043 by Melany Zapien RN  Outcome: Progressing  12/27/2024 2223 by Nathalia Goode RN  Outcome: Progressing     Problem: Safety - Violent/Self-destructive Restraint  Goal: Remains free of injury from restraints (Restraint for Violent/Self-Destructive Behavior)  Description: INTERVENTIONS:  1. Determine that de-escalation and other, less restrictive measures have been tried or would not be effective before applying the restraint  2. Identify and document the criteria for restraint  3. Evaluate the patient's condition at the time of restraint application  4. Inform patient/family regarding the reason for restraint/seclusion  5. Q2H: Monitor comfort, nutrition and hydration needs  6. Q15M: Perform safety checks including skin, circulation, sensory, respiratory and psychological status  7. Ensure continuous observation  8. Identify and implement measures to help patient regain control, assess readiness for release and initiate progressive release per policy  12/28/2024 1043 by Melany Zapien RN  Outcome: Progressing  12/27/2024 2223 by Nathalia Goode RN  Outcome: Progressing     Problem: Safety - Medical Restraint  Goal: Remains free of injury from restraints (Restraint for Interference with Medical Device)  Description: INTERVENTIONS:  1. Determine that other, less restrictive measures have been tried or would not be effective before applying the restraint  2. Evaluate the patient's condition at the time of restraint application  3. Inform patient/family regarding the reason for restraint  4. Q2H: Monitor safety, psychosocial status, comfort, nutrition and hydration  12/28/2024 1043 by Melany Zapien RN  Outcome: Progressing  12/27/2024 2223 by Nathalia Goode RN  Outcome: Progressing     Problem: Pain  Goal: Verbalizes/displays  adequate comfort level or baseline comfort level  12/28/2024 1043 by Melany Zapien RN  Outcome: Progressing  12/27/2024 2223 by Nathalia Goode RN  Outcome: Progressing     Problem: Skin/Tissue Integrity  Goal: Absence of new skin breakdown  Description: 1.  Monitor for areas of redness and/or skin breakdown  2.  Assess vascular access sites hourly  3.  Every 4-6 hours minimum:  Change oxygen saturation probe site  4.  Every 4-6 hours:  If on nasal continuous positive airway pressure, respiratory therapy assess nares and determine need for appliance change or resting period.  12/28/2024 1043 by Melany Zapien RN  Outcome: Progressing  12/27/2024 2223 by Nathalia Goode RN  Outcome: Progressing     Problem: Skin/Tissue Integrity - Adult  Goal: Skin integrity remains intact  12/28/2024 1043 by Melany Zapien RN  Outcome: Progressing  12/27/2024 2223 by Nathalia Goode RN  Outcome: Progressing  Goal: Incisions, wounds, or drain sites healing without S/S of infection  12/28/2024 1043 by Melany Zapien RN  Outcome: Progressing  12/27/2024 2223 by Nathalia Goode RN  Outcome: Progressing  Goal: Oral mucous membranes remain intact  12/28/2024 1043 by Melany Zapien RN  Outcome: Progressing  12/27/2024 2223 by Nathalia Goode RN  Outcome: Progressing

## 2024-12-28 NOTE — PLAN OF CARE
Speech LAnguage Pathology Dysphagia TREATMENT    Patient: Kim Markham (49 y.o. female)  Date: 12/28/2024  Primary Diagnosis: Acute right-sided congestive heart failure (HCC) [I50.811]  SVT (supraventricular tachycardia) (HCC) [I47.10]  Essential hypertension [I10]  Hypoxia [R09.02]  COPD exacerbation ([facility]) [J44.1]  Procedure(s) (LRB):  TRACHEOSTOMY (N/A) 24 Days Post-Op   Precautions: aspiration, Fall Risk                  Diet recommendations: Easy to chew, thin liquids, and no straws    Safe swallow precautions: STRICT aspiration/GERD precautions. Monitor closely for s/s asp/pen, slow rate, double swallow, small single sips, small bites. Remain upright for at least 30 minutes after meals. Meds as tolerated.    ASSESSMENT :  RN reports no concerns with patient tolerating current diet. Upon entering room, patient alert, resting in bed. Awakes to knock on door and repositions self EOB. Good vocal quality. Reports feeling better and air no longer escaping through stoma. Patient reports dressing on stoma changed this am, hopeful will no longer need dressing soon. Per patient, continuing to complete swallowing exercises daily.     Self-administered po trials of ETC consistency and thin liquids. Slow but adequate mastication, adequate bolus control, cleared oral cavity. Swallow initiation appears timely. Upon palpation, HLE appears WFL. Cough response x1 sip thin; cued to take small single sips and demonstrated with remaining trials without clinical indicators asp/pen.     Swallow exercises completed:   Leonie x10  Effortful x10  Patient expressed fatigue. Encouraged patient to continue completing swallow exercises above as well as Supraglottic exercise between tx sessions.     Patient will benefit from skilled intervention to address the above impairments.      Problem: SLP Adult - Impaired Swallowing  Goal: By Discharge: Advance to least restrictive diet without signs or symptoms of aspiration for planned  employment    Cognitive and Communication Status:  Neurologic State: Alert  Orientation Level: Oriented x4  Cognition: Appropriate for age attention/concentration and Follows commands               After Treatment:  Patient left in no apparent distress in bed, Call bell left within reach, and Nursing notified     Pain:  Denies pain    COMMUNICATION/EDUCATION:   Swallow safety precautions, Aspiration precautions, Diet recommendations, Compensatory strategies, and Prognosis and SLP POC education provided to Patient and Nurse via explanation, all questions/concerns addressed. Patient verbalizes understanding.    The patient's plan of care including recommendations, planned interventions, and recommended diet changes were discussed with: Registered nurse.    Patient/family agree to work toward stated goals and plan of care    Thank you,  Candida Becerra M.S. CCC-SLP  Minutes: 17

## 2024-12-28 NOTE — PROGRESS NOTES
Hospitalist Progress Note               Daily Progress Note: 12/28/2024      Hospital Day: 47     Chief complaint:   Chief Complaint   Patient presents with    Shortness of Breath        Subjective:   Patient is seen and examined today during bedside rounds.  No new events.  No new complaints.  Patient has been working with physical therapy.  She admitted with [facility] did fill all the necessary paperwork for the [facility] bed for [facility]    Assessment and plan    Acute respiratory failure with hypoxia 2/2 to COPD   S/p Intubation   S/p tracheostomy 12/4  -Present to the ICU in acute distress and required oxygen  -Respiratory status worsened on 11/13 AM and patient was intubated.  -Patient was extubated 11/23.  Patient failed extubation and was reintubated on 11/26 due to lung collapse.    -CT thorax 11/26: Bibasilar linear and bandlike opacities consistent with atelectasis along with bronchial wall thickening and debris in the lower lobe bronchi consistent with mucous plugging  -Bronchoscopy was performed on 11/26 due to concern of mucous plugging on chest x-ray.  There was severe mucous plugging noted on left side of endobronchial anatomy.  -Otherwise, ENT was consulted tracheostomy was performed on 12/4.    -PT recommends SNF and patient pending placement  -Speech following and recommend thickened liquids  -ENT decannulated trach, will heal by secondary intention    HFpEF   EF from 11/13/2024 6065%.  Mildly increased wall thickness.  Abnormal diastolic function.  Add Jardiance  Restart diuretics    History of recent type II NSTEMI, likely demand ischemia given hypoxia. Peak troponin 248. Echo with EF 60-65%, LVH, RV moderately dilated. CTA chest negative for PE   Continue ASA  Can consider ischemic evaluation in the OP setting  Will give patient directions to call cardiology as she does not have established cardiology yet outpatient..  This admission admission she was seen by [facility]ology.  COPD

## 2024-12-28 NOTE — PLAN OF CARE
Problem: Discharge Planning  Goal: Discharge to home or other facility with appropriate resources  Outcome: Progressing     Problem: ABCDS Injury Assessment  Goal: Absence of physical injury  Outcome: Progressing     Problem: Safety - Adult  Goal: Free from fall injury  Outcome: Progressing     Problem: Chronic Conditions and Co-morbidities  Goal: Patient's chronic conditions and co-morbidity symptoms are monitored and maintained or improved  Outcome: Progressing     Problem: Neurosensory - Adult  Goal: Achieves stable or improved neurological status  Outcome: Progressing  Goal: Absence of seizures  Outcome: Progressing  Goal: Remains free of injury related to seizures activity  Outcome: Progressing  Goal: Achieves maximal functionality and self care  Outcome: Progressing     Problem: Respiratory - Adult  Goal: Achieves optimal ventilation and oxygenation  Outcome: Progressing     Problem: Cardiovascular - Adult  Goal: Maintains optimal cardiac output and hemodynamic stability  Outcome: Progressing  Goal: Absence of cardiac dysrhythmias or at baseline  Outcome: Progressing

## 2024-12-28 NOTE — CARE COORDINATION
CM noted discharge order.     Patient is pending jerry bed application with [facility].    Backup is Medicaid pending bed when available at [facility].     CM will continue to follow.

## 2024-12-29 LAB
GLUCOSE BLD STRIP.AUTO-MCNC: 98 MG/DL (ref 65–100)
PERFORMED BY:: NORMAL

## 2024-12-29 PROCEDURE — 6360000002 HC RX W HCPCS: Performed by: STUDENT IN AN ORGANIZED HEALTH CARE EDUCATION/TRAINING PROGRAM

## 2024-12-29 PROCEDURE — 2500000003 HC RX 250 WO HCPCS: Performed by: NURSE PRACTITIONER

## 2024-12-29 PROCEDURE — 2500000003 HC RX 250 WO HCPCS: Performed by: INTERNAL MEDICINE

## 2024-12-29 PROCEDURE — 94640 AIRWAY INHALATION TREATMENT: CPT

## 2024-12-29 PROCEDURE — 6370000000 HC RX 637 (ALT 250 FOR IP): Performed by: INTERNAL MEDICINE

## 2024-12-29 PROCEDURE — 6370000000 HC RX 637 (ALT 250 FOR IP)

## 2024-12-29 PROCEDURE — 1100000000 HC RM PRIVATE

## 2024-12-29 PROCEDURE — 82962 GLUCOSE BLOOD TEST: CPT

## 2024-12-29 RX ADMIN — Medication 2 PUFF: at 22:04

## 2024-12-29 RX ADMIN — POTASSIUM BICARBONATE 40 MEQ: 782 TABLET, EFFERVESCENT ORAL at 21:32

## 2024-12-29 RX ADMIN — ENOXAPARIN SODIUM 30 MG: 100 INJECTION SUBCUTANEOUS at 21:33

## 2024-12-29 RX ADMIN — ACETAMINOPHEN 650 MG: 650 SOLUTION ORAL at 16:36

## 2024-12-29 RX ADMIN — GLYCOPYRROLATE 1 MG: 1 TABLET ORAL at 14:09

## 2024-12-29 RX ADMIN — FLUCONAZOLE 100 MG: 100 TABLET ORAL at 08:10

## 2024-12-29 RX ADMIN — GLYCOPYRROLATE 1 MG: 1 TABLET ORAL at 21:32

## 2024-12-29 RX ADMIN — FUROSEMIDE 20 MG: 40 TABLET ORAL at 08:09

## 2024-12-29 RX ADMIN — METOPROLOL TARTRATE 25 MG: 25 TABLET, FILM COATED ORAL at 08:09

## 2024-12-29 RX ADMIN — ENOXAPARIN SODIUM 30 MG: 100 INJECTION SUBCUTANEOUS at 08:10

## 2024-12-29 RX ADMIN — SERTRALINE 50 MG: 50 TABLET, FILM COATED ORAL at 08:09

## 2024-12-29 RX ADMIN — ASPIRIN 81 MG 81 MG: 81 TABLET ORAL at 08:09

## 2024-12-29 RX ADMIN — SODIUM CHLORIDE, PRESERVATIVE FREE 10 ML: 5 INJECTION INTRAVENOUS at 08:10

## 2024-12-29 RX ADMIN — POTASSIUM BICARBONATE 40 MEQ: 782 TABLET, EFFERVESCENT ORAL at 08:09

## 2024-12-29 RX ADMIN — QUETIAPINE FUMARATE 50 MG: 25 TABLET ORAL at 21:32

## 2024-12-29 RX ADMIN — GUAIFENESIN 300 MG: 100 SOLUTION ORAL at 08:09

## 2024-12-29 RX ADMIN — ATORVASTATIN CALCIUM 40 MG: 40 TABLET, FILM COATED ORAL at 21:32

## 2024-12-29 RX ADMIN — Medication 2 PUFF: at 08:48

## 2024-12-29 RX ADMIN — GUAIFENESIN 300 MG: 100 SOLUTION ORAL at 16:35

## 2024-12-29 RX ADMIN — EMPAGLIFLOZIN 10 MG: 10 TABLET, FILM COATED ORAL at 08:10

## 2024-12-29 RX ADMIN — QUETIAPINE FUMARATE 50 MG: 25 TABLET ORAL at 08:10

## 2024-12-29 RX ADMIN — BUSPIRONE HYDROCHLORIDE 7.5 MG: 5 TABLET ORAL at 21:31

## 2024-12-29 RX ADMIN — BUSPIRONE HYDROCHLORIDE 7.5 MG: 5 TABLET ORAL at 08:09

## 2024-12-29 RX ADMIN — QUETIAPINE FUMARATE 50 MG: 25 TABLET ORAL at 14:09

## 2024-12-29 RX ADMIN — SODIUM CHLORIDE, PRESERVATIVE FREE 10 ML: 5 INJECTION INTRAVENOUS at 21:33

## 2024-12-29 RX ADMIN — METOPROLOL TARTRATE 25 MG: 25 TABLET, FILM COATED ORAL at 21:32

## 2024-12-29 RX ADMIN — FAMOTIDINE 20 MG: 20 TABLET, FILM COATED ORAL at 08:10

## 2024-12-29 RX ADMIN — SENNOSIDES 8.6 MG: 8.6 TABLET, FILM COATED ORAL at 21:32

## 2024-12-29 RX ADMIN — TAMSULOSIN HYDROCHLORIDE 0.4 MG: 0.4 CAPSULE ORAL at 08:09

## 2024-12-29 RX ADMIN — GLYCOPYRROLATE 1 MG: 1 TABLET ORAL at 08:10

## 2024-12-29 RX ADMIN — GUAIFENESIN 300 MG: 100 SOLUTION ORAL at 14:09

## 2024-12-29 RX ADMIN — GUAIFENESIN 300 MG: 100 SOLUTION ORAL at 21:31

## 2024-12-29 ASSESSMENT — PAIN DESCRIPTION - LOCATION
LOCATION: BACK;HIP
LOCATION: BACK

## 2024-12-29 ASSESSMENT — PAIN SCALES - GENERAL
PAINLEVEL_OUTOF10: 7
PAINLEVEL_OUTOF10: 5
PAINLEVEL_OUTOF10: 3

## 2024-12-29 ASSESSMENT — PAIN DESCRIPTION - ORIENTATION: ORIENTATION: LEFT

## 2024-12-29 ASSESSMENT — PAIN DESCRIPTION - DESCRIPTORS
DESCRIPTORS: ACHING
DESCRIPTORS: ACHING

## 2024-12-29 ASSESSMENT — PAIN DESCRIPTION - PAIN TYPE: TYPE: CHRONIC PAIN

## 2024-12-29 NOTE — WOUND CARE
Wound Care Note:    Wound Care into see patient because of wound to back of head    Skin Care & Pressure Relief Recommendations:  Minimize layers of linen  Pads under patient to optimize support surface and microclimate  Turn/reposition approximately every 2 hours.  Pillow Wedges  Manage incontinence  Promote continence; Skin Protective lotion to buttocks and sacrum daily and as needed with incontinence care    Offload heels with pillows or offloading boots.    Support Surface: foam.  Patient ambulating in room with a walker.      Dry crust noted to posterior head.  Surround skin is intact.  No erythema.  Etiology unclear.  Patient denies tenderness to area.  Crust will gradually dissolve with daily cleansing with warm water and soap.  No dressing needed.     Prevention:  Apply Optifoam Border Sacral foam dressing to sacrum and Border Heel foams to both heels every three days to redistribute pressure from bony prominence and prevent pressure and friction injury to skin.  Rn is to gently peel back foam dressing for shift integumentary assessment and re-secure.  Maintain the external  incontinence management system to manage  incontinence.  Use foam wedge to turn q2h at 30 degree angle to offload sacrum, if needed.  Float heels with 1-2 pillows lengthwise while in bed for offloading of the heels.  Maintain HOB at 30 degrees or less, if not contraindicated, to reduce pressure to buttocks and sacrum.  Raise foot of bed to help prevent friction and shear injury from sliding down in the bed.  Continue to work with PT/OT to address mobility deficits.  Re-consult [facility] for other skin/wound care concerns.

## 2024-12-29 NOTE — PLAN OF CARE
infection at discharge  Outcome: Progressing  Goal: Absence of infection during hospitalization  Outcome: Progressing  Goal: Absence of fever/infection during anticipated neutropenic period  Outcome: Progressing     Problem: Metabolic/Fluid and Electrolytes - Adult  Goal: Electrolytes maintained within normal limits  Outcome: Progressing  Goal: Hemodynamic stability and optimal renal function maintained  Outcome: Progressing  Goal: Glucose maintained within prescribed range  Outcome: Progressing     Problem: Safety - Violent/Self-destructive Restraint  Goal: Remains free of injury from restraints (Restraint for Violent/Self-Destructive Behavior)  Outcome: Progressing     Problem: Safety - Medical Restraint  Goal: Remains free of injury from restraints (Restraint for Interference with Medical Device)  Outcome: Progressing     Problem: Pain  Goal: Verbalizes/displays adequate comfort level or baseline comfort level  Outcome: Progressing     Problem: Skin/Tissue Integrity  Goal: Absence of new skin breakdown  Outcome: Progressing     Problem: Skin/Tissue Integrity - Adult  Goal: Skin integrity remains intact  Outcome: Progressing  Goal: Incisions, wounds, or drain sites healing without S/S of infection  Outcome: Progressing  Goal: Oral mucous membranes remain intact  Outcome: Progressing     Problem: SLP Adult - Impaired Swallowing  Goal: By Discharge: Advance to least restrictive diet without signs or symptoms of aspiration for planned discharge setting.  See evaluation for individualized goals.  12/28/2024 1407 by Candida Becerra, SLP  Outcome: Progressing

## 2024-12-30 PROCEDURE — 97116 GAIT TRAINING THERAPY: CPT

## 2024-12-30 PROCEDURE — 94761 N-INVAS EAR/PLS OXIMETRY MLT: CPT

## 2024-12-30 PROCEDURE — 6370000000 HC RX 637 (ALT 250 FOR IP): Performed by: INTERNAL MEDICINE

## 2024-12-30 PROCEDURE — 6370000000 HC RX 637 (ALT 250 FOR IP)

## 2024-12-30 PROCEDURE — 2500000003 HC RX 250 WO HCPCS: Performed by: INTERNAL MEDICINE

## 2024-12-30 PROCEDURE — 97110 THERAPEUTIC EXERCISES: CPT

## 2024-12-30 PROCEDURE — 94640 AIRWAY INHALATION TREATMENT: CPT

## 2024-12-30 PROCEDURE — 6360000002 HC RX W HCPCS: Performed by: STUDENT IN AN ORGANIZED HEALTH CARE EDUCATION/TRAINING PROGRAM

## 2024-12-30 PROCEDURE — 1100000000 HC RM PRIVATE

## 2024-12-30 PROCEDURE — 2500000003 HC RX 250 WO HCPCS: Performed by: NURSE PRACTITIONER

## 2024-12-30 RX ORDER — FUROSEMIDE 20 MG/1
20 TABLET ORAL DAILY
Qty: 60 TABLET | Refills: 3 | Status: SHIPPED | OUTPATIENT
Start: 2024-12-31 | End: 2025-01-09

## 2024-12-30 RX ORDER — METOPROLOL TARTRATE 25 MG/1
25 TABLET, FILM COATED ORAL 2 TIMES DAILY
Qty: 60 TABLET | Refills: 3 | Status: SHIPPED | OUTPATIENT
Start: 2024-12-30 | End: 2025-01-09

## 2024-12-30 RX ADMIN — FUROSEMIDE 20 MG: 40 TABLET ORAL at 09:59

## 2024-12-30 RX ADMIN — ACETAMINOPHEN 650 MG: 650 SOLUTION ORAL at 22:47

## 2024-12-30 RX ADMIN — FAMOTIDINE 20 MG: 20 TABLET, FILM COATED ORAL at 09:58

## 2024-12-30 RX ADMIN — ACETAMINOPHEN 650 MG: 650 SOLUTION ORAL at 17:24

## 2024-12-30 RX ADMIN — SENNOSIDES 8.6 MG: 8.6 TABLET, FILM COATED ORAL at 22:50

## 2024-12-30 RX ADMIN — BUSPIRONE HYDROCHLORIDE 7.5 MG: 5 TABLET ORAL at 22:50

## 2024-12-30 RX ADMIN — GUAIFENESIN 300 MG: 100 SOLUTION ORAL at 09:53

## 2024-12-30 RX ADMIN — ENOXAPARIN SODIUM 30 MG: 100 INJECTION SUBCUTANEOUS at 22:51

## 2024-12-30 RX ADMIN — ATORVASTATIN CALCIUM 40 MG: 40 TABLET, FILM COATED ORAL at 22:50

## 2024-12-30 RX ADMIN — GUAIFENESIN 300 MG: 100 SOLUTION ORAL at 13:30

## 2024-12-30 RX ADMIN — METOPROLOL TARTRATE 25 MG: 25 TABLET, FILM COATED ORAL at 22:50

## 2024-12-30 RX ADMIN — GUAIFENESIN 300 MG: 100 SOLUTION ORAL at 17:21

## 2024-12-30 RX ADMIN — Medication 2 PUFF: at 08:00

## 2024-12-30 RX ADMIN — TAMSULOSIN HYDROCHLORIDE 0.4 MG: 0.4 CAPSULE ORAL at 09:55

## 2024-12-30 RX ADMIN — ENOXAPARIN SODIUM 30 MG: 100 INJECTION SUBCUTANEOUS at 09:52

## 2024-12-30 RX ADMIN — SODIUM CHLORIDE, PRESERVATIVE FREE 10 ML: 5 INJECTION INTRAVENOUS at 11:27

## 2024-12-30 RX ADMIN — SODIUM CHLORIDE, PRESERVATIVE FREE 5 ML: 5 INJECTION INTRAVENOUS at 12:50

## 2024-12-30 RX ADMIN — METOPROLOL TARTRATE 25 MG: 25 TABLET, FILM COATED ORAL at 09:55

## 2024-12-30 RX ADMIN — QUETIAPINE FUMARATE 50 MG: 25 TABLET ORAL at 09:54

## 2024-12-30 RX ADMIN — QUETIAPINE FUMARATE 50 MG: 25 TABLET ORAL at 22:50

## 2024-12-30 RX ADMIN — QUETIAPINE FUMARATE 50 MG: 25 TABLET ORAL at 13:30

## 2024-12-30 RX ADMIN — POTASSIUM BICARBONATE 40 MEQ: 782 TABLET, EFFERVESCENT ORAL at 22:47

## 2024-12-30 RX ADMIN — GUAIFENESIN 300 MG: 100 SOLUTION ORAL at 22:48

## 2024-12-30 RX ADMIN — POTASSIUM BICARBONATE 40 MEQ: 782 TABLET, EFFERVESCENT ORAL at 09:52

## 2024-12-30 RX ADMIN — Medication 2 PUFF: at 20:26

## 2024-12-30 RX ADMIN — EMPAGLIFLOZIN 10 MG: 10 TABLET, FILM COATED ORAL at 09:59

## 2024-12-30 RX ADMIN — SODIUM CHLORIDE, PRESERVATIVE FREE 10 ML: 5 INJECTION INTRAVENOUS at 22:48

## 2024-12-30 RX ADMIN — GLYCOPYRROLATE 1 MG: 1 TABLET ORAL at 09:58

## 2024-12-30 RX ADMIN — ASPIRIN 81 MG 81 MG: 81 TABLET ORAL at 09:59

## 2024-12-30 RX ADMIN — BUSPIRONE HYDROCHLORIDE 7.5 MG: 5 TABLET ORAL at 09:56

## 2024-12-30 RX ADMIN — FLUCONAZOLE 100 MG: 100 TABLET ORAL at 09:58

## 2024-12-30 RX ADMIN — GLYCOPYRROLATE 1 MG: 1 TABLET ORAL at 13:30

## 2024-12-30 RX ADMIN — GLYCOPYRROLATE 1 MG: 1 TABLET ORAL at 22:50

## 2024-12-30 RX ADMIN — SERTRALINE 50 MG: 50 TABLET, FILM COATED ORAL at 09:57

## 2024-12-30 ASSESSMENT — PAIN DESCRIPTION - DESCRIPTORS
DESCRIPTORS: ACHING
DESCRIPTORS: ACHING

## 2024-12-30 ASSESSMENT — PAIN SCALES - WONG BAKER: WONGBAKER_NUMERICALRESPONSE: NO HURT

## 2024-12-30 ASSESSMENT — PAIN SCALES - GENERAL
PAINLEVEL_OUTOF10: 1
PAINLEVEL_OUTOF10: 6
PAINLEVEL_OUTOF10: 1
PAINLEVEL_OUTOF10: 2

## 2024-12-30 ASSESSMENT — PAIN DESCRIPTION - ORIENTATION: ORIENTATION: LEFT

## 2024-12-30 ASSESSMENT — PAIN DESCRIPTION - LOCATION
LOCATION: BACK
LOCATION: GENERALIZED

## 2024-12-30 NOTE — PLAN OF CARE
Problem: Discharge Planning  Goal: Discharge to home or other facility with appropriate resources  Outcome: Progressing  Flowsheets (Taken 12/30/2024 1030)  Discharge to home or other facility with appropriate resources:   Identify barriers to discharge with patient and caregiver   Identify discharge learning needs (meds, wound care, etc)   Arrange for interpreters to assist at discharge as needed     Problem: ABCDS Injury Assessment  Goal: Absence of physical injury  Outcome: Progressing     Problem: Safety - Adult  Goal: Free from fall injury  Outcome: Progressing     Problem: Confusion  Goal: Confusion, delirium, dementia, or psychosis is improved or at baseline  Description: INTERVENTIONS:  1. Assess for possible contributors to thought disturbance, including medications, impaired vision or hearing, underlying metabolic abnormalities, dehydration, psychiatric diagnoses, and notify attending LIP  2. [facility] high risk fall precautions, as indicated  3. Provide frequent short contacts to provide reality reorientation, refocusing and direction  4. Decrease environmental stimuli, including noise as appropriate  5. Monitor and intervene to maintain adequate nutrition, hydration, elimination, sleep and activity  6. If unable to ensure safety without constant attention obtain sitter and review sitter guidelines with assigned personnel  7. Initiate Psychosocial CNS and Spiritual Care consult, as indicated  Outcome: Progressing  Flowsheets (Taken 12/30/2024 1030)  Effect of thought disturbance (confusion, delirium, dementia, or psychosis) are managed with adequate functional status:   Assess for contributors to thought disturbance, including medications, impaired vision or hearing, underlying metabolic abnormalities, dehydration, psychiatric diagnoses, notify LIP   Provide frequent short contacts to provide reality reorientation, refocusing and direction   [facility] high risk fall precautions, as indicated   Decrease

## 2024-12-30 NOTE — PROGRESS NOTES
extra gravy, no lemonade    Anthropometric Measures:  Height: 172.7 cm (5' 7.99\")  Ideal Body Weight (IBW): 140 lbs (64 kg)    Admission Body Weight: 126 kg (277 lb 11.2 oz)  Current Body Weight: 120 kg (264 lb 8.8 oz), 189 % IBW. Weight Source: Bed scale  Current BMI (kg/m2): 40.2  Usual Body Weight: 106.6 kg (235 lb) (11 mths ago per EMR)     % Weight Change (Calculated): 22  Weight Adjustment For: No Adjustment        BMI Categories: Obese Class 3 (BMI 40.0 or greater)  Last Weight Metrics:      12/28/2024     6:00 AM 12/26/2024     9:52 PM 12/26/2024     6:00 AM 12/25/2024     6:00 AM 12/23/2024    12:27 AM 12/22/2024     2:24 AM 12/19/2024     6:00 AM   Weight Loss Metrics   Weight - Scale 265 lbs 7 oz 267 lbs 3 oz 263 lbs 4 oz 264 lbs 9 oz 263 lbs 263 lbs 7 oz 271 lbs 3 oz   BMI (Calculated) 40.5 kg/m2 40.7 kg/m2 40.1 kg/m2 40.3 kg/m2 40.1 kg/m2 40.2 kg/m2 41.3 kg/m2      Estimated Daily Nutrient Needs:  Energy Requirements Based On: Kcal/kg  Weight Used for Energy Requirements: Adjusted  Energy (kcal/day): 9133-3620 kcals/day (25-30 kcals/kg adjBW)  Weight Used for Protein Requirements: Adjusted  Protein (g/day):  g/day (1.4-1.7 g/kg IBW)  Method Used for Fluid Requirements: 1 ml/kcal  Fluid (ml/day): 2457-8925 ml/day    Nutrition Diagnosis:   Increased nutrient needs related to impaired respiratory function as evidenced by intubation    Nutrition Interventions:   Food and/or Nutrient Delivery: Continue Current Diet, Continue Oral Nutrition Supplement  Nutrition Education/Counseling: No recommendation at this time  Coordination of Nutrition Care: Continue to monitor while inpatient  Plan of Care discussed with: pt    Goals:  Goals: Maintain adequate nutrition status, prior to discharge  Type of Goal: Continue current goal  Previous Goal Met: Progressing toward Goal(s)    Nutrition Monitoring and Evaluation:   Behavioral-Environmental Outcomes: None Identified  Food/Nutrient Intake Outcomes: Supplement

## 2024-12-30 NOTE — CARE COORDINATION
Chart reviewed. Patient's jerry application is still pending with [facility]. Possible beds available Jan 1 if patient qualifies. CM reached for an update and awaiting response at this time.     Patient's alternative discharge plan will be LTC at a nursing facility pending acceptance at a facility that will accept medicaid pending.

## 2024-12-30 NOTE — PROGRESS NOTES
Discharge paperwork started. Put in next dose due date. Print and send with patient when patient is ready to leave. Primary nurse aware.

## 2024-12-30 NOTE — PLAN OF CARE
PHYSICAL THERAPY TREATMENT     Patient: Kim Markham (49 y.o. female)  Date: 12/30/2024  Diagnosis: Acute right-sided congestive heart failure (HCC) [I50.811]  SVT (supraventricular tachycardia) (HCC) [I47.10]  Essential hypertension [I10]  Hypoxia [R09.02]  COPD exacerbation (HCC) [J44.1] SVT (supraventricular tachycardia) (HCC)  Procedure(s) (LRB):  TRACHEOSTOMY (N/A) 26 Days Post-Op  Precautions: Fall Risk                      Recommendations for nursing mobility: Out of bed to chair for meals, AD and gt belt for bed to chair , Amb to bathroom with AD and gait belt, Amb in hallway, and Assist x1    In place during session:   Chart, physical therapy assessment, plan of care and goals were reviewed.  ASSESSMENT  Patient continues with skilled PT services and is progressing towards goals. Pt supine upon PT arrival, agreeable to session. Pt A&O x 4. (See below for objective details and assist levels).     Overall pt tolerated session well today with PT. Decreased assistance required for bed mobility and transfers. Increased amb distance. Demos slow steady maday with no overt LOB noted. Heavy reliance on RW. Pt appeared SOB with amb, however O2 sats WFL t/o tx. Participated/reviewed LE seated therex, verbalized understanding.  Will continue to benefit from skilled PT services, and will continue to progress as tolerated. Current PT DC recommendation High intensity and comprehensive skilled physical therapy in a multidisciplinary setting as patient is working towards tolerating up to 3 hours of therapy/day x 5-7 days/week once medically appropriate.     Start of Session End of Session   SPO2 (%) 99 97   Heart Rate (BPM) 85 85     GOALS:         PLAN :  Patient continues to benefit from skilled intervention to address functional impairments. Continue treatment per established plan of care to address goals.    Recommendation for discharge: (in order for the patient to meet his/her long term goals)  High

## 2024-12-30 NOTE — PROGRESS NOTES
Hospitalist Progress Note               Daily Progress Note: 12/30/2024      Hospital Day: 49     Chief complaint:   Chief Complaint   Patient presents with    Shortness of Breath        Subjective:   Patient is seen and examined today during bedside rounds.  Patient feels well, no new complaints.  She was moved from telemetry floor yesterday.  Patient is awaiting jerry bed from [facility]    Assessment and plan    Acute respiratory failure with hypoxia 2/2 to COPD   S/p Intubation   S/p tracheostomy 12/4  -Present to the ICU in acute distress and required oxygen  -Respiratory status worsened on 11/13 AM and patient was intubated.  -Patient was extubated 11/23.  Patient failed extubation and was reintubated on 11/26 due to lung collapse.    -CT thorax 11/26: Bibasilar linear and bandlike opacities consistent with atelectasis along with bronchial wall thickening and debris in the lower lobe bronchi consistent with mucous plugging  -Bronchoscopy was performed on 11/26 due to concern of mucous plugging on chest x-ray.  There was severe mucous plugging noted on left side of endobronchial anatomy.  -Otherwise, ENT was consulted tracheostomy was performed on 12/4.    -PT recommends SNF and patient pending placement  -Speech following and recommend thickened liquids  -ENT decannulated trach, will heal by secondary intention    HFpEF   EF from 11/13/2024 6065%.  Mildly increased wall thickness.  Abnormal diastolic function.  Add Jardiance  Restart diuretics    History of recent type II NSTEMI, likely demand ischemia given hypoxia. Peak troponin 248. Echo with EF 60-65%, LVH, RV moderately dilated. CTA chest negative for PE   Continue ASA  Can consider ischemic evaluation in the OP setting  Will give patient directions to call cardiology as she does not have established cardiology yet outpatient..  This admission admission she was seen by [facility]ology.  COPD exacerbation, resolved  Will add triple therapy with  below the hemidiaphragm. The central line is   not visualized. A well-healed right posterior rib deformity is noted. The lungs   are clear. The cardiac and mediastinal contours and pulmonary vascularity are   normal.        IMPRESSION: Nonvisualization of the central line.         Electronically signed by Jailyn Espinoza      XR CHEST PORTABLE   Final Result   Satisfactory position of endotracheal and nasogastric tubes.      Electronically signed by Tristian Florence      XR CHEST PORTABLE   Final Result      Decreased minimal pulmonary edema.         Electronically signed by Tang Charles      XR CHEST PORTABLE   Final Result   Minimal interstitial pulmonary edema.         Electronically signed by MINDA REIS             Discussion/MDM:     [] High (any 2)    A. Problems (any 1)  [] Acute/Chronic Illness/injury posing threat to life or bodily function:    [] Severe exacerbation of chronic illness:    ---------------------------------------------------------------------  B. Risk of Treatment (any 1)   [] Drugs/treatments that require intensive monitoring for toxicity include:    [] IV ABX requiring serial renal monitoring for nephrotoxicity:     [] IV Narcotic analgesia for adverse drug reaction  [] Aggressive IV diuresis requiring serial monitoring for renal impairment and electrolyte derangements  [] Critical electrolyte abnormalities requiring IV replacement and close serial monitoring  [] SQ Insulin SS- monitoring serial FSBS for Hypoglycemic adverse drug reaction  [] Other -   [] Change in code status:    [] Decision to escalate care:    [] Major surgery/procedure with associated risk factors:    ----------------------------------------------------------------------  C. Data (any 2)  [x] Discussed current management and discharge planning options with Case Management.  [x] Discussed management of the case with:    [] Telemetry personally reviewed and interpreted as documented above    [] Imaging personally reviewed and

## 2024-12-31 PROCEDURE — 6370000000 HC RX 637 (ALT 250 FOR IP): Performed by: INTERNAL MEDICINE

## 2024-12-31 PROCEDURE — 6370000000 HC RX 637 (ALT 250 FOR IP)

## 2024-12-31 PROCEDURE — 2500000003 HC RX 250 WO HCPCS: Performed by: INTERNAL MEDICINE

## 2024-12-31 PROCEDURE — 97530 THERAPEUTIC ACTIVITIES: CPT

## 2024-12-31 PROCEDURE — 94640 AIRWAY INHALATION TREATMENT: CPT

## 2024-12-31 PROCEDURE — 6360000002 HC RX W HCPCS: Performed by: STUDENT IN AN ORGANIZED HEALTH CARE EDUCATION/TRAINING PROGRAM

## 2024-12-31 PROCEDURE — 1100000000 HC RM PRIVATE

## 2024-12-31 PROCEDURE — 2500000003 HC RX 250 WO HCPCS: Performed by: NURSE PRACTITIONER

## 2024-12-31 RX ADMIN — QUETIAPINE FUMARATE 50 MG: 25 TABLET ORAL at 09:01

## 2024-12-31 RX ADMIN — ENOXAPARIN SODIUM 30 MG: 100 INJECTION SUBCUTANEOUS at 22:02

## 2024-12-31 RX ADMIN — FUROSEMIDE 20 MG: 40 TABLET ORAL at 09:01

## 2024-12-31 RX ADMIN — ACETAMINOPHEN 650 MG: 650 SOLUTION ORAL at 22:05

## 2024-12-31 RX ADMIN — SERTRALINE 50 MG: 50 TABLET, FILM COATED ORAL at 09:02

## 2024-12-31 RX ADMIN — BUSPIRONE HYDROCHLORIDE 7.5 MG: 5 TABLET ORAL at 09:02

## 2024-12-31 RX ADMIN — GUAIFENESIN 300 MG: 100 SOLUTION ORAL at 22:05

## 2024-12-31 RX ADMIN — ENOXAPARIN SODIUM 30 MG: 100 INJECTION SUBCUTANEOUS at 09:00

## 2024-12-31 RX ADMIN — Medication 2 PUFF: at 07:51

## 2024-12-31 RX ADMIN — SODIUM CHLORIDE, PRESERVATIVE FREE 10 ML: 5 INJECTION INTRAVENOUS at 09:07

## 2024-12-31 RX ADMIN — GLYCOPYRROLATE 1 MG: 1 TABLET ORAL at 22:04

## 2024-12-31 RX ADMIN — GLYCOPYRROLATE 1 MG: 1 TABLET ORAL at 09:02

## 2024-12-31 RX ADMIN — GLYCOPYRROLATE 1 MG: 1 TABLET ORAL at 13:58

## 2024-12-31 RX ADMIN — POTASSIUM BICARBONATE 40 MEQ: 782 TABLET, EFFERVESCENT ORAL at 09:00

## 2024-12-31 RX ADMIN — SENNOSIDES 8.6 MG: 8.6 TABLET, FILM COATED ORAL at 22:04

## 2024-12-31 RX ADMIN — GUAIFENESIN 300 MG: 100 SOLUTION ORAL at 09:00

## 2024-12-31 RX ADMIN — QUETIAPINE FUMARATE 50 MG: 25 TABLET ORAL at 13:58

## 2024-12-31 RX ADMIN — ASPIRIN 81 MG 81 MG: 81 TABLET ORAL at 09:02

## 2024-12-31 RX ADMIN — TAMSULOSIN HYDROCHLORIDE 0.4 MG: 0.4 CAPSULE ORAL at 09:01

## 2024-12-31 RX ADMIN — METOPROLOL TARTRATE 25 MG: 25 TABLET, FILM COATED ORAL at 22:09

## 2024-12-31 RX ADMIN — GUAIFENESIN 300 MG: 100 SOLUTION ORAL at 13:58

## 2024-12-31 RX ADMIN — Medication 2 PUFF: at 20:16

## 2024-12-31 RX ADMIN — QUETIAPINE FUMARATE 50 MG: 25 TABLET ORAL at 22:04

## 2024-12-31 RX ADMIN — GUAIFENESIN 300 MG: 100 SOLUTION ORAL at 17:53

## 2024-12-31 RX ADMIN — METOPROLOL TARTRATE 25 MG: 25 TABLET, FILM COATED ORAL at 09:02

## 2024-12-31 RX ADMIN — FLUCONAZOLE 100 MG: 100 TABLET ORAL at 09:01

## 2024-12-31 RX ADMIN — ATORVASTATIN CALCIUM 40 MG: 40 TABLET, FILM COATED ORAL at 22:04

## 2024-12-31 RX ADMIN — BUSPIRONE HYDROCHLORIDE 7.5 MG: 5 TABLET ORAL at 22:05

## 2024-12-31 RX ADMIN — ACETAMINOPHEN 650 MG: 650 SOLUTION ORAL at 17:55

## 2024-12-31 RX ADMIN — FAMOTIDINE 20 MG: 20 TABLET, FILM COATED ORAL at 09:01

## 2024-12-31 RX ADMIN — POTASSIUM BICARBONATE 40 MEQ: 782 TABLET, EFFERVESCENT ORAL at 22:08

## 2024-12-31 RX ADMIN — SODIUM CHLORIDE, PRESERVATIVE FREE 10 ML: 5 INJECTION INTRAVENOUS at 22:05

## 2024-12-31 ASSESSMENT — PAIN SCALES - GENERAL
PAINLEVEL_OUTOF10: 3
PAINLEVEL_OUTOF10: 0
PAINLEVEL_OUTOF10: 6
PAINLEVEL_OUTOF10: 2

## 2024-12-31 ASSESSMENT — PAIN DESCRIPTION - PAIN TYPE: TYPE: ACUTE PAIN

## 2024-12-31 ASSESSMENT — PAIN DESCRIPTION - LOCATION
LOCATION: BACK
LOCATION: GENERALIZED
LOCATION: BACK

## 2024-12-31 ASSESSMENT — PAIN DESCRIPTION - DESCRIPTORS
DESCRIPTORS: ACHING

## 2024-12-31 ASSESSMENT — PAIN DESCRIPTION - ORIENTATION
ORIENTATION: LOWER
ORIENTATION: LOWER

## 2024-12-31 ASSESSMENT — PAIN - FUNCTIONAL ASSESSMENT: PAIN_FUNCTIONAL_ASSESSMENT: ACTIVITIES ARE NOT PREVENTED

## 2024-12-31 NOTE — PLAN OF CARE
Problem: Discharge Planning  Goal: Discharge to home or other facility with appropriate resources  12/31/2024 1050 by Remedios Moise RN  Outcome: Progressing  12/31/2024 0416 by Surinder Mckeon RN  Outcome: Progressing  Flowsheets (Taken 12/31/2024 0416)  Discharge to home or other facility with appropriate resources: Identify barriers to discharge with patient and caregiver  Note: Pending jerry bed and/or auth at SNF per CM note     Problem: ABCDS Injury Assessment  Goal: Absence of physical injury  Outcome: Progressing     Problem: Safety - Adult  Goal: Free from fall injury  12/31/2024 1050 by Remedios Moise RN  Outcome: Progressing  12/31/2024 0416 by Surinder Mckeon RN  Outcome: Progressing  Flowsheets (Taken 12/25/2024 0611)  Free From Fall Injury: Instruct family/caregiver on patient safety  Note: Patient remains free from fall/injury during hospitalization.     Problem: Confusion  Goal: Confusion, delirium, dementia, or psychosis is improved or at baseline  Description: INTERVENTIONS:  1. Assess for possible contributors to thought disturbance, including medications, impaired vision or hearing, underlying metabolic abnormalities, dehydration, psychiatric diagnoses, and notify attending LIP  2. [place] high risk fall precautions, as indicated  3. Provide frequent short contacts to provide reality reorientation, refocusing and direction  4. Decrease environmental stimuli, including noise as appropriate  5. Monitor and intervene to maintain adequate nutrition, hydration, elimination, sleep and activity  6. If unable to ensure safety without constant attention obtain sitter and review sitter guidelines with assigned personnel  7. Initiate Psychosocial CNS and Spiritual Care consult, as indicated  12/31/2024 1050 by Remedios Moise RN  Outcome: Progressing  12/31/2024 0416 by Surinder Mckeon RN  Outcome: Progressing  Flowsheets (Taken 12/31/2024 0416)  Effect of thought disturbance (confusion, delirium, dementia, or

## 2024-12-31 NOTE — PROGRESS NOTES
Hospitalist Progress Note    NAME:   Kim Markham   : 1975   MRN: [id number]     Date/Time: 2024 3:43 PM  Patient PCP: None, None    Estimated discharge date:  Barriers:       Assessment / Plan:    Acute respiratory failure with hypoxia 2/2 to COPD   S/p Intubation   S/p tracheostomy   -Present to the ICU in acute distress and required oxygen  -Respiratory status worsened on  AM and patient was intubated.  -Patient was extubated .  Patient failed extubation and was reintubated on  due to lung collapse.    -CT thorax : Bibasilar linear and bandlike opacities consistent with atelectasis along with bronchial wall thickening and debris in the lower lobe bronchi consistent with mucous plugging  -Bronchoscopy was performed on  due to concern of mucous plugging on chest x-ray.  There was severe mucous plugging noted on left side of endobronchial anatomy.  -Otherwise, ENT was consulted tracheostomy was performed on .    -PT recommends SNF and patient pending placement  -Speech following and recommend thickened liquids  -ENT decannulated trach, will heal by secondary intention     HFpEF   EF from 2024 6065%.  Mildly increased wall thickness.  Abnormal diastolic function.  Add Jardiance  Restart diuretics     History of recent type II NSTEMI, likely demand ischemia given hypoxia. Peak troponin 248. Echo with EF 60-65%, LVH, RV moderately dilated. CTA chest negative for PE   Continue ASA  Can consider ischemic evaluation in the OP setting  Will give patient directions to call cardiology as she does not have established cardiology yet outpatient..  This admission admission she was seen by [facility] cardiology.  COPD exacerbation, resolved  Will add triple therapy with tiotropium and Symbicort.  Same on discharge  No wheezing on exam  DuoNebs as needed as needed     Atrial arrhythmia, likely AVNRT -- there was also concern of WCT likely considered abberent

## 2024-12-31 NOTE — CARE COORDINATION
0740: Discharge order noted.    Patient is pending a jerry bed at [facility] after January 1, 2025.    Jerry application completed.    Back-up plan is a long term care facility who will accept Medicaid pending and has a bed available.    1235: [facility] has declined referral stating, \"she is walking 80 feet- reasoning is too high level and would not need intensive services at this time.\"

## 2024-12-31 NOTE — PLAN OF CARE
Goals reviewed, Care plan updated. No noted acute cardiac/respiratory distress, A&O x 4, IV saline locked, up ad teri, pleasant and cooperative.  Problem: Discharge Planning  Goal: Discharge to home or other facility with appropriate resources  Outcome: Progressing  Flowsheets (Taken 12/31/2024 0416)  Discharge to home or other facility with appropriate resources: Identify barriers to discharge with patient and caregiver  Note: Pending jerry bed and/or auth at SNF per CM note     Problem: Safety - Adult  Goal: Free from fall injury  Outcome: Progressing  Flowsheets (Taken 12/25/2024 0611)  Free From Fall Injury: Instruct family/caregiver on patient safety  Note: Patient remains free from fall/injury during hospitalization.     Problem: Confusion  Goal: Confusion, delirium, dementia, or psychosis is improved or at baseline  Description: INTERVENTIONS:  1. Assess for possible contributors to thought disturbance, including medications, impaired vision or hearing, underlying metabolic abnormalities, dehydration, psychiatric diagnoses, and notify attending LIP  2. [place] high risk fall precautions, as indicated  3. Provide frequent short contacts to provide reality reorientation, refocusing and direction  4. Decrease environmental stimuli, including noise as appropriate  5. Monitor and intervene to maintain adequate nutrition, hydration, elimination, sleep and activity  6. If unable to ensure safety without constant attention obtain sitter and review sitter guidelines with assigned personnel  7. Initiate Psychosocial CNS and Spiritual Care consult, as indicated  Outcome: Progressing  Flowsheets (Taken 12/31/2024 0416)  Effect of thought disturbance (confusion, delirium, dementia, or psychosis) are managed with adequate functional status:   Assess for contributors to thought disturbance, including medications, impaired vision or hearing, underlying metabolic abnormalities, dehydration, psychiatric diagnoses, notify

## 2024-12-31 NOTE — PLAN OF CARE
highest level of function for planned discharge setting.  See evaluation for individualized goals.  Description: FUNCTIONAL STATUS PRIOR TO ADMISSION: Patient was modified independent using a rolling walker and single point cane for functional mobility.    HOME SUPPORT PRIOR TO ADMISSION: The patient lived with spouse but did not require assistance.    Physical Therapy Goals  Initiated 11/25/2024  Goals reassessed and revised 12/6/2024  Updated 12/27/2024  Pt stated goal: to go home  Pt will be I with LE HEP in 7 days.  Pt will perform bed mobility with CGA assist x 1 in 7 days - goal met 12/27/20242  Patient to perform sit to stand transfer with mod A in 7 days. - Goal met 12/27/2024  Pt to perform stand pivot transfer from bed to chair with max A in 7 days. - goal met 12/27/2024  Pt will be able to amb 150-250' with LRAD and SBA in 7 days.  Pt will be able to traverse 10-12 steps with unilateral handrail and SBA in 7 days.  Pt will demonstrate improvement in standing balance from good/fair with constant support to good/good unsupported in 7 days.  Outcome: Progressing     PLAN :  Patient continues to benefit from skilled intervention to address functional impairments. Continue treatment per established plan of care to address goals.    Recommendation for discharge: (in order for the patient to meet his/her long term goals)  High intensity and comprehensive skilled physical therapy in a multidisciplinary setting as patient is working towards tolerating up to 3 hours of therapy/day x 5-7 days/week    Potential barriers for safe discharge: pt is a high fall risk, pt is not safe to be alone, and concern for pt safely navigating or managing the home environment.    IF patient discharges home will need the following DME:continuing to assess with progress     SUBJECTIVE:   Patient stated “I feel good.”    OBJECTIVE DATA SUMMARY:   Critical Behavior:  Orientation  Overall Orientation Status: Within Functional  Limits  Orientation Level: Oriented X4  Cognition  Arousal/Alertness: Appears intact  Following Commands: Follows multistep commands consistently;Follows one step commands consistently  Attention Span: Appears intact  Memory: Appears intact  Safety Judgement: Appears intact  Sequencing: Requires cues for some  Functional Mobility Training:  Bed Mobility:  Bed Mobility Training  Bed Mobility Training: Yes  Rolling: Modified independent  Supine to Sit: Modified independent  Scooting: Modified independent  Transfers:  Transfer Training  Transfer Training: Yes  Sit to Stand: Supervision  Stand to Sit: Supervision  Bed to Chair: Stand-by assistance  Balance:  Balance  Sitting: Intact  Standing: Impaired  Standing - Static: Good;Constant support  Standing - Dynamic: Good;Fair;Constant support  Wheelchair Mobility:  Wheelchair Management  Wheelchair Management: No  Ambulation/Gait Training:  Gait  Gait Training: Yes  Overall Level of Assistance: Stand-by assistance;Additional time;Contact-guard assistance  Distance (ft): 85 Feet (5+10+20+10+10+15+15)  Assistive Device: Gait belt;Walker, rolling  Speed/Laura: Slow  Step Length: Left shortened;Right shortened  Gait Abnormalities: Decreased step clearance  Rail Use: Both  Stairs - Level of Assistance: Contact-guard assistance;Assist X1;Additional time;Adaptive equipment (parallel bars)  Number of Stairs Trained: 5 (5x2)    Therapeutic Exercises:   -5 minutes on UE bike  -2 minutes x2 on LE bike  -Alternating step ups in parallel bars 5       Pain Rating:  Pain reported in LLE, no formal rating    Activity Tolerance:   Fair     After treatment patient left in no apparent distress:   Bed locked and returned to lowest position, Patient left in no apparent distress sitting up in chair and Call bell within reach, and nsg updated     COMMUNICATION/COLLABORATION:   The patient’s plan of care was discussed with: Registered nurse    Patient Education  Education Given To:

## 2025-01-01 PROCEDURE — 6370000000 HC RX 637 (ALT 250 FOR IP): Performed by: INTERNAL MEDICINE

## 2025-01-01 PROCEDURE — 6370000000 HC RX 637 (ALT 250 FOR IP)

## 2025-01-01 PROCEDURE — 2500000003 HC RX 250 WO HCPCS: Performed by: NURSE PRACTITIONER

## 2025-01-01 PROCEDURE — 1100000000 HC RM PRIVATE

## 2025-01-01 PROCEDURE — 94640 AIRWAY INHALATION TREATMENT: CPT

## 2025-01-01 PROCEDURE — 2500000003 HC RX 250 WO HCPCS: Performed by: INTERNAL MEDICINE

## 2025-01-01 PROCEDURE — 6360000002 HC RX W HCPCS: Performed by: STUDENT IN AN ORGANIZED HEALTH CARE EDUCATION/TRAINING PROGRAM

## 2025-01-01 PROCEDURE — 94761 N-INVAS EAR/PLS OXIMETRY MLT: CPT

## 2025-01-01 RX ADMIN — Medication 2 PUFF: at 08:32

## 2025-01-01 RX ADMIN — QUETIAPINE FUMARATE 50 MG: 25 TABLET ORAL at 09:36

## 2025-01-01 RX ADMIN — FLUCONAZOLE 100 MG: 100 TABLET ORAL at 09:37

## 2025-01-01 RX ADMIN — GUAIFENESIN 300 MG: 100 SOLUTION ORAL at 21:23

## 2025-01-01 RX ADMIN — POTASSIUM BICARBONATE 40 MEQ: 782 TABLET, EFFERVESCENT ORAL at 21:23

## 2025-01-01 RX ADMIN — QUETIAPINE FUMARATE 50 MG: 25 TABLET ORAL at 15:46

## 2025-01-01 RX ADMIN — BUSPIRONE HYDROCHLORIDE 7.5 MG: 5 TABLET ORAL at 09:36

## 2025-01-01 RX ADMIN — ACETAMINOPHEN 650 MG: 650 SOLUTION ORAL at 15:52

## 2025-01-01 RX ADMIN — METOPROLOL TARTRATE 25 MG: 25 TABLET, FILM COATED ORAL at 21:23

## 2025-01-01 RX ADMIN — GLYCOPYRROLATE 1 MG: 1 TABLET ORAL at 21:23

## 2025-01-01 RX ADMIN — FAMOTIDINE 20 MG: 20 TABLET, FILM COATED ORAL at 09:37

## 2025-01-01 RX ADMIN — QUETIAPINE FUMARATE 50 MG: 25 TABLET ORAL at 21:23

## 2025-01-01 RX ADMIN — ASPIRIN 81 MG 81 MG: 81 TABLET ORAL at 09:38

## 2025-01-01 RX ADMIN — GUAIFENESIN 300 MG: 100 SOLUTION ORAL at 09:39

## 2025-01-01 RX ADMIN — GLYCOPYRROLATE 1 MG: 1 TABLET ORAL at 09:37

## 2025-01-01 RX ADMIN — GLYCOPYRROLATE 1 MG: 1 TABLET ORAL at 15:46

## 2025-01-01 RX ADMIN — ENOXAPARIN SODIUM 30 MG: 100 INJECTION SUBCUTANEOUS at 21:30

## 2025-01-01 RX ADMIN — ACETAMINOPHEN 650 MG: 650 SOLUTION ORAL at 21:33

## 2025-01-01 RX ADMIN — SENNOSIDES 8.6 MG: 8.6 TABLET, FILM COATED ORAL at 21:23

## 2025-01-01 RX ADMIN — SODIUM CHLORIDE, PRESERVATIVE FREE 10 ML: 5 INJECTION INTRAVENOUS at 09:40

## 2025-01-01 RX ADMIN — ENOXAPARIN SODIUM 30 MG: 100 INJECTION SUBCUTANEOUS at 09:57

## 2025-01-01 RX ADMIN — FUROSEMIDE 20 MG: 40 TABLET ORAL at 09:37

## 2025-01-01 RX ADMIN — EMPAGLIFLOZIN 10 MG: 10 TABLET, FILM COATED ORAL at 09:51

## 2025-01-01 RX ADMIN — Medication 2 PUFF: at 19:35

## 2025-01-01 RX ADMIN — GUAIFENESIN 300 MG: 100 SOLUTION ORAL at 15:45

## 2025-01-01 RX ADMIN — POTASSIUM BICARBONATE 40 MEQ: 782 TABLET, EFFERVESCENT ORAL at 09:40

## 2025-01-01 RX ADMIN — SERTRALINE 50 MG: 50 TABLET, FILM COATED ORAL at 09:36

## 2025-01-01 RX ADMIN — ATORVASTATIN CALCIUM 40 MG: 40 TABLET, FILM COATED ORAL at 21:23

## 2025-01-01 RX ADMIN — BUSPIRONE HYDROCHLORIDE 7.5 MG: 5 TABLET ORAL at 21:23

## 2025-01-01 RX ADMIN — TAMSULOSIN HYDROCHLORIDE 0.4 MG: 0.4 CAPSULE ORAL at 09:36

## 2025-01-01 ASSESSMENT — PAIN SCALES - GENERAL
PAINLEVEL_OUTOF10: 2
PAINLEVEL_OUTOF10: 0
PAINLEVEL_OUTOF10: 5
PAINLEVEL_OUTOF10: 2
PAINLEVEL_OUTOF10: 7
PAINLEVEL_OUTOF10: 6
PAINLEVEL_OUTOF10: 6

## 2025-01-01 ASSESSMENT — PAIN DESCRIPTION - LOCATION: LOCATION: BACK

## 2025-01-01 ASSESSMENT — PAIN DESCRIPTION - ORIENTATION: ORIENTATION: LEFT;LOWER

## 2025-01-01 ASSESSMENT — PAIN DESCRIPTION - DESCRIPTORS: DESCRIPTORS: ACHING

## 2025-01-01 NOTE — PROGRESS NOTES
4 Eyes Skin Assessment     NAME:  Kim Markham  YOB: 1975  MEDICAL RECORD NUMBER:  [id number]    The patient is being assessed for  Other Weekly skin assessment    I agree that at least one RN has performed a thorough Head to Toe Skin Assessment on the patient. ALL assessment sites listed below have been assessed.      Areas assessed by both nurses:    Head, Face, Ears, Shoulders, Back, Chest, Arms, Elbows, Hands, Sacrum. Buttock, Coccyx, Ischium, Legs. Feet and Heels, and Under Medical Devices         Does the Patient have a Wound? No noted wound(s)       [place] Prevention initiated by RN: No  Wound Care Orders initiated by RN: No    Pressure Injury (Stage 3,4, Unstageable, DTI, NWPT, and Complex wounds) if present, place Wound referral order by RN under : No    New Ostomies, if present place, Ostomy referral order under : No     Nurse 1 eSignature: Electronically signed by RAJESH DOYLE RN on 1/1/25 at 4:36 AM EST    **SHARE this note so that the co-signing nurse can place an eSignature**    Nurse 2 eSignature: Electronically signed by Priya Kline RN on 1/1/25 at 5:25 AM EST

## 2025-01-01 NOTE — PLAN OF CARE
Problem: Discharge Planning  Goal: Discharge to home or other facility with appropriate resources  1/1/2025 0511 by Surinder Mckeon RN  Outcome: Progressing  Flowsheets (Taken 12/31/2024 0416)  Discharge to home or other facility with appropriate resources: Identify barriers to discharge with patient and caregiver  Note: Awaiting insurance authorization for SNF or jerry bed at [facility] per CM note     Problem: Safety - Adult  Goal: Free from fall injury  1/1/2025 1241 by Lito Galloway RN  Outcome: Progressing  1/1/2025 0511 by Surinder Mckeon RN  Outcome: Progressing  Flowsheets (Taken 12/25/2024 0611)  Free From Fall Injury: Instruct family/caregiver on patient safety  Note: Remains free from fall/injury during hospitalization, refuses bed exit alarm     Problem: Skin/Tissue Integrity - Adult  Goal: Skin integrity remains intact  1/1/2025 0511 by Surinder Mckeon RN  Outcome: Progressing  Flowsheets (Taken 12/31/2024 0910 by Remedios Moise, RN)  Skin Integrity Remains Intact: Monitor for areas of redness and/or skin breakdown  Note: Skin intact

## 2025-01-01 NOTE — CARE COORDINATION
CM noted discharge order.    DCP is pending. Patient was declined for a jerry bed at [facility] yesterday as they stated that level of functioning is too high.     Patient will need to be accepted for a LTC bed that will take Medicaid pending.     Several messages sent via [employer] this morning to see if patient could be accepted under Medicaid pending.    CM will continue to follow.

## 2025-01-01 NOTE — DISCHARGE SUMMARY
Discharge Summary    Name: Kim Markham  [id number]  YOB: 1975 (Age: 49 y.o.)   Date of Admission: 11/12/2024  Date of Discharge: 1/1/2025  Attending Physician: John Florez MD    Discharge Diagnosis:     Consultations:  IP CONSULT TO CARDIOLOGY  IP CONSULT TO PHARMACY  IP CONSULT TO OTOLARYNGOLOGY  IP CONSULT TO ETHICS  IP CONSULT TO SPIRITUAL CARE  IP CONSULT TO VASCULAR ACCESS TEAM      Brief Admission History/Reason for Admission Per Mee Elizabeth MD:     Chief Complaint / Reason for Physician Visit  \" Difficulty breathing\".  Discussed with RN events overnight.      11/13 --  Critical Care admission H&P  49 y.o. female with known past medical history significant for asthma and COPD who treated her symptoms today with Primatene Mist over-the-counter says that she went into fast heart rate became short of breath immediately.  No other exacerbating or relieving factors which presents to the emergency room with no treatment prior to arrival.  Patient reports that she had a few day history of shortness of breath and thought she had a pneumonia. She has been taking OTC cough syrup and cough medicine as well as her inhaler. She reports difficulty affording medications and has limited access to healthcare.      Patient was placed in ICU due to increased work of breathing.  V. tach was noted and patient was started on nitroglycerin infusion.  Tachycardia worsened by epinephrine and your inhaler.  Patient was placed on Rocephin, azithromycin, and Solu-Medrol for COPD exacerbation.  Respiratory status worsened on 11/13 AM and patient was intubated.  As for tachycardia, echo was performed and showed EF of 60 to 65%, LVH, abnormal diastolic function, RV moderately dilated.  CTA ruled out PE.  Duplex showed no DVT.  Cardiology consulted and started patient on aspirin, Plavix, and statin.  Patient was also noted to be SVT and required amiodarone infusions.  Due to

## 2025-01-01 NOTE — PLAN OF CARE
Goals reviewed, Care plan updated. A&O x 4, no noted acute cardiac/respiratory distress, up ad teri, pleasant and cooperative, Midline saline locked.   Problem: Discharge Planning  Goal: Discharge to home or other facility with appropriate resources  Outcome: Progressing  Flowsheets (Taken 12/31/2024 0416)  Discharge to home or other facility with appropriate resources: Identify barriers to discharge with patient and caregiver  Note: Awaiting insurance authorization for SNF or jerry bed at [facility] per CM note     Problem: Safety - Adult  Goal: Free from fall injury  Outcome: Progressing  Flowsheets (Taken 12/25/2024 0611)  Free From Fall Injury: Instruct family/caregiver on patient safety  Note: Remains free from fall/injury during hospitalization, refuses bed exit alarm     Problem: Skin/Tissue Integrity - Adult  Goal: Skin integrity remains intact  Outcome: Progressing  Flowsheets (Taken 12/31/2024 0910 by Remedios Moise, RN)  Skin Integrity Remains Intact: Monitor for areas of redness and/or skin breakdown  Note: Skin intact

## 2025-01-02 PROCEDURE — 6370000000 HC RX 637 (ALT 250 FOR IP): Performed by: INTERNAL MEDICINE

## 2025-01-02 PROCEDURE — 94761 N-INVAS EAR/PLS OXIMETRY MLT: CPT

## 2025-01-02 PROCEDURE — 2500000003 HC RX 250 WO HCPCS: Performed by: INTERNAL MEDICINE

## 2025-01-02 PROCEDURE — 94640 AIRWAY INHALATION TREATMENT: CPT

## 2025-01-02 PROCEDURE — 6360000002 HC RX W HCPCS: Performed by: STUDENT IN AN ORGANIZED HEALTH CARE EDUCATION/TRAINING PROGRAM

## 2025-01-02 PROCEDURE — 92526 ORAL FUNCTION THERAPY: CPT

## 2025-01-02 PROCEDURE — 6370000000 HC RX 637 (ALT 250 FOR IP)

## 2025-01-02 PROCEDURE — 2500000003 HC RX 250 WO HCPCS: Performed by: NURSE PRACTITIONER

## 2025-01-02 PROCEDURE — 1100000000 HC RM PRIVATE

## 2025-01-02 PROCEDURE — 97530 THERAPEUTIC ACTIVITIES: CPT

## 2025-01-02 PROCEDURE — 97116 GAIT TRAINING THERAPY: CPT

## 2025-01-02 PROCEDURE — 97110 THERAPEUTIC EXERCISES: CPT

## 2025-01-02 RX ADMIN — QUETIAPINE FUMARATE 50 MG: 25 TABLET ORAL at 09:44

## 2025-01-02 RX ADMIN — ENOXAPARIN SODIUM 30 MG: 100 INJECTION SUBCUTANEOUS at 21:03

## 2025-01-02 RX ADMIN — GUAIFENESIN 300 MG: 100 SOLUTION ORAL at 20:59

## 2025-01-02 RX ADMIN — POTASSIUM BICARBONATE 40 MEQ: 782 TABLET, EFFERVESCENT ORAL at 09:43

## 2025-01-02 RX ADMIN — SENNOSIDES 8.6 MG: 8.6 TABLET, FILM COATED ORAL at 20:56

## 2025-01-02 RX ADMIN — FAMOTIDINE 20 MG: 20 TABLET, FILM COATED ORAL at 09:43

## 2025-01-02 RX ADMIN — Medication 2 PUFF: at 10:13

## 2025-01-02 RX ADMIN — FUROSEMIDE 20 MG: 40 TABLET ORAL at 09:43

## 2025-01-02 RX ADMIN — METOPROLOL TARTRATE 25 MG: 25 TABLET, FILM COATED ORAL at 09:43

## 2025-01-02 RX ADMIN — SERTRALINE 50 MG: 50 TABLET, FILM COATED ORAL at 09:42

## 2025-01-02 RX ADMIN — QUETIAPINE FUMARATE 50 MG: 25 TABLET ORAL at 20:55

## 2025-01-02 RX ADMIN — FLUCONAZOLE 100 MG: 100 TABLET ORAL at 09:42

## 2025-01-02 RX ADMIN — METOPROLOL TARTRATE 25 MG: 25 TABLET, FILM COATED ORAL at 20:56

## 2025-01-02 RX ADMIN — Medication 2 PUFF: at 22:10

## 2025-01-02 RX ADMIN — SODIUM CHLORIDE, PRESERVATIVE FREE 10 ML: 5 INJECTION INTRAVENOUS at 21:11

## 2025-01-02 RX ADMIN — ACETAMINOPHEN 650 MG: 650 SOLUTION ORAL at 16:10

## 2025-01-02 RX ADMIN — QUETIAPINE FUMARATE 50 MG: 25 TABLET ORAL at 16:06

## 2025-01-02 RX ADMIN — GLYCOPYRROLATE 1 MG: 1 TABLET ORAL at 20:56

## 2025-01-02 RX ADMIN — SODIUM CHLORIDE, PRESERVATIVE FREE 10 ML: 5 INJECTION INTRAVENOUS at 10:00

## 2025-01-02 RX ADMIN — ATORVASTATIN CALCIUM 40 MG: 40 TABLET, FILM COATED ORAL at 20:55

## 2025-01-02 RX ADMIN — ENOXAPARIN SODIUM 30 MG: 100 INJECTION SUBCUTANEOUS at 09:44

## 2025-01-02 RX ADMIN — GLYCOPYRROLATE 1 MG: 1 TABLET ORAL at 09:44

## 2025-01-02 RX ADMIN — POTASSIUM BICARBONATE 40 MEQ: 782 TABLET, EFFERVESCENT ORAL at 20:55

## 2025-01-02 RX ADMIN — EMPAGLIFLOZIN 10 MG: 10 TABLET, FILM COATED ORAL at 09:43

## 2025-01-02 RX ADMIN — GUAIFENESIN 300 MG: 100 SOLUTION ORAL at 16:06

## 2025-01-02 RX ADMIN — BUSPIRONE HYDROCHLORIDE 7.5 MG: 5 TABLET ORAL at 09:41

## 2025-01-02 RX ADMIN — ASPIRIN 81 MG 81 MG: 81 TABLET ORAL at 09:44

## 2025-01-02 RX ADMIN — BUSPIRONE HYDROCHLORIDE 7.5 MG: 5 TABLET ORAL at 20:56

## 2025-01-02 RX ADMIN — GUAIFENESIN 300 MG: 100 SOLUTION ORAL at 09:41

## 2025-01-02 RX ADMIN — TAMSULOSIN HYDROCHLORIDE 0.4 MG: 0.4 CAPSULE ORAL at 09:43

## 2025-01-02 RX ADMIN — GLYCOPYRROLATE 1 MG: 1 TABLET ORAL at 16:07

## 2025-01-02 ASSESSMENT — PAIN DESCRIPTION - LOCATION
LOCATION: BACK
LOCATION: BACK

## 2025-01-02 ASSESSMENT — PAIN SCALES - WONG BAKER: WONGBAKER_NUMERICALRESPONSE: HURTS A LITTLE BIT

## 2025-01-02 ASSESSMENT — PAIN SCALES - GENERAL
PAINLEVEL_OUTOF10: 5
PAINLEVEL_OUTOF10: 6

## 2025-01-02 ASSESSMENT — PAIN DESCRIPTION - DESCRIPTORS: DESCRIPTORS: ACHING

## 2025-01-02 NOTE — DISCHARGE SUMMARY
Discharge Summary    Name: Kim Markham  [id number]  YOB: 1975 (Age: 49 y.o.)   Date of Admission: 11/12/2024  Date of Discharge: 1/2/2025  Attending Physician: John Florez MD    Discharge Diagnosis:     Consultations:  IP CONSULT TO CARDIOLOGY  IP CONSULT TO PHARMACY  IP CONSULT TO OTOLARYNGOLOGY  IP CONSULT TO ETHICS  IP CONSULT TO SPIRITUAL CARE  IP CONSULT TO VASCULAR ACCESS TEAM      Brief Admission History/Reason for Admission Per Mee Elizabeth MD:     Chief Complaint / Reason for Physician Visit  \" Difficulty breathing\".  Discussed with RN events overnight.      11/13 --  Critical Care admission H&P  49 y.o. female with known past medical history significant for asthma and COPD who treated her symptoms today with Primatene Mist over-the-counter says that she went into fast heart rate became short of breath immediately.  No other exacerbating or relieving factors which presents to the emergency room with no treatment prior to arrival.  Patient reports that she had a few day history of shortness of breath and thought she had a pneumonia. She has been taking OTC cough syrup and cough medicine as well as her inhaler. She reports difficulty affording medications and has limited access to healthcare.      Patient was placed in ICU due to increased work of breathing.  V. tach was noted and patient was started on nitroglycerin infusion.  Tachycardia worsened by epinephrine and your inhaler.  Patient was placed on Rocephin, azithromycin, and Solu-Medrol for COPD exacerbation.  Respiratory status worsened on 11/13 AM and patient was intubated.  As for tachycardia, echo was performed and showed EF of 60 to 65%, LVH, abnormal diastolic function, RV moderately dilated.  CTA ruled out PE.  Duplex showed no DVT.  Cardiology consulted and started patient on aspirin, Plavix, and statin.  Patient was also noted to be SVT and required amiodarone infusions.  Due to

## 2025-01-02 NOTE — CARE COORDINATION
CM was asked to see patient as patient has several questions regarding her discharge plan. Met with the patient at the bedside and she wanted to know if there was any assistance she could get from  to get a hotel stay paid for. CM informed her that she would need to call and inquire about services she may be eligible for. CM provided patient with the number.     Aslo- informed patient her medicaid is still pending and which facilities are reviewing. She reiterated she does not have a place to go and while she feels stronger she still does not feel strong enough to be alone. CM explained the only option we have at this time is to wait for her medicaid to become active or if one the facilities will accept her pending.     CM send additional referrals to facilities including [facility] to see if they will consider. They are reviewing at this time.

## 2025-01-02 NOTE — PLAN OF CARE
PRECAUTIONS: Rec slow rate of intake, small bites/sips, effortful swallow, and remain upright 30 minutes after PO intake.      Acute SLP Services: No, patient will be discharged from acute skilled speech-language pathology at this time.    Discharge Recommendations: No further skilled speech therapy.      SUBJECTIVE:   Patient reports she may have to go to long term care    OBJECTIVE:     Past Medical History:   Diagnosis Date    Asthma     COPD (chronic obstructive pulmonary disease) (HCC)      Past Surgical History:   Procedure Laterality Date    TRACHEOSTOMY N/A 12/4/2024    TRACHEOSTOMY performed by Chaya Loyd MD at [facility] MAIN OR     Prior Level of Function/Home Situation:   Social/Functional History  Lives With: Spouse  Type of Home:  (hotel)  Home Layout: One level  Bathroom Shower/Tub: Tub/Shower unit  Bathroom Toilet: Standard  Home Equipment: Walker - Rolling, Cane  Has the patient had two or more falls in the past year or any fall with injury in the past year?: No  Prior Level of Assist for ADLs: Independent  Prior Level of Assist for Transfers: Independent  Occupation: Full time employment    Cognitive and Communication Status:  Neurologic State: Alert  Orientation Level: Oriented x4  Cognition: Follows commands    Baseline Assessment:  Current Diet : Easy to chew  Current Liquid Diet : Thin       General:         O2 Device: None (Room air)     Current Diet : Easy to chew  Current Liquid Diet : Thin  Dentition: Adequate  Patient Positioning: Upright in chair    Dysphagia:  Oral Assessment:     P.O. Trials:  Consistencies Administered: Thin - cup    After Treatment:  Patient left in no apparent distress sitting up in chair, Call bell left within reach, Nursing notified, and Updated patient's board with:  diet recommendations and aspiration precautions     Pain:  VAS (numerical) 2/10    COMMUNICATION/EDUCATION:   Swallow safety precautions, Aspiration precautions, GERD precautions, and Diet  recommendations education provided to Patient via explanation and teach back, all questions/concerns addressed. Patient verbalizes understanding.    The patient's plan of care including recommendations, planned interventions, and recommended diet changes were discussed with: Registered nurse.    Thank you,  Elena Liao M.S., M.Ed., CCC-SLP  Minutes: 20

## 2025-01-02 NOTE — PLAN OF CARE
PHYSICAL THERAPY REEVALUATION  Patient: Kim Markham (49 y.o. female)  Date: 1/2/2025  Primary Diagnosis: Acute right-sided congestive heart failure (HCC) [I50.811]  SVT (supraventricular tachycardia) (HCC) [I47.10]  Essential hypertension [I10]  Hypoxia [R09.02]  COPD exacerbation (HCC) [J44.1]  Procedure(s) (LRB):  TRACHEOSTOMY (N/A) 29 Days Post-Op   Precautions: Fall Risk                      Recommendations for nursing mobility: Out of bed to chair for meals, Encourage HEP in prep for ADLs/mobility; see handout for details, AD and gt belt for bed to chair , Amb to bathroom with AD and gait belt, and Assist x1    In place during session: EKG/telemetry   Chart, physical therapy assessment, plan of care, and goals were reviewed.      ASSESSMENT  Patient initially seen for PT evaluation on 11/25/24 and had 14 skilled PT sessions since evalution. Patient seen today for PT reevaluation s/t LOS. Patient A&O x4. Pt sitting on the chair upon arrival, agreeable to session.      Based on the objective data described, the patient currently presents with impaired functional mobility, decreased ROM, impaired strength, decreased activity tolerance, impaired balance, and increased pain levels. (See below for objective details and assist levels).    Overall pt tolerated session fair today, currently with c/o pain at back and L Le - 7/10. Pt requires SBA/CGA for STS and SPT with cues for safety. Dynamic standing balance training performed, pt able to accept minimal challenges with unilateral support on walker, LOB x 1 when reaching out for another gown in standing, require cues for safety. Pt able to ambulate - 100' with RW, CGA, require 1 standing rest break after ambulating 60'. Pt  will benefit from continued skilled PT to address above deficits and return to PLOF, PT goals and POC reviewed on this date and continue to remain appropriate at this time. Potential barriers for safe discharge:  pt is a high fall risk and  assistance;Contact-guard assistance  Distance (ft): 100 Feet  Assistive Device: Gait belt;Walker, rolling  Interventions: Verbal cues;Safety awareness training  Base of Support: Widened  Speed/Laura: Slow  Step Length: Left shortened;Right shortened  Gait Abnormalities: Decreased step clearance    Therapeutic Intervention provided:   OOB transfers, amb with AD, LE therapeutic exercises, and standing   balance      EXERCISE   Sets   Reps   Active Active Assist   Passive Self ROM   Comments   Hip abd/add   [x] [] [] [] In standing   Fwd/backward stepping   [x] [] [] [] In standing      Functional Measure:  [facility] AM-PAC™ “6 Clicks”         Basic Mobility Inpatient Short Form  How much difficulty does the patient currently have... Unable A Lot A Little None   1.  Turning over in bed (including adjusting bedclothes, sheets and blankets)?   [] 1   [] 2   [x] 3   [] 4   2.  Sitting down on and standing up from a chair with arms ( e.g., wheelchair, bedside commode, etc.)   [] 1   [] 2   [x] 3   [] 4   3.  Moving from lying on back to sitting on the side of the bed?   [] 1   [] 2   [x] 3   [] 4          How much help from another person does the patient currently need... Total A Lot A Little None   4.  Moving to and from a bed to a chair (including a wheelchair)?   [] 1   [] 2   [x] 3   [] 4   5.  Need to walk in hospital room?   [] 1   [] 2   [x] 3   [] 4   6.  Climbing 3-5 steps with a railing?   [] 1   [] 2   [x] 3   [] 4   © , Trustees of [facility], under license to [employer]. All rights reserved     Score:  Initial:  Most Recent: (Date: 2025 )   Interpretation of Tool:  Represents activities that are increasingly more difficult (i.e. Bed mobility, Transfers, Gait).  Score 24 23 22-20 19-15 14-10 9-7 6   Modifier CH CI CJ CK CL CM CN     Pain Ratin/10 Pain at back and L LE reported  Pain Intervention(s):   nursing notified    Activity Tolerance:   Fair     After treatment

## 2025-01-02 NOTE — PLAN OF CARE
and nsg updated.    COMMUNICATION/EDUCATION:   The patient’s plan of care was discussed with: Physical therapist and Registered nurse    Patient Education  Education Given To: Patient  Education Provided: ADL Adaptive Strategies;Role of Therapy;Plan of Care  Education Method: Verbal  Barriers to Learning: None  Education Outcome: Verbalized understanding    The supervising occupational therapist and treating occupational therapist assistant have met to review this patient’s progress and plan of care.    This patient’s plan of care is appropriate for delegation to MARGARET.       Thank you for this referral.  Preeti Kline OT  Minutes: 33

## 2025-01-03 PROCEDURE — 94761 N-INVAS EAR/PLS OXIMETRY MLT: CPT

## 2025-01-03 PROCEDURE — 6370000000 HC RX 637 (ALT 250 FOR IP)

## 2025-01-03 PROCEDURE — 2500000003 HC RX 250 WO HCPCS: Performed by: NURSE PRACTITIONER

## 2025-01-03 PROCEDURE — 6370000000 HC RX 637 (ALT 250 FOR IP): Performed by: INTERNAL MEDICINE

## 2025-01-03 PROCEDURE — 94640 AIRWAY INHALATION TREATMENT: CPT

## 2025-01-03 PROCEDURE — 6360000002 HC RX W HCPCS: Performed by: STUDENT IN AN ORGANIZED HEALTH CARE EDUCATION/TRAINING PROGRAM

## 2025-01-03 PROCEDURE — 97530 THERAPEUTIC ACTIVITIES: CPT

## 2025-01-03 PROCEDURE — 1100000000 HC RM PRIVATE

## 2025-01-03 PROCEDURE — 2500000003 HC RX 250 WO HCPCS: Performed by: INTERNAL MEDICINE

## 2025-01-03 RX ADMIN — ACETAMINOPHEN 650 MG: 650 SOLUTION ORAL at 16:12

## 2025-01-03 RX ADMIN — BUSPIRONE HYDROCHLORIDE 7.5 MG: 5 TABLET ORAL at 22:04

## 2025-01-03 RX ADMIN — TAMSULOSIN HYDROCHLORIDE 0.4 MG: 0.4 CAPSULE ORAL at 09:45

## 2025-01-03 RX ADMIN — GLYCOPYRROLATE 1 MG: 1 TABLET ORAL at 22:05

## 2025-01-03 RX ADMIN — SERTRALINE 50 MG: 50 TABLET, FILM COATED ORAL at 09:45

## 2025-01-03 RX ADMIN — METOPROLOL TARTRATE 25 MG: 25 TABLET, FILM COATED ORAL at 09:45

## 2025-01-03 RX ADMIN — Medication 2 PUFF: at 07:46

## 2025-01-03 RX ADMIN — BUSPIRONE HYDROCHLORIDE 7.5 MG: 5 TABLET ORAL at 09:44

## 2025-01-03 RX ADMIN — GLYCOPYRROLATE 1 MG: 1 TABLET ORAL at 13:43

## 2025-01-03 RX ADMIN — GUAIFENESIN 300 MG: 100 SOLUTION ORAL at 13:43

## 2025-01-03 RX ADMIN — FAMOTIDINE 20 MG: 20 TABLET, FILM COATED ORAL at 09:46

## 2025-01-03 RX ADMIN — METOPROLOL TARTRATE 25 MG: 25 TABLET, FILM COATED ORAL at 22:05

## 2025-01-03 RX ADMIN — GLYCOPYRROLATE 1 MG: 1 TABLET ORAL at 09:45

## 2025-01-03 RX ADMIN — SENNOSIDES 8.6 MG: 8.6 TABLET, FILM COATED ORAL at 22:04

## 2025-01-03 RX ADMIN — EMPAGLIFLOZIN 10 MG: 10 TABLET, FILM COATED ORAL at 09:45

## 2025-01-03 RX ADMIN — ASPIRIN 81 MG 81 MG: 81 TABLET ORAL at 09:46

## 2025-01-03 RX ADMIN — GUAIFENESIN 300 MG: 100 SOLUTION ORAL at 22:03

## 2025-01-03 RX ADMIN — FUROSEMIDE 20 MG: 40 TABLET ORAL at 09:45

## 2025-01-03 RX ADMIN — QUETIAPINE FUMARATE 50 MG: 25 TABLET ORAL at 09:45

## 2025-01-03 RX ADMIN — GUAIFENESIN 300 MG: 100 SOLUTION ORAL at 09:44

## 2025-01-03 RX ADMIN — QUETIAPINE FUMARATE 50 MG: 25 TABLET ORAL at 22:04

## 2025-01-03 RX ADMIN — POTASSIUM BICARBONATE 40 MEQ: 782 TABLET, EFFERVESCENT ORAL at 22:01

## 2025-01-03 RX ADMIN — ENOXAPARIN SODIUM 30 MG: 100 INJECTION SUBCUTANEOUS at 22:08

## 2025-01-03 RX ADMIN — GUAIFENESIN 300 MG: 100 SOLUTION ORAL at 16:12

## 2025-01-03 RX ADMIN — FLUCONAZOLE 100 MG: 100 TABLET ORAL at 09:46

## 2025-01-03 RX ADMIN — Medication 2 PUFF: at 20:13

## 2025-01-03 RX ADMIN — SODIUM CHLORIDE, PRESERVATIVE FREE 10 ML: 5 INJECTION INTRAVENOUS at 09:48

## 2025-01-03 RX ADMIN — ENOXAPARIN SODIUM 30 MG: 100 INJECTION SUBCUTANEOUS at 09:44

## 2025-01-03 RX ADMIN — POTASSIUM BICARBONATE 40 MEQ: 782 TABLET, EFFERVESCENT ORAL at 09:44

## 2025-01-03 RX ADMIN — QUETIAPINE FUMARATE 50 MG: 25 TABLET ORAL at 13:43

## 2025-01-03 RX ADMIN — ATORVASTATIN CALCIUM 40 MG: 40 TABLET, FILM COATED ORAL at 22:05

## 2025-01-03 RX ADMIN — SODIUM CHLORIDE, PRESERVATIVE FREE 10 ML: 5 INJECTION INTRAVENOUS at 22:08

## 2025-01-03 ASSESSMENT — PAIN SCALES - GENERAL
PAINLEVEL_OUTOF10: 3
PAINLEVEL_OUTOF10: 0
PAINLEVEL_OUTOF10: 1

## 2025-01-03 ASSESSMENT — PAIN DESCRIPTION - LOCATION: LOCATION: BACK

## 2025-01-03 ASSESSMENT — PAIN DESCRIPTION - DESCRIPTORS: DESCRIPTORS: ACHING

## 2025-01-03 ASSESSMENT — PAIN DESCRIPTION - ORIENTATION: ORIENTATION: LOWER

## 2025-01-03 NOTE — PLAN OF CARE
PHYSICAL THERAPY TREATMENT     Patient: Kim Markham (49 y.o. female)  Date: 1/3/2025  Diagnosis: Acute right-sided congestive heart failure (HCC) [I50.811]  SVT (supraventricular tachycardia) (HCC) [I47.10]  Essential hypertension [I10]  Hypoxia [R09.02]  COPD exacerbation (HCC) [J44.1] SVT (supraventricular tachycardia) (HCC)  Procedure(s) (LRB):  TRACHEOSTOMY (N/A) 30 Days Post-Op  Precautions: Fall Risk                      Recommendations for nursing mobility: Out of bed to chair for meals, Encourage HEP in prep for ADLs/mobility; see handout for details, AD and gt belt for bed to chair , Amb to bathroom with AD and gait belt, Amb in hallway, and Assist x1    In place during session: None  Chart, physical therapy assessment, plan of care and goals were reviewed.  ASSESSMENT  Patient continues with skilled PT services and is progressing towards goals. Pt in bed upon PT arrival, agreeable to session. Pt A&O x 4. (See below for objective details and assist levels).     Overall pt tolerated session well today.  PT at EOB upon arrival and was able to ambulate steadily approx 140 ft overall with RW. Demos no overt LOB but wide [place], shuffled gait noted. Patient ambulated in the room mostly SBA however CGA provided in the hallway. Pt tolerated standing therex well with some generalized fatigue and LE muscle fatigue noted. Will continue to benefit from skilled PT services, and will continue to progress as tolerated. Current PT DC recommendation High intensity and comprehensive skilled physical therapy in a multidisciplinary setting as patient is working towards tolerating up to 3 hours of therapy/day x 5-7 days/week once medically appropriate.      GOALS:    Problem: Physical Therapy - Adult  Goal: By Discharge: Performs mobility at highest level of function for planned discharge setting.  See evaluation for individualized goals.  Description: FUNCTIONAL STATUS PRIOR TO ADMISSION: Patient was modified  27

## 2025-01-03 NOTE — CARE COORDINATION
CM reviewed chart.    Patient is medicaid pending.    Barrier to dc is finding a LTC facility that will accept medicaid pending    Numerous referrals have been sent via [employer].    No accepting facility as of now.

## 2025-01-03 NOTE — PLAN OF CARE
Problem: Discharge Planning  Goal: Discharge to home or other facility with appropriate resources  Outcome: Progressing     Problem: ABCDS Injury Assessment  Goal: Absence of physical injury  Outcome: Progressing     Problem: Safety - Adult  Goal: Free from fall injury  Outcome: Progressing     Problem: Confusion  Goal: Confusion, delirium, dementia, or psychosis is improved or at baseline  Description: INTERVENTIONS:  1. Assess for possible contributors to thought disturbance, including medications, impaired vision or hearing, underlying metabolic abnormalities, dehydration, psychiatric diagnoses, and notify attending LIP  2. [place] high risk fall precautions, as indicated  3. Provide frequent short contacts to provide reality reorientation, refocusing and direction  4. Decrease environmental stimuli, including noise as appropriate  5. Monitor and intervene to maintain adequate nutrition, hydration, elimination, sleep and activity  6. If unable to ensure safety without constant attention obtain sitter and review sitter guidelines with assigned personnel  7. Initiate Psychosocial CNS and Spiritual Care consult, as indicated  Outcome: Progressing     Problem: Chronic Conditions and Co-morbidities  Goal: Patient's chronic conditions and co-morbidity symptoms are monitored and maintained or improved  Outcome: Progressing     Problem: Neurosensory - Adult  Goal: Achieves stable or improved neurological status  Outcome: Progressing  Goal: Absence of seizures  Outcome: Progressing  Goal: Remains free of injury related to seizures activity  Outcome: Progressing  Goal: Achieves maximal functionality and self care  Outcome: Progressing     Problem: Respiratory - Adult  Goal: Achieves optimal ventilation and oxygenation  Outcome: Progressing     Problem: Cardiovascular - Adult  Goal: Maintains optimal cardiac output and hemodynamic stability  Outcome: Progressing  Goal: Absence of cardiac dysrhythmias or at

## 2025-01-03 NOTE — PLAN OF CARE
Problem: Discharge Planning  Goal: Discharge to home or other facility with appropriate resources  1/3/2025 1051 by Remedios Moise RN  Outcome: Progressing  1/2/2025 2355 by Alec Kline LPN  Outcome: Progressing     Problem: ABCDS Injury Assessment  Goal: Absence of physical injury  1/3/2025 1051 by Remedios Moise RN  Outcome: Progressing  1/2/2025 2355 by Alec Kline LPN  Outcome: Progressing     Problem: Safety - Adult  Goal: Free from fall injury  1/3/2025 1051 by Remedios Moise RN  Outcome: Progressing  1/2/2025 2355 by Alec Kline LPN  Outcome: Progressing     Problem: Confusion  Goal: Confusion, delirium, dementia, or psychosis is improved or at baseline  Description: INTERVENTIONS:  1. Assess for possible contributors to thought disturbance, including medications, impaired vision or hearing, underlying metabolic abnormalities, dehydration, psychiatric diagnoses, and notify attending LIP  2. [place] high risk fall precautions, as indicated  3. Provide frequent short contacts to provide reality reorientation, refocusing and direction  4. Decrease environmental stimuli, including noise as appropriate  5. Monitor and intervene to maintain adequate nutrition, hydration, elimination, sleep and activity  6. If unable to ensure safety without constant attention obtain sitter and review sitter guidelines with assigned personnel  7. Initiate Psychosocial CNS and Spiritual Care consult, as indicated  1/3/2025 1051 by Remedios Moise RN  Outcome: Progressing  1/2/2025 2355 by Alec Kline LPN  Outcome: Progressing     Problem: Chronic Conditions and Co-morbidities  Goal: Patient's chronic conditions and co-morbidity symptoms are monitored and maintained or improved  1/3/2025 1051 by Remedios Moise RN  Outcome: Progressing  1/2/2025 2355 by Alec Kline LPN  Outcome: Progressing     Problem: Neurosensory - Adult  Goal: Achieves stable or improved neurological status  1/3/2025 1051 by Remedios Moise

## 2025-01-03 NOTE — DISCHARGE SUMMARY
Discharge Summary    Name: Kim Markham  [id number]  YOB: 1975 (Age: 49 y.o.)   Date of Admission: 11/12/2024  Date of Discharge: 1/3/2025  Attending Physician: John Florez MD    Discharge Diagnosis:     Consultations:  IP CONSULT TO CARDIOLOGY  IP CONSULT TO PHARMACY  IP CONSULT TO OTOLARYNGOLOGY  IP CONSULT TO ETHICS  IP CONSULT TO SPIRITUAL CARE  IP CONSULT TO VASCULAR ACCESS TEAM      Brief Admission History/Reason for Admission Per Mee Elizabeth MD:     Chief Complaint / Reason for Physician Visit  \" Difficulty breathing\".  Discussed with RN events overnight.      11/13 --  Critical Care admission H&P  49 y.o. female with known past medical history significant for asthma and COPD who treated her symptoms today with Primatene Mist over-the-counter says that she went into fast heart rate became short of breath immediately.  No other exacerbating or relieving factors which presents to the emergency room with no treatment prior to arrival.  Patient reports that she had a few day history of shortness of breath and thought she had a pneumonia. She has been taking OTC cough syrup and cough medicine as well as her inhaler. She reports difficulty affording medications and has limited access to healthcare.      Patient was placed in ICU due to increased work of breathing.  V. tach was noted and patient was started on nitroglycerin infusion.  Tachycardia worsened by epinephrine and your inhaler.  Patient was placed on Rocephin, azithromycin, and Solu-Medrol for COPD exacerbation.  Respiratory status worsened on 11/13 AM and patient was intubated.  As for tachycardia, echo was performed and showed EF of 60 to 65%, LVH, abnormal diastolic function, RV moderately dilated.  CTA ruled out PE.  Duplex showed no DVT.  Cardiology consulted and started patient on aspirin, Plavix, and statin.  Patient was also noted to be SVT and required amiodarone infusions.  Due to  K+ 4-5, Mg >2   Needs follow-up with EP in the OP setting        Hypokalemia  As scheduled supplements     Volume overload, resolved  -Received diuresis during hospital course and now euvolemic  Lasix started on 12/24        Hypernatremia, resolved  -Nephrology was following and now resolved     Hypercalcemia, resolved   -Calcium 10.3     Anemia  -Hemoglobin stable at 10  -Iron studies 11/30 with falsely elevated ferritin and normal iron level  B12 and folate normal levels     Chronic back pain, improved  -Likely from hospitalization for 1 month  -States send lower back and does not radiate  -Lidocaine patches as needed  -Nursing to inform if any concerns  -PT/OT for movement  Discharge to skilled nursing     CODE STATUS full    DVT prophylaxis   Lovenox      Discharge Exam:  Patient seen and examined by me on discharge day.  Pertinent Findings:  Patient Vitals for the past 24 hrs:   BP Temp Temp src Pulse Resp SpO2 Weight   01/03/25 0741 126/77 97.9 °F (36.6 °C) Oral 71 19 95 % --   01/03/25 0505 -- -- -- -- -- -- 124.2 kg (273 lb 13 oz)   01/03/25 0439 105/71 97.8 °F (36.6 °C) Oral 64 18 94 % --   01/02/25 2214 -- -- -- 59 18 94 % --   01/02/25 2211 -- -- -- 56 18 94 % --   01/02/25 2055 (!) 98/48 97.9 °F (36.6 °C) Oral 58 20 91 % --   01/02/25 1612 131/76 98.3 °F (36.8 °C) Oral 98 18 98 % --   01/02/25 1407 129/88 98.1 °F (36.7 °C) Oral 97 18 95 % --   01/02/25 1013 -- -- -- -- -- 98 % --   01/02/25 0930 129/88 98.1 °F (36.7 °C) Oral 97 18 95 % --       Gen:    Not in distress  Chest: Clear lungs  CVS:   Regular rhythm.  No edema  Abd:  Soft, not distended, not tender  Neuro: awake, moving all exts    Discharge/Recent Laboratory Results:  No results for input(s): \"NA\", \"K\", \"CL\", \"CO2\", \"BUN\", \"CREATININE\", \"GLUCOSE\", \"CALCIUM\", \"PHOS\", \"MG\" in the last 72 hours.  No results for input(s): \"HGB\", \"HCT\", \"WBC\", \"PLT\" in the last 72 hours.    Discharge Medications:     Medication List        START taking these

## 2025-01-04 PROCEDURE — 2500000003 HC RX 250 WO HCPCS: Performed by: INTERNAL MEDICINE

## 2025-01-04 PROCEDURE — 6370000000 HC RX 637 (ALT 250 FOR IP): Performed by: INTERNAL MEDICINE

## 2025-01-04 PROCEDURE — 97530 THERAPEUTIC ACTIVITIES: CPT

## 2025-01-04 PROCEDURE — 94761 N-INVAS EAR/PLS OXIMETRY MLT: CPT

## 2025-01-04 PROCEDURE — 6370000000 HC RX 637 (ALT 250 FOR IP)

## 2025-01-04 PROCEDURE — 94640 AIRWAY INHALATION TREATMENT: CPT

## 2025-01-04 PROCEDURE — 6360000002 HC RX W HCPCS: Performed by: STUDENT IN AN ORGANIZED HEALTH CARE EDUCATION/TRAINING PROGRAM

## 2025-01-04 PROCEDURE — 2500000003 HC RX 250 WO HCPCS: Performed by: NURSE PRACTITIONER

## 2025-01-04 PROCEDURE — 1100000000 HC RM PRIVATE

## 2025-01-04 RX ADMIN — SODIUM CHLORIDE, PRESERVATIVE FREE 10 ML: 5 INJECTION INTRAVENOUS at 21:09

## 2025-01-04 RX ADMIN — GUAIFENESIN 300 MG: 100 SOLUTION ORAL at 21:03

## 2025-01-04 RX ADMIN — GLYCOPYRROLATE 1 MG: 1 TABLET ORAL at 09:01

## 2025-01-04 RX ADMIN — Medication 2 PUFF: at 08:47

## 2025-01-04 RX ADMIN — Medication 2 PUFF: at 20:15

## 2025-01-04 RX ADMIN — QUETIAPINE FUMARATE 50 MG: 25 TABLET ORAL at 21:02

## 2025-01-04 RX ADMIN — TAMSULOSIN HYDROCHLORIDE 0.4 MG: 0.4 CAPSULE ORAL at 09:01

## 2025-01-04 RX ADMIN — SERTRALINE 50 MG: 50 TABLET, FILM COATED ORAL at 09:02

## 2025-01-04 RX ADMIN — GUAIFENESIN 300 MG: 100 SOLUTION ORAL at 16:26

## 2025-01-04 RX ADMIN — EMPAGLIFLOZIN 10 MG: 10 TABLET, FILM COATED ORAL at 09:02

## 2025-01-04 RX ADMIN — POTASSIUM BICARBONATE 40 MEQ: 782 TABLET, EFFERVESCENT ORAL at 21:01

## 2025-01-04 RX ADMIN — FUROSEMIDE 20 MG: 40 TABLET ORAL at 09:02

## 2025-01-04 RX ADMIN — ATORVASTATIN CALCIUM 40 MG: 40 TABLET, FILM COATED ORAL at 21:03

## 2025-01-04 RX ADMIN — QUETIAPINE FUMARATE 50 MG: 25 TABLET ORAL at 09:02

## 2025-01-04 RX ADMIN — METOPROLOL TARTRATE 25 MG: 25 TABLET, FILM COATED ORAL at 21:03

## 2025-01-04 RX ADMIN — ENOXAPARIN SODIUM 30 MG: 100 INJECTION SUBCUTANEOUS at 21:05

## 2025-01-04 RX ADMIN — ENOXAPARIN SODIUM 30 MG: 100 INJECTION SUBCUTANEOUS at 09:01

## 2025-01-04 RX ADMIN — FAMOTIDINE 20 MG: 20 TABLET, FILM COATED ORAL at 09:02

## 2025-01-04 RX ADMIN — POTASSIUM BICARBONATE 40 MEQ: 782 TABLET, EFFERVESCENT ORAL at 09:02

## 2025-01-04 RX ADMIN — ASPIRIN 81 MG 81 MG: 81 TABLET ORAL at 09:02

## 2025-01-04 RX ADMIN — GUAIFENESIN 300 MG: 100 SOLUTION ORAL at 09:01

## 2025-01-04 RX ADMIN — FLUCONAZOLE 100 MG: 100 TABLET ORAL at 09:01

## 2025-01-04 RX ADMIN — GLYCOPYRROLATE 1 MG: 1 TABLET ORAL at 14:32

## 2025-01-04 RX ADMIN — QUETIAPINE FUMARATE 50 MG: 25 TABLET ORAL at 14:31

## 2025-01-04 RX ADMIN — BUSPIRONE HYDROCHLORIDE 7.5 MG: 5 TABLET ORAL at 09:01

## 2025-01-04 RX ADMIN — SENNOSIDES 8.6 MG: 8.6 TABLET, FILM COATED ORAL at 21:02

## 2025-01-04 RX ADMIN — GLYCOPYRROLATE 1 MG: 1 TABLET ORAL at 21:03

## 2025-01-04 RX ADMIN — ACETAMINOPHEN 650 MG: 650 SOLUTION ORAL at 16:26

## 2025-01-04 RX ADMIN — SODIUM CHLORIDE, PRESERVATIVE FREE 10 ML: 5 INJECTION INTRAVENOUS at 09:02

## 2025-01-04 RX ADMIN — GUAIFENESIN 300 MG: 100 SOLUTION ORAL at 14:31

## 2025-01-04 RX ADMIN — BUSPIRONE HYDROCHLORIDE 7.5 MG: 5 TABLET ORAL at 21:02

## 2025-01-04 RX ADMIN — METOPROLOL TARTRATE 25 MG: 25 TABLET, FILM COATED ORAL at 09:02

## 2025-01-04 ASSESSMENT — PAIN DESCRIPTION - DESCRIPTORS: DESCRIPTORS: ACHING

## 2025-01-04 ASSESSMENT — PAIN DESCRIPTION - ORIENTATION: ORIENTATION: LEFT;LOWER

## 2025-01-04 ASSESSMENT — PAIN DESCRIPTION - LOCATION: LOCATION: BACK

## 2025-01-04 ASSESSMENT — PAIN SCALES - GENERAL
PAINLEVEL_OUTOF10: 0
PAINLEVEL_OUTOF10: 3
PAINLEVEL_OUTOF10: 1

## 2025-01-04 NOTE — PLAN OF CARE
OCCUPATIONAL THERAPY TREATMENT  Patient: Kim Markham (49 y.o. female)  Date: 1/4/2025  Primary Diagnosis: Acute right-sided congestive heart failure (HCC) [I50.811]  SVT (supraventricular tachycardia) (HCC) [I47.10]  Essential hypertension [I10]  Hypoxia [R09.02]  COPD exacerbation (HCC) [J44.1]  Procedure(s) (LRB):  TRACHEOSTOMY (N/A) 31 Days Post-Op   Precautions: Fall Risk                Recommendations for nursing mobility: Out of bed to chair for meals, Encourage HEP in prep for ADLs/mobility; see handout for details, AD and gt belt for bed to chair , Amb to bathroom with AD and gait belt, and Assist x1    In place during session: None  Chart, occupational therapy assessment, plan of care, and goals were reviewed.    ASSESSMENT  Patient continues with skilled OT services and is progressing towards goals.  Pt received semi-supine in bed upon arrival, AXO x 4 and agreeable to GARDUNO tx at this time. Pt cooperative and demonstrated good effort during activities. Pt mod I for all bed mobility. Pt noted with improved functional mobility bed>toilet>sink>chair tf using AD with supervision/mod I with min vc's for correct hand placement and RW mgmt for safety. Pt IND for toileting hygiene using grab bar. No LOB noted at sink with grooming task however pt presents with decreased activity tolerance. Seated in chair, pt engaged in theraband exercises with education and cueing provided regarding joint protection/proper technique/achieve full ROM needed to maintain/improve strength to increase performance in ADL'S and functional tf's, see grid below.  (See below for objective details and assist levels)     Overall, pt limited by generalized weakness that interferes with performance in ADL's and functional tf's safely.  Will continue to progress. Current OT recommendations for discharge High intensity and comprehensive skilled occupational therapy in a multidisciplinary setting as patient is working towards tolerating up  I was.”    OBJECTIVE DATA SUMMARY:   Cognitive/Behavioral Status:  Orientation  Orientation Level: Oriented X4  Cognition  Overall Cognitive Status: WFL    Functional Mobility and Transfers for ADLs:  Bed Mobility:  Bed Mobility Training  Bed Mobility Training: Yes  Rolling: Modified independent  Supine to Sit: Modified independent  Scooting: Independent    Transfers:  Transfer Training  Transfer Training: Yes  Sit to Stand: Modified independent  Stand to Sit: Modified independent  Toilet Transfer: Modified independent;Supervision      Balance:  Balance  Sitting: Intact  Sitting - Static: Good (unsupported)  Sitting - Dynamic: Good (unsupported)  Standing: Impaired  Standing - Static: Constant support;Good  Standing - Dynamic: Constant support;Good;Fair      ADL Intervention:                   Grooming: Modified independent    Grooming Skilled Clinical Factors: Standing at sink: facial/oral care and hand washing       Toileting: Independent;Increased time to complete  Toileting Skilled Clinical Factors: seated for posterior/rocio care           Therapeutic exercise: Theraband green    Exercise Sets Reps AROM AAROM PROM Self PROM Comments   Shoulder horiz. abd 1 10 [x] [] [] [] Chair level:    Shoulder flex/ext 1 10 [x] [] [] []         Pain Ratin/10  Pain Intervention(s):       Activity Tolerance:   Fair  and requires rest breaks    After treatment patient left in no apparent distress:   Bed locked and returned to lowest position, Patient left in no apparent distress sitting up in chair and Call bell within reach, and nsg updated     COMMUNICATION/EDUCATION:   The patient’s plan of care was discussed with: Registered nurse        Patient Education  Education Given To: Patient  Education Provided: Role of Therapy;Home Exercise Program;Transfer Training;Mobility Training;Energy Conservation  Education Method: Verbal;Demonstration  Education Outcome: Verbalized understanding    Thank you for this

## 2025-01-04 NOTE — DISCHARGE SUMMARY
Discharge Summary    Name: Kim Markham  [id number]  YOB: 1975 (Age: 49 y.o.)   Date of Admission: 11/12/2024  Date of Discharge: 1/4/2025  Attending Physician: John Florez MD    Discharge Diagnosis:     Consultations:  IP CONSULT TO CARDIOLOGY  IP CONSULT TO PHARMACY  IP CONSULT TO OTOLARYNGOLOGY  IP CONSULT TO ETHICS  IP CONSULT TO SPIRITUAL CARE  IP CONSULT TO VASCULAR ACCESS TEAM      Brief Admission History/Reason for Admission Per Mee Elizabeth MD:     Chief Complaint / Reason for Physician Visit  \" Difficulty breathing\".  Discussed with RN events overnight.      11/13 --  Critical Care admission H&P  49 y.o. female with known past medical history significant for asthma and COPD who treated her symptoms today with Primatene Mist over-the-counter says that she went into fast heart rate became short of breath immediately.  No other exacerbating or relieving factors which presents to the emergency room with no treatment prior to arrival.  Patient reports that she had a few day history of shortness of breath and thought she had a pneumonia. She has been taking OTC cough syrup and cough medicine as well as her inhaler. She reports difficulty affording medications and has limited access to healthcare.      Patient was placed in ICU due to increased work of breathing.  V. tach was noted and patient was started on nitroglycerin infusion.  Tachycardia worsened by epinephrine and your inhaler.  Patient was placed on Rocephin, azithromycin, and Solu-Medrol for COPD exacerbation.  Respiratory status worsened on 11/13 AM and patient was intubated.  As for tachycardia, echo was performed and showed EF of 60 to 65%, LVH, abnormal diastolic function, RV moderately dilated.  CTA ruled out PE.  Duplex showed no DVT.  Cardiology consulted and started patient on aspirin, Plavix, and statin.  Patient was also noted to be SVT and required amiodarone infusions.  Due to

## 2025-01-04 NOTE — PLAN OF CARE
Problem: Discharge Planning  Goal: Discharge to home or other facility with appropriate resources  1/3/2025 2229 by Alec Kline LPN  Outcome: Progressing  1/3/2025 1051 by Remedios Moise RN  Outcome: Progressing     Problem: ABCDS Injury Assessment  Goal: Absence of physical injury  1/3/2025 2229 by Alec Kline LPN  Outcome: Progressing  1/3/2025 1051 by Remedios Moise RN  Outcome: Progressing     Problem: Safety - Adult  Goal: Free from fall injury  1/3/2025 2229 by Alec Kline LPN  Outcome: Progressing  1/3/2025 1051 by Remedios Moise RN  Outcome: Progressing     Problem: Confusion  Goal: Confusion, delirium, dementia, or psychosis is improved or at baseline  Description: INTERVENTIONS:  1. Assess for possible contributors to thought disturbance, including medications, impaired vision or hearing, underlying metabolic abnormalities, dehydration, psychiatric diagnoses, and notify attending LIP  2. [place] high risk fall precautions, as indicated  3. Provide frequent short contacts to provide reality reorientation, refocusing and direction  4. Decrease environmental stimuli, including noise as appropriate  5. Monitor and intervene to maintain adequate nutrition, hydration, elimination, sleep and activity  6. If unable to ensure safety without constant attention obtain sitter and review sitter guidelines with assigned personnel  7. Initiate Psychosocial CNS and Spiritual Care consult, as indicated  1/3/2025 2229 by Alec Kline LPN  Outcome: Progressing  1/3/2025 1051 by Remedios Moise RN  Outcome: Progressing     Problem: Chronic Conditions and Co-morbidities  Goal: Patient's chronic conditions and co-morbidity symptoms are monitored and maintained or improved  1/3/2025 2229 by Alec Kline LPN  Outcome: Progressing  1/3/2025 1051 by Remedios Moise RN  Outcome: Progressing     Problem: Neurosensory - Adult  Goal: Achieves stable or improved neurological status  1/3/2025 2229 by Alec Kline  Have You Had Laser Resurfacing Before?: has not had previous treatments Behavior)  Description: INTERVENTIONS:  1. Determine that de-escalation and other, less restrictive measures have been tried or would not be effective before applying the restraint  2. Identify and document the criteria for restraint  3. Evaluate the patient's condition at the time of restraint application  4. Inform patient/family regarding the reason for restraint/seclusion  5. Q2H: Monitor comfort, nutrition and hydration needs  6. Q15M: Perform safety checks including skin, circulation, sensory, respiratory and psychological status  7. Ensure continuous observation  8. Identify and implement measures to help patient regain control, assess readiness for release and initiate progressive release per policy  1/3/2025 2229 by Alec Kline LPN  Outcome: Progressing  1/3/2025 1051 by Remedios Moise RN  Outcome: Progressing     Problem: Safety - Medical Restraint  Goal: Remains free of injury from restraints (Restraint for Interference with Medical Device)  Description: INTERVENTIONS:  1. Determine that other, less restrictive measures have been tried or would not be effective before applying the restraint  2. Evaluate the patient's condition at the time of restraint application  3. Inform patient/family regarding the reason for restraint  4. Q2H: Monitor safety, psychosocial status, comfort, nutrition and hydration  1/3/2025 2229 by Alec Kline LPN  Outcome: Progressing  1/3/2025 1051 by Remedios Moise RN  Outcome: Progressing     Problem: Pain  Goal: Verbalizes/displays adequate comfort level or baseline comfort level  1/3/2025 2229 by Alec Kline LPN  Outcome: Progressing  1/3/2025 1051 by Remedios Moise RN  Outcome: Progressing     Problem: Skin/Tissue Integrity  Goal: Absence of new skin breakdown  Description: 1.  Monitor for areas of redness and/or skin breakdown  2.  Assess vascular access sites hourly  3.  Every 4-6 hours minimum:  Change oxygen saturation probe site  4.  Every 4-6 hours:  If on

## 2025-01-04 NOTE — PLAN OF CARE
Problem: Discharge Planning  Goal: Discharge to home or other facility with appropriate resources  1/4/2025 0802 by Remedios Moise RN  Outcome: Progressing  1/3/2025 2229 by Alec Kline LPN  Outcome: Progressing     Problem: ABCDS Injury Assessment  Goal: Absence of physical injury  1/4/2025 0802 by Remedios Moise RN  Outcome: Progressing  1/3/2025 2229 by Alec Kline LPN  Outcome: Progressing     Problem: Safety - Adult  Goal: Free from fall injury  1/4/2025 0802 by Remedios Moise RN  Outcome: Progressing  1/3/2025 2229 by Alec Kline LPN  Outcome: Progressing     Problem: Confusion  Goal: Confusion, delirium, dementia, or psychosis is improved or at baseline  Description: INTERVENTIONS:  1. Assess for possible contributors to thought disturbance, including medications, impaired vision or hearing, underlying metabolic abnormalities, dehydration, psychiatric diagnoses, and notify attending LIP  2. [place] high risk fall precautions, as indicated  3. Provide frequent short contacts to provide reality reorientation, refocusing and direction  4. Decrease environmental stimuli, including noise as appropriate  5. Monitor and intervene to maintain adequate nutrition, hydration, elimination, sleep and activity  6. If unable to ensure safety without constant attention obtain sitter and review sitter guidelines with assigned personnel  7. Initiate Psychosocial CNS and Spiritual Care consult, as indicated  1/4/2025 0802 by Remedios Moise RN  Outcome: Progressing  1/3/2025 2229 by Alec Kline LPN  Outcome: Progressing     Problem: Chronic Conditions and Co-morbidities  Goal: Patient's chronic conditions and co-morbidity symptoms are monitored and maintained or improved  1/4/2025 0802 by Remedios Moise RN  Outcome: Progressing  1/3/2025 2229 by Alec Kline LPN  Outcome: Progressing     Problem: Neurosensory - Adult  Goal: Achieves stable or improved neurological status  1/4/2025 0802 by Remedios Moise  Behavior)  Description: INTERVENTIONS:  1. Determine that de-escalation and other, less restrictive measures have been tried or would not be effective before applying the restraint  2. Identify and document the criteria for restraint  3. Evaluate the patient's condition at the time of restraint application  4. Inform patient/family regarding the reason for restraint/seclusion  5. Q2H: Monitor comfort, nutrition and hydration needs  6. Q15M: Perform safety checks including skin, circulation, sensory, respiratory and psychological status  7. Ensure continuous observation  8. Identify and implement measures to help patient regain control, assess readiness for release and initiate progressive release per policy  1/4/2025 0802 by Remedios Moise RN  Outcome: Progressing  1/3/2025 2229 by Alec Kline LPN  Outcome: Progressing     Problem: Safety - Medical Restraint  Goal: Remains free of injury from restraints (Restraint for Interference with Medical Device)  Description: INTERVENTIONS:  1. Determine that other, less restrictive measures have been tried or would not be effective before applying the restraint  2. Evaluate the patient's condition at the time of restraint application  3. Inform patient/family regarding the reason for restraint  4. Q2H: Monitor safety, psychosocial status, comfort, nutrition and hydration  1/4/2025 0802 by Remedios Moise RN  Outcome: Progressing  1/3/2025 2229 by Alec Kline LPN  Outcome: Progressing     Problem: Pain  Goal: Verbalizes/displays adequate comfort level or baseline comfort level  1/4/2025 0802 by Remedios Moise RN  Outcome: Progressing  1/3/2025 2229 by Alec Kline LPN  Outcome: Progressing     Problem: Skin/Tissue Integrity  Goal: Absence of new skin breakdown  Description: 1.  Monitor for areas of redness and/or skin breakdown  2.  Assess vascular access sites hourly  3.  Every 4-6 hours minimum:  Change oxygen saturation probe site  4.  Every 4-6 hours:  If on

## 2025-01-05 PROCEDURE — 6370000000 HC RX 637 (ALT 250 FOR IP)

## 2025-01-05 PROCEDURE — 6370000000 HC RX 637 (ALT 250 FOR IP): Performed by: INTERNAL MEDICINE

## 2025-01-05 PROCEDURE — 2500000003 HC RX 250 WO HCPCS: Performed by: INTERNAL MEDICINE

## 2025-01-05 PROCEDURE — 94640 AIRWAY INHALATION TREATMENT: CPT

## 2025-01-05 PROCEDURE — 2500000003 HC RX 250 WO HCPCS: Performed by: NURSE PRACTITIONER

## 2025-01-05 PROCEDURE — 94668 MNPJ CHEST WALL SBSQ: CPT

## 2025-01-05 PROCEDURE — 94761 N-INVAS EAR/PLS OXIMETRY MLT: CPT

## 2025-01-05 PROCEDURE — 6360000002 HC RX W HCPCS: Performed by: STUDENT IN AN ORGANIZED HEALTH CARE EDUCATION/TRAINING PROGRAM

## 2025-01-05 PROCEDURE — 1100000000 HC RM PRIVATE

## 2025-01-05 RX ADMIN — FLUCONAZOLE 100 MG: 100 TABLET ORAL at 08:23

## 2025-01-05 RX ADMIN — FUROSEMIDE 20 MG: 40 TABLET ORAL at 08:22

## 2025-01-05 RX ADMIN — GUAIFENESIN 300 MG: 100 SOLUTION ORAL at 15:33

## 2025-01-05 RX ADMIN — EMPAGLIFLOZIN 10 MG: 10 TABLET, FILM COATED ORAL at 08:23

## 2025-01-05 RX ADMIN — Medication 2 PUFF: at 09:18

## 2025-01-05 RX ADMIN — POTASSIUM BICARBONATE 40 MEQ: 782 TABLET, EFFERVESCENT ORAL at 08:17

## 2025-01-05 RX ADMIN — GLYCOPYRROLATE 1 MG: 1 TABLET ORAL at 15:33

## 2025-01-05 RX ADMIN — QUETIAPINE FUMARATE 50 MG: 25 TABLET ORAL at 23:35

## 2025-01-05 RX ADMIN — SODIUM CHLORIDE, PRESERVATIVE FREE 10 ML: 5 INJECTION INTRAVENOUS at 23:41

## 2025-01-05 RX ADMIN — POTASSIUM BICARBONATE 40 MEQ: 782 TABLET, EFFERVESCENT ORAL at 23:33

## 2025-01-05 RX ADMIN — BUSPIRONE HYDROCHLORIDE 7.5 MG: 5 TABLET ORAL at 08:21

## 2025-01-05 RX ADMIN — FAMOTIDINE 20 MG: 20 TABLET, FILM COATED ORAL at 08:23

## 2025-01-05 RX ADMIN — METOPROLOL TARTRATE 25 MG: 25 TABLET, FILM COATED ORAL at 23:35

## 2025-01-05 RX ADMIN — ENOXAPARIN SODIUM 30 MG: 100 INJECTION SUBCUTANEOUS at 23:36

## 2025-01-05 RX ADMIN — TAMSULOSIN HYDROCHLORIDE 0.4 MG: 0.4 CAPSULE ORAL at 08:23

## 2025-01-05 RX ADMIN — QUETIAPINE FUMARATE 50 MG: 25 TABLET ORAL at 08:23

## 2025-01-05 RX ADMIN — ENOXAPARIN SODIUM 30 MG: 100 INJECTION SUBCUTANEOUS at 08:18

## 2025-01-05 RX ADMIN — METOPROLOL TARTRATE 25 MG: 25 TABLET, FILM COATED ORAL at 08:23

## 2025-01-05 RX ADMIN — ASPIRIN 81 MG 81 MG: 81 TABLET ORAL at 08:23

## 2025-01-05 RX ADMIN — ATORVASTATIN CALCIUM 40 MG: 40 TABLET, FILM COATED ORAL at 23:36

## 2025-01-05 RX ADMIN — GUAIFENESIN 300 MG: 100 SOLUTION ORAL at 23:34

## 2025-01-05 RX ADMIN — Medication 2 PUFF: at 19:31

## 2025-01-05 RX ADMIN — GUAIFENESIN 300 MG: 100 SOLUTION ORAL at 08:26

## 2025-01-05 RX ADMIN — SERTRALINE 50 MG: 50 TABLET, FILM COATED ORAL at 08:23

## 2025-01-05 RX ADMIN — GLYCOPYRROLATE 1 MG: 1 TABLET ORAL at 08:23

## 2025-01-05 RX ADMIN — BUSPIRONE HYDROCHLORIDE 7.5 MG: 5 TABLET ORAL at 23:35

## 2025-01-05 RX ADMIN — SODIUM CHLORIDE, PRESERVATIVE FREE 10 ML: 5 INJECTION INTRAVENOUS at 08:29

## 2025-01-05 RX ADMIN — GLYCOPYRROLATE 1 MG: 1 TABLET ORAL at 23:36

## 2025-01-05 RX ADMIN — SENNOSIDES 8.6 MG: 8.6 TABLET, FILM COATED ORAL at 23:36

## 2025-01-05 RX ADMIN — QUETIAPINE FUMARATE 50 MG: 25 TABLET ORAL at 15:33

## 2025-01-05 ASSESSMENT — PAIN SCALES - GENERAL: PAINLEVEL_OUTOF10: 0

## 2025-01-05 NOTE — PLAN OF CARE
assess nares and determine need for appliance change or resting period.  1/4/2025 1957 by Alec Kline LPN  Outcome: Progressing  1/4/2025 0802 by Remedios Moise RN  Outcome: Progressing     Problem: Skin/Tissue Integrity - Adult  Goal: Skin integrity remains intact  1/4/2025 1957 by Alec Kline LPN  Outcome: Progressing  1/4/2025 0802 by Remedios Moise RN  Outcome: Progressing  Flowsheets (Taken 1/4/2025 0725)  Skin Integrity Remains Intact: Monitor for areas of redness and/or skin breakdown  Goal: Incisions, wounds, or drain sites healing without S/S of infection  1/4/2025 1957 by Alec Kline LPN  Outcome: Progressing  1/4/2025 0802 by Remedios Moise RN  Outcome: Progressing  Flowsheets (Taken 1/4/2025 0725)  Incisions, Wounds, or Drain Sites Healing Without Sign and Symptoms of Infection: ADMISSION and DAILY: Assess and document risk factors for pressure ulcer development  Goal: Oral mucous membranes remain intact  1/4/2025 1957 by Alec Kline LPN  Outcome: Progressing  1/4/2025 0802 by Remedios Moise RN  Outcome: Progressing     Problem: Occupational Therapy - Adult  Goal: By Discharge: Performs self-care activities at highest level of function for planned discharge setting.  See evaluation for individualized goals.  Description: FUNCTIONAL STATUS PRIOR TO ADMISSION:  Pt was independent w/ ADLs and IADLs, ambulating w/ a cane or RW and working full time prior to admission.    HOME SUPPORT: The patient lived with spouse but did not require assistance.    Occupational Therapy Goals:  Initiated 11/25/2024  Patient/Family stated goal: improve mobility and engagement in ADLs  1.  Patient will perform grooming with [place] within 7 day(s).  2.  Patient will perform upper body dressing with [place] within 7 day(s).  3.  Patient will perform lower body dressing with [place] within 7 day(s).  4.  Patient will perform toilet transfers with Modified [place]  within 7 day(s).  5.  Patient

## 2025-01-05 NOTE — DISCHARGE SUMMARY
Discharge Summary    Name: Kim Markham  [id number]  YOB: 1975 (Age: 49 y.o.)   Date of Admission: 11/12/2024  Date of Discharge: 1/5/2025  Attending Physician: Erick Carrillo MD    Discharge Diagnosis:     Consultations:  IP CONSULT TO CARDIOLOGY  IP CONSULT TO PHARMACY  IP CONSULT TO OTOLARYNGOLOGY  IP CONSULT TO ETHICS  IP CONSULT TO SPIRITUAL CARE  IP CONSULT TO VASCULAR ACCESS TEAM      Brief Admission History/Reason for Admission Per Mee Elizabeth MD:     Chief Complaint / Reason for Physician Visit  \" Difficulty breathing\".  Discussed with RN events overnight.      11/13 --  Critical Care admission H&P  49 y.o. female with known past medical history significant for asthma and COPD who treated her symptoms today with Primatene Mist over-the-counter says that she went into fast heart rate became short of breath immediately.  No other exacerbating or relieving factors which presents to the emergency room with no treatment prior to arrival.  Patient reports that she had a few day history of shortness of breath and thought she had a pneumonia. She has been taking OTC cough syrup and cough medicine as well as her inhaler. She reports difficulty affording medications and has limited access to healthcare.      Patient was placed in ICU due to increased work of breathing.  V. tach was noted and patient was started on nitroglycerin infusion.  Tachycardia worsened by epinephrine and your inhaler.  Patient was placed on Rocephin, azithromycin, and Solu-Medrol for COPD exacerbation.  Respiratory status worsened on 11/13 AM and patient was intubated.  As for tachycardia, echo was performed and showed EF of 60 to 65%, LVH, abnormal diastolic function, RV moderately dilated.  CTA ruled out PE.  Duplex showed no DVT.  Cardiology consulted and started patient on aspirin, Plavix, and statin.  Patient was also noted to be SVT and required amiodarone infusions.  Due  tablet  Commonly known as: PROTONIX               Where to Get Your Medications        These medications were sent to [facility] [address] [place] [address] - [address] [place] [address] - P [phone] - F [phone]  [address] [place] [id number]      Phone: [phone]   amiodarone 200 MG tablet  aspirin 81 MG chewable tablet  atorvastatin 40 MG tablet  busPIRone 7.5 MG tablet  empagliflozin 10 MG tablet  furosemide 20 MG tablet  lidocaine 4 % external patch  metoprolol tartrate 25 MG tablet  QUEtiapine 50 MG tablet  senna 8.6 MG tablet  sertraline 50 MG tablet  tamsulosin 0.4 MG capsule  tiotropium 2.5 MCG/ACT Aers inhaler           DISPOSITION:    Home with Family:       Home with HH/PT/OT/RN:    SNF/LTC:               xx   RANJANA:    OTHER:        Code status:   Full  Recommended diet: cardiac diet  Recommended activity: activity as tolerated\      Follow up with:   PCP : None, None    Sheeba Gardner, PAGLORY  [address]  [facility] [address] [phone]    Go on 12/6/2024  at 2pm    Dinorah Segal, APRN - NP  [address]  [facility] [address] [phone]    Follow up on 3/3/2025  time at at 1400, the address is [address] [place] [address] [address] [place] [address], phone #[phone]        Total time in minutes spent coordinating this discharge (includes going over instructions, follow-up, prescriptions, and preparing report for sign off to her PCP) :  35 minutes

## 2025-01-05 NOTE — PLAN OF CARE
Problem: Discharge Planning  Goal: Discharge to home or other facility with appropriate resources  Outcome: Progressing     Problem: ABCDS Injury Assessment  Goal: Absence of physical injury  Outcome: Progressing     Problem: Safety - Adult  Goal: Free from fall injury  Outcome: Progressing     Problem: Confusion  Goal: Confusion, delirium, dementia, or psychosis is improved or at baseline  Description: INTERVENTIONS:  1. Assess for possible contributors to thought disturbance, including medications, impaired vision or hearing, underlying metabolic abnormalities, dehydration, psychiatric diagnoses, and notify attending LIP  2. [place] high risk fall precautions, as indicated  3. Provide frequent short contacts to provide reality reorientation, refocusing and direction  4. Decrease environmental stimuli, including noise as appropriate  5. Monitor and intervene to maintain adequate nutrition, hydration, elimination, sleep and activity  6. If unable to ensure safety without constant attention obtain sitter and review sitter guidelines with assigned personnel  7. Initiate Psychosocial CNS and Spiritual Care consult, as indicated  Outcome: Progressing     Problem: Chronic Conditions and Co-morbidities  Goal: Patient's chronic conditions and co-morbidity symptoms are monitored and maintained or improved  Outcome: Progressing     Problem: Neurosensory - Adult  Goal: Achieves stable or improved neurological status  Outcome: Progressing  Goal: Absence of seizures  Outcome: Progressing  Goal: Remains free of injury related to seizures activity  Outcome: Progressing  Goal: Achieves maximal functionality and self care  Outcome: Progressing     Problem: Respiratory - Adult  Goal: Achieves optimal ventilation and oxygenation  Outcome: Progressing     Problem: Cardiovascular - Adult  Goal: Maintains optimal cardiac output and hemodynamic stability  Outcome: Progressing  Goal: Absence of cardiac dysrhythmias or at

## 2025-01-06 PROCEDURE — 6370000000 HC RX 637 (ALT 250 FOR IP)

## 2025-01-06 PROCEDURE — 6370000000 HC RX 637 (ALT 250 FOR IP): Performed by: INTERNAL MEDICINE

## 2025-01-06 PROCEDURE — 1100000000 HC RM PRIVATE

## 2025-01-06 PROCEDURE — 97530 THERAPEUTIC ACTIVITIES: CPT

## 2025-01-06 PROCEDURE — 94761 N-INVAS EAR/PLS OXIMETRY MLT: CPT

## 2025-01-06 PROCEDURE — 2500000003 HC RX 250 WO HCPCS: Performed by: NURSE PRACTITIONER

## 2025-01-06 PROCEDURE — 2500000003 HC RX 250 WO HCPCS: Performed by: INTERNAL MEDICINE

## 2025-01-06 PROCEDURE — 6360000002 HC RX W HCPCS: Performed by: STUDENT IN AN ORGANIZED HEALTH CARE EDUCATION/TRAINING PROGRAM

## 2025-01-06 PROCEDURE — 94640 AIRWAY INHALATION TREATMENT: CPT

## 2025-01-06 RX ADMIN — GUAIFENESIN 300 MG: 100 SOLUTION ORAL at 13:19

## 2025-01-06 RX ADMIN — SENNOSIDES 8.6 MG: 8.6 TABLET, FILM COATED ORAL at 20:39

## 2025-01-06 RX ADMIN — ACETAMINOPHEN 650 MG: 650 SOLUTION ORAL at 16:08

## 2025-01-06 RX ADMIN — POTASSIUM BICARBONATE 40 MEQ: 782 TABLET, EFFERVESCENT ORAL at 20:38

## 2025-01-06 RX ADMIN — POTASSIUM BICARBONATE 40 MEQ: 782 TABLET, EFFERVESCENT ORAL at 08:21

## 2025-01-06 RX ADMIN — QUETIAPINE FUMARATE 50 MG: 25 TABLET ORAL at 20:39

## 2025-01-06 RX ADMIN — SERTRALINE 50 MG: 50 TABLET, FILM COATED ORAL at 08:23

## 2025-01-06 RX ADMIN — FUROSEMIDE 20 MG: 40 TABLET ORAL at 08:22

## 2025-01-06 RX ADMIN — ATORVASTATIN CALCIUM 40 MG: 40 TABLET, FILM COATED ORAL at 20:38

## 2025-01-06 RX ADMIN — Medication 2 PUFF: at 21:09

## 2025-01-06 RX ADMIN — METOPROLOL TARTRATE 25 MG: 25 TABLET, FILM COATED ORAL at 20:39

## 2025-01-06 RX ADMIN — GLYCOPYRROLATE 1 MG: 1 TABLET ORAL at 13:19

## 2025-01-06 RX ADMIN — QUETIAPINE FUMARATE 50 MG: 25 TABLET ORAL at 13:19

## 2025-01-06 RX ADMIN — FAMOTIDINE 20 MG: 20 TABLET, FILM COATED ORAL at 08:22

## 2025-01-06 RX ADMIN — METOPROLOL TARTRATE 25 MG: 25 TABLET, FILM COATED ORAL at 08:23

## 2025-01-06 RX ADMIN — Medication 2 PUFF: at 07:58

## 2025-01-06 RX ADMIN — GLYCOPYRROLATE 1 MG: 1 TABLET ORAL at 20:39

## 2025-01-06 RX ADMIN — GUAIFENESIN 300 MG: 100 SOLUTION ORAL at 16:08

## 2025-01-06 RX ADMIN — BUSPIRONE HYDROCHLORIDE 7.5 MG: 5 TABLET ORAL at 20:39

## 2025-01-06 RX ADMIN — FLUCONAZOLE 100 MG: 100 TABLET ORAL at 08:22

## 2025-01-06 RX ADMIN — ENOXAPARIN SODIUM 30 MG: 100 INJECTION SUBCUTANEOUS at 20:38

## 2025-01-06 RX ADMIN — TAMSULOSIN HYDROCHLORIDE 0.4 MG: 0.4 CAPSULE ORAL at 08:23

## 2025-01-06 RX ADMIN — Medication 2 PUFF: at 07:59

## 2025-01-06 RX ADMIN — SODIUM CHLORIDE, PRESERVATIVE FREE 10 ML: 5 INJECTION INTRAVENOUS at 20:32

## 2025-01-06 RX ADMIN — BUSPIRONE HYDROCHLORIDE 7.5 MG: 5 TABLET ORAL at 08:22

## 2025-01-06 RX ADMIN — GUAIFENESIN 300 MG: 100 SOLUTION ORAL at 20:38

## 2025-01-06 RX ADMIN — ASPIRIN 81 MG 81 MG: 81 TABLET ORAL at 08:22

## 2025-01-06 RX ADMIN — EMPAGLIFLOZIN 10 MG: 10 TABLET, FILM COATED ORAL at 08:22

## 2025-01-06 RX ADMIN — HYDROCODONE BITARTRATE AND ACETAMINOPHEN 1 TABLET: 5; 325 TABLET ORAL at 05:13

## 2025-01-06 RX ADMIN — QUETIAPINE FUMARATE 50 MG: 25 TABLET ORAL at 08:22

## 2025-01-06 RX ADMIN — GUAIFENESIN 300 MG: 100 SOLUTION ORAL at 08:21

## 2025-01-06 RX ADMIN — SODIUM CHLORIDE, PRESERVATIVE FREE 10 ML: 5 INJECTION INTRAVENOUS at 08:24

## 2025-01-06 RX ADMIN — ENOXAPARIN SODIUM 30 MG: 100 INJECTION SUBCUTANEOUS at 08:23

## 2025-01-06 RX ADMIN — GLYCOPYRROLATE 1 MG: 1 TABLET ORAL at 08:22

## 2025-01-06 ASSESSMENT — PAIN SCALES - GENERAL
PAINLEVEL_OUTOF10: 8
PAINLEVEL_OUTOF10: 0
PAINLEVEL_OUTOF10: 8
PAINLEVEL_OUTOF10: 0
PAINLEVEL_OUTOF10: 2

## 2025-01-06 ASSESSMENT — PAIN DESCRIPTION - DESCRIPTORS: DESCRIPTORS: CRAMPING

## 2025-01-06 ASSESSMENT — PAIN DESCRIPTION - ORIENTATION: ORIENTATION: LEFT

## 2025-01-06 ASSESSMENT — PAIN DESCRIPTION - LOCATION
LOCATION: HIP
LOCATION: BACK

## 2025-01-06 ASSESSMENT — PAIN SCALES - WONG BAKER: WONGBAKER_NUMERICALRESPONSE: NO HURT

## 2025-01-06 NOTE — CARE COORDINATION
1308: KARTHIKEYAN heard back from Public Benefits.  Patient's Medicaid is still pending at this time.  Patient was applied on 12/3/24.  T#[id number].    KARTHIKEYAN has sent T# to facilities that have been following patient.  Patient still pending LTC placement at this time.    1518: KARTHIKEYAN has sent a referral to [facility] to determine if they can possibly accept patient while she awaits a Medicaid pending bed or either her Medicaid becomes active.    KARTHIKEYAN will continue to follow up with the [facility] team.

## 2025-01-06 NOTE — CARE COORDINATION
Patient is medicaid pending.  Medicaid Application started on 12/3/24.  CM  has reached out to public benefits for T#.  CM has reached out to facilities inquiring if they are able to accept patient, CM awaiting response.  We will explore sister facilities that may be able to accept patient medicaid pending.

## 2025-01-06 NOTE — PLAN OF CARE
PHYSICAL THERAPY TREATMENT     Patient: Kim Markham (49 y.o. female)  Date: 1/6/2025  Diagnosis: Acute right-sided congestive heart failure (HCC) [I50.811]  SVT (supraventricular tachycardia) (HCC) [I47.10]  Essential hypertension [I10]  Hypoxia [R09.02]  COPD exacerbation (HCC) [J44.1] SVT (supraventricular tachycardia) (HCC)  Procedure(s) (LRB):  TRACHEOSTOMY (N/A) 33 Days Post-Op  Precautions: Fall Risk                      Recommendations for nursing mobility: Out of bed to chair for meals, Encourage HEP in prep for ADLs/mobility; see handout for details, and Assist x1    In place during session: EKG/telemetry   Chart, physical therapy assessment, plan of care and goals were reviewed.  ASSESSMENT  Patient continues with skilled PT services and is progressing towards goals. Pt sitting eob upon PT arrival, agreeable to session. (See below for objective details and assist levels).     Overall pt tolerated session well/fair today. Pt performed seated and standing LE therex with minimal c/o pain and discomfort. Pt noted to be slightly sob during therex, educated in PLB; pt reports being due to pain. Pt ambulated ~175 ft with RW and sba. Gait was slow and steady with no lob or knee buckling noted. Pt left sitting up in chair with all needs met. Will continue to benefit from skilled PT services, and will continue to progress as tolerated. Current PT DC recommendation High intensity and comprehensive skilled physical therapy in a multidisciplinary setting as patient is working towards tolerating up to 3 hours of therapy/day x 5-7 days/week once medically appropriate.    GOALS:  Problem: Physical Therapy - Adult  Goal: By Discharge: Performs mobility at highest level of function for planned discharge setting.  See evaluation for individualized goals.  Description: FUNCTIONAL STATUS PRIOR TO ADMISSION: Patient was modified independent using a rolling walker and single point cane for functional  intact  Insights: Appears intact  Functional Mobility Training:  Bed Mobility:  Bed Mobility Training  Bed Mobility Training: No  Transfers:  Transfer Training  Transfer Training: Yes  Sit to Stand: Modified independent  Stand to Sit: Modified independent  Bed to Chair: Supervision  Balance:  Balance  Sitting: Intact  Standing: Impaired  Standing - Static: Good  Standing - Dynamic: Good;Fair  Wheelchair Mobility:  Wheelchair Management  Wheelchair Management: No  Ambulation/Gait Training:  Gait  Gait Training: Yes  Overall Level of Assistance: Stand-by assistance  Distance (ft): 175 Feet  Assistive Device: Gait belt;Walker, rolling  Base of Support: Widened  Speed/Laura: Slow  Step Length: Left shortened;Right shortened    Therapeutic Exercises:     EXERCISE   Sets   Reps   Active Active Assist   Passive Self ROM   Comments   Ankle Pumps  15 [x] [] [] []    Quad Sets/Glut Sets   [] [] [] []    Hamstring Sets   [] [] [] []    Short Arc Quads   [] [] [] []    Heel Slides   [] [] [] []    Straight Leg Raises   [] [] [] []    Standing Hip abd/add  15 [x] [] [] []    Long Arc Quads  15 [x] [] [] []    Standing Marching  15 [x] [] [] []    Seated HR/TR   [] [] [] []       [] [] [] []       Pain Ratin-8/10 LLE pain reported    Activity Tolerance:   Good and Fair     After treatment patient left in no apparent distress:   Bed locked and returned to lowest position, Patient left in no apparent distress sitting up in chair and Call bell within reach, and nsg updated     COMMUNICATION/COLLABORATION:   The patient’s plan of care was discussed with: Registered nurse    Patient Education  Education Given To: Patient  Education Provided: Role of Therapy;Plan of Care;Home Exercise Program;Transfer Training;Mobility Training;Equipment;Energy Conservation;Precautions  Education Method: Verbal;Demonstration  Education Outcome: Verbalized understanding;Continued education needed;Demonstrated understanding    Preeti Worrell PTA

## 2025-01-06 NOTE — DISCHARGE SUMMARY
awake, moving all exts    Discharge/Recent Laboratory Results:  No results for input(s): \"NA\", \"K\", \"CL\", \"CO2\", \"BUN\", \"CREATININE\", \"GLUCOSE\", \"CALCIUM\", \"PHOS\", \"MG\" in the last 72 hours.  No results for input(s): \"HGB\", \"HCT\", \"WBC\", \"PLT\" in the last 72 hours.    Discharge Medications:     Medication List        START taking these medications      aspirin 81 MG chewable tablet  Take 1 tablet by mouth daily     atorvastatin 40 MG tablet  Commonly known as: LIPITOR  Take 1 tablet by mouth nightly     busPIRone 7.5 MG tablet  Commonly known as: BUSPAR  Take 1 tablet by mouth 2 times daily     empagliflozin 10 MG tablet  Commonly known as: JARDIANCE  Take 1 tablet by mouth daily     furosemide 20 MG tablet  Commonly known as: LASIX  Take 1 tablet by mouth daily     lidocaine 4 % external patch  Place 1 patch onto the skin daily     metoprolol tartrate 25 MG tablet  Commonly known as: LOPRESSOR  Take 1 tablet by mouth 2 times daily     QUEtiapine 50 MG tablet  Commonly known as: SEROQUEL  Take 1 tablet by mouth 3 times daily     senna 8.6 MG tablet  Commonly known as: SENOKOT  Take 1 tablet by mouth nightly     sertraline 50 MG tablet  Commonly known as: ZOLOFT  Take 1 tablet by mouth daily     tamsulosin 0.4 MG capsule  Commonly known as: FLOMAX  Take 1 capsule by mouth daily     tiotropium 2.5 MCG/ACT Aers inhaler  Commonly known as: SPIRIVA RESPIMAT  Inhale 2 puffs into the lungs daily            CHANGE how you take these medications      amiodarone 200 MG tablet  Commonly known as: CORDARONE  Take 1 tablet by mouth daily  What changed: Another medication with the same name was removed. Continue taking this medication, and follow the directions you see here.            CONTINUE taking these medications      budesonide-formoterol 160-4.5 MCG/ACT Aero  Commonly known as: Symbicort  Inhale 2 puffs into the lungs 2 times daily     guaiFENesin 1200 MG Tb12     montelukast 10 MG tablet  Commonly known as:  BLANCHEIR  Take 1 tablet by mouth nightly     pantoprazole 40 MG tablet  Commonly known as: PROTONIX               Where to Get Your Medications        These medications were sent to [facility] [address] [place] [address] - [address] [place] [address] - P [phone] - F [phone]  [facility] [address]      Phone: [phone]   amiodarone 200 MG tablet  aspirin 81 MG chewable tablet  atorvastatin 40 MG tablet  busPIRone 7.5 MG tablet  empagliflozin 10 MG tablet  furosemide 20 MG tablet  lidocaine 4 % external patch  metoprolol tartrate 25 MG tablet  QUEtiapine 50 MG tablet  senna 8.6 MG tablet  sertraline 50 MG tablet  tamsulosin 0.4 MG capsule  tiotropium 2.5 MCG/ACT Aers inhaler           DISPOSITION:    Home with Family:       Home with HH/PT/OT/RN:    SNF/LTC:               xx   RANJANA:    OTHER:        Code status:   Full  Recommended diet: cardiac diet  Recommended activity: activity as tolerated\      Follow up with:   PCP : None, None    Sheeba Gardner, RE  [address]  [address]  [facility] [address] [phone]    Go on 12/6/2024  at 2pm    Dinorah Segal APRN - NP  [address] [address] [phone]    Follow up on 3/3/2025  time at at 1400, the address is [address] [place] [address] [address] [place] [address], phone #[phone]        Total time in minutes spent coordinating this discharge (includes going over instructions, follow-up, prescriptions, and preparing report for sign off to her PCP) :  35 minutes

## 2025-01-07 PROCEDURE — 94668 MNPJ CHEST WALL SBSQ: CPT

## 2025-01-07 PROCEDURE — 6370000000 HC RX 637 (ALT 250 FOR IP): Performed by: INTERNAL MEDICINE

## 2025-01-07 PROCEDURE — 2500000003 HC RX 250 WO HCPCS: Performed by: INTERNAL MEDICINE

## 2025-01-07 PROCEDURE — 6370000000 HC RX 637 (ALT 250 FOR IP)

## 2025-01-07 PROCEDURE — 97530 THERAPEUTIC ACTIVITIES: CPT

## 2025-01-07 PROCEDURE — 2500000003 HC RX 250 WO HCPCS: Performed by: NURSE PRACTITIONER

## 2025-01-07 PROCEDURE — 94640 AIRWAY INHALATION TREATMENT: CPT

## 2025-01-07 PROCEDURE — 94761 N-INVAS EAR/PLS OXIMETRY MLT: CPT

## 2025-01-07 PROCEDURE — 1100000000 HC RM PRIVATE

## 2025-01-07 PROCEDURE — 6360000002 HC RX W HCPCS: Performed by: STUDENT IN AN ORGANIZED HEALTH CARE EDUCATION/TRAINING PROGRAM

## 2025-01-07 RX ORDER — BUDESONIDE AND FORMOTEROL FUMARATE DIHYDRATE 160; 4.5 UG/1; UG/1
2 AEROSOL RESPIRATORY (INHALATION) 2 TIMES DAILY
Qty: 30.6 G | Refills: 1 | Status: SHIPPED | OUTPATIENT
Start: 2025-01-07 | End: 2025-01-09

## 2025-01-07 RX ADMIN — GUAIFENESIN 300 MG: 100 SOLUTION ORAL at 16:47

## 2025-01-07 RX ADMIN — QUETIAPINE FUMARATE 50 MG: 25 TABLET ORAL at 21:17

## 2025-01-07 RX ADMIN — BUSPIRONE HYDROCHLORIDE 7.5 MG: 5 TABLET ORAL at 08:39

## 2025-01-07 RX ADMIN — GLYCOPYRROLATE 1 MG: 1 TABLET ORAL at 21:18

## 2025-01-07 RX ADMIN — FUROSEMIDE 20 MG: 40 TABLET ORAL at 08:39

## 2025-01-07 RX ADMIN — SODIUM CHLORIDE, PRESERVATIVE FREE 10 ML: 5 INJECTION INTRAVENOUS at 08:40

## 2025-01-07 RX ADMIN — SODIUM CHLORIDE, PRESERVATIVE FREE 10 ML: 5 INJECTION INTRAVENOUS at 21:23

## 2025-01-07 RX ADMIN — ATORVASTATIN CALCIUM 40 MG: 40 TABLET, FILM COATED ORAL at 21:18

## 2025-01-07 RX ADMIN — QUETIAPINE FUMARATE 50 MG: 25 TABLET ORAL at 13:57

## 2025-01-07 RX ADMIN — SENNOSIDES 8.6 MG: 8.6 TABLET, FILM COATED ORAL at 21:17

## 2025-01-07 RX ADMIN — SERTRALINE 50 MG: 50 TABLET, FILM COATED ORAL at 08:39

## 2025-01-07 RX ADMIN — FAMOTIDINE 20 MG: 20 TABLET, FILM COATED ORAL at 08:39

## 2025-01-07 RX ADMIN — Medication 2 PUFF: at 07:54

## 2025-01-07 RX ADMIN — POTASSIUM BICARBONATE 40 MEQ: 782 TABLET, EFFERVESCENT ORAL at 08:40

## 2025-01-07 RX ADMIN — METOPROLOL TARTRATE 25 MG: 25 TABLET, FILM COATED ORAL at 21:17

## 2025-01-07 RX ADMIN — ENOXAPARIN SODIUM 30 MG: 100 INJECTION SUBCUTANEOUS at 21:16

## 2025-01-07 RX ADMIN — GLYCOPYRROLATE 1 MG: 1 TABLET ORAL at 08:39

## 2025-01-07 RX ADMIN — Medication 2 PUFF: at 22:00

## 2025-01-07 RX ADMIN — ASPIRIN 81 MG 81 MG: 81 TABLET ORAL at 08:39

## 2025-01-07 RX ADMIN — FLUCONAZOLE 100 MG: 100 TABLET ORAL at 08:39

## 2025-01-07 RX ADMIN — METOPROLOL TARTRATE 25 MG: 25 TABLET, FILM COATED ORAL at 08:39

## 2025-01-07 RX ADMIN — GUAIFENESIN 300 MG: 100 SOLUTION ORAL at 21:17

## 2025-01-07 RX ADMIN — GUAIFENESIN 300 MG: 100 SOLUTION ORAL at 08:39

## 2025-01-07 RX ADMIN — GUAIFENESIN 300 MG: 100 SOLUTION ORAL at 13:57

## 2025-01-07 RX ADMIN — ENOXAPARIN SODIUM 30 MG: 100 INJECTION SUBCUTANEOUS at 08:39

## 2025-01-07 RX ADMIN — BUSPIRONE HYDROCHLORIDE 7.5 MG: 5 TABLET ORAL at 21:18

## 2025-01-07 RX ADMIN — TAMSULOSIN HYDROCHLORIDE 0.4 MG: 0.4 CAPSULE ORAL at 08:39

## 2025-01-07 RX ADMIN — QUETIAPINE FUMARATE 50 MG: 25 TABLET ORAL at 08:39

## 2025-01-07 RX ADMIN — EMPAGLIFLOZIN 10 MG: 10 TABLET, FILM COATED ORAL at 08:39

## 2025-01-07 RX ADMIN — GLYCOPYRROLATE 1 MG: 1 TABLET ORAL at 13:57

## 2025-01-07 RX ADMIN — POTASSIUM BICARBONATE 40 MEQ: 782 TABLET, EFFERVESCENT ORAL at 21:17

## 2025-01-07 ASSESSMENT — PAIN SCALES - GENERAL
PAINLEVEL_OUTOF10: 0

## 2025-01-07 NOTE — PROGRESS NOTES
Nutrient Intake  Physical Signs/Symptoms Outcomes: GI Status, Meal Time Behavior, Weight, Biochemical Data, Fluid Status or Edema    Discharge Planning:    Too soon to determine     Snehal Dennis RD  Contact: lucas

## 2025-01-07 NOTE — PLAN OF CARE
OCCUPATIONAL THERAPY TREATMENT  Patient: Kim Markham (49 y.o. female)  Date: 1/7/2025  Primary Diagnosis: Acute right-sided congestive heart failure (HCC) [I50.811]  SVT (supraventricular tachycardia) (HCC) [I47.10]  Essential hypertension [I10]  Hypoxia [R09.02]  COPD exacerbation (HCC) [J44.1]  Procedure(s) (LRB):  TRACHEOSTOMY (N/A) 34 Days Post-Op   Precautions: Fall Risk                Recommendations for nursing mobility: Out of bed to chair for meals, Encourage HEP in prep for ADLs/mobility; see handout for details, Frequent repositioning to prevent skin breakdown, AD and gt belt for bed to chair , Amb to bathroom with AD and gait belt, and Assist x1    In place during session: None  Chart, occupational therapy assessment, plan of care, and goals were reviewed.  ASSESSMENT  Patient continues with skilled OT services and is progressing towards goals. Pt seated in recliner upon GARDUNO arrival, agreeable to session. Pt A&O x 4. Pt politely declined all adl tasks stating that she already completed them.  Pt completed UE therex w/ green thera bands, see grid below for details, to maintain/ increase strength and endurance to aid in adl performance. Education provided on energy conservation, safety awareness and HEP w/ pt verbalizing good understanding. Pt left seated in recliner with all known needs met. (See below for objective details and assist levels).     Overall pt tolerated session well today with rest breaks. Current OT recommendations for discharge High intensity and comprehensive skilled occupational therapy in a multidisciplinary setting as patient is working towards tolerating up to 3 hours of therapy/day x 5-7 days/week. Will continue to benefit from skilled OT services, and will continue to progress as tolerated.       GOALS:    Problem: Occupational Therapy - Adult  Goal: By Discharge: Performs self-care activities at highest level of function for planned discharge setting.  See evaluation for

## 2025-01-07 NOTE — DISCHARGE SUMMARY
Discharge Summary    Name: Kim Markham  [id number]  YOB: 1975 (Age: 49 y.o.)   Date of Admission: 11/12/2024  Date of Discharge: 1/7/2025  Attending Physician: Jad Carver MD    Discharge Diagnosis:     Consultations:  IP CONSULT TO CARDIOLOGY  IP CONSULT TO PHARMACY  IP CONSULT TO OTOLARYNGOLOGY  IP CONSULT TO ETHICS  IP CONSULT TO SPIRITUAL CARE  IP CONSULT TO VASCULAR ACCESS TEAM      Brief Admission History/Reason for Admission Per Mee Elizabeth MD:     Chief Complaint / Reason for Physician Visit  \" Difficulty breathing\".  Discussed with RN events overnight.      11/13 --  Critical Care admission H&P  49 y.o. female with known past medical history significant for asthma and COPD who treated her symptoms today with Primatene Mist over-the-counter says that she went into fast heart rate became short of breath immediately.  No other exacerbating or relieving factors which presents to the emergency room with no treatment prior to arrival.  Patient reports that she had a few day history of shortness of breath and thought she had a pneumonia. She has been taking OTC cough syrup and cough medicine as well as her inhaler. She reports difficulty affording medications and has limited access to healthcare.      Patient was placed in ICU due to increased work of breathing.  V. tach was noted and patient was started on nitroglycerin infusion.  Tachycardia worsened by epinephrine and your inhaler.  Patient was placed on Rocephin, azithromycin, and Solu-Medrol for COPD exacerbation.  Respiratory status worsened on 11/13 AM and patient was intubated.  As for tachycardia, echo was performed and showed EF of 60 to 65%, LVH, abnormal diastolic function, RV moderately dilated.  CTA ruled out PE.  Duplex showed no DVT.  Cardiology consulted and started patient on aspirin, Plavix, and statin.  Patient was also noted to be SVT and required amiodarone infusions.  Due to  prescriptions, and preparing report for sign off to her PCP) :  35 minutes

## 2025-01-07 NOTE — PLAN OF CARE
PHYSICAL THERAPY TREATMENT     Patient: Kim Markham (49 y.o. female)  Date: 1/7/2025  Diagnosis: Acute right-sided congestive heart failure (HCC) [I50.811]  SVT (supraventricular tachycardia) (HCC) [I47.10]  Essential hypertension [I10]  Hypoxia [R09.02]  COPD exacerbation (HCC) [J44.1] SVT (supraventricular tachycardia) (HCC)  Procedure(s) (LRB):  TRACHEOSTOMY (N/A) 34 Days Post-Op  Precautions: Fall Risk                      Recommendations for nursing mobility: Out of bed to chair for meals, Amb to bathroom with AD and gait belt, and Assist x1    In place during session: EKG/telemetry   Chart, physical therapy assessment, plan of care and goals were reviewed.  ASSESSMENT  Patient continues with skilled PT services and is progressing towards goals. Pt sitting in recliner chair upon PT arrival, agreeable to session. (See below for objective details and assist levels).     Overall pt tolerated session well today. Pt stood from chair and ambulated ~300 ft with RW and sup. Gait was slow and steady with no lob or knee buckling noted. Pt performed standing static balance activities at the sink and seated LE therex with no c/o pain or discomfort. Pt left sitting up in chair with all needs met. Will continue to benefit from skilled PT services, and will continue to progress as tolerated. Current PT DC recommendation High intensity and comprehensive skilled physical therapy in a multidisciplinary setting as patient is working towards tolerating up to 3 hours of therapy/day x 5-7 days/week once medically appropriate.    GOALS:  Problem: Physical Therapy - Adult  Goal: By Discharge: Performs mobility at highest level of function for planned discharge setting.  See evaluation for individualized goals.  Description: FUNCTIONAL STATUS PRIOR TO ADMISSION: Patient was modified independent using a rolling walker and single point cane for functional mobility.    HOME SUPPORT PRIOR TO ADMISSION: The patient lived with  spouse but did not require assistance.    Physical Therapy Goals  Initiated 11/25/2024  Goals reassessed and revised 12/6/2024  Updated 12/27/2024  Goals revised 1/2/24  Pt stated goal: to go home  Pt will be I with LE HEP in 7 days.  Pt will be able to amb 150-250' with LRAD and SBA in 7 days.  Pt will be able to traverse 10-12 steps with unilateral handrail and SBA in 7 days.  Pt will demonstrate improvement in standing balance from good/fair with constant support to good/good unsupported in 7 days.    Pt will perform bed mobility with CGA assist x 1 in 7 days - goal met 12/27/20242  Patient to perform sit to stand transfer with mod A in 7 days. - Goal met 12/27/2024  Pt to perform stand pivot transfer from bed to chair with max A in 7 days. - goal met 12/27/2024  Outcome: Progressing     PLAN :  Patient continues to benefit from skilled intervention to address functional impairments. Continue treatment per established plan of care to address goals.    Recommendation for discharge: (in order for the patient to meet his/her long term goals)  High intensity and comprehensive skilled physical therapy in a multidisciplinary setting as patient is working towards tolerating up to 3 hours of therapy/day x 5-7 days/week    Potential barriers for safe discharge: concern for pt safely navigating or managing the home environment.    IF patient discharges home will need the following DME:continuing to assess with progress     SUBJECTIVE:   Patient stated “I had a feeling you were coming.”    OBJECTIVE DATA SUMMARY:   Critical Behavior:  Orientation  Overall Orientation Status: Within Normal Limits  Orientation Level: Oriented X4  Cognition  Overall Cognitive Status: WFL  Arousal/Alertness: Appears intact  Following Commands: Appears intact  Attention Span: Appears intact  Memory: Appears intact  Safety Judgement: Appears intact  Problem Solving: Good awareness of errors made;Able to problem solve independently  Insights:

## 2025-01-07 NOTE — CARE COORDINATION
Patient is pending placement and medicaid pending.  Medicaid application submitted on 12.3.25, T# [id number].  Referrals have been sent to multiple SNFs, whom most have declined due to no payor source.  We have reached out to sister facilities [facility] and [facility] to see if they will be able to accept patient.  CM called [facility] [phone] and spoke with admissions.  They will ask their business office to see if they can accept patient and get back with CM.

## 2025-01-08 LAB
BASOPHILS # BLD: 0.04 K/UL (ref 0–0.1)
BASOPHILS NFR BLD: 0.7 % (ref 0–1)
DIFFERENTIAL METHOD BLD: ABNORMAL
EOSINOPHIL # BLD: 0.25 K/UL (ref 0–0.4)
EOSINOPHIL NFR BLD: 4.2 % (ref 0–7)
ERYTHROCYTE [DISTWIDTH] IN BLOOD BY AUTOMATED COUNT: 16.7 % (ref 11.5–14.5)
HCT VFR BLD AUTO: 37.2 % (ref 35–47)
HGB BLD-MCNC: 11.4 G/DL (ref 11.5–16)
IMM GRANULOCYTES # BLD AUTO: 0.03 K/UL (ref 0–0.04)
IMM GRANULOCYTES NFR BLD AUTO: 0.5 % (ref 0–0.5)
LYMPHOCYTES # BLD: 1.26 K/UL (ref 0.8–3.5)
LYMPHOCYTES NFR BLD: 21.1 % (ref 12–49)
MCH RBC QN AUTO: 28.3 PG (ref 26–34)
MCHC RBC AUTO-ENTMCNC: 30.6 G/DL (ref 30–36.5)
MCV RBC AUTO: 92.3 FL (ref 80–99)
MONOCYTES # BLD: 0.61 K/UL (ref 0–1)
MONOCYTES NFR BLD: 10.2 % (ref 5–13)
NEUTS SEG # BLD: 3.79 K/UL (ref 1.8–8)
NEUTS SEG NFR BLD: 63.3 % (ref 32–75)
NRBC # BLD: 0 K/UL (ref 0–0.01)
NRBC BLD-RTO: 0 PER 100 WBC
PLATELET # BLD AUTO: 210 K/UL (ref 150–400)
PMV BLD AUTO: 12.1 FL (ref 8.9–12.9)
RBC # BLD AUTO: 4.03 M/UL (ref 3.8–5.2)
WBC # BLD AUTO: 6 K/UL (ref 3.6–11)

## 2025-01-08 PROCEDURE — 1100000000 HC RM PRIVATE

## 2025-01-08 PROCEDURE — 2500000003 HC RX 250 WO HCPCS: Performed by: NURSE PRACTITIONER

## 2025-01-08 PROCEDURE — 6370000000 HC RX 637 (ALT 250 FOR IP): Performed by: INTERNAL MEDICINE

## 2025-01-08 PROCEDURE — 36415 COLL VENOUS BLD VENIPUNCTURE: CPT

## 2025-01-08 PROCEDURE — 6360000002 HC RX W HCPCS: Performed by: STUDENT IN AN ORGANIZED HEALTH CARE EDUCATION/TRAINING PROGRAM

## 2025-01-08 PROCEDURE — 6370000000 HC RX 637 (ALT 250 FOR IP)

## 2025-01-08 PROCEDURE — 2500000003 HC RX 250 WO HCPCS: Performed by: INTERNAL MEDICINE

## 2025-01-08 PROCEDURE — 97530 THERAPEUTIC ACTIVITIES: CPT

## 2025-01-08 PROCEDURE — 94640 AIRWAY INHALATION TREATMENT: CPT

## 2025-01-08 PROCEDURE — 94761 N-INVAS EAR/PLS OXIMETRY MLT: CPT

## 2025-01-08 PROCEDURE — 85025 COMPLETE CBC W/AUTO DIFF WBC: CPT

## 2025-01-08 RX ORDER — SENNOSIDES A AND B 8.6 MG/1
1 TABLET, FILM COATED ORAL NIGHTLY PRN
Status: DISCONTINUED | OUTPATIENT
Start: 2025-01-08 | End: 2025-01-10 | Stop reason: HOSPADM

## 2025-01-08 RX ADMIN — ATORVASTATIN CALCIUM 40 MG: 40 TABLET, FILM COATED ORAL at 21:56

## 2025-01-08 RX ADMIN — GLYCOPYRROLATE 1 MG: 1 TABLET ORAL at 13:14

## 2025-01-08 RX ADMIN — ENOXAPARIN SODIUM 30 MG: 100 INJECTION SUBCUTANEOUS at 21:56

## 2025-01-08 RX ADMIN — ENOXAPARIN SODIUM 30 MG: 100 INJECTION SUBCUTANEOUS at 09:44

## 2025-01-08 RX ADMIN — SODIUM CHLORIDE, PRESERVATIVE FREE 10 ML: 5 INJECTION INTRAVENOUS at 21:59

## 2025-01-08 RX ADMIN — QUETIAPINE FUMARATE 50 MG: 25 TABLET ORAL at 09:44

## 2025-01-08 RX ADMIN — FUROSEMIDE 20 MG: 40 TABLET ORAL at 09:43

## 2025-01-08 RX ADMIN — Medication 2 PUFF: at 08:00

## 2025-01-08 RX ADMIN — TAMSULOSIN HYDROCHLORIDE 0.4 MG: 0.4 CAPSULE ORAL at 09:43

## 2025-01-08 RX ADMIN — METOPROLOL TARTRATE 25 MG: 25 TABLET, FILM COATED ORAL at 21:56

## 2025-01-08 RX ADMIN — EMPAGLIFLOZIN 10 MG: 10 TABLET, FILM COATED ORAL at 09:43

## 2025-01-08 RX ADMIN — BUSPIRONE HYDROCHLORIDE 7.5 MG: 5 TABLET ORAL at 09:42

## 2025-01-08 RX ADMIN — SODIUM CHLORIDE, PRESERVATIVE FREE 10 ML: 5 INJECTION INTRAVENOUS at 09:48

## 2025-01-08 RX ADMIN — POTASSIUM BICARBONATE 40 MEQ: 782 TABLET, EFFERVESCENT ORAL at 09:46

## 2025-01-08 RX ADMIN — ASPIRIN 81 MG 81 MG: 81 TABLET ORAL at 09:41

## 2025-01-08 RX ADMIN — ACETAMINOPHEN 650 MG: 650 SOLUTION ORAL at 16:35

## 2025-01-08 RX ADMIN — QUETIAPINE FUMARATE 50 MG: 25 TABLET ORAL at 13:14

## 2025-01-08 RX ADMIN — GLYCOPYRROLATE 1 MG: 1 TABLET ORAL at 09:41

## 2025-01-08 RX ADMIN — Medication 2 PUFF: at 21:13

## 2025-01-08 RX ADMIN — FAMOTIDINE 20 MG: 20 TABLET, FILM COATED ORAL at 09:43

## 2025-01-08 RX ADMIN — METOPROLOL TARTRATE 25 MG: 25 TABLET, FILM COATED ORAL at 09:42

## 2025-01-08 RX ADMIN — GUAIFENESIN 300 MG: 100 SOLUTION ORAL at 13:14

## 2025-01-08 RX ADMIN — FLUCONAZOLE 100 MG: 100 TABLET ORAL at 09:43

## 2025-01-08 RX ADMIN — SERTRALINE 50 MG: 50 TABLET, FILM COATED ORAL at 09:43

## 2025-01-08 RX ADMIN — GUAIFENESIN 300 MG: 100 SOLUTION ORAL at 09:44

## 2025-01-08 ASSESSMENT — PAIN SCALES - GENERAL
PAINLEVEL_OUTOF10: 0
PAINLEVEL_OUTOF10: 2
PAINLEVEL_OUTOF10: 1

## 2025-01-08 ASSESSMENT — PAIN SCALES - WONG BAKER
WONGBAKER_NUMERICALRESPONSE: NO HURT
WONGBAKER_NUMERICALRESPONSE: NO HURT

## 2025-01-08 NOTE — PROGRESS NOTES
1015: Bedside and Verbal shift change report given to Khadra Jc RN by Madyson Parks RN. Report included the following information Nurse Handoff Report, Index, ED Encounter Summary, Adult Overview, Surgery Report, Intake/Output, MAR, Recent Results, Alarm Parameters, Quality Measures, and Neuro Assessment.     1015: Assessment completed. Pt repositioned in bed.     1200: Reassessment completed. Pt up to chair.     1400: Pt ambulated in hallway with PT/OT.     1600: Reassessment completed. Pt repositioned in chair.     1800: Pt transferred from chair to bed.     1920: Bedside and Verbal shift change report given to PARISH Bacon (oncoming nurse) by Khadra Jc RN (offgoing nurse). Report included the following information Nurse Handoff Report, Index, ED Encounter Summary, Adult Overview, Surgery Report, Intake/Output, MAR, Recent Results, Alarm Parameters, Quality Measures, and Neuro Assessment.

## 2025-01-08 NOTE — CARE COORDINATION
Patient is pending placement.  CM received notification that therapy is recommending outpatient therapy at this time.  CM called [facility], [phone] and asked to speak with patients .  CM was transferred to [facility] [place] [address]492, no answer to telephone, CM left message requesting return telephone call.     CM met with patient at bedside, CM discussed that we are not recommending a facility any longer for her to go to.  Which would mean, she is safe to discharge from the hospital at this time. CM informed patient that CM called [facility] and left message,  Patient is asking if [facility] has a jerry in which they can help patient with hotel room at least.  Patient became very tearful during conversation.  CM inquired about if patient can stay with her mother, Patient stated that would require a plane ticket as her mother lives in [place].  CM has asked patient to call her friends and family to see if she can stay with someone.  CM will get homeless resources together for the patient.  Patient is upset, she has lost her job, has no income and no way to afford anything.  Spiritual care has already been consulted for patient.  CM is awaiting follow up call from [facility] to determine next steps.  Patient still without insurance. Patient was applied for medicaid on 12/3/24, it has been 37 days.

## 2025-01-08 NOTE — PLAN OF CARE
PHYSICAL THERAPY TREATMENT     Patient: Kim Markham (49 y.o. female)  Date: 1/8/2025  Diagnosis: Acute right-sided congestive heart failure (HCC) [I50.811]  SVT (supraventricular tachycardia) (HCC) [I47.10]  Essential hypertension [I10]  Hypoxia [R09.02]  COPD exacerbation (HCC) [J44.1] SVT (supraventricular tachycardia) (HCC)  Procedure(s) (LRB):  TRACHEOSTOMY (N/A) 35 Days Post-Op  Precautions: Fall Risk                      Recommendations for nursing mobility: Assist x1    In place during session: None  Chart, physical therapy assessment, plan of care and goals were reviewed.  ASSESSMENT  Patient continues with skilled PT services and is progressing towards goals. Pt sitting in recliner chair upon PT arrival, agreeable to session. (See below for objective details and assist levels).     Overall pt tolerated session well today. Pt emotional upon entering room.Pt apologized and just feels overwhelmed with everything going on. Active listening and empathy given to pt. Pt stood from chair and ambulated ~200 ft with RW and supervision. Pt returned to room and became emotional again. PTA asked if a solo has come to visit her to talk, she said no. Explained to pt that it does not need to be a spiritual chat but just someone to talk to; spiritual health consult put in. Pt left sitting up in chair with all needs met. Updating current PT discharge recommendations to Outpatient physical therapy for increased strengthening when medically appropriate due to improvement in mobility and activity tolerance; change discussed and agreed upon with supervising PT Deepthi Landry. Co-signature obtained. Will continue to benefit from skilled PT services, and will continue to progress as tolerated. Current PT DC recommendation Outpatient physical therapy for increased strengthening  once medically appropriate.    GOALS:  Problem: Physical Therapy - Adult  Goal: By Discharge: Performs mobility at highest level of function  Judgement: Appears intact  Problem Solving: Good awareness of errors made;Able to problem solve independently  Insights: Appears intact  Initiation: Appears intact  Sequencing: Appears intact  Functional Mobility Training:  Bed Mobility:  Bed Mobility Training  Bed Mobility Training: No  Transfers:  Transfer Training  Transfer Training: Yes  Sit to Stand: Modified independent  Stand to Sit: Modified independent  Balance:  Balance  Sitting: Intact  Standing: Intact  Standing - Static: Good  Standing - Dynamic: Good  Wheelchair Mobility:  Wheelchair Management  Wheelchair Management: No  Ambulation/Gait Training:  Gait  Gait Training: Yes  Overall Level of Assistance: Supervision  Distance (ft): 200 Feet  Assistive Device: Walker, rolling;Gait belt  Base of Support: Widened  Speed/Laura: Slow     Pain Ratin/10 reported    Activity Tolerance:   Good    After treatment patient left in no apparent distress:   Bed locked and returned to lowest position, Patient left in no apparent distress sitting up in chair and Call bell within reach, and nsg updated     COMMUNICATION/COLLABORATION:   The patient’s plan of care was discussed with: Registered nurse    Patient Education  Education Given To: Patient  Education Provided: Role of Therapy;Plan of Care;Home Exercise Program;Transfer Training;Mobility Training;Energy Conservation  Education Method: Verbal  Education Outcome: Verbalized understanding;Continued education needed;Demonstrated understanding    Preeti Worrell PTA  Minutes: 30

## 2025-01-08 NOTE — DISCHARGE SUMMARY
radiate  -Lidocaine patches as needed  -Nursing to inform if any concerns  -PT/OT for movement  Discharge to skilled nursing     CODE STATUS full    DVT prophylaxis   Lovenox      Discharge Exam:  Patient seen and examined by me on discharge day.  Pertinent Findings:  Patient Vitals for the past 24 hrs:   BP Temp Temp src Pulse Resp SpO2 Weight   01/08/25 1213 110/75 97.7 °F (36.5 °C) Oral 58 14 -- --   01/08/25 0802 -- -- -- 84 18 96 % --   01/08/25 0733 (!) 143/109 97.7 °F (36.5 °C) Oral 72 18 96 % --   01/08/25 0600 -- -- -- -- -- -- 122.9 kg (270 lb 15.1 oz)   01/08/25 0441 -- -- -- -- -- -- 122.8 kg (270 lb 11.6 oz)   01/08/25 0327 114/80 97.9 °F (36.6 °C) Oral 59 18 96 % --   01/07/25 2202 -- -- -- 82 18 94 % --   01/07/25 1930 113/75 97.8 °F (36.6 °C) Oral -- 16 94 % --   01/07/25 1550 118/85 98.1 °F (36.7 °C) Oral 84 18 93 % --       Gen:    Not in distress  Chest: Clear lungs  CVS:   Regular rhythm.  No edema  Abd:  Soft, not distended, not tender  Neuro: awake, moving all exts    Discharge/Recent Laboratory Results:  No results for input(s): \"NA\", \"K\", \"CL\", \"CO2\", \"BUN\", \"CREATININE\", \"GLUCOSE\", \"CALCIUM\", \"PHOS\", \"MG\" in the last 72 hours.  Recent Labs     01/08/25  0552   HGB 11.4*   HCT 37.2   WBC 6.0          Discharge Medications:     Medication List        START taking these medications      aspirin 81 MG chewable tablet  Take 1 tablet by mouth daily     atorvastatin 40 MG tablet  Commonly known as: LIPITOR  Take 1 tablet by mouth nightly     empagliflozin 10 MG tablet  Commonly known as: JARDIANCE  Take 1 tablet by mouth daily     furosemide 20 MG tablet  Commonly known as: LASIX  Take 1 tablet by mouth daily     lidocaine 4 % external patch  Place 1 patch onto the skin daily     metoprolol tartrate 25 MG tablet  Commonly known as: LOPRESSOR  Take 1 tablet by mouth 2 times daily     senna 8.6 MG tablet  Commonly known as: SENOKOT  Take 1 tablet by mouth nightly     tiotropium 2.5 MCG/ACT  instructions, follow-up, prescriptions, and preparing report for sign off to her PCP) :  35 minutes

## 2025-01-08 NOTE — PROGRESS NOTES
4 Eyes Skin Assessment     NAME:  Kim Markham  YOB: 1975  MEDICAL RECORD NUMBER:  [id number]    The patient is being assessed for  Other Wednesday skin assessment    I agree that at least one RN has performed a thorough Head to Toe Skin Assessment on the patient. ALL assessment sites listed below have been assessed.      Areas assessed by both nurses:    Head, Face, Ears, Shoulders, Back, Chest, Arms, Elbows, Hands, Sacrum. Buttock, Coccyx, Ischium, Legs. Feet and Heels, Under Medical Devices , and Other neck        Does the Patient have a Wound? Yes wound(s) were present on assessment. LDA wound assessment was Initiated and completed by RN       Jerson Prevention initiated by RN: Yes  Wound Care Orders initiated by RN: Yes    Pressure Injury (Stage 3,4, Unstageable, DTI, NWPT, and Complex wounds) if present, place Wound referral order by RN under : Yes    New Ostomies, if present place, Ostomy referral order under : No     Nurse 1 eSignature: Electronically signed by Bernice Jc RN on 1/8/25 at 3:05 PM EST    **SHARE this note so that the co-signing nurse can place an eSignature**    Nurse 2 eSignature: Electronically signed by Venkata Tovar RN on 1/8/25 at 3:11 PM EST

## 2025-01-08 NOTE — PLAN OF CARE
Problem: Discharge Planning  Goal: Discharge to home or other facility with appropriate resources  1/8/2025 1509 by Bernice Jc RN  Outcome: Progressing  1/8/2025 1029 by Madyson Parks RN  Outcome: Progressing     Problem: ABCDS Injury Assessment  Goal: Absence of physical injury  1/8/2025 1509 by Bernice Jc RN  Outcome: Progressing  1/8/2025 1029 by Madyson Parks RN  Outcome: Progressing     Problem: Safety - Adult  Goal: Free from fall injury  1/8/2025 1509 by Bernice Jc RN  Outcome: Progressing  1/8/2025 1029 by Madyson Parks RN  Outcome: Progressing     Problem: Confusion  Goal: Confusion, delirium, dementia, or psychosis is improved or at baseline  Description: INTERVENTIONS:  1. Assess for possible contributors to thought disturbance, including medications, impaired vision or hearing, underlying metabolic abnormalities, dehydration, psychiatric diagnoses, and notify attending LIP  2. [place] high risk fall precautions, as indicated  3. Provide frequent short contacts to provide reality reorientation, refocusing and direction  4. Decrease environmental stimuli, including noise as appropriate  5. Monitor and intervene to maintain adequate nutrition, hydration, elimination, sleep and activity  6. If unable to ensure safety without constant attention obtain sitter and review sitter guidelines with assigned personnel  7. Initiate Psychosocial CNS and Spiritual Care consult, as indicated  1/8/2025 1509 by Bernice Jc RN  Outcome: Progressing  1/8/2025 1029 by Madyson Parks RN  Outcome: Progressing     Problem: Chronic Conditions and Co-morbidities  Goal: Patient's chronic conditions and co-morbidity symptoms are monitored and maintained or improved  1/8/2025 1509 by Bernice Jc RN  Outcome: Progressing  1/8/2025 1029 by Madyson Parks RN  Outcome: Progressing     Problem: Neurosensory - Adult  Goal: Achieves stable or improved neurological status  1/8/2025 1509 by

## 2025-01-08 NOTE — PROGRESS NOTES
4 Eyes Skin Assessment     NAME:  Kim Markham  YOB: 1975  MEDICAL RECORD NUMBER:  [id number]    The patient is being assessed for  Other Weekly Skin Assessment    I agree that at least one RN has performed a thorough Head to Toe Skin Assessment on the patient. ALL assessment sites listed below have been assessed.      Areas assessed by both nurses:    Head, Face, Ears, Shoulders, Back, Chest, Arms, Elbows, Hands, Sacrum. Buttock, Coccyx, Ischium, Legs. Feet and Heels, and Under Medical Devices         Does the Patient have a Wound? Yes wound(s) were present on assessment. LDA wound assessment was Initiated and completed by RN       Jerson Prevention initiated by RN: No  Wound Care Orders initiated by RN: No    Pressure Injury (Stage 3,4, Unstageable, DTI, NWPT, and Complex wounds) if present, place Wound referral order by RN under : No    New Ostomies, if present place, Ostomy referral order under : No     Nurse 1 eSignature: Electronically signed by Priya Kline RN on 1/8/25 at 2:25 AM EST    **SHARE this note so that the co-signing nurse can place an eSignature**    Nurse 2 eSignature: Electronically signed by LAKESHA HERNANDEZ RN on 1/8/25 at 2:25 AM EST

## 2025-01-09 LAB
ANION GAP SERPL CALC-SCNC: 2 MMOL/L (ref 2–12)
BUN SERPL-MCNC: 7 MG/DL (ref 6–20)
BUN/CREAT SERPL: 9 (ref 12–20)
CA-I BLD-MCNC: 9.5 MG/DL (ref 8.5–10.1)
CHLORIDE SERPL-SCNC: 111 MMOL/L (ref 97–108)
CO2 SERPL-SCNC: 28 MMOL/L (ref 21–32)
CREAT SERPL-MCNC: 0.76 MG/DL (ref 0.55–1.02)
GLUCOSE SERPL-MCNC: 94 MG/DL (ref 65–100)
POTASSIUM SERPL-SCNC: 4.2 MMOL/L (ref 3.5–5.1)
SODIUM SERPL-SCNC: 141 MMOL/L (ref 136–145)

## 2025-01-09 PROCEDURE — 80048 BASIC METABOLIC PNL TOTAL CA: CPT

## 2025-01-09 PROCEDURE — 1100000000 HC RM PRIVATE

## 2025-01-09 PROCEDURE — 2500000003 HC RX 250 WO HCPCS: Performed by: NURSE PRACTITIONER

## 2025-01-09 PROCEDURE — 94640 AIRWAY INHALATION TREATMENT: CPT

## 2025-01-09 PROCEDURE — 6360000002 HC RX W HCPCS: Performed by: STUDENT IN AN ORGANIZED HEALTH CARE EDUCATION/TRAINING PROGRAM

## 2025-01-09 PROCEDURE — 6370000000 HC RX 637 (ALT 250 FOR IP)

## 2025-01-09 PROCEDURE — 6370000000 HC RX 637 (ALT 250 FOR IP): Performed by: INTERNAL MEDICINE

## 2025-01-09 PROCEDURE — 36415 COLL VENOUS BLD VENIPUNCTURE: CPT

## 2025-01-09 PROCEDURE — 94761 N-INVAS EAR/PLS OXIMETRY MLT: CPT

## 2025-01-09 PROCEDURE — 97530 THERAPEUTIC ACTIVITIES: CPT

## 2025-01-09 RX ORDER — METOPROLOL TARTRATE 25 MG/1
25 TABLET, FILM COATED ORAL 2 TIMES DAILY
Qty: 60 TABLET | Refills: 3 | Status: SHIPPED | OUTPATIENT
Start: 2025-01-09

## 2025-01-09 RX ORDER — SENNOSIDES A AND B 8.6 MG/1
1 TABLET, FILM COATED ORAL DAILY PRN
Qty: 30 TABLET | Refills: 0 | Status: SHIPPED | OUTPATIENT
Start: 2025-01-09 | End: 2025-02-08

## 2025-01-09 RX ORDER — BUDESONIDE AND FORMOTEROL FUMARATE DIHYDRATE 160; 4.5 UG/1; UG/1
2 AEROSOL RESPIRATORY (INHALATION) 2 TIMES DAILY
Qty: 30.6 G | Refills: 1 | Status: SHIPPED | OUTPATIENT
Start: 2025-01-09

## 2025-01-09 RX ORDER — POTASSIUM BICARBONATE 782 MG/1
1 TABLET, EFFERVESCENT ORAL DAILY
Qty: 30 TABLET | Refills: 0 | Status: SHIPPED | OUTPATIENT
Start: 2025-01-10 | End: 2025-01-10 | Stop reason: HOSPADM

## 2025-01-09 RX ORDER — LIDOCAINE 4 G/G
1 PATCH TOPICAL DAILY
Qty: 10 EACH | Refills: 1 | Status: SHIPPED | OUTPATIENT
Start: 2025-01-09

## 2025-01-09 RX ORDER — ATORVASTATIN CALCIUM 40 MG/1
40 TABLET, FILM COATED ORAL NIGHTLY
Qty: 30 TABLET | Refills: 3 | Status: SHIPPED | OUTPATIENT
Start: 2025-01-09

## 2025-01-09 RX ORDER — MONTELUKAST SODIUM 10 MG/1
10 TABLET ORAL NIGHTLY
Qty: 30 TABLET | Refills: 0 | Status: SHIPPED | OUTPATIENT
Start: 2025-01-09

## 2025-01-09 RX ORDER — ASPIRIN 81 MG/1
81 TABLET, CHEWABLE ORAL DAILY
Qty: 30 TABLET | Refills: 3 | Status: SHIPPED | OUTPATIENT
Start: 2025-01-09

## 2025-01-09 RX ORDER — FUROSEMIDE 20 MG/1
20 TABLET ORAL DAILY
Qty: 60 TABLET | Refills: 3 | Status: SHIPPED | OUTPATIENT
Start: 2025-01-09

## 2025-01-09 RX ADMIN — METOPROLOL TARTRATE 25 MG: 25 TABLET, FILM COATED ORAL at 09:49

## 2025-01-09 RX ADMIN — Medication 2 PUFF: at 20:06

## 2025-01-09 RX ADMIN — SODIUM CHLORIDE, PRESERVATIVE FREE 10 ML: 5 INJECTION INTRAVENOUS at 09:50

## 2025-01-09 RX ADMIN — ASPIRIN 81 MG 81 MG: 81 TABLET ORAL at 09:49

## 2025-01-09 RX ADMIN — SODIUM CHLORIDE, PRESERVATIVE FREE 10 ML: 5 INJECTION INTRAVENOUS at 22:27

## 2025-01-09 RX ADMIN — ENOXAPARIN SODIUM 30 MG: 100 INJECTION SUBCUTANEOUS at 20:32

## 2025-01-09 RX ADMIN — EMPAGLIFLOZIN 10 MG: 10 TABLET, FILM COATED ORAL at 09:49

## 2025-01-09 RX ADMIN — HYDROCODONE BITARTRATE AND ACETAMINOPHEN 1 TABLET: 5; 325 TABLET ORAL at 17:10

## 2025-01-09 RX ADMIN — ENOXAPARIN SODIUM 30 MG: 100 INJECTION SUBCUTANEOUS at 09:50

## 2025-01-09 RX ADMIN — Medication 2 PUFF: at 08:15

## 2025-01-09 RX ADMIN — FUROSEMIDE 20 MG: 40 TABLET ORAL at 09:50

## 2025-01-09 RX ADMIN — ATORVASTATIN CALCIUM 40 MG: 40 TABLET, FILM COATED ORAL at 20:32

## 2025-01-09 RX ADMIN — Medication 2 PUFF: at 08:16

## 2025-01-09 ASSESSMENT — PAIN SCALES - GENERAL
PAINLEVEL_OUTOF10: 8
PAINLEVEL_OUTOF10: 2
PAINLEVEL_OUTOF10: 0

## 2025-01-09 ASSESSMENT — PAIN DESCRIPTION - LOCATION
LOCATION: BACK
LOCATION: BACK

## 2025-01-09 ASSESSMENT — PAIN DESCRIPTION - ORIENTATION
ORIENTATION: LOWER
ORIENTATION: LOWER

## 2025-01-09 ASSESSMENT — PAIN DESCRIPTION - DESCRIPTORS
DESCRIPTORS: ACHING
DESCRIPTORS: ACHING

## 2025-01-09 NOTE — CARE COORDINATION
CM called [phone], extension [phone] to speak with Ms. Sandoval.  No answer to telephone, CM left voicemail requesting return telephone call back.  CM called [facility] emergency shelter in [place], [place] [phone], no answer to telephone, CM unable to leave voicemail as voicemail box is full.  CM called telephone number left on [facility] voicemail [phone], no answer to telephone.

## 2025-01-09 NOTE — DISCHARGE SUMMARY
tablet  Commonly known as: SENOKOT  Take 1 tablet by mouth daily as needed for Constipation     tiotropium 2.5 MCG/ACT Aers inhaler  Commonly known as: SPIRIVA RESPIMAT  Inhale 2 puffs into the lungs daily            CONTINUE taking these medications      budesonide-formoterol 160-4.5 MCG/ACT Aero  Commonly known as: Symbicort  Inhale 2 puffs into the lungs 2 times daily     montelukast 10 MG tablet  Commonly known as: SINGULAIR  Take 1 tablet by mouth nightly            STOP taking these medications      amiodarone 200 MG tablet  Commonly known as: CORDARONE     amiodarone 400 MG tablet  Commonly known as: PACERONE     guaiFENesin 1200 MG Tb12     pantoprazole 40 MG tablet  Commonly known as: PROTONIX               Where to Get Your Medications        These medications were sent to [place], [place] - [address] [place] [address] [phone] - F [phone]  [address] [facility] [address]      Phone: [phone]   aspirin 81 MG chewable tablet  atorvastatin 40 MG tablet  budesonide-formoterol 160-4.5 MCG/ACT Aero  Effer-K 20 MEQ Tbef effervescent tablet  empagliflozin 10 MG tablet  furosemide 20 MG tablet  lidocaine 4 % external patch  metoprolol tartrate 25 MG tablet  montelukast 10 MG tablet  senna 8.6 MG tablet  tiotropium 2.5 MCG/ACT Aers inhaler           DISPOSITION:    Home with Family:       Home with HH/PT/OT/RN:    SNF/LTC:               xx   RANJANA:    OTHER:        Code status:   Full  Recommended diet: cardiac diet  Recommended activity: activity as tolerated\      Follow up with:   PCP : None, None    Sheeba Gardner, PAGLORY  [address]  [address]  [facility] [address] [phone]    Go on 12/6/2024  at 2pm    Dinorah Segal APRN - NP  [address] [address] [phone]    Follow up on 3/3/2025  time at at 1400, the address is [address] [place] [address] [address] [place] [address], phone #[phone]    Lavell Iraheta MD  247

## 2025-01-09 NOTE — PROGRESS NOTES
Spiritual Health History and Assessment/Progress Note  [facility]    Advance Care Planning, Spiritual/Emotional Needs,  , Adjustment to illness,      Name: Kim Markham MRN: [id number]    Age: 49 y.o.     Sex: female   Language: English   Temple: None   SVT (supraventricular tachycardia) (HCC)     Date: 1/9/2025            Total Time Calculated: 90 min              Spiritual Assessment continued in [facility] ONCOLOGY        Referral/Consult From: Nurse   Encounter Overview/Reason: Advance Care Planning, Spiritual/Emotional Needs  Service Provided For: Patient    Susanne, Belief, Meaning:   Patient identifies as spiritual and has beliefs or practices that help with coping during difficult times  Family/Friends No family/friends present      Importance and Influence:  Patient has spiritual/personal beliefs that influence decisions regarding their health  Family/Friends No family/friends present    Community:  Patient feels well-supported. Support system includes: Parent/s and Friends and expresses feelings of isolation: Other: Previously felt disconnected from friends and family. Recently has felt more support.   Family/Friends No family/friends present    Assessment and Plan of Care:     Patient Interventions include: Facilitated expression of thoughts and feelings, Explored spiritual coping/struggle/distress, Engaged in theological reflection, Affirmed coping skills/support systems, and Facilitated life review and/ or legacy  Family/Friends Interventions include: No family/friends present    Patient Plan of Care: Spiritual Care available upon further referral  Family/Friends Plan of Care: Spiritual Care available upon further referral     is visiting for a routine spiritual assessment. As part of consultation, the patient expressed her wish to update her advance directive at this time. She wishes to update her former advance directive, keeping her boyfriend (Erick Natarajan) as her

## 2025-01-09 NOTE — CARE COORDINATION
CM received telephone call from Ms. Sandoval at [facility] [phone].  She stated they have no resources for homeless other than calling the homeless intake line.  CM met with patient and allowed Ms. Sandoval and the patient discuss medicaid.  Patient will need to provide proof of income to DSS, patient is working on getting her pay stubs from her previous employer, She will see if they will be able to email them to the patient at [email or web address] and then she can email them to [facility].  Patient did state she spoke with a friend of hers in [place], [place] that is able to come and get the patient Sunday or Monday after the weather breaks this weekend.  KARTHIKEYAN explained that CM can see if we can get approval for transport for today.  Patient stated her friend will need to get things in order prior to patient coming.  CM has asked patient to get the address of where she will be going and contact information for her friend so we can have it documented in her chart.  Patient will work on this information.

## 2025-01-09 NOTE — ACP (ADVANCE CARE PLANNING)
Advance Care Planning     Advance Care Planning Inpatient Note  [facility] Department    Today's Date: 1/9/2025  Unit: [facility] MEDICAL ONCOLOGY    Received request from patient.  Upon review of chart and communication with care team, patient's decision making abilities are not in question.. Patient was/were present in the room during visit.    Goals of ACP Conversation:  Discuss advance care planning documents  Facilitate a discussion related to patient's goals of care as they align with the patient's values and beliefs.    Health Care Decision Makers:       Primary Decision Maker: Erick Natarajan - Ezequielfriend - [phone]    Secondary Decision Maker: EliezerKalee dwyer - Parent - [phone]  Summary:  Completed New Documents  Updated Healthcare Decision Maker    Advance Care Planning Documents (Patient Wishes):  Healthcare Power of /Advance Directive Appointment of Health Care Agent  Living Will/Advance Directive  Anatomical Gift/Organ Donation     Assessment:   is visiting for a routine spiritual assessment. As part of consultation, the patient expressed her wish to update her advance directive at this time. She wishes to update her former advance directive, keeping her boyfriend (Erick Natarajan) as her primary medical decision maker, and appointing her mother (Kalee Markham) as her secondary medical decision maker. She has two adult children and siblings, but wishes to keep her boyfriend and mother in these respective roles for healthcare decision makers. She wishes to keep her healthcare instructions and anatomical gift as they are stated previously.  facilitated dialogue with the patient about her values and wishes. RN and  witnessed the patient's signature. The original document and copies were returned the the patient.     Interventions:  Provided education on documents for clarity and greater understanding  Discussed and provided education on state decision maker

## 2025-01-09 NOTE — PLAN OF CARE
PHYSICAL THERAPY TREATMENT     Patient: Kim Markham (49 y.o. female)  Date: 1/9/2025  Diagnosis: Acute right-sided congestive heart failure (HCC) [I50.811]  SVT (supraventricular tachycardia) (HCC) [I47.10]  Essential hypertension [I10]  Hypoxia [R09.02]  COPD exacerbation (HCC) [J44.1] SVT (supraventricular tachycardia) (HCC)  Procedure(s) (LRB):  TRACHEOSTOMY (N/A) 36 Days Post-Op  Precautions: Fall Risk                      Recommendations for nursing mobility: Out of bed to chair for meals, Amb to bathroom with AD and gait belt, and Assist x1    In place during session:   Chart, physical therapy assessment, plan of care and goals were reviewed.  ASSESSMENT  Patient continues with skilled PT services and is progressing towards goals. Pt sitting up in chair upon PT arrival, agreeable to session. Pt A&O x 4.  Pt declined amb at time of session stating she was too fatigued and had ambulated earlier.  Pt agreeable for LE exercises and review. (See below for objective details and assist levels).     Overall pt tolerated session good today with overall mod I reported with amb to and from bathroom with RW in room. Pt was able to perform LE exercises to include LAQ, seated marches, ankle pumps and heel raises.  Pt performed 10-20reps each LE.  Pt was educated on energy conservation and use of RW upon d/c for UE support due to ongoing L LE weakness. Pt with verbal understanding.  Will continue to benefit from skilled PT services, and will continue to progress as tolerated. Previous goals reviewed and remain appropriate at this time.  Current PT DC recommendation Outpatient physical therapy for functional mobility training  once medically appropriate.      GOALS:    Problem: Physical Therapy - Adult  Goal: By Discharge: Performs mobility at highest level of function for planned discharge setting.  See evaluation for individualized goals.  Description: FUNCTIONAL STATUS PRIOR TO ADMISSION: Patient was modified

## 2025-01-09 NOTE — PROGRESS NOTES
OT tx attempted at 1150 however pt w/ . Will continue to follow and re-attempt OT at a later time. Thank you.

## 2025-01-10 VITALS
DIASTOLIC BLOOD PRESSURE: 71 MMHG | HEIGHT: 68 IN | HEART RATE: 60 BPM | OXYGEN SATURATION: 96 % | WEIGHT: 270.95 LBS | TEMPERATURE: 97.9 F | BODY MASS INDEX: 41.06 KG/M2 | RESPIRATION RATE: 16 BRPM | SYSTOLIC BLOOD PRESSURE: 134 MMHG

## 2025-01-10 PROCEDURE — 6370000000 HC RX 637 (ALT 250 FOR IP): Performed by: INTERNAL MEDICINE

## 2025-01-10 PROCEDURE — 6360000002 HC RX W HCPCS: Performed by: STUDENT IN AN ORGANIZED HEALTH CARE EDUCATION/TRAINING PROGRAM

## 2025-01-10 PROCEDURE — 6370000000 HC RX 637 (ALT 250 FOR IP)

## 2025-01-10 PROCEDURE — 97530 THERAPEUTIC ACTIVITIES: CPT

## 2025-01-10 PROCEDURE — 94640 AIRWAY INHALATION TREATMENT: CPT

## 2025-01-10 PROCEDURE — 2500000003 HC RX 250 WO HCPCS: Performed by: NURSE PRACTITIONER

## 2025-01-10 RX ORDER — IPRATROPIUM BROMIDE AND ALBUTEROL SULFATE 2.5; .5 MG/3ML; MG/3ML
1 SOLUTION RESPIRATORY (INHALATION) EVERY 4 HOURS PRN
Qty: 360 ML | Refills: 0 | Status: SHIPPED | OUTPATIENT
Start: 2025-01-10 | End: 2025-02-09

## 2025-01-10 RX ORDER — POTASSIUM CHLORIDE 750 MG/1
10 TABLET, EXTENDED RELEASE ORAL 2 TIMES DAILY
Qty: 60 TABLET | Refills: 0 | Status: SHIPPED | OUTPATIENT
Start: 2025-01-10

## 2025-01-10 RX ADMIN — ENOXAPARIN SODIUM 30 MG: 100 INJECTION SUBCUTANEOUS at 08:42

## 2025-01-10 RX ADMIN — EMPAGLIFLOZIN 10 MG: 10 TABLET, FILM COATED ORAL at 08:49

## 2025-01-10 RX ADMIN — SODIUM CHLORIDE, PRESERVATIVE FREE 10 ML: 5 INJECTION INTRAVENOUS at 08:42

## 2025-01-10 RX ADMIN — ASPIRIN 81 MG 81 MG: 81 TABLET ORAL at 08:42

## 2025-01-10 RX ADMIN — FUROSEMIDE 20 MG: 40 TABLET ORAL at 08:42

## 2025-01-10 RX ADMIN — METOPROLOL TARTRATE 25 MG: 25 TABLET, FILM COATED ORAL at 08:42

## 2025-01-10 RX ADMIN — POTASSIUM BICARBONATE 20 MEQ: 782 TABLET, EFFERVESCENT ORAL at 08:42

## 2025-01-10 RX ADMIN — Medication 2 PUFF: at 08:22

## 2025-01-10 RX ADMIN — Medication 2 PUFF: at 08:23

## 2025-01-10 ASSESSMENT — PAIN SCALES - GENERAL: PAINLEVEL_OUTOF10: 6

## 2025-01-10 ASSESSMENT — PAIN DESCRIPTION - LOCATION: LOCATION: BACK

## 2025-01-10 ASSESSMENT — PAIN DESCRIPTION - ORIENTATION: ORIENTATION: LOWER

## 2025-01-10 NOTE — PLAN OF CARE
Problem: Discharge Planning  Goal: Discharge to home or other facility with appropriate resources  1/10/2025 1435 by Nathalia Llanos RN  Outcome: Adequate for Discharge  1/10/2025 1016 by Nathalia Llanos RN  Outcome: Progressing  1/10/2025 0139 by Sara Vásquez RN  Outcome: Progressing  Flowsheets (Taken 1/9/2025 2025)  Discharge to home or other facility with appropriate resources:   Identify barriers to discharge with patient and caregiver   Identify discharge learning needs (meds, wound care, etc)     Problem: ABCDS Injury Assessment  Goal: Absence of physical injury  1/10/2025 1435 by Nathalia Llanos RN  Outcome: Adequate for Discharge  1/10/2025 1016 by Nathalia Llanos RN  Outcome: Progressing  1/10/2025 0139 by Sara Vásquez RN  Outcome: Progressing     Problem: Safety - Adult  Goal: Free from fall injury  1/10/2025 1435 by Nathalia Llanos RN  Outcome: Adequate for Discharge  1/10/2025 1016 by Nathalia Llanos RN  Outcome: Progressing  1/10/2025 0139 by Sara Vásquez RN  Outcome: Progressing     Problem: Confusion  Goal: Confusion, delirium, dementia, or psychosis is improved or at baseline  Description: INTERVENTIONS:  1. Assess for possible contributors to thought disturbance, including medications, impaired vision or hearing, underlying metabolic abnormalities, dehydration, psychiatric diagnoses, and notify attending LIP  2. [place] high risk fall precautions, as indicated  3. Provide frequent short contacts to provide reality reorientation, refocusing and direction  4. Decrease environmental stimuli, including noise as appropriate  5. Monitor and intervene to maintain adequate nutrition, hydration, elimination, sleep and activity  6. If unable to ensure safety without constant attention obtain sitter and review sitter guidelines with assigned personnel  7. Initiate Psychosocial CNS and Spiritual Care consult, as indicated  1/10/2025 1435 by Nathalia Llanos RN  Outcome: Adequate for  physical therapy/occupational therapy   Encourage visually impaired, hearing impaired and aphasic patients to use assistive/communication devices     Problem: Respiratory - Adult  Goal: Achieves optimal ventilation and oxygenation  1/10/2025 1435 by Nathalia Llanos RN  Outcome: Adequate for Discharge  1/10/2025 1016 by Nathalia Llanos RN  Outcome: Progressing  1/10/2025 0139 by Sara Vásquez RN  Outcome: Progressing  Flowsheets (Taken 1/9/2025 2025)  Achieves optimal ventilation and oxygenation:   Assess for changes in respiratory status   Assess for changes in mentation and behavior   Position to facilitate oxygenation and minimize respiratory effort   Oxygen supplementation based on oxygen saturation or arterial blood gases   Assess and instruct to report shortness of breath or any respiratory difficulty     Problem: Cardiovascular - Adult  Goal: Maintains optimal cardiac output and hemodynamic stability  1/10/2025 1435 by Nathalia Llanos RN  Outcome: Adequate for Discharge  1/10/2025 1016 by Nathalia Llanos RN  Outcome: Progressing  1/10/2025 0139 by Sara Vásquez RN  Outcome: Progressing  Flowsheets (Taken 1/9/2025 2025)  Maintains optimal cardiac output and hemodynamic stability:   Monitor blood pressure and heart rate   Monitor urine output and notify Licensed Independent Practitioner for values outside of normal range   Assess for signs of decreased cardiac output  Goal: Absence of cardiac dysrhythmias or at baseline  1/10/2025 1435 by Nathalia Llanos RN  Outcome: Adequate for Discharge  1/10/2025 1016 by Nathalia Llanos RN  Outcome: Progressing  1/10/2025 0139 by Sara Vásquez RN  Outcome: Progressing  Flowsheets (Taken 1/9/2025 2025)  Absence of cardiac dysrhythmias or at baseline:   Monitor cardiac rate and rhythm   Assess for signs of decreased cardiac output     Problem: Musculoskeletal - Adult  Goal: Return mobility to safest level of function  1/10/2025 1435 by Nathalia Llanos

## 2025-01-10 NOTE — PLAN OF CARE
Problem: Discharge Planning  Goal: Discharge to home or other facility with appropriate resources  Outcome: Progressing  Flowsheets (Taken 1/9/2025 2025)  Discharge to home or other facility with appropriate resources:   Identify barriers to discharge with patient and caregiver   Identify discharge learning needs (meds, wound care, etc)     Problem: ABCDS Injury Assessment  Goal: Absence of physical injury  Outcome: Progressing     Problem: Safety - Adult  Goal: Free from fall injury  Outcome: Progressing     Problem: Confusion  Goal: Confusion, delirium, dementia, or psychosis is improved or at baseline  Description: INTERVENTIONS:  1. Assess for possible contributors to thought disturbance, including medications, impaired vision or hearing, underlying metabolic abnormalities, dehydration, psychiatric diagnoses, and notify attending LIP  2. [place] high risk fall precautions, as indicated  3. Provide frequent short contacts to provide reality reorientation, refocusing and direction  4. Decrease environmental stimuli, including noise as appropriate  5. Monitor and intervene to maintain adequate nutrition, hydration, elimination, sleep and activity  6. If unable to ensure safety without constant attention obtain sitter and review sitter guidelines with assigned personnel  7. Initiate Psychosocial CNS and Spiritual Care consult, as indicated  Outcome: Progressing  Flowsheets (Taken 1/9/2025 2025)  Effect of thought disturbance (confusion, delirium, dementia, or psychosis) are managed with adequate functional status:   Assess for contributors to thought disturbance, including medications, impaired vision or hearing, underlying metabolic abnormalities, dehydration, psychiatric diagnoses, notify LIP   Provide frequent short contacts to provide reality reorientation, refocusing and direction   Decrease environmental stimuli, including noise as appropriate   Monitor and intervene to maintain adequate nutrition,  modified independent using a rolling walker and single point cane for functional mobility.    HOME SUPPORT PRIOR TO ADMISSION: The patient lived with spouse but did not require assistance.    Physical Therapy Goals  Initiated 11/25/2024  Goals reassessed and revised 12/6/2024  Updated 12/27/2024  Goals revised 1/2/24  Pt stated goal: to go home  Pt will be I with LE HEP in 7 days.  Pt will be able to amb 150-250' with LRAD and SBA in 7 days.  Pt will be able to traverse 10-12 steps with unilateral handrail and SBA in 7 days.  Pt will demonstrate improvement in standing balance from good/fair with constant support to good/good unsupported in 7 days.    Pt will perform bed mobility with CGA assist x 1 in 7 days - goal met 12/27/20242  Patient to perform sit to stand transfer with mod A in 7 days. - Goal met 12/27/2024  Pt to perform stand pivot transfer from bed to chair with max A in 7 days. - goal met 12/27/2024 1/9/2025 1553 by Radha Sahu, PT  Outcome: Progressing

## 2025-01-10 NOTE — PLAN OF CARE
OCCUPATIONAL THERAPY WEEKLY REASSESSMENT WITH DISCHARGE    Patient: Kim Markham (49 y.o. female)  Date: 1/10/2025  Primary Diagnosis: Acute right-sided congestive heart failure (HCC) [I50.811]  SVT (supraventricular tachycardia) (HCC) [I47.10]  Essential hypertension [I10]  Hypoxia [R09.02]  COPD exacerbation (HCC) [J44.1]  Procedure(s) (LRB):  TRACHEOSTOMY (N/A) 37 Days Post-Op   Precautions: Fall Risk                Recommendations for nursing mobility: Out of bed to chair for meals, Encourage HEP in prep for ADLs/mobility; see handout for details, AD and gt belt for bed to chair , Amb to bathroom with AD and gait belt, and Assist x1    In place during session: None  Chart, occupational therapy assessment, plan of care, and goals were reviewed.  ASSESSMENT  Patient initially seen for OT evaluation 11/25/24 and 11 skilled OT sessions since evalution. Patient seen today for OT reevaluation s/t LOS. Patient A&O x 4. Pt seated in chair upon arrival, agreeable to session.     Based on the objective data described below pt currently present at mod I-IND for ADLs and functional transfers/mobility. (See below for objective details and assist levels).     Overall pt tolerated session well today with no c/o pain. Pt able to complete LB dressing, OOB functional mobility/transfers, toileting routine, standing grooming, and able to wipe toilet seat with mod I-IND. Pt demo's good safety awareness throughout activity. No LOB, SOB, or dizziness with activity. Pt educated on OPOT or HHOT once discharged if having difficulty with daily activities in the home setting and pt verbalized good understanding. Pt currently has no need for continued skilled acute OT at this time either noted by OT or reported by pt, will DC skilled OT following re-evaluation; pt verbalized understanding and agreement.  Current OT DC recommendation No skilled occupational therapy, return to previous living setting once medically appropriate.    Patient  Represents clinically-significant functional categories (i.e. Activities of daily living).  Percentage of Impairment CH    0%   CI    1-19% CJ    20-39% CK    40-59% CL    60-79% CM    80-99% CN     100%   [facility]  Score 6-24 24 23 20-22 15-19 10-14 7-9 6       Pain Ratin/10   Pain Intervention(s):       Activity Tolerance:   Good  Please refer to the flowsheet for vital signs taken during this treatment.    After treatment:   Bed locked and in lowest position Patient left in no apparent distress sitting up in chair, Call bell within reach, and Updated patient's board on functional status and mobility recommendations and nsg updated.    COMMUNICATION/EDUCATION:   The patient’s plan of care was discussed with: Registered nurse    Patient Education  Education Given To: Patient  Education Provided: Role of Therapy;ADL Adaptive Strategies;Plan of Care  Education Method: Verbal  Barriers to Learning: None  Education Outcome: Verbalized understanding    Thank you for this referral.  Maria Raya OT  Minutes: 30

## 2025-01-10 NOTE — PLAN OF CARE
Problem: Discharge Planning  Goal: Discharge to home or other facility with appropriate resources  1/10/2025 1016 by Nathalia Llanos RN  Outcome: Progressing  1/10/2025 0139 by Sara Vásquez RN  Outcome: Progressing  Flowsheets (Taken 1/9/2025 2025)  Discharge to home or other facility with appropriate resources:   Identify barriers to discharge with patient and caregiver   Identify discharge learning needs (meds, wound care, etc)     Problem: ABCDS Injury Assessment  Goal: Absence of physical injury  1/10/2025 1016 by Nathalia Llanos RN  Outcome: Progressing  1/10/2025 0139 by Sara Vásquez RN  Outcome: Progressing     Problem: Safety - Adult  Goal: Free from fall injury  1/10/2025 1016 by Nathalia Llanos RN  Outcome: Progressing  1/10/2025 0139 by Sara Vásquez RN  Outcome: Progressing     Problem: Confusion  Goal: Confusion, delirium, dementia, or psychosis is improved or at baseline  Description: INTERVENTIONS:  1. Assess for possible contributors to thought disturbance, including medications, impaired vision or hearing, underlying metabolic abnormalities, dehydration, psychiatric diagnoses, and notify attending LIP  2. [place] high risk fall precautions, as indicated  3. Provide frequent short contacts to provide reality reorientation, refocusing and direction  4. Decrease environmental stimuli, including noise as appropriate  5. Monitor and intervene to maintain adequate nutrition, hydration, elimination, sleep and activity  6. If unable to ensure safety without constant attention obtain sitter and review sitter guidelines with assigned personnel  7. Initiate Psychosocial CNS and Spiritual Care consult, as indicated  1/10/2025 1016 by Nathalia Llanos RN  Outcome: Progressing  1/10/2025 0139 by Sara Vásquez RN  Outcome: Progressing  Flowsheets (Taken 1/9/2025 2025)  Effect of thought disturbance (confusion, delirium, dementia, or psychosis) are managed with adequate functional  progressive release per policy  1/10/2025 1016 by Nathalia Llanos RN  Outcome: Progressing  1/10/2025 0139 by Sara Vásquez RN  Outcome: Progressing     Problem: Safety - Medical Restraint  Goal: Remains free of injury from restraints (Restraint for Interference with Medical Device)  Description: INTERVENTIONS:  1. Determine that other, less restrictive measures have been tried or would not be effective before applying the restraint  2. Evaluate the patient's condition at the time of restraint application  3. Inform patient/family regarding the reason for restraint  4. Q2H: Monitor safety, psychosocial status, comfort, nutrition and hydration  1/10/2025 1016 by Nathalia Llanos RN  Outcome: Progressing  1/10/2025 0139 by Sara Vásquez RN  Outcome: Progressing     Problem: Pain  Goal: Verbalizes/displays adequate comfort level or baseline comfort level  1/10/2025 1016 by Nathalia Llanos RN  Outcome: Progressing  1/10/2025 0139 by Sara Vásquez RN  Outcome: Progressing  Flowsheets (Taken 1/9/2025 2027)  Verbalizes/displays adequate comfort level or baseline comfort level:   Encourage patient to monitor pain and request assistance   Assess pain using appropriate pain scale   Administer analgesics based on type and severity of pain and evaluate response   Implement non-pharmacological measures as appropriate and evaluate response   Consider cultural and social influences on pain and pain management   Notify Licensed Independent Practitioner if interventions unsuccessful or patient reports new pain     Problem: Skin/Tissue Integrity  Goal: Absence of new skin breakdown  Description: 1.  Monitor for areas of redness and/or skin breakdown  2.  Assess vascular access sites hourly  3.  Every 4-6 hours minimum:  Change oxygen saturation probe site  4.  Every 4-6 hours:  If on nasal continuous positive airway pressure, respiratory therapy assess nares and determine need for appliance change or resting

## 2025-01-10 NOTE — CARE COORDINATION
Transition of Care Plan:    RUR: 11%  Prior Level of Functioning: IND  Disposition: Home  ADOLFO: Today  If SNF or IPR: Date FOC offered: NA  Date FOC received: NA  Accepting facility: NA  Date authorization started with reference number: NA  Date authorization received and expires: NA  Follow up appointments: Patient to arrange  DME needed: Rolling Walker has been supplied  Transportation at discharge: Friend  IM/IMM Medicare/[place] letter given: NA  Is patient a [place] and connected with [facility]? No   If yes, was  transfer form completed and [facility] notified? NA  Caregiver Contact: Erick Natarajan [phone]  Discharge Caregiver contacted prior to discharge? NO  Care Conference needed? No  Barriers to discharge: Medications being delivered.     Patient with discharge order present.  Patient has a friend going to come to pick her up today around 2pm.  Medications to be delivered by [facility] today, hospital approved 30 day supply, no OTCs.  Patient has a Rolling Walker and Nebulizer machine.  Patient is cleared to discharge from CM standpoint once medications are delivered.

## 2025-01-10 NOTE — DISCHARGE SUMMARY
Discharge Summary    Name: Kim Markham  [id number]  YOB: 1975 (Age: 49 y.o.)   Date of Admission: 11/12/2024  Date of Discharge: 1/10/2025  Attending Physician: Jad Carver MD    Discharge Diagnosis:     Consultations:  IP CONSULT TO CARDIOLOGY  IP CONSULT TO PHARMACY  IP CONSULT TO OTOLARYNGOLOGY  IP CONSULT TO ETHICS  IP CONSULT TO SPIRITUAL CARE  IP CONSULT TO VASCULAR ACCESS TEAM  IP CONSULT TO SPIRITUAL CARE      Brief Admission History/Reason for Admission Per Mee Elizabeth MD:     Chief Complaint / Reason for Physician Visit  \" Difficulty breathing\".  Discussed with RN events overnight.      11/13 --  Critical Care admission H&P  49 y.o. female with known past medical history significant for asthma and COPD who treated her symptoms today with Primatene Mist over-the-counter says that she went into fast heart rate became short of breath immediately.  No other exacerbating or relieving factors which presents to the emergency room with no treatment prior to arrival.  Patient reports that she had a few day history of shortness of breath and thought she had a pneumonia. She has been taking OTC cough syrup and cough medicine as well as her inhaler. She reports difficulty affording medications and has limited access to healthcare.      Patient was placed in ICU due to increased work of breathing.  V. tach was noted and patient was started on nitroglycerin infusion.  Tachycardia worsened by epinephrine and your inhaler.  Patient was placed on Rocephin, azithromycin, and Solu-Medrol for COPD exacerbation.  Respiratory status worsened on 11/13 AM and patient was intubated.  As for tachycardia, echo was performed and showed EF of 60 to 65%, LVH, abnormal diastolic function, RV moderately dilated.  CTA ruled out PE.  Duplex showed no DVT.  Cardiology consulted and started patient on aspirin, Plavix, and statin.  Patient was also noted to be SVT and required    RANJANA:    OTHER:        Code status:   Full  Recommended diet: cardiac diet  Recommended activity: activity as tolerated\      Follow up with:   PCP : None, None    Sheeba Gardner, PAGLORY  [address]  [facility] [address]  [facility] [address] [phone]    Go on 12/6/2024  at 2pm    Dinorah Segal APRN - NP  [address] [facility] [address]  [facility] [address] [phone]    Follow up on 3/3/2025  time at at 1400, the address is [address] [place] [address] [address] [place] [address], phone #[phone]    Lavell Iraheta MD  [address]  [address] [address] [phone]    Follow up in 2 week(s)          Total time in minutes spent coordinating this discharge (includes going over instructions, follow-up, prescriptions, and preparing report for sign off to her PCP) :  35 minutes

## (undated) DEVICE — SOLUTION IRRIG 500ML 0.9% SOD CHLO USP POUR PLAS BTL

## (undated) DEVICE — WET SKIN PREP TRAY: Brand: MEDLINE INDUSTRIES, INC.

## (undated) DEVICE — GLOVE ORANGE PI 7   MSG9070

## (undated) DEVICE — SUTURE VICRYL + SZ 3-0 L18IN ABSRB UD SH 1/2 CIR TAPERCUT NDL VCP864D

## (undated) DEVICE — DRESSING,GAUZE,XEROFORM,CURAD,5"X9",ST: Brand: CURAD

## (undated) DEVICE — Device: Brand: JELCO

## (undated) DEVICE — SYRINGE IRRIG 60ML SFT PLIABLE BLB EZ TO GRP 1 HND USE W/

## (undated) DEVICE — AGENT HEMOSTATIC SURGIFLOW MATRIX KIT W/THROMBIN

## (undated) DEVICE — TUBE ET DIA5MM ORAL NSL CUF MURPHY EYE HI LO RADPQ LN DISP

## (undated) DEVICE — BLADE ES ELASTOMERIC COAT INSUL DURABLE BEND UPTO 90DEG

## (undated) DEVICE — DRAPE,TOP,102X53,STERILE: Brand: MEDLINE

## (undated) DEVICE — BLADE,CARBON-STEEL,15,STRL,DISPOSABLE,TB: Brand: MEDLINE

## (undated) DEVICE — PENCIL ES CRD L10FT HND SWCHING ROCK SWCH W/ EDGE COAT BLDE

## (undated) DEVICE — MINOR GENERAL: Brand: MEDLINE INDUSTRIES, INC.

## (undated) DEVICE — CORD BPLR 12FT SGL USE CLR

## (undated) DEVICE — Device

## (undated) DEVICE — GLOVE SURG SZ 6 THK91MIL LTX FREE SYN POLYISOPRENE ANTI

## (undated) DEVICE — NEEDLE,BLUNT,18GX1.5": Brand: MEDLINE

## (undated) DEVICE — SPONGE,PEANUT,XRAY,ST,SM,3/8",5/CARD: Brand: MEDLINE INDUSTRIES, INC.

## (undated) DEVICE — SHEET, DRAPE, SPLIT, STERILE: Brand: MEDLINE

## (undated) DEVICE — AGENT HEMOSTATIC SURG ORIGINAL ABS 4X8IN LOOSE KNIT 12/CA

## (undated) DEVICE — FORCEPS BIPOLAR 8.25IN .75MM STRAIGHT BAYONET

## (undated) DEVICE — APPLIER CLP L9.375IN APER 2.1MM CLS L3.8MM 20 SM TI CLP